# Patient Record
Sex: FEMALE | Race: WHITE | NOT HISPANIC OR LATINO | Employment: OTHER | URBAN - METROPOLITAN AREA
[De-identification: names, ages, dates, MRNs, and addresses within clinical notes are randomized per-mention and may not be internally consistent; named-entity substitution may affect disease eponyms.]

---

## 2017-02-22 ENCOUNTER — HISTORICAL (OUTPATIENT)
Dept: ADMINISTRATIVE | Facility: HOSPITAL | Age: 62
End: 2017-02-22

## 2018-03-01 ENCOUNTER — HISTORICAL (OUTPATIENT)
Dept: ADMINISTRATIVE | Facility: HOSPITAL | Age: 63
End: 2018-03-01

## 2018-03-01 LAB — ERYTHROCYTE [SEDIMENTATION RATE] IN BLOOD: 55 MM/HR (ref 0–20)

## 2018-05-30 ENCOUNTER — HISTORICAL (OUTPATIENT)
Dept: INTERNAL MEDICINE | Facility: CLINIC | Age: 63
End: 2018-05-30

## 2018-05-30 LAB
ABS NEUT (OLG): 5.04 X10(3)/MCL (ref 2.1–9.2)
ALBUMIN SERPL-MCNC: 3.2 GM/DL (ref 3.4–5)
ALBUMIN/GLOB SERPL: 1 RATIO (ref 1–2)
ALP SERPL-CCNC: 150 UNIT/L (ref 45–117)
ALT SERPL-CCNC: 23 UNIT/L (ref 12–78)
AST SERPL-CCNC: 17 UNIT/L (ref 15–37)
BASOPHILS # BLD AUTO: 0.07 X10(3)/MCL
BASOPHILS NFR BLD AUTO: 1 %
BILIRUB SERPL-MCNC: 0.5 MG/DL (ref 0.2–1)
BILIRUBIN DIRECT+TOT PNL SERPL-MCNC: 0.1 MG/DL
BILIRUBIN DIRECT+TOT PNL SERPL-MCNC: 0.4 MG/DL
BUN SERPL-MCNC: 13 MG/DL (ref 7–18)
CALCIUM SERPL-MCNC: 9 MG/DL (ref 8.5–10.1)
CHLORIDE SERPL-SCNC: 103 MMOL/L (ref 98–107)
CHOLEST SERPL-MCNC: 141 MG/DL
CHOLEST/HDLC SERPL: 2.5 {RATIO} (ref 0–4.4)
CO2 SERPL-SCNC: 28 MMOL/L (ref 21–32)
CREAT SERPL-MCNC: 1 MG/DL (ref 0.6–1.3)
CREAT UR-MCNC: 310 MG/DL
EOSINOPHIL # BLD AUTO: 0.28 X10(3)/MCL
EOSINOPHIL NFR BLD AUTO: 4 %
ERYTHROCYTE [DISTWIDTH] IN BLOOD BY AUTOMATED COUNT: 14.5 % (ref 11.5–14.5)
ERYTHROCYTE [SEDIMENTATION RATE] IN BLOOD: 48 MM/HR (ref 0–20)
EST. AVERAGE GLUCOSE BLD GHB EST-MCNC: 275 MG/DL
GLOBULIN SER-MCNC: 4.7 GM/ML (ref 2.3–3.5)
GLUCOSE SERPL-MCNC: 252 MG/DL (ref 74–106)
HBA1C MFR BLD: 11.2 % (ref 4.2–6.3)
HCT VFR BLD AUTO: 41.5 % (ref 35–46)
HDLC SERPL-MCNC: 56 MG/DL
HGB BLD-MCNC: 13.2 GM/DL (ref 12–16)
IMM GRANULOCYTES # BLD AUTO: 0.03 10*3/UL
IMM GRANULOCYTES NFR BLD AUTO: 0 %
LDLC SERPL CALC-MCNC: 66 MG/DL (ref 0–130)
LYMPHOCYTES # BLD AUTO: 1.92 X10(3)/MCL
LYMPHOCYTES NFR BLD AUTO: 25 % (ref 13–40)
MCH RBC QN AUTO: 25 PG (ref 26–34)
MCHC RBC AUTO-ENTMCNC: 31.8 GM/DL (ref 31–37)
MCV RBC AUTO: 78.7 FL (ref 80–100)
MICROALBUMIN UR-MCNC: 69 MG/L (ref 0–19)
MICROALBUMIN/CREAT RATIO PNL UR: 22.3 MCG/MG CR (ref 0–29)
MONOCYTES # BLD AUTO: 0.48 X10(3)/MCL
MONOCYTES NFR BLD AUTO: 6 % (ref 4–12)
NEUTROPHILS # BLD AUTO: 5.04 X10(3)/MCL
NEUTROPHILS NFR BLD AUTO: 64 X10(3)/MCL
PLATELET # BLD AUTO: 141 X10(3)/MCL (ref 130–400)
PMV BLD AUTO: 12.3 FL (ref 7.4–10.4)
POTASSIUM SERPL-SCNC: 4.2 MMOL/L (ref 3.5–5.1)
PROT SERPL-MCNC: 7.9 GM/DL (ref 6.4–8.2)
RBC # BLD AUTO: 5.27 X10(6)/MCL (ref 4–5.2)
SODIUM SERPL-SCNC: 138 MMOL/L (ref 136–145)
TRIGL SERPL-MCNC: 93 MG/DL
VLDLC SERPL CALC-MCNC: 19 MG/DL
WBC # SPEC AUTO: 7.8 X10(3)/MCL (ref 4.5–11)

## 2018-08-10 ENCOUNTER — HISTORICAL (OUTPATIENT)
Dept: RADIOLOGY | Facility: HOSPITAL | Age: 63
End: 2018-08-10

## 2018-08-14 ENCOUNTER — HISTORICAL (OUTPATIENT)
Dept: RADIOLOGY | Facility: HOSPITAL | Age: 63
End: 2018-08-14

## 2018-08-14 ENCOUNTER — HISTORICAL (OUTPATIENT)
Dept: WOUND CARE | Facility: HOSPITAL | Age: 63
End: 2018-08-14

## 2018-08-21 ENCOUNTER — HISTORICAL (OUTPATIENT)
Dept: WOUND CARE | Facility: HOSPITAL | Age: 63
End: 2018-08-21

## 2018-08-22 ENCOUNTER — HISTORICAL (OUTPATIENT)
Dept: RADIOLOGY | Facility: HOSPITAL | Age: 63
End: 2018-08-22

## 2018-09-11 ENCOUNTER — HISTORICAL (OUTPATIENT)
Dept: WOUND CARE | Facility: HOSPITAL | Age: 63
End: 2018-09-11

## 2018-09-19 ENCOUNTER — HISTORICAL (OUTPATIENT)
Dept: WOUND CARE | Facility: HOSPITAL | Age: 63
End: 2018-09-19

## 2018-09-19 ENCOUNTER — HISTORICAL (OUTPATIENT)
Dept: LAB | Facility: HOSPITAL | Age: 63
End: 2018-09-19

## 2018-09-19 LAB — (HCYS)2 SERPL-MCNC: 11.7 MCMOL/L (ref 3.2–10.7)

## 2018-09-24 ENCOUNTER — HISTORICAL (OUTPATIENT)
Dept: RADIOLOGY | Facility: HOSPITAL | Age: 63
End: 2018-09-24

## 2018-09-26 ENCOUNTER — HISTORICAL (OUTPATIENT)
Dept: WOUND CARE | Facility: HOSPITAL | Age: 63
End: 2018-09-26

## 2018-10-03 ENCOUNTER — HISTORICAL (OUTPATIENT)
Dept: WOUND CARE | Facility: HOSPITAL | Age: 63
End: 2018-10-03

## 2018-11-15 ENCOUNTER — HISTORICAL (OUTPATIENT)
Dept: SURGERY | Facility: HOSPITAL | Age: 63
End: 2018-11-15

## 2019-05-10 ENCOUNTER — HISTORICAL (OUTPATIENT)
Dept: INTERNAL MEDICINE | Facility: CLINIC | Age: 64
End: 2019-05-10

## 2019-05-10 LAB
EST. AVERAGE GLUCOSE BLD GHB EST-MCNC: 266 MG/DL
HBA1C MFR BLD: 10.9 % (ref 4.2–6.3)

## 2019-08-02 ENCOUNTER — HISTORICAL (OUTPATIENT)
Dept: INTERNAL MEDICINE | Facility: CLINIC | Age: 64
End: 2019-08-02

## 2019-08-02 LAB
CHOLEST SERPL-MCNC: 190 MG/DL
CHOLEST/HDLC SERPL: 3.2 {RATIO} (ref 0–4.4)
EST. AVERAGE GLUCOSE BLD GHB EST-MCNC: 255 MG/DL
HBA1C MFR BLD: 10.5 % (ref 4.2–6.3)
HDLC SERPL-MCNC: 59 MG/DL
LDLC SERPL CALC-MCNC: 107 MG/DL (ref 0–130)
TRIGL SERPL-MCNC: 118 MG/DL
VLDLC SERPL CALC-MCNC: 24 MG/DL

## 2019-08-26 ENCOUNTER — HISTORICAL (OUTPATIENT)
Dept: RADIOLOGY | Facility: HOSPITAL | Age: 64
End: 2019-08-26

## 2020-01-09 ENCOUNTER — HISTORICAL (OUTPATIENT)
Dept: ADMINISTRATIVE | Facility: HOSPITAL | Age: 65
End: 2020-01-09

## 2020-02-14 ENCOUNTER — HISTORICAL (OUTPATIENT)
Dept: ADMINISTRATIVE | Facility: HOSPITAL | Age: 65
End: 2020-02-14

## 2020-06-26 ENCOUNTER — HISTORICAL (OUTPATIENT)
Dept: ADMINISTRATIVE | Facility: HOSPITAL | Age: 65
End: 2020-06-26

## 2020-09-24 ENCOUNTER — HISTORICAL (OUTPATIENT)
Dept: INTERNAL MEDICINE | Facility: CLINIC | Age: 65
End: 2020-09-24

## 2020-09-24 LAB
ABS NEUT (OLG): 4.88 X10(3)/MCL (ref 2.1–9.2)
ALBUMIN SERPL-MCNC: 3.3 GM/DL (ref 3.4–5)
ALBUMIN/GLOB SERPL: 0.7 RATIO (ref 1.1–2)
ALP SERPL-CCNC: 112 UNIT/L (ref 45–117)
ALT SERPL-CCNC: 19 UNIT/L (ref 12–78)
AST SERPL-CCNC: 14 UNIT/L (ref 15–37)
BASOPHILS # BLD AUTO: 0.1 X10(3)/MCL (ref 0–0.2)
BASOPHILS NFR BLD AUTO: 1 %
BILIRUB SERPL-MCNC: 0.5 MG/DL (ref 0.2–1)
BILIRUBIN DIRECT+TOT PNL SERPL-MCNC: 0.1 MG/DL (ref 0–0.2)
BILIRUBIN DIRECT+TOT PNL SERPL-MCNC: 0.4 MG/DL
BUN SERPL-MCNC: 21 MG/DL (ref 7–18)
CALCIUM SERPL-MCNC: 9.5 MG/DL (ref 8.5–10.1)
CHLORIDE SERPL-SCNC: 105 MMOL/L (ref 98–107)
CHOLEST SERPL-MCNC: 165 MG/DL
CHOLEST/HDLC SERPL: 3.1 {RATIO} (ref 0–4.4)
CO2 SERPL-SCNC: 30 MMOL/L (ref 21–32)
CREAT SERPL-MCNC: 1.1 MG/DL (ref 0.6–1.3)
CREAT UR-MCNC: 126 MG/DL
EOSINOPHIL # BLD AUTO: 0.3 X10(3)/MCL (ref 0–0.9)
EOSINOPHIL NFR BLD AUTO: 4 %
ERYTHROCYTE [DISTWIDTH] IN BLOOD BY AUTOMATED COUNT: 14.6 % (ref 11.5–14.5)
EST. AVERAGE GLUCOSE BLD GHB EST-MCNC: 226 MG/DL
GLOBULIN SER-MCNC: 4.7 GM/ML (ref 2.3–3.5)
GLUCOSE SERPL-MCNC: 100 MG/DL (ref 74–106)
HBA1C MFR BLD: 9.5 % (ref 4.2–6.3)
HCT VFR BLD AUTO: 41.6 % (ref 35–46)
HDLC SERPL-MCNC: 54 MG/DL (ref 40–59)
HGB BLD-MCNC: 12.7 GM/DL (ref 12–16)
IMM GRANULOCYTES # BLD AUTO: 0.05 10*3/UL
IMM GRANULOCYTES NFR BLD AUTO: 1 %
LDLC SERPL CALC-MCNC: 92 MG/DL
LYMPHOCYTES # BLD AUTO: 1.5 X10(3)/MCL (ref 0.6–4.6)
LYMPHOCYTES NFR BLD AUTO: 21 %
MCH RBC QN AUTO: 25.8 PG (ref 26–34)
MCHC RBC AUTO-ENTMCNC: 30.5 GM/DL (ref 31–37)
MCV RBC AUTO: 84.6 FL (ref 80–100)
MICROALBUMIN UR-MCNC: 36.1 MG/L (ref 0–19)
MICROALBUMIN/CREAT RATIO PNL UR: 28.7 MCG/MG CR (ref 0–29)
MONOCYTES # BLD AUTO: 0.5 X10(3)/MCL (ref 0.1–1.3)
MONOCYTES NFR BLD AUTO: 6 %
NEUTROPHILS # BLD AUTO: 4.88 X10(3)/MCL (ref 2.1–9.2)
NEUTROPHILS NFR BLD AUTO: 67 %
PLATELET # BLD AUTO: 163 X10(3)/MCL (ref 130–400)
PMV BLD AUTO: 12.1 FL (ref 7.4–10.4)
POTASSIUM SERPL-SCNC: 4.9 MMOL/L (ref 3.5–5.1)
PROT SERPL-MCNC: 8 GM/DL (ref 6.4–8.2)
RBC # BLD AUTO: 4.92 X10(6)/MCL (ref 4–5.2)
SODIUM SERPL-SCNC: 141 MMOL/L (ref 136–145)
TRIGL SERPL-MCNC: 97 MG/DL
VLDLC SERPL CALC-MCNC: 19 MG/DL
WBC # SPEC AUTO: 7.2 X10(3)/MCL (ref 4.5–11)

## 2020-09-28 ENCOUNTER — HISTORICAL (OUTPATIENT)
Dept: ADMINISTRATIVE | Facility: HOSPITAL | Age: 65
End: 2020-09-28

## 2021-02-12 ENCOUNTER — HISTORICAL (OUTPATIENT)
Dept: ADMINISTRATIVE | Facility: HOSPITAL | Age: 66
End: 2021-02-12

## 2021-02-12 LAB
ABS NEUT (OLG): 4.91 X10(3)/MCL (ref 2.1–9.2)
ALBUMIN SERPL-MCNC: 3.5 GM/DL (ref 3.4–4.8)
ALBUMIN/GLOB SERPL: 0.8 RATIO (ref 1.1–2)
ALP SERPL-CCNC: 121 UNIT/L (ref 40–150)
ALT SERPL-CCNC: 12 UNIT/L (ref 0–55)
APPEARANCE, UA: CLEAR
AST SERPL-CCNC: 16 UNIT/L (ref 5–34)
BACTERIA #/AREA URNS AUTO: ABNORMAL /HPF
BASOPHILS # BLD AUTO: 0 X10(3)/MCL (ref 0–0.2)
BASOPHILS NFR BLD AUTO: 1 %
BILIRUB SERPL-MCNC: 0.5 MG/DL
BILIRUB UR QL STRIP: NEGATIVE
BILIRUBIN DIRECT+TOT PNL SERPL-MCNC: 0.2 MG/DL (ref 0–0.8)
BILIRUBIN DIRECT+TOT PNL SERPL-MCNC: 0.3 MG/DL (ref 0–0.5)
BUN SERPL-MCNC: 16 MG/DL (ref 9.8–20.1)
CALCIUM SERPL-MCNC: 9.5 MG/DL (ref 8.4–10.2)
CHLORIDE SERPL-SCNC: 100 MMOL/L (ref 98–107)
CO2 SERPL-SCNC: 30 MMOL/L (ref 23–31)
COLOR UR: NORMAL
CREAT SERPL-MCNC: 1.02 MG/DL (ref 0.55–1.02)
DEPRECATED CALCIDIOL+CALCIFEROL SERPL-MC: 13.9 NG/ML (ref 30–80)
EOSINOPHIL # BLD AUTO: 0.3 X10(3)/MCL (ref 0–0.9)
EOSINOPHIL NFR BLD AUTO: 4 %
ERYTHROCYTE [DISTWIDTH] IN BLOOD BY AUTOMATED COUNT: 14.5 % (ref 11.5–14.5)
EST. AVERAGE GLUCOSE BLD GHB EST-MCNC: 231.7 MG/DL
GLOBULIN SER-MCNC: 4.6 GM/DL (ref 2.4–3.5)
GLUCOSE (UA): NEGATIVE
GLUCOSE SERPL-MCNC: 146 MG/DL (ref 82–115)
HAV IGM SERPL QL IA: NONREACTIVE
HBA1C MFR BLD: 9.7 %
HBV CORE IGM SERPL QL IA: NONREACTIVE
HBV SURFACE AG SERPL QL IA: NONREACTIVE
HCT VFR BLD AUTO: 42.9 % (ref 35–46)
HCV AB SERPL QL IA: NONREACTIVE
HGB BLD-MCNC: 13 GM/DL (ref 12–16)
HGB UR QL STRIP: NEGATIVE
HYALINE CASTS #/AREA URNS LPF: ABNORMAL /LPF
IMM GRANULOCYTES # BLD AUTO: 0.03 10*3/UL
IMM GRANULOCYTES NFR BLD AUTO: 0 %
INR PPP: 1.1 (ref 0.9–1.2)
KETONES UR QL STRIP: NEGATIVE
LEUKOCYTE ESTERASE UR QL STRIP: NEGATIVE
LYMPHOCYTES # BLD AUTO: 1.6 X10(3)/MCL (ref 0.6–4.6)
LYMPHOCYTES NFR BLD AUTO: 22 %
MCH RBC QN AUTO: 25.5 PG (ref 26–34)
MCHC RBC AUTO-ENTMCNC: 30.3 GM/DL (ref 31–37)
MCV RBC AUTO: 84.3 FL (ref 80–100)
MONOCYTES # BLD AUTO: 0.4 X10(3)/MCL (ref 0.1–1.3)
MONOCYTES NFR BLD AUTO: 5 %
NEUTROPHILS # BLD AUTO: 4.91 X10(3)/MCL (ref 2.1–9.2)
NEUTROPHILS NFR BLD AUTO: 68 %
NITRITE UR QL STRIP: NEGATIVE
PH UR STRIP: 5.5 [PH] (ref 4.5–8)
PLATELET # BLD AUTO: 154 X10(3)/MCL (ref 130–400)
PMV BLD AUTO: 12.4 FL (ref 7.4–10.4)
POTASSIUM SERPL-SCNC: 4.3 MMOL/L (ref 3.5–5.1)
PROT SERPL-MCNC: 8.1 GM/DL (ref 5.8–7.6)
PROT UR QL STRIP: NEGATIVE
PROTHROMBIN TIME: 13.8 SECOND(S) (ref 11.9–14.4)
RBC # BLD AUTO: 5.09 X10(6)/MCL (ref 4–5.2)
RBC #/AREA URNS AUTO: ABNORMAL /HPF
SODIUM SERPL-SCNC: 139 MMOL/L (ref 136–145)
SP GR UR STRIP: 1.01 (ref 1–1.03)
SQUAMOUS #/AREA URNS LPF: ABNORMAL /LPF
TSH SERPL-ACNC: 3.04 UIU/ML (ref 0.35–4.94)
UROBILINOGEN UR STRIP-ACNC: NORMAL
WBC # SPEC AUTO: 7.3 X10(3)/MCL (ref 4.5–11)
WBC #/AREA URNS AUTO: ABNORMAL /HPF

## 2021-03-02 ENCOUNTER — HISTORICAL (OUTPATIENT)
Dept: RADIOLOGY | Facility: HOSPITAL | Age: 66
End: 2021-03-02

## 2021-06-08 ENCOUNTER — HISTORICAL (OUTPATIENT)
Dept: ADMINISTRATIVE | Facility: HOSPITAL | Age: 66
End: 2021-06-08

## 2021-06-08 LAB
ABS NEUT (OLG): 5.19 X10(3)/MCL (ref 2.1–9.2)
ALBUMIN SERPL-MCNC: 3.5 GM/DL (ref 3.4–4.8)
ALBUMIN/GLOB SERPL: 0.8 RATIO (ref 1.1–2)
ALP SERPL-CCNC: 108 UNIT/L (ref 40–150)
ALT SERPL-CCNC: 16 UNIT/L (ref 0–55)
AST SERPL-CCNC: 19 UNIT/L (ref 5–34)
BASOPHILS # BLD AUTO: 0 X10(3)/MCL (ref 0–0.2)
BASOPHILS NFR BLD AUTO: 0 %
BILIRUB SERPL-MCNC: 0.7 MG/DL
BILIRUBIN DIRECT+TOT PNL SERPL-MCNC: 0.3 MG/DL (ref 0–0.5)
BILIRUBIN DIRECT+TOT PNL SERPL-MCNC: 0.4 MG/DL (ref 0–0.8)
BUN SERPL-MCNC: 6.5 MG/DL (ref 9.8–20.1)
CALCIUM SERPL-MCNC: 9.8 MG/DL (ref 8.4–10.2)
CHLORIDE SERPL-SCNC: 100 MMOL/L (ref 98–107)
CHOLEST SERPL-MCNC: 167 MG/DL
CHOLEST/HDLC SERPL: 4 {RATIO} (ref 0–5)
CO2 SERPL-SCNC: 29 MMOL/L (ref 23–31)
CREAT SERPL-MCNC: 0.97 MG/DL (ref 0.55–1.02)
CREAT UR-MCNC: 332.7 MG/DL (ref 45–106)
DEPRECATED CALCIDIOL+CALCIFEROL SERPL-MC: 9.5 NG/ML (ref 30–80)
EOSINOPHIL # BLD AUTO: 0.4 X10(3)/MCL (ref 0–0.9)
EOSINOPHIL NFR BLD AUTO: 4 %
ERYTHROCYTE [DISTWIDTH] IN BLOOD BY AUTOMATED COUNT: 16.6 % (ref 11.5–14.5)
EST. AVERAGE GLUCOSE BLD GHB EST-MCNC: 157.1 MG/DL
GLOBULIN SER-MCNC: 4.3 GM/DL (ref 2.4–3.5)
GLUCOSE SERPL-MCNC: 120 MG/DL (ref 82–115)
HBA1C MFR BLD: 7.1 %
HCT VFR BLD AUTO: 40.3 % (ref 35–46)
HDLC SERPL-MCNC: 47 MG/DL (ref 35–60)
HGB BLD-MCNC: 13.1 GM/DL (ref 12–16)
IMM GRANULOCYTES # BLD AUTO: 0.02 10*3/UL
IMM GRANULOCYTES NFR BLD AUTO: 0 %
LDLC SERPL CALC-MCNC: 95 MG/DL (ref 50–140)
LYMPHOCYTES # BLD AUTO: 1.8 X10(3)/MCL (ref 0.6–4.6)
LYMPHOCYTES NFR BLD AUTO: 23 %
MCH RBC QN AUTO: 27.6 PG (ref 26–34)
MCHC RBC AUTO-ENTMCNC: 32.5 GM/DL (ref 31–37)
MCV RBC AUTO: 84.8 FL (ref 80–100)
MICROALBUMIN UR-MCNC: 384 MG/L
MICROALBUMIN/CREAT RATIO PNL UR: 115.4 MG/GM CR (ref 0–30)
MONOCYTES # BLD AUTO: 0.4 X10(3)/MCL (ref 0.1–1.3)
MONOCYTES NFR BLD AUTO: 4 %
NEUTROPHILS # BLD AUTO: 5.19 X10(3)/MCL (ref 2.1–9.2)
NEUTROPHILS NFR BLD AUTO: 67 %
NRBC BLD AUTO-RTO: 0 % (ref 0–0.2)
PLATELET # BLD AUTO: 144 X10(3)/MCL (ref 130–400)
PMV BLD AUTO: 12.4 FL (ref 7.4–10.4)
POTASSIUM SERPL-SCNC: 3.6 MMOL/L (ref 3.5–5.1)
PROT SERPL-MCNC: 7.8 GM/DL (ref 5.8–7.6)
RBC # BLD AUTO: 4.75 X10(6)/MCL (ref 4–5.2)
SODIUM SERPL-SCNC: 139 MMOL/L (ref 136–145)
TRIGL SERPL-MCNC: 123 MG/DL (ref 37–140)
VLDLC SERPL CALC-MCNC: 25 MG/DL
WBC # SPEC AUTO: 7.7 X10(3)/MCL (ref 4.5–11)

## 2021-06-29 ENCOUNTER — HISTORICAL (OUTPATIENT)
Dept: RADIOLOGY | Facility: HOSPITAL | Age: 66
End: 2021-06-29

## 2021-12-21 ENCOUNTER — HISTORICAL (OUTPATIENT)
Dept: ADMINISTRATIVE | Facility: HOSPITAL | Age: 66
End: 2021-12-21

## 2021-12-21 LAB
ABS NEUT (OLG): 3.48 X10(3)/MCL (ref 2.1–9.2)
ALBUMIN SERPL-MCNC: 3.6 GM/DL (ref 3.4–4.8)
ALBUMIN/GLOB SERPL: 0.9 RATIO (ref 1.1–2)
ALP SERPL-CCNC: 127 UNIT/L (ref 40–150)
ALT SERPL-CCNC: 13 UNIT/L (ref 0–55)
AST SERPL-CCNC: 18 UNIT/L (ref 5–34)
BASOPHILS # BLD AUTO: 0 X10(3)/MCL (ref 0–0.2)
BASOPHILS NFR BLD AUTO: 1 %
BILIRUB SERPL-MCNC: 0.4 MG/DL
BILIRUBIN DIRECT+TOT PNL SERPL-MCNC: 0.2 MG/DL (ref 0–0.5)
BILIRUBIN DIRECT+TOT PNL SERPL-MCNC: 0.2 MG/DL (ref 0–0.8)
BUN SERPL-MCNC: 16.7 MG/DL (ref 9.8–20.1)
CALCIUM SERPL-MCNC: 9.7 MG/DL (ref 8.7–10.5)
CHLORIDE SERPL-SCNC: 102 MMOL/L (ref 98–107)
CHOLEST SERPL-MCNC: 175 MG/DL
CHOLEST/HDLC SERPL: 3 {RATIO} (ref 0–5)
CO2 SERPL-SCNC: 31 MMOL/L (ref 23–31)
CREAT SERPL-MCNC: 0.87 MG/DL (ref 0.55–1.02)
CREAT UR-MCNC: 53 MG/DL (ref 45–106)
DEPRECATED CALCIDIOL+CALCIFEROL SERPL-MC: 14.3 NG/ML (ref 30–80)
EOSINOPHIL # BLD AUTO: 0.3 X10(3)/MCL (ref 0–0.9)
EOSINOPHIL NFR BLD AUTO: 5 %
ERYTHROCYTE [DISTWIDTH] IN BLOOD BY AUTOMATED COUNT: 14 % (ref 11.5–14.5)
EST. AVERAGE GLUCOSE BLD GHB EST-MCNC: 168.6 MG/DL
GLOBULIN SER-MCNC: 4.1 GM/DL (ref 2.4–3.5)
GLUCOSE SERPL-MCNC: 131 MG/DL (ref 82–115)
HBA1C MFR BLD: 7.5 %
HCT VFR BLD AUTO: 41.1 % (ref 35–46)
HDLC SERPL-MCNC: 59 MG/DL (ref 35–60)
HGB BLD-MCNC: 12.7 GM/DL (ref 12–16)
IMM GRANULOCYTES # BLD AUTO: 0.03 10*3/UL
IMM GRANULOCYTES NFR BLD AUTO: 0 %
LDLC SERPL CALC-MCNC: 99 MG/DL (ref 50–140)
LYMPHOCYTES # BLD AUTO: 1.5 X10(3)/MCL (ref 0.6–4.6)
LYMPHOCYTES NFR BLD AUTO: 26 %
MCH RBC QN AUTO: 26.2 PG (ref 26–34)
MCHC RBC AUTO-ENTMCNC: 30.9 GM/DL (ref 31–37)
MCV RBC AUTO: 84.9 FL (ref 80–100)
MICROALBUMIN UR-MCNC: 90.4 MG/L
MICROALBUMIN/CREAT RATIO PNL UR: 170.6 MG/GM CR (ref 0–30)
MONOCYTES # BLD AUTO: 0.3 X10(3)/MCL (ref 0.1–1.3)
MONOCYTES NFR BLD AUTO: 5 %
NEUTROPHILS # BLD AUTO: 3.48 X10(3)/MCL (ref 2.1–9.2)
NEUTROPHILS NFR BLD AUTO: 62 %
NRBC BLD AUTO-RTO: 0 % (ref 0–0.2)
PLATELET # BLD AUTO: 132 X10(3)/MCL (ref 130–400)
PMV BLD AUTO: 12.4 FL (ref 7.4–10.4)
POTASSIUM SERPL-SCNC: 4.2 MMOL/L (ref 3.5–5.1)
PROT SERPL-MCNC: 7.7 GM/DL (ref 5.8–7.6)
RBC # BLD AUTO: 4.84 X10(6)/MCL (ref 4–5.2)
SODIUM SERPL-SCNC: 139 MMOL/L (ref 136–145)
TRIGL SERPL-MCNC: 84 MG/DL (ref 37–140)
VLDLC SERPL CALC-MCNC: 17 MG/DL
WBC # SPEC AUTO: 5.6 X10(3)/MCL (ref 4.5–11)

## 2022-01-19 ENCOUNTER — HISTORICAL (OUTPATIENT)
Dept: LAB | Facility: HOSPITAL | Age: 67
End: 2022-01-19

## 2022-01-19 ENCOUNTER — HISTORICAL (OUTPATIENT)
Dept: CARDIOLOGY | Facility: HOSPITAL | Age: 67
End: 2022-01-19

## 2022-01-19 LAB
BUN SERPL-MCNC: 15.5 MG/DL (ref 9.8–20.1)
CALCIUM SERPL-MCNC: 9.7 MG/DL (ref 8.7–10.5)
CHLORIDE SERPL-SCNC: 106 MMOL/L (ref 98–107)
CO2 SERPL-SCNC: 28 MMOL/L (ref 23–31)
CREAT SERPL-MCNC: 0.93 MG/DL (ref 0.55–1.02)
CREAT/UREA NIT SERPL: 17
DEPRECATED CALCIDIOL+CALCIFEROL SERPL-MC: 14.3 NG/ML (ref 30–80)
ERYTHROCYTE [DISTWIDTH] IN BLOOD BY AUTOMATED COUNT: 14.1 % (ref 11.5–14.5)
GLUCOSE SERPL-MCNC: 90 MG/DL (ref 82–115)
HCT VFR BLD AUTO: 41 % (ref 35–46)
HGB BLD-MCNC: 12.6 GM/DL (ref 12–16)
MCH RBC QN AUTO: 26.2 PG (ref 26–34)
MCHC RBC AUTO-ENTMCNC: 30.7 GM/DL (ref 31–37)
MCV RBC AUTO: 85.2 FL (ref 80–100)
NRBC BLD AUTO-RTO: 0 % (ref 0–0.2)
PLATELET # BLD AUTO: 121 X10(3)/MCL (ref 130–400)
PMV BLD AUTO: 11.9 FL (ref 7.4–10.4)
POTASSIUM SERPL-SCNC: 4.7 MMOL/L (ref 3.5–5.1)
RBC # BLD AUTO: 4.81 X10(6)/MCL (ref 4–5.2)
SODIUM SERPL-SCNC: 141 MMOL/L (ref 136–145)
WBC # SPEC AUTO: 5.1 X10(3)/MCL (ref 4.5–11)

## 2022-04-08 ENCOUNTER — HISTORICAL (OUTPATIENT)
Dept: LAB | Facility: HOSPITAL | Age: 67
End: 2022-04-08

## 2022-04-08 LAB
ABS NEUT (OLG): 3.72 (ref 2.1–9.2)
ALBUMIN SERPL-MCNC: 3.7 G/DL (ref 3.4–4.8)
ALBUMIN/GLOB SERPL: 0.9 {RATIO} (ref 1.1–2)
ALP SERPL-CCNC: 103 U/L (ref 40–150)
ALT SERPL-CCNC: 15 U/L (ref 0–55)
APPEARANCE, UA: NORMAL
AST SERPL-CCNC: 17 U/L (ref 5–34)
BACTERIA SPEC CULT: NORMAL
BASOPHILS # BLD AUTO: 0.06 10*3/UL (ref 0–0.2)
BASOPHILS NFR BLD AUTO: 1 % (ref 0–1)
BILIRUB SERPL-MCNC: 0.5 MG/DL (ref 0.2–1.2)
BILIRUB UR QL STRIP.AUTO: NEGATIVE
BILIRUB UR QL STRIP: NEGATIVE
BILIRUBIN DIRECT+TOT PNL SERPL-MCNC: 0.2 (ref 0–0.5)
BILIRUBIN DIRECT+TOT PNL SERPL-MCNC: 0.3 (ref 0–0.8)
BUN SERPL-MCNC: 23.2 MG/DL (ref 9.8–20.1)
CALCIUM SERPL-MCNC: 9.9 MG/DL (ref 8.4–10.2)
CHLORIDE SERPL-SCNC: 104 MMOL/L (ref 98–107)
CHOLEST SERPL-MCNC: 138 MG/DL
CHOLEST/HDLC SERPL: 3 {RATIO} (ref 0–5)
CO2 SERPL-SCNC: 31 MMOL/L (ref 23–31)
COLOR UR: YELLOW
CREAT SERPL-MCNC: 1.11 MG/DL (ref 0.57–1.11)
CREAT UR-MCNC: 40 MG/DL (ref 45–106)
DEPRECATED CALCIDIOL+CALCIFEROL SERPL-MC: 52.8 NG/ML (ref 30–80)
DO MICRO?: YES
EOSINOPHIL # BLD AUTO: 0.36 10*3/UL (ref 0–0.9)
EOSINOPHIL NFR BLD AUTO: 5.8 % (ref 0–6.4)
ERYTHROCYTE [DISTWIDTH] IN BLOOD BY AUTOMATED COUNT: 14.4 % (ref 11.5–17)
EST. AVERAGE GLUCOSE BLD GHB EST-MCNC: 208.7 MG/DL
GLOBULIN SER-MCNC: 4.1 G/DL (ref 2.4–3.5)
GLUCOSE (UA): NEGATIVE
GLUCOSE SERPL-MCNC: 107 MG/DL (ref 82–115)
GLUCOSE UR QL STRIP.AUTO: NEGATIVE
HBA1C MFR BLD: 8.9 %
HCT VFR BLD AUTO: 43.1 % (ref 37–47)
HDLC SERPL-MCNC: 53 MG/DL (ref 40–60)
HEMOLYSIS INTERF INDEX SERPL-ACNC: 6
HGB BLD-MCNC: 13.4 G/DL (ref 12–16)
HGB UR QL STRIP: NEGATIVE
ICTERIC INTERF INDEX SERPL-ACNC: 0
IMM GRANULOCYTES # BLD AUTO: 0.03 10*3/UL (ref 0–0.02)
IMM GRANULOCYTES NFR BLD AUTO: 0.5 % (ref 0–0.43)
KETONES UR QL STRIP.AUTO: NEGATIVE
KETONES UR QL STRIP: NEGATIVE
LDLC SERPL CALC-MCNC: 67 MG/DL (ref 50–140)
LEUKOCYTE ESTERASE UR QL STRIP.AUTO: NORMAL
LEUKOCYTE ESTERASE UR QL STRIP: NORMAL
LIPEMIC INTERF INDEX SERPL-ACNC: 10
LYMPHOCYTES # BLD AUTO: 1.68 10*3/UL (ref 0.6–4.6)
LYMPHOCYTES NFR BLD AUTO: 27 % (ref 16–44)
MANUAL DIFF? (OHS): NO
MCH RBC QN AUTO: 26.3 PG (ref 27–31)
MCHC RBC AUTO-ENTMCNC: 31.1 G/DL (ref 33–36)
MCV RBC AUTO: 84.5 FL (ref 80–94)
MICROALBUMIN UR-MCNC: 33.2
MICROALBUMIN/CREAT RATIO PNL UR: 83 (ref 0–30)
MONOCYTES # BLD AUTO: 0.37 10*3/UL (ref 0.1–1.3)
MONOCYTES NFR BLD AUTO: 5.9 % (ref 4–12.1)
NEUTROPHILS # BLD AUTO: 3.72 10*3/UL (ref 2.1–9.2)
NEUTROPHILS NFR BLD AUTO: 59.8 % (ref 43–73)
NITRITE UR QL STRIP: POSITIVE
NRBC BLD AUTO-RTO: 0 % (ref 0–0.2)
PH UR STRIP: 6 [PH] (ref 5–7)
PLATELET # BLD AUTO: 115 10*3/UL (ref 130–400)
PMV BLD AUTO: 12.5 FL (ref 7.4–10.4)
POTASSIUM SERPL-SCNC: 4.8 MMOL/L (ref 3.5–5.1)
PROT SERPL-MCNC: 7.8 G/DL (ref 5.8–7.6)
PROT UR QL STRIP.AUTO: NEGATIVE
PROT UR QL STRIP: NEGATIVE
RBC # BLD AUTO: 5.1 10*6/UL (ref 4.2–5.4)
RBC #/AREA URNS HPF: 0 /[HPF] (ref 2–5)
RBC UR QL AUTO: NEGATIVE
SODIUM SERPL-SCNC: 142 MMOL/L (ref 136–145)
SP GR UR STRIP: 1.01 (ref 1–1.03)
SQUAMOUS EPITHELIAL, UA: NORMAL
TRIGL SERPL-MCNC: 90 MG/DL (ref 0–150)
TSH SERPL-ACNC: 2.52 M[IU]/L (ref 0.35–4.94)
UROBILINOGEN UR STRIP-ACNC: 1
UROBILINOGEN UR STRIP-ACNC: NEGATIVE
VLDLC SERPL CALC-MCNC: 18 MG/DL
WBC # SPEC AUTO: 6.2 10*3/UL (ref 4.5–11.5)
WBC #/AREA URNS HPF: NORMAL /[HPF] (ref 2–5)

## 2022-04-10 ENCOUNTER — HISTORICAL (OUTPATIENT)
Dept: ADMINISTRATIVE | Facility: HOSPITAL | Age: 67
End: 2022-04-10
Payer: MEDICARE

## 2022-04-12 ENCOUNTER — HISTORICAL (OUTPATIENT)
Dept: LAB | Facility: HOSPITAL | Age: 67
End: 2022-04-12

## 2022-04-20 ENCOUNTER — HISTORICAL (OUTPATIENT)
Dept: ADMINISTRATIVE | Facility: HOSPITAL | Age: 67
End: 2022-04-20
Payer: MEDICARE

## 2022-04-20 LAB
ABS NEUT (OLG): 3.91 (ref 2.1–9.2)
ALBUMIN SERPL-MCNC: 3.5 G/DL (ref 3.4–4.8)
ALBUMIN/GLOB SERPL: 1 {RATIO} (ref 1.1–2)
ALP SERPL-CCNC: 124 U/L (ref 40–150)
ALT SERPL-CCNC: 15 U/L (ref 0–55)
AST SERPL-CCNC: 15 U/L (ref 5–34)
BASOPHILS # BLD AUTO: 0 10*3/UL (ref 0–0.2)
BASOPHILS NFR BLD AUTO: 0.6 %
BILIRUB SERPL-MCNC: 0.4 MG/DL
BILIRUBIN DIRECT+TOT PNL SERPL-MCNC: 0.2 (ref 0–0.5)
BILIRUBIN DIRECT+TOT PNL SERPL-MCNC: 0.2 (ref 0–0.8)
BUN SERPL-MCNC: 19.2 MG/DL (ref 9.8–20.1)
CALCIUM SERPL-MCNC: 9.3 MG/DL (ref 8.7–10.5)
CHLORIDE SERPL-SCNC: 101 MMOL/L (ref 98–107)
CO2 SERPL-SCNC: 31 MMOL/L (ref 23–31)
CREAT SERPL-MCNC: 1.05 MG/DL (ref 0.55–1.02)
EOSINOPHIL # BLD AUTO: 0.3 10*3/UL (ref 0–0.9)
EOSINOPHIL NFR BLD AUTO: 4.7 %
ERYTHROCYTE [DISTWIDTH] IN BLOOD BY AUTOMATED COUNT: 14.1 % (ref 11.5–17)
FERRITIN SERPL-MCNC: 98.79 NG/ML (ref 4.63–204)
FOLATE SERPL-MCNC: 6.7 NG/ML (ref 7–31.4)
GLOBULIN SER-MCNC: 3.4 G/DL (ref 2.4–3.5)
GLUCOSE SERPL-MCNC: 181 MG/DL (ref 82–115)
HCT VFR BLD AUTO: 38.7 % (ref 37–47)
HEMOLYSIS INTERF INDEX SERPL-ACNC: 2
HGB BLD-MCNC: 12 G/DL (ref 12–16)
ICTERIC INTERF INDEX SERPL-ACNC: 0
IRON SATN MFR SERPL: 18 % (ref 20–50)
IRON SERPL-MCNC: 46 UG/DL (ref 50–170)
LIPEMIC INTERF INDEX SERPL-ACNC: 1
LYMPHOCYTES # BLD AUTO: 1.6 10*3/UL (ref 0.6–4.6)
LYMPHOCYTES NFR BLD AUTO: 25.6 %
MANUAL DIFF? (OHS): NO
MCH RBC QN AUTO: 26 PG (ref 27–31)
MCHC RBC AUTO-ENTMCNC: 31 G/DL (ref 33–36)
MCV RBC AUTO: 83.9 FL (ref 80–94)
MONOCYTES # BLD AUTO: 0.3 10*3/UL (ref 0.1–1.3)
MONOCYTES NFR BLD AUTO: 5.5 %
NEUTROPHILS # BLD AUTO: 3.9 10*3/UL (ref 2.1–9.2)
NEUTROPHILS NFR BLD AUTO: 63.3 %
PLATELET # BLD AUTO: 108 10*3/UL (ref 130–400)
PMV BLD AUTO: 10.9 FL (ref 9.4–12.4)
POTASSIUM SERPL-SCNC: 4.2 MMOL/L (ref 3.5–5.1)
PROT SERPL-MCNC: 6.9 G/DL (ref 5.8–7.6)
RBC # BLD AUTO: 4.61 10*6/UL (ref 4.2–5.4)
RHEUMATOID FACT SERPL-ACNC: <13 [IU]/ML
SODIUM SERPL-SCNC: 141 MMOL/L (ref 136–145)
TIBC SERPL-MCNC: 212 UG/DL (ref 70–310)
TIBC SERPL-MCNC: 258 UG/DL (ref 250–450)
TRANSFERRIN SERPL-MCNC: 249 MG/DL (ref 173–360)
TSH SERPL-ACNC: 2.29 M[IU]/L (ref 0.35–4.94)
VIT B12 SERPL-MCNC: 491 PG/ML (ref 213–816)
WBC # SPEC AUTO: 6.2 10*3/UL (ref 4.5–11.5)

## 2022-04-24 VITALS
BODY MASS INDEX: 43.45 KG/M2 | DIASTOLIC BLOOD PRESSURE: 76 MMHG | OXYGEN SATURATION: 96 % | WEIGHT: 260.81 LBS | HEIGHT: 65 IN | SYSTOLIC BLOOD PRESSURE: 126 MMHG

## 2022-04-24 LAB
ANTINUCLEAR ANTIBODY SCREEN (OHS): NEGATIVE
CENTROMERE PROTEIN ANTIBODY (OHS): NEGATIVE
CENTROMERE QUANT (OHS): <0.4
DSDNA AB QUANT (OHS): 1.4
DSDNA ANTIBODY (OHS): NEGATIVE
JO-1 AB QUANT (OHS): <0.3
JO-1 ANTIBODY (OHS): NEGATIVE
RNP70 AB QUANT (OHS): <0.3
RNP70 ANTIBODY (OHS): NEGATIVE
SCL-70S AB QUANT (OHS): <0.6
SCLERODERMA (SCL-70S) ANTIBODY (OHS): NEGATIVE
SMITH AB QUANT (OHS): 1.7
SMITH DP IGG (OHS): NEGATIVE
SSA(RO) AB QUANT (OHS): 0.3
SSA(RO) ANTIBODY (OHS): NEGATIVE
SSB(LA) AB QUANT (OHS): 0.3
SSB(LA) ANTIBODY (OHS): NEGATIVE
U1RNP AB QUANT (OHS): 0.6
U1RNP ANTIBODY (OHS): NEGATIVE

## 2022-05-03 DIAGNOSIS — Z87.891 PERSONAL HISTORY OF TOBACCO USE, PRESENTING HAZARDS TO HEALTH: Primary | ICD-10-CM

## 2022-05-03 RX ORDER — CELECOXIB 200 MG/1
200 CAPSULE ORAL DAILY
COMMUNITY
Start: 2022-03-24 | End: 2023-02-02

## 2022-05-03 RX ORDER — SYRINGE,SAFETY WITH NEEDLE,1ML 25GX1"
1 SYRINGE (EA) MISCELLANEOUS
COMMUNITY
Start: 2021-11-20

## 2022-05-03 RX ORDER — INSULIN GLARGINE 100 [IU]/ML
60 INJECTION, SOLUTION SUBCUTANEOUS NIGHTLY
COMMUNITY
Start: 2022-02-03 | End: 2022-05-03 | Stop reason: SDUPTHER

## 2022-05-03 RX ORDER — PANTOPRAZOLE SODIUM 40 MG/1
40 TABLET, DELAYED RELEASE ORAL
Status: ON HOLD | COMMUNITY
Start: 2022-03-14 | End: 2023-11-20 | Stop reason: HOSPADM

## 2022-05-03 RX ORDER — INSULIN ASPART 100 [IU]/ML
5 INJECTION, SOLUTION INTRAVENOUS; SUBCUTANEOUS
COMMUNITY
Start: 2021-12-03 | End: 2022-05-03 | Stop reason: SDUPTHER

## 2022-05-03 RX ORDER — OXYBUTYNIN CHLORIDE 5 MG/1
1 TABLET ORAL 3 TIMES DAILY
COMMUNITY
Start: 2022-03-14 | End: 2022-08-09

## 2022-05-03 RX ORDER — HYDROCODONE BITARTRATE AND ACETAMINOPHEN 7.5; 325 MG/1; MG/1
1-2 TABLET ORAL EVERY 8 HOURS PRN
COMMUNITY
Start: 2022-02-02 | End: 2022-08-09

## 2022-05-03 RX ORDER — ATORVASTATIN CALCIUM 20 MG/1
20 TABLET, FILM COATED ORAL DAILY
COMMUNITY
Start: 2022-03-14 | End: 2022-10-04 | Stop reason: SDUPTHER

## 2022-05-03 RX ORDER — SERTRALINE HYDROCHLORIDE 50 MG/1
50 TABLET, FILM COATED ORAL DAILY
COMMUNITY
Start: 2022-03-14 | End: 2023-01-05 | Stop reason: SDUPTHER

## 2022-05-03 RX ORDER — INSULIN GLARGINE 300 U/ML
85 INJECTION, SOLUTION SUBCUTANEOUS DAILY
COMMUNITY
Start: 2022-04-18 | End: 2022-05-03 | Stop reason: SDUPTHER

## 2022-05-03 RX ORDER — LISINOPRIL 5 MG/1
5 TABLET ORAL DAILY
COMMUNITY
Start: 2022-03-14 | End: 2022-10-17 | Stop reason: SDUPTHER

## 2022-05-03 RX ORDER — GABAPENTIN 300 MG/1
300 CAPSULE ORAL 3 TIMES DAILY
COMMUNITY
Start: 2022-03-14 | End: 2023-01-11 | Stop reason: SDUPTHER

## 2022-05-03 RX ORDER — ASPIRIN 325 MG
50000 TABLET, DELAYED RELEASE (ENTERIC COATED) ORAL
COMMUNITY
Start: 2022-02-08 | End: 2022-08-09

## 2022-05-04 ENCOUNTER — OFFICE VISIT (OUTPATIENT)
Dept: HEMATOLOGY/ONCOLOGY | Facility: CLINIC | Age: 67
End: 2022-05-04
Payer: MEDICARE

## 2022-05-04 DIAGNOSIS — E53.8 FOLATE DEFICIENCY: ICD-10-CM

## 2022-05-04 DIAGNOSIS — E61.1 IRON DEFICIENCY: ICD-10-CM

## 2022-05-04 DIAGNOSIS — D69.6 THROMBOCYTOPENIA: Primary | ICD-10-CM

## 2022-05-04 PROCEDURE — 3288F PR FALLS RISK ASSESSMENT DOCUMENTED: ICD-10-PCS | Mod: CPTII,95,, | Performed by: INTERNAL MEDICINE

## 2022-05-04 PROCEDURE — 1101F PT FALLS ASSESS-DOCD LE1/YR: CPT | Mod: CPTII,95,, | Performed by: INTERNAL MEDICINE

## 2022-05-04 PROCEDURE — 99203 OFFICE O/P NEW LOW 30 MIN: CPT | Mod: 95,,, | Performed by: INTERNAL MEDICINE

## 2022-05-04 PROCEDURE — 1101F PR PT FALLS ASSESS DOC 0-1 FALLS W/OUT INJ PAST YR: ICD-10-PCS | Mod: CPTII,95,, | Performed by: INTERNAL MEDICINE

## 2022-05-04 PROCEDURE — 1159F PR MEDICATION LIST DOCUMENTED IN MEDICAL RECORD: ICD-10-PCS | Mod: CPTII,95,, | Performed by: INTERNAL MEDICINE

## 2022-05-04 PROCEDURE — 99203 PR OFFICE/OUTPT VISIT, NEW, LEVL III, 30-44 MIN: ICD-10-PCS | Mod: 95,,, | Performed by: INTERNAL MEDICINE

## 2022-05-04 PROCEDURE — 1160F PR REVIEW ALL MEDS BY PRESCRIBER/CLIN PHARMACIST DOCUMENTED: ICD-10-PCS | Mod: CPTII,95,, | Performed by: INTERNAL MEDICINE

## 2022-05-04 PROCEDURE — 1160F RVW MEDS BY RX/DR IN RCRD: CPT | Mod: CPTII,95,, | Performed by: INTERNAL MEDICINE

## 2022-05-04 PROCEDURE — 3288F FALL RISK ASSESSMENT DOCD: CPT | Mod: CPTII,95,, | Performed by: INTERNAL MEDICINE

## 2022-05-04 PROCEDURE — 1159F MED LIST DOCD IN RCRD: CPT | Mod: CPTII,95,, | Performed by: INTERNAL MEDICINE

## 2022-05-04 NOTE — HISTORICAL [facility]
This is a historical note converted from Seth. Formatting and pictures may have been removed.  Please reference Seth for original formatting and attached multimedia. Chief Complaint  Rt. great toe ulcer  Physical Exam  Vitals & Measurements  T:?36.5? ?C (Oral)? HR:?86(Peripheral)? RR:?16? BP:?117/80?  HT:?165?cm? WT:?115.4?kg?  Assessment/Plan  1.?Diabetic foot ulcer  Ordered:  Wound Care Team Treatment, 18 11:43:00 CDT, Stop date 18 11:43:00 CDT  Wound Care Team Treatment, 18 11:32:00 CDT, Stop date 18 11:32:00 CDT  XR Foot Great Toe Right Min 2 Views, Routine, 18 11:36:00 CDT, Inflammation, R/O Osteomyelitis, None, Ambulatory, Rad Type, Order for future visit, Diabetic foot ulcer, Not Scheduled, 18 11:36:00 CDT  ?  2.?Acquired keratoderma  ?  3.?Diabetic neuropathy  ?  4.?Xerosis of skin  ?  Orders:  sulfamethoxazole-trimethoprim, 1 tab(s), Oral, BID, for infection, X 10 day(s), # 20 tab(s), 0 Refill(s), Pharmacy: [id number], Patient Verbalizes Understanding  triamcinolone topical, 1 jayden, TOP, TID, Mix with an equal portion of Eucerin lotion and use as directed for dry skin dermatitis, X 30 day(s), # 60 mL, 0 Refill(s), Pharmacy: [id number], Patient Verbalizes Understanding  Wound Care Outpatient, *Est. 18 14:00:00 CDT, *Est. Stop date 18 14:00:00 CDT, [facility] FU with Physician in 1 week  Wound Care Outpatient, *Est. 18 3:00:00 CDT, *Est. Stop date 18 3:00:00 CDT, [facility] FU with Physician in 1 week   Problem List/Past Medical History  Ongoing  Acquired keratoderma  Depression  Diabetes type 2 on insulin  Diabetic foot ulcer  Diabetic neuropathy  Diabetic ulcer of right foot due to diabetes mellitus type 2  Tobacco user  Tobacco user  Xerosis of skin  Historical  Diabetes mellitus  Procedure/Surgical History  Appendectomy  Breast biopsy and related procedures    section  Cholecystectomy  gall bladder  Hemorrhoid  Hysterectomy   Medications  Bactrim  mg-160 mg oral tablet, 1 tab(s), Oral, BID  Basaglar KwikPen 100 units/mL subcutaneous solution, 100 units, Subcutaneous, Daily, 6 refills  Basaglar pen and needles, See Instructions, 6 refills  CBG test strips, See Instructions, 11 refills  Glucometer, See Instructions  Lancets, See Instructions, 11 refills  Lipitor 40 mg oral tablet, 40 mg= 1 tab(s), Oral, Daily, 6 refills  Needles, See Instructions, 11 refills  Neurontin 600 mg oral tablet, 600 mg= 1 tab(s), Oral, TID, 6 refills  NovoLOG 100 units/mL injectable solution, 5 units, Subcutaneous, TIDAC, 3 refills  triamcinolone 0.1% topical lotion, 1 jayden, TOP, TID  Zoloft 50 mg oral tablet, 75 mg= 1.5 tab(s), Oral, Daily, 6 refills  Allergies  No Known Medication Allergies  Social History  Alcohol - Denies Alcohol Use, 07/13/2014  Never, 03/08/2016  Employment/School  not working, Work/School description: not working. Highest education level: High school. Operates hazardous equipment: No., 04/09/2015  Exercise  Exercise frequency: Daily. Self assessment: Good condition. Exercise type: Walking., 04/09/2015  Home/Environment  Lives with Alone. Living situation: Home/Independent. Home equipment: Glucose monitoring. Alcohol abuse in household: No. Substance abuse in household: No. Smoker in household: Yes. Injuries/Abuse/Neglect in household: No. Feels unsafe at home: No. Family/Friends available for support: Yes. Concern for family members at home: No. Major illness in household: No. Financial concerns: Yes., 05/31/2018  Nutrition/Health  Type of diet: regular. Regular, Caffeine intake amount: coffee. Wants to lose weight: Yes. Sleeping concerns: Yes. Feels highly stressed: Yes., 04/09/2015  Other  Substance Abuse - Denies Substance Abuse, 07/13/2014  Never, 03/08/2016  Tobacco  Current every day smoker Use:. Cigarettes Type:. 6 per day., 08/08/2018  Family  History  Alzheimers disease: Mother.  DIABETES MELLITUS: Mother, Father, Sister and Brother.  Immunizations  Vaccine Date Status   influenza virus vaccine, inactivated 11/30/2016 Given   pneumococcal 23-polyvalent vaccine 11/30/2016 Given   Health Maintenance  Health Maintenance  ???Pending?(in the next year)  ??? ??OverDue  ??? ? ? ?Smoking Cessation (Diabetes) due??07/12/16??and every 2??year(s)  ??? ? ? ?Diabetes Maintenance-Urine Dipstick due??02/22/18??and every 1??year(s)  ??? ??Due?  ??? ? ? ?Breast Cancer Screening due??07/06/18??and every 2??year(s)  ??? ? ? ?Alcohol Misuse Screening due??08/14/18??and every 1??year(s)  ??? ? ? ?Cervical Cancer Screening due??08/14/18??and every?  ??? ? ? ?Colorectal Screening due??08/14/18??and every?  ??? ? ? ?Diabetes Maintenance-Foot Exam due??08/14/18??and every?  ??? ? ? ?Influenza Vaccine due??08/14/18??and every?  ??? ? ? ?Tetanus Vaccine due??08/14/18??and every 10??year(s)  ??? ? ? ?Zoster Vaccine due??08/14/18??and every 100??year(s)  ??? ??Due In Future?  ??? ? ? ?Diabetes Maintenance-Eye Exam not due until??05/24/19??and every 1??year(s)  ??? ? ? ?Diabetes Maintenance-Fasting Lipid Profile not due until??05/30/19??and every 1??year(s)  ??? ? ? ?Diabetes Maintenance-HgbA1c not due until??05/30/19??and every 1??year(s)  ??? ? ? ?Hypertension Management-BMP not due until??05/30/19??and every 1??year(s)  ??? ? ? ?Diabetes Maintenance-Serum Creatinine not due until??05/30/19??and every 1??year(s)  ??? ? ? ?Diabetes Maintenance-Microalbumin not due until??05/30/19??and every 1??year(s)  ??? ? ? ?Aspirin Therapy for CVD Prevention not due until??05/31/19??and every 1??year(s)  ??? ? ? ?Blood Pressure Screening not due until??08/08/19??and every 1??year(s)  ??? ? ? ?Body Mass Index Check not due until??08/08/19??and every 1??year(s)  ??? ? ? ?Hypertension Management-Blood Pressure not due until??08/08/19??and every 1??year(s)  ??? ? ? ?Obesity Screening not due  until??08/08/19??and every 1??year(s)  ???Satisfied?(in the past 1 year)  ??? ??Satisfied?  ??? ? ? ?Aspirin Therapy for CVD Prevention on??05/31/18.??Satisfied by Peng Ladd DO??Reason: Expectation Satisfied Elsewhere  ??? ? ? ?Blood Pressure Screening on??08/14/18.??Satisfied by Bhumi Mackenzie LPN.  ??? ? ? ?Body Mass Index Check on??08/08/18.??Satisfied by Andrea BARGER, Keeley  ??? ? ? ?Breast Cancer Screening on??08/22/18.??Satisfied by Peng Ladd DO  ??? ? ? ?Depression Screening on??08/08/18.??Satisfied by Andrea BARGER, Keeley  ??? ? ? ?Diabetes Maintenance-Eye Exam on??05/24/18.??Satisfied by Dejah Mcgregor  ??? ? ? ?Diabetes Maintenance-Fasting Lipid Profile on??05/30/18.??Satisfied by Marquez Marcelino Jr.  ??? ? ? ?Diabetes Maintenance-HgbA1c on??10/08/18.??Satisfied by Peng Ladd DO  ??? ? ? ?Diabetes Maintenance-Microalbumin on??05/30/18.??Satisfied by Marquez Marcelino Jr.  ??? ? ? ?Diabetes Maintenance-Serum Creatinine on??05/30/18.??Satisfied by Marquez Marcelino Jr.  ??? ? ? ?Diabetes Screening on??05/30/18.??Satisfied by Marquez Marcelino Jr.  ??? ? ? ?Hypertension Management-BMP on??05/30/18.??Satisfied by Marquez Marcelino Jr.  ??? ? ? ?Hypertension Management-Blood Pressure on??08/14/18.??Satisfied by Bhumi Mackenzie LPN.  ??? ? ? ?Lipid Screening on??05/30/18.??Satisfied by Marquez Marcelino Jr.  ??? ? ? ?Obesity Screening on??08/08/18.??Satisfied by Keeley Mendez LPN  ??? ? ? ?Smoking Cessation on??08/14/18.??Satisfied by Cintia Jo RN  ??? ? ? ?Smoking Cessation (Coronary Artery Disease) on??08/14/18.??Satisfied by Cintia Jo RN  ??? ? ? ?Smoking Cessation (Diabetes) on??08/14/18.??Satisfied by Cintia Jo RN  ?  ?

## 2022-05-04 NOTE — HISTORICAL [facility]
This is a historical note converted from Seth. Formatting and pictures may have been removed.  Please reference Seth for original formatting and attached multimedia. History of Present Illness  Mrs. Azul?notes that she has had?recurrent bleeding?involving the wound?on her?right hallux.? She continues to have significant loss of sensation.? She has been?more active recently and has?observed increased trauma?to the?right great toe as a result of excessive walking?and?use of her shoes.  Physical Exam  Vitals & Measurements  T:?36.9? ?C (Oral)? HR:?86(Peripheral)? RR:?16? BP:?123/80? SpO2:?96%?  ?  The patient has a broad area of keratoma formation involving the?medial and plantar right hallux.??Centrally?there is evidence of ulcerative changes?with associated?hyper granulation.? There is a moderate amount of undermining?associated with her wound.? There is no evidence of?cellulitic changes or purulence?at the site of her wound.  Assessment/Plan  1.?Granuloma, pyogenic  ? Today the patient underwent debridement?of her wound with extension to subcutaneous tissue.? The granulomatous tissue was excised using a #15 blade. ?Silver nitrate was used for cauterization?with?hemostasis being accomplished.? Will be placed on Bactrim?DS 1 twice daily for 7 days. ?Follow-up appointment has been suggested in 1 week.  Ordered:  Wound Care Team Treatment, 09/11/18 15:11:00 CDT, Stop date 09/11/18 15:11:00 CDT  ?  2.?Diabetic foot ulcer  Ordered:  Wound Care Team Treatment, 09/11/18 15:11:00 CDT, Stop date 09/11/18 15:11:00 CDT  ?  3.?Diabetic neuropathy  Ordered:  Wound Care Team Treatment, 09/11/18 15:11:00 CDT, Stop date 09/11/18 15:11:00 CDT  ?  Orders:  Wound Care Outpatient, *Est. 09/18/18 13:30:00 CDT, *Est. Stop date 09/18/18 13:30:00 CDT, [facility], FU with Physician in 1 week   Problem List/Past Medical History  Ongoing  Acquired keratoderma  Depression  Diabetes type 2 on insulin  Diabetic foot  ulcer  Diabetic neuropathy  Diabetic ulcer of right foot due to diabetes mellitus type 2  Granuloma, pyogenic  Tobacco user  Tobacco user  Xerosis of skin  Historical  Diabetes mellitus  Skin granuloma  Procedure/Surgical History  Appendectomy  Breast biopsy and related procedures   section  Cholecystectomy  gall bladder  Hemorrhoid  Hysterectomy   Medications  Basaglar KwikPen 100 units/mL subcutaneous solution, 100 units, Subcutaneous, Daily, 6 refills  Basaglar pen and needles, See Instructions, 6 refills  CBG test strips, See Instructions, 11 refills  Glucometer, See Instructions  Lancets, See Instructions, 11 refills  Lipitor 40 mg oral tablet, 40 mg= 1 tab(s), Oral, Daily, 6 refills  Needles, See Instructions, 11 refills  Neurontin 600 mg oral tablet, 600 mg= 1 tab(s), Oral, TID, 6 refills  NovoLOG 100 units/mL injectable solution, 5 units, Subcutaneous, TIDAC, 3 refills  triamcinolone 0.1% topical lotion, 1 jayden, TOP, TID  Zoloft 50 mg oral tablet, 75 mg= 1.5 tab(s), Oral, Daily, 6 refills  Allergies  No Known Medication Allergies  Social History  Alcohol - Denies Alcohol Use, 2014  Never, 2016  Employment/School  not working, Work/School description: not working. Highest education level: High school. Operates hazardous equipment: No., 2015  Exercise  Exercise frequency: Daily. Self assessment: Good condition. Exercise type: Walking., 2015  Home/Environment  Lives with Alone. Living situation: Home/Independent. Home equipment: Glucose monitoring. Alcohol abuse in household: No. Substance abuse in household: No. Smoker in household: Yes. Injuries/Abuse/Neglect in household: No. Feels unsafe at home: No. Family/Friends available for support: Yes. Concern for family members at home: No. Major illness in household: No. Financial concerns: Yes., 2018  Nutrition/Health  Type of diet: regular. Regular, Caffeine intake amount: coffee. Wants to lose weight: Yes. Sleeping concerns:  Yes. Feels highly stressed: Yes., 04/09/2015  Other  Substance Abuse - Denies Substance Abuse, 07/13/2014  Never, 03/08/2016  Tobacco  Current every day smoker Use:. Cigarettes Type:. 6 per day., 08/08/2018  Family History  Alzheimers disease: Mother.  DIABETES MELLITUS: Mother, Father, Sister and Brother.  Immunizations  Vaccine Date Status   influenza virus vaccine, inactivated 11/30/2016 Given   pneumococcal 23-polyvalent vaccine 11/30/2016 Given   Health Maintenance  Health Maintenance  ???Pending?(in the next year)  ??? ??OverDue  ??? ? ? ?Smoking Cessation (Diabetes) due??07/12/16??and every 2??year(s)  ??? ? ? ?Diabetes Maintenance-Urine Dipstick due??02/22/18??and every 1??year(s)  ??? ??Due?  ??? ? ? ?ADL Screening due??09/11/18??and every 1??year(s)  ??? ? ? ?Alcohol Misuse Screening due??09/11/18??and every 1??year(s)  ??? ? ? ?Cervical Cancer Screening due??09/11/18??and every?  ??? ? ? ?Colorectal Screening due??09/11/18??and every?  ??? ? ? ?Diabetes Maintenance-Foot Exam due??09/11/18??and every?  ??? ? ? ?Influenza Vaccine due??09/11/18??and every?  ??? ? ? ?Tetanus Vaccine due??09/11/18??and every 10??year(s)  ??? ? ? ?Zoster Vaccine due??09/11/18??and every 100??year(s)  ??? ??Due In Future?  ??? ? ? ?Diabetes Maintenance-Eye Exam not due until??05/24/19??and every 1??year(s)  ??? ? ? ?Diabetes Maintenance-Fasting Lipid Profile not due until??05/30/19??and every 1??year(s)  ??? ? ? ?Diabetes Maintenance-HgbA1c not due until??05/30/19??and every 1??year(s)  ??? ? ? ?Hypertension Management-BMP not due until??05/30/19??and every 1??year(s)  ??? ? ? ?Diabetes Maintenance-Serum Creatinine not due until??05/30/19??and every 1??year(s)  ??? ? ? ?Diabetes Maintenance-Microalbumin not due until??05/30/19??and every 1??year(s)  ??? ? ? ?Aspirin Therapy for CVD Prevention not due until??05/31/19??and every 1??year(s)  ??? ? ? ?Obesity Screening not due until??08/08/19??and every 1??year(s)  ??? ? ? ?Body  Mass Index Check not due until??08/14/19??and every 1??year(s)  ??? ? ? ?Smoking Cessation not due until??08/14/19??and every 1??year(s)  ???Satisfied?(in the past 1 year)  ??? ??Satisfied?  ??? ? ? ?Aspirin Therapy for CVD Prevention on??05/31/18.??Satisfied by Peng Ladd DO??Reason: Expectation Satisfied Elsewhere  ??? ? ? ?Blood Pressure Screening on??09/11/18.??Satisfied by Bhumi Mackenzie LPN.  ??? ? ? ?Body Mass Index Check on??08/08/18.??Satisfied by Andrea BARGER Keeley  ??? ? ? ?Breast Cancer Screening on??08/22/18.??Satisfied by Jackie Campbell  ??? ? ? ?Depression Screening on??08/08/18.??Satisfied by Andrea BARGER, Keeley  ??? ? ? ?Diabetes Maintenance-Eye Exam on??05/24/18.??Satisfied by Dejah Mcgregor  ??? ? ? ?Diabetes Maintenance-Fasting Lipid Profile on??05/30/18.??Satisfied by Marquez Marcelino Jr.  ??? ? ? ?Diabetes Maintenance-HgbA1c on??10/08/18.??Satisfied by Peng Ladd DO  ??? ? ? ?Diabetes Maintenance-Microalbumin on??05/30/18.??Satisfied by Marquez Marcelino Jr.  ??? ? ? ?Diabetes Maintenance-Serum Creatinine on??05/30/18.??Satisfied by Marquez Marcelino Jr.  ??? ? ? ?Diabetes Screening on??05/30/18.??Satisfied by Marquez Marcelino Jr.  ??? ? ? ?Hypertension Management-BMP on??05/30/18.??Satisfied by Marquez Marcelino Jr.  ??? ? ? ?Hypertension Management-Blood Pressure on??09/11/18.??Satisfied by Bhumi Mackenzie LPN  ??? ? ? ?Lipid Screening on??05/30/18.??Satisfied by Marquez Marcelino Jr.  ??? ? ? ?Obesity Screening on??08/08/18.??Satisfied by Keeley Mendez LPN  ??? ? ? ?Smoking Cessation on??08/14/18.??Satisfied by Cintia Jo RN  ??? ? ? ?Smoking Cessation (Coronary Artery Disease) on??08/14/18.??Satisfied by Cinita Jo RN  ??? ? ? ?Smoking Cessation (Diabetes) on??08/14/18.??Satisfied by Cintia Jo RN  ?  ?

## 2022-05-04 NOTE — HISTORICAL [facility]
This is a historical note converted from Seth. Formatting and pictures may have been removed.  Please reference Seth for original formatting and attached multimedia. History of Present Illness  Mrs. Tan presents today for?continued management of a diabetic foot ulcer?on the tibial side of her right hallux.? She offers no new complaints and has been compliant with treatment recommendations.  Physical Exam  Vitals & Measurements  T:?37.2? ?C (Oral)? HR:?96(Peripheral)? RR:?18? BP:?115/72? SpO2:?96%?  ?  The?right hallux ulcer has significantly contracted in size and there is only of superficial?ulcer?approximately?0.3 cm in?length and a depth of 1?centimeter. ?Excellent granulation tissue is present. ?There is a broad area of periwound hyper callus tissue present.? There is no evidence of infection at this time.  Assessment/Plan  1.?Diabetic ulcer of right foot due to diabetes mellitus type 2  ? The patient underwent?selective debridement of her wound using #15 blade and?rongeurs.??A large amount of?hyper callus tissue was?abbreviated.? She has been advised to apply?a foam dressing to the wound and change every 48 hours.? A follow-up appointment has been suggested in 3 weeks.  Ordered:  Wound Care Team Treatment, 08/21/18 14:45:00 CDT, Stop date 08/21/18 14:45:00 CDT  ?  2.?Diabetic neuropathy  ? The use of proper foot care has been emphasized.? Patient education?literature has been?provided to the patient.  ?  3.?Acquired keratoderma  ? Meticulous foot care is advised with?proper foot gear?use?has been encouraged.  ?  4.?Xerosis of skin  ? Continue?using triamcinolone cream mixed with Eucerin cream?as previously?prescribed.? She was also advised to?apply the triamcinolone cream to the?hyper callused area?of the right hallux?once daily.  ?  Orders:  Wound Care Outpatient, *Est. 08/28/18 3:00:00 CDT, *Est. Stop date 08/28/18 3:00:00 CDT, [facility], FU with Physician in 3 week   Problem List/Past  Medical History  Ongoing  Acquired keratoderma  Depression  Diabetes type 2 on insulin  Diabetic foot ulcer  Diabetic neuropathy  Diabetic ulcer of right foot due to diabetes mellitus type 2  Tobacco user  Tobacco user  Xerosis of skin  Historical  Diabetes mellitus  Procedure/Surgical History  Appendectomy  Breast biopsy and related procedures   section  Cholecystectomy  gall bladder  Hemorrhoid  Hysterectomy   Medications  Bactrim  mg-160 mg oral tablet, 1 tab(s), Oral, BID  Basaglar KwikPen 100 units/mL subcutaneous solution, 100 units, Subcutaneous, Daily, 6 refills  Basaglar pen and needles, See Instructions, 6 refills  CBG test strips, See Instructions, 11 refills  Glucometer, See Instructions  Lancets, See Instructions, 11 refills  Lipitor 40 mg oral tablet, 40 mg= 1 tab(s), Oral, Daily, 6 refills  Needles, See Instructions, 11 refills  Neurontin 600 mg oral tablet, 600 mg= 1 tab(s), Oral, TID, 6 refills  NovoLOG 100 units/mL injectable solution, 5 units, Subcutaneous, TIDAC, 3 refills  triamcinolone 0.1% topical lotion, 1 jayden, TOP, TID  Zoloft 50 mg oral tablet, 75 mg= 1.5 tab(s), Oral, Daily, 6 refills  Allergies  No Known Medication Allergies  Social History  Alcohol - Denies Alcohol Use, 2014  Never, 2016  Employment/School  not working, Work/School description: not working. Highest education level: High school. Operates hazardous equipment: No., 2015  Exercise  Exercise frequency: Daily. Self assessment: Good condition. Exercise type: Walking., 2015  Home/Environment  Lives with Alone. Living situation: Home/Independent. Home equipment: Glucose monitoring. Alcohol abuse in household: No. Substance abuse in household: No. Smoker in household: Yes. Injuries/Abuse/Neglect in household: No. Feels unsafe at home: No. Family/Friends available for support: Yes. Concern for family members at home: No. Major illness in household: No. Financial concerns: Yes.,  05/31/2018  Nutrition/Health  Type of diet: regular. Regular, Caffeine intake amount: coffee. Wants to lose weight: Yes. Sleeping concerns: Yes. Feels highly stressed: Yes., 04/09/2015  Other  Substance Abuse - Denies Substance Abuse, 07/13/2014  Never, 03/08/2016  Tobacco  Current every day smoker Use:. Cigarettes Type:. 6 per day., 08/08/2018  Family History  Alzheimers disease: Mother.  DIABETES MELLITUS: Mother, Father, Sister and Brother.  Immunizations  Vaccine Date Status   influenza virus vaccine, inactivated 11/30/2016 Given   pneumococcal 23-polyvalent vaccine 11/30/2016 Given   Health Maintenance  Health Maintenance  ???Pending?(in the next year)  ??? ??OverDue  ??? ? ? ?Smoking Cessation (Diabetes) due??07/12/16??and every 2??year(s)  ??? ? ? ?Diabetes Maintenance-Urine Dipstick due??02/22/18??and every 1??year(s)  ??? ??Due?  ??? ? ? ?Breast Cancer Screening due??07/06/18??and every 2??year(s)  ??? ? ? ?Alcohol Misuse Screening due??08/21/18??and every 1??year(s)  ??? ? ? ?Cervical Cancer Screening due??08/21/18??and every?  ??? ? ? ?Colorectal Screening due??08/21/18??and every?  ??? ? ? ?Diabetes Maintenance-Foot Exam due??08/21/18??and every?  ??? ? ? ?Influenza Vaccine due??08/21/18??and every?  ??? ? ? ?Tetanus Vaccine due??08/21/18??and every 10??year(s)  ??? ? ? ?Zoster Vaccine due??08/21/18??and every 100??year(s)  ??? ??Due In Future?  ??? ? ? ?Diabetes Maintenance-Eye Exam not due until??05/24/19??and every 1??year(s)  ??? ? ? ?Diabetes Maintenance-Fasting Lipid Profile not due until??05/30/19??and every 1??year(s)  ??? ? ? ?Diabetes Maintenance-HgbA1c not due until??05/30/19??and every 1??year(s)  ??? ? ? ?Hypertension Management-BMP not due until??05/30/19??and every 1??year(s)  ??? ? ? ?Diabetes Maintenance-Serum Creatinine not due until??05/30/19??and every 1??year(s)  ??? ? ? ?Diabetes Maintenance-Microalbumin not due until??05/30/19??and every 1??year(s)  ??? ? ? ?Aspirin Therapy for CVD  Prevention not due until??05/31/19??and every 1??year(s)  ??? ? ? ?Obesity Screening not due until??08/08/19??and every 1??year(s)  ??? ? ? ?Blood Pressure Screening not due until??08/14/19??and every 1??year(s)  ??? ? ? ?Body Mass Index Check not due until??08/14/19??and every 1??year(s)  ??? ? ? ?Hypertension Management-Blood Pressure not due until??08/14/19??and every 1??year(s)  ??? ? ? ?Smoking Cessation not due until??08/14/19??and every 1??year(s)  ???Satisfied?(in the past 1 year)  ??? ??Satisfied?  ??? ? ? ?Aspirin Therapy for CVD Prevention on??05/31/18.??Satisfied by Peng Ladd DO??Reason: Expectation Satisfied Elsewhere  ??? ? ? ?Blood Pressure Screening on??08/21/18.??Satisfied by Cintia Jo RN  ??? ? ? ?Body Mass Index Check on??08/08/18.??Satisfied by Andrea BARGER Keeley  ??? ? ? ?Breast Cancer Screening on??08/22/18.??Satisfied by Peng Ladd DO  ??? ? ? ?Depression Screening on??08/08/18.??Satisfied by Andrea BARGER Keeley  ??? ? ? ?Diabetes Maintenance-Eye Exam on??05/24/18.??Satisfied by Dejah Mcgregor  ??? ? ? ?Diabetes Maintenance-Fasting Lipid Profile on??05/30/18.??Satisfied by Marquez Marcelino Jr.  ??? ? ? ?Diabetes Maintenance-HgbA1c on??10/08/18.??Satisfied by Peng Ladd DO  ??? ? ? ?Diabetes Maintenance-Microalbumin on??05/30/18.??Satisfied by Marquez Marcelino Jr.  ??? ? ? ?Diabetes Maintenance-Serum Creatinine on??05/30/18.??Satisfied by Marquez Marcelino Jr.  ??? ? ? ?Diabetes Screening on??05/30/18.??Satisfied by Marquez Marcelino Jr.  ??? ? ? ?Hypertension Management-BMP on??05/30/18.??Satisfied by Marquez Marcelino Jr.  ??? ? ? ?Hypertension Management-Blood Pressure on??08/21/18.??Satisfied by Cintia Jo RN  ??? ? ? ?Lipid Screening on??05/30/18.??Satisfied by Marquez Marcelino Jr.  ??? ? ? ?Obesity Screening on??08/08/18.??Satisfied by Keeley Mendez LPN  ??? ? ? ?Smoking Cessation  on??08/14/18.??Satisfied by Cintia Jo RN  ??? ? ? ?Smoking Cessation (Coronary Artery Disease) on??08/14/18.??Satisfied by Cintia Jo RN  ??? ? ? ?Smoking Cessation (Diabetes) on??08/14/18.??Satisfied by Cintia Jo RN  ?  ?

## 2022-05-04 NOTE — HISTORICAL [facility]ER
This is a historical note converted from Seth. Formatting and pictures may have been removed.  Please reference Seth for original formatting and attached multimedia. Chief Complaint  Rt. great toe ulcer  History of Present Illness  This 62-year-old lady?reports having a chronic callus?on the tibial side of her right hallux for?greater than 8 months.? In an attempt to remedy the problem she?tried debriding it with a pumice stone.? The site eventually became inflamed?and?ulcerated.? She has used a number of?home modalities without?adequate healing response.? She also has been?seen?at a local emergency department?and provided with an antibiotic?for infection.? She was recently seen and evaluated by her personal medical physician?and thereafter referred to the wound treatment center for further evaluation and treatment.  ?  It is also noted radiate that?following multiple attempts?at?obtaining a visit with?a podiatrist she has had no success.? She has a long history of?diabetic?peripheral neuropathy?and uncontrolled diabetes?despite?multiple adjustments in her?hypoglycemic program.  Review of Systems  REVIEW OF SYSTEMS:  Constitutional:? [No fever, fatigue or weight loss.]?  Skin:?See?history of present illness  Eyes:? [No recent vision problems or eye pain.]?  ENT:? [No congestion, ear pain, or sore throat.]?  Endocrine:? [No thyroid problems.]?  Cardiovascular:? [No chest pain.]?  Respiratory:? [No cough, shortness of breath, congestion, or wheezing.]?  Gastrointestinal:?See past medical history  Genitourinary: Past medical history  Musculoskeletal:? [No joint swelling.]?  Neurologic:? [No seizures.]  Hematologic:? [No unusual bruising or bleeding.]?  Psychiatric:?She is a chronic smoker?summation marked  [All other systems reviewed and otherwise negative.]  ?  Physical Exam  Vitals & Measurements  T:?36.5? ?C (Oral)? HR:?86(Peripheral)? RR:?16? BP:?117/80?  HT:?165?cm? WT:?115.4?kg?  PHYSICAL EXAMINATION:  HEENT:  Normal cephalic; PERRLA; Nose is unremarkable; Oropharyngeal exam is normal  NECK: Supple; without thyromegaly  RESPIRATORY: Lungs are clear  CARDIO- VASCULAR: NSR without murmur or ectopy.? The pedal pulses are intact.  ABDOMEN:? Soft without organomegaly  : Without CVAT  MUSCULOSKELETAL: Good range of motion of all joints  NEURO:?She has moderate?loss of sensation in both feet?both for touch and?vibratory sensation.??She?also has diminished?deep tendon reflexes at the knees and ankle.  LYMPHATIC: Without lymphadenopathy  PSYCH: Unremarkable  SKIN:?Patient has?keratoma?overlying the medial and plantar?right hallux?with?central?ulceration.? There is evidence of?microtic tissue with a fetid odor.? Scant?serous drainage is present.? Also has?a small keratoma overlying the tibial side of the left hallux.  ?   The RICHARD?on the right is?1.06  The?RICHARD on the left is?1.03  ?  Assessment/Plan  1.?Diabetic foot ulcer  ? Today the patient underwent debridement of?the?right hallux diabetic ulcer using a #15 blade and rongeurs. ?The wound was packed with?Mesalt and?an appropriate secondary overlay.??In general x-rays of the right hallux will be obtained to rule out osteomyelitis.???Tissue?will be submitted to the lab for CNS.. ?Antibiotic treatment will be initiated with?trimethoprim sulfa DS.  Ordered:  Wound Care Team Treatment, 08/14/18 11:43:00 CDT, Stop date 08/14/18 11:43:00 CDT  Wound Care Team Treatment, 08/14/18 11:32:00 CDT, Stop date 08/14/18 11:32:00 CDT  XR Foot Great Toe Right Min 2 Views, Routine, 08/14/18 11:36:00 CDT, Inflammation, R/O Osteomyelitis, None, Ambulatory, Rad Type, Order for future visit, Diabetic foot ulcer, Not Scheduled, 08/14/18 11:36:00 CDT  ?  2.?Acquired keratoderma  ? Proper foot ?gear?and?periodic?paring of the keratoma will be?provided  ?  3.?Diabetic neuropathy  ? Chronic diabetic foot care instructions/education?will be provided.  ?  4.?Xerosis of skin  ? She will be placed on  triamcinolone lotion which will be mixed with an equal portion of Eucerin lotion?and applied to the areas 3 times daily.? Aveeno will be used as the soap of choice.  ?  Orders:  sulfamethoxazole-trimethoprim, 1 tab(s), Oral, BID, for infection, X 10 day(s), # 20 tab(s), 0 Refill(s), Pharmacy: [id number], Patient Verbalizes Understanding  triamcinolone topical, 1 jayden, TOP, TID, Mix with an equal portion of Eucerin lotion and use as directed for dry skin dermatitis, X 30 day(s), # 60 mL, 0 Refill(s), Pharmacy: [id number], Patient Verbalizes Understanding  Wound Care Outpatient, *Est. 18 14:00:00 CDT, *Est. Stop date 18 14:00:00 CDT, [facility], FU with Physician in 1 week  Wound Care Outpatient, *Est. 18 3:00:00 CDT, *Est. Stop date 18 3:00:00 CDT, [facility], FU with Physician in 1 week   Problem List/Past Medical History  Ongoing  Acquired keratoderma  Depression  Diabetes type 2 on insulin  Diabetic foot ulcer  Diabetic neuropathy  Diabetic ulcer of right foot due to diabetes mellitus type 2  Tobacco user  Tobacco user  Xerosis of skin  Historical  Diabetes mellitus  Procedure/Surgical History  Appendectomy  Breast biopsy and related procedures   section  Cholecystectomy  gall bladder  Hemorrhoid  Hysterectomy   Medications  Bactrim  mg-160 mg oral tablet, 1 tab(s), Oral, BID  Basaglar KwikPen 100 units/mL subcutaneous solution, 100 units, Subcutaneous, Daily, 6 refills  Basaglar pen and needles, See Instructions, 6 refills  CBG test strips, See Instructions, 11 refills  Glucometer, See Instructions  Lancets, See Instructions, 11 refills  Lipitor 40 mg oral tablet, 40 mg= 1 tab(s), Oral, Daily, 6 refills  Needles, See Instructions, 11 refills  Neurontin 600 mg oral tablet, 600 mg= 1 tab(s), Oral, TID, 6 refills  NovoLOG 100 units/mL injectable solution, 5 units, Subcutaneous, TIDAC, 3 refills  triamcinolone 0.1% topical lotion,  1 jayden, TOP, TID  Zoloft 50 mg oral tablet, 75 mg= 1.5 tab(s), Oral, Daily, 6 refills  Allergies  No Known Medication Allergies  Social History  Alcohol - Denies Alcohol Use, 07/13/2014  Never, 03/08/2016  Employment/School  not working, Work/School description: not working. Highest education level: High school. Operates hazardous equipment: No., 04/09/2015  Exercise  Exercise frequency: Daily. Self assessment: Good condition. Exercise type: Walking., 04/09/2015  Home/Environment  Lives with Alone. Living situation: Home/Independent. Home equipment: Glucose monitoring. Alcohol abuse in household: No. Substance abuse in household: No. Smoker in household: Yes. Injuries/Abuse/Neglect in household: No. Feels unsafe at home: No. Family/Friends available for support: Yes. Concern for family members at home: No. Major illness in household: No. Financial concerns: Yes., 05/31/2018  Nutrition/Health  Type of diet: regular. Regular, Caffeine intake amount: coffee. Wants to lose weight: Yes. Sleeping concerns: Yes. Feels highly stressed: Yes., 04/09/2015  Other  Substance Abuse - Denies Substance Abuse, 07/13/2014  Never, 03/08/2016  Tobacco  Current every day smoker Use:. Cigarettes Type:. 6 per day., 08/08/2018  Family History  Alzheimers disease: Mother.  DIABETES MELLITUS: Mother, Father, Sister and Brother.  Immunizations  Vaccine Date Status   influenza virus vaccine, inactivated 11/30/2016 Given   pneumococcal 23-polyvalent vaccine 11/30/2016 Given   Health Maintenance  Health Maintenance  ???Pending?(in the next year)  ??? ??OverDue  ??? ? ? ?Smoking Cessation (Diabetes) due??07/12/16??and every 2??year(s)  ??? ? ? ?Diabetes Maintenance-Urine Dipstick due??02/22/18??and every 1??year(s)  ??? ??Due?  ??? ? ? ?Breast Cancer Screening due??07/06/18??and every 2??year(s)  ??? ? ? ?Alcohol Misuse Screening due??08/14/18??and every 1??year(s)  ??? ? ? ?Cervical Cancer Screening due??08/14/18??and every?  ??? ? ? ?Colorectal  Screening due??08/14/18??and every?  ??? ? ? ?Diabetes Maintenance-Foot Exam due??08/14/18??and every?  ??? ? ? ?Influenza Vaccine due??08/14/18??and every?  ??? ? ? ?Tetanus Vaccine due??08/14/18??and every 10??year(s)  ??? ? ? ?Zoster Vaccine due??08/14/18??and every 100??year(s)  ??? ??Due In Future?  ??? ? ? ?Diabetes Maintenance-Eye Exam not due until??05/24/19??and every 1??year(s)  ??? ? ? ?Diabetes Maintenance-Fasting Lipid Profile not due until??05/30/19??and every 1??year(s)  ??? ? ? ?Diabetes Maintenance-HgbA1c not due until??05/30/19??and every 1??year(s)  ??? ? ? ?Hypertension Management-BMP not due until??05/30/19??and every 1??year(s)  ??? ? ? ?Diabetes Maintenance-Serum Creatinine not due until??05/30/19??and every 1??year(s)  ??? ? ? ?Diabetes Maintenance-Microalbumin not due until??05/30/19??and every 1??year(s)  ??? ? ? ?Aspirin Therapy for CVD Prevention not due until??05/31/19??and every 1??year(s)  ??? ? ? ?Blood Pressure Screening not due until??08/08/19??and every 1??year(s)  ??? ? ? ?Body Mass Index Check not due until??08/08/19??and every 1??year(s)  ??? ? ? ?Hypertension Management-Blood Pressure not due until??08/08/19??and every 1??year(s)  ??? ? ? ?Obesity Screening not due until??08/08/19??and every 1??year(s)  ???Satisfied?(in the past 1 year)  ??? ??Satisfied?  ??? ? ? ?Aspirin Therapy for CVD Prevention on??05/31/18.??Satisfied by Peng Ladd DO??Reason: Expectation Satisfied Elsewhere  ??? ? ? ?Blood Pressure Screening on??08/14/18.??Satisfied by Bhmui Mackenzie LPN  ??? ? ? ?Body Mass Index Check on??08/08/18.??Satisfied by Andrea BARGER, Keeley  ??? ? ? ?Breast Cancer Screening on??08/22/18.??Satisfied by Peng Ladd DO  ??? ? ? ?Depression Screening on??08/08/18.??Satisfied by Andrea BARGER, Keeley  ??? ? ? ?Diabetes Maintenance-Eye Exam on??05/24/18.??Satisfied by Dejah Mcgregor  ??? ? ? ?Diabetes Maintenance-Fasting Lipid Profile on??05/30/18.??Satisfied by  Marquez Marcelino Jr.  ??? ? ? ?Diabetes Maintenance-HgbA1c on??10/08/18.??Satisfied by Peng Ladd DO  ??? ? ? ?Diabetes Maintenance-Microalbumin on??05/30/18.??Satisfied by Marquez Marcelino Jr.  ??? ? ? ?Diabetes Maintenance-Serum Creatinine on??05/30/18.??Satisfied by Marquez Marcelino Jr.  ??? ? ? ?Diabetes Screening on??05/30/18.??Satisfied by Marquez Marcelino Jr.  ??? ? ? ?Hypertension Management-BMP on??05/30/18.??Satisfied by Marquez Marcelino Jr.  ??? ? ? ?Hypertension Management-Blood Pressure on??08/14/18.??Satisfied by Bhumi Mackenzie LPN  ??? ? ? ?Lipid Screening on??05/30/18.??Satisfied by Marquez Marcelino Jr.  ??? ? ? ?Obesity Screening on??08/08/18.??Satisfied by Keeley Mendez LPN  ??? ? ? ?Smoking Cessation on??08/14/18.??Satisfied by Cintia Jo RN  ??? ? ? ?Smoking Cessation (Coronary Artery Disease) on??08/14/18.??Satisfied by Cintia Jo RN  ??? ? ? ?Smoking Cessation (Diabetes) on??08/14/18.??Satisfied by Cintia Jo RN  ?  ?

## 2022-05-06 DIAGNOSIS — Z87.891 HISTORY OF TOBACCO USE: Primary | ICD-10-CM

## 2022-05-17 DIAGNOSIS — K74.60 HEPATIC CIRRHOSIS, UNSPECIFIED HEPATIC CIRRHOSIS TYPE, UNSPECIFIED WHETHER ASCITES PRESENT: Primary | ICD-10-CM

## 2022-06-01 ENCOUNTER — DOCUMENTATION ONLY (OUTPATIENT)
Dept: RADIOLOGY | Facility: HOSPITAL | Age: 67
End: 2022-06-01
Payer: MEDICARE

## 2022-06-02 ENCOUNTER — TELEPHONE (OUTPATIENT)
Dept: FAMILY MEDICINE | Facility: CLINIC | Age: 67
End: 2022-06-02

## 2022-06-02 ENCOUNTER — OFFICE VISIT (OUTPATIENT)
Dept: FAMILY MEDICINE | Facility: CLINIC | Age: 67
End: 2022-06-02
Payer: MEDICARE

## 2022-06-02 VITALS
HEIGHT: 65 IN | BODY MASS INDEX: 42.99 KG/M2 | OXYGEN SATURATION: 97 % | SYSTOLIC BLOOD PRESSURE: 112 MMHG | DIASTOLIC BLOOD PRESSURE: 64 MMHG | TEMPERATURE: 98 F | WEIGHT: 258 LBS | RESPIRATION RATE: 17 BRPM

## 2022-06-02 DIAGNOSIS — Z79.4 TYPE 2 DIABETES MELLITUS WITH HYPERGLYCEMIA, WITH LONG-TERM CURRENT USE OF INSULIN: Primary | ICD-10-CM

## 2022-06-02 DIAGNOSIS — E11.22 CKD STAGE 3 DUE TO TYPE 2 DIABETES MELLITUS: ICD-10-CM

## 2022-06-02 DIAGNOSIS — Z79.4 LONG-TERM INSULIN USE: ICD-10-CM

## 2022-06-02 DIAGNOSIS — E11.65 TYPE 2 DIABETES MELLITUS WITH HYPERGLYCEMIA, WITH LONG-TERM CURRENT USE OF INSULIN: Primary | ICD-10-CM

## 2022-06-02 DIAGNOSIS — E55.9 VITAMIN D DEFICIENCY: ICD-10-CM

## 2022-06-02 DIAGNOSIS — N18.30 CKD STAGE 3 DUE TO TYPE 2 DIABETES MELLITUS: ICD-10-CM

## 2022-06-02 DIAGNOSIS — E11.59 HYPERTENSION ASSOCIATED WITH TYPE 2 DIABETES MELLITUS: ICD-10-CM

## 2022-06-02 DIAGNOSIS — E11.69 HYPERLIPIDEMIA ASSOCIATED WITH TYPE 2 DIABETES MELLITUS: ICD-10-CM

## 2022-06-02 DIAGNOSIS — F33.42 RECURRENT MAJOR DEPRESSIVE DISORDER, IN FULL REMISSION: ICD-10-CM

## 2022-06-02 DIAGNOSIS — E78.5 HYPERLIPIDEMIA ASSOCIATED WITH TYPE 2 DIABETES MELLITUS: ICD-10-CM

## 2022-06-02 DIAGNOSIS — I15.2 HYPERTENSION ASSOCIATED WITH TYPE 2 DIABETES MELLITUS: ICD-10-CM

## 2022-06-02 PROCEDURE — 1101F PT FALLS ASSESS-DOCD LE1/YR: CPT | Mod: CPTII,,, | Performed by: INTERNAL MEDICINE

## 2022-06-02 PROCEDURE — 99214 OFFICE O/P EST MOD 30 MIN: CPT | Mod: ,,, | Performed by: INTERNAL MEDICINE

## 2022-06-02 PROCEDURE — 1160F PR REVIEW ALL MEDS BY PRESCRIBER/CLIN PHARMACIST DOCUMENTED: ICD-10-PCS | Mod: CPTII,,, | Performed by: INTERNAL MEDICINE

## 2022-06-02 PROCEDURE — 99214 PR OFFICE/OUTPT VISIT, EST, LEVL IV, 30-39 MIN: ICD-10-PCS | Mod: ,,, | Performed by: INTERNAL MEDICINE

## 2022-06-02 PROCEDURE — 3074F SYST BP LT 130 MM HG: CPT | Mod: CPTII,,, | Performed by: INTERNAL MEDICINE

## 2022-06-02 PROCEDURE — 3060F POS MICROALBUMINURIA REV: CPT | Mod: CPTII,,, | Performed by: INTERNAL MEDICINE

## 2022-06-02 PROCEDURE — 4010F PR ACE/ARB THEARPY RXD/TAKEN: ICD-10-PCS | Mod: CPTII,,, | Performed by: INTERNAL MEDICINE

## 2022-06-02 PROCEDURE — 1159F MED LIST DOCD IN RCRD: CPT | Mod: CPTII,,, | Performed by: INTERNAL MEDICINE

## 2022-06-02 PROCEDURE — 3052F PR MOST RECENT HEMOGLOBIN A1C LEVEL 8.0 - < 9.0%: ICD-10-PCS | Mod: CPTII,,, | Performed by: INTERNAL MEDICINE

## 2022-06-02 PROCEDURE — 3074F PR MOST RECENT SYSTOLIC BLOOD PRESSURE < 130 MM HG: ICD-10-PCS | Mod: CPTII,,, | Performed by: INTERNAL MEDICINE

## 2022-06-02 PROCEDURE — 1126F AMNT PAIN NOTED NONE PRSNT: CPT | Mod: CPTII,,, | Performed by: INTERNAL MEDICINE

## 2022-06-02 PROCEDURE — 3008F PR BODY MASS INDEX (BMI) DOCUMENTED: ICD-10-PCS | Mod: CPTII,,, | Performed by: INTERNAL MEDICINE

## 2022-06-02 PROCEDURE — 1126F PR PAIN SEVERITY QUANTIFIED, NO PAIN PRESENT: ICD-10-PCS | Mod: CPTII,,, | Performed by: INTERNAL MEDICINE

## 2022-06-02 PROCEDURE — 3288F FALL RISK ASSESSMENT DOCD: CPT | Mod: CPTII,,, | Performed by: INTERNAL MEDICINE

## 2022-06-02 PROCEDURE — 1160F RVW MEDS BY RX/DR IN RCRD: CPT | Mod: CPTII,,, | Performed by: INTERNAL MEDICINE

## 2022-06-02 PROCEDURE — 4010F ACE/ARB THERAPY RXD/TAKEN: CPT | Mod: CPTII,,, | Performed by: INTERNAL MEDICINE

## 2022-06-02 PROCEDURE — 1159F PR MEDICATION LIST DOCUMENTED IN MEDICAL RECORD: ICD-10-PCS | Mod: CPTII,,, | Performed by: INTERNAL MEDICINE

## 2022-06-02 PROCEDURE — 3008F BODY MASS INDEX DOCD: CPT | Mod: CPTII,,, | Performed by: INTERNAL MEDICINE

## 2022-06-02 PROCEDURE — 3078F DIAST BP <80 MM HG: CPT | Mod: CPTII,,, | Performed by: INTERNAL MEDICINE

## 2022-06-02 PROCEDURE — 3066F NEPHROPATHY DOC TX: CPT | Mod: CPTII,,, | Performed by: INTERNAL MEDICINE

## 2022-06-02 PROCEDURE — 3078F PR MOST RECENT DIASTOLIC BLOOD PRESSURE < 80 MM HG: ICD-10-PCS | Mod: CPTII,,, | Performed by: INTERNAL MEDICINE

## 2022-06-02 PROCEDURE — 3052F HG A1C>EQUAL 8.0%<EQUAL 9.0%: CPT | Mod: CPTII,,, | Performed by: INTERNAL MEDICINE

## 2022-06-02 PROCEDURE — 1101F PR PT FALLS ASSESS DOC 0-1 FALLS W/OUT INJ PAST YR: ICD-10-PCS | Mod: CPTII,,, | Performed by: INTERNAL MEDICINE

## 2022-06-02 PROCEDURE — 3288F PR FALLS RISK ASSESSMENT DOCUMENTED: ICD-10-PCS | Mod: CPTII,,, | Performed by: INTERNAL MEDICINE

## 2022-06-02 PROCEDURE — 3066F PR DOCUMENTATION OF TREATMENT FOR NEPHROPATHY: ICD-10-PCS | Mod: CPTII,,, | Performed by: INTERNAL MEDICINE

## 2022-06-02 PROCEDURE — 3060F PR POS MICROALBUMINURIA RESULT DOCUMENTED/REVIEW: ICD-10-PCS | Mod: CPTII,,, | Performed by: INTERNAL MEDICINE

## 2022-06-02 RX ORDER — ACETAMINOPHEN 500 MG
500 TABLET ORAL EVERY 6 HOURS PRN
Status: ON HOLD | COMMUNITY
End: 2023-10-23 | Stop reason: HOSPADM

## 2022-06-02 RX ORDER — DULAGLUTIDE 1.5 MG/.5ML
1.5 INJECTION, SOLUTION SUBCUTANEOUS
Qty: 4 PEN | Refills: 11
Start: 2022-06-02 | End: 2022-06-30 | Stop reason: SDUPTHER

## 2022-06-02 RX ORDER — SOLIFENACIN SUCCINATE 10 MG/1
10 TABLET, FILM COATED ORAL DAILY
COMMUNITY

## 2022-06-02 RX ORDER — INSULIN GLARGINE 300 U/ML
75 INJECTION, SOLUTION SUBCUTANEOUS NIGHTLY
COMMUNITY
Start: 2022-06-01 | End: 2022-08-09 | Stop reason: DRUGHIGH

## 2022-06-02 NOTE — PROGRESS NOTES
Subjective:      Patient ID: Indu Azul is a 66 y.o. female.    Chief Complaint: Establish Care (Urologist-Dr. Tijerina)    Disclaimer:  This note is prepared using voice recognition software and as such is likely to have errors and has not been proof read. Please contact me for questions.     HPI     Patient is a 66 year old F here today to establish care with new PCP. Wellness in April 2022 with Maggy.  Patient is with her son today in office. She says she recently had two surgeries in the past 1 year for her feet- she has charcot corrie disease. She says that her main concern is her diabetes as it is very uncontrolled.     Type 2 DM with complications, Long Term Insulin Use  -currently using Toujeo 85 units QHS  -her -140, evening sugar is up to 300-350  -she was told to check her BG throughout the day and use 10 units of Novolog if her BG was >200 and she does use this  -she is overdue for an eye and foot exam    UTI: Currently taking Macrobid for UTI.    Thrombocytopenia: Plt count 115.      CKD: Most recent GFR 52,    HLD:  Not taking any Statin. Previously taking Atorvastatin.     HTN: BP at goal. Previously Lisinopril 5 mg daily.    Vitamin D deficiency: Recent vitamin D level 52.8. Currently taking Vitamin D3 50,000 IUs weekly.     GERD: Currently taking Protonix 40 mg daily.    Depression: Stable. Currently taking Zoloft 50 mg (1 tablet) daily. Denies any suicidal/homicidal ideations.    Charcot's joint of left foot: Recent surgery February 1st, 2022 (old records requested; Dr. Areli Hart's in [place])    Overactive Bladder/Urge Incontinence: Urology referral pending.      Lab Results   Component Value Date    HGBA1C 8.9 04/08/2022    HGBA1C 7.5 (H) 12/21/2021    HGBA1C 7.1 (H) 06/08/2021      Lab Results   Component Value Date    CHOL 138 04/08/2022    CHOL 175 12/21/2021    CHOL 167 06/08/2021     No results found for: LDLCALC    Wt Readings from Last 10 Encounters:    06/02/22 117 kg (258 lb)   12/21/21 118.3 kg (260 lb 12.9 oz)       The 10-year ASCVD risk score (Marinalawanda MILLER Jr., et al., 2013) is: 16.3%*    Values used to calculate the score:      Age: 66 years      Sex: Female      Is Non- : No      Diabetic: Yes      Tobacco smoker: Yes      Systolic Blood Pressure: 112 mmHg      Is BP treated: Yes      HDL Cholesterol: 53 mg/dL*      Total Cholesterol: 138 mg/dL*      * - Cholesterol units were assumed for this score calculation        Lab Results   Component Value Date    WBC 6.2 04/20/2022    HGB 12.0 04/20/2022    HCT 38.7 04/20/2022     04/20/2022    CHOL 138 04/08/2022    TRIG 90 04/08/2022    HDL 53 04/08/2022    ALT 15 04/20/2022    AST 15 04/20/2022     04/20/2022    K 4.2 04/20/2022    CREATININE 1.05 04/20/2022    BUN 19.2 04/20/2022    CO2 31 04/20/2022    TSH 2.2858 04/20/2022    INR 1.10 02/12/2021    HGBA1C 8.9 04/08/2022       CT Chest Lung Screening Low Dose  Narrative: EXAMINATION:  CT CHEST LUNG SCREENING LOW DOSE    CLINICAL HISTORY:  screening; Personal history of nicotine dependence    TECHNIQUE:  CT of the thorax was performed with low dose, lung screening protocol.  No contrast was administered.  Sagittal and coronal reconstructions were obtained.    COMPARISON:  Chest x-ray May 17, 2021; CT thorax December 15, 2014    FINDINGS:  Support devices: None    Heart/pericardial/pleural spaces: Cardiac size is not enlarged.  There is no coronary calcium.  No pleural or pericardial effusion is observed.    Hilum/mediastinum/great vessels: No hilar or mediastinal lymphadenopathy is detected.  The aortic arch and pulmonary trunk are normal in caliber.    Chest wall/diaphragm/upper abdomen: No axillary or supraclavicular lymphadenopathy is seen.  Hepatic steatosis is mild.  The liver appears cirrhotic, with widened hepatic fissures and deep notch sign in the posterior right hepatic lobe.  The gallbladder is absent.  The spleen  "appears enlarged, but is incompletely imaged.  No bone lesion is detected.    Lungs: The lungs are clear.  No pulmonary nodule is identified.    Central airway: A small amount of mucoid debris is expected in the trachea.  The tracheobronchial tree is patent.  Impression: Lung-RADS Category:  1 - Negative - Continue annual screening with LDCT in 12 months.    Clinically or potentially clinically significant non lung cancer finding:  S - Significant.    Prior Lung Cancer Modifier:  No history of prior lung cancer.    Cirrhosis.  Mild hepatic steatosis.  Splenomegaly.  Cholecystectomy.    Electronically signed by: Damien Ross  Date:    05/16/2022  Time:    15:38        Review of Systems   Constitutional: Negative for chills, fatigue and fever.   HENT: Negative for congestion, ear pain, postnasal drip, rhinorrhea, sinus pressure and sore throat.    Eyes: Negative for itching and visual disturbance.   Respiratory: Negative for cough, shortness of breath and wheezing.    Cardiovascular: Negative for chest pain, palpitations and leg swelling.   Gastrointestinal: Negative for abdominal pain and nausea.   Genitourinary: Negative for dysuria.   Musculoskeletal: Negative for arthralgias and myalgias.   Skin: Negative for rash.   Neurological: Negative for weakness, light-headedness and headaches.     Objective:     Vitals:    06/02/22 0950   BP: 112/64   BP Location: Left arm   Patient Position: Sitting   BP Method: Large (Automatic)   Resp: 17   Temp: 98.1 °F (36.7 °C)   TempSrc: Oral   SpO2: 97%   Weight: 117 kg (258 lb)   Height: 5' 4.96" (1.65 m)     Physical Exam  Vitals and nursing note reviewed.   Constitutional:       Appearance: Normal appearance.   Neurological:      Mental Status: She is alert.   Psychiatric:         Mood and Affect: Mood normal.         Behavior: Behavior normal.       Assessment:     1. Type 2 diabetes mellitus with hyperglycemia, with long-term current use of insulin    2. Long-term insulin " use    3. Hypertension associated with type 2 diabetes mellitus    4. Hyperlipidemia associated with type 2 diabetes mellitus    5. CKD stage 3 due to type 2 diabetes mellitus    6. Recurrent major depressive disorder, in full remission    7. Vitamin D deficiency      Plan:   Indu was seen today for establish care.    Diagnoses and all orders for this visit:    Type 2 diabetes mellitus with hyperglycemia, with long-term current use of insulin  -     Ambulatory referral/consult to Diabetes Education; Future  Uncontrolled  Start Trulicity 1.5 mg subQ weekly  Reduce Toujeo to 75 untis QHS  Check BG twice daily atleast  Send me BG log in 2 weeks  Referral to DM education- patient needs to understand how to eat properly  Reminded patient to schedule DM eye exam- she has eye MD  Foot exam due next visit  Long-term insulin use  See above    Hypertension associated with type 2 diabetes mellitus    Hyperlipidemia associated with type 2 diabetes mellitus    CKD stage 3 due to type 2 diabetes mellitus    Recurrent major depressive disorder, in full remission    Vitamin D deficiency        Follow up in about 4 weeks (around 6/30/2022) for DM follow up.    There are no Patient Instructions on file for this visit.

## 2022-06-16 DIAGNOSIS — K74.60 HEPATIC CIRRHOSIS, UNSPECIFIED HEPATIC CIRRHOSIS TYPE, UNSPECIFIED WHETHER ASCITES PRESENT: Primary | ICD-10-CM

## 2022-06-30 ENCOUNTER — OFFICE VISIT (OUTPATIENT)
Dept: FAMILY MEDICINE | Facility: CLINIC | Age: 67
End: 2022-06-30
Payer: MEDICARE

## 2022-06-30 VITALS
HEIGHT: 65 IN | HEART RATE: 75 BPM | DIASTOLIC BLOOD PRESSURE: 73 MMHG | TEMPERATURE: 98 F | BODY MASS INDEX: 42.99 KG/M2 | RESPIRATION RATE: 16 BRPM | WEIGHT: 258 LBS | OXYGEN SATURATION: 98 % | SYSTOLIC BLOOD PRESSURE: 110 MMHG

## 2022-06-30 DIAGNOSIS — K76.0 HEPATIC STEATOSIS: ICD-10-CM

## 2022-06-30 DIAGNOSIS — Z79.4 TYPE 2 DIABETES MELLITUS WITH HYPERGLYCEMIA, WITH LONG-TERM CURRENT USE OF INSULIN: Primary | ICD-10-CM

## 2022-06-30 DIAGNOSIS — E11.65 TYPE 2 DIABETES MELLITUS WITH HYPERGLYCEMIA, WITH LONG-TERM CURRENT USE OF INSULIN: Primary | ICD-10-CM

## 2022-06-30 PROCEDURE — 3078F PR MOST RECENT DIASTOLIC BLOOD PRESSURE < 80 MM HG: ICD-10-PCS | Mod: CPTII,,, | Performed by: NURSE PRACTITIONER

## 2022-06-30 PROCEDURE — 3060F PR POS MICROALBUMINURIA RESULT DOCUMENTED/REVIEW: ICD-10-PCS | Mod: CPTII,,, | Performed by: NURSE PRACTITIONER

## 2022-06-30 PROCEDURE — 1160F PR REVIEW ALL MEDS BY PRESCRIBER/CLIN PHARMACIST DOCUMENTED: ICD-10-PCS | Mod: CPTII,,, | Performed by: NURSE PRACTITIONER

## 2022-06-30 PROCEDURE — 4010F PR ACE/ARB THEARPY RXD/TAKEN: ICD-10-PCS | Mod: CPTII,,, | Performed by: NURSE PRACTITIONER

## 2022-06-30 PROCEDURE — 99214 PR OFFICE/OUTPT VISIT, EST, LEVL IV, 30-39 MIN: ICD-10-PCS | Mod: ,,, | Performed by: NURSE PRACTITIONER

## 2022-06-30 PROCEDURE — 1126F AMNT PAIN NOTED NONE PRSNT: CPT | Mod: CPTII,,, | Performed by: NURSE PRACTITIONER

## 2022-06-30 PROCEDURE — 3066F PR DOCUMENTATION OF TREATMENT FOR NEPHROPATHY: ICD-10-PCS | Mod: CPTII,,, | Performed by: NURSE PRACTITIONER

## 2022-06-30 PROCEDURE — 3074F SYST BP LT 130 MM HG: CPT | Mod: CPTII,,, | Performed by: NURSE PRACTITIONER

## 2022-06-30 PROCEDURE — 4010F ACE/ARB THERAPY RXD/TAKEN: CPT | Mod: CPTII,,, | Performed by: NURSE PRACTITIONER

## 2022-06-30 PROCEDURE — 1101F PR PT FALLS ASSESS DOC 0-1 FALLS W/OUT INJ PAST YR: ICD-10-PCS | Mod: CPTII,,, | Performed by: NURSE PRACTITIONER

## 2022-06-30 PROCEDURE — 1160F RVW MEDS BY RX/DR IN RCRD: CPT | Mod: CPTII,,, | Performed by: NURSE PRACTITIONER

## 2022-06-30 PROCEDURE — 3060F POS MICROALBUMINURIA REV: CPT | Mod: CPTII,,, | Performed by: NURSE PRACTITIONER

## 2022-06-30 PROCEDURE — 1126F PR PAIN SEVERITY QUANTIFIED, NO PAIN PRESENT: ICD-10-PCS | Mod: CPTII,,, | Performed by: NURSE PRACTITIONER

## 2022-06-30 PROCEDURE — 3066F NEPHROPATHY DOC TX: CPT | Mod: CPTII,,, | Performed by: NURSE PRACTITIONER

## 2022-06-30 PROCEDURE — 1159F PR MEDICATION LIST DOCUMENTED IN MEDICAL RECORD: ICD-10-PCS | Mod: CPTII,,, | Performed by: NURSE PRACTITIONER

## 2022-06-30 PROCEDURE — 3078F DIAST BP <80 MM HG: CPT | Mod: CPTII,,, | Performed by: NURSE PRACTITIONER

## 2022-06-30 PROCEDURE — 3288F PR FALLS RISK ASSESSMENT DOCUMENTED: ICD-10-PCS | Mod: CPTII,,, | Performed by: NURSE PRACTITIONER

## 2022-06-30 PROCEDURE — 3052F PR MOST RECENT HEMOGLOBIN A1C LEVEL 8.0 - < 9.0%: ICD-10-PCS | Mod: CPTII,,, | Performed by: NURSE PRACTITIONER

## 2022-06-30 PROCEDURE — 3288F FALL RISK ASSESSMENT DOCD: CPT | Mod: CPTII,,, | Performed by: NURSE PRACTITIONER

## 2022-06-30 PROCEDURE — 99214 OFFICE O/P EST MOD 30 MIN: CPT | Mod: ,,, | Performed by: NURSE PRACTITIONER

## 2022-06-30 PROCEDURE — 3074F PR MOST RECENT SYSTOLIC BLOOD PRESSURE < 130 MM HG: ICD-10-PCS | Mod: CPTII,,, | Performed by: NURSE PRACTITIONER

## 2022-06-30 PROCEDURE — 1101F PT FALLS ASSESS-DOCD LE1/YR: CPT | Mod: CPTII,,, | Performed by: NURSE PRACTITIONER

## 2022-06-30 PROCEDURE — 3052F HG A1C>EQUAL 8.0%<EQUAL 9.0%: CPT | Mod: CPTII,,, | Performed by: NURSE PRACTITIONER

## 2022-06-30 PROCEDURE — 1159F MED LIST DOCD IN RCRD: CPT | Mod: CPTII,,, | Performed by: NURSE PRACTITIONER

## 2022-06-30 PROCEDURE — 3008F PR BODY MASS INDEX (BMI) DOCUMENTED: ICD-10-PCS | Mod: CPTII,,, | Performed by: NURSE PRACTITIONER

## 2022-06-30 PROCEDURE — 3008F BODY MASS INDEX DOCD: CPT | Mod: CPTII,,, | Performed by: NURSE PRACTITIONER

## 2022-06-30 RX ORDER — INSULIN ASPART 100 [IU]/ML
2 INJECTION, SOLUTION INTRAVENOUS; SUBCUTANEOUS
COMMUNITY
Start: 2022-03-14 | End: 2023-10-13

## 2022-06-30 RX ORDER — DULAGLUTIDE 1.5 MG/.5ML
1.5 INJECTION, SOLUTION SUBCUTANEOUS
Qty: 4 PEN | Refills: 6
Start: 2022-06-30 | End: 2022-08-09 | Stop reason: SDUPTHER

## 2022-06-30 NOTE — PROGRESS NOTES
Subjective:      Patient ID: Indu Azul is a 66 y.o. White female      Chief Complaint: Follow-up (4 week f/u DM)       Past Medical History:   Diagnosis Date    Charcot's joint of foot, left     Chronic kidney disease, unspecified     Depression     Diabetes mellitus, type II, insulin dependent     GERD (gastroesophageal reflux disease)     Hepatic steatosis     HLD (hyperlipidemia)     HTN (hypertension)     Other hyperlipidemia     Overactive bladder     Thrombocytopenia, unspecified     Vitamin D deficiency         HPI  Here for 4-week follow up DM    DM: Most recent A1c 8.9 (4/21/2022).  Medication adjustments were made at previous visit (Trulicity added).  States fasting CBGs 90's-150's, much improved. Denies any hypoglycemia. Currently taking Toujeo 75 units at bedtime, Trulicity 1.5 mg po q week,  and NovoLog 5 units TIDAC prn CBGs >200.    Microalbumin up-to-date (12/20/2021)  DM eye exam up-to-date (6/20/2021); Needs DM eye exam.    PLT:  PLT count 108.  Following Dr. Olmstead    Hepatitic Steatosis:  Cirrhotic appearance of liver per CT.  Has upcoming appt with GI for evaluation.       CKD:  Most recent GFR 52,     HLD: LDL 67  Not taking any Statin. Previously taking Atorvastatin.      HTN: BP at goal.  Previously Lisinopril 5 mg daily.     Vitamin D deficiency: Recent vitamin D level 52.8.  Currently taking Vitamin D3 50,000 IUs weekly.       GERD: Currently taking Protonix 40 mg daily.     Depression: Stable.  Currently taking Zoloft 50 mg (1 tablet) daily.  Denies any suicidal/homicidal ideations.     Charcot's joint of left foot:  Recent surgery February 1st, 2022 (old records requested; Dr. Areli Hart's in [place])     Overactive Bladder/Urge Incontinence: Specialist is Dr. Crenshaw, urology.  Cystoscopy done 6/14/2022; states normal.             Review of Systems   Constitutional: Negative.  Negative for appetite change, chills and fever.   HENT: Negative.    Eyes:  Negative.  Negative for discharge and redness.   Respiratory: Negative.  Negative for shortness of breath.    Cardiovascular: Negative.  Negative for chest pain.   Gastrointestinal: Negative.    Endocrine: Negative.    Genitourinary: Negative.    Integumentary:  Negative.   Allergic/Immunologic: Negative.    Neurological: Negative.    Psychiatric/Behavioral: Negative.    All other systems reviewed and are negative.         Objective:      Vitals:    06/30/22 1344   BP: 110/73   Pulse: 75   Resp: 16   Temp: 98 °F (36.7 °C)      Body mass index is 42.99 kg/m².     Physical Exam  Vitals reviewed.   Constitutional:       Appearance: She is not toxic-appearing.   HENT:      Head: Normocephalic.      Mouth/Throat:      Mouth: Mucous membranes are moist.   Eyes:      Extraocular Movements: Extraocular movements intact.      Pupils: Pupils are equal, round, and reactive to light.   Cardiovascular:      Rate and Rhythm: Normal rate and regular rhythm.      Pulses: Normal pulses.      Heart sounds: Normal heart sounds.   Pulmonary:      Effort: Pulmonary effort is normal. No respiratory distress.      Breath sounds: Normal breath sounds.   Abdominal:      General: Bowel sounds are normal. There is no distension.      Palpations: Abdomen is soft.      Tenderness: There is no abdominal tenderness.   Musculoskeletal:         General: No tenderness. Normal range of motion.      Cervical back: Neck supple.   Skin:     General: Skin is warm and dry.   Neurological:      Mental Status: She is alert and oriented to person, place, and time.   Psychiatric:         Mood and Affect: Mood normal.            Current Outpatient Medications:     acetaminophen (TYLENOL) 500 MG tablet, Take 500 mg by mouth every 6 (six) hours as needed., Disp: , Rfl:     atorvastatin (LIPITOR) 20 MG tablet, Take 20 mg by mouth once daily., Disp: , Rfl:     celecoxib (CELEBREX) 200 MG capsule, Take 200 mg by mouth once daily., Disp: , Rfl:      cholecalciferol, vitamin D3, 1,250 mcg (50,000 unit) capsule, Take 50,000 Units by mouth every 7 days., Disp: , Rfl:     gabapentin (NEURONTIN) 300 MG capsule, Take 300 mg by mouth 3 (three) times daily., Disp: , Rfl:     HYDROcodone-acetaminophen (NORCO) 7.5-325 mg per tablet, Take 1-2 tablets by mouth every 8 (eight) hours as needed for Pain., Disp: , Rfl:     insulin aspart U-100 (NOVOLOG) 100 unit/mL injection, Inject into the skin., Disp: , Rfl:     insulin syringe-needle U-100 1 mL 31 gauge x 5/16 Syrg, Inject 1 Syringe into the skin 3 (three) times daily with meals., Disp: , Rfl:     lisinopriL (PRINIVIL,ZESTRIL) 5 MG tablet, Take 5 mg by mouth once daily., Disp: , Rfl:     oxybutynin (DITROPAN) 5 MG Tab, Take 1 tablet by mouth 3 (three) times daily., Disp: , Rfl:     pantoprazole (PROTONIX) 40 MG tablet, Take 40 mg by mouth once daily. for 90 days, Disp: , Rfl:     sertraline (ZOLOFT) 50 MG tablet, Take 50 mg by mouth once daily., Disp: , Rfl:     solifenacin (VESICARE) 10 MG tablet, Take 10 mg by mouth once daily., Disp: , Rfl:     TOUJEO MAX U-300 SOLOSTAR 300 unit/mL (3 mL) insulin pen, Inject 75 Units into the skin every evening., Disp: , Rfl:     dulaglutide (TRULICITY) 1.5 mg/0.5 mL pen injector, Inject 1.5 mg into the skin every 7 days., Disp: 4 pen, Rfl: 6    Assessment & Plan:     Problem List Items Addressed This Visit        Endocrine    Type 2 diabetes mellitus with hyperglycemia, with long-term current use of insulin - Primary     Most recent A1c 8.9.  (4/21/2022)  Medication adjustments weret made at previous visit (Trulicity added).   CBGs 90's-150's, much improved.  Denies any hypoglycemia.   Currently taking Toujeo 75 units at bedtime, Trulicity 1.5 mg po q week,  and NovoLog 5 units TIDAC prn CBGs >200  Needs Rx for Trulicity (sample and Rx/Rx card given)  With improved cbgs, will continue current regimen  CBGs daily  Educated on hypoglycemia and interventions  DM eye exam  up-to-date (6/20/2021); Needs DM eye exam; will do in office at next visit  RTC in 4-6 weeks for reevaluation, with A1C prior               Relevant Medications    insulin aspart U-100 (NOVOLOG) 100 unit/mL injection    dulaglutide (TRULICITY) 1.5 mg/0.5 mL pen injector    Other Relevant Orders    Hemoglobin A1C       GI    Hepatic steatosis     Hepatic Steatosis with cirrhotic appearance of liver per CT  Keep appt with GI for evaluation

## 2022-06-30 NOTE — ASSESSMENT & PLAN NOTE
Most recent A1c 8.9.  (4/21/2022)  Medication adjustments weret made at previous visit (Trulicity added).   CBGs 90's-150's, much improved.  Denies any hypoglycemia.   Currently taking Toujeo 75 units at bedtime, Trulicity 1.5 mg po q week,  and NovoLog 5 units TIDAC prn CBGs >200  Needs Rx for Trulicity (sample and Rx/Rx card given)  With improved cbgs, will continue current regimen  CBGs daily  Educated on hypoglycemia and interventions  DM eye exam up-to-date (6/20/2021); Needs DM eye exam; will do in office at next visit  RTC in 4-6 weeks for reevaluation, with A1C prior

## 2022-07-27 ENCOUNTER — HOSPITAL ENCOUNTER (OUTPATIENT)
Dept: RADIOLOGY | Facility: HOSPITAL | Age: 67
Discharge: HOME | End: 2022-07-27
Attending: INTERNAL MEDICINE
Payer: MEDICARE

## 2022-07-27 DIAGNOSIS — Z12.31 BREAST CANCER SCREENING BY MAMMOGRAM: ICD-10-CM

## 2022-07-27 PROCEDURE — 77067 SCR MAMMO BI INCL CAD: CPT | Mod: TC

## 2022-07-27 PROCEDURE — 77063 MAMMO DIGITAL SCREENING BILAT WITH TOMO: ICD-10-PCS | Mod: 26,,, | Performed by: STUDENT IN AN ORGANIZED HEALTH CARE EDUCATION/TRAINING PROGRAM

## 2022-07-27 PROCEDURE — 77063 BREAST TOMOSYNTHESIS BI: CPT | Mod: 26,,, | Performed by: STUDENT IN AN ORGANIZED HEALTH CARE EDUCATION/TRAINING PROGRAM

## 2022-07-27 PROCEDURE — 77067 SCR MAMMO BI INCL CAD: CPT | Mod: 26,,, | Performed by: STUDENT IN AN ORGANIZED HEALTH CARE EDUCATION/TRAINING PROGRAM

## 2022-07-27 PROCEDURE — 77067 MAMMO DIGITAL SCREENING BILAT WITH TOMO: ICD-10-PCS | Mod: 26,,, | Performed by: STUDENT IN AN ORGANIZED HEALTH CARE EDUCATION/TRAINING PROGRAM

## 2022-08-04 ENCOUNTER — LAB VISIT (OUTPATIENT)
Dept: LAB | Facility: HOSPITAL | Age: 67
End: 2022-08-04
Attending: INTERNAL MEDICINE
Payer: MEDICARE

## 2022-08-04 DIAGNOSIS — E61.1 IRON DEFICIENCY: ICD-10-CM

## 2022-08-04 DIAGNOSIS — D69.6 THROMBOCYTOPENIA: ICD-10-CM

## 2022-08-04 DIAGNOSIS — E53.8 FOLATE DEFICIENCY: ICD-10-CM

## 2022-08-04 LAB
ALBUMIN SERPL-MCNC: 3.4 GM/DL (ref 3.4–4.8)
ALBUMIN/GLOB SERPL: 0.9 RATIO (ref 1.1–2)
ALP SERPL-CCNC: 101 UNIT/L (ref 40–150)
ALT SERPL-CCNC: 12 UNIT/L (ref 0–55)
AST SERPL-CCNC: 15 UNIT/L (ref 5–34)
BASOPHILS # BLD AUTO: 0.05 X10(3)/MCL (ref 0–0.2)
BASOPHILS NFR BLD AUTO: 0.7 %
BILIRUBIN DIRECT+TOT PNL SERPL-MCNC: 0.4 MG/DL
BUN SERPL-MCNC: 19.3 MG/DL (ref 9.8–20.1)
CALCIUM SERPL-MCNC: 9.6 MG/DL (ref 8.4–10.2)
CHLORIDE SERPL-SCNC: 104 MMOL/L (ref 98–107)
CO2 SERPL-SCNC: 27 MMOL/L (ref 23–31)
CREAT SERPL-MCNC: 1.32 MG/DL (ref 0.55–1.02)
EOSINOPHIL # BLD AUTO: 0.27 X10(3)/MCL (ref 0–0.9)
EOSINOPHIL NFR BLD AUTO: 3.9 %
ERYTHROCYTE [DISTWIDTH] IN BLOOD BY AUTOMATED COUNT: 13.7 % (ref 11.5–17)
FERRITIN SERPL-MCNC: 95.97 NG/ML (ref 4.63–204)
FOLATE SERPL-MCNC: 7.6 NG/ML (ref 7–31.4)
GLOBULIN SER-MCNC: 3.9 GM/DL (ref 2.4–3.5)
GLUCOSE SERPL-MCNC: 120 MG/DL (ref 82–115)
HCT VFR BLD AUTO: 38.6 % (ref 37–47)
HGB BLD-MCNC: 12.1 GM/DL (ref 12–16)
IMM GRANULOCYTES # BLD AUTO: 0.01 X10(3)/MCL (ref 0–0.04)
IMM GRANULOCYTES NFR BLD AUTO: 0.1 %
IRON SATN MFR SERPL: 21 % (ref 20–50)
IRON SERPL-MCNC: 54 UG/DL (ref 50–170)
LYMPHOCYTES # BLD AUTO: 1.44 X10(3)/MCL (ref 0.6–4.6)
LYMPHOCYTES NFR BLD AUTO: 21 %
MCH RBC QN AUTO: 26.3 PG (ref 27–31)
MCHC RBC AUTO-ENTMCNC: 31.3 MG/DL (ref 33–36)
MCV RBC AUTO: 83.9 FL (ref 80–94)
MONOCYTES # BLD AUTO: 0.32 X10(3)/MCL (ref 0.1–1.3)
MONOCYTES NFR BLD AUTO: 4.7 %
NEUTROPHILS # BLD AUTO: 4.8 X10(3)/MCL (ref 2.1–9.2)
NEUTROPHILS NFR BLD AUTO: 69.6 %
NRBC BLD AUTO-RTO: 0 %
PLATELET # BLD AUTO: 135 X10(3)/MCL (ref 130–400)
PMV BLD AUTO: 12.5 FL (ref 7.4–10.4)
POTASSIUM SERPL-SCNC: 4.6 MMOL/L (ref 3.5–5.1)
PROT SERPL-MCNC: 7.3 GM/DL (ref 5.8–7.6)
RBC # BLD AUTO: 4.6 X10(6)/MCL (ref 4.2–5.4)
SODIUM SERPL-SCNC: 142 MMOL/L (ref 136–145)
TIBC SERPL-MCNC: 204 UG/DL (ref 70–310)
TIBC SERPL-MCNC: 258 UG/DL (ref 250–450)
TRANSFERRIN SERPL-MCNC: 232 MG/DL (ref 173–360)
WBC # SPEC AUTO: 6.9 X10(3)/MCL (ref 4.5–11.5)

## 2022-08-04 PROCEDURE — 82746 ASSAY OF FOLIC ACID SERUM: CPT

## 2022-08-04 PROCEDURE — 85025 COMPLETE CBC W/AUTO DIFF WBC: CPT

## 2022-08-04 PROCEDURE — 83540 ASSAY OF IRON: CPT

## 2022-08-04 PROCEDURE — 80053 COMPREHEN METABOLIC PANEL: CPT

## 2022-08-04 PROCEDURE — 82728 ASSAY OF FERRITIN: CPT

## 2022-08-09 ENCOUNTER — OFFICE VISIT (OUTPATIENT)
Dept: FAMILY MEDICINE | Facility: CLINIC | Age: 67
End: 2022-08-09
Payer: MEDICARE

## 2022-08-09 ENCOUNTER — CLINICAL SUPPORT (OUTPATIENT)
Dept: FAMILY MEDICINE | Facility: CLINIC | Age: 67
End: 2022-08-09
Attending: NURSE PRACTITIONER
Payer: MEDICARE

## 2022-08-09 ENCOUNTER — OFFICE VISIT (OUTPATIENT)
Dept: HEMATOLOGY/ONCOLOGY | Facility: CLINIC | Age: 67
End: 2022-08-09
Payer: MEDICARE

## 2022-08-09 VITALS
DIASTOLIC BLOOD PRESSURE: 72 MMHG | OXYGEN SATURATION: 99 % | TEMPERATURE: 98 F | WEIGHT: 259.31 LBS | BODY MASS INDEX: 44.27 KG/M2 | HEIGHT: 64 IN | RESPIRATION RATE: 16 BRPM | HEART RATE: 79 BPM | SYSTOLIC BLOOD PRESSURE: 112 MMHG

## 2022-08-09 VITALS
TEMPERATURE: 98 F | SYSTOLIC BLOOD PRESSURE: 104 MMHG | OXYGEN SATURATION: 97 % | HEIGHT: 64 IN | WEIGHT: 260 LBS | DIASTOLIC BLOOD PRESSURE: 70 MMHG | HEART RATE: 75 BPM | BODY MASS INDEX: 44.39 KG/M2

## 2022-08-09 DIAGNOSIS — E61.1 IRON DEFICIENCY: ICD-10-CM

## 2022-08-09 DIAGNOSIS — D69.6 THROMBOCYTOPENIA: ICD-10-CM

## 2022-08-09 DIAGNOSIS — D69.6 THROMBOCYTOPENIA: Primary | ICD-10-CM

## 2022-08-09 DIAGNOSIS — E78.5 HYPERLIPIDEMIA ASSOCIATED WITH TYPE 2 DIABETES MELLITUS: ICD-10-CM

## 2022-08-09 DIAGNOSIS — E53.8 FOLATE DEFICIENCY: ICD-10-CM

## 2022-08-09 DIAGNOSIS — E11.69 HYPERLIPIDEMIA ASSOCIATED WITH TYPE 2 DIABETES MELLITUS: ICD-10-CM

## 2022-08-09 DIAGNOSIS — E11.22 CKD STAGE 3 DUE TO TYPE 2 DIABETES MELLITUS: ICD-10-CM

## 2022-08-09 DIAGNOSIS — I15.2 HYPERTENSION ASSOCIATED WITH TYPE 2 DIABETES MELLITUS: ICD-10-CM

## 2022-08-09 DIAGNOSIS — Z79.4 TYPE 2 DIABETES MELLITUS WITH HYPERGLYCEMIA, WITH LONG-TERM CURRENT USE OF INSULIN: Primary | ICD-10-CM

## 2022-08-09 DIAGNOSIS — E11.65 TYPE 2 DIABETES MELLITUS WITH HYPERGLYCEMIA, WITH LONG-TERM CURRENT USE OF INSULIN: Primary | ICD-10-CM

## 2022-08-09 DIAGNOSIS — E11.59 HYPERTENSION ASSOCIATED WITH TYPE 2 DIABETES MELLITUS: ICD-10-CM

## 2022-08-09 DIAGNOSIS — N18.30 CKD STAGE 3 DUE TO TYPE 2 DIABETES MELLITUS: ICD-10-CM

## 2022-08-09 DIAGNOSIS — Z12.11 COLON CANCER SCREENING: ICD-10-CM

## 2022-08-09 DIAGNOSIS — Z79.4 TYPE 2 DIABETES MELLITUS WITH HYPERGLYCEMIA, WITH LONG-TERM CURRENT USE OF INSULIN: ICD-10-CM

## 2022-08-09 DIAGNOSIS — E11.65 TYPE 2 DIABETES MELLITUS WITH HYPERGLYCEMIA, WITH LONG-TERM CURRENT USE OF INSULIN: ICD-10-CM

## 2022-08-09 PROCEDURE — 3078F PR MOST RECENT DIASTOLIC BLOOD PRESSURE < 80 MM HG: ICD-10-PCS | Mod: CPTII,,, | Performed by: NURSE PRACTITIONER

## 2022-08-09 PROCEDURE — 3288F PR FALLS RISK ASSESSMENT DOCUMENTED: ICD-10-PCS | Mod: CPTII,,, | Performed by: NURSE PRACTITIONER

## 2022-08-09 PROCEDURE — 3074F PR MOST RECENT SYSTOLIC BLOOD PRESSURE < 130 MM HG: ICD-10-PCS | Mod: CPTII,,, | Performed by: NURSE PRACTITIONER

## 2022-08-09 PROCEDURE — 99999 PR PBB SHADOW E&M-EST. PATIENT-LVL IV: CPT | Mod: PBBFAC,,, | Performed by: INTERNAL MEDICINE

## 2022-08-09 PROCEDURE — 1159F MED LIST DOCD IN RCRD: CPT | Mod: CPTII,S$GLB,, | Performed by: INTERNAL MEDICINE

## 2022-08-09 PROCEDURE — 1101F PR PT FALLS ASSESS DOC 0-1 FALLS W/OUT INJ PAST YR: ICD-10-PCS | Mod: CPTII,S$GLB,, | Performed by: INTERNAL MEDICINE

## 2022-08-09 PROCEDURE — 4010F ACE/ARB THERAPY RXD/TAKEN: CPT | Mod: CPTII,S$GLB,, | Performed by: INTERNAL MEDICINE

## 2022-08-09 PROCEDURE — 1101F PT FALLS ASSESS-DOCD LE1/YR: CPT | Mod: CPTII,,, | Performed by: NURSE PRACTITIONER

## 2022-08-09 PROCEDURE — 1126F AMNT PAIN NOTED NONE PRSNT: CPT | Mod: CPTII,,, | Performed by: NURSE PRACTITIONER

## 2022-08-09 PROCEDURE — 1101F PT FALLS ASSESS-DOCD LE1/YR: CPT | Mod: CPTII,S$GLB,, | Performed by: INTERNAL MEDICINE

## 2022-08-09 PROCEDURE — 3288F FALL RISK ASSESSMENT DOCD: CPT | Mod: CPTII,S$GLB,, | Performed by: INTERNAL MEDICINE

## 2022-08-09 PROCEDURE — 3288F FALL RISK ASSESSMENT DOCD: CPT | Mod: CPTII,,, | Performed by: NURSE PRACTITIONER

## 2022-08-09 PROCEDURE — 1126F PR PAIN SEVERITY QUANTIFIED, NO PAIN PRESENT: ICD-10-PCS | Mod: CPTII,S$GLB,, | Performed by: INTERNAL MEDICINE

## 2022-08-09 PROCEDURE — 99213 OFFICE O/P EST LOW 20 MIN: CPT | Mod: S$GLB,,, | Performed by: INTERNAL MEDICINE

## 2022-08-09 PROCEDURE — 1160F RVW MEDS BY RX/DR IN RCRD: CPT | Mod: CPTII,S$GLB,, | Performed by: INTERNAL MEDICINE

## 2022-08-09 PROCEDURE — 3288F PR FALLS RISK ASSESSMENT DOCUMENTED: ICD-10-PCS | Mod: CPTII,S$GLB,, | Performed by: INTERNAL MEDICINE

## 2022-08-09 PROCEDURE — 99214 OFFICE O/P EST MOD 30 MIN: CPT | Mod: ,,, | Performed by: NURSE PRACTITIONER

## 2022-08-09 PROCEDURE — 1159F PR MEDICATION LIST DOCUMENTED IN MEDICAL RECORD: ICD-10-PCS | Mod: CPTII,,, | Performed by: NURSE PRACTITIONER

## 2022-08-09 PROCEDURE — 3008F BODY MASS INDEX DOCD: CPT | Mod: CPTII,S$GLB,, | Performed by: INTERNAL MEDICINE

## 2022-08-09 PROCEDURE — 99999 PR PBB SHADOW E&M-EST. PATIENT-LVL IV: ICD-10-PCS | Mod: PBBFAC,,, | Performed by: INTERNAL MEDICINE

## 2022-08-09 PROCEDURE — 4010F PR ACE/ARB THEARPY RXD/TAKEN: ICD-10-PCS | Mod: CPTII,,, | Performed by: NURSE PRACTITIONER

## 2022-08-09 PROCEDURE — 3066F NEPHROPATHY DOC TX: CPT | Mod: CPTII,S$GLB,, | Performed by: INTERNAL MEDICINE

## 2022-08-09 PROCEDURE — 3060F POS MICROALBUMINURIA REV: CPT | Mod: CPTII,S$GLB,, | Performed by: INTERNAL MEDICINE

## 2022-08-09 PROCEDURE — 1159F MED LIST DOCD IN RCRD: CPT | Mod: CPTII,,, | Performed by: NURSE PRACTITIONER

## 2022-08-09 PROCEDURE — 1160F PR REVIEW ALL MEDS BY PRESCRIBER/CLIN PHARMACIST DOCUMENTED: ICD-10-PCS | Mod: CPTII,,, | Performed by: NURSE PRACTITIONER

## 2022-08-09 PROCEDURE — 3060F PR POS MICROALBUMINURIA RESULT DOCUMENTED/REVIEW: ICD-10-PCS | Mod: CPTII,,, | Performed by: NURSE PRACTITIONER

## 2022-08-09 PROCEDURE — 1126F PR PAIN SEVERITY QUANTIFIED, NO PAIN PRESENT: ICD-10-PCS | Mod: CPTII,,, | Performed by: NURSE PRACTITIONER

## 2022-08-09 PROCEDURE — 99214 PR OFFICE/OUTPT VISIT, EST, LEVL IV, 30-39 MIN: ICD-10-PCS | Mod: ,,, | Performed by: NURSE PRACTITIONER

## 2022-08-09 PROCEDURE — 3060F PR POS MICROALBUMINURIA RESULT DOCUMENTED/REVIEW: ICD-10-PCS | Mod: CPTII,S$GLB,, | Performed by: INTERNAL MEDICINE

## 2022-08-09 PROCEDURE — 3074F SYST BP LT 130 MM HG: CPT | Mod: CPTII,S$GLB,, | Performed by: INTERNAL MEDICINE

## 2022-08-09 PROCEDURE — 1159F PR MEDICATION LIST DOCUMENTED IN MEDICAL RECORD: ICD-10-PCS | Mod: CPTII,S$GLB,, | Performed by: INTERNAL MEDICINE

## 2022-08-09 PROCEDURE — 3078F DIAST BP <80 MM HG: CPT | Mod: CPTII,,, | Performed by: NURSE PRACTITIONER

## 2022-08-09 PROCEDURE — 4010F PR ACE/ARB THEARPY RXD/TAKEN: ICD-10-PCS | Mod: CPTII,S$GLB,, | Performed by: INTERNAL MEDICINE

## 2022-08-09 PROCEDURE — 4010F ACE/ARB THERAPY RXD/TAKEN: CPT | Mod: CPTII,,, | Performed by: NURSE PRACTITIONER

## 2022-08-09 PROCEDURE — 99213 PR OFFICE/OUTPT VISIT, EST, LEVL III, 20-29 MIN: ICD-10-PCS | Mod: S$GLB,,, | Performed by: INTERNAL MEDICINE

## 2022-08-09 PROCEDURE — 3078F DIAST BP <80 MM HG: CPT | Mod: CPTII,S$GLB,, | Performed by: INTERNAL MEDICINE

## 2022-08-09 PROCEDURE — 3008F BODY MASS INDEX DOCD: CPT | Mod: CPTII,,, | Performed by: NURSE PRACTITIONER

## 2022-08-09 PROCEDURE — 3074F PR MOST RECENT SYSTOLIC BLOOD PRESSURE < 130 MM HG: ICD-10-PCS | Mod: CPTII,S$GLB,, | Performed by: INTERNAL MEDICINE

## 2022-08-09 PROCEDURE — 3066F PR DOCUMENTATION OF TREATMENT FOR NEPHROPATHY: ICD-10-PCS | Mod: CPTII,S$GLB,, | Performed by: INTERNAL MEDICINE

## 2022-08-09 PROCEDURE — 3066F NEPHROPATHY DOC TX: CPT | Mod: CPTII,,, | Performed by: NURSE PRACTITIONER

## 2022-08-09 PROCEDURE — 3060F POS MICROALBUMINURIA REV: CPT | Mod: CPTII,,, | Performed by: NURSE PRACTITIONER

## 2022-08-09 PROCEDURE — 3078F PR MOST RECENT DIASTOLIC BLOOD PRESSURE < 80 MM HG: ICD-10-PCS | Mod: CPTII,S$GLB,, | Performed by: INTERNAL MEDICINE

## 2022-08-09 PROCEDURE — 3008F PR BODY MASS INDEX (BMI) DOCUMENTED: ICD-10-PCS | Mod: CPTII,,, | Performed by: NURSE PRACTITIONER

## 2022-08-09 PROCEDURE — 3008F PR BODY MASS INDEX (BMI) DOCUMENTED: ICD-10-PCS | Mod: CPTII,S$GLB,, | Performed by: INTERNAL MEDICINE

## 2022-08-09 PROCEDURE — 1126F AMNT PAIN NOTED NONE PRSNT: CPT | Mod: CPTII,S$GLB,, | Performed by: INTERNAL MEDICINE

## 2022-08-09 PROCEDURE — 1101F PR PT FALLS ASSESS DOC 0-1 FALLS W/OUT INJ PAST YR: ICD-10-PCS | Mod: CPTII,,, | Performed by: NURSE PRACTITIONER

## 2022-08-09 PROCEDURE — 1160F RVW MEDS BY RX/DR IN RCRD: CPT | Mod: CPTII,,, | Performed by: NURSE PRACTITIONER

## 2022-08-09 PROCEDURE — 3074F SYST BP LT 130 MM HG: CPT | Mod: CPTII,,, | Performed by: NURSE PRACTITIONER

## 2022-08-09 PROCEDURE — 1160F PR REVIEW ALL MEDS BY PRESCRIBER/CLIN PHARMACIST DOCUMENTED: ICD-10-PCS | Mod: CPTII,S$GLB,, | Performed by: INTERNAL MEDICINE

## 2022-08-09 PROCEDURE — 3066F PR DOCUMENTATION OF TREATMENT FOR NEPHROPATHY: ICD-10-PCS | Mod: CPTII,,, | Performed by: NURSE PRACTITIONER

## 2022-08-09 RX ORDER — INSULIN GLARGINE 300 U/ML
79 INJECTION, SOLUTION SUBCUTANEOUS DAILY
Qty: 6 PEN | Refills: 6 | Status: SHIPPED | OUTPATIENT
Start: 2022-08-09 | End: 2023-01-06 | Stop reason: SDUPTHER

## 2022-08-09 RX ORDER — DULAGLUTIDE 1.5 MG/.5ML
1.5 INJECTION, SOLUTION SUBCUTANEOUS
Qty: 4 PEN | Refills: 6
Start: 2022-08-09 | End: 2023-02-28 | Stop reason: SDUPTHER

## 2022-08-09 NOTE — ASSESSMENT & PLAN NOTE
A1C 7.3 (8/4//2022).    States fasting CBGs 's, much improved.   Currently taking Toujeo 75 units at bedtime, Trulicity 1.5 mg po q week, and NovoLog 5 units TIDAC prn CBGs >200.   Continue Trulicity and Novolog  Increase Toujeo to 79 units at bedtime  Educated on hypoglycemia and interventions  Microalbumin up-to-date (12/20/2021)  DM eye exam up-to-date (6/20/2021); Needs DM eye exam. Ordered in office on today.

## 2022-08-09 NOTE — PROGRESS NOTES
"Subjective:      Patient ID: Indu Azul is a 66 y.o. White female      Chief Complaint: Follow-up (6 wk f/u, DM)       Past Medical History:   Diagnosis Date    Charcot's joint of foot, left     Chronic kidney disease, unspecified     Depression     Diabetes mellitus, type II, insulin dependent     Diabetic neuropathy     GERD (gastroesophageal reflux disease)     Hepatic steatosis     HLD (hyperlipidemia)     HTN (hypertension)     Other hyperlipidemia     Overactive bladder     Thrombocytopenia, unspecified     Vitamin D deficiency         HPI  Presents for 6-week follow up DM     DM: Most recent A1C 7.3 (8/4//2022).  Medication adjustments were made at previous visit (Trulicity added).  States fasting CBGs 's, much improved. Denies any hypoglycemia. Currently taking Toujeo 75 units at bedtime, Trulicity 1.5 mg po q week,  and NovoLog 5 units TIDAC prn CBGs >200.    Microalbumin up-to-date (12/20/2021)  DM eye exam up-to-date (6/20/2021); Needs DM eye exam. Ordered in office on today.       PLT:  PLT count 135 and stable.  Following Dr. Olmstead     Hepatitic Steatosis:  Cirrhotic appearance of liver per CT.  Has upcoming appt with Dr. Amador next week for evaluation.        CKD:  Most recent GFR 43.      HLD: LDL 67.  Currently taking Atorvastatin 20 mg po daily.        HTN: BP at goal. Currently taking Lisinopril 5 mg daily.      Vitamin D deficiency: Recent vitamin D level 52.8.  Currently taking Vitamin D3 50,000 IUs weekly.        GERD: Currently taking Protonix 40 mg daily.      Depression: Stable.  Currently taking Zoloft 50 mg (1 tablet) daily.  Denies any suicidal/homicidal ideations.      Charcot's joint of left foot:  Recent surgery February 1st, 2022.      Overactive Bladder/Urge Incontinence: Specialist is Dr. Crenshaw, urology.  Cystoscopy done 6/14/2022; states normal.       Colon cancer screening:  States she "messed up" old Cologuard stool test.  Repeat testing ordered " today.        Review of Systems   Constitutional: Negative.  Negative for appetite change, chills and fever.   HENT: Negative.    Eyes: Negative.  Negative for discharge and redness.   Respiratory: Negative.  Negative for shortness of breath.    Cardiovascular: Negative.  Negative for chest pain.   Gastrointestinal: Negative.    Endocrine: Negative.    Genitourinary: Negative.    Integumentary:  Negative.   Allergic/Immunologic: Negative.    Neurological: Negative.    Psychiatric/Behavioral: Negative.    All other systems reviewed and are negative.         Objective:      Vitals:    08/09/22 0901   BP: 112/72   Pulse: 79   Resp: 16   Temp: 98.4 °F (36.9 °C)      Body mass index is 44.51 kg/m².     Physical Exam  Vitals reviewed.   Constitutional:       Appearance: Normal appearance.   HENT:      Head: Normocephalic.      Mouth/Throat:      Mouth: Mucous membranes are moist.   Eyes:      Conjunctiva/sclera: Conjunctivae normal.      Pupils: Pupils are equal, round, and reactive to light.   Cardiovascular:      Rate and Rhythm: Normal rate and regular rhythm.   Pulmonary:      Effort: Pulmonary effort is normal. No respiratory distress.      Breath sounds: Normal breath sounds.   Musculoskeletal:         General: Normal range of motion.      Cervical back: Normal range of motion and neck supple.   Skin:     General: Skin is warm and dry.   Neurological:      Mental Status: She is alert and oriented to person, place, and time.   Psychiatric:         Mood and Affect: Mood normal.            Current Outpatient Medications:     acetaminophen (TYLENOL) 500 MG tablet, Take 500 mg by mouth every 6 (six) hours as needed., Disp: , Rfl:     atorvastatin (LIPITOR) 20 MG tablet, Take 20 mg by mouth once daily., Disp: , Rfl:     celecoxib (CELEBREX) 200 MG capsule, Take 200 mg by mouth once daily., Disp: , Rfl:     gabapentin (NEURONTIN) 300 MG capsule, Take 300 mg by mouth 3 (three) times daily., Disp: , Rfl:     insulin  aspart U-100 (NOVOLOG) 100 unit/mL injection, Inject into the skin., Disp: , Rfl:     insulin syringe-needle U-100 1 mL 31 gauge x 5/16 Syrg, Inject 1 Syringe into the skin 3 (three) times daily with meals., Disp: , Rfl:     lisinopriL (PRINIVIL,ZESTRIL) 5 MG tablet, Take 5 mg by mouth once daily., Disp: , Rfl:     pantoprazole (PROTONIX) 40 MG tablet, Take 40 mg by mouth once daily. for 90 days, Disp: , Rfl:     sertraline (ZOLOFT) 50 MG tablet, Take 50 mg by mouth once daily., Disp: , Rfl:     solifenacin (VESICARE) 10 MG tablet, Take 10 mg by mouth once daily., Disp: , Rfl:     dulaglutide (TRULICITY) 1.5 mg/0.5 mL pen injector, Inject 1.5 mg into the skin every 7 days., Disp: 4 pen, Rfl: 6    insulin glargine, TOUJEO, (TOUJEO SOLOSTAR U-300 INSULIN) 300 unit/mL (1.5 mL) InPn pen, Inject 79 Units into the skin once daily., Disp: 6 pen, Rfl: 6    Assessment & Plan:     Problem List Items Addressed This Visit        Cardiac/Vascular    Hyperlipidemia associated with type 2 diabetes mellitus     Stable  Continue Statin           Relevant Medications    dulaglutide (TRULICITY) 1.5 mg/0.5 mL pen injector    insulin glargine, TOUJEO, (TOUJEO SOLOSTAR U-300 INSULIN) 300 unit/mL (1.5 mL) InPn pen       Renal/    CKD stage 3 due to type 2 diabetes mellitus     GFR 43  Avoid NSAIDS  Ensure adequate hydration  May need Nephrology referral if no improvement           Relevant Medications    dulaglutide (TRULICITY) 1.5 mg/0.5 mL pen injector    insulin glargine, TOUJEO, (TOUJEO SOLOSTAR U-300 INSULIN) 300 unit/mL (1.5 mL) InPn pen       Hematology    Thrombocytopenia       Endocrine    Type 2 diabetes mellitus with hyperglycemia, with long-term current use of insulin - Primary     A1C 7.3 (8/4//2022).    States fasting CBGs 's, much improved.   Currently taking Toujeo 75 units at bedtime, Trulicity 1.5 mg po q week, and NovoLog 5 units TIDAC prn CBGs >200.   Continue Trulicity and Novolog  Increase Toujeo to 79  units at bedtime  Educated on hypoglycemia and interventions  Microalbumin up-to-date (12/20/2021)  DM eye exam up-to-date (6/20/2021); Needs DM eye exam. Ordered in office on today.             Relevant Medications    dulaglutide (TRULICITY) 1.5 mg/0.5 mL pen injector    insulin glargine, TOUJEO, (TOUJEO SOLOSTAR U-300 INSULIN) 300 unit/mL (1.5 mL) InPn pen    Other Relevant Orders    Diabetic Eye Screening Photo    Hypertension associated with type 2 diabetes mellitus     BP at goal  Continue current meds           Relevant Medications    dulaglutide (TRULICITY) 1.5 mg/0.5 mL pen injector    insulin glargine, TOUJEO, (TOUJEO SOLOSTAR U-300 INSULIN) 300 unit/mL (1.5 mL) InPn pen       GI    Colon cancer screening     Cologuard reordered           Relevant Orders    Cologuard Screening (Multitarget Stool DNA)

## 2022-08-09 NOTE — PROGRESS NOTES
HEMATOLOGY/ONCOLOGY OFFICE CLINIC VISIT    Visit Information:     Initial Evaluation: 2022   Referring Provider: Maggy Jaquez NP   Other providers:   Code status: Not addressed    Diagnosis/Problem list:   Thrombocytopenia      CLINICAL HISTORY:       Patient ID: Indu Azul is a 66 y.o. female with multiple medical problems kindly referred for thrombocytopenia.  Patient platelet counts on 2021 were 132,000 and since that that has been slowly trending down.  2021 platelets 132,000  2022 platelets 121,000  2022 platelets 115,000    Of note patient have a UA done that same day that suggest possible UTI with positive nitrates, 1+ leukocytes and many bacteria.  Urine culture with E. coli.   Reviewing electronic medical record and going back to 12/15/2014 patient with occasional low platelets.  They were never lower than 100,000 and they always normalized.   As per patient in 2020 when her platelets were slightly decreased (117,000), this was shortly after she has her left foot surgery.  Then in May 17 and May 18 2021, platelets were 105k and 101k,  she had COVID.  Again in 2022 she have a UTI for which she completed antibiotics.      Patient's father diagnosed Blood disorder requiring blood transfusion that started q 4 weeks and the q week. He was diagnosed on his 80's but  at 91   Sister and niece had ovarian cancer. Sister  of it. Niece still alive.     No fever, chills, sweats.  No chest pain or shortness of breath.  No changes in bowel habits.  No bleeding.  No neurological symptoms.      Chief Complaint: No concerns today      Interval History:    Patient presents to clinic today for follow up to discuss lab results. She is with her daughter. She has not been taking any vitamin supplements. She voices no concerns today and is doing well. No fever, chills or sweats. No chest pain or shortness of breath. No bleeding. Thrombocytopenia resolved    Past  Medical History:   Diagnosis Date    Charcot's joint of foot, left     Chronic kidney disease, unspecified     Depression     Diabetes mellitus, type II, insulin dependent     Diabetic neuropathy     GERD (gastroesophageal reflux disease)     Hepatic steatosis     HLD (hyperlipidemia)     HTN (hypertension)     Other hyperlipidemia     Overactive bladder     Thrombocytopenia, unspecified     Vitamin D deficiency       Past Surgical History:   Procedure Laterality Date    APPENDECTOMY      BREAST BIOPSY  2016     SECTION      charcot-leyden crystals identification      CHOLECYSTECTOMY  2006    HEMORRHOID SURGERY      HYSTERECTOMY  1990    total    right foot surgery Right 2022     Family History   Problem Relation Age of Onset    Diabetes Mother     Alzheimer's disease Mother     Diabetes Father     Leukemia Father     Diabetes Sister     Liver cancer Sister     Diabetes Brother      Social Connections: Not on file       Review of patient's allergies indicates:  No Known Allergies   Current Outpatient Medications on File Prior to Visit   Medication Sig Dispense Refill    acetaminophen (TYLENOL) 500 MG tablet Take 500 mg by mouth every 6 (six) hours as needed.      atorvastatin (LIPITOR) 20 MG tablet Take 20 mg by mouth once daily.      celecoxib (CELEBREX) 200 MG capsule Take 200 mg by mouth once daily.      gabapentin (NEURONTIN) 300 MG capsule Take 300 mg by mouth 3 (three) times daily.      insulin aspart U-100 (NOVOLOG) 100 unit/mL injection Inject into the skin.      insulin syringe-needle U-100 1 mL 31 gauge x 5/16 Syrg Inject 1 Syringe into the skin 3 (three) times daily with meals.      lisinopriL (PRINIVIL,ZESTRIL) 5 MG tablet Take 5 mg by mouth once daily.      pantoprazole (PROTONIX) 40 MG tablet Take 40 mg by mouth once daily. for 90 days      sertraline (ZOLOFT) 50 MG tablet Take 50 mg by mouth once daily.      solifenacin (VESICARE) 10 MG tablet  Take 10 mg by mouth once daily.      [DISCONTINUED] cholecalciferol, vitamin D3, 1,250 mcg (50,000 unit) capsule Take 50,000 Units by mouth every 7 days.      [DISCONTINUED] dulaglutide (TRULICITY) 1.5 mg/0.5 mL pen injector Inject 1.5 mg into the skin every 7 days. 4 pen 6    [DISCONTINUED] HYDROcodone-acetaminophen (NORCO) 7.5-325 mg per tablet Take 1-2 tablets by mouth every 8 (eight) hours as needed for Pain.      [DISCONTINUED] oxybutynin (DITROPAN) 5 MG Tab Take 1 tablet by mouth 3 (three) times daily.      [DISCONTINUED] TOUJEO MAX U-300 SOLOSTAR 300 unit/mL (3 mL) insulin pen Inject 75 Units into the skin every evening.       No current facility-administered medications on file prior to visit.      Review of Systems   Constitutional: Negative for activity change, appetite change, chills, diaphoresis, fatigue, fever and unexpected weight change.   HENT: Negative.  Negative for mouth dryness, mouth sores, nosebleeds, sore throat and trouble swallowing.    Eyes: Negative for visual disturbance.   Respiratory: Negative for apnea, cough, choking, chest tightness and shortness of breath.    Cardiovascular: Negative for chest pain, palpitations and leg swelling.   Gastrointestinal: Negative for abdominal distention, abdominal pain, blood in stool, change in bowel habit, constipation, diarrhea and change in bowel habit.   Genitourinary: Negative for decreased urine volume, difficulty urinating, dysuria, frequency, hematuria, hot flashes and urgency.   Musculoskeletal: Negative for arthralgias, back pain, myalgias and neck pain.   Integumentary:  Negative for breast mass, breast discharge and breast tenderness.   Neurological: Negative for dizziness, syncope, weakness, numbness, headaches and memory loss.   Hematological: Negative for adenopathy. Does not bruise/bleed easily.   Psychiatric/Behavioral: Negative for confusion, hallucinations, sleep disturbance and suicidal ideas.   Breast: Negative for mass and  "tenderness             Vitals:    08/09/22 1100   BP: 104/70   BP Location: Left arm   Patient Position: Sitting   Pulse: 75   Temp: 98.4 °F (36.9 °C)   SpO2: 97%   Weight: 117.9 kg (260 lb)   Height: 5' 4" (1.626 m)      Physical Exam  Vitals and nursing note reviewed.   Constitutional:       General: She is not in acute distress.     Appearance: Normal appearance. She is well-developed.   HENT:      Head: Normocephalic and atraumatic.      Mouth/Throat:      Mouth: Mucous membranes are moist.   Eyes:      General: No scleral icterus.     Extraocular Movements: Extraocular movements intact.      Conjunctiva/sclera: Conjunctivae normal.      Pupils: Pupils are equal, round, and reactive to light.   Neck:      Vascular: No JVD.   Cardiovascular:      Rate and Rhythm: Normal rate and regular rhythm.      Heart sounds: No murmur heard.  Pulmonary:      Effort: Pulmonary effort is normal.      Breath sounds: Normal breath sounds. No wheezing or rhonchi.   Chest:      Chest wall: No deformity or tenderness.   Breasts:      Right: No swelling, mass, nipple discharge, skin change, tenderness, axillary adenopathy or supraclavicular adenopathy.      Left: No swelling, mass, nipple discharge, skin change, tenderness, axillary adenopathy or supraclavicular adenopathy.       Abdominal:      General: Bowel sounds are normal. There is no distension.      Palpations: Abdomen is soft. There is no mass.      Tenderness: There is no abdominal tenderness.   Musculoskeletal:         General: No swelling or deformity.      Cervical back: Neck supple.   Lymphadenopathy:      Cervical: No cervical adenopathy.      Upper Body:      Right upper body: No supraclavicular or axillary adenopathy.      Left upper body: No supraclavicular or axillary adenopathy.      Lower Body: No right inguinal adenopathy. No left inguinal adenopathy.   Skin:     General: Skin is warm.      Coloration: Skin is not jaundiced.      Findings: No lesion or rash.    "   Nails: There is no clubbing.   Neurological:      General: No focal deficit present.      Mental Status: She is alert and oriented to person, place, and time.      Sensory: Sensation is intact.      Motor: Motor function is intact.      Gait: Gait is intact.   Psychiatric:         Attention and Perception: Attention normal.         Mood and Affect: Mood and affect normal.         Speech: Speech normal.         Behavior: Behavior is cooperative.         Thought Content: Thought content normal.         Cognition and Memory: Cognition normal.         Judgment: Judgment normal.       ECOG SCORE         No results for input(s): COMP, CBC in the last 72 hours.               Assessment:       Thrombocytopenia-mild  Iron deficiency  Folate deficiency        Plan:         RTC in 6 months with labs  Labs: CBC, CMP, Iron studies, Ferritin, Vit B-12, Folate, MM work up, no urine  Continue follow ups with PCP and GI   Instructed to call office if any problems arise    Encouraged to call with questions or problems  The patient was given ample opportunity to ask questions and they were all answered to satisfaction; patient demonstrated understanding of what we discussed and is agreeable to plan.      AAYUSH REIS MD      Professional Services   I, Ashlie Duong LPN, acted solely as a scribe for and in the presence of Dr. Aayush Reis, who performed these services.

## 2022-08-10 NOTE — PROGRESS NOTES
Indu Azul is a 66 y.o. female here for a diabetic eye screening with non-dilated fundus photos per Maggy Jaquez NP    Patient cooperative?: Yes  Small pupils?: No  Last eye exam: unknown    For exam results, see Encounter Report.

## 2022-08-15 ENCOUNTER — TELEPHONE (OUTPATIENT)
Dept: FAMILY MEDICINE | Facility: CLINIC | Age: 67
End: 2022-08-15
Payer: MEDICARE

## 2022-10-04 DIAGNOSIS — E78.5 HYPERLIPIDEMIA ASSOCIATED WITH TYPE 2 DIABETES MELLITUS: Primary | ICD-10-CM

## 2022-10-04 DIAGNOSIS — E11.69 HYPERLIPIDEMIA ASSOCIATED WITH TYPE 2 DIABETES MELLITUS: Primary | ICD-10-CM

## 2022-10-04 RX ORDER — ATORVASTATIN CALCIUM 20 MG/1
20 TABLET, FILM COATED ORAL DAILY
Qty: 90 TABLET | Refills: 3 | Status: SHIPPED | OUTPATIENT
Start: 2022-10-04 | End: 2023-11-27

## 2022-10-17 DIAGNOSIS — E11.59 HYPERTENSION ASSOCIATED WITH TYPE 2 DIABETES MELLITUS: Primary | ICD-10-CM

## 2022-10-17 DIAGNOSIS — I15.2 HYPERTENSION ASSOCIATED WITH TYPE 2 DIABETES MELLITUS: Primary | ICD-10-CM

## 2022-10-17 RX ORDER — LISINOPRIL 5 MG/1
5 TABLET ORAL DAILY
Qty: 90 TABLET | Refills: 2 | Status: SHIPPED | OUTPATIENT
Start: 2022-10-17 | End: 2023-07-13

## 2023-01-05 ENCOUNTER — TELEPHONE (OUTPATIENT)
Dept: FAMILY MEDICINE | Facility: CLINIC | Age: 68
End: 2023-01-05
Payer: MEDICARE

## 2023-01-05 DIAGNOSIS — F33.42 RECURRENT MAJOR DEPRESSIVE DISORDER, IN FULL REMISSION: Primary | ICD-10-CM

## 2023-01-05 RX ORDER — SERTRALINE HYDROCHLORIDE 50 MG/1
50 TABLET, FILM COATED ORAL DAILY
Qty: 30 TABLET | Refills: 6 | Status: SHIPPED | OUTPATIENT
Start: 2023-01-05 | End: 2023-07-03

## 2023-01-06 DIAGNOSIS — Z79.4 TYPE 2 DIABETES MELLITUS WITH HYPERGLYCEMIA, WITH LONG-TERM CURRENT USE OF INSULIN: ICD-10-CM

## 2023-01-06 DIAGNOSIS — E11.65 TYPE 2 DIABETES MELLITUS WITH HYPERGLYCEMIA, WITH LONG-TERM CURRENT USE OF INSULIN: ICD-10-CM

## 2023-01-06 RX ORDER — INSULIN GLARGINE 300 U/ML
79 INJECTION, SOLUTION SUBCUTANEOUS DAILY
Qty: 6 PEN | Refills: 6 | Status: SHIPPED | OUTPATIENT
Start: 2023-01-06 | End: 2023-05-16

## 2023-01-11 ENCOUNTER — OFFICE VISIT (OUTPATIENT)
Dept: FAMILY MEDICINE | Facility: CLINIC | Age: 68
End: 2023-01-11
Payer: MEDICARE

## 2023-01-11 ENCOUNTER — LAB VISIT (OUTPATIENT)
Dept: LAB | Facility: HOSPITAL | Age: 68
End: 2023-01-11
Attending: NURSE PRACTITIONER
Payer: MEDICARE

## 2023-01-11 VITALS
WEIGHT: 257 LBS | HEIGHT: 64 IN | TEMPERATURE: 99 F | RESPIRATION RATE: 18 BRPM | HEART RATE: 92 BPM | OXYGEN SATURATION: 98 % | SYSTOLIC BLOOD PRESSURE: 119 MMHG | DIASTOLIC BLOOD PRESSURE: 76 MMHG | BODY MASS INDEX: 43.87 KG/M2

## 2023-01-11 DIAGNOSIS — E11.42 DIABETIC POLYNEUROPATHY ASSOCIATED WITH TYPE 2 DIABETES MELLITUS: ICD-10-CM

## 2023-01-11 DIAGNOSIS — Z79.4 TYPE 2 DIABETES MELLITUS WITH HYPERGLYCEMIA, WITH LONG-TERM CURRENT USE OF INSULIN: ICD-10-CM

## 2023-01-11 DIAGNOSIS — Z23 NEED FOR INFLUENZA VACCINATION: ICD-10-CM

## 2023-01-11 DIAGNOSIS — E78.5 HYPERLIPIDEMIA ASSOCIATED WITH TYPE 2 DIABETES MELLITUS: ICD-10-CM

## 2023-01-11 DIAGNOSIS — E11.22 CKD STAGE 3 DUE TO TYPE 2 DIABETES MELLITUS: ICD-10-CM

## 2023-01-11 DIAGNOSIS — E11.65 TYPE 2 DIABETES MELLITUS WITH HYPERGLYCEMIA, WITH LONG-TERM CURRENT USE OF INSULIN: Primary | ICD-10-CM

## 2023-01-11 DIAGNOSIS — E11.69 HYPERLIPIDEMIA ASSOCIATED WITH TYPE 2 DIABETES MELLITUS: ICD-10-CM

## 2023-01-11 DIAGNOSIS — Z79.4 TYPE 2 DIABETES MELLITUS WITH HYPERGLYCEMIA, WITH LONG-TERM CURRENT USE OF INSULIN: Primary | ICD-10-CM

## 2023-01-11 DIAGNOSIS — K74.69 OTHER CIRRHOSIS OF LIVER: ICD-10-CM

## 2023-01-11 DIAGNOSIS — E11.59 HYPERTENSION ASSOCIATED WITH TYPE 2 DIABETES MELLITUS: ICD-10-CM

## 2023-01-11 DIAGNOSIS — I15.2 HYPERTENSION ASSOCIATED WITH TYPE 2 DIABETES MELLITUS: ICD-10-CM

## 2023-01-11 DIAGNOSIS — E11.65 TYPE 2 DIABETES MELLITUS WITH HYPERGLYCEMIA, WITH LONG-TERM CURRENT USE OF INSULIN: ICD-10-CM

## 2023-01-11 DIAGNOSIS — N18.30 CKD STAGE 3 DUE TO TYPE 2 DIABETES MELLITUS: ICD-10-CM

## 2023-01-11 DIAGNOSIS — K74.60 HEPATIC CIRRHOSIS, UNSPECIFIED HEPATIC CIRRHOSIS TYPE, UNSPECIFIED WHETHER ASCITES PRESENT: Primary | ICD-10-CM

## 2023-01-11 LAB
ALBUMIN SERPL-MCNC: 3.4 G/DL (ref 3.4–4.8)
ALBUMIN/GLOB SERPL: 0.8 RATIO (ref 1.1–2)
ALP SERPL-CCNC: 102 UNIT/L (ref 40–150)
ALT SERPL-CCNC: 13 UNIT/L (ref 0–55)
AST SERPL-CCNC: 12 UNIT/L (ref 5–34)
BASOPHILS # BLD AUTO: 0.06 X10(3)/MCL (ref 0–0.2)
BASOPHILS NFR BLD AUTO: 0.6 %
BILIRUBIN DIRECT+TOT PNL SERPL-MCNC: 0.3 MG/DL
BUN SERPL-MCNC: 18.5 MG/DL (ref 9.8–20.1)
CALCIUM SERPL-MCNC: 10.1 MG/DL (ref 8.4–10.2)
CHLORIDE SERPL-SCNC: 104 MMOL/L (ref 98–107)
CO2 SERPL-SCNC: 28 MMOL/L (ref 23–31)
CREAT SERPL-MCNC: 1.52 MG/DL (ref 0.55–1.02)
EOSINOPHIL # BLD AUTO: 0.34 X10(3)/MCL (ref 0–0.9)
EOSINOPHIL NFR BLD AUTO: 3.2 %
ERYTHROCYTE [DISTWIDTH] IN BLOOD BY AUTOMATED COUNT: 14.3 % (ref 11.5–17)
EST. AVERAGE GLUCOSE BLD GHB EST-MCNC: 162.8 MG/DL
GFR SERPLBLD CREATININE-BSD FMLA CKD-EPI: 38 MLS/MIN/1.73/M2
GLOBULIN SER-MCNC: 4.3 GM/DL (ref 2.4–3.5)
GLUCOSE SERPL-MCNC: 183 MG/DL (ref 82–115)
HBA1C MFR BLD: 7.3 %
HCT VFR BLD AUTO: 38.3 % (ref 37–47)
HGB BLD-MCNC: 11.8 GM/DL (ref 12–16)
IMM GRANULOCYTES # BLD AUTO: 0.04 X10(3)/MCL (ref 0–0.04)
IMM GRANULOCYTES NFR BLD AUTO: 0.4 %
INR BLD: 1.11 (ref 2–3)
LYMPHOCYTES # BLD AUTO: 2.42 X10(3)/MCL (ref 0.6–4.6)
LYMPHOCYTES NFR BLD AUTO: 22.5 %
MCH RBC QN AUTO: 24.6 PG
MCHC RBC AUTO-ENTMCNC: 30.8 MG/DL (ref 33–36)
MCV RBC AUTO: 80 FL (ref 80–94)
MONOCYTES # BLD AUTO: 0.51 X10(3)/MCL (ref 0.1–1.3)
MONOCYTES NFR BLD AUTO: 4.7 %
NEUTROPHILS # BLD AUTO: 7.39 X10(3)/MCL (ref 2.1–9.2)
NEUTROPHILS NFR BLD AUTO: 68.6 %
NRBC BLD AUTO-RTO: 0 %
PLATELET # BLD AUTO: 195 X10(3)/MCL (ref 130–400)
PMV BLD AUTO: 11.8 FL (ref 7.4–10.4)
POTASSIUM SERPL-SCNC: 4.6 MMOL/L (ref 3.5–5.1)
PROT SERPL-MCNC: 7.7 GM/DL (ref 5.8–7.6)
PROTHROMBIN TIME: 14.1 SECONDS (ref 11.7–14.5)
RBC # BLD AUTO: 4.79 X10(6)/MCL (ref 4.2–5.4)
SODIUM SERPL-SCNC: 142 MMOL/L (ref 136–145)
WBC # SPEC AUTO: 10.8 X10(3)/MCL (ref 4.5–11.5)

## 2023-01-11 PROCEDURE — 3008F PR BODY MASS INDEX (BMI) DOCUMENTED: ICD-10-PCS | Mod: CPTII,,, | Performed by: NURSE PRACTITIONER

## 2023-01-11 PROCEDURE — 1159F MED LIST DOCD IN RCRD: CPT | Mod: CPTII,,, | Performed by: NURSE PRACTITIONER

## 2023-01-11 PROCEDURE — 1159F PR MEDICATION LIST DOCUMENTED IN MEDICAL RECORD: ICD-10-PCS | Mod: CPTII,,, | Performed by: NURSE PRACTITIONER

## 2023-01-11 PROCEDURE — 83036 HEMOGLOBIN GLYCOSYLATED A1C: CPT

## 2023-01-11 PROCEDURE — 85025 COMPLETE CBC W/AUTO DIFF WBC: CPT

## 2023-01-11 PROCEDURE — 80053 COMPREHEN METABOLIC PANEL: CPT

## 2023-01-11 PROCEDURE — 1101F PT FALLS ASSESS-DOCD LE1/YR: CPT | Mod: CPTII,,, | Performed by: NURSE PRACTITIONER

## 2023-01-11 PROCEDURE — 4010F ACE/ARB THERAPY RXD/TAKEN: CPT | Mod: CPTII,,, | Performed by: NURSE PRACTITIONER

## 2023-01-11 PROCEDURE — 3074F SYST BP LT 130 MM HG: CPT | Mod: CPTII,,, | Performed by: NURSE PRACTITIONER

## 2023-01-11 PROCEDURE — 1101F PR PT FALLS ASSESS DOC 0-1 FALLS W/OUT INJ PAST YR: ICD-10-PCS | Mod: CPTII,,, | Performed by: NURSE PRACTITIONER

## 2023-01-11 PROCEDURE — 3288F PR FALLS RISK ASSESSMENT DOCUMENTED: ICD-10-PCS | Mod: CPTII,,, | Performed by: NURSE PRACTITIONER

## 2023-01-11 PROCEDURE — 99214 PR OFFICE/OUTPT VISIT, EST, LEVL IV, 30-39 MIN: ICD-10-PCS | Mod: 25,,, | Performed by: NURSE PRACTITIONER

## 2023-01-11 PROCEDURE — 99214 OFFICE O/P EST MOD 30 MIN: CPT | Mod: 25,,, | Performed by: NURSE PRACTITIONER

## 2023-01-11 PROCEDURE — 90694 FLU VACCINE - QUADRIVALENT - ADJUVANTED: ICD-10-PCS | Mod: ,,, | Performed by: NURSE PRACTITIONER

## 2023-01-11 PROCEDURE — 4010F PR ACE/ARB THEARPY RXD/TAKEN: ICD-10-PCS | Mod: CPTII,,, | Performed by: NURSE PRACTITIONER

## 2023-01-11 PROCEDURE — 36415 COLL VENOUS BLD VENIPUNCTURE: CPT

## 2023-01-11 PROCEDURE — 90694 VACC AIIV4 NO PRSRV 0.5ML IM: CPT | Mod: ,,, | Performed by: NURSE PRACTITIONER

## 2023-01-11 PROCEDURE — 1160F PR REVIEW ALL MEDS BY PRESCRIBER/CLIN PHARMACIST DOCUMENTED: ICD-10-PCS | Mod: CPTII,,, | Performed by: NURSE PRACTITIONER

## 2023-01-11 PROCEDURE — 85610 PROTHROMBIN TIME: CPT

## 2023-01-11 PROCEDURE — G0008 FLU VACCINE - QUADRIVALENT - ADJUVANTED: ICD-10-PCS | Mod: ,,, | Performed by: NURSE PRACTITIONER

## 2023-01-11 PROCEDURE — G0008 ADMIN INFLUENZA VIRUS VAC: HCPCS | Mod: ,,, | Performed by: NURSE PRACTITIONER

## 2023-01-11 PROCEDURE — 3008F BODY MASS INDEX DOCD: CPT | Mod: CPTII,,, | Performed by: NURSE PRACTITIONER

## 2023-01-11 PROCEDURE — 1160F RVW MEDS BY RX/DR IN RCRD: CPT | Mod: CPTII,,, | Performed by: NURSE PRACTITIONER

## 2023-01-11 PROCEDURE — 3074F PR MOST RECENT SYSTOLIC BLOOD PRESSURE < 130 MM HG: ICD-10-PCS | Mod: CPTII,,, | Performed by: NURSE PRACTITIONER

## 2023-01-11 PROCEDURE — 3078F DIAST BP <80 MM HG: CPT | Mod: CPTII,,, | Performed by: NURSE PRACTITIONER

## 2023-01-11 PROCEDURE — 3078F PR MOST RECENT DIASTOLIC BLOOD PRESSURE < 80 MM HG: ICD-10-PCS | Mod: CPTII,,, | Performed by: NURSE PRACTITIONER

## 2023-01-11 PROCEDURE — 3288F FALL RISK ASSESSMENT DOCD: CPT | Mod: CPTII,,, | Performed by: NURSE PRACTITIONER

## 2023-01-11 RX ORDER — SPIRONOLACTONE 50 MG/1
50 TABLET, FILM COATED ORAL DAILY
COMMUNITY
Start: 2022-11-09

## 2023-01-11 RX ORDER — GABAPENTIN 300 MG/1
300 CAPSULE ORAL 3 TIMES DAILY
Qty: 90 CAPSULE | Refills: 6 | Status: SHIPPED | OUTPATIENT
Start: 2023-01-11 | End: 2023-07-19

## 2023-01-11 RX ORDER — FUROSEMIDE 40 MG/1
40 TABLET ORAL DAILY
COMMUNITY
Start: 2022-11-09 | End: 2023-02-02

## 2023-01-11 NOTE — ASSESSMENT & PLAN NOTE
Most recent A1C 7.3 (8/4//2022).   Currently taking Toujeo 75 units at bedtime, Trulicity 1.5 mg po q week,  and NovoLog 5 units TIDAC prn CBGs >200.    Microalbumin up-to-date (04/20/2022)  DM eye exam up-to-date (8/2022); Has appt with Opthalmopathy on tomorrow for DM eye exam   Labs today;will call with results  Keep appt with PCP for follow up

## 2023-01-11 NOTE — PROGRESS NOTES
"Subjective:      Patient ID: Indu Azul is a 67 y.o. White female      Chief Complaint: Follow-up (DM Follow-Up )       Past Medical History:   Diagnosis Date    Charcot's joint of foot, left     Chronic kidney disease, unspecified     Cirrhosis of liver without ascites     Depression     Diabetes mellitus, type II, insulin dependent     Diabetic neuropathy     GERD (gastroesophageal reflux disease)     Hepatic steatosis     HLD (hyperlipidemia)     HTN (hypertension)     Other hyperlipidemia     Overactive bladder     Thrombocytopenia, unspecified     Vitamin D deficiency         HPI  Presents for 6-month follow up DM.     DM: Most recent A1C 7.3 (8/4//2022).  States fasting CBGs 100-120's, much improved. Denies any hypoglycemia. Currently taking Toujeo 75 units at bedtime, Trulicity 1.5 mg po q week,  and NovoLog 5 units TIDAC prn CBGs >200.    Microalbumin up-to-date (04/20/2022)  DM eye exam up-to-date (8/2022); Has appt with Opthalmopathy on tomorrow for DM eye exam      PLT:  PLT count 135 and stable.  Following Dr. Olmstead     Hepatitic Steatosis/Cirrhosis:  Cirrhotic appearance of liver per CT.  Evaluated per Dr. Amador, who started her on Aldactone and Furosemide.  Has appt for follow up after labs.      CKD:  Most recent GFR 43.      HLD: LDL 67.  Currently taking Atorvastatin 20 mg po daily.        HTN: BP at goal. Currently taking Lisinopril 5 mg daily.      Vitamin D deficiency: Recent vitamin D level 52.8.  Currently taking Vitamin D3 50,000 IUs weekly.        GERD: Currently taking Protonix 40 mg daily.      Depression: Stable.  Currently taking Zoloft 50 mg (1 tablet) daily.  Denies any suicidal/homicidal ideations.      Charcot's joint of left foot:  Recent surgery February 1st, 2022.      Overactive Bladder/Urge Incontinence: Specialist is Dr. Crenshaw, urology.  Cystoscopy done 6/14/2022; states normal.       Colon cancer screening:  States she "messed up" old Cologuard stool test.  " Repeat testing ordered today.     Review of Systems   Constitutional: Negative.  Negative for appetite change, chills and fever.   HENT: Negative.     Eyes: Negative.  Negative for discharge and redness.   Respiratory: Negative.  Negative for shortness of breath.    Cardiovascular: Negative.  Negative for chest pain.   Gastrointestinal: Negative.    Endocrine: Negative.    Genitourinary: Negative.    Integumentary:  Negative.   Allergic/Immunologic: Negative.    Neurological: Negative.    Psychiatric/Behavioral: Negative.     All other systems reviewed and are negative.       Objective:      Vitals:    01/11/23 1252   BP: 119/76   Pulse: 92   Resp: 18   Temp: 99.3 °F (37.4 °C)      Body mass index is 44.11 kg/m².     Physical Exam  Vitals reviewed.   Constitutional:       Appearance: Normal appearance.   HENT:      Head: Normocephalic.      Mouth/Throat:      Mouth: Mucous membranes are moist.   Eyes:      Conjunctiva/sclera: Conjunctivae normal.      Pupils: Pupils are equal, round, and reactive to light.   Cardiovascular:      Rate and Rhythm: Normal rate and regular rhythm.   Pulmonary:      Effort: Pulmonary effort is normal. No respiratory distress.      Breath sounds: Normal breath sounds.   Musculoskeletal:         General: Normal range of motion.      Cervical back: Normal range of motion and neck supple.   Skin:     General: Skin is warm and dry.   Neurological:      Mental Status: She is alert and oriented to person, place, and time.   Psychiatric:         Mood and Affect: Mood normal.          Current Outpatient Medications:     acetaminophen (TYLENOL) 500 MG tablet, Take 500 mg by mouth every 6 (six) hours as needed., Disp: , Rfl:     atorvastatin (LIPITOR) 20 MG tablet, Take 1 tablet (20 mg total) by mouth once daily., Disp: 90 tablet, Rfl: 3    dulaglutide (TRULICITY) 1.5 mg/0.5 mL pen injector, Inject 1.5 mg into the skin every 7 days., Disp: 4 pen, Rfl: 6    furosemide (LASIX) 40 MG tablet, Take 40  mg by mouth once daily., Disp: , Rfl:     gabapentin (NEURONTIN) 300 MG capsule, Take 300 mg by mouth 3 (three) times daily., Disp: , Rfl:     insulin aspart U-100 (NOVOLOG) 100 unit/mL injection, Inject into the skin as needed., Disp: , Rfl:     insulin glargine, TOUJEO, (TOUJEO SOLOSTAR U-300 INSULIN) 300 unit/mL (1.5 mL) InPn pen, Inject 79 Units into the skin once daily., Disp: 6 pen, Rfl: 6    insulin syringe-needle U-100 1 mL 31 gauge x 5/16 Syrg, Inject 1 Syringe into the skin 3 (three) times daily with meals., Disp: , Rfl:     lisinopriL (PRINIVIL,ZESTRIL) 5 MG tablet, Take 1 tablet (5 mg total) by mouth once daily., Disp: 90 tablet, Rfl: 2    pantoprazole (PROTONIX) 40 MG tablet, Take 40 mg by mouth as needed. for 90 days, Disp: , Rfl:     sertraline (ZOLOFT) 50 MG tablet, Take 1 tablet (50 mg total) by mouth once daily., Disp: 30 tablet, Rfl: 6    solifenacin (VESICARE) 10 MG tablet, Take 10 mg by mouth once daily., Disp: , Rfl:     spironolactone (ALDACTONE) 50 MG tablet, Take 50 mg by mouth once daily., Disp: , Rfl:     celecoxib (CELEBREX) 200 MG capsule, Take 200 mg by mouth once daily., Disp: , Rfl:     Assessment & Plan:     Problem List Items Addressed This Visit          Neuro    Diabetic polyneuropathy associated with type 2 diabetes mellitus     Stable  Continue Gabapentin as prescribed; Rx sent  Keep appt with PCP for follow up            Cardiac/Vascular    Hyperlipidemia associated with type 2 diabetes mellitus     Stable  Continue Statin            Renal/    CKD stage 3 due to type 2 diabetes mellitus     GFR 43  Avoid NSAIDS  Ensure adequate hydration  May need Nephrology referral if no improvement            ID    Need for influenza vaccination    Relevant Orders    Influenza (FLUAD) - Quadrivalent (Adjuvanted) *Preferred* (65+) (PF)       Endocrine    Type 2 diabetes mellitus with hyperglycemia, with long-term current use of insulin - Primary     Most recent A1C 7.3 (8/4//2022).    Currently taking Toujeo 75 units at bedtime, Trulicity 1.5 mg po q week,  and NovoLog 5 units TIDAC prn CBGs >200.    Microalbumin up-to-date (04/20/2022)  DM eye exam up-to-date (8/2022); Has appt with Opthalmopathy on tomorrow for DM eye exam   Labs today;will call with results  Keep appt with PCP for follow up              Relevant Orders    Hemoglobin A1C    Comprehensive Metabolic Panel    Hypertension associated with type 2 diabetes mellitus     BP at goal  Continue current meds            GI    Other cirrhosis of liver     Stable  Following Dr. Amador; keep appt for follow up  Continue Aldactone and Lasix as prescribed

## 2023-01-12 LAB
LEFT EYE DM RETINOPATHY: NEGATIVE
RIGHT EYE DM RETINOPATHY: POSITIVE

## 2023-01-18 DIAGNOSIS — N18.32 STAGE 3B CHRONIC KIDNEY DISEASE: Primary | ICD-10-CM

## 2023-01-24 ENCOUNTER — DOCUMENTATION ONLY (OUTPATIENT)
Dept: FAMILY MEDICINE | Facility: CLINIC | Age: 68
End: 2023-01-24
Payer: MEDICARE

## 2023-01-30 ENCOUNTER — PATIENT MESSAGE (OUTPATIENT)
Dept: ADMINISTRATIVE | Facility: HOSPITAL | Age: 68
End: 2023-01-30
Payer: MEDICARE

## 2023-01-30 DIAGNOSIS — Z12.11 COLON CANCER SCREENING: Primary | ICD-10-CM

## 2023-01-31 ENCOUNTER — LAB VISIT (OUTPATIENT)
Dept: LAB | Facility: HOSPITAL | Age: 68
End: 2023-01-31
Attending: INTERNAL MEDICINE
Payer: MEDICARE

## 2023-01-31 DIAGNOSIS — D69.6 THROMBOCYTOPENIA: ICD-10-CM

## 2023-01-31 DIAGNOSIS — E53.8 FOLATE DEFICIENCY: ICD-10-CM

## 2023-01-31 DIAGNOSIS — E61.1 IRON DEFICIENCY: ICD-10-CM

## 2023-01-31 LAB
ALBUMIN SERPL-MCNC: 3.6 G/DL (ref 3.4–4.8)
ALBUMIN/GLOB SERPL: 1 RATIO (ref 1.1–2)
ALP SERPL-CCNC: 99 UNIT/L (ref 40–150)
ALT SERPL-CCNC: 14 UNIT/L (ref 0–55)
AST SERPL-CCNC: 13 UNIT/L (ref 5–34)
B2 MICROGLOB SERPL-MCNC: 6.21 MG/L
BASOPHILS # BLD AUTO: 0.04 X10(3)/MCL (ref 0–0.2)
BASOPHILS NFR BLD AUTO: 0.4 %
BILIRUBIN DIRECT+TOT PNL SERPL-MCNC: 0.5 MG/DL
BUN SERPL-MCNC: 37.1 MG/DL (ref 9.8–20.1)
CALCIUM SERPL-MCNC: 9.6 MG/DL (ref 8.4–10.2)
CHLORIDE SERPL-SCNC: 101 MMOL/L (ref 98–107)
CO2 SERPL-SCNC: 29 MMOL/L (ref 23–31)
CREAT SERPL-MCNC: 1.87 MG/DL (ref 0.55–1.02)
EOSINOPHIL # BLD AUTO: 0.33 X10(3)/MCL (ref 0–0.9)
EOSINOPHIL NFR BLD AUTO: 3.5 %
ERYTHROCYTE [DISTWIDTH] IN BLOOD BY AUTOMATED COUNT: 14.4 % (ref 11.5–17)
FERRITIN SERPL-MCNC: 45.39 NG/ML (ref 4.63–204)
FOLATE SERPL-MCNC: 7.5 NG/ML (ref 7–31.4)
GFR SERPLBLD CREATININE-BSD FMLA CKD-EPI: 29 MLS/MIN/1.73/M2
GLOBULIN SER-MCNC: 3.5 GM/DL (ref 2.4–3.5)
GLUCOSE SERPL-MCNC: 140 MG/DL (ref 82–115)
HCT VFR BLD AUTO: 38 % (ref 37–47)
HGB BLD-MCNC: 11.4 GM/DL (ref 12–16)
IGA SERPL-MCNC: 237 MG/DL (ref 69–517)
IGG SERPL-MCNC: 1252 MG/DL (ref 522–1631)
IGM SERPL-MCNC: 99 MG/DL (ref 33–293)
IMM GRANULOCYTES # BLD AUTO: 0.03 X10(3)/MCL (ref 0–0.04)
IMM GRANULOCYTES NFR BLD AUTO: 0.3 %
IRON SATN MFR SERPL: 14 % (ref 20–50)
IRON SERPL-MCNC: 44 UG/DL (ref 50–170)
LYMPHOCYTES # BLD AUTO: 1.64 X10(3)/MCL (ref 0.6–4.6)
LYMPHOCYTES NFR BLD AUTO: 17.4 %
MCH RBC QN AUTO: 24.1 PG
MCHC RBC AUTO-ENTMCNC: 30 MG/DL (ref 33–36)
MCV RBC AUTO: 80.3 FL (ref 80–94)
MONOCYTES # BLD AUTO: 0.53 X10(3)/MCL (ref 0.1–1.3)
MONOCYTES NFR BLD AUTO: 5.6 %
NEUTROPHILS # BLD AUTO: 6.85 X10(3)/MCL (ref 2.1–9.2)
NEUTROPHILS NFR BLD AUTO: 72.8 %
PLATELET # BLD AUTO: 172 X10(3)/MCL (ref 130–400)
PMV BLD AUTO: 12.3 FL (ref 7.4–10.4)
POTASSIUM SERPL-SCNC: 4.6 MMOL/L (ref 3.5–5.1)
PROT SERPL-MCNC: 7.1 GM/DL (ref 5.8–7.6)
RBC # BLD AUTO: 4.73 X10(6)/MCL (ref 4.2–5.4)
SODIUM SERPL-SCNC: 141 MMOL/L (ref 136–145)
TIBC SERPL-MCNC: 269 UG/DL (ref 70–310)
TIBC SERPL-MCNC: 313 UG/DL (ref 250–450)
VIT B12 SERPL-MCNC: 591 PG/ML (ref 213–816)
WBC # SPEC AUTO: 9.4 X10(3)/MCL (ref 4.5–11.5)

## 2023-01-31 PROCEDURE — 82784 ASSAY IGA/IGD/IGG/IGM EACH: CPT

## 2023-01-31 PROCEDURE — 85025 COMPLETE CBC W/AUTO DIFF WBC: CPT

## 2023-01-31 PROCEDURE — 80053 COMPREHEN METABOLIC PANEL: CPT

## 2023-01-31 PROCEDURE — 84165 PROTEIN E-PHORESIS SERUM: CPT

## 2023-01-31 PROCEDURE — 82728 ASSAY OF FERRITIN: CPT

## 2023-01-31 PROCEDURE — 36415 COLL VENOUS BLD VENIPUNCTURE: CPT

## 2023-01-31 PROCEDURE — 83521 IG LIGHT CHAINS FREE EACH: CPT

## 2023-01-31 PROCEDURE — 83550 IRON BINDING TEST: CPT

## 2023-01-31 PROCEDURE — 82607 VITAMIN B-12: CPT

## 2023-01-31 PROCEDURE — 86334 IMMUNOFIX E-PHORESIS SERUM: CPT

## 2023-01-31 PROCEDURE — 82746 ASSAY OF FOLIC ACID SERUM: CPT

## 2023-01-31 PROCEDURE — 86146 BETA-2 GLYCOPROTEIN ANTIBODY: CPT

## 2023-02-01 LAB
ALBUMIN % SPEP (OHS): 42.83
ALBUMIN SERPL-MCNC: 3 G/DL (ref 3.4–4.8)
ALBUMIN/GLOB SERPL: 0.7 RATIO (ref 1.1–2)
ALPHA 1 GLOB (OHS): 0.35 GM/DL
ALPHA 1 GLOB% (OHS): 4.98
ALPHA 2 GLOB % (OHS): 16.92
ALPHA 2 GLOB (OHS): 1.2 GM/DL
BETA GLOB (OHS): 1.17 GM/DL
BETA GLOB% (OHS): 16.48
GAMMA GLOBULIN % (OHS): 19.29
GAMMA GLOBULIN (OHS): 1.37 GM/DL
GLOBULIN SER-MCNC: 4.1 GM/DL (ref 2.4–3.5)
KAPPA LC FREE SER-MCNC: 7.38 MG/DL (ref 0.33–1.94)
KAPPA LC FREE/LAMBDA FREE SER: 2.04 {RATIO} (ref 0.26–1.65)
LAMBDA LC FREE SERPL-MCNC: 3.61 MG/DL (ref 0.57–2.63)
M SPIKE % (OHS): ABNORMAL
M SPIKE (OHS): ABNORMAL
PATH REV: NORMAL
PROT SERPL-MCNC: 7.1 GM/DL (ref 5.8–7.6)

## 2023-02-02 ENCOUNTER — OFFICE VISIT (OUTPATIENT)
Dept: HEMATOLOGY/ONCOLOGY | Facility: CLINIC | Age: 68
End: 2023-02-02
Payer: MEDICARE

## 2023-02-02 VITALS
HEIGHT: 65 IN | SYSTOLIC BLOOD PRESSURE: 119 MMHG | WEIGHT: 242.5 LBS | DIASTOLIC BLOOD PRESSURE: 80 MMHG | TEMPERATURE: 98 F | BODY MASS INDEX: 40.4 KG/M2 | RESPIRATION RATE: 20 BRPM | OXYGEN SATURATION: 98 % | HEART RATE: 91 BPM

## 2023-02-02 DIAGNOSIS — E53.8 FOLATE DEFICIENCY: ICD-10-CM

## 2023-02-02 DIAGNOSIS — D47.2 GAMMOPATHY: ICD-10-CM

## 2023-02-02 DIAGNOSIS — D69.6 THROMBOCYTOPENIA: Primary | ICD-10-CM

## 2023-02-02 DIAGNOSIS — R16.1 SPLENOMEGALY: ICD-10-CM

## 2023-02-02 DIAGNOSIS — E61.1 IRON DEFICIENCY: ICD-10-CM

## 2023-02-02 PROCEDURE — 4010F ACE/ARB THERAPY RXD/TAKEN: CPT | Mod: CPTII,S$GLB,, | Performed by: INTERNAL MEDICINE

## 2023-02-02 PROCEDURE — 99999 PR PBB SHADOW E&M-EST. PATIENT-LVL IV: CPT | Mod: PBBFAC,,, | Performed by: INTERNAL MEDICINE

## 2023-02-02 PROCEDURE — 1159F PR MEDICATION LIST DOCUMENTED IN MEDICAL RECORD: ICD-10-PCS | Mod: CPTII,S$GLB,, | Performed by: INTERNAL MEDICINE

## 2023-02-02 PROCEDURE — 1160F RVW MEDS BY RX/DR IN RCRD: CPT | Mod: CPTII,S$GLB,, | Performed by: INTERNAL MEDICINE

## 2023-02-02 PROCEDURE — 99214 OFFICE O/P EST MOD 30 MIN: CPT | Mod: S$GLB,,, | Performed by: INTERNAL MEDICINE

## 2023-02-02 PROCEDURE — 3008F PR BODY MASS INDEX (BMI) DOCUMENTED: ICD-10-PCS | Mod: CPTII,S$GLB,, | Performed by: INTERNAL MEDICINE

## 2023-02-02 PROCEDURE — 1101F PR PT FALLS ASSESS DOC 0-1 FALLS W/OUT INJ PAST YR: ICD-10-PCS | Mod: CPTII,S$GLB,, | Performed by: INTERNAL MEDICINE

## 2023-02-02 PROCEDURE — 1126F AMNT PAIN NOTED NONE PRSNT: CPT | Mod: CPTII,S$GLB,, | Performed by: INTERNAL MEDICINE

## 2023-02-02 PROCEDURE — 3008F BODY MASS INDEX DOCD: CPT | Mod: CPTII,S$GLB,, | Performed by: INTERNAL MEDICINE

## 2023-02-02 PROCEDURE — 1160F PR REVIEW ALL MEDS BY PRESCRIBER/CLIN PHARMACIST DOCUMENTED: ICD-10-PCS | Mod: CPTII,S$GLB,, | Performed by: INTERNAL MEDICINE

## 2023-02-02 PROCEDURE — 3079F DIAST BP 80-89 MM HG: CPT | Mod: CPTII,S$GLB,, | Performed by: INTERNAL MEDICINE

## 2023-02-02 PROCEDURE — 1159F MED LIST DOCD IN RCRD: CPT | Mod: CPTII,S$GLB,, | Performed by: INTERNAL MEDICINE

## 2023-02-02 PROCEDURE — 99999 PR PBB SHADOW E&M-EST. PATIENT-LVL IV: ICD-10-PCS | Mod: PBBFAC,,, | Performed by: INTERNAL MEDICINE

## 2023-02-02 PROCEDURE — 4010F PR ACE/ARB THEARPY RXD/TAKEN: ICD-10-PCS | Mod: CPTII,S$GLB,, | Performed by: INTERNAL MEDICINE

## 2023-02-02 PROCEDURE — 1126F PR PAIN SEVERITY QUANTIFIED, NO PAIN PRESENT: ICD-10-PCS | Mod: CPTII,S$GLB,, | Performed by: INTERNAL MEDICINE

## 2023-02-02 PROCEDURE — 1101F PT FALLS ASSESS-DOCD LE1/YR: CPT | Mod: CPTII,S$GLB,, | Performed by: INTERNAL MEDICINE

## 2023-02-02 PROCEDURE — 3288F FALL RISK ASSESSMENT DOCD: CPT | Mod: CPTII,S$GLB,, | Performed by: INTERNAL MEDICINE

## 2023-02-02 PROCEDURE — 3079F PR MOST RECENT DIASTOLIC BLOOD PRESSURE 80-89 MM HG: ICD-10-PCS | Mod: CPTII,S$GLB,, | Performed by: INTERNAL MEDICINE

## 2023-02-02 PROCEDURE — 3074F SYST BP LT 130 MM HG: CPT | Mod: CPTII,S$GLB,, | Performed by: INTERNAL MEDICINE

## 2023-02-02 PROCEDURE — 3074F PR MOST RECENT SYSTOLIC BLOOD PRESSURE < 130 MM HG: ICD-10-PCS | Mod: CPTII,S$GLB,, | Performed by: INTERNAL MEDICINE

## 2023-02-02 PROCEDURE — 3288F PR FALLS RISK ASSESSMENT DOCUMENTED: ICD-10-PCS | Mod: CPTII,S$GLB,, | Performed by: INTERNAL MEDICINE

## 2023-02-02 PROCEDURE — 99214 PR OFFICE/OUTPT VISIT, EST, LEVL IV, 30-39 MIN: ICD-10-PCS | Mod: S$GLB,,, | Performed by: INTERNAL MEDICINE

## 2023-02-02 NOTE — PROGRESS NOTES
HEMATOLOGY/ONCOLOGY OFFICE CLINIC VISIT    Visit Information:    Initial Evaluation: 2022  Referring Provider: Maggy Jaquez NP  Other providers:  Code status: Not addressed    Diagnosis/Problem list:   Thrombocytopenia      Imaging studies:  CT C/A/P 6/3/2022: There is no significant hepatic steatosis.  The liver is cirrhotic.  No liver lesion is identified.  The spleen is enlarged, measuring 15.5 cm in length.  There is no retroperitoneal lymphadenopathy or abdominal aortic aneurysm.  A mildly prominent right external iliac lymph node measures 9 mm in short axis, nonspecific.  This is also similar in appearance when compared to 2015. Impression: Cirrhosis.  Splenomegaly.     CLINICAL HISTORY:       Patient ID: Indu Azul is a 67 y.o. female with multiple medical problems kindly referred for thrombocytopenia.  Patient platelet counts on 2021 were 132,000 and since that that has been slowly trending down.  2021 platelets 132,000  2022 platelets 121,000  2022 platelets 115,000    Of note patient have a UA done that same day that suggest possible UTI with positive nitrates, 1+ leukocytes and many bacteria.  Urine culture with E. coli.   Reviewing electronic medical record and going back to 12/15/2014 patient with occasional low platelets.  They were never lower than 100,000 and they always normalized.   As per patient in 2020 when her platelets were slightly decreased (117,000), this was shortly after she has her left foot surgery.  Then in May 17 and May 18 2021, platelets were 105k and 101k,  she had COVID.  Again in 2022 she have a UTI for which she completed antibiotics.      Patient's father diagnosed Blood disorder requiring blood transfusion that started q 4 weeks and the q week. He was diagnosed on his 80's but  at 91   Sister and niece had ovarian cancer. Sister  of it. Niece still alive.           Chief Complaint: no complaints today      Interval  History:    Patient presents to clinic today for follow up to discuss lab results. She is by herself. She has not been taking any vitamin supplements. She voices no concerns today and is doing well. No fever, chills or sweats. No chest pain or shortness of breath. No bleeding. Thrombocytopenia resolved    Kidney function is worsening so she was referred to nephrologist. She will see also an Urologist and GI for colonoscopy and possible cirrhosis as well.  Review blood work with her.  Thrombocytopenia resolved.  Platelets continue to be normal.  She is a little more anemic with mild iron deficiency.  Kidney function worsening.  Discussed with her CT scan of the abdomen and pelvis from 2022 that revealed cirrhosis of the liver with splenomegaly.  She remember discussing CT scan with her doctor but not wearing the cirrhosis or enlarged spleen.  Discussed with her the significance of these findings and this need to be continue to follow either with her primary care physician or her GI once she starts seeing him.    Past Medical History:   Diagnosis Date    Charcot's joint of foot, left     Chronic kidney disease, unspecified     Cirrhosis of liver without ascites     Depression     Diabetes mellitus, type II, insulin dependent     Diabetic neuropathy     GERD (gastroesophageal reflux disease)     Hepatic steatosis     HLD (hyperlipidemia)     HTN (hypertension)     Other hyperlipidemia     Overactive bladder     Thrombocytopenia, unspecified     Type 2 diabetes mellitus with unspecified diabetic retinopathy without macular edema     Vitamin D deficiency       Past Surgical History:   Procedure Laterality Date    APPENDECTOMY      BREAST BIOPSY  2016     SECTION      charcot-leyden crystals identification      CHOLECYSTECTOMY  2006    HEMORRHOID SURGERY      HYSTERECTOMY  1990    total    right foot surgery Right 2022     Family History   Problem Relation Age of Onset    Diabetes Mother      Alzheimer's disease Mother     Diabetes Father     Leukemia Father     Diabetes Sister     Liver cancer Sister     Diabetes Brother      Social Connections: Not on file       Review of patient's allergies indicates:  No Known Allergies   Current Outpatient Medications on File Prior to Visit   Medication Sig Dispense Refill    acetaminophen (TYLENOL) 500 MG tablet Take 500 mg by mouth every 6 (six) hours as needed.      atorvastatin (LIPITOR) 20 MG tablet Take 1 tablet (20 mg total) by mouth once daily. 90 tablet 3    dulaglutide (TRULICITY) 1.5 mg/0.5 mL pen injector Inject 1.5 mg into the skin every 7 days. 4 pen 6    gabapentin (NEURONTIN) 300 MG capsule Take 1 capsule (300 mg total) by mouth 3 (three) times daily. 90 capsule 6    insulin aspart U-100 (NOVOLOG) 100 unit/mL injection Inject into the skin as needed.      insulin glargine, TOUJEO, (TOUJEO SOLOSTAR U-300 INSULIN) 300 unit/mL (1.5 mL) InPn pen Inject 79 Units into the skin once daily. 6 pen 6    insulin syringe-needle U-100 1 mL 31 gauge x 5/16 Syrg Inject 1 Syringe into the skin 3 (three) times daily with meals.      pantoprazole (PROTONIX) 40 MG tablet Take 40 mg by mouth as needed. for 90 days      sertraline (ZOLOFT) 50 MG tablet Take 1 tablet (50 mg total) by mouth once daily. 30 tablet 6    solifenacin (VESICARE) 10 MG tablet Take 10 mg by mouth once daily.      spironolactone (ALDACTONE) 50 MG tablet Take 50 mg by mouth once daily.      lisinopriL (PRINIVIL,ZESTRIL) 5 MG tablet Take 1 tablet (5 mg total) by mouth once daily. 90 tablet 2    [DISCONTINUED] celecoxib (CELEBREX) 200 MG capsule Take 200 mg by mouth once daily.      [DISCONTINUED] furosemide (LASIX) 40 MG tablet Take 40 mg by mouth once daily.       No current facility-administered medications on file prior to visit.      Review of Systems   Constitutional:  Negative for activity change, appetite change, chills, diaphoresis, fatigue, fever and unexpected weight change.   HENT:  "Negative.  Negative for mouth dryness, mouth sores, nosebleeds, sore throat and trouble swallowing.    Eyes:  Negative for visual disturbance.   Respiratory:  Negative for apnea, cough, choking, chest tightness and shortness of breath.    Cardiovascular:  Negative for chest pain, palpitations and leg swelling.   Gastrointestinal:  Negative for abdominal distention, abdominal pain, blood in stool, change in bowel habit, constipation, diarrhea and change in bowel habit.   Genitourinary:  Negative for decreased urine volume, difficulty urinating, dysuria, frequency, hematuria, hot flashes and urgency.   Musculoskeletal:  Negative for arthralgias, back pain, myalgias and neck pain.   Integumentary:  Negative for breast mass, breast discharge and breast tenderness.   Neurological:  Negative for dizziness, syncope, weakness, numbness, headaches and memory loss.   Hematological:  Negative for adenopathy. Does not bruise/bleed easily.   Psychiatric/Behavioral:  Negative for confusion, hallucinations, sleep disturbance and suicidal ideas.    Breast: Negative for mass and tenderness           Vitals:    02/02/23 0949   BP: 119/80   BP Location: Left arm   Patient Position: Sitting   BP Method: Medium (Automatic)   Pulse: 91   Resp: 20   Temp: 98.2 °F (36.8 °C)   TempSrc: Oral   SpO2: 98%   Weight: 110 kg (242 lb 8 oz)   Height: 5' 5" (1.651 m)      Physical Exam  Vitals and nursing note reviewed.   Constitutional:       General: She is not in acute distress.     Appearance: Normal appearance. She is well-developed.   HENT:      Head: Normocephalic and atraumatic.      Mouth/Throat:      Mouth: Mucous membranes are moist.   Eyes:      General: No scleral icterus.     Extraocular Movements: Extraocular movements intact.      Conjunctiva/sclera: Conjunctivae normal.      Pupils: Pupils are equal, round, and reactive to light.   Neck:      Vascular: No JVD.   Cardiovascular:      Rate and Rhythm: Normal rate and regular rhythm. "      Heart sounds: No murmur heard.  Pulmonary:      Effort: Pulmonary effort is normal.      Breath sounds: Normal breath sounds. No wheezing or rhonchi.   Chest:      Chest wall: No deformity or tenderness.   Breasts:     Right: No swelling, mass, nipple discharge, skin change or tenderness.      Left: No swelling, mass, nipple discharge, skin change or tenderness.   Abdominal:      General: Bowel sounds are normal. There is no distension.      Palpations: Abdomen is soft. There is no mass.      Tenderness: There is no abdominal tenderness.   Musculoskeletal:         General: No swelling or deformity.      Cervical back: Neck supple.   Lymphadenopathy:      Cervical: No cervical adenopathy.      Upper Body:      Right upper body: No supraclavicular or axillary adenopathy.      Left upper body: No supraclavicular or axillary adenopathy.      Lower Body: No right inguinal adenopathy. No left inguinal adenopathy.   Skin:     General: Skin is warm.      Coloration: Skin is not jaundiced.      Findings: No lesion or rash.      Nails: There is no clubbing.   Neurological:      General: No focal deficit present.      Mental Status: She is alert and oriented to person, place, and time.      Sensory: Sensation is intact.      Motor: Motor function is intact.      Gait: Gait is intact.   Psychiatric:         Attention and Perception: Attention normal.         Mood and Affect: Mood and affect normal.         Speech: Speech normal.         Behavior: Behavior is cooperative.         Thought Content: Thought content normal.         Cognition and Memory: Cognition normal.         Judgment: Judgment normal.     ECOG SCORE            Latest Reference Range & Units 01/19/22 08:57 04/08/22 09:51 04/20/22 13:53 08/04/22 10:04 01/11/23 13:50 01/31/23 07:28   WBC 4.5 - 11.5 x10(3)/mcL 5.1 6.2 6.2 6.9 10.8 9.4   RBC 4.20 - 5.40 x10(6)/mcL 4.81 5.10 4.61 4.60 4.79 4.73   Hemoglobin 12.0 - 16.0 gm/dL 12.6 13.4 12.0 12.1 11.8 (L) 11.4 (L)    Hematocrit 37.0 - 47.0 % 41.0 43.1 38.7 38.6 38.3 38.0   MCV 80.0 - 94.0 fL 85.2 84.5 83.9 83.9 80.0 80.3   MCH pg 26.2 26.3 26.0 26.3 (L) 24.6 24.1   MCHC 33.0 - 36.0 mg/dL 30.7 (L) 31.1 31.0 31.3 (L) 30.8 (L) 30.0 (L)   RDW 11.5 - 17.0 % 14.1 14.4 14.1 13.7 14.3 14.4   Platelets 130 - 400 x10(3)/mcL 121 (L) 115 108 135 195 172      Latest Reference Range & Units 01/31/23 07:28   Iron 50 - 170 ug/dL 44 (L)   TIBC 250 - 450 ug/dL 313   Iron Binding Capacity Unsaturated 70 - 310 ug/dL 269   Ferritin 4.63 - 204.00 ng/mL 45.39   Folate 7.0 - 31.4 ng/mL 7.5   Vitamin B-12 213 - 816 pg/mL 591   Iron Saturation 20 - 50 % 14 (L)       SPEP: No M-spike indicative of a monoclonal protein is identified.   RAFY:  Immunofixation shows a normal pattern of immunoglobulins.   No monoclonal bands are detected.    Latest Reference Range & Units 01/31/23 07:28   IgG 522.00 - 1,631.00 mg/dL 1,252.00   IgM 33.0 - 293.0 mg/dL 99.0   IgA 69.0 - 517.0 mg/dL 237.0     Kappa Free Light Chain 0.3300 - 1.94 mg/dL 7.38 High     Lambda Free Light Chain 0.5700 - 2.63 mg/dL 3.61 High     Kappa/Lambda FLC Ratio 0.2600 - 1.65 2.04 High     Comment: Elevated free light chain ratios between 1.66 and 3.00 may  occur due to polyclonal hypergammaglobulinemia or impaired   renal clearance. An isolated increased free light chain ratio in this range should be interpreted with caution, and clinical correlation is recommended.           Latest Reference Range & Units 01/31/23 07:28   Sodium 136 - 145 mmol/L 141   Potassium 3.5 - 5.1 mmol/L 4.6   Chloride 98 - 107 mmol/L 101   CO2 23 - 31 mmol/L 29   BUN 9.8 - 20.1 mg/dL 37.1 (H)   Creatinine 0.55 - 1.02 mg/dL 1.87 (H)   eGFR mls/min/1.73/m2 29   Glucose 82 - 115 mg/dL 140 (H)   Calcium 8.4 - 10.2 mg/dL 9.6   Alkaline Phosphatase 40 - 150 unit/L 99   PROTEIN TOTAL 5.8 - 7.6 gm/dL  5.8 - 7.6 gm/dL 7.1  7.1   Albumin 3.4 - 4.8 g/dL  3.4 - 4.8 g/dL 3.0 (L)  3.6   Albumin/Globulin Ratio 1.1 - 2.0 ratio  1.1 -  2.0 ratio 0.7 (L)  1.0 (L)   BILIRUBIN TOTAL <=1.5 mg/dL 0.5   AST 5 - 34 unit/L 13   ALT 0 - 55 unit/L 14   Globulin, Total 2.4 - 3.5 gm/dL  2.4 - 3.5 gm/dL 4.1 (H)  3.5               Assessment:       1. Thrombocytopenia    2. Folate deficiency    3. Iron deficiency    4. Gammopathy    5. Splenomegaly        Thrombocytopenia-resolved. Will follow counts closely as she has splenomegaly and this can affects her counts mostly platelets.  Iron deficiency-mild  Folate deficiency-resolved        Plan:         RTC in 6 months with labs  Labs: CBC, CMP, Iron studies, Ferritin, folate  Restart oral Iron daily  Continue follow ups with PCP and GI   Instructed to call office if any problems arise    Encouraged to call with questions or problems  The patient was given ample opportunity to ask questions and they were all answered to satisfaction; patient demonstrated understanding of what we discussed and is agreeable to plan.      AAYUSH REIS MD

## 2023-02-08 ENCOUNTER — TELEPHONE (OUTPATIENT)
Dept: FAMILY MEDICINE | Facility: CLINIC | Age: 68
End: 2023-02-08
Payer: MEDICARE

## 2023-02-28 ENCOUNTER — TELEPHONE (OUTPATIENT)
Dept: FAMILY MEDICINE | Facility: CLINIC | Age: 68
End: 2023-02-28
Payer: MEDICARE

## 2023-02-28 DIAGNOSIS — E11.65 TYPE 2 DIABETES MELLITUS WITH HYPERGLYCEMIA, WITH LONG-TERM CURRENT USE OF INSULIN: ICD-10-CM

## 2023-02-28 DIAGNOSIS — Z79.4 TYPE 2 DIABETES MELLITUS WITH HYPERGLYCEMIA, WITH LONG-TERM CURRENT USE OF INSULIN: ICD-10-CM

## 2023-02-28 RX ORDER — DULAGLUTIDE 1.5 MG/.5ML
1.5 INJECTION, SOLUTION SUBCUTANEOUS
Qty: 4 PEN | Refills: 6 | Status: SHIPPED | OUTPATIENT
Start: 2023-02-28 | End: 2023-07-20

## 2023-02-28 NOTE — TELEPHONE ENCOUNTER
I have signed for the following orders AND/OR meds. Please notify the patient and ask the patient to schedule the testing and/or information about any medications that were sent.      Medications Ordered This Encounter   Medications    dulaglutide (TRULICITY) 1.5 mg/0.5 mL pen injector     Sig: Inject 1.5 mg into the skin every 7 days.     Dispense:  4 pen     Refill:  6     No orders of the defined types were placed in this encounter.

## 2023-04-14 ENCOUNTER — TELEPHONE (OUTPATIENT)
Dept: FAMILY MEDICINE | Facility: CLINIC | Age: 68
End: 2023-04-14
Payer: MEDICARE

## 2023-05-01 ENCOUNTER — PATIENT MESSAGE (OUTPATIENT)
Dept: ADMINISTRATIVE | Facility: HOSPITAL | Age: 68
End: 2023-05-01
Payer: MEDICARE

## 2023-05-16 DIAGNOSIS — Z79.4 TYPE 2 DIABETES MELLITUS WITH HYPERGLYCEMIA, WITH LONG-TERM CURRENT USE OF INSULIN: ICD-10-CM

## 2023-05-16 DIAGNOSIS — E11.65 TYPE 2 DIABETES MELLITUS WITH HYPERGLYCEMIA, WITH LONG-TERM CURRENT USE OF INSULIN: ICD-10-CM

## 2023-05-16 RX ORDER — INSULIN GLARGINE 300 U/ML
INJECTION, SOLUTION SUBCUTANEOUS
Qty: 6 ML | Refills: 3 | Status: SHIPPED | OUTPATIENT
Start: 2023-05-16 | End: 2023-06-19

## 2023-05-26 LAB
LEFT EYE DM RETINOPATHY: POSITIVE
RIGHT EYE DM RETINOPATHY: POSITIVE

## 2023-06-14 ENCOUNTER — TELEPHONE (OUTPATIENT)
Dept: FAMILY MEDICINE | Facility: CLINIC | Age: 68
End: 2023-06-14
Payer: MEDICARE

## 2023-06-14 DIAGNOSIS — Z00.00 WELLNESS EXAMINATION: ICD-10-CM

## 2023-06-14 DIAGNOSIS — E11.9 TYPE 2 DIABETES MELLITUS WITHOUT COMPLICATION, UNSPECIFIED WHETHER LONG TERM INSULIN USE: Primary | ICD-10-CM

## 2023-06-15 ENCOUNTER — LAB VISIT (OUTPATIENT)
Dept: LAB | Facility: HOSPITAL | Age: 68
End: 2023-06-15
Attending: NURSE PRACTITIONER
Payer: MEDICARE

## 2023-06-15 DIAGNOSIS — E11.9 TYPE 2 DIABETES MELLITUS WITHOUT COMPLICATION, UNSPECIFIED WHETHER LONG TERM INSULIN USE: ICD-10-CM

## 2023-06-15 DIAGNOSIS — Z00.00 WELLNESS EXAMINATION: ICD-10-CM

## 2023-06-15 LAB
ALBUMIN SERPL-MCNC: 3.3 G/DL (ref 3.4–4.8)
ALBUMIN/GLOB SERPL: 0.8 RATIO (ref 1.1–2)
ALP SERPL-CCNC: 90 UNIT/L (ref 40–150)
ALT SERPL-CCNC: 10 UNIT/L (ref 0–55)
APPEARANCE UR: ABNORMAL
AST SERPL-CCNC: 11 UNIT/L (ref 5–34)
BACTERIA #/AREA URNS AUTO: ABNORMAL /HPF
BASOPHILS # BLD AUTO: 0.06 X10(3)/MCL
BASOPHILS NFR BLD AUTO: 0.7 %
BILIRUB UR QL STRIP.AUTO: NEGATIVE MG/DL
BILIRUBIN DIRECT+TOT PNL SERPL-MCNC: 0.3 MG/DL
BUN SERPL-MCNC: 25.4 MG/DL (ref 9.8–20.1)
CALCIUM SERPL-MCNC: 10 MG/DL (ref 8.4–10.2)
CHLORIDE SERPL-SCNC: 100 MMOL/L (ref 98–107)
CHOLEST SERPL-MCNC: 113 MG/DL
CHOLEST/HDLC SERPL: 3 {RATIO} (ref 0–5)
CO2 SERPL-SCNC: 30 MMOL/L (ref 23–31)
COLOR UR: ABNORMAL
CREAT SERPL-MCNC: 1.93 MG/DL (ref 0.55–1.02)
EOSINOPHIL # BLD AUTO: 0.2 X10(3)/MCL (ref 0–0.9)
EOSINOPHIL NFR BLD AUTO: 2.2 %
ERYTHROCYTE [DISTWIDTH] IN BLOOD BY AUTOMATED COUNT: 16.1 % (ref 11.5–17)
EST. AVERAGE GLUCOSE BLD GHB EST-MCNC: 191.5 MG/DL
GFR SERPLBLD CREATININE-BSD FMLA CKD-EPI: 28 MLS/MIN/1.73/M2
GLOBULIN SER-MCNC: 4.3 GM/DL (ref 2.4–3.5)
GLUCOSE SERPL-MCNC: 112 MG/DL (ref 82–115)
GLUCOSE UR QL STRIP.AUTO: NEGATIVE MG/DL
HBA1C MFR BLD: 8.3 %
HCT VFR BLD AUTO: 34.8 % (ref 37–47)
HDLC SERPL-MCNC: 38 MG/DL (ref 35–60)
HGB BLD-MCNC: 10.8 G/DL (ref 12–16)
IMM GRANULOCYTES # BLD AUTO: 0.03 X10(3)/MCL (ref 0–0.04)
IMM GRANULOCYTES NFR BLD AUTO: 0.3 %
KETONES UR QL STRIP.AUTO: NEGATIVE MG/DL
LDLC SERPL CALC-MCNC: 62 MG/DL (ref 50–140)
LEUKOCYTE ESTERASE UR QL STRIP.AUTO: ABNORMAL UNIT/L
LYMPHOCYTES # BLD AUTO: 1.46 X10(3)/MCL (ref 0.6–4.6)
LYMPHOCYTES NFR BLD AUTO: 15.9 %
MCH RBC QN AUTO: 23.1 PG (ref 27–31)
MCHC RBC AUTO-ENTMCNC: 31 G/DL (ref 33–36)
MCV RBC AUTO: 74.5 FL (ref 80–94)
MONOCYTES # BLD AUTO: 0.51 X10(3)/MCL (ref 0.1–1.3)
MONOCYTES NFR BLD AUTO: 5.6 %
NEUTROPHILS # BLD AUTO: 6.92 X10(3)/MCL (ref 2.1–9.2)
NEUTROPHILS NFR BLD AUTO: 75.3 %
NITRITE UR QL STRIP.AUTO: NEGATIVE
NRBC BLD AUTO-RTO: 0 %
PH UR STRIP.AUTO: 6 [PH]
PLATELET # BLD AUTO: 176 X10(3)/MCL (ref 130–400)
PLATELETS.RETICULATED NFR BLD AUTO: 7.9 % (ref 0.9–11.2)
PMV BLD AUTO: 11.9 FL (ref 7.4–10.4)
POTASSIUM SERPL-SCNC: 4.3 MMOL/L (ref 3.5–5.1)
PROT SERPL-MCNC: 7.6 GM/DL (ref 5.8–7.6)
PROT UR QL STRIP.AUTO: NEGATIVE MG/DL
RBC # BLD AUTO: 4.67 X10(6)/MCL (ref 4.2–5.4)
RBC #/AREA URNS AUTO: ABNORMAL /HPF
RBC UR QL AUTO: ABNORMAL UNIT/L
SODIUM SERPL-SCNC: 139 MMOL/L (ref 136–145)
SP GR UR STRIP.AUTO: 1.01
SQUAMOUS #/AREA URNS AUTO: ABNORMAL /HPF
TRIGL SERPL-MCNC: 64 MG/DL (ref 37–140)
TSH SERPL-ACNC: 3.07 UIU/ML (ref 0.35–4.94)
UROBILINOGEN UR STRIP-ACNC: 1 MG/DL
VLDLC SERPL CALC-MCNC: 13 MG/DL
WBC # SPEC AUTO: 9.18 X10(3)/MCL (ref 4.5–11.5)
WBC #/AREA URNS AUTO: ABNORMAL /HPF

## 2023-06-15 PROCEDURE — 80061 LIPID PANEL: CPT

## 2023-06-15 PROCEDURE — 36415 COLL VENOUS BLD VENIPUNCTURE: CPT

## 2023-06-15 PROCEDURE — 80053 COMPREHEN METABOLIC PANEL: CPT

## 2023-06-15 PROCEDURE — 83036 HEMOGLOBIN GLYCOSYLATED A1C: CPT

## 2023-06-15 PROCEDURE — 85025 COMPLETE CBC W/AUTO DIFF WBC: CPT

## 2023-06-15 PROCEDURE — 84443 ASSAY THYROID STIM HORMONE: CPT

## 2023-06-19 ENCOUNTER — OFFICE VISIT (OUTPATIENT)
Dept: FAMILY MEDICINE | Facility: CLINIC | Age: 68
End: 2023-06-19
Payer: MEDICARE

## 2023-06-19 VITALS
WEIGHT: 238.13 LBS | HEART RATE: 89 BPM | RESPIRATION RATE: 19 BRPM | SYSTOLIC BLOOD PRESSURE: 123 MMHG | OXYGEN SATURATION: 96 % | DIASTOLIC BLOOD PRESSURE: 76 MMHG | BODY MASS INDEX: 39.67 KG/M2 | TEMPERATURE: 97 F | HEIGHT: 65 IN

## 2023-06-19 DIAGNOSIS — F33.42 RECURRENT MAJOR DEPRESSIVE DISORDER, IN FULL REMISSION: ICD-10-CM

## 2023-06-19 DIAGNOSIS — N18.30 CKD STAGE 3 DUE TO TYPE 2 DIABETES MELLITUS: ICD-10-CM

## 2023-06-19 DIAGNOSIS — Z79.4 TYPE 2 DIABETES MELLITUS WITH HYPERGLYCEMIA, WITH LONG-TERM CURRENT USE OF INSULIN: ICD-10-CM

## 2023-06-19 DIAGNOSIS — E11.69 HYPERLIPIDEMIA ASSOCIATED WITH TYPE 2 DIABETES MELLITUS: ICD-10-CM

## 2023-06-19 DIAGNOSIS — E11.65 TYPE 2 DIABETES MELLITUS WITH HYPERGLYCEMIA, WITH LONG-TERM CURRENT USE OF INSULIN: ICD-10-CM

## 2023-06-19 DIAGNOSIS — D22.9 NEVUS: ICD-10-CM

## 2023-06-19 DIAGNOSIS — K74.69 OTHER CIRRHOSIS OF LIVER: ICD-10-CM

## 2023-06-19 DIAGNOSIS — E11.42 DIABETIC POLYNEUROPATHY ASSOCIATED WITH TYPE 2 DIABETES MELLITUS: ICD-10-CM

## 2023-06-19 DIAGNOSIS — E55.9 VITAMIN D DEFICIENCY: ICD-10-CM

## 2023-06-19 DIAGNOSIS — D50.9 MICROCYTIC ANEMIA: ICD-10-CM

## 2023-06-19 DIAGNOSIS — Z00.00 WELLNESS EXAMINATION: Primary | ICD-10-CM

## 2023-06-19 DIAGNOSIS — K76.0 HEPATIC STEATOSIS: ICD-10-CM

## 2023-06-19 DIAGNOSIS — F17.210 CIGARETTE NICOTINE DEPENDENCE WITHOUT COMPLICATION: ICD-10-CM

## 2023-06-19 DIAGNOSIS — E78.5 HYPERLIPIDEMIA ASSOCIATED WITH TYPE 2 DIABETES MELLITUS: ICD-10-CM

## 2023-06-19 DIAGNOSIS — Z12.39 ENCOUNTER FOR SCREENING FOR MALIGNANT NEOPLASM OF BREAST, UNSPECIFIED SCREENING MODALITY: ICD-10-CM

## 2023-06-19 DIAGNOSIS — Z12.31 ENCOUNTER FOR SCREENING MAMMOGRAM FOR MALIGNANT NEOPLASM OF BREAST: ICD-10-CM

## 2023-06-19 DIAGNOSIS — Z78.0 POST-MENOPAUSAL: ICD-10-CM

## 2023-06-19 DIAGNOSIS — E11.22 CKD STAGE 3 DUE TO TYPE 2 DIABETES MELLITUS: ICD-10-CM

## 2023-06-19 DIAGNOSIS — E11.59 HYPERTENSION ASSOCIATED WITH TYPE 2 DIABETES MELLITUS: ICD-10-CM

## 2023-06-19 DIAGNOSIS — Z12.11 COLON CANCER SCREENING: ICD-10-CM

## 2023-06-19 DIAGNOSIS — I15.2 HYPERTENSION ASSOCIATED WITH TYPE 2 DIABETES MELLITUS: ICD-10-CM

## 2023-06-19 PROCEDURE — G0439 PPPS, SUBSEQ VISIT: HCPCS | Mod: ,,, | Performed by: NURSE PRACTITIONER

## 2023-06-19 PROCEDURE — 3061F NEG MICROALBUMINURIA REV: CPT | Mod: CPTII,,, | Performed by: NURSE PRACTITIONER

## 2023-06-19 PROCEDURE — 3074F PR MOST RECENT SYSTOLIC BLOOD PRESSURE < 130 MM HG: ICD-10-PCS | Mod: CPTII,,, | Performed by: NURSE PRACTITIONER

## 2023-06-19 PROCEDURE — 3078F PR MOST RECENT DIASTOLIC BLOOD PRESSURE < 80 MM HG: ICD-10-PCS | Mod: CPTII,,, | Performed by: NURSE PRACTITIONER

## 2023-06-19 PROCEDURE — 4010F ACE/ARB THERAPY RXD/TAKEN: CPT | Mod: CPTII,,, | Performed by: NURSE PRACTITIONER

## 2023-06-19 PROCEDURE — 1160F PR REVIEW ALL MEDS BY PRESCRIBER/CLIN PHARMACIST DOCUMENTED: ICD-10-PCS | Mod: CPTII,,, | Performed by: NURSE PRACTITIONER

## 2023-06-19 PROCEDURE — 3288F PR FALLS RISK ASSESSMENT DOCUMENTED: ICD-10-PCS | Mod: CPTII,,, | Performed by: NURSE PRACTITIONER

## 2023-06-19 PROCEDURE — 1101F PT FALLS ASSESS-DOCD LE1/YR: CPT | Mod: CPTII,,, | Performed by: NURSE PRACTITIONER

## 2023-06-19 PROCEDURE — 3078F DIAST BP <80 MM HG: CPT | Mod: CPTII,,, | Performed by: NURSE PRACTITIONER

## 2023-06-19 PROCEDURE — 1126F PR PAIN SEVERITY QUANTIFIED, NO PAIN PRESENT: ICD-10-PCS | Mod: CPTII,,, | Performed by: NURSE PRACTITIONER

## 2023-06-19 PROCEDURE — 3066F NEPHROPATHY DOC TX: CPT | Mod: CPTII,,, | Performed by: NURSE PRACTITIONER

## 2023-06-19 PROCEDURE — 4010F PR ACE/ARB THEARPY RXD/TAKEN: ICD-10-PCS | Mod: CPTII,,, | Performed by: NURSE PRACTITIONER

## 2023-06-19 PROCEDURE — 1101F PR PT FALLS ASSESS DOC 0-1 FALLS W/OUT INJ PAST YR: ICD-10-PCS | Mod: CPTII,,, | Performed by: NURSE PRACTITIONER

## 2023-06-19 PROCEDURE — 1126F AMNT PAIN NOTED NONE PRSNT: CPT | Mod: CPTII,,, | Performed by: NURSE PRACTITIONER

## 2023-06-19 PROCEDURE — 3074F SYST BP LT 130 MM HG: CPT | Mod: CPTII,,, | Performed by: NURSE PRACTITIONER

## 2023-06-19 PROCEDURE — 3008F PR BODY MASS INDEX (BMI) DOCUMENTED: ICD-10-PCS | Mod: CPTII,,, | Performed by: NURSE PRACTITIONER

## 2023-06-19 PROCEDURE — G0439 PR MEDICARE ANNUAL WELLNESS SUBSEQUENT VISIT: ICD-10-PCS | Mod: ,,, | Performed by: NURSE PRACTITIONER

## 2023-06-19 PROCEDURE — 3066F PR DOCUMENTATION OF TREATMENT FOR NEPHROPATHY: ICD-10-PCS | Mod: CPTII,,, | Performed by: NURSE PRACTITIONER

## 2023-06-19 PROCEDURE — 1159F PR MEDICATION LIST DOCUMENTED IN MEDICAL RECORD: ICD-10-PCS | Mod: CPTII,,, | Performed by: NURSE PRACTITIONER

## 2023-06-19 PROCEDURE — 3288F FALL RISK ASSESSMENT DOCD: CPT | Mod: CPTII,,, | Performed by: NURSE PRACTITIONER

## 2023-06-19 PROCEDURE — 3061F PR NEG MICROALBUMINURIA RESULT DOCUMENTED/REVIEW: ICD-10-PCS | Mod: CPTII,,, | Performed by: NURSE PRACTITIONER

## 2023-06-19 PROCEDURE — 1159F MED LIST DOCD IN RCRD: CPT | Mod: CPTII,,, | Performed by: NURSE PRACTITIONER

## 2023-06-19 PROCEDURE — 3008F BODY MASS INDEX DOCD: CPT | Mod: CPTII,,, | Performed by: NURSE PRACTITIONER

## 2023-06-19 PROCEDURE — 1160F RVW MEDS BY RX/DR IN RCRD: CPT | Mod: CPTII,,, | Performed by: NURSE PRACTITIONER

## 2023-06-19 RX ORDER — INSULIN GLARGINE 300 U/ML
INJECTION, SOLUTION SUBCUTANEOUS
Qty: 6 ML | Refills: 3
Start: 2023-06-19 | End: 2023-08-11

## 2023-06-19 RX ORDER — FUROSEMIDE 40 MG/1
40 TABLET ORAL
COMMUNITY
Start: 2023-05-08

## 2023-06-19 NOTE — ASSESSMENT & PLAN NOTE
Labs previously done and reviewed with patient  DM eye exam up to date (1/2023)  Colon cancer screening due; Colonoscopy ordered; referral placed  Shingle; Tdap; Pnemovax due; declines  Declines CT Lung Cancer screening

## 2023-06-19 NOTE — PROGRESS NOTES
Subjective:      Patient ID: Indu Azul is a 67 y.o. White female      Chief Complaint: No chief complaint on file.       Past Medical History:   Diagnosis Date    Charcot's joint of foot, left     Chronic kidney disease, unspecified     Cirrhosis of liver without ascites     Depression     Diabetes mellitus, type II, insulin dependent     Diabetic neuropathy     GERD (gastroesophageal reflux disease)     Hepatic steatosis     HLD (hyperlipidemia)     HTN (hypertension)     Other hyperlipidemia     Overactive bladder     Thrombocytopenia, unspecified     Type 2 diabetes mellitus with unspecified diabetic retinopathy without macular edema     Vitamin D deficiency         HPI  Presents to clinic for Annual Wellness.    Labs previously done and reviewed with patient  DM eye exam up to date (1/2023)  Colon cancer screening due  Shingle; Tdap; Pnemovax due           DM: Most recent A1C 7.3 (8/4//2022).  States fasting CBGs 100-120's, much improved. Denies any hypoglycemia. Currently taking Toujeo 75 units at bedtime, Trulicity 1.5 mg po q week,  and NovoLog 5 units TIDAC prn CBGs >200.    Microalbumin up-to-date (04/20/2022)  DM eye exam up-to-date (8/2022); Has appt with Opthalmopathy on tomorrow for DM eye exam      PLT:  PLT count 135 and stable.  Following Dr. Olmstead     Hepatitic Steatosis/Cirrhosis:  Cirrhotic appearance of liver per CT.  Evaluated per Dr. Amador, who started her on Aldactone and Furosemide.  Has appt for follow up after labs.      CKD:  Most recent GFR 43.      HLD: LDL 67.  Currently taking Atorvastatin 20 mg po daily.        HTN: BP at goal. Currently taking Lisinopril 5 mg daily.      Vitamin D deficiency: Recent vitamin D level 52.8.  Currently taking Vitamin D3 50,000 IUs weekly.        GERD: Currently taking Protonix 40 mg daily.      Depression: Stable.  Currently taking Zoloft 50 mg (1 tablet) daily.  Denies any suicidal/homicidal ideations.      Charcot's joint of left foot:   Recent surgery February 1st, 2022.      Overactive Bladder/Urge Incontinence: Specialist is Dr. Crenshaw, urology.  Cystoscopy done 6/14/2022; states normal.     Tobacco user:  Smokes 1/2 PPD x 40 years.  Declines CT Lung Cancer screening            Review of Systems       Objective:      There were no vitals filed for this visit.   There is no height or weight on file to calculate BMI.     Physical Exam       Current Outpatient Medications:     acetaminophen (TYLENOL) 500 MG tablet, Take 500 mg by mouth every 6 (six) hours as needed., Disp: , Rfl:     atorvastatin (LIPITOR) 20 MG tablet, Take 1 tablet (20 mg total) by mouth once daily., Disp: 90 tablet, Rfl: 3    dulaglutide (TRULICITY) 1.5 mg/0.5 mL pen injector, Inject 1.5 mg into the skin every 7 days., Disp: 4 pen, Rfl: 6    gabapentin (NEURONTIN) 300 MG capsule, Take 1 capsule (300 mg total) by mouth 3 (three) times daily., Disp: 90 capsule, Rfl: 6    insulin aspart U-100 (NOVOLOG) 100 unit/mL injection, Inject into the skin as needed., Disp: , Rfl:     insulin glargine, TOUJEO, (TOUJEO SOLOSTAR U-300 INSULIN) 300 unit/mL (1.5 mL) InPn pen, INJECT 79 UNITS SUBCUTANEOUSLY ONCE DAILY, Disp: 6 mL, Rfl: 3    insulin syringe-needle U-100 1 mL 31 gauge x 5/16 Syrg, Inject 1 Syringe into the skin 3 (three) times daily with meals., Disp: , Rfl:     lisinopriL (PRINIVIL,ZESTRIL) 5 MG tablet, Take 1 tablet (5 mg total) by mouth once daily., Disp: 90 tablet, Rfl: 2    pantoprazole (PROTONIX) 40 MG tablet, Take 40 mg by mouth as needed. for 90 days, Disp: , Rfl:     sertraline (ZOLOFT) 50 MG tablet, Take 1 tablet (50 mg total) by mouth once daily., Disp: 30 tablet, Rfl: 6    solifenacin (VESICARE) 10 MG tablet, Take 10 mg by mouth once daily., Disp: , Rfl:     spironolactone (ALDACTONE) 50 MG tablet, Take 50 mg by mouth once daily., Disp: , Rfl:     Assessment & Plan:     Problem List Items Addressed This Visit          Neuro    Diabetic polyneuropathy associated with  type 2 diabetes mellitus       Psychiatric    Recurrent major depressive disorder, in full remission       Cardiac/Vascular    Hyperlipidemia associated with type 2 diabetes mellitus       Renal/    CKD stage 3 due to type 2 diabetes mellitus       Endocrine    Type 2 diabetes mellitus with hyperglycemia, with long-term current use of insulin    Hypertension associated with type 2 diabetes mellitus    Vitamin D deficiency       GI    Hepatic steatosis    Other cirrhosis of liver     Other Visit Diagnoses       Wellness examination    -  Primary                 ***

## 2023-06-19 NOTE — PROGRESS NOTES
Patient ID: [id number]     Chief Complaint: Annual Exam      HPI:     Indu Azul is a 67 y.o. female here today for a Medicare Wellness.     Presents to clinic for Annual Wellness.    Labs previously done and reviewed with patient  DM eye exam up to date (1/2023)  Colon cancer screening due; Colonoscopy ordered  Shingle; Tdap; Pnemovax due; declines  Declines CT Lung Cancer screening         DM: Most recent A1C 8.3 (6/15/2022).  States fasting CBGs 100-120's, much improved. Denies any hypoglycemia. Currently taking Toujeo 75 units at bedtime, Trulicity 1.5 mg  q week,  and NovoLog 5 units TIDAC prn CBGs >200.    Microalbumin up-to-date (04/20/2022)  DM eye exam up-to-date (8/2022); Has appt with Opthalmopathy on tomorrow for DM eye exam      Hx thrombocytopenia:  PLT count 176 and stable.  Following Dr. Olmstead     Hepatitic Steatosis/Cirrhosis:  Cirrhotic appearance of liver per CT.  Evaluated per Dr. Amador, who started her on Aldactone and Furosemide.  Has appt for follow up after labs.      CKD:  Most recent GFR 43.  Not currently following Nephrology; referral placed.       HLD: LDL 67.  Currently taking Atorvastatin 20 mg po daily.        HTN: BP at goal. Currently taking Lisinopril 5 mg daily.      Vitamin D deficiency: Recent vitamin D level 52.8.  Currently taking Vitamin D3 50,000 IUs weekly.        GERD: Currently taking Protonix 40 mg daily.      Depression: Stable.  Currently taking Zoloft 50 mg (1 tablet) daily.  Denies any suicidal/homicidal ideations.      Charcot's joint of left foot:  Recent surgery February 1st, 2022.      Overactive Bladder/Urge Incontinence: Specialist is Dr. Crenshaw, urology.  Cystoscopy done 6/14/2022; states normal.     Tobacco user:  Smokes 1/2 PPD x 40 years.  Declines CT Lung Cancer screening          Opioid Screening: Patient medication list reviewed, patient is not taking prescription opioids. Patient is not using additional opioids than prescribed. Patient is  at low risk of substance abuse based on this opioid use history.       ----------------------------  Charcot's joint of foot, left  Chronic kidney disease, unspecified  Cirrhosis of liver without ascites  Depression  Diabetes mellitus, type II, insulin dependent  Diabetic neuropathy  GERD (gastroesophageal reflux disease)  H/O: hysterectomy  Hepatic steatosis  HLD (hyperlipidemia)  HTN (hypertension)  Other hyperlipidemia  Overactive bladder  Thrombocytopenia, unspecified  Type 2 diabetes mellitus with unspecified diabetic retinopathy   without macular edema  Vitamin D deficiency     Past Surgical History:   Procedure Laterality Date    APPENDECTOMY      BREAST BIOPSY       SECTION      charcot-leyden crystals identification      CHOLECYSTECTOMY  2006    HEMORRHOID SURGERY      HYSTERECTOMY      total    right foot surgery Right 2022       Review of patient's allergies indicates:  No Known Allergies    Outpatient Medications Marked as Taking for the 23 encounter (Office Visit) with Maggy Jaquez NP   Medication Sig Dispense Refill    acetaminophen (TYLENOL) 500 MG tablet Take 500 mg by mouth every 6 (six) hours as needed.      atorvastatin (LIPITOR) 20 MG tablet Take 1 tablet (20 mg total) by mouth once daily. 90 tablet 3    dulaglutide (TRULICITY) 1.5 mg/0.5 mL pen injector Inject 1.5 mg into the skin every 7 days. 4 pen 6    gabapentin (NEURONTIN) 300 MG capsule Take 1 capsule (300 mg total) by mouth 3 (three) times daily. 90 capsule 6    insulin aspart U-100 (NOVOLOG) 100 unit/mL injection Inject into the skin as needed.      insulin syringe-needle U-100 1 mL 31 gauge x 5/16 Syrg Inject 1 Syringe into the skin 3 (three) times daily with meals.      pantoprazole (PROTONIX) 40 MG tablet Take 40 mg by mouth as needed. for 90 days      sertraline (ZOLOFT) 50 MG tablet Take 1 tablet (50 mg total) by mouth once daily. 30 tablet 6    solifenacin (VESICARE) 10 MG tablet Take 10 mg by  mouth once daily.      spironolactone (ALDACTONE) 50 MG tablet Take 50 mg by mouth once daily.      [DISCONTINUED] insulin glargine, TOUJEO, (TOUJEO SOLOSTAR U-300 INSULIN) 300 unit/mL (1.5 mL) InPn pen INJECT 79 UNITS SUBCUTANEOUSLY ONCE DAILY 6 mL 3       Social History     Socioeconomic History    Marital status:    Occupational History    Occupation: retired   Tobacco Use    Smoking status: Every Day     Packs/day: 0.50     Years: 40.00     Pack years: 20.00     Types: Cigarettes    Smokeless tobacco: Never   Substance and Sexual Activity    Alcohol use: Not Currently     Comment: occassionally    Drug use: Never        Family History   Problem Relation Age of Onset    Diabetes Mother     Alzheimer's disease Mother     Diabetes Father     Leukemia Father     Diabetes Sister     Liver cancer Sister     Diabetes Brother         Patient Care Team:  Alejandra Pinedo MD as PCP - General (Internal Medicine)  Maggy Jaquez NP as Nurse Practitioner (Nurse Practitioner)  Nolan Russ MD as Consulting Physician (Gastroenterology)       Subjective:     Review of Systems   Constitutional: Negative.    HENT: Negative.     Eyes: Negative.    Respiratory: Negative.     Cardiovascular: Negative.    Gastrointestinal: Negative.    Genitourinary: Negative.    Musculoskeletal: Negative.    Skin: Negative.    Neurological: Negative.    Endo/Heme/Allergies: Negative.    Psychiatric/Behavioral: Negative.     All other systems reviewed and are negative.      Patient Reported Health Risk Assessment  What [age] 65-70  Are you male or female?: Female  During the past four weeks, how much have you been bothered by emotional problems such as feeling anxious, depressed, irritable, sad, or downhearted and blue?: Not at all  During the past five weeks, has your physical and/or emotional health limited your social activities with family, friends, neighbors, or groups?: Slightly  During the past four weeks, how much bodily  pain have you generally had?: Mild pain  During the past four weeks, was someone available to help if you needed and wanted help?: Yes, as much as I wanted  During the past four weeks, what was the hardest physical activity you could do for at least two minutes?: Very light  Can you get to places out of walking distance without help?  (For example, can you travel alone on buses or taxis, or drive your own car?): Yes  Can you go shopping for groceries or clothes without someone's help?: Yes  Can you prepare your own meals?: Yes  Can you do your own housework without help?: Yes  Because of any health problems, do you need the help of another person with your personal care needs such as eating, bathing, dressing, or getting around the house?: No  Can you handle your own money without help?: Yes  During the past four weeks, how would you rate your health in general?: Good  How have things been going for you during the past four weeks?: Pretty well  Are you having difficulties driving your car?: No  Do you always fasten your seat belt when you are in a car?: Yes, usually  How often in the past four weeks have you been bothered by falling or dizzy when standing up?: Seldom  How often in the past four weeks have you been bothered by sexual problems?: Often  How often in the past four weeks have you been bothered by trouble eating well?: Never  How often in the past four weeks have you been bothered by teeth or denture problems?: Never  How often in the past four weeks have you been bothered with problems using the telephone?: Never  How often in the past four weeks have you been bothered by tiredness or fatigue?: Never  Have you fallen two or more times in the past year?: No  Are you afraid of falling?: No  Are you a smoker?: No  During the past four weeks, how many drinks of wine, beer, or other alcoholic beverages did you have?: No alcohol at all  Do you exercise for about 20 minutes three or more days a week?: No, I  "usually do not exercise this much  Have you been given any information to help you with hazards in your house that might hurt you?: No  Have you been given any information to help you with keeping track of your medications?: No  How often do you have trouble taking medicines the way you've been told to take them?: I always take them as prescribed  How confident are you that you can control and manage most of your health problems?: Very confident  What is your race? (Check all that apply.):     Objective:     /76 (BP Location: Right arm, Patient Position: Sitting, BP Method: Large (Automatic))   Pulse 89   Temp 97.4 °F (36.3 °C) (Oral)   Resp 19   Ht 5' 5" (1.651 m)   Wt 108 kg (238 lb 1.6 oz)   SpO2 96%   BMI 39.62 kg/m²     Physical Exam  Vitals reviewed.   Constitutional:       Appearance: Normal appearance.   HENT:      Head: Normocephalic.      Mouth/Throat:      Mouth: Mucous membranes are moist.   Eyes:      Conjunctiva/sclera: Conjunctivae normal.      Pupils: Pupils are equal, round, and reactive to light.   Cardiovascular:      Rate and Rhythm: Normal rate and regular rhythm.   Pulmonary:      Effort: Pulmonary effort is normal. No respiratory distress.      Breath sounds: Normal breath sounds.   Musculoskeletal:         General: Normal range of motion.      Cervical back: Normal range of motion and neck supple.   Skin:     General: Skin is warm and dry.      Comments: Mole posterior thorax; black; irregular borders   Neurological:      Mental Status: She is alert and oriented to person, place, and time.   Psychiatric:         Mood and Affect: Mood normal.         No flowsheet data found.  Fall Risk Assessment - Outpatient 6/19/2023 2/2/2023 1/11/2023 8/9/2022 8/9/2022 6/30/2022 6/2/2022   Mobility Status Ambulatory w/ assistance Ambulatory Ambulatory Ambulatory w/ assistance Ambulatory Ambulatory w/ assistance Ambulatory   Number of falls 0 0 0 0 0 0 0   Identified as fall risk 1 0 0 " 0 0 0 0   Wrist band refused - - - - - - -           Depression Screening  Over the past two weeks, has the patient felt down, depressed, or hopeless?: No  Over the past two weeks, has the patient felt little interest or pleasure in doing things?: No  Functional Ability/Safety Screening  Was the patient's timed Up & Go test unsteady or longer than 30 seconds?: Yes  Does the patient need help with phone, transportation, shopping, preparing meals, housework, laundry, meds, or managing money?: No  Does the patient's home have rugs in the hallway, lack grab bars in the bathroom, lack handrails on the stairs or have poor lighting?: No  Have you noticed any hearing difficulties?: No  Cognitive Function (Assessed through direct observation with due consideration of information obtained by way of patient reports and/or concerns raised by family, friends, caretakers, or others)    Does the patient repeat questions/statements in the same day?: No  Does the patient have trouble remembering the date, year, and time?: No  Does the patient have difficulty managing finances?: No  Does the patient have a decreased sense of direction?: No  Assessment/Plan:     1. Wellness examination  Assessment & Plan:  Labs previously done and reviewed with patient  DM eye exam up to date (1/2023)  Colon cancer screening due; Colonoscopy ordered; referral placed  Shingle; Tdap; Pnemovax due; declines  Declines CT Lung Cancer screening      2. Type 2 diabetes mellitus with hyperglycemia, with long-term current use of insulin  Assessment & Plan:  Most recent A1C 8.3 (6/15/2032).    Currently taking Toujeo 75 units at bedtime, Trulicity 1.5 mg  q week,  and NovoLog 5 units TIDAC prn CBGs >200.    Continue Trulicity and Novolog  Increase Toujeo to 80 units at bedtime  Daily CBGs; Educated on hypoglycemia and interventions  Keep appt with PCP for follow up    Orders:  -     insulin glargine, TOUJEO, (TOUJEO SOLOSTAR U-300 INSULIN) 300 unit/mL (1.5 mL)  InPn pen; INJECT 80 UNITS SUBCUTANEOUSLY ONCE DAILY  Dispense: 6 mL; Refill: 3    3. Hyperlipidemia associated with type 2 diabetes mellitus  Assessment & Plan:  Stable  Continue Statin      4. Hypertension associated with type 2 diabetes mellitus  Assessment & Plan:  BP at goal  Continue current medication  Daily BP check; bring log all clinic visits  Instructed to report to ED for any BP greater than 200/100, dizziness, syncope, CP, or SOB  Keep appt. With PCP for follow up  Patient is agreeable to plan and verbalizes understanding          5. Vitamin D deficiency  Assessment & Plan:  Stable      6. CKD stage 3 due to type 2 diabetes mellitus  Assessment & Plan:  GFR 28; Worsening  Referral placed to Nephrology (patient cancelled previous referral)  Keep appt with PCP for follow up    Orders:  -     Ambulatory referral/consult to Nephrology; Future; Expected date: 06/26/2023    7. Recurrent major depressive disorder, in full remission  Assessment & Plan:  Stable  Continue Sertraline as prescribed      8. Hepatic steatosis  Assessment & Plan:  Hepatic Steatosis with cirrhotic appearance of liver per CT  Keep appt with GI for follow up      9. Other cirrhosis of liver  Assessment & Plan:  Stable  Following Dr. Amador; keep appt for follow up  Continue Aldactone and Lasix as prescribed      10. Diabetic polyneuropathy associated with type 2 diabetes mellitus  Assessment & Plan:  Stable  Continue Gabapentin as prescribed      11. Cigarette nicotine dependence without complication    12. Encounter for screening for malignant neoplasm of breast, unspecified screening modality  -     Mammo Digital Screening Bilat w/ Sharad; Future; Expected date: 08/01/2023    13. Encounter for screening mammogram for malignant neoplasm of breast  -     Mammo Digital Screening Bilat w/ Sharad; Future; Expected date: 08/01/2023    14. Colon cancer screening  -     Ambulatory referral/consult to Gastroenterology; Future; Expected date:  06/26/2023    15. Post-menopausal  -     DXA Bone Density Axial Skeleton 1 or more sites; Future; Expected date: 08/01/2023    16. Microcytic anemia  Assessment & Plan:  Start OTC Fe+ supplement  Keep appt with PCP for follow up  Referral placed to GI for colonoscopy      17. Nevus  -     Ambulatory referral/consult to Dermatology; Future; Expected date: 06/26/2023           Medicare Annual Wellness and Personalized Prevention Plan:   Fall Risk + Home Safety + Hearing Impairment + Depression Screen + Opioid and Substance Abuse Screening + Cognitive Impairment Screen + Health Risk Assessment all reviewed.     Health Maintenance Topics with due status: Not Due       Topic Last Completion Date    Eye Exam 01/12/2023    Diabetes Urine Screening 06/15/2023    Lipid Panel 06/15/2023    Hemoglobin A1c 06/15/2023      The patient's Health Maintenance was reviewed and the following appears to be due at this time:   Health Maintenance Due   Topic Date Due    Foot Exam  Never done    DEXA Scan  Never done    Colorectal Cancer Screening  Never done    Mammogram  07/27/2023       Advance Care Planning   I attest to discussing Advance Care Planning with patient and/or family member.  Education was provided including the importance of the Health Care Power of , Advance Directives, and/or [place] documentation.  The patient expressed understanding to the importance of this information and discussion.         Follow up in about 1 month (around 7/19/2023) for F/U with PCP; WENDY. In addition to their scheduled follow up, the patient has also been instructed to follow up on as needed basis.

## 2023-06-19 NOTE — ASSESSMENT & PLAN NOTE
BP at goal  Continue current medication  Daily BP check; bring log all clinic visits  Instructed to report to ED for any BP greater than 200/100, dizziness, syncope, CP, or SOB  Keep appt. With PCP for follow up  Patient is agreeable to plan and verbalizes understanding

## 2023-06-19 NOTE — ASSESSMENT & PLAN NOTE
GFR 28; Worsening  Referral placed to Nephrology (patient cancelled previous referral)  Keep appt with PCP for follow up

## 2023-06-19 NOTE — ASSESSMENT & PLAN NOTE
Most recent A1C 8.3 (6/15/2032).    Currently taking Toujeo 75 units at bedtime, Trulicity 1.5 mg  q week,  and NovoLog 5 units TIDAC prn CBGs >200.    Continue Trulicity and Novolog  Increase Toujeo to 80 units at bedtime  Daily CBGs; Educated on hypoglycemia and interventions  Keep appt with PCP for follow up

## 2023-06-20 ENCOUNTER — DOCUMENTATION ONLY (OUTPATIENT)
Dept: ADMINISTRATIVE | Facility: HOSPITAL | Age: 68
End: 2023-06-20
Payer: MEDICARE

## 2023-07-02 DIAGNOSIS — F33.42 RECURRENT MAJOR DEPRESSIVE DISORDER, IN FULL REMISSION: ICD-10-CM

## 2023-07-03 RX ORDER — SERTRALINE HYDROCHLORIDE 50 MG/1
TABLET, FILM COATED ORAL
Qty: 30 TABLET | Refills: 0 | Status: SHIPPED | OUTPATIENT
Start: 2023-07-03 | End: 2023-07-31

## 2023-07-13 ENCOUNTER — OFFICE VISIT (OUTPATIENT)
Dept: NEPHROLOGY | Facility: CLINIC | Age: 68
End: 2023-07-13
Payer: MEDICARE

## 2023-07-13 VITALS
SYSTOLIC BLOOD PRESSURE: 132 MMHG | OXYGEN SATURATION: 96 % | BODY MASS INDEX: 40 KG/M2 | HEIGHT: 65 IN | RESPIRATION RATE: 20 BRPM | HEART RATE: 95 BPM | WEIGHT: 240.06 LBS | DIASTOLIC BLOOD PRESSURE: 70 MMHG | TEMPERATURE: 99 F

## 2023-07-13 DIAGNOSIS — E11.65 TYPE 2 DIABETES MELLITUS WITH HYPERGLYCEMIA, WITH LONG-TERM CURRENT USE OF INSULIN: ICD-10-CM

## 2023-07-13 DIAGNOSIS — N18.4 CKD (CHRONIC KIDNEY DISEASE) STAGE 4, GFR 15-29 ML/MIN: ICD-10-CM

## 2023-07-13 DIAGNOSIS — K74.60 HEPATIC CIRRHOSIS, UNSPECIFIED HEPATIC CIRRHOSIS TYPE, UNSPECIFIED WHETHER ASCITES PRESENT: ICD-10-CM

## 2023-07-13 DIAGNOSIS — E11.22 CKD STAGE 3 DUE TO TYPE 2 DIABETES MELLITUS: Primary | ICD-10-CM

## 2023-07-13 DIAGNOSIS — N18.30 CKD STAGE 3 DUE TO TYPE 2 DIABETES MELLITUS: Primary | ICD-10-CM

## 2023-07-13 DIAGNOSIS — Z79.4 TYPE 2 DIABETES MELLITUS WITH HYPERGLYCEMIA, WITH LONG-TERM CURRENT USE OF INSULIN: ICD-10-CM

## 2023-07-13 PROCEDURE — 4010F PR ACE/ARB THEARPY RXD/TAKEN: ICD-10-PCS | Mod: CPTII,S$GLB,,

## 2023-07-13 PROCEDURE — 99204 OFFICE O/P NEW MOD 45 MIN: CPT | Mod: S$GLB,,,

## 2023-07-13 PROCEDURE — 3078F DIAST BP <80 MM HG: CPT | Mod: CPTII,S$GLB,,

## 2023-07-13 PROCEDURE — 99999 PR PBB SHADOW E&M-EST. PATIENT-LVL IV: ICD-10-PCS | Mod: PBBFAC,,,

## 2023-07-13 PROCEDURE — 3288F FALL RISK ASSESSMENT DOCD: CPT | Mod: CPTII,S$GLB,,

## 2023-07-13 PROCEDURE — 3288F PR FALLS RISK ASSESSMENT DOCUMENTED: ICD-10-PCS | Mod: CPTII,S$GLB,,

## 2023-07-13 PROCEDURE — 3075F PR MOST RECENT SYSTOLIC BLOOD PRESS GE 130-139MM HG: ICD-10-PCS | Mod: CPTII,S$GLB,,

## 2023-07-13 PROCEDURE — 99204 PR OFFICE/OUTPT VISIT, NEW, LEVL IV, 45-59 MIN: ICD-10-PCS | Mod: S$GLB,,,

## 2023-07-13 PROCEDURE — 3078F PR MOST RECENT DIASTOLIC BLOOD PRESSURE < 80 MM HG: ICD-10-PCS | Mod: CPTII,S$GLB,,

## 2023-07-13 PROCEDURE — 4010F ACE/ARB THERAPY RXD/TAKEN: CPT | Mod: CPTII,S$GLB,,

## 2023-07-13 PROCEDURE — 1159F MED LIST DOCD IN RCRD: CPT | Mod: CPTII,S$GLB,,

## 2023-07-13 PROCEDURE — 1159F PR MEDICATION LIST DOCUMENTED IN MEDICAL RECORD: ICD-10-PCS | Mod: CPTII,S$GLB,,

## 2023-07-13 PROCEDURE — 1126F PR PAIN SEVERITY QUANTIFIED, NO PAIN PRESENT: ICD-10-PCS | Mod: CPTII,S$GLB,,

## 2023-07-13 PROCEDURE — 1101F PR PT FALLS ASSESS DOC 0-1 FALLS W/OUT INJ PAST YR: ICD-10-PCS | Mod: CPTII,S$GLB,,

## 2023-07-13 PROCEDURE — 99999 PR PBB SHADOW E&M-EST. PATIENT-LVL IV: CPT | Mod: PBBFAC,,,

## 2023-07-13 PROCEDURE — 3075F SYST BP GE 130 - 139MM HG: CPT | Mod: CPTII,S$GLB,,

## 2023-07-13 PROCEDURE — 3008F PR BODY MASS INDEX (BMI) DOCUMENTED: ICD-10-PCS | Mod: CPTII,S$GLB,,

## 2023-07-13 PROCEDURE — 3066F NEPHROPATHY DOC TX: CPT | Mod: CPTII,S$GLB,,

## 2023-07-13 PROCEDURE — 3052F HG A1C>EQUAL 8.0%<EQUAL 9.0%: CPT | Mod: CPTII,S$GLB,,

## 2023-07-13 PROCEDURE — 3061F NEG MICROALBUMINURIA REV: CPT | Mod: CPTII,S$GLB,,

## 2023-07-13 PROCEDURE — 1126F AMNT PAIN NOTED NONE PRSNT: CPT | Mod: CPTII,S$GLB,,

## 2023-07-13 PROCEDURE — 1101F PT FALLS ASSESS-DOCD LE1/YR: CPT | Mod: CPTII,S$GLB,,

## 2023-07-13 PROCEDURE — 3052F PR MOST RECENT HEMOGLOBIN A1C LEVEL 8.0 - < 9.0%: ICD-10-PCS | Mod: CPTII,S$GLB,,

## 2023-07-13 PROCEDURE — 3066F PR DOCUMENTATION OF TREATMENT FOR NEPHROPATHY: ICD-10-PCS | Mod: CPTII,S$GLB,,

## 2023-07-13 PROCEDURE — 3061F PR NEG MICROALBUMINURIA RESULT DOCUMENTED/REVIEW: ICD-10-PCS | Mod: CPTII,S$GLB,,

## 2023-07-13 PROCEDURE — 3008F BODY MASS INDEX DOCD: CPT | Mod: CPTII,S$GLB,,

## 2023-07-13 NOTE — PROGRESS NOTES
[facility] New Referral Office Note    HPI  Indu Azul, 67 y.o. female, presents to office as a new patient for chronic kidney disease.  Patient's history is significant for diabetes.  Patient had normal renal function prior to August 2022.  Over the last year patient's renal function has progressively declined to stage 4 CKD.  Her only new medications included spironolactone and Lasix started in November.  She has needed these medications for lower extremity edema.  She was previously on an ACE inhibitor however she states she forgot to refill it and has not been on it for some time now.  Her blood pressure has been stable.  She is never been diagnosed with hypertension.  She is had diabetes since about 2008.  She has negative diabetic retinopathy however she does have bilateral lower extremity neuropathy and can not feel from her mid lower legs to her feet.  She denies ever having a heart attack or stroke.  She denies ever having an angiogram or stents placed.  She denies dysuria.  No family history of polycystic kidney disease or chronic kidney disease.  Her PCP recently increased her Lantus for better glucose control.  She denies ever taking NSAIDs.  She has only taken Protonix PRN very sparingly.  Serum immuno electrophoresis completed in January negative for monoclonal gammopathy   Hepatitis panel completed 1 year ago negative screening for hepatitis-B or C    Overactive Bladder/Urge Incontinence: Specialist is Dr. Crenshaw, urology. Cystoscopy done 6/14/2022; states normal.   Hepatitic Steatosis/Cirrhosis: Cirrhotic appearance of liver per CT.  Seeing per Dr. Perry Cobb thrombocytopenia: Following Dr. Olmstead           Medical Diagnoses:   Past Medical History:   Diagnosis Date    Charcot's joint of foot, left     Chronic kidney disease, unspecified     Cirrhosis of liver without ascites     Depression     Diabetes mellitus, type II, insulin dependent     Diabetic neuropathy     GERD  (gastroesophageal reflux disease)     H/O: hysterectomy     Hepatic steatosis     HLD (hyperlipidemia)     HTN (hypertension)     Other hyperlipidemia     Overactive bladder     Thrombocytopenia, unspecified     Type 2 diabetes mellitus with unspecified diabetic retinopathy without macular edema     Vitamin D deficiency      Patient Active Problem List   Diagnosis    Thrombocytopenia    Iron deficiency    Folate deficiency    Type 2 diabetes mellitus with hyperglycemia, with long-term current use of insulin    Long-term insulin use    Hypertension associated with type 2 diabetes mellitus    Hyperlipidemia associated with type 2 diabetes mellitus    CKD stage 3 due to type 2 diabetes mellitus    Recurrent major depressive disorder, in full remission    Vitamin D deficiency    Hepatic steatosis    Colon cancer screening    Need for influenza vaccination    Other cirrhosis of liver    Diabetic polyneuropathy associated with type 2 diabetes mellitus    Gammopathy    Splenomegaly    Microcytic anemia    Wellness examination       Surgical History:   Past Surgical History:   Procedure Laterality Date    APPENDECTOMY      BREAST BIOPSY       SECTION      charcot-leyden crystals identification      CHOLECYSTECTOMY  2006    HEMORRHOID SURGERY      HYSTERECTOMY  1990    total    right foot surgery Right 2022       Family History:   Family History   Problem Relation Age of Onset    Diabetes Mother     Alzheimer's disease Mother     Diabetes Father     Leukemia Father     Diabetes Sister     Liver cancer Sister     Diabetes Brother        Social History:   Social History     Tobacco Use    Smoking status: Every Day     Packs/day: 0.50     Years: 40.00     Pack years: 20.00     Types: Cigarettes     Passive exposure: Current    Smokeless tobacco: Never   Substance Use Topics    Alcohol use: Not Currently     Comment: occassionally       Allergies:  Review of patient's allergies indicates:  No Known  Allergies    Medications:    Current Outpatient Medications:     sertraline (ZOLOFT) 50 MG tablet, Take 1 tablet by mouth once daily, Disp: 30 tablet, Rfl: 0    acetaminophen (TYLENOL) 500 MG tablet, Take 500 mg by mouth every 6 (six) hours as needed., Disp: , Rfl:     atorvastatin (LIPITOR) 20 MG tablet, Take 1 tablet (20 mg total) by mouth once daily., Disp: 90 tablet, Rfl: 3    dulaglutide (TRULICITY) 1.5 mg/0.5 mL pen injector, Inject 1.5 mg into the skin every 7 days., Disp: 4 pen, Rfl: 6    furosemide (LASIX) 40 MG tablet, Take 40 mg by mouth., Disp: , Rfl:     gabapentin (NEURONTIN) 300 MG capsule, Take 1 capsule (300 mg total) by mouth 3 (three) times daily., Disp: 90 capsule, Rfl: 6    insulin aspart U-100 (NOVOLOG) 100 unit/mL injection, Inject into the skin as needed., Disp: , Rfl:     insulin glargine, TOUJEO, (TOUJEO SOLOSTAR U-300 INSULIN) 300 unit/mL (1.5 mL) InPn pen, INJECT 80 UNITS SUBCUTANEOUSLY ONCE DAILY, Disp: 6 mL, Rfl: 3    insulin syringe-needle U-100 1 mL 31 gauge x 5/16 Syrg, Inject 1 Syringe into the skin 3 (three) times daily with meals., Disp: , Rfl:     lisinopriL (PRINIVIL,ZESTRIL) 5 MG tablet, Take 1 tablet (5 mg total) by mouth once daily., Disp: 90 tablet, Rfl: 2    pantoprazole (PROTONIX) 40 MG tablet, Take 40 mg by mouth as needed. for 90 days, Disp: , Rfl:     solifenacin (VESICARE) 10 MG tablet, Take 10 mg by mouth once daily., Disp: , Rfl:     spironolactone (ALDACTONE) 50 MG tablet, Take 50 mg by mouth once daily., Disp: , Rfl:        Review of Systems:    Constitutional: Denies fever, fatigue, generalized weakness  Skin: Denies wounds, no rashes, no itching, no new skin lesions  Respiratory:  Denies cough, shortness of breath, or wheezing  Cardiovascular: Denies chest pain, palpitations; + bilateral lower extremity edema, much better controlled with spironolactone and Lasix  Gastrointestional: Denies abdominal pain, nausea, vomiting, diarrhea, or constipation  Genitourinary:  "Denies dysuria, hematuria, foamy urine, or incontinence; reports able to empty bladder; + overactive bladder  Musculoskeletal: Denies back or flank pain  Neurological: Denies headaches, dizziness, tremors or focal weakness; + neuropathy bilateral lower extremity      Vital Signs:  /70 (BP Location: Left arm, Patient Position: Sitting)   Pulse 95   Temp 98.6 °F (37 °C) (Temporal)   Resp 20   Ht 5' 5" (1.651 m)   Wt 108.9 kg (240 lb 1.3 oz)   SpO2 96%   BMI 39.95 kg/m²   Body mass index is 39.95 kg/m².      Physical Exam:    General: no acute distress, awake, alert  Eyes: PERRLA, conjunctiva clear, eyelids without swelling  HENT: atraumatic, oropharynx and nasal mucosa patent  Neck: supple, trache midline, full ROM, no JVD  Respiratory: equal, unlabored, clear to auscultation A/P  Cardiovascular: RRR without rub; radial and pedal pulses +2 BL  Edema:  Trace to +1 bilateral lower extremity  Gastrointestinal: soft, non-tender, non-distended; positive bowel sounds; no masses to palpation; no ascites  Genitourinary: no CVA tenderness upon palpation  Musculoskeletal: ROM without new limitation or discomfort; + cane for ambulation assistance  Integumentary: warm, dry; no rashes, wounds, or skin lesions  Neurological: oriented x4, appropriate, no acute deficits; no asterixis      Labs:        Component Value Date/Time     06/15/2023 0834     01/31/2023 0728    K 4.3 06/15/2023 0834    K 4.6 01/31/2023 0728    CO2 30 06/15/2023 0834    CO2 29 01/31/2023 0728    BUN 25.4 (H) 06/15/2023 0834    BUN 37.1 (H) 01/31/2023 0728    CREATININE 1.93 (H) 06/15/2023 0834    CREATININE 1.87 (H) 01/31/2023 0728    CREATININE 1.52 (H) 01/11/2023 1350    CREATININE 1.32 (H) 08/04/2022 1004    CALCIUM 10.0 06/15/2023 0834    CALCIUM 9.6 01/31/2023 0728           Component Value Date/Time    WBC 9.18 06/15/2023 0834    WBC 9.4 01/31/2023 0728    HGB 10.8 (L) 06/15/2023 0834    HGB 11.4 (L) 01/31/2023 0728    HCT 34.8 " (L) 06/15/2023 0834    HCT 38.0 01/31/2023 0728     06/15/2023 0834     01/31/2023 0728         Imaging:  CT ABDOMEN PELVIS W WO CONTRAST   Impression:     Cirrhosis.  Splenomegaly.     Unremarkable appearance of the kidneys, ureters, and bladder.     Moderate degenerative changes in the spine.     Date:                                            06/03/2022      Impression:    1. CKD stage 3 due to type 2 diabetes mellitus  Ambulatory referral/consult to Nephrology      2. CKD (chronic kidney disease) stage 4, GFR 15-29 ml/min  Patient's GFR since January 2023 puts her at stage IV CKD    Most likely related to diabetic nephropathy.  Need to quantitative proteinuria as she has had positive microalbuminuria in the past    Likely also a component of hepatorenal disease with her history of hepatic steatosis and cirrhosis, low albumin. Hx of splenomegaly.   Patient is followed by Dr. Amador.  Liver function is stable.      3. Type 2 diabetes mellitus with hyperglycemia, with long-term current use of insulin  A1C 8.3 (6/15/2022)    Patient needs tighter control of glucose levels as A1c goal is less than 7.  PCP recently increased insulin dosage                    Plan:  Continue hydrating well with water.  Prevent dehydration.  Quantitative proteinuria, urine sodium  LADI  Retroperitoneal ultrasound  Iron studies    Follow up in  2 weeks  with labs within the week before due to progressiveness of renal disease.    Patient to call our office with any concerns prior to next appointment.    Avoid NSAIDs (Aleve, Mobic, Celebrex, Ibuprofen, Advil, Toradol and Diclofenac).  Only take tylenol occasionally if needed for aches/pains.    Educated on low sodium diet:  Avoid high salt foods (olives, pickles, smoked meats, deli meats, salted potato chips, etc.).   Do not add salt to your food at the table.   Use only small amounts of salt when cooking.    DENIS Gardner

## 2023-07-19 ENCOUNTER — TELEPHONE (OUTPATIENT)
Dept: NEPHROLOGY | Facility: CLINIC | Age: 68
End: 2023-07-19
Payer: MEDICARE

## 2023-07-19 ENCOUNTER — LAB VISIT (OUTPATIENT)
Dept: LAB | Facility: HOSPITAL | Age: 68
End: 2023-07-19
Payer: MEDICARE

## 2023-07-19 DIAGNOSIS — E61.1 IRON DEFICIENCY: Primary | ICD-10-CM

## 2023-07-19 DIAGNOSIS — Z79.4 TYPE 2 DIABETES MELLITUS WITH HYPERGLYCEMIA, WITH LONG-TERM CURRENT USE OF INSULIN: ICD-10-CM

## 2023-07-19 DIAGNOSIS — N18.30 CKD STAGE 3 DUE TO TYPE 2 DIABETES MELLITUS: ICD-10-CM

## 2023-07-19 DIAGNOSIS — E11.65 TYPE 2 DIABETES MELLITUS WITH HYPERGLYCEMIA, WITH LONG-TERM CURRENT USE OF INSULIN: ICD-10-CM

## 2023-07-19 DIAGNOSIS — N18.4 CKD (CHRONIC KIDNEY DISEASE) STAGE 4, GFR 15-29 ML/MIN: ICD-10-CM

## 2023-07-19 DIAGNOSIS — E11.22 CKD STAGE 3 DUE TO TYPE 2 DIABETES MELLITUS: ICD-10-CM

## 2023-07-19 LAB
ALBUMIN SERPL-MCNC: 3.2 G/DL (ref 3.4–4.8)
ALBUMIN/GLOB SERPL: 1 RATIO (ref 1.1–2)
ALP SERPL-CCNC: 88 UNIT/L (ref 40–150)
ALT SERPL-CCNC: 10 UNIT/L (ref 0–55)
APPEARANCE UR: CLEAR
AST SERPL-CCNC: 11 UNIT/L (ref 5–34)
BACTERIA #/AREA URNS AUTO: ABNORMAL /HPF
BASOPHILS # BLD AUTO: 0.05 X10(3)/MCL
BASOPHILS NFR BLD AUTO: 0.7 %
BILIRUB UR QL STRIP.AUTO: NEGATIVE
BILIRUBIN DIRECT+TOT PNL SERPL-MCNC: 0.3 MG/DL
BUN SERPL-MCNC: 19 MG/DL (ref 9.8–20.1)
CALCIUM SERPL-MCNC: 8.9 MG/DL (ref 8.4–10.2)
CHLORIDE SERPL-SCNC: 104 MMOL/L (ref 98–107)
CO2 SERPL-SCNC: 29 MMOL/L (ref 23–31)
COLOR UR: YELLOW
CREAT SERPL-MCNC: 1.35 MG/DL (ref 0.55–1.02)
CREAT UR-MCNC: 63.9 MG/DL (ref 45–106)
EOSINOPHIL # BLD AUTO: 0.22 X10(3)/MCL (ref 0–0.9)
EOSINOPHIL NFR BLD AUTO: 2.9 %
ERYTHROCYTE [DISTWIDTH] IN BLOOD BY AUTOMATED COUNT: 16.8 % (ref 11.5–17)
FERRITIN SERPL-MCNC: 8.7 NG/ML (ref 4.63–204)
GFR SERPLBLD CREATININE-BSD FMLA CKD-EPI: 43 MLS/MIN/1.73/M2
GLOBULIN SER-MCNC: 3.2 GM/DL (ref 2.4–3.5)
GLUCOSE SERPL-MCNC: 149 MG/DL (ref 82–115)
GLUCOSE UR QL STRIP.AUTO: NEGATIVE
HCT VFR BLD AUTO: 31.7 % (ref 37–47)
HGB BLD-MCNC: 9.3 G/DL (ref 12–16)
IMM GRANULOCYTES # BLD AUTO: 0.03 X10(3)/MCL (ref 0–0.04)
IMM GRANULOCYTES NFR BLD AUTO: 0.4 %
IRON SATN MFR SERPL: 9 % (ref 20–50)
IRON SERPL-MCNC: 31 UG/DL (ref 50–170)
KETONES UR QL STRIP.AUTO: NEGATIVE
LEUKOCYTE ESTERASE UR QL STRIP.AUTO: ABNORMAL
LYMPHOCYTES # BLD AUTO: 1.15 X10(3)/MCL (ref 0.6–4.6)
LYMPHOCYTES NFR BLD AUTO: 15.4 %
MCH RBC QN AUTO: 22.2 PG (ref 27–31)
MCHC RBC AUTO-ENTMCNC: 29.3 G/DL (ref 33–36)
MCV RBC AUTO: 75.8 FL (ref 80–94)
MONOCYTES # BLD AUTO: 0.41 X10(3)/MCL (ref 0.1–1.3)
MONOCYTES NFR BLD AUTO: 5.5 %
NEUTROPHILS # BLD AUTO: 5.6 X10(3)/MCL (ref 2.1–9.2)
NEUTROPHILS NFR BLD AUTO: 75.1 %
NITRITE UR QL STRIP.AUTO: NEGATIVE
NRBC BLD AUTO-RTO: 0 %
PH UR STRIP.AUTO: 6 [PH]
PHOSPHATE SERPL-MCNC: 3.6 MG/DL (ref 2.3–4.7)
PLATELET # BLD AUTO: 156 X10(3)/MCL (ref 130–400)
PMV BLD AUTO: 12 FL (ref 7.4–10.4)
POTASSIUM SERPL-SCNC: 4.9 MMOL/L (ref 3.5–5.1)
PROT SERPL-MCNC: 6.4 GM/DL (ref 5.8–7.6)
PROT UR QL STRIP.AUTO: NEGATIVE
PROT UR STRIP-MCNC: 7.9 MG/DL
PTH-INTACT SERPL-MCNC: 67.2 PG/ML (ref 8.7–77)
RBC # BLD AUTO: 4.18 X10(6)/MCL (ref 4.2–5.4)
RBC #/AREA URNS AUTO: ABNORMAL /HPF
RBC UR QL AUTO: NEGATIVE
RHEUMATOID FACT SERPL-ACNC: <13 IU/ML
SODIUM SERPL-SCNC: 139 MMOL/L (ref 136–145)
SODIUM UR-SCNC: 52 MMOL/L
SP GR UR STRIP.AUTO: 1.01 (ref 1–1.03)
SQUAMOUS #/AREA URNS AUTO: ABNORMAL /HPF
TIBC SERPL-MCNC: 296 UG/DL (ref 70–310)
TIBC SERPL-MCNC: 327 UG/DL (ref 250–450)
TRANSFERRIN SERPL-MCNC: 289 MG/DL (ref 173–360)
URINE PROTEIN/CREATININE RATIO (OHS): 0.1
UROBILINOGEN UR STRIP-ACNC: 1
WBC # SPEC AUTO: 7.46 X10(3)/MCL (ref 4.5–11.5)
WBC #/AREA URNS AUTO: ABNORMAL /HPF

## 2023-07-19 PROCEDURE — 80053 COMPREHEN METABOLIC PANEL: CPT

## 2023-07-19 PROCEDURE — 83550 IRON BINDING TEST: CPT

## 2023-07-19 PROCEDURE — 82728 ASSAY OF FERRITIN: CPT

## 2023-07-19 PROCEDURE — 86431 RHEUMATOID FACTOR QUANT: CPT

## 2023-07-19 PROCEDURE — 82570 ASSAY OF URINE CREATININE: CPT

## 2023-07-19 PROCEDURE — 85025 COMPLETE CBC W/AUTO DIFF WBC: CPT

## 2023-07-19 PROCEDURE — 81001 URINALYSIS AUTO W/SCOPE: CPT

## 2023-07-19 PROCEDURE — 86160 COMPLEMENT ANTIGEN: CPT

## 2023-07-19 PROCEDURE — 83970 ASSAY OF PARATHORMONE: CPT

## 2023-07-19 PROCEDURE — 84300 ASSAY OF URINE SODIUM: CPT

## 2023-07-19 PROCEDURE — 84100 ASSAY OF PHOSPHORUS: CPT

## 2023-07-19 PROCEDURE — 36415 COLL VENOUS BLD VENIPUNCTURE: CPT

## 2023-07-19 RX ORDER — FERROUS SULFATE 325(65) MG
325 TABLET ORAL 2 TIMES DAILY
Qty: 60 TABLET | Refills: 11 | Status: SHIPPED | OUTPATIENT
Start: 2023-07-19 | End: 2024-03-08

## 2023-07-19 RX ORDER — GABAPENTIN 300 MG/1
600 CAPSULE ORAL NIGHTLY
Qty: 60 CAPSULE | Refills: 5 | Status: SHIPPED | OUTPATIENT
Start: 2023-07-19

## 2023-07-19 NOTE — TELEPHONE ENCOUNTER
----- Message from DENIS Purdy sent at 7/19/2023  3:22 PM CDT -----  Please start patient on ferrous sulfate 325 mg BID.  Please send iron panel and CBC to PCP and have them follow up on why iron is so low.  We will see her regarding renal function and remaining labs on 7/27    Called patient regarding lab results, iron level is low, Dez Burdick NP ordered Ferrous Sulfate 325 mg bid, sent to pharmacy on file, labs sent to PCP. Verbalized understanding.

## 2023-07-20 ENCOUNTER — TELEPHONE (OUTPATIENT)
Dept: FAMILY MEDICINE | Facility: CLINIC | Age: 68
End: 2023-07-20
Payer: MEDICARE

## 2023-07-20 DIAGNOSIS — Z79.4 TYPE 2 DIABETES MELLITUS WITH HYPERGLYCEMIA, WITH LONG-TERM CURRENT USE OF INSULIN: Primary | ICD-10-CM

## 2023-07-20 DIAGNOSIS — E11.65 TYPE 2 DIABETES MELLITUS WITH HYPERGLYCEMIA, WITH LONG-TERM CURRENT USE OF INSULIN: Primary | ICD-10-CM

## 2023-07-20 DIAGNOSIS — E11.65 TYPE 2 DIABETES MELLITUS WITH HYPERGLYCEMIA, WITH LONG-TERM CURRENT USE OF INSULIN: ICD-10-CM

## 2023-07-20 DIAGNOSIS — Z79.4 TYPE 2 DIABETES MELLITUS WITH HYPERGLYCEMIA, WITH LONG-TERM CURRENT USE OF INSULIN: ICD-10-CM

## 2023-07-20 RX ORDER — DULAGLUTIDE 3 MG/.5ML
3 INJECTION, SOLUTION SUBCUTANEOUS
Qty: 4 PEN | Refills: 11 | Status: SHIPPED | OUTPATIENT
Start: 2023-07-20 | End: 2024-07-19

## 2023-07-20 NOTE — TELEPHONE ENCOUNTER
Nephrology notified me of low hgb, patient has seen both her hematologist and also GI this year, she is scheduled for GI evaluation for iron deficiency anemia next month, she is also now taking iron supplements as of yesterday.   She did have questions about why her kidney function declined in June, appears to be related to uncontrolled DM. We discussed she is only checking BG once a day fasting and she is only giving herself long acting insulin and trulicity. I think she would benefit from a CGM, some diabetes education on dietary changes, lifestyle changes, and also let's increase her trulicity to 3 mg subq weekly, she is agreeable  She will call me in 2 weeks with home BG report after dose change of trulicity  Continue toujeo      Please help work on dex com device for patient   Thanks

## 2023-07-21 LAB
C3 SERPL-MCNC: 161 MG/DL
C4 SERPL-MCNC: 22 MG/DL
NUCLEAR IGG SER QL IA: NORMAL
RHEUMATOID FACT SERPL-ACNC: <10 IU/ML

## 2023-07-24 ENCOUNTER — PATIENT MESSAGE (OUTPATIENT)
Dept: ADMINISTRATIVE | Facility: HOSPITAL | Age: 68
End: 2023-07-24
Payer: MEDICARE

## 2023-07-27 ENCOUNTER — OFFICE VISIT (OUTPATIENT)
Dept: NEPHROLOGY | Facility: CLINIC | Age: 68
End: 2023-07-27
Payer: MEDICARE

## 2023-07-27 VITALS
DIASTOLIC BLOOD PRESSURE: 60 MMHG | WEIGHT: 239.44 LBS | OXYGEN SATURATION: 95 % | TEMPERATURE: 98 F | HEIGHT: 65 IN | HEART RATE: 97 BPM | BODY MASS INDEX: 39.89 KG/M2 | SYSTOLIC BLOOD PRESSURE: 120 MMHG | RESPIRATION RATE: 20 BRPM

## 2023-07-27 DIAGNOSIS — N18.4 CKD (CHRONIC KIDNEY DISEASE) STAGE 4, GFR 15-29 ML/MIN: Primary | ICD-10-CM

## 2023-07-27 DIAGNOSIS — E11.65 TYPE 2 DIABETES MELLITUS WITH HYPERGLYCEMIA, WITH LONG-TERM CURRENT USE OF INSULIN: ICD-10-CM

## 2023-07-27 DIAGNOSIS — Z79.4 TYPE 2 DIABETES MELLITUS WITH HYPERGLYCEMIA, WITH LONG-TERM CURRENT USE OF INSULIN: ICD-10-CM

## 2023-07-27 PROCEDURE — 3052F HG A1C>EQUAL 8.0%<EQUAL 9.0%: CPT | Mod: CPTII,S$GLB,, | Performed by: INTERNAL MEDICINE

## 2023-07-27 PROCEDURE — 3061F PR NEG MICROALBUMINURIA RESULT DOCUMENTED/REVIEW: ICD-10-PCS | Mod: CPTII,S$GLB,, | Performed by: INTERNAL MEDICINE

## 2023-07-27 PROCEDURE — 1101F PR PT FALLS ASSESS DOC 0-1 FALLS W/OUT INJ PAST YR: ICD-10-PCS | Mod: CPTII,S$GLB,, | Performed by: INTERNAL MEDICINE

## 2023-07-27 PROCEDURE — 3078F DIAST BP <80 MM HG: CPT | Mod: CPTII,S$GLB,, | Performed by: INTERNAL MEDICINE

## 2023-07-27 PROCEDURE — 3008F PR BODY MASS INDEX (BMI) DOCUMENTED: ICD-10-PCS | Mod: CPTII,S$GLB,, | Performed by: INTERNAL MEDICINE

## 2023-07-27 PROCEDURE — 3052F PR MOST RECENT HEMOGLOBIN A1C LEVEL 8.0 - < 9.0%: ICD-10-PCS | Mod: CPTII,S$GLB,, | Performed by: INTERNAL MEDICINE

## 2023-07-27 PROCEDURE — 3288F PR FALLS RISK ASSESSMENT DOCUMENTED: ICD-10-PCS | Mod: CPTII,S$GLB,, | Performed by: INTERNAL MEDICINE

## 2023-07-27 PROCEDURE — 4010F PR ACE/ARB THEARPY RXD/TAKEN: ICD-10-PCS | Mod: CPTII,S$GLB,, | Performed by: INTERNAL MEDICINE

## 2023-07-27 PROCEDURE — 3074F PR MOST RECENT SYSTOLIC BLOOD PRESSURE < 130 MM HG: ICD-10-PCS | Mod: CPTII,S$GLB,, | Performed by: INTERNAL MEDICINE

## 2023-07-27 PROCEDURE — 1159F MED LIST DOCD IN RCRD: CPT | Mod: CPTII,S$GLB,, | Performed by: INTERNAL MEDICINE

## 2023-07-27 PROCEDURE — 3078F PR MOST RECENT DIASTOLIC BLOOD PRESSURE < 80 MM HG: ICD-10-PCS | Mod: CPTII,S$GLB,, | Performed by: INTERNAL MEDICINE

## 2023-07-27 PROCEDURE — 99213 PR OFFICE/OUTPT VISIT, EST, LEVL III, 20-29 MIN: ICD-10-PCS | Mod: S$GLB,,, | Performed by: INTERNAL MEDICINE

## 2023-07-27 PROCEDURE — 1126F PR PAIN SEVERITY QUANTIFIED, NO PAIN PRESENT: ICD-10-PCS | Mod: CPTII,S$GLB,, | Performed by: INTERNAL MEDICINE

## 2023-07-27 PROCEDURE — 3066F PR DOCUMENTATION OF TREATMENT FOR NEPHROPATHY: ICD-10-PCS | Mod: CPTII,S$GLB,, | Performed by: INTERNAL MEDICINE

## 2023-07-27 PROCEDURE — 3288F FALL RISK ASSESSMENT DOCD: CPT | Mod: CPTII,S$GLB,, | Performed by: INTERNAL MEDICINE

## 2023-07-27 PROCEDURE — 99999 PR PBB SHADOW E&M-EST. PATIENT-LVL IV: ICD-10-PCS | Mod: PBBFAC,,, | Performed by: INTERNAL MEDICINE

## 2023-07-27 PROCEDURE — 99213 OFFICE O/P EST LOW 20 MIN: CPT | Mod: S$GLB,,, | Performed by: INTERNAL MEDICINE

## 2023-07-27 PROCEDURE — 3061F NEG MICROALBUMINURIA REV: CPT | Mod: CPTII,S$GLB,, | Performed by: INTERNAL MEDICINE

## 2023-07-27 PROCEDURE — 3074F SYST BP LT 130 MM HG: CPT | Mod: CPTII,S$GLB,, | Performed by: INTERNAL MEDICINE

## 2023-07-27 PROCEDURE — 4010F ACE/ARB THERAPY RXD/TAKEN: CPT | Mod: CPTII,S$GLB,, | Performed by: INTERNAL MEDICINE

## 2023-07-27 PROCEDURE — 1126F AMNT PAIN NOTED NONE PRSNT: CPT | Mod: CPTII,S$GLB,, | Performed by: INTERNAL MEDICINE

## 2023-07-27 PROCEDURE — 1159F PR MEDICATION LIST DOCUMENTED IN MEDICAL RECORD: ICD-10-PCS | Mod: CPTII,S$GLB,, | Performed by: INTERNAL MEDICINE

## 2023-07-27 PROCEDURE — 1101F PT FALLS ASSESS-DOCD LE1/YR: CPT | Mod: CPTII,S$GLB,, | Performed by: INTERNAL MEDICINE

## 2023-07-27 PROCEDURE — 99999 PR PBB SHADOW E&M-EST. PATIENT-LVL IV: CPT | Mod: PBBFAC,,, | Performed by: INTERNAL MEDICINE

## 2023-07-27 PROCEDURE — 3066F NEPHROPATHY DOC TX: CPT | Mod: CPTII,S$GLB,, | Performed by: INTERNAL MEDICINE

## 2023-07-27 PROCEDURE — 3008F BODY MASS INDEX DOCD: CPT | Mod: CPTII,S$GLB,, | Performed by: INTERNAL MEDICINE

## 2023-07-27 NOTE — PROGRESS NOTES
[facility] Ambulatory Progress Note      HPI  Indu Azul, 67 y.o. female, presents to office for a follow up visit for CKD 3 patient also has cirrhosis and possible chronic hepatorenal physiology she has been labeled as diabetic nephropathy although recently she has no proteinuria.  Patient is maintained on Aldactone and furosemide and despite that her potassium is borderline elevated precluding us from prescribing Ace or arbs  Only complaints are related to chronic lower extremity edema    Patient denies taking NSAIDs or new antibiotics. Also denies recent episode of dehydration, diarrhea, vomiting, acute illness, hospitalization, recent angiograms or exposure to IV radiocontrast.        Medical Diagnoses:   Past Medical History:   Diagnosis Date    Charcot's joint of foot, left     Chronic kidney disease, unspecified     Cirrhosis of liver without ascites     Depression     Diabetes mellitus, type II, insulin dependent     Diabetic neuropathy     GERD (gastroesophageal reflux disease)     H/O: hysterectomy     Hepatic steatosis     HLD (hyperlipidemia)     HTN (hypertension)     Other hyperlipidemia     Overactive bladder     Thrombocytopenia, unspecified     Type 2 diabetes mellitus with unspecified diabetic retinopathy without macular edema     Vitamin D deficiency      Patient Active Problem List   Diagnosis    Thrombocytopenia    Iron deficiency    Folate deficiency    Type 2 diabetes mellitus with hyperglycemia, with long-term current use of insulin    Long-term insulin use    Hypertension associated with type 2 diabetes mellitus    Hyperlipidemia associated with type 2 diabetes mellitus    CKD stage 3 due to type 2 diabetes mellitus    Recurrent major depressive disorder, in full remission    Vitamin D deficiency    Hepatic steatosis    Colon cancer screening    Need for influenza vaccination    Other cirrhosis of liver    Diabetic polyneuropathy associated with type 2 diabetes mellitus     Gammopathy    Splenomegaly    Microcytic anemia    Wellness examination    CKD (chronic kidney disease) stage 4, GFR 15-29 ml/min       Surgical History:   Past Surgical History:   Procedure Laterality Date    APPENDECTOMY  1978    BREAST BIOPSY  2016     SECTION      charcot-leyden crystals identification      CHOLECYSTECTOMY  2006    HEMORRHOID SURGERY      HYSTERECTOMY  1990    total    right foot surgery Right 2022       Family History:   Family History   Problem Relation Age of Onset    Diabetes Mother     Alzheimer's disease Mother     Diabetes Father     Leukemia Father     Diabetes Sister     Liver cancer Sister     Diabetes Brother        Social History:   Social History     Tobacco Use    Smoking status: Every Day     Packs/day: 0.50     Years: 40.00     Pack years: 20.00     Types: Cigarettes     Passive exposure: Current    Smokeless tobacco: Never   Substance Use Topics    Alcohol use: Not Currently     Comment: occassionally       Allergies:  Review of patient's allergies indicates:  No Known Allergies    Medications:    Current Outpatient Medications:     acetaminophen (TYLENOL) 500 MG tablet, Take 500 mg by mouth every 6 (six) hours as needed., Disp: , Rfl:     atorvastatin (LIPITOR) 20 MG tablet, Take 1 tablet (20 mg total) by mouth once daily., Disp: 90 tablet, Rfl: 3    dulaglutide (TRULICITY) 3 mg/0.5 mL pen injector, Inject 3 mg into the skin every 7 days., Disp: 4 pen , Rfl: 11    ferrous sulfate (FEOSOL) 325 mg (65 mg iron) Tab tablet, Take 1 tablet (325 mg total) by mouth 2 (two) times daily., Disp: 60 tablet, Rfl: 11    furosemide (LASIX) 40 MG tablet, Take 40 mg by mouth once daily., Disp: , Rfl:     gabapentin (NEURONTIN) 300 MG capsule, Take 2 capsules (600 mg total) by mouth every evening., Disp: 60 capsule, Rfl: 5    insulin aspart U-100 (NOVOLOG) 100 unit/mL injection, Inject into the skin as needed., Disp: , Rfl:     insulin glargine, TOUJEO, (TOUJEO SOLOSTAR U-300  "INSULIN) 300 unit/mL (1.5 mL) InPn pen, INJECT 80 UNITS SUBCUTANEOUSLY ONCE DAILY, Disp: 6 mL, Rfl: 3    insulin syringe-needle U-100 1 mL 31 gauge x 5/16 Syrg, Inject 1 Syringe into the skin 3 (three) times daily with meals., Disp: , Rfl:     pantoprazole (PROTONIX) 40 MG tablet, Take 40 mg by mouth as needed. for 90 days, Disp: , Rfl:     sertraline (ZOLOFT) 50 MG tablet, Take 1 tablet by mouth once daily, Disp: 30 tablet, Rfl: 0    solifenacin (VESICARE) 10 MG tablet, Take 10 mg by mouth once daily., Disp: , Rfl:     spironolactone (ALDACTONE) 50 MG tablet, Take 50 mg by mouth once daily., Disp: , Rfl:        Review of Systems:    Constitutional: Denies fever, fatigue, generalized weakness  Skin: Denies wounds, no rashes, no itching, no new skin lesions  Respiratory:  Denies cough, shortness of breath, or wheezing  Cardiovascular: Denies chest pain, palpitations,   Gastrointestional: Denies abdominal pain, nausea, vomiting, diarrhea, or constipation  Genitourinary: Denies dysuria, hematuria, foamy urine, or incontinence; reports able to empty bladder  Musculoskeletal:  Difficulty in ambulation needs a cane due to Charcot feet  Neurological: Denies headaches, dizziness, paresthesias, tremors or focal weakness      Vital Signs:  /60 (BP Location: Left arm, Patient Position: Sitting)   Pulse 97   Temp 98.2 °F (36.8 °C) (Temporal)   Resp 20   Ht 5' 5" (1.651 m)   Wt 108.6 kg (239 lb 6.7 oz)   SpO2 95%   BMI 39.84 kg/m²   Body mass index is 39.84 kg/m².      Physical Exam:    General: no acute distress, awake, alert  Eyes: conjunctiva clear, eyelids without swelling  HENT: atraumatic, oropharynx and nasal mucosa patent  Neck: supple, trache midline, no JVD  Respiratory: equal, unlabored, clear to auscultation A/P  Cardiovascular: RRR without murmur or rub  Edema:  +2 lower extremity edema  Gastrointestinal: soft, non-tender, non-distended; positive bowel sounds; no masses to palpation; no " ascites  Genitourinary: no CVA tenderness upon palpation  Musculoskeletal:  Needs cane for ambulation  Integumentary: warm, dry; no rashes, wounds, or skin lesions  Neurological: oriented x4, appropriate, no acute deficits; no asterixis      Labs:        Component Value Date/Time     07/19/2023 0834     06/15/2023 0834     06/01/2022 1340    K 4.9 07/19/2023 0834    K 4.3 06/15/2023 0834    K 4.9 06/01/2022 1340     06/01/2022 1340    CO2 29 07/19/2023 0834    CO2 30 06/15/2023 0834    CO2 29 06/01/2022 1340    BUN 19.0 07/19/2023 0834    BUN 25.4 (H) 06/15/2023 0834    BUN 22 06/01/2022 1340    CREATININE 1.35 (H) 07/19/2023 0834    CREATININE 1.93 (H) 06/15/2023 0834    CREATININE 1.87 (H) 01/31/2023 0728    CREATININE 1.52 (H) 01/11/2023 1350    CREATININE 1.32 (H) 06/01/2022 1340    CALCIUM 8.9 07/19/2023 0834    CALCIUM 10.0 06/15/2023 0834    CALCIUM 8.7 (L) 06/01/2022 1340    PHOS 3.6 07/19/2023 0834    PTH 67.2 07/19/2023 0834           Component Value Date/Time    WBC 7.46 07/19/2023 0834    WBC 9.18 06/15/2023 0834    HGB 9.3 (L) 07/19/2023 0834    HGB 10.8 (L) 06/15/2023 0834    HCT 31.7 (L) 07/19/2023 0834    HCT 34.8 (L) 06/15/2023 0834     07/19/2023 0834     06/15/2023 0834       Urine Creatinine   Date Value Ref Range Status   07/19/2023 63.9 45.0 - 106.0 mg/dL Final   06/15/2023 114.5 (H) 47.0 - 110.0 mg/dL Final       Urine Protein Level   Date Value Ref Range Status   07/19/2023 7.9 mg/dL Final           Imaging:  Retroperitoneal US:      Impression:    CKD 3 related to combination of diabetic kidney disease and possible hepatorenal physiology not quite sure about her cardiac function  Patient on Aldactone and furosemide with borderline hyperkalemia precluding us from prescribing ACE inhibitors  No significant proteinuria no straight forward indication for SGL 2 inhibitors but we may consider that option in the near future      Plan:  Low-sodium diet  low-potassium diet  I will follow back in 3 months with repeating labs repeated urine spot protein to creatinine consideration for adding SGL 2 inhibitors will be done accordingly      Daya Weir      This note was created with the assistance of [employer] voice recognition software or phone dictation. There may be transcription errors as a result of using this technology however minimal. Effort has been made to assure accuracy of transcription but any obvious errors or omissions should be clarified with the author of the document

## 2023-07-28 DIAGNOSIS — F33.42 RECURRENT MAJOR DEPRESSIVE DISORDER, IN FULL REMISSION: ICD-10-CM

## 2023-07-31 ENCOUNTER — HOSPITAL ENCOUNTER (OUTPATIENT)
Dept: RADIOLOGY | Facility: HOSPITAL | Age: 68
Discharge: HOME | End: 2023-07-31
Attending: NURSE PRACTITIONER
Payer: MEDICARE

## 2023-07-31 DIAGNOSIS — Z78.0 POST-MENOPAUSAL: ICD-10-CM

## 2023-07-31 DIAGNOSIS — Z12.39 ENCOUNTER FOR SCREENING FOR MALIGNANT NEOPLASM OF BREAST, UNSPECIFIED SCREENING MODALITY: ICD-10-CM

## 2023-07-31 DIAGNOSIS — Z12.31 ENCOUNTER FOR SCREENING MAMMOGRAM FOR MALIGNANT NEOPLASM OF BREAST: ICD-10-CM

## 2023-07-31 PROCEDURE — 77080 DXA BONE DENSITY AXIAL: CPT | Mod: 26,XU,, | Performed by: RADIOLOGY

## 2023-07-31 PROCEDURE — 77080 DXA BONE DENSITY AXIAL: CPT | Mod: XU,TC

## 2023-07-31 PROCEDURE — 77081 DXA BONE DENSITY APPENDICULR: CPT | Mod: TC

## 2023-07-31 PROCEDURE — 77081 DEXA BONE DENSITY APPENDICULAR SKELETON: ICD-10-PCS | Mod: 26,,, | Performed by: RADIOLOGY

## 2023-07-31 PROCEDURE — 77081 DXA BONE DENSITY APPENDICULR: CPT | Mod: 26,,, | Performed by: RADIOLOGY

## 2023-07-31 PROCEDURE — 77067 MAMMO DIGITAL SCREENING BILAT WITH TOMO: ICD-10-PCS | Mod: 26,,, | Performed by: RADIOLOGY

## 2023-07-31 PROCEDURE — 77067 SCR MAMMO BI INCL CAD: CPT | Mod: TC

## 2023-07-31 PROCEDURE — 77063 BREAST TOMOSYNTHESIS BI: CPT | Mod: 26,,, | Performed by: RADIOLOGY

## 2023-07-31 PROCEDURE — 77080 DXA BONE DENSITY AXIAL SKELETON 1 OR MORE SITES: ICD-10-PCS | Mod: 26,XU,, | Performed by: RADIOLOGY

## 2023-07-31 PROCEDURE — 77063 MAMMO DIGITAL SCREENING BILAT WITH TOMO: ICD-10-PCS | Mod: 26,,, | Performed by: RADIOLOGY

## 2023-07-31 PROCEDURE — 77067 SCR MAMMO BI INCL CAD: CPT | Mod: 26,,, | Performed by: RADIOLOGY

## 2023-07-31 RX ORDER — SERTRALINE HYDROCHLORIDE 50 MG/1
TABLET, FILM COATED ORAL
Qty: 30 TABLET | Refills: 2 | Status: ON HOLD | OUTPATIENT
Start: 2023-07-31 | End: 2023-08-29

## 2023-08-10 ENCOUNTER — LAB VISIT (OUTPATIENT)
Dept: LAB | Facility: HOSPITAL | Age: 68
End: 2023-08-10
Attending: INTERNAL MEDICINE
Payer: MEDICARE

## 2023-08-10 DIAGNOSIS — D47.2 GAMMOPATHY: ICD-10-CM

## 2023-08-10 DIAGNOSIS — R16.1 SPLENOMEGALY: ICD-10-CM

## 2023-08-10 DIAGNOSIS — D69.6 THROMBOCYTOPENIA: ICD-10-CM

## 2023-08-10 DIAGNOSIS — E53.8 FOLATE DEFICIENCY: ICD-10-CM

## 2023-08-10 DIAGNOSIS — E61.1 IRON DEFICIENCY: ICD-10-CM

## 2023-08-10 LAB
ALBUMIN SERPL-MCNC: 3.3 G/DL (ref 3.4–4.8)
ALBUMIN/GLOB SERPL: 1 RATIO (ref 1.1–2)
ALP SERPL-CCNC: 88 UNIT/L (ref 40–150)
ALT SERPL-CCNC: 9 UNIT/L (ref 0–55)
AST SERPL-CCNC: 11 UNIT/L (ref 5–34)
BASOPHILS # BLD AUTO: 0.04 X10(3)/MCL
BASOPHILS NFR BLD AUTO: 0.5 %
BILIRUB SERPL-MCNC: 0.4 MG/DL
BUN SERPL-MCNC: 14.8 MG/DL (ref 9.8–20.1)
CALCIUM SERPL-MCNC: 9.4 MG/DL (ref 8.4–10.2)
CHLORIDE SERPL-SCNC: 104 MMOL/L (ref 98–107)
CO2 SERPL-SCNC: 28 MMOL/L (ref 23–31)
CREAT SERPL-MCNC: 1.36 MG/DL (ref 0.55–1.02)
EOSINOPHIL # BLD AUTO: 0.27 X10(3)/MCL (ref 0–0.9)
EOSINOPHIL NFR BLD AUTO: 3.1 %
ERYTHROCYTE [DISTWIDTH] IN BLOOD BY AUTOMATED COUNT: 17.6 % (ref 11.5–17)
FERRITIN SERPL-MCNC: 18.14 NG/ML (ref 4.63–204)
FOLATE SERPL-MCNC: 6.2 NG/ML (ref 7–31.4)
GFR SERPLBLD CREATININE-BSD FMLA CKD-EPI: 43 MLS/MIN/1.73/M2
GLOBULIN SER-MCNC: 3.3 GM/DL (ref 2.4–3.5)
GLUCOSE SERPL-MCNC: 85 MG/DL (ref 82–115)
HCT VFR BLD AUTO: 34.7 % (ref 37–47)
HGB BLD-MCNC: 10.2 G/DL (ref 12–16)
IMM GRANULOCYTES # BLD AUTO: 0.02 X10(3)/MCL (ref 0–0.04)
IMM GRANULOCYTES NFR BLD AUTO: 0.2 %
IRON SATN MFR SERPL: 10 % (ref 20–50)
IRON SERPL-MCNC: 32 UG/DL (ref 50–170)
LYMPHOCYTES # BLD AUTO: 1.7 X10(3)/MCL (ref 0.6–4.6)
LYMPHOCYTES NFR BLD AUTO: 19.7 %
MCH RBC QN AUTO: 22.9 PG (ref 27–31)
MCHC RBC AUTO-ENTMCNC: 29.4 G/DL (ref 33–36)
MCV RBC AUTO: 78 FL (ref 80–94)
MONOCYTES # BLD AUTO: 0.52 X10(3)/MCL (ref 0.1–1.3)
MONOCYTES NFR BLD AUTO: 6 %
NEUTROPHILS # BLD AUTO: 6.07 X10(3)/MCL (ref 2.1–9.2)
NEUTROPHILS NFR BLD AUTO: 70.5 %
PLATELET # BLD AUTO: 187 X10(3)/MCL (ref 130–400)
PMV BLD AUTO: 11.4 FL (ref 7.4–10.4)
POTASSIUM SERPL-SCNC: 4.9 MMOL/L (ref 3.5–5.1)
PROT SERPL-MCNC: 6.6 GM/DL (ref 5.8–7.6)
RBC # BLD AUTO: 4.45 X10(6)/MCL (ref 4.2–5.4)
SODIUM SERPL-SCNC: 141 MMOL/L (ref 136–145)
TIBC SERPL-MCNC: 278 UG/DL (ref 70–310)
TIBC SERPL-MCNC: 310 UG/DL (ref 250–450)
TRANSFERRIN SERPL-MCNC: 278 MG/DL (ref 173–360)
WBC # SPEC AUTO: 8.62 X10(3)/MCL (ref 4.5–11.5)

## 2023-08-10 PROCEDURE — 85025 COMPLETE CBC W/AUTO DIFF WBC: CPT

## 2023-08-10 PROCEDURE — 83550 IRON BINDING TEST: CPT

## 2023-08-10 PROCEDURE — 82746 ASSAY OF FOLIC ACID SERUM: CPT

## 2023-08-10 PROCEDURE — 36415 COLL VENOUS BLD VENIPUNCTURE: CPT

## 2023-08-10 PROCEDURE — 80053 COMPREHEN METABOLIC PANEL: CPT

## 2023-08-10 PROCEDURE — 82728 ASSAY OF FERRITIN: CPT

## 2023-08-14 ENCOUNTER — TELEPHONE (OUTPATIENT)
Dept: HEMATOLOGY/ONCOLOGY | Facility: CLINIC | Age: 68
End: 2023-08-14
Payer: MEDICARE

## 2023-08-14 DIAGNOSIS — E53.8 FOLIC ACID DEFICIENCY: Primary | ICD-10-CM

## 2023-08-14 RX ORDER — FOLIC ACID 1 MG/1
1 TABLET ORAL DAILY
Qty: 100 TABLET | Refills: 3 | Status: SHIPPED | OUTPATIENT
Start: 2023-08-14 | End: 2024-03-08

## 2023-08-14 NOTE — TELEPHONE ENCOUNTER
Please let the patient know that her folic acid, a type of B vitamin, came back low and that I order folic acid to take daily to her pharmacy. Thanks

## 2023-08-21 ENCOUNTER — OFFICE VISIT (OUTPATIENT)
Dept: HEMATOLOGY/ONCOLOGY | Facility: CLINIC | Age: 68
End: 2023-08-21
Payer: MEDICARE

## 2023-08-21 VITALS
DIASTOLIC BLOOD PRESSURE: 78 MMHG | WEIGHT: 243.19 LBS | OXYGEN SATURATION: 95 % | TEMPERATURE: 98 F | BODY MASS INDEX: 40.52 KG/M2 | RESPIRATION RATE: 18 BRPM | HEIGHT: 65 IN | SYSTOLIC BLOOD PRESSURE: 116 MMHG | HEART RATE: 84 BPM

## 2023-08-21 DIAGNOSIS — E53.8 FOLIC ACID DEFICIENCY: Primary | ICD-10-CM

## 2023-08-21 DIAGNOSIS — D50.9 MICROCYTIC ANEMIA: ICD-10-CM

## 2023-08-21 DIAGNOSIS — E61.1 IRON DEFICIENCY: ICD-10-CM

## 2023-08-21 PROCEDURE — 99999 PR PBB SHADOW E&M-EST. PATIENT-LVL IV: ICD-10-PCS | Mod: PBBFAC,,, | Performed by: NURSE PRACTITIONER

## 2023-08-21 PROCEDURE — 3008F BODY MASS INDEX DOCD: CPT | Mod: CPTII,S$GLB,, | Performed by: NURSE PRACTITIONER

## 2023-08-21 PROCEDURE — 3074F PR MOST RECENT SYSTOLIC BLOOD PRESSURE < 130 MM HG: ICD-10-PCS | Mod: CPTII,S$GLB,, | Performed by: NURSE PRACTITIONER

## 2023-08-21 PROCEDURE — 3066F PR DOCUMENTATION OF TREATMENT FOR NEPHROPATHY: ICD-10-PCS | Mod: CPTII,S$GLB,, | Performed by: NURSE PRACTITIONER

## 2023-08-21 PROCEDURE — 99999 PR PBB SHADOW E&M-EST. PATIENT-LVL IV: CPT | Mod: PBBFAC,,, | Performed by: NURSE PRACTITIONER

## 2023-08-21 PROCEDURE — 4010F PR ACE/ARB THEARPY RXD/TAKEN: ICD-10-PCS | Mod: CPTII,S$GLB,, | Performed by: NURSE PRACTITIONER

## 2023-08-21 PROCEDURE — 1159F PR MEDICATION LIST DOCUMENTED IN MEDICAL RECORD: ICD-10-PCS | Mod: CPTII,S$GLB,, | Performed by: NURSE PRACTITIONER

## 2023-08-21 PROCEDURE — 3008F PR BODY MASS INDEX (BMI) DOCUMENTED: ICD-10-PCS | Mod: CPTII,S$GLB,, | Performed by: NURSE PRACTITIONER

## 2023-08-21 PROCEDURE — 3061F NEG MICROALBUMINURIA REV: CPT | Mod: CPTII,S$GLB,, | Performed by: NURSE PRACTITIONER

## 2023-08-21 PROCEDURE — 1126F AMNT PAIN NOTED NONE PRSNT: CPT | Mod: CPTII,S$GLB,, | Performed by: NURSE PRACTITIONER

## 2023-08-21 PROCEDURE — 1126F PR PAIN SEVERITY QUANTIFIED, NO PAIN PRESENT: ICD-10-PCS | Mod: CPTII,S$GLB,, | Performed by: NURSE PRACTITIONER

## 2023-08-21 PROCEDURE — 3288F FALL RISK ASSESSMENT DOCD: CPT | Mod: CPTII,S$GLB,, | Performed by: NURSE PRACTITIONER

## 2023-08-21 PROCEDURE — 4010F ACE/ARB THERAPY RXD/TAKEN: CPT | Mod: CPTII,S$GLB,, | Performed by: NURSE PRACTITIONER

## 2023-08-21 PROCEDURE — 99214 PR OFFICE/OUTPT VISIT, EST, LEVL IV, 30-39 MIN: ICD-10-PCS | Mod: S$GLB,,, | Performed by: NURSE PRACTITIONER

## 2023-08-21 PROCEDURE — 1101F PR PT FALLS ASSESS DOC 0-1 FALLS W/OUT INJ PAST YR: ICD-10-PCS | Mod: CPTII,S$GLB,, | Performed by: NURSE PRACTITIONER

## 2023-08-21 PROCEDURE — 3288F PR FALLS RISK ASSESSMENT DOCUMENTED: ICD-10-PCS | Mod: CPTII,S$GLB,, | Performed by: NURSE PRACTITIONER

## 2023-08-21 PROCEDURE — 99214 OFFICE O/P EST MOD 30 MIN: CPT | Mod: S$GLB,,, | Performed by: NURSE PRACTITIONER

## 2023-08-21 PROCEDURE — 3074F SYST BP LT 130 MM HG: CPT | Mod: CPTII,S$GLB,, | Performed by: NURSE PRACTITIONER

## 2023-08-21 PROCEDURE — 3066F NEPHROPATHY DOC TX: CPT | Mod: CPTII,S$GLB,, | Performed by: NURSE PRACTITIONER

## 2023-08-21 PROCEDURE — 3052F PR MOST RECENT HEMOGLOBIN A1C LEVEL 8.0 - < 9.0%: ICD-10-PCS | Mod: CPTII,S$GLB,, | Performed by: NURSE PRACTITIONER

## 2023-08-21 PROCEDURE — 3061F PR NEG MICROALBUMINURIA RESULT DOCUMENTED/REVIEW: ICD-10-PCS | Mod: CPTII,S$GLB,, | Performed by: NURSE PRACTITIONER

## 2023-08-21 PROCEDURE — 1101F PT FALLS ASSESS-DOCD LE1/YR: CPT | Mod: CPTII,S$GLB,, | Performed by: NURSE PRACTITIONER

## 2023-08-21 PROCEDURE — 1159F MED LIST DOCD IN RCRD: CPT | Mod: CPTII,S$GLB,, | Performed by: NURSE PRACTITIONER

## 2023-08-21 PROCEDURE — 3078F PR MOST RECENT DIASTOLIC BLOOD PRESSURE < 80 MM HG: ICD-10-PCS | Mod: CPTII,S$GLB,, | Performed by: NURSE PRACTITIONER

## 2023-08-21 PROCEDURE — 3078F DIAST BP <80 MM HG: CPT | Mod: CPTII,S$GLB,, | Performed by: NURSE PRACTITIONER

## 2023-08-21 PROCEDURE — 3052F HG A1C>EQUAL 8.0%<EQUAL 9.0%: CPT | Mod: CPTII,S$GLB,, | Performed by: NURSE PRACTITIONER

## 2023-08-21 NOTE — PROGRESS NOTES
HEMATOLOGY/ONCOLOGY OFFICE CLINIC VISIT    Visit Information:    Initial Evaluation: 2022  Referring Provider: Maggy Jaquez NP  Other providers:  Code status: Not addressed    Diagnosis/Problem list:   Microcytic anemia.   Folate deficiency       Imaging studies:  CT C/A/P 6/3/2022: There is no significant hepatic steatosis.  The liver is cirrhotic.  No liver lesion is identified.  The spleen is enlarged, measuring 15.5 cm in length.  There is no retroperitoneal lymphadenopathy or abdominal aortic aneurysm.  A mildly prominent right external iliac lymph node measures 9 mm in short axis, nonspecific.  This is also similar in appearance when compared to 2015. Impression: Cirrhosis.  Splenomegaly.     CLINICAL HISTORY:       Patient ID: Indu Azul is a 67 y.o. female with multiple medical problems kindly referred for thrombocytopenia.  Patient platelet counts on 2021 were 132,000 and since that that has been slowly trending down.  2021 platelets 132,000  2022 platelets 121,000  2022 platelets 115,000    Of note patient have a UA done that same day that suggest possible UTI with positive nitrates, 1+ leukocytes and many bacteria.  Urine culture with E. coli.   Reviewing electronic medical record and going back to 12/15/2014 patient with occasional low platelets.  They were never lower than 100,000 and they always normalized.   As per patient in 2020 when her platelets were slightly decreased (117,000), this was shortly after she has her left foot surgery.  Then in May 17 and May 18 2021, platelets were 105k and 101k,  she had COVID.  Again in 2022 she have a UTI for which she completed antibiotics.      Patient's father diagnosed Blood disorder requiring blood transfusion that started q 4 weeks and the q week. He was diagnosed on his 80's but  at 91   Sister and niece had ovarian cancer. Sister  of it. Niece still alive.           Chief Complaint: OTHER (PT  have questions about her medications. )      Interval History:    Patient presents to clinic today for follow up to discuss lab results. She is by herself. She has has been taking oral iron twice daily. Last week when she did labs in preparation for this appointment, her folate level was decreased so Dr. Aysha taycribed folic acid and nurse called to let her know to start taking it. She reports that she has not started it yet because she wanted to ask us today if it will interfere with her other medications. She voices no concerns today and is doing well. No fever, chills or sweats. No chest pain or shortness of breath. No bleeding. Thrombocytopenia resolved    Past Medical History:   Diagnosis Date    Charcot's joint of foot, left     Chronic kidney disease, unspecified     Cirrhosis of liver without ascites     Depression     Diabetes mellitus, type II, insulin dependent     Diabetic neuropathy     GERD (gastroesophageal reflux disease)     H/O: hysterectomy     Hepatic steatosis     HLD (hyperlipidemia)     HTN (hypertension)     Other hyperlipidemia     Overactive bladder     Thrombocytopenia, unspecified     Type 2 diabetes mellitus with unspecified diabetic retinopathy without macular edema     Vitamin D deficiency       Past Surgical History:   Procedure Laterality Date    APPENDECTOMY      BREAST BIOPSY       SECTION      charcot-leyden crystals identification      CHOLECYSTECTOMY  2006    HEMORRHOID SURGERY      HYSTERECTOMY  1990    total    right foot surgery Right 2022     Family History   Problem Relation Age of Onset    Diabetes Mother     Alzheimer's disease Mother     Diabetes Father     Leukemia Father     Diabetes Sister     Liver cancer Sister     Diabetes Brother      Social Connections: Not on file       Review of patient's allergies indicates:  No Known Allergies   Current Outpatient Medications on File Prior to Visit   Medication Sig Dispense Refill    acetaminophen  (TYLENOL) 500 MG tablet Take 500 mg by mouth every 6 (six) hours as needed.      atorvastatin (LIPITOR) 20 MG tablet Take 1 tablet (20 mg total) by mouth once daily. 90 tablet 3    dulaglutide (TRULICITY) 3 mg/0.5 mL pen injector Inject 3 mg into the skin every 7 days. 4 pen 11    ferrous sulfate (FEOSOL) 325 mg (65 mg iron) Tab tablet Take 1 tablet (325 mg total) by mouth 2 (two) times daily. 60 tablet 11    folic acid (FOLVITE) 1 MG tablet Take 1 tablet (1 mg total) by mouth once daily. 100 tablet 3    furosemide (LASIX) 40 MG tablet Take 40 mg by mouth once daily.      gabapentin (NEURONTIN) 300 MG capsule Take 2 capsules (600 mg total) by mouth every evening. 60 capsule 5    insulin aspart U-100 (NOVOLOG) 100 unit/mL injection Inject into the skin as needed.      insulin glargine, TOUJEO, (TOUJEO SOLOSTAR U-300 INSULIN) 300 unit/mL (1.5 mL) InPn pen INJECT 79 UNITS SUBCUTANEOUSLY ONCE DAILY 6 mL 0    insulin syringe-needle U-100 1 mL 31 gauge x 5/16 Syrg Inject 1 Syringe into the skin 3 (three) times daily with meals.      pantoprazole (PROTONIX) 40 MG tablet Take 40 mg by mouth as needed. for 90 days      sertraline (ZOLOFT) 50 MG tablet Take 1 tablet by mouth once daily 30 tablet 2    solifenacin (VESICARE) 10 MG tablet Take 10 mg by mouth once daily.      spironolactone (ALDACTONE) 50 MG tablet Take 50 mg by mouth once daily.       No current facility-administered medications on file prior to visit.      Review of Systems   Constitutional:  Negative for activity change, appetite change, chills, diaphoresis, fatigue, fever and unexpected weight change.   HENT: Negative.  Negative for mouth dryness, mouth sores, nosebleeds, sore throat and trouble swallowing.    Eyes:  Negative for visual disturbance.   Respiratory:  Negative for apnea, cough, choking, chest tightness and shortness of breath.    Cardiovascular:  Negative for chest pain, palpitations and leg swelling.   Gastrointestinal:  Negative for abdominal  "distention, abdominal pain, blood in stool, change in bowel habit, constipation, diarrhea and change in bowel habit.   Genitourinary:  Negative for decreased urine volume, difficulty urinating, dysuria, frequency, hematuria, hot flashes and urgency.   Musculoskeletal:  Negative for arthralgias, back pain, myalgias and neck pain.   Integumentary:  Negative for breast mass, breast discharge and breast tenderness.   Neurological:  Negative for dizziness, syncope, weakness, numbness, headaches and memory loss.   Hematological:  Negative for adenopathy. Does not bruise/bleed easily.   Psychiatric/Behavioral:  Negative for confusion, hallucinations, sleep disturbance and suicidal ideas.    Breast: Negative for mass and tenderness             Vitals:    08/21/23 1113   BP: 116/78   BP Location: Left arm   Patient Position: Sitting   Pulse: 84   Resp: 18   Temp: 98.4 °F (36.9 °C)   TempSrc: Oral   SpO2: 95%   Weight: 110.3 kg (243 lb 3.2 oz)   Height: 5' 5" (1.651 m)        Physical Exam  Vitals and nursing note reviewed.   Constitutional:       General: She is not in acute distress.     Appearance: Normal appearance. She is well-developed.   HENT:      Head: Normocephalic and atraumatic.      Mouth/Throat:      Mouth: Mucous membranes are moist.   Eyes:      General: No scleral icterus.     Extraocular Movements: Extraocular movements intact.      Conjunctiva/sclera: Conjunctivae normal.      Pupils: Pupils are equal, round, and reactive to light.   Neck:      Vascular: No JVD.   Cardiovascular:      Rate and Rhythm: Normal rate and regular rhythm.      Heart sounds: No murmur heard.  Pulmonary:      Effort: Pulmonary effort is normal.      Breath sounds: Normal breath sounds. No wheezing or rhonchi.   Chest:      Chest wall: No deformity or tenderness.   Breasts:     Right: No swelling, mass, nipple discharge, skin change or tenderness.      Left: No swelling, mass, nipple discharge, skin change or tenderness. "   Abdominal:      General: Bowel sounds are normal. There is no distension.      Palpations: Abdomen is soft. There is no mass.      Tenderness: There is no abdominal tenderness.   Musculoskeletal:         General: No swelling or deformity.      Cervical back: Neck supple.   Lymphadenopathy:      Cervical: No cervical adenopathy.      Upper Body:      Right upper body: No supraclavicular or axillary adenopathy.      Left upper body: No supraclavicular or axillary adenopathy.      Lower Body: No right inguinal adenopathy. No left inguinal adenopathy.   Skin:     General: Skin is warm.      Coloration: Skin is not jaundiced.      Findings: No lesion or rash.      Nails: There is no clubbing.   Neurological:      General: No focal deficit present.      Mental Status: She is alert and oriented to person, place, and time.      Sensory: Sensation is intact.      Motor: Motor function is intact.      Gait: Gait is intact.   Psychiatric:         Attention and Perception: Attention normal.         Mood and Affect: Mood and affect normal.         Speech: Speech normal.         Behavior: Behavior is cooperative.         Thought Content: Thought content normal.         Cognition and Memory: Cognition normal.         Judgment: Judgment normal.       ECOG SCORE            Latest Reference Range & Units 01/19/22 08:57 04/08/22 09:51 04/20/22 13:53 08/04/22 10:04 01/11/23 13:50 01/31/23 07:28   WBC 4.5 - 11.5 x10(3)/mcL 5.1 6.2 6.2 6.9 10.8 9.4   RBC 4.20 - 5.40 x10(6)/mcL 4.81 5.10 4.61 4.60 4.79 4.73   Hemoglobin 12.0 - 16.0 gm/dL 12.6 13.4 12.0 12.1 11.8 (L) 11.4 (L)   Hematocrit 37.0 - 47.0 % 41.0 43.1 38.7 38.6 38.3 38.0   MCV 80.0 - 94.0 fL 85.2 84.5 83.9 83.9 80.0 80.3   MCH pg 26.2 26.3 26.0 26.3 (L) 24.6 24.1   MCHC 33.0 - 36.0 mg/dL 30.7 (L) 31.1 31.0 31.3 (L) 30.8 (L) 30.0 (L)   RDW 11.5 - 17.0 % 14.1 14.4 14.1 13.7 14.3 14.4   Platelets 130 - 400 x10(3)/mcL 121 (L) 115 108 135 195 172      Latest Reference Range & Units  01/31/23 07:28   Iron 50 - 170 ug/dL 44 (L)   TIBC 250 - 450 ug/dL 313   Iron Binding Capacity Unsaturated 70 - 310 ug/dL 269   Ferritin 4.63 - 204.00 ng/mL 45.39   Folate 7.0 - 31.4 ng/mL 7.5   Vitamin B-12 213 - 816 pg/mL 591   Iron Saturation 20 - 50 % 14 (L)       SPEP: No M-spike indicative of a monoclonal protein is identified.   RAFY:  Immunofixation shows a normal pattern of immunoglobulins.   No monoclonal bands are detected.    Latest Reference Range & Units 01/31/23 07:28   IgG 522.00 - 1,631.00 mg/dL 1,252.00   IgM 33.0 - 293.0 mg/dL 99.0   IgA 69.0 - 517.0 mg/dL 237.0     Kappa Free Light Chain 0.3300 - 1.94 mg/dL 7.38 High     Lambda Free Light Chain 0.5700 - 2.63 mg/dL 3.61 High     Kappa/Lambda FLC Ratio 0.2600 - 1.65 2.04 High     Comment: Elevated free light chain ratios between 1.66 and 3.00 may  occur due to polyclonal hypergammaglobulinemia or impaired   renal clearance. An isolated increased free light chain ratio in this range should be interpreted with caution, and clinical correlation is recommended.           Latest Reference Range & Units 01/31/23 07:28   Sodium 136 - 145 mmol/L 141   Potassium 3.5 - 5.1 mmol/L 4.6   Chloride 98 - 107 mmol/L 101   CO2 23 - 31 mmol/L 29   BUN 9.8 - 20.1 mg/dL 37.1 (H)   Creatinine 0.55 - 1.02 mg/dL 1.87 (H)   eGFR mls/min/1.73/m2 29   Glucose 82 - 115 mg/dL 140 (H)   Calcium 8.4 - 10.2 mg/dL 9.6   Alkaline Phosphatase 40 - 150 unit/L 99   PROTEIN TOTAL 5.8 - 7.6 gm/dL  5.8 - 7.6 gm/dL 7.1  7.1   Albumin 3.4 - 4.8 g/dL  3.4 - 4.8 g/dL 3.0 (L)  3.6   Albumin/Globulin Ratio 1.1 - 2.0 ratio  1.1 - 2.0 ratio 0.7 (L)  1.0 (L)   BILIRUBIN TOTAL <=1.5 mg/dL 0.5   AST 5 - 34 unit/L 13   ALT 0 - 55 unit/L 14   Globulin, Total 2.4 - 3.5 gm/dL  2.4 - 3.5 gm/dL 4.1 (H)  3.5               Assessment:       No diagnosis found.      Thrombocytopenia-resolved. Will follow counts closely as she has splenomegaly and this can affects her counts mostly platelets.  Iron  deficiency-mild  Folate deficiency-she will start taking folic acid at this time.         Plan:         Continue oral iron  Start Folic acid 1 mg daily.   Continue follow ups with PCP , nephrology and GI   RTC in 6 months with labs    Instructed to call office if any problems arise    Encouraged to call with questions or problems  The patient was given ample opportunity to ask questions and they were all answered to satisfaction; patient demonstrated understanding of what we discussed and is agreeable to plan.      LAURA Degroot

## 2023-08-25 ENCOUNTER — PATIENT MESSAGE (OUTPATIENT)
Dept: ADMINISTRATIVE | Facility: OTHER | Age: 68
End: 2023-08-25
Payer: MEDICARE

## 2023-08-25 DIAGNOSIS — E11.65 TYPE 2 DIABETES MELLITUS WITH HYPERGLYCEMIA, WITH LONG-TERM CURRENT USE OF INSULIN: ICD-10-CM

## 2023-08-25 DIAGNOSIS — Z79.4 TYPE 2 DIABETES MELLITUS WITH HYPERGLYCEMIA, WITH LONG-TERM CURRENT USE OF INSULIN: ICD-10-CM

## 2023-08-25 RX ORDER — INSULIN GLARGINE 300 U/ML
INJECTION, SOLUTION SUBCUTANEOUS
Qty: 6 ML | Refills: 0 | Status: SHIPPED | OUTPATIENT
Start: 2023-08-25 | End: 2023-09-14

## 2023-08-26 ENCOUNTER — PATIENT MESSAGE (OUTPATIENT)
Dept: ADMINISTRATIVE | Facility: OTHER | Age: 68
End: 2023-08-26
Payer: MEDICARE

## 2023-08-27 ENCOUNTER — PATIENT MESSAGE (OUTPATIENT)
Dept: ADMINISTRATIVE | Facility: OTHER | Age: 68
End: 2023-08-27
Payer: MEDICARE

## 2023-08-28 ENCOUNTER — ANESTHESIA (OUTPATIENT)
Dept: ENDOSCOPY | Facility: HOSPITAL | Age: 68
End: 2023-08-28
Payer: MEDICARE

## 2023-08-28 ENCOUNTER — HOSPITAL ENCOUNTER (OUTPATIENT)
Facility: HOSPITAL | Age: 68
Discharge: HOME | End: 2023-08-28
Attending: INTERNAL MEDICINE | Admitting: INTERNAL MEDICINE
Payer: MEDICARE

## 2023-08-28 ENCOUNTER — ANESTHESIA EVENT (OUTPATIENT)
Dept: ENDOSCOPY | Facility: HOSPITAL | Age: 68
End: 2023-08-28
Payer: MEDICARE

## 2023-08-28 DIAGNOSIS — K74.60 HEPATIC CIRRHOSIS, UNSPECIFIED HEPATIC CIRRHOSIS TYPE, UNSPECIFIED WHETHER ASCITES PRESENT: ICD-10-CM

## 2023-08-28 DIAGNOSIS — Z86.010 PERSONAL HISTORY OF COLONIC POLYPS: ICD-10-CM

## 2023-08-28 DIAGNOSIS — K63.89 MASS OF COLON: Primary | ICD-10-CM

## 2023-08-28 LAB — POCT GLUCOSE: 137 MG/DL (ref 70–110)

## 2023-08-28 PROCEDURE — D9220A PRA ANESTHESIA: ICD-10-PCS | Mod: ANES,,, | Performed by: STUDENT IN AN ORGANIZED HEALTH CARE EDUCATION/TRAINING PROGRAM

## 2023-08-28 PROCEDURE — D9220A PRA ANESTHESIA: Mod: ANES,,, | Performed by: STUDENT IN AN ORGANIZED HEALTH CARE EDUCATION/TRAINING PROGRAM

## 2023-08-28 PROCEDURE — 37000009 HC ANESTHESIA EA ADD 15 MINS: Performed by: INTERNAL MEDICINE

## 2023-08-28 PROCEDURE — 63600175 PHARM REV CODE 636 W HCPCS: Performed by: NURSE ANESTHETIST, CERTIFIED REGISTERED

## 2023-08-28 PROCEDURE — 45380 COLONOSCOPY AND BIOPSY: CPT | Mod: 74 | Performed by: INTERNAL MEDICINE

## 2023-08-28 PROCEDURE — 27201423 OPTIME MED/SURG SUP & DEVICES STERILE SUPPLY: Performed by: INTERNAL MEDICINE

## 2023-08-28 PROCEDURE — D9220A PRA ANESTHESIA: ICD-10-PCS | Mod: CRNA,,, | Performed by: NURSE ANESTHETIST, CERTIFIED REGISTERED

## 2023-08-28 PROCEDURE — 88305 TISSUE EXAM BY PATHOLOGIST: CPT | Mod: 59 | Performed by: INTERNAL MEDICINE

## 2023-08-28 PROCEDURE — D9220A PRA ANESTHESIA: Mod: CRNA,,, | Performed by: NURSE ANESTHETIST, CERTIFIED REGISTERED

## 2023-08-28 PROCEDURE — 25000003 PHARM REV CODE 250: Performed by: NURSE ANESTHETIST, CERTIFIED REGISTERED

## 2023-08-28 PROCEDURE — 45385 COLONOSCOPY W/LESION REMOVAL: CPT | Mod: 74 | Performed by: INTERNAL MEDICINE

## 2023-08-28 PROCEDURE — 37000008 HC ANESTHESIA 1ST 15 MINUTES: Performed by: INTERNAL MEDICINE

## 2023-08-28 PROCEDURE — 43202 ESOPHAGOSCOPY FLEX BIOPSY: CPT | Performed by: INTERNAL MEDICINE

## 2023-08-28 PROCEDURE — 45381 COLONOSCOPY SUBMUCOUS NJX: CPT | Mod: 74 | Performed by: INTERNAL MEDICINE

## 2023-08-28 RX ORDER — LIDOCAINE HYDROCHLORIDE 20 MG/ML
INJECTION INTRAVENOUS
Status: DISCONTINUED | OUTPATIENT
Start: 2023-08-28 | End: 2023-08-28

## 2023-08-28 RX ORDER — PROPOFOL 10 MG/ML
INJECTION, EMULSION INTRAVENOUS
Status: DISCONTINUED | OUTPATIENT
Start: 2023-08-28 | End: 2023-08-28

## 2023-08-28 RX ADMIN — SODIUM CHLORIDE, SODIUM GLUCONATE, SODIUM ACETATE, POTASSIUM CHLORIDE AND MAGNESIUM CHLORIDE: 526; 502; 368; 37; 30 INJECTION, SOLUTION INTRAVENOUS at 07:08

## 2023-08-28 RX ADMIN — PROPOFOL 30 MG: 10 INJECTION, EMULSION INTRAVENOUS at 07:08

## 2023-08-28 RX ADMIN — PROPOFOL 20 MG: 10 INJECTION, EMULSION INTRAVENOUS at 08:08

## 2023-08-28 RX ADMIN — PROPOFOL 30 MG: 10 INJECTION, EMULSION INTRAVENOUS at 08:08

## 2023-08-28 RX ADMIN — LIDOCAINE HYDROCHLORIDE 80 MG: 20 INJECTION INTRAVENOUS at 07:08

## 2023-08-28 NOTE — H&P
Gastroenterology     CC:  67-year-old female here for varus esophageal variceal screening.  She has a history of cirrhosis likely due to fatty liver disease abnormal workup was negative.  She denies any difficulty swallowing nausea or vomiting other alarm symptoms.  She does have a history of anemia.  She also here for colonoscopies and polyps in the past but denies any bleeding diarrhea the alarm symptoms.  Tolerated the prep for the procedure.    HPI 67 y.o. female      Past Medical History:   Diagnosis Date    Charcot's joint of foot, left     Chronic kidney disease, unspecified     Cirrhosis of liver without ascites     Depression     Diabetes mellitus, type II, insulin dependent     Diabetic neuropathy     GERD (gastroesophageal reflux disease)     H/O: hysterectomy     Hepatic steatosis     HLD (hyperlipidemia)     HTN (hypertension)     Other hyperlipidemia     Overactive bladder     Thrombocytopenia, unspecified     Type 2 diabetes mellitus with unspecified diabetic retinopathy without macular edema     Vitamin D deficiency        Past Surgical History:   Procedure Laterality Date    APPENDECTOMY      BREAST BIOPSY       SECTION      charcot-leyden crystals identification      CHOLECYSTECTOMY  2006    HEMORRHOID SURGERY      HYSTERECTOMY  1990    total    right foot surgery Right 2022       Social History     Tobacco Use    Smoking status: Every Day     Current packs/day: 0.50     Average packs/day: 0.5 packs/day for 40.0 years (20.0 ttl pk-yrs)     Types: Cigarettes     Passive exposure: Current    Smokeless tobacco: Never   Substance Use Topics    Alcohol use: Not Currently     Comment: occassionally    Drug use: Never       Family History   Problem Relation Age of Onset    Diabetes Mother     Alzheimer's disease Mother     Diabetes Father     Leukemia Father     Diabetes Sister     Liver cancer Sister     Diabetes Brother        Review of Systems  General ROS: negative for - chills,  "fever or weight loss  Psychological ROS: negative for - hallucination, depression or suicidal ideation  Ophthalmic ROS: negative for - blurry vision, photophobia or eye pain  ENT ROS: negative for - epistaxis, sore throat or rhinorrhea  Respiratory ROS: no cough, shortness of breath, or wheezing  Cardiovascular ROS: no chest pain or dyspnea on exertion  Gastrointestinal ROS: nl  Genito-Urinary ROS: no dysuria, trouble voiding, or hematuria  Musculoskeletal ROS: negative for - gait disturbance or muscular weakness  Neurological ROS: no syncope or seizures; no ataxia  Dermatological ROS: negative for pruritis, rash and jaundice    Physical Examination  /71 (BP Location: Left arm)   Pulse 92   Temp 96.8 °F (36 °C)   Resp 13   Ht 5' 5" (1.651 m)   Wt 108.9 kg (240 lb)   SpO2 99%   BMI 39.94 kg/m²   General appearance: alert, cooperative, no distress  HENT: Normocephalic, atraumatic, neck symmetrical, no nasal discharge   Eyes: conjunctivae/corneas clear, PERRL, EOM's intact  Lungs: clear to auscultation bilaterally, no dullness to percussion bilaterally  Heart: regular rate and rhythm without rub; no displacement of the PMI   Abdomen: nl  Extremities: extremities symmetric; no clubbing, cyanosis, or edema  Integument: Skin color, texture, turgor normal; no rashes; hair distrubution normal  Neurologic: Alert and oriented X 3, normal strength, normal coordination and gait  Psychiatric: no pressured speech; normal affect; no evidence of impaired cognition     Labs:      Imaging:     I have personally reviewed these images    Assessment:   Egd/colon     Plan:  procedure   "

## 2023-08-28 NOTE — PROVATION PATIENT INSTRUCTIONS
Discharge Summary/Instructions after an Endoscopic Procedure  Patient Name: Indu Azul  Patient MRN: [id number]  Patient YOB: 1955  Monday, August 28, 2023  Luis Amador MD  Dear patient,  As a result of recent federal legislation (The Federal Cures Act), you may   receive lab or pathology results from your procedure in your [facility]   account before your physician is able to contact you. Your physician or   their representative will relay the results to you with their   recommendations at their soonest availability.  Thank you,  RESTRICTIONS:  During your procedure today, you received medications for sedation.  These   medications may affect your judgment, balance and coordination.  Therefore,   for 24 hours, you have the following restrictions:   - DO NOT drive a car, operate machinery, make legal/financial decisions,   sign important papers or drink alcohol.    ACTIVITY:  Today: no heavy lifting, straining or running due to procedural   sedation/anesthesia.  The following day: return to full activity including work.  DIET:  Eat and drink normally unless instructed otherwise.     TREATMENT FOR COMMON SIDE EFFECTS:  - Mild abdominal pain, nausea, belching, bloating or excessive gas:  rest,   eat lightly and use a heating pad.  - Sore Throat: treat with throat lozenges and/or gargle with warm salt   water.  - Because air was used during the procedure, expelling large amounts of air   from your rectum or belching is normal.  - If a bowel prep was taken, you may not have a bowel movement for 1-3 days.    This is normal.  SYMPTOMS TO WATCH FOR AND REPORT TO YOUR PHYSICIAN:  1. Abdominal pain or bloating, other than gas cramps.  2. Chest pain.  3. Back pain.  4. Signs of infection such as: chills or fever occurring within 24 hours   after the procedure.  5. Rectal bleeding, which would show as bright red, maroon, or black stools.   (A tablespoon of blood from the rectum is not serious, especially  if   hemorrhoids are present.)  6. Vomiting.  7. Weakness or dizziness.  GO DIRECTLY TO THE NEAREST EMERGENCY ROOM IF YOU HAVE ANY OF THE FOLLOWING:      Difficulty breathing              Chills and/or fever over 101 F   Persistent vomiting and/or vomiting blood   Severe abdominal pain   Severe chest pain   Black, tarry stools   Bleeding- more than one tablespoon   Any other symptom or condition that you feel may need urgent attention  Your doctor recommends these additional instructions:  If any biopsies were taken, your doctors clinic will contact you in 1 to 2   weeks with any results.  small varices  gastritis  follow up the biopies  Repeat egd in one years  Varices to small for banding  Will hold off on beta blockers for now given there size  - Discharge patient to home (ambulatory).   - Discharge patient to home (ambulatory).  For questions, problems or results please call your physician - Luis Amador MD at Work:  [phone].  [facility] EMERGENCY ROOM PHONE NUMBER: [phone]  IF A COMPLICATION OR EMERGENCY SITUATION ARISES AND YOU ARE UNABLE TO REACH   YOUR PHYSICIAN - GO DIRECTLY TO THE EMERGENCY ROOM.  MD Luis Bird MD  8/28/2023 8:35:14 AM  This report has been verified and signed electronically.  Dear patient,  As a result of recent federal legislation (The Federal Cures Act), you may   receive lab or pathology results from your procedure in your [facility]   account before your physician is able to contact you. Your physician or   their representative will relay the results to you with their   recommendations at their soonest availability.  Thank you,  PROVATION

## 2023-08-28 NOTE — ANESTHESIA PREPROCEDURE EVALUATION
2023  Indu Azul is a 67 y.o., female.    Other Medical History   Chronic kidney disease, unspecified Diabetes mellitus, type II, insulin dependent   GERD (gastroesophageal reflux disease) Depression   Other hyperlipidemia Thrombocytopenia, unspecified   Vitamin D deficiency HLD (hyperlipidemia)   HTN (hypertension) Charcot's joint of foot, left   Overactive bladder Hepatic steatosis   Diabetic neuropathy Cirrhosis of liver without ascites   Type 2 diabetes mellitus with unspecified diabetic retinopathy without macular edema H/O: hysterectomy     Surgical History  right foot surgery BREAST BIOPSY   CHOLECYSTECTOMY HYSTERECTOMY   APPENDECTOMY  SECTION   HEMORRHOID SURGERY charcot-leyden crystals identification     Na 141, K 4.9, Cr 1.36  10 / 34 / 187k    Newly diagnosed liver cirrhosis 2/2 hepatitis C  Appears mostly euvolemic, but with some LE edema  Able to lie flat, no dyspnea  No obvious modifiable risk factors    Pre-op Assessment    I have reviewed the Patient Summary Reports.     I have reviewed the Nursing Notes. I have reviewed the NPO Status.   I have reviewed the Medications.     Review of Systems  Anesthesia Hx:   Denies Personal Hx of Anesthesia complications.   EENT/Dental:EENT/Dental Normal   Cardiovascular:   Hypertension    Pulmonary:  Pulmonary Normal    Renal/:   Chronic Renal Disease, CKD    Hepatic/GI:   GERD Liver Disease,    Neurological:   Neuromuscular Disease,    Endocrine:   Diabetes  Obesity / BMI > 30  Psych:   Psychiatric History depression          Physical Exam  General: Well nourished, Cooperative and Alert    Airway:  Mallampati: II   Mouth Opening: Normal  TM Distance: Normal  Tongue: Normal    Dental:  Intact    Chest/Lungs:  Normal Respiratory Rate    Heart:  Rate: Normal        Anesthesia Plan  Type of Anesthesia, risks & benefits  discussed:    Anesthesia Type: Gen Natural Airway  Intra-op Monitoring Plan: Standard ASA Monitors  Post Op Pain Control Plan: IV/PO Opioids PRN  Induction:  IV  Informed Consent: Informed consent signed with the Patient and all parties understand the risks and agree with anesthesia plan.  All questions answered.   ASA Score: 3  Day of Surgery Review of History & Physical: H&P Update referred to the surgeon/provider.    Ready For Surgery From Anesthesia Perspective.     .

## 2023-08-28 NOTE — DISCHARGE SUMMARY
[facility] Endoscopy  Discharge Note  Short Stay    Procedure(s) (LRB):  DOUBLE (N/A)  COLON (N/A)  COLONOSCOPY, WITH 1 OR MORE BIOPSIES  COLONOSCOPY, WITH DIRECTED SUBMUCOSAL INJECTION  COLONOSCOPY, WITH POLYPECTOMY USING SNARE      OUTCOME: Patient tolerated treatment/procedure well without complication and is now ready for discharge.    DISPOSITION: Home or Self Care    FINAL DIAGNOSIS:  <principal problem not specified>    FOLLOWUP: None    DISCHARGE INSTRUCTIONS:  No discharge procedures on file.      Clinical Reference Documents Added to Patient Instructions         Document    COLON POLYPECTOMY DISCHARGE INSTRUCTIONS (ENGLISH)    MODERATE SEDATION IN ADULTS DISCHARGE INSTRUCTIONS (ENGLISH)    UPPER GI ENDOSCOPY DISCHARGE INSTRUCTIONS (ENGLISH)            TIME SPENT ON DISCHARGE: 33 minutes

## 2023-08-28 NOTE — TRANSFER OF CARE
"Anesthesia Transfer of Care Note    Patient: Indu Azul    Procedure(s) Performed: Procedure(s) (LRB):  DOUBLE (N/A)  COLON (N/A)  COLONOSCOPY, WITH 1 OR MORE BIOPSIES  COLONOSCOPY, WITH DIRECTED SUBMUCOSAL INJECTION  COLONOSCOPY, WITH POLYPECTOMY USING SNARE    Patient location: GI    Anesthesia Type: MAC    Transport from OR: Transported from OR on room air with adequate spontaneous ventilation    Post pain: adequate analgesia    Post assessment: no apparent anesthetic complications and tolerated procedure well    Post vital signs: stable    Level of consciousness: awake, alert and responds to stimulation    Nausea/Vomiting: no nausea/vomiting    Complications: none    Transfer of care protocol was followed      Last vitals:   Visit Vitals  /71 (BP Location: Left arm)   Pulse 92   Temp 36 °C (96.8 °F)   Resp 13   Ht 5' 5" (1.651 m)   Wt 108.9 kg (240 lb)   SpO2 99%   BMI 39.94 kg/m²     "

## 2023-08-28 NOTE — PROVATION PATIENT INSTRUCTIONS
Discharge Summary/Instructions after an Endoscopic Procedure  Patient Name: Indu Azul  Patient MRN: [id number]  Patient YOB: 1955  Monday, August 28, 2023  Luis Amador MD  Dear patient,  As a result of recent federal legislation (The Federal Cures Act), you may   receive lab or pathology results from your procedure in your [facility]   account before your physician is able to contact you. Your physician or   their representative will relay the results to you with their   recommendations at their soonest availability.  Thank you,  RESTRICTIONS:  During your procedure today, you received medications for sedation.  These   medications may affect your judgment, balance and coordination.  Therefore,   for 24 hours, you have the following restrictions:   - DO NOT drive a car, operate machinery, make legal/financial decisions,   sign important papers or drink alcohol.    ACTIVITY:  Today: no heavy lifting, straining or running due to procedural   sedation/anesthesia.  The following day: return to full activity including work.  DIET:  Eat and drink normally unless instructed otherwise.     TREATMENT FOR COMMON SIDE EFFECTS:  - Mild abdominal pain, nausea, belching, bloating or excessive gas:  rest,   eat lightly and use a heating pad.  - Sore Throat: treat with throat lozenges and/or gargle with warm salt   water.  - Because air was used during the procedure, expelling large amounts of air   from your rectum or belching is normal.  - If a bowel prep was taken, you may not have a bowel movement for 1-3 days.    This is normal.  SYMPTOMS TO WATCH FOR AND REPORT TO YOUR PHYSICIAN:  1. Abdominal pain or bloating, other than gas cramps.  2. Chest pain.  3. Back pain.  4. Signs of infection such as: chills or fever occurring within 24 hours   after the procedure.  5. Rectal bleeding, which would show as bright red, maroon, or black stools.   (A tablespoon of blood from the rectum is not serious, especially  if   hemorrhoids are present.)  6. Vomiting.  7. Weakness or dizziness.  GO DIRECTLY TO THE NEAREST EMERGENCY ROOM IF YOU HAVE ANY OF THE FOLLOWING:      Difficulty breathing              Chills and/or fever over 101 F   Persistent vomiting and/or vomiting blood   Severe abdominal pain   Severe chest pain   Black, tarry stools   Bleeding- more than one tablespoon   Any other symptom or condition that you feel may need urgent attention  Your doctor recommends these additional instructions:  If any biopsies were taken, your doctors clinic will contact you in 1 to 2   weeks with any results.  Follow up biopsies  Repeat colon in 1 year due to the prep  Ct scan abdomen and pelvis and refer to surgeon  For questions, problems or results please call your physician - Luis Amador MD at Work:  [phone].  [facility] EMERGENCY ROOM PHONE NUMBER: [phone]  IF A COMPLICATION OR EMERGENCY SITUATION ARISES AND YOU ARE UNABLE TO REACH   YOUR PHYSICIAN - GO DIRECTLY TO THE EMERGENCY ROOM.  MD Luis Bird MD  8/28/2023 8:43:26 AM  This report has been verified and signed electronically.  Dear patient,  As a result of recent federal legislation (The Federal Cures Act), you may   receive lab or pathology results from your procedure in your [facility]   account before your physician is able to contact you. Your physician or   their representative will relay the results to you with their   recommendations at their soonest availability.  Thank you,  PROVATION

## 2023-08-29 VITALS
RESPIRATION RATE: 16 BRPM | SYSTOLIC BLOOD PRESSURE: 124 MMHG | TEMPERATURE: 97 F | HEART RATE: 84 BPM | WEIGHT: 240 LBS | BODY MASS INDEX: 39.99 KG/M2 | HEIGHT: 65 IN | OXYGEN SATURATION: 95 % | DIASTOLIC BLOOD PRESSURE: 74 MMHG

## 2023-08-29 DIAGNOSIS — F33.42 RECURRENT MAJOR DEPRESSIVE DISORDER, IN FULL REMISSION: ICD-10-CM

## 2023-08-29 RX ORDER — SERTRALINE HYDROCHLORIDE 50 MG/1
TABLET, FILM COATED ORAL
Qty: 90 TABLET | Refills: 0 | Status: SHIPPED | OUTPATIENT
Start: 2023-08-29 | End: 2024-02-01

## 2023-08-30 LAB — PSYCHE PATHOLOGY RESULT: NORMAL

## 2023-08-31 ENCOUNTER — PATIENT MESSAGE (OUTPATIENT)
Dept: ENDOSCOPY | Facility: HOSPITAL | Age: 68
End: 2023-08-31
Payer: MEDICARE

## 2023-08-31 ENCOUNTER — PATIENT MESSAGE (OUTPATIENT)
Dept: ADMINISTRATIVE | Facility: OTHER | Age: 68
End: 2023-08-31
Payer: MEDICARE

## 2023-09-01 ENCOUNTER — PATIENT MESSAGE (OUTPATIENT)
Dept: ADMINISTRATIVE | Facility: OTHER | Age: 68
End: 2023-09-01
Payer: MEDICARE

## 2023-09-01 ENCOUNTER — PATIENT MESSAGE (OUTPATIENT)
Dept: ENDOSCOPY | Facility: HOSPITAL | Age: 68
End: 2023-09-01
Payer: MEDICARE

## 2023-09-07 NOTE — PROGRESS NOTES
History & Physical    CHIEF COMPLAINT:  ASCENDING COLON MASS     History of Present Illness:  67 year-old-female referred by Dr. Amador.  Patient presented with iron deficiency anemia.  EGD performed, grade I-II esophageal varices found, too small to band.  Colonoscopy performed, an ulcerated malignant-appearing partially obstructing medium sized mass found in the ascending colon.  Biopsy and tattoo of the mass showed fragments of colonic mucosa, no evidence of dysplasia or malignancy.  Two other polyps were completely removed in the descending and sigmoid colon, pathology returned hyperplastic polyps.  CT abd/pel showed no acute findings.    Greater than 60 minutes was required for complete chart review, imaging review including interpretation of imaging, coordination with referring/other physicians, patient counseling regarding diagnosis/treatment plan, answering questions, medical decision making, and documentation.      Review of patient's allergies indicates:  No Known Allergies    Current Outpatient Medications   Medication Sig Dispense Refill    acetaminophen (TYLENOL) 500 MG tablet Take 500 mg by mouth every 6 (six) hours as needed.      atorvastatin (LIPITOR) 20 MG tablet Take 1 tablet (20 mg total) by mouth once daily. (Patient not taking: Reported on 8/25/2023) 90 tablet 3    dulaglutide (TRULICITY) 3 mg/0.5 mL pen injector Inject 3 mg into the skin every 7 days. 4 pen 11    ferrous sulfate (FEOSOL) 325 mg (65 mg iron) Tab tablet Take 1 tablet (325 mg total) by mouth 2 (two) times daily. 60 tablet 11    folic acid (FOLVITE) 1 MG tablet Take 1 tablet (1 mg total) by mouth once daily. 100 tablet 3    furosemide (LASIX) 40 MG tablet Take 40 mg by mouth once daily.      gabapentin (NEURONTIN) 300 MG capsule Take 2 capsules (600 mg total) by mouth every evening. 60 capsule 5    insulin aspart U-100 (NOVOLOG) 100 unit/mL injection Inject into the skin as needed.      insulin syringe-needle U-100 1 mL 31  gauge x 5/16 Syrg Inject 1 Syringe into the skin 3 (three) times daily with meals.      pantoprazole (PROTONIX) 40 MG tablet Take 40 mg by mouth as needed. for 90 days      sertraline (ZOLOFT) 50 MG tablet Take 1 tablet by mouth once daily 90 tablet 0    solifenacin (VESICARE) 10 MG tablet Take 10 mg by mouth once daily.      spironolactone (ALDACTONE) 50 MG tablet Take 50 mg by mouth once daily.      TOUJEO SOLOSTAR U-300 INSULIN 300 unit/mL (1.5 mL) InPn pen INJECT 79 UNITS SUBCUTANEOUSLY ONCE DAILY 6 mL 0     No current facility-administered medications for this visit.       Past Medical History:   Diagnosis Date    Charcot's joint of foot, left     Chronic kidney disease, unspecified     Cirrhosis of liver without ascites     Depression     Diabetes mellitus, type II, insulin dependent     Diabetic neuropathy     GERD (gastroesophageal reflux disease)     H/O: hysterectomy     Hepatic steatosis     HLD (hyperlipidemia)     HTN (hypertension)     Other hyperlipidemia     Overactive bladder     Thrombocytopenia, unspecified     Type 2 diabetes mellitus with unspecified diabetic retinopathy without macular edema     Vitamin D deficiency      Past Surgical History:   Procedure Laterality Date    APPENDECTOMY      BREAST BIOPSY       SECTION      charcot-leyden crystals identification      CHOLECYSTECTOMY      COLONOSCOPY N/A 2023    Procedure: COLON;  Surgeon: Luis Amador MD;  Location: [facility] ENDOSCOPY;  Service: Gastroenterology;  Laterality: N/A;    COLONOSCOPY, WITH 1 OR MORE BIOPSIES  2023    Procedure: COLONOSCOPY, WITH 1 OR MORE BIOPSIES;  Surgeon: Luis Amador MD;  Location: [facility] ENDOSCOPY;  Service: Gastroenterology;;    COLONOSCOPY, WITH DIRECTED SUBMUCOSAL INJECTION  2023    Procedure: COLONOSCOPY, WITH DIRECTED SUBMUCOSAL INJECTION;  Surgeon: Luis Amador MD;  Location: [facility] ENDOSCOPY;  Service: Gastroenterology;;  [facility]  Colon Tattoo    COLONOSCOPY, WITH POLYPECTOMY USING SNARE  8/28/2023    Procedure: COLONOSCOPY, WITH POLYPECTOMY USING SNARE;  Surgeon: Luis Amador MD;  Location: [facility] ENDOSCOPY;  Service: Gastroenterology;;    ESOPHAGOGASTRODUODENOSCOPY N/A 8/28/2023    Procedure: DOUBLE;  Surgeon: Luis Amador MD;  Location: [facility] ENDOSCOPY;  Service: Gastroenterology;  Laterality: N/A;    HEMORRHOID SURGERY      HYSTERECTOMY  1990    total    right foot surgery Right 02/01/2022     Family History   Problem Relation Age of Onset    Diabetes Mother     Alzheimer's disease Mother     Diabetes Father     Leukemia Father     Diabetes Sister     Liver cancer Sister     Diabetes Brother      Social History     Tobacco Use    Smoking status: Every Day     Current packs/day: 0.50     Average packs/day: 0.5 packs/day for 40.0 years (20.0 ttl pk-yrs)     Types: Cigarettes     Passive exposure: Current    Smokeless tobacco: Never   Substance Use Topics    Alcohol use: Not Currently     Comment: occassionally    Drug use: Never        Review of Systems:  Review of Systems   Constitutional:  Negative for appetite change, chills, diaphoresis and fever.   HENT:  Negative for congestion, drooling, ear discharge, ear pain and hearing loss.    Eyes:  Negative for discharge.   Respiratory:  Negative for apnea, cough, choking, chest tightness, shortness of breath and stridor.    Cardiovascular:  Negative for chest pain, palpitations and leg swelling.   Endocrine: Negative for cold intolerance and heat intolerance.   Genitourinary:  Negative for difficulty urinating, dyspareunia, dysuria and hematuria.   Musculoskeletal:  Negative for arthralgias, gait problem and joint swelling.   Skin:  Negative for color change and rash.   Neurological:  Negative for dizziness, tremors, seizures, syncope, facial asymmetry, speech difficulty, light-headedness, numbness and headaches.   Psychiatric/Behavioral:  Negative for agitation  and confusion.        OBJECTIVE:     Vital Signs (Most Recent)              Physical Exam:  Physical Exam  Constitutional:       General: She is not in acute distress.     Appearance: Normal appearance. She is not toxic-appearing.   HENT:      Head: Normocephalic and atraumatic.      Right Ear: External ear normal.      Left Ear: External ear normal.      Mouth/Throat:      Mouth: Mucous membranes are moist.   Eyes:      General: No scleral icterus.     Conjunctiva/sclera: Conjunctivae normal.      Pupils: Pupils are equal, round, and reactive to light.   Cardiovascular:      Rate and Rhythm: Normal rate and regular rhythm.      Pulses: Normal pulses.   Pulmonary:      Effort: Pulmonary effort is normal. No respiratory distress.      Breath sounds: Normal breath sounds. No stridor. No wheezing or rhonchi.   Abdominal:      General: Abdomen is flat. There is no distension.      Palpations: Abdomen is soft. There is no mass.      Tenderness: There is no abdominal tenderness. There is no guarding or rebound.      Hernia: No hernia is present.   Musculoskeletal:         General: No swelling or tenderness. Normal range of motion.      Cervical back: Normal range of motion and neck supple.      Right lower leg: No edema.      Left lower leg: No edema.   Skin:     General: Skin is warm.      Capillary Refill: Capillary refill takes less than 2 seconds.      Coloration: Skin is not jaundiced or pale.      Findings: No erythema or rash.   Neurological:      General: No focal deficit present.      Mental Status: She is alert and oriented to person, place, and time.      Gait: Gait normal.   Psychiatric:         Mood and Affect: Mood normal.         Behavior: Behavior normal.         Judgment: Judgment normal.           ASSESSMENT/PLAN:     Malignant-appearing partially obstructing mass of the ascending colon.  Biopsy at colonoscopy showed fragments of colonic mucosa, but photographs and description are consistent with colon  cancer.  The area is not amenable to endoscopic resection    Diagnosis, staging, prognosis and treatment guidelines for presumptive diagnosis of colon cancer were discussed with the patient in detail, all questions were addressed.Using visual aids and drawings, I described the anatomy and potential surgical procedures.    Schedule laparoscopic/robotic right hemicolectomy    The risks and benefits of the procedure were explained in detail using layman terms, including the pros and cons of any implant that may be used, all questions were addressed, the patient gives consent to proceed    Timothy Russ MD  Surgical Oncology  Complex General, Gastrointestinal and Hepatobiliary Surgery

## 2023-09-08 ENCOUNTER — OFFICE VISIT (OUTPATIENT)
Dept: SURGICAL ONCOLOGY | Facility: CLINIC | Age: 68
End: 2023-09-08
Payer: MEDICARE

## 2023-09-08 VITALS
BODY MASS INDEX: 40.22 KG/M2 | SYSTOLIC BLOOD PRESSURE: 116 MMHG | WEIGHT: 241.38 LBS | HEART RATE: 87 BPM | HEIGHT: 65 IN | DIASTOLIC BLOOD PRESSURE: 66 MMHG

## 2023-09-08 DIAGNOSIS — Z01.818 PREOP TESTING: ICD-10-CM

## 2023-09-08 DIAGNOSIS — C18.2 MALIGNANT NEOPLASM OF ASCENDING COLON: ICD-10-CM

## 2023-09-08 DIAGNOSIS — K63.89 COLONIC MASS: ICD-10-CM

## 2023-09-08 DIAGNOSIS — K63.89 MASS OF COLON: Primary | ICD-10-CM

## 2023-09-08 DIAGNOSIS — C18.2 MALIGNANT NEOPLASM OF ASCENDING COLON: Primary | ICD-10-CM

## 2023-09-08 DIAGNOSIS — K63.89 MASS OF COLON: ICD-10-CM

## 2023-09-08 PROCEDURE — 3074F PR MOST RECENT SYSTOLIC BLOOD PRESSURE < 130 MM HG: ICD-10-PCS | Mod: CPTII,S$GLB,, | Performed by: SURGERY

## 2023-09-08 PROCEDURE — 99205 PR OFFICE/OUTPT VISIT, NEW, LEVL V, 60-74 MIN: ICD-10-PCS | Mod: S$GLB,,, | Performed by: SURGERY

## 2023-09-08 PROCEDURE — 3288F FALL RISK ASSESSMENT DOCD: CPT | Mod: CPTII,S$GLB,, | Performed by: SURGERY

## 2023-09-08 PROCEDURE — 3052F HG A1C>EQUAL 8.0%<EQUAL 9.0%: CPT | Mod: CPTII,S$GLB,, | Performed by: SURGERY

## 2023-09-08 PROCEDURE — 99999 PR PBB SHADOW E&M-EST. PATIENT-LVL III: ICD-10-PCS | Mod: PBBFAC,,, | Performed by: SURGERY

## 2023-09-08 PROCEDURE — 3052F PR MOST RECENT HEMOGLOBIN A1C LEVEL 8.0 - < 9.0%: ICD-10-PCS | Mod: CPTII,S$GLB,, | Performed by: SURGERY

## 2023-09-08 PROCEDURE — 1126F AMNT PAIN NOTED NONE PRSNT: CPT | Mod: CPTII,S$GLB,, | Performed by: SURGERY

## 2023-09-08 PROCEDURE — 1159F PR MEDICATION LIST DOCUMENTED IN MEDICAL RECORD: ICD-10-PCS | Mod: CPTII,S$GLB,, | Performed by: SURGERY

## 2023-09-08 PROCEDURE — 3008F BODY MASS INDEX DOCD: CPT | Mod: CPTII,S$GLB,, | Performed by: SURGERY

## 2023-09-08 PROCEDURE — 4010F PR ACE/ARB THEARPY RXD/TAKEN: ICD-10-PCS | Mod: CPTII,S$GLB,, | Performed by: SURGERY

## 2023-09-08 PROCEDURE — 99999 PR PBB SHADOW E&M-EST. PATIENT-LVL III: CPT | Mod: PBBFAC,,, | Performed by: SURGERY

## 2023-09-08 PROCEDURE — 3074F SYST BP LT 130 MM HG: CPT | Mod: CPTII,S$GLB,, | Performed by: SURGERY

## 2023-09-08 PROCEDURE — 1126F PR PAIN SEVERITY QUANTIFIED, NO PAIN PRESENT: ICD-10-PCS | Mod: CPTII,S$GLB,, | Performed by: SURGERY

## 2023-09-08 PROCEDURE — 1101F PT FALLS ASSESS-DOCD LE1/YR: CPT | Mod: CPTII,S$GLB,, | Performed by: SURGERY

## 2023-09-08 PROCEDURE — 3078F PR MOST RECENT DIASTOLIC BLOOD PRESSURE < 80 MM HG: ICD-10-PCS | Mod: CPTII,S$GLB,, | Performed by: SURGERY

## 2023-09-08 PROCEDURE — 3078F DIAST BP <80 MM HG: CPT | Mod: CPTII,S$GLB,, | Performed by: SURGERY

## 2023-09-08 PROCEDURE — 3288F PR FALLS RISK ASSESSMENT DOCUMENTED: ICD-10-PCS | Mod: CPTII,S$GLB,, | Performed by: SURGERY

## 2023-09-08 PROCEDURE — 3061F PR NEG MICROALBUMINURIA RESULT DOCUMENTED/REVIEW: ICD-10-PCS | Mod: CPTII,S$GLB,, | Performed by: SURGERY

## 2023-09-08 PROCEDURE — 4010F ACE/ARB THERAPY RXD/TAKEN: CPT | Mod: CPTII,S$GLB,, | Performed by: SURGERY

## 2023-09-08 PROCEDURE — 1159F MED LIST DOCD IN RCRD: CPT | Mod: CPTII,S$GLB,, | Performed by: SURGERY

## 2023-09-08 PROCEDURE — 3008F PR BODY MASS INDEX (BMI) DOCUMENTED: ICD-10-PCS | Mod: CPTII,S$GLB,, | Performed by: SURGERY

## 2023-09-08 PROCEDURE — 1101F PR PT FALLS ASSESS DOC 0-1 FALLS W/OUT INJ PAST YR: ICD-10-PCS | Mod: CPTII,S$GLB,, | Performed by: SURGERY

## 2023-09-08 PROCEDURE — 99205 OFFICE O/P NEW HI 60 MIN: CPT | Mod: S$GLB,,, | Performed by: SURGERY

## 2023-09-08 PROCEDURE — 3061F NEG MICROALBUMINURIA REV: CPT | Mod: CPTII,S$GLB,, | Performed by: SURGERY

## 2023-09-08 PROCEDURE — 3066F PR DOCUMENTATION OF TREATMENT FOR NEPHROPATHY: ICD-10-PCS | Mod: CPTII,S$GLB,, | Performed by: SURGERY

## 2023-09-08 PROCEDURE — 3066F NEPHROPATHY DOC TX: CPT | Mod: CPTII,S$GLB,, | Performed by: SURGERY

## 2023-09-08 RX ORDER — ALVIMOPAN 12 MG/1
12 CAPSULE ORAL ONCE
Status: CANCELLED | OUTPATIENT
Start: 2023-10-19 | End: 2023-10-25

## 2023-09-08 RX ORDER — ENOXAPARIN SODIUM 300 MG/3ML
30 INJECTION INTRAVENOUS; SUBCUTANEOUS
Status: CANCELLED | OUTPATIENT
Start: 2023-10-19 | End: 2023-10-19

## 2023-09-11 ENCOUNTER — PATIENT MESSAGE (OUTPATIENT)
Dept: ADMINISTRATIVE | Facility: HOSPITAL | Age: 68
End: 2023-09-11
Payer: MEDICARE

## 2023-09-14 DIAGNOSIS — E11.65 TYPE 2 DIABETES MELLITUS WITH HYPERGLYCEMIA, WITH LONG-TERM CURRENT USE OF INSULIN: ICD-10-CM

## 2023-09-14 DIAGNOSIS — Z79.4 TYPE 2 DIABETES MELLITUS WITH HYPERGLYCEMIA, WITH LONG-TERM CURRENT USE OF INSULIN: ICD-10-CM

## 2023-09-14 RX ORDER — INSULIN GLARGINE 300 U/ML
INJECTION, SOLUTION SUBCUTANEOUS
Qty: 6 ML | Refills: 0 | Status: SHIPPED | OUTPATIENT
Start: 2023-09-14 | End: 2023-09-29

## 2023-09-15 ENCOUNTER — PATIENT OUTREACH (OUTPATIENT)
Dept: ADMINISTRATIVE | Facility: HOSPITAL | Age: 68
End: 2023-09-15
Payer: MEDICARE

## 2023-09-15 ENCOUNTER — TELEPHONE (OUTPATIENT)
Dept: FAMILY MEDICINE | Facility: CLINIC | Age: 68
End: 2023-09-15
Payer: MEDICARE

## 2023-09-15 DIAGNOSIS — E11.65 TYPE 2 DIABETES MELLITUS WITH HYPERGLYCEMIA, WITH LONG-TERM CURRENT USE OF INSULIN: Primary | ICD-10-CM

## 2023-09-15 DIAGNOSIS — Z79.4 TYPE 2 DIABETES MELLITUS WITH HYPERGLYCEMIA, WITH LONG-TERM CURRENT USE OF INSULIN: Primary | ICD-10-CM

## 2023-09-15 DIAGNOSIS — E78.5 HYPERLIPIDEMIA ASSOCIATED WITH TYPE 2 DIABETES MELLITUS: ICD-10-CM

## 2023-09-15 DIAGNOSIS — E11.59 HYPERTENSION ASSOCIATED WITH TYPE 2 DIABETES MELLITUS: ICD-10-CM

## 2023-09-15 DIAGNOSIS — I15.2 HYPERTENSION ASSOCIATED WITH TYPE 2 DIABETES MELLITUS: ICD-10-CM

## 2023-09-15 DIAGNOSIS — E11.69 HYPERLIPIDEMIA ASSOCIATED WITH TYPE 2 DIABETES MELLITUS: ICD-10-CM

## 2023-09-15 NOTE — TELEPHONE ENCOUNTER
----- Message from Jenelle Franklin MA sent at 9/15/2023  8:51 AM CDT -----  Regarding: Lab order  Please order an A1c and other labs the patient may need.

## 2023-09-15 NOTE — PROGRESS NOTES
Population Health Chart Review & Patient Outreach Details:     Reason for Outreach Encounter:     []  Non-Compliant Report   []  Payor Report ([employer], PHN, BCBS, MSSP, MCIP, UHC, etc.)   [x]  Campaign Message     Updates Requested / Reviewed:     []  Care Everywhere    []     []  External Sources ([facility], [facility], DIS, etc.)   []  Care Team Updated    Patient Outreach Method:    []  Telephone Outreach Completed   [] Successful   [] Left Voicemail   [] Unable to Contact (wrong number, no voicemail)  [x]  [facility] Portal Outreach Sent  []  Letter Outreach Mailed  []  Fax Sent for External Records  []  External Records Upload    Health Maintenance Topics Addressed and Outreach Outcomes / Actions Taken:        []      Breast Cancer Screening []  Mammo Scheduled      []  External Records Requested     []  Added Reminder to Complete to Upcoming Primary Care Appt Notes     []  Patient Declined     []  Patient Will Call Back to Schedule     []  Patient Will Schedule with External Provider / Order Routed if Applicable             []       Cervical Cancer Screening []  Pap Scheduled      []  External Records Requested     []  Added Reminder to Complete to Upcoming Primary Care Appt Notes     []  Patient Declined     []  Patient Will Call Back to Schedule     []  Patient Will Schedule with External Provider               []          Colorectal Cancer Screening []  Colonoscopy Case Request or Referral Placed     []  External Records Requested     []  Added Reminder to Complete to Upcoming Primary Care Appt Notes     []  Patient Declined     []  Patient Will Call Back to Schedule     []  Patient Will Schedule with External Provider     []  Fit Kit Mailed (add the SmartPhrase under additional notes)     []  Reminded Patient to Complete Home Test             []      Diabetic Eye Exam []  Eye Camera Scheduled or Optometry Referral Placed     []  External Records Requested     []  Added Reminder to Complete to Upcoming  Primary Care Appt Notes     []  Patient Declined     []  Patient Will Call Back to Schedule     []  Patient Will Schedule with External Provider             []      Blood Pressure Control []  Primary Care Follow Up Visit Scheduled     []  Remote Blood Pressure Reading Captured     []  Added Reminder to Complete to Upcoming Primary Care Appt Notes     []  Patient Declined     []  Patient Will Call Back / Patient Will Send Portal Message with Reading     []  Patient Will Call Back to Schedule Provider Visit             [x]       HbA1c & Other Labs []  Labs Ordered     []  Primary Care Follow Up Visit Scheduled      []  Reminded Patient to Complete Home Test     []  Added Reminder to Complete to Upcoming Primary Care Appt Notes     []  Patient Declined     []  Patient Will Call Back to Schedule     []  Patient Will Schedule with External Provider / Order Routed if Applicable           []    Schedule Primary Care Appt []  Primary Care Appt Scheduled     []  Patient Declined     []  Patient Will Call Back to Schedule     []  Pt Established with External Provider & Updated Care Team             []      Medication Adherence []  Primary Care Appointment Scheduled     []  Added Reminder to Upcoming Primary Care Appt Notes     []  Patient Reminded to  Prescription     []  Patient Declined, Provider Notified if Needed     []  Sent Provider Message to Review and/or Add Exclusion to Problem List             []      Osteoporosis Screening []  DXA Appointment Scheduled     []  External Records Requested     []  Added Reminder to Complete to Upcoming Primary Care Appt Notes     []  Patient Declined     []  Patient Will Call Back to Schedule     []  Patient Will Schedule with External Provider / Order Routed if Applicable     Additional Care Coordinator Notes:     A1C and other labs have been ordered. Patient notified through portal.

## 2023-09-18 PROBLEM — Z00.00 WELLNESS EXAMINATION: Status: RESOLVED | Noted: 2023-06-19 | Resolved: 2023-09-18

## 2023-09-20 DIAGNOSIS — Z79.4 TYPE 2 DIABETES MELLITUS WITH HYPERGLYCEMIA, WITH LONG-TERM CURRENT USE OF INSULIN: Primary | ICD-10-CM

## 2023-09-20 DIAGNOSIS — E11.65 TYPE 2 DIABETES MELLITUS WITH HYPERGLYCEMIA, WITH LONG-TERM CURRENT USE OF INSULIN: Primary | ICD-10-CM

## 2023-09-26 ENCOUNTER — CLINICAL SUPPORT (OUTPATIENT)
Dept: DIABETES | Facility: CLINIC | Age: 68
End: 2023-09-26
Payer: MEDICARE

## 2023-09-26 VITALS — BODY MASS INDEX: 38.7 KG/M2 | WEIGHT: 232.31 LBS | HEIGHT: 65 IN

## 2023-09-26 DIAGNOSIS — E11.65 TYPE 2 DIABETES MELLITUS WITH HYPERGLYCEMIA, WITH LONG-TERM CURRENT USE OF INSULIN: ICD-10-CM

## 2023-09-26 DIAGNOSIS — Z79.4 TYPE 2 DIABETES MELLITUS WITH HYPERGLYCEMIA, WITH LONG-TERM CURRENT USE OF INSULIN: ICD-10-CM

## 2023-09-26 PROCEDURE — G0108 DIAB MANAGE TRN  PER INDIV: HCPCS | Mod: ,,, | Performed by: FAMILY MEDICINE

## 2023-09-26 PROCEDURE — G0108 PR DIAB MANAGE TRN  PER INDIV: ICD-10-PCS | Mod: ,,, | Performed by: FAMILY MEDICINE

## 2023-09-26 NOTE — PROGRESS NOTES
"Diabetes Care Specialist Progress Note  Author: Trinity Shay RN  Date: 9/26/2023    Program Intake  Reason for Diabetes Program Visit:: Initial Diabetes Assessment  Current diabetes risk level:: high  In the last 12 months, have you:: none  Permission to speak with others about care:: yes    Lab Results   Component Value Date    HGBA1C 8.3 (H) 06/15/2023       Clinical    Weight: 105.4 kg (232 lb 4.8 oz)   Height: 5' 5" (165.1 cm)   Body mass index is 38.66 kg/m².    Patient Health Rating  Compared to other people your age, how would you rate your health?: Fair    Problem Review  Reviewed Problem List with Patient: yes  Active comorbidities affecting diabetes self-care.: no    Clinical Assessment  Current Diabetes Treatment: Injectable, Insulin  Have you ever experienced hypoglycemia (low blood sugar)?: yes  In the last month, how often have you experienced low blood sugar?: once a day  Are you able to tell when your blood sugar is low?: Yes  What symptoms do you experience?: Dizzy/Light-headed, Shaky, Sweaty, Tiredness  Have you ever been hospitalized because your blood sugar was too low?: no  How do you treat hypoglycemia (low blood sugar)?: 1/2 can soda/fruit juice  Have you ever experienced hyperglycemia (high blood sugar)?: yes  In the last month, how often have you experienced high blood sugar?: other (see comments) (Nothing over 200 in the last 4 weeks.)  Are you able to tell when your blood sugar is high?: Yes  What are your symptoms?: fatigue, thirst  Have you ever been hospitalized because your blood sugar was high?: no    Medication Information  How many days a week do you miss your medications?: Never  Do you use a pill box or medication chart to help you manage your medications?: No  Do you sometimes have difficulty refilling your medications?: No  Medication adherence impacting ability to self-manage diabetes?: No    Labs  Do you have regular lab work to monitor your medications?: Yes  Type of " Regular Lab Work: A1c  Lab Compliance Barriers: No    Nutritional Status  Diet: Diabetic diet  Change in appetite?: No  Dentation:: Intact  Recent Changes in Weight: No Recent Weight Change  Current nutritional status an area of need that is impacting patient's ability to self-manage diabetes?: No    Additional Social History    Support  Does anyone support you with your diabetes care?: yes  Who supports you?: son/daughter, family member, self  Who takes you to your medical appointments?: son/daughter, self  Does the current support meet the patient's needs?: Yes  Is Support an area impacting ability to self-manage diabetes?: No    Access to Mass Media & Technology  Does the patient have access to any of the following devices or technologies?: Smart phone  Media or technology needs impacting ability to self-manage diabetes?: No    Cognitive/Behavioral Health  Alert and Oriented: Yes  Difficulty Thinking: No  Requires Prompting: No  Requires assistance for routine expression?: No  Cognitive or behavioral barriers impacting ability to self-manage diabetes?: No    Culture/Mormonism  Culture or Sikhism beliefs that may impact ability to access healthcare: No    Communication  Language preference: English  Hearing Problems: No  Vision Problems: No  Communication needs impacting ability to self-manage diabetes?: No    Health Literacy  Preferred Learning Method: Face to Face, Web Based, Reading Materials, Hands On  How often do you need to have someone help you read instructions, pamphlets, or written material from your doctor or pharmacy?: Sometimes  Health literacy needs impacting ability to self-manage diabetes?: No      Diabetes Self-Management Skills Assessment    Diabetes Disease Process/Treatment Options  Patient/caregiver able to state what happens when someone has diabetes.: yes  Patient/caregiver knows what type of diabetes they have.: yes  Diabetes Type : Type II  Diabetes Disease Process/Treatment Options:  Skills Assessment Completed: No  Deferred due to:: Time    Nutrition/Healthy Eating  Challenges to healthy eating:: other (see comments) (skipping meals day time and large supper night time)  Method of carbohydrate measurement:: no method  Patient can identify foods that impact blood sugar.: yes  Patient-identified foods:: milk, starches (bread, pasta, rice, cereal)  Nutrition/Healthy Eating Skills Assessment Completed:: Yes  Assessment indicates:: Knowledge deficit, Instruction Needed  Area of need?: Yes    Physical Activity/Exercise  Patient's daily activity level:: sedentary  Patient formally exercises outside of work.: no  Reasons for not exercising:: physically unable to exercise currently  Patient can identify forms of physical activity.: no  Patient can identify reasons why exercise/physical activity is important in diabetes management.: no  Physical Activity/Exercise Skills Assessment Completed: : Yes  Assessment indicates:: Knowledge deficit, Instruction Needed  Area of need?: Yes    Medications  Patient is able to describe current diabetes management routine.: yes  Diabetes management routine:: injectable medications, insulin (Toujeo 80units Qhs, Trulicity 3mg Every saturday Novolog ss >200)  Patient is able to identify current diabetes medications, dosages, and appropriate timing of medications.: yes  Patient understands the purpose of the medications taken for diabetes.: no  Patient reports problems or concerns with current medication regimen.: yes  Medication regimen problems/concerns:: concerned about side effects  Medication Skills Assessment Completed:: Yes  Assessment indicates:: Knowledge deficit, Instruction Needed  Area of need?: Yes    Home Blood Glucose Monitoring  Patient states that blood sugar is checked at home daily.: yes  Monitoring Method:: home glucometer, personal continuous glucose monitor  How often do you check your blood sugar?: Once a day  When do you check your blood sugar?:  Before breakfast  When you check what is your typical blood sugar range? : 104-180  Blood glucose logs:: no  Personal CGM type:: Dexcom G7 - New start up today  CGM Report reviewed?: no  Home Blood Glucose Monitoring Skills Assessment Completed: : Yes  Assessment indicates:: Knowledge deficit, Instruction Needed  Area of need?: Yes    Acute Complications  Patient is able to identify types of acute complications: Yes  Patient Identified:: Hypoglycemia  Patient is able to state the basic meaning of hypoglycemia?: Yes  Able to state the blood sugar range for hypoglycemia?: no (see comments)  Patient can identify general symptoms of hypoglycemia: yes  Patient identified:: dizziness, shakiness, sweating, other (see comments) (weakness and feels like passing out)  Able to state proper treatment of hypoglycemia?: yes  Patient identified:: 1/2 can soda/fruit juice, 5-6 pieces of hard candy  Acute Complications Skills Assessment Completed: : Yes  Assessment indicates:: Knowledge deficit, Instruction Needed  Area of need?: Yes    Chronic Complications  Chronic Complications Skills Assessment Completed: : No  Deferred due to:: Time    Psychosocial/Coping  Patient can identify ways of coping with chronic disease.: yes  Patient-stated ways of coping with chronic disease:: support from loved ones  Psychosocial/Coping Skills Assessment Completed: : Yes  Assessment indicates:: Adequate understanding  Area of need?: No      Assessment Summary and Plan    Based on today's diabetes care assessment, the following areas of need were identified:          9/26/2023    12:01 AM   Social   Support No   Access to Mass Media/Tech No   Cognitive/Behavioral Health No   Culture/Catholic No   Communication No   Health Literacy No            9/26/2023    12:01 AM   Clinical   Medication Adherence No   Lab Compliance No   Nutritional Status No            9/26/2023    12:01 AM   Diabetes Self-Management Skills   Nutrition/Healthy Eating Yes see care  plan    Physical Activity/Exercise Yes see care plan    Medication Yes Discussed MOA, onset, side effects, dosage of Toujeo and  trulicity has not used novolog in the last 4 weeks.    Home Blood Glucose Monitoring Yes see care plan    Acute Complications Yes Discussed prevention, identification signs/symptoms and treatment of hyperglycemia and hypoglycemia and when to contact clinic. Reviewed sick days.    Psychosocial/Coping No son at side for support          Today's interventions were provided through individual discussion, instruction, and written materials were provided.      Patient verbalized understanding of instruction and written materials.  Pt was able to return back demonstration of instructions today. Patient understood key points, needs reinforcement and further instruction.     Diabetes Self-Management Care Plan:    Today's Diabetes Self-Management Care Plan was developed with Indu's input. Indu has agreed to work toward the following goal(s) to improve his/her overall diabetes control.      Care Plan: Diabetes Management   Updates made since 8/27/2023 12:00 AM        Problem: Healthy Eating         Long-Range Goal: Pt agrees to pair carbohydrate with protein for meals or snacks    Start Date: 9/26/2023   Priority: Medium   Barriers: Knowledge deficit   Note:    Review the importance of balancing carbohydrates with each meal using portion control techniques to count servings of carbohydrate.  Discussed food exchange list and snack ideas as well as pairing carbs with protein. She does drink one 4oz serving of coke per day.  Discussed stopping or using during the day when not eating much instead of with larger meal at supper       Task: Provided visual examples using dry measuring cups, food models, and other familiar objects such as computer mouse, deck or cards, tennis ball etc. to help with visualization of portions. Completed 9/26/2023        Task: Recommended replacing beverages containing  high sugar content with noncaloric/sugar free options and/or water. Completed 9/26/2023        Problem: Blood Glucose Self-Monitoring         Long-Range Goal: Patient agrees to use dexcom G7 to monitor glucose and return in 2 weeks to review patterns.    Start Date: 9/26/2023   Priority: High   Barriers: Knowledge deficit   Note:    Personal, patient owned continuous glucose monitor brought to clinic for education/set up.      CGM Type: Dexcom G7  to clinic 9/26/2023 for set up and education.     Reviewed Sensor and Reciever. Patient was successfully able to return demonstrate proper preparation of skin, insertion on sensor, inserting sensor and codes, troubleshooting, 20 min warm up, set up high and low sensors, removing and replacing sensor every 10 days, overlay patches as needed.  Discussed trend arrows and predictive patterns. Discussed lag time with interstitial fluid glucose vs blood glucose, instructed to do blood glucose anytime in doubt with sensor reading or if glucose shows low. Patient voiced understanding to all instructions.  Son at side to help with technology but pt. Was able to apply sensor and change alarm settings with little assistance. Overlay patch applied.                Problem: Physical Activity and Exercise         Long-Range Goal: Patient agrees to increase physical activity to a goal of 3 times per week for 15 minutes.    Start Date: 9/26/2023   Priority: Low   Barriers: Knowledge deficit; Physical Limitations   Note:    Had foot surgery and uses cane unable to walk far. Discussed using light arm weights like water bottles or stretch bands. She is willing to try this.        Task: Discussed role of physical activity on reducing insulin resistance and improvement in overall glycemic control. Completed 9/26/2023        Task: Discussed role of physical activity as it relates to weight loss Completed 9/26/2023        Task: Offered suggestions on how patient could increase their regular  physical activity Completed 9/26/2023        Task: Reviewed blood glucose monitoring before, during and after exercise/activity Completed 9/26/2023          Follow Up Plan     Follow up in about 2 weeks (around 10/10/2023) for pattern management . Appt made with pt. Today for October 10 at 12:30.  Plan of downloading agp and reviewing patterns prior to surgery on the 19th.      Today's care plan and follow up schedule was discussed with patient.  Indu verbalized understanding of the care plan, goals, and agrees to follow up plan.        The patient was encouraged to communicate with his/her health care provider/physician and care team regarding his/her condition(s) and treatment.  I provided the patient with my contact information today and encouraged to contact me via phone or [facility] Patient Portal as needed.     Length of Visit   Total Time: 90 Minutes

## 2023-09-29 DIAGNOSIS — E11.65 TYPE 2 DIABETES MELLITUS WITH HYPERGLYCEMIA, WITH LONG-TERM CURRENT USE OF INSULIN: ICD-10-CM

## 2023-09-29 DIAGNOSIS — Z79.4 TYPE 2 DIABETES MELLITUS WITH HYPERGLYCEMIA, WITH LONG-TERM CURRENT USE OF INSULIN: ICD-10-CM

## 2023-09-29 RX ORDER — INSULIN GLARGINE 300 U/ML
INJECTION, SOLUTION SUBCUTANEOUS
Qty: 6 ML | Refills: 0 | Status: SHIPPED | OUTPATIENT
Start: 2023-09-29 | End: 2023-10-18

## 2023-10-10 ENCOUNTER — CLINICAL SUPPORT (OUTPATIENT)
Dept: DIABETES | Facility: CLINIC | Age: 68
End: 2023-10-10
Payer: MEDICARE

## 2023-10-10 ENCOUNTER — TELEPHONE (OUTPATIENT)
Dept: DIABETES | Facility: CLINIC | Age: 68
End: 2023-10-10

## 2023-10-10 VITALS — HEIGHT: 65 IN | BODY MASS INDEX: 39.32 KG/M2 | WEIGHT: 236 LBS

## 2023-10-10 DIAGNOSIS — Z79.4 TYPE 2 DIABETES MELLITUS WITH HYPERGLYCEMIA, WITH LONG-TERM CURRENT USE OF INSULIN: Primary | ICD-10-CM

## 2023-10-10 DIAGNOSIS — E11.65 TYPE 2 DIABETES MELLITUS WITH HYPERGLYCEMIA, WITH LONG-TERM CURRENT USE OF INSULIN: Primary | ICD-10-CM

## 2023-10-10 PROCEDURE — G0108 DIAB MANAGE TRN  PER INDIV: HCPCS | Mod: ,,, | Performed by: FAMILY MEDICINE

## 2023-10-10 PROCEDURE — G0108 PR DIAB MANAGE TRN  PER INDIV: ICD-10-PCS | Mod: ,,, | Performed by: FAMILY MEDICINE

## 2023-10-10 NOTE — TELEPHONE ENCOUNTER
Pt. In office today for dm f/u.  We reviewed her Dexcom G7 AGP report.  She is having pattens of night time highs and has ss novolog >200.  Could we consider changing this to >150 so she can take at least 2 units prior to supper to prevent the highs.  She is open to trying this.  She is due to see you in office on 10/30 but will have surgery on 10/19.  See attached report. Please advise.  Thank you

## 2023-10-10 NOTE — PROGRESS NOTES
"Diabetes Care Specialist Progress Note  Author: Trinity Shay RN  Date: 10/10/2023    Program Intake  Reason for Diabetes Program Visit:: Intervention  Type of Intervention:: Individual  Individual: Education  Education: Pattern Management  Current diabetes risk level:: high  In the last 12 months, have you:: none    Lab Results   Component Value Date    HGBA1C 8.3 (H) 06/15/2023       Clinical    Weight: 107 kg (236 lb)   Height: 5' 5" (165.1 cm)   Body mass index is 39.27 kg/m².     Medication Information  How do you obtain your medications?: Patient drives    Diabetes Self-Management Skills Assessment    Diabetes Disease Process/Treatment Options  Patient/caregiver able to identify at least three signs and symptoms of diabetes.: yes  Identified signs and symptoms:: fatigue, increased thirst, frequent urination  Patient able to identify at least three risk factors for diabetes.: yes  Identified risk factors:: reduced activity, family history, being overweight, age over 40  Diabetes Disease Process/Treatment Options: Skills Assessment Completed: Yes  Assessment indicates:: Adequate understanding  Area of need?: No    Medications  Diabetes management routine:: insulin, injectable medications (Toujeo 80 units Qhs, Trulicity 3mg every saturday and novolog ss>200)     Chronic Complications  Patient can identify major chronic complications of diabetes.: no  Patient can identify ways to prevent or delay diabetes complications.: yes  Stated ways to prevent complications:: controlling blood sugar, controlling cholesterol and triglycerides, maintaining optimal blood glucose control  Patient is aware that having diabetes increases risk of heart disease?: No  Patient is aware that heart disease is the leading cause of death and disability in people with diabetes?: No  Patient able to state risk factors for heart disease?: No  Patient is taking statin?: Yes  Chronic Complications Skills Assessment Completed: : " Yes  Assessment indicates:: Knowledge deficit, Instruction Needed  Area of need?: Yes     Assessment Summary and Plan    Based on today's diabetes care assessment, the following areas of need were identified:          9/26/2023    12:01 AM   Social   Support No   Access to Mass Media/Tech No   Cognitive/Behavioral Health No   Culture/Muslim No   Communication No   Health Literacy No            9/26/2023    12:01 AM   Clinical   Medication Adherence No   Lab Compliance No   Nutritional Status No            10/10/2023    12:01 AM   Diabetes Self-Management Skills   Diabetes Disease Process/Treatment Options No   Chronic Complications Yes Discussed importance of A1c less than 7 to reduce risk of micro and macro complications, including nephropathy, neuropathy, retinopathy, heart attack and stroke. Reviewed the importance of controlled Blood Pressure and Cholesterol Lab Values in preventing disease.   Health maintenance reviewed           Today's interventions were provided through individual discussion, instruction, and written materials were provided.      Patient verbalized understanding of instruction and written materials.  Pt was able to return back demonstration of instructions today. Patient understood key points, needs reinforcement and further instruction.     Diabetes Self-Management Care Plan:    Today's Diabetes Self-Management Care Plan was developed with Indu's input. Indu has agreed to work toward the following goal(s) to improve his/her overall diabetes control.      Care Plan: Diabetes Management   Updates made since 9/10/2023 12:00 AM        Problem: Healthy Eating         Long-Range Goal: Pt agrees to pair carbohydrate with protein for meals or snacks    Start Date: 9/26/2023   Priority: Medium   Barriers: Knowledge deficit   Note:    Review the importance of balancing carbohydrates with each meal using portion control techniques to count servings of carbohydrate.  Discussed food exchange  list and snack ideas as well as pairing carbs with protein. She does drink one 4oz serving of coke per day.  Discussed stopping or using during the day when not eating much instead of with larger meal at supper 10/10/23 States no matter what she eats at supper her glucose rises. Last night had  salad and glucose >250 after meal        Task: Provided visual examples using dry measuring cups, food models, and other familiar objects such as computer mouse, deck or cards, tennis ball etc. to help with visualization of portions. Completed 9/26/2023     Task: Recommended replacing beverages containing high sugar content with noncaloric/sugar free options and/or water. Completed 9/26/2023     Problem: Blood Glucose Self-Monitoring         Long-Range Goal: Patient agrees to use dexcom G7 to monitor glucose and return in 2 weeks to review patterns.    Start Date: 9/26/2023   Priority: High   Barriers: Knowledge deficit   Note:    Personal, patient owned continuous glucose monitor brought to clinic for education/set up.      CGM Type: Dexcom G7  to clinic 9/26/2023 for set up and education.     Reviewed Sensor and Reciever. Patient was successfully able to return demonstrate proper preparation of skin, insertion on sensor, inserting sensor and codes, troubleshooting, 20 min warm up, set up high and low sensors, removing and replacing sensor every 10 days, overlay patches as needed.  Discussed trend arrows and predictive patterns. Discussed lag time with interstitial fluid glucose vs blood glucose, instructed to do blood glucose anytime in doubt with sensor reading or if glucose shows low. Patient voiced understanding to all instructions.  Son at side to help with technology but pt. Was able to apply sensor and change alarm settings with little assistance. Overlay patch applied.   10/10/23  Dexcom g7 CGM reviewed  Average glucose is 177 mg/dL  Hypoglycemia frequency 0 % with BG <70  Hyperglycemia frequency 41 % with BG  >180  Time in range 58 %  Recommendations: Reviewed agp with pt. And son. See attached report Consider changing novolog ss to <150 instead of 200 or adding flat 2 units prior to each supper meal. Sending report to pcp for possible medication changes.                                  Problem: Physical Activity and Exercise         Long-Range Goal: Patient agrees to increase physical activity to a goal of 3 times per week for 15 minutes.    Start Date: 9/26/2023   Priority: Low   Barriers: Knowledge deficit; Physical Limitations   Note:    Had foot surgery and uses cane unable to walk far. Discussed using light arm weights like water bottles or stretch bands. She is willing to try this. 10/10/23 Defer this visit .       Task: Discussed role of physical activity on reducing insulin resistance and improvement in overall glycemic control. Completed 9/26/2023        Task: Discussed role of physical activity as it relates to weight loss Completed 9/26/2023        Task: Offered suggestions on how patient could increase their regular physical activity Completed 9/26/2023        Task: Reviewed blood glucose monitoring before, during and after exercise/activity Completed 9/26/2023          Follow Up Plan     Follow up in about 4 weeks (around 11/7/2023) for pattern management . Appt made with pt. Today for Nov 15 at 12:30pm.  Plan to review agp, med changes, nutrition and exercise.     Today's care plan and follow up schedule was discussed with patient.  Indu verbalized understanding of the care plan, goals, and agrees to follow up plan.        The patient was encouraged to communicate with his/her health care provider/physician and care team regarding his/her condition(s) and treatment.  I provided the patient with my contact information today and encouraged to contact me via phone or [facility]'s Patient Portal as needed.     Length of Visit   Total Time: 60 Minutes

## 2023-10-11 ENCOUNTER — OFFICE VISIT (OUTPATIENT)
Dept: SURGICAL ONCOLOGY | Facility: CLINIC | Age: 68
End: 2023-10-11
Payer: MEDICARE

## 2023-10-11 DIAGNOSIS — C18.2 MALIGNANT NEOPLASM OF ASCENDING COLON: Primary | ICD-10-CM

## 2023-10-11 PROCEDURE — 99213 OFFICE O/P EST LOW 20 MIN: CPT | Mod: S$GLB,,, | Performed by: NURSE PRACTITIONER

## 2023-10-11 PROCEDURE — 3061F NEG MICROALBUMINURIA REV: CPT | Mod: CPTII,S$GLB,, | Performed by: NURSE PRACTITIONER

## 2023-10-11 PROCEDURE — 3066F NEPHROPATHY DOC TX: CPT | Mod: CPTII,S$GLB,, | Performed by: NURSE PRACTITIONER

## 2023-10-11 PROCEDURE — 3052F HG A1C>EQUAL 8.0%<EQUAL 9.0%: CPT | Mod: CPTII,S$GLB,, | Performed by: NURSE PRACTITIONER

## 2023-10-11 PROCEDURE — 99213 PR OFFICE/OUTPT VISIT, EST, LEVL III, 20-29 MIN: ICD-10-PCS | Mod: S$GLB,,, | Performed by: NURSE PRACTITIONER

## 2023-10-11 PROCEDURE — 3052F PR MOST RECENT HEMOGLOBIN A1C LEVEL 8.0 - < 9.0%: ICD-10-PCS | Mod: CPTII,S$GLB,, | Performed by: NURSE PRACTITIONER

## 2023-10-11 PROCEDURE — 4010F ACE/ARB THERAPY RXD/TAKEN: CPT | Mod: CPTII,S$GLB,, | Performed by: NURSE PRACTITIONER

## 2023-10-11 PROCEDURE — 3061F PR NEG MICROALBUMINURIA RESULT DOCUMENTED/REVIEW: ICD-10-PCS | Mod: CPTII,S$GLB,, | Performed by: NURSE PRACTITIONER

## 2023-10-11 PROCEDURE — 3066F PR DOCUMENTATION OF TREATMENT FOR NEPHROPATHY: ICD-10-PCS | Mod: CPTII,S$GLB,, | Performed by: NURSE PRACTITIONER

## 2023-10-11 PROCEDURE — 4010F PR ACE/ARB THEARPY RXD/TAKEN: ICD-10-PCS | Mod: CPTII,S$GLB,, | Performed by: NURSE PRACTITIONER

## 2023-10-11 NOTE — PROGRESS NOTES
History & Physical    CHIEF COMPLAINT:  ASCENDING COLON MASS     History of Present Illness:  67 year-old-female referred by Dr. Amador.  Patient presented with iron deficiency anemia.  EGD performed, grade I-II esophageal varices found, too small to band.  Colonoscopy performed, an ulcerated malignant-appearing partially obstructing medium sized mass found in the ascending colon.  Biopsy and tattoo of the mass showed fragments of colonic mucosa, no evidence of dysplasia or malignancy.  Two other polyps were completely removed in the descending and sigmoid colon, pathology returned hyperplastic polyps.  CT abd/pel showed no acute findings.    Greater than 60 minutes was required for complete chart review, imaging review including interpretation of imaging, coordination with referring/other physicians, patient counseling regarding diagnosis/treatment plan, answering questions, medical decision making, and documentation.      Review of patient's allergies indicates:  No Known Allergies    Current Outpatient Medications   Medication Sig Dispense Refill    acetaminophen (TYLENOL) 500 MG tablet Take 500 mg by mouth every 6 (six) hours as needed.      atorvastatin (LIPITOR) 20 MG tablet Take 1 tablet (20 mg total) by mouth once daily. 90 tablet 3    dulaglutide (TRULICITY) 3 mg/0.5 mL pen injector Inject 3 mg into the skin every 7 days. 4 pen 11    ferrous sulfate (FEOSOL) 325 mg (65 mg iron) Tab tablet Take 1 tablet (325 mg total) by mouth 2 (two) times daily. 60 tablet 11    folic acid (FOLVITE) 1 MG tablet Take 1 tablet (1 mg total) by mouth once daily. 100 tablet 3    furosemide (LASIX) 40 MG tablet Take 40 mg by mouth once daily.      gabapentin (NEURONTIN) 300 MG capsule Take 2 capsules (600 mg total) by mouth every evening. 60 capsule 5    insulin aspart U-100 (NOVOLOG) 100 unit/mL injection Inject into the skin as needed.      insulin syringe-needle U-100 1 mL 31 gauge x 5/16 Syrg Inject 1 Syringe into the skin  3 (three) times daily with meals.      pantoprazole (PROTONIX) 40 MG tablet Take 40 mg by mouth as needed. for 90 days      sertraline (ZOLOFT) 50 MG tablet Take 1 tablet by mouth once daily 90 tablet 0    solifenacin (VESICARE) 10 MG tablet Take 10 mg by mouth once daily.      spironolactone (ALDACTONE) 50 MG tablet Take 50 mg by mouth once daily.      TOUJEO SOLOSTAR U-300 INSULIN 300 unit/mL (1.5 mL) InPn pen INJECT 79 UNITS SUBCUTANEOUSLY ONCE DAILY 6 mL 0     No current facility-administered medications for this visit.       Past Medical History:   Diagnosis Date    Charcot's joint of foot, left     Chronic kidney disease, unspecified     Cirrhosis of liver without ascites     Depression     Diabetes mellitus, type II, insulin dependent     Diabetic neuropathy     GERD (gastroesophageal reflux disease)     H/O: hysterectomy     Hepatic steatosis     HLD (hyperlipidemia)     HTN (hypertension)     Other hyperlipidemia     Overactive bladder     Thrombocytopenia, unspecified     Type 2 diabetes mellitus with unspecified diabetic retinopathy without macular edema     Vitamin D deficiency      Past Surgical History:   Procedure Laterality Date    APPENDECTOMY      BREAST BIOPSY       SECTION      charcot-leyden crystals identification      CHOLECYSTECTOMY      COLONOSCOPY N/A 2023    Procedure: COLON;  Surgeon: Luis Amador MD;  Location: [facility] ENDOSCOPY;  Service: Gastroenterology;  Laterality: N/A;    COLONOSCOPY, WITH 1 OR MORE BIOPSIES  2023    Procedure: COLONOSCOPY, WITH 1 OR MORE BIOPSIES;  Surgeon: Luis Amador MD;  Location: [facility] ENDOSCOPY;  Service: Gastroenterology;;    COLONOSCOPY, WITH DIRECTED SUBMUCOSAL INJECTION  2023    Procedure: COLONOSCOPY, WITH DIRECTED SUBMUCOSAL INJECTION;  Surgeon: Luis Amador MD;  Location: [facility] ENDOSCOPY;  Service: Gastroenterology;;  8cc Colon Tattoo    COLONOSCOPY, WITH POLYPECTOMY USING  SNARE  8/28/2023    Procedure: COLONOSCOPY, WITH POLYPECTOMY USING SNARE;  Surgeon: Luis Amador MD;  Location: [facility] ENDOSCOPY;  Service: Gastroenterology;;    ESOPHAGOGASTRODUODENOSCOPY N/A 8/28/2023    Procedure: DOUBLE;  Surgeon: Luis Amador MD;  Location: [facility] ENDOSCOPY;  Service: Gastroenterology;  Laterality: N/A;    HEMORRHOID SURGERY      HYSTERECTOMY  1990    total    right foot surgery Right 02/01/2022     Family History   Problem Relation Age of Onset    Diabetes Mother     Alzheimer's disease Mother     Diabetes Father     Leukemia Father     Diabetes Sister     Liver cancer Sister     Diabetes Brother      Social History     Tobacco Use    Smoking status: Every Day     Current packs/day: 0.50     Average packs/day: 0.5 packs/day for 40.0 years (20.0 ttl pk-yrs)     Types: Cigarettes     Passive exposure: Current    Smokeless tobacco: Never   Substance Use Topics    Alcohol use: Not Currently     Comment: occassionally    Drug use: Never        Review of Systems:  Review of Systems   Constitutional: Negative.  Negative for appetite change, chills, diaphoresis and fever.   HENT: Negative.  Negative for congestion, drooling, ear discharge, ear pain and hearing loss.    Eyes: Negative.  Negative for discharge.   Respiratory: Negative.  Negative for apnea, cough, choking, chest tightness, shortness of breath and stridor.    Cardiovascular: Negative.  Negative for chest pain, palpitations and leg swelling.   Gastrointestinal: Negative.    Endocrine: Negative.  Negative for cold intolerance and heat intolerance.   Genitourinary: Negative.  Negative for difficulty urinating, dyspareunia, dysuria and hematuria.   Musculoskeletal: Negative.  Negative for arthralgias, gait problem and joint swelling.   Skin:  Negative for color change and rash.   Allergic/Immunologic: Negative.    Neurological: Negative.  Negative for dizziness, tremors, seizures, syncope, facial asymmetry, speech  difficulty, light-headedness, numbness and headaches.   Hematological: Negative.    Psychiatric/Behavioral: Negative.  Negative for agitation and confusion.    All other systems reviewed and are negative.      OBJECTIVE:     Vital Signs (Most Recent)              Physical Exam:  Physical Exam  Constitutional:       General: She is not in acute distress.     Appearance: Normal appearance. She is not toxic-appearing.   HENT:      Head: Normocephalic and atraumatic.      Right Ear: External ear normal.      Left Ear: External ear normal.      Mouth/Throat:      Mouth: Mucous membranes are moist.   Eyes:      General: No scleral icterus.     Conjunctiva/sclera: Conjunctivae normal.      Pupils: Pupils are equal, round, and reactive to light.   Cardiovascular:      Rate and Rhythm: Normal rate and regular rhythm.      Pulses: Normal pulses.   Pulmonary:      Effort: Pulmonary effort is normal. No respiratory distress.      Breath sounds: Normal breath sounds. No stridor. No wheezing or rhonchi.   Abdominal:      General: Abdomen is flat. There is no distension.      Palpations: Abdomen is soft. There is no mass.      Tenderness: There is no abdominal tenderness. There is no guarding or rebound.      Hernia: No hernia is present.   Musculoskeletal:         General: No swelling or tenderness. Normal range of motion.      Cervical back: Normal range of motion and neck supple.      Right lower leg: No edema.      Left lower leg: No edema.   Skin:     General: Skin is warm.      Capillary Refill: Capillary refill takes less than 2 seconds.      Coloration: Skin is not jaundiced or pale.      Findings: No erythema or rash.   Neurological:      General: No focal deficit present.      Mental Status: She is alert and oriented to person, place, and time.      Gait: Gait normal.   Psychiatric:         Mood and Affect: Mood normal.         Behavior: Behavior normal.         Judgment: Judgment normal.           ASSESSMENT/PLAN:      Malignant-appearing partially obstructing mass of the ascending colon.  Biopsy at colonoscopy showed fragments of colonic mucosa, but photographs and description are consistent with colon cancer.  The area is not amenable to endoscopic resection    Diagnosis, staging, prognosis and treatment guidelines for presumptive diagnosis of colon cancer were discussed with the patient in detail, all questions were addressed.Using visual aids and drawings, I described the anatomy and potential surgical procedures.    Schedule laparoscopic/robotic right hemicolectomy    The risks and benefits of the procedure were explained in detail using layman terms, including the pros and cons of any implant that may be used, all questions were addressed, the patient gives consent to proceed    Timothy Arizmendi MD  Surgical Oncology  Complex General, Gastrointestinal and Hepatobiliary Surgery    ADDENDUM    PT HERE TODAY FOR PREOPERATIVE EVALUATION  DENIES ANY CHANGES IN MEDICAL HX  NO NEW MEDICATIONS  NO NEW SURGICAL PROCEDURES  NOT ON BLOOD THINNERS    CH CHEST COMPLETED, NO EVIDENCE OF METASTATIC DISEASE  DR ARIZMENDI REVIEWS    ROS: ALL NEG  PE AS ABOVE    PROCEDURE, RISKS, AND BENEFITS DISCUSSED AT LENGTH AND QUESTIONS ANSWERED; PT WISHES TO PROCEED  DX LAP/CHIKA RIGHT COLON RESECTION  SX DATE 10/19/2023

## 2023-10-11 NOTE — H&P (VIEW-ONLY)
History & Physical    CHIEF COMPLAINT:  ASCENDING COLON MASS     History of Present Illness:  67 year-old-female referred by Dr. Amador.  Patient presented with iron deficiency anemia.  EGD performed, grade I-II esophageal varices found, too small to band.  Colonoscopy performed, an ulcerated malignant-appearing partially obstructing medium sized mass found in the ascending colon.  Biopsy and tattoo of the mass showed fragments of colonic mucosa, no evidence of dysplasia or malignancy.  Two other polyps were completely removed in the descending and sigmoid colon, pathology returned hyperplastic polyps.  CT abd/pel showed no acute findings.    Greater than 60 minutes was required for complete chart review, imaging review including interpretation of imaging, coordination with referring/other physicians, patient counseling regarding diagnosis/treatment plan, answering questions, medical decision making, and documentation.      Review of patient's allergies indicates:  No Known Allergies    Current Outpatient Medications   Medication Sig Dispense Refill    acetaminophen (TYLENOL) 500 MG tablet Take 500 mg by mouth every 6 (six) hours as needed.      atorvastatin (LIPITOR) 20 MG tablet Take 1 tablet (20 mg total) by mouth once daily. 90 tablet 3    dulaglutide (TRULICITY) 3 mg/0.5 mL pen injector Inject 3 mg into the skin every 7 days. 4 pen 11    ferrous sulfate (FEOSOL) 325 mg (65 mg iron) Tab tablet Take 1 tablet (325 mg total) by mouth 2 (two) times daily. 60 tablet 11    folic acid (FOLVITE) 1 MG tablet Take 1 tablet (1 mg total) by mouth once daily. 100 tablet 3    furosemide (LASIX) 40 MG tablet Take 40 mg by mouth once daily.      gabapentin (NEURONTIN) 300 MG capsule Take 2 capsules (600 mg total) by mouth every evening. 60 capsule 5    insulin aspart U-100 (NOVOLOG) 100 unit/mL injection Inject into the skin as needed.      insulin syringe-needle U-100 1 mL 31 gauge x 5/16 Syrg Inject 1 Syringe into the skin  3 (three) times daily with meals.      pantoprazole (PROTONIX) 40 MG tablet Take 40 mg by mouth as needed. for 90 days      sertraline (ZOLOFT) 50 MG tablet Take 1 tablet by mouth once daily 90 tablet 0    solifenacin (VESICARE) 10 MG tablet Take 10 mg by mouth once daily.      spironolactone (ALDACTONE) 50 MG tablet Take 50 mg by mouth once daily.      TOUJEO SOLOSTAR U-300 INSULIN 300 unit/mL (1.5 mL) InPn pen INJECT 79 UNITS SUBCUTANEOUSLY ONCE DAILY 6 mL 0     No current facility-administered medications for this visit.       Past Medical History:   Diagnosis Date    Charcot's joint of foot, left     Chronic kidney disease, unspecified     Cirrhosis of liver without ascites     Depression     Diabetes mellitus, type II, insulin dependent     Diabetic neuropathy     GERD (gastroesophageal reflux disease)     H/O: hysterectomy     Hepatic steatosis     HLD (hyperlipidemia)     HTN (hypertension)     Other hyperlipidemia     Overactive bladder     Thrombocytopenia, unspecified     Type 2 diabetes mellitus with unspecified diabetic retinopathy without macular edema     Vitamin D deficiency      Past Surgical History:   Procedure Laterality Date    APPENDECTOMY      BREAST BIOPSY       SECTION      charcot-leyden crystals identification      CHOLECYSTECTOMY      COLONOSCOPY N/A 2023    Procedure: COLON;  Surgeon: Luis Amador MD;  Location: [facility] ENDOSCOPY;  Service: Gastroenterology;  Laterality: N/A;    COLONOSCOPY, WITH 1 OR MORE BIOPSIES  2023    Procedure: COLONOSCOPY, WITH 1 OR MORE BIOPSIES;  Surgeon: Luis Amador MD;  Location: [facility] ENDOSCOPY;  Service: Gastroenterology;;    COLONOSCOPY, WITH DIRECTED SUBMUCOSAL INJECTION  2023    Procedure: COLONOSCOPY, WITH DIRECTED SUBMUCOSAL INJECTION;  Surgeon: Luis Amador MD;  Location: [facility] ENDOSCOPY;  Service: Gastroenterology;;  8cc Colon Tattoo    COLONOSCOPY, WITH POLYPECTOMY USING  SNARE  8/28/2023    Procedure: COLONOSCOPY, WITH POLYPECTOMY USING SNARE;  Surgeon: Luis Amador MD;  Location: [facility] ENDOSCOPY;  Service: Gastroenterology;;    ESOPHAGOGASTRODUODENOSCOPY N/A 8/28/2023    Procedure: DOUBLE;  Surgeon: Luis Amador MD;  Location: [facility] ENDOSCOPY;  Service: Gastroenterology;  Laterality: N/A;    HEMORRHOID SURGERY      HYSTERECTOMY  1990    total    right foot surgery Right 02/01/2022     Family History   Problem Relation Age of Onset    Diabetes Mother     Alzheimer's disease Mother     Diabetes Father     Leukemia Father     Diabetes Sister     Liver cancer Sister     Diabetes Brother      Social History     Tobacco Use    Smoking status: Every Day     Current packs/day: 0.50     Average packs/day: 0.5 packs/day for 40.0 years (20.0 ttl pk-yrs)     Types: Cigarettes     Passive exposure: Current    Smokeless tobacco: Never   Substance Use Topics    Alcohol use: Not Currently     Comment: occassionally    Drug use: Never        Review of Systems:  Review of Systems   Constitutional: Negative.  Negative for appetite change, chills, diaphoresis and fever.   HENT: Negative.  Negative for congestion, drooling, ear discharge, ear pain and hearing loss.    Eyes: Negative.  Negative for discharge.   Respiratory: Negative.  Negative for apnea, cough, choking, chest tightness, shortness of breath and stridor.    Cardiovascular: Negative.  Negative for chest pain, palpitations and leg swelling.   Gastrointestinal: Negative.    Endocrine: Negative.  Negative for cold intolerance and heat intolerance.   Genitourinary: Negative.  Negative for difficulty urinating, dyspareunia, dysuria and hematuria.   Musculoskeletal: Negative.  Negative for arthralgias, gait problem and joint swelling.   Skin:  Negative for color change and rash.   Allergic/Immunologic: Negative.    Neurological: Negative.  Negative for dizziness, tremors, seizures, syncope, facial asymmetry, speech  difficulty, light-headedness, numbness and headaches.   Hematological: Negative.    Psychiatric/Behavioral: Negative.  Negative for agitation and confusion.    All other systems reviewed and are negative.      OBJECTIVE:     Vital Signs (Most Recent)              Physical Exam:  Physical Exam  Constitutional:       General: She is not in acute distress.     Appearance: Normal appearance. She is not toxic-appearing.   HENT:      Head: Normocephalic and atraumatic.      Right Ear: External ear normal.      Left Ear: External ear normal.      Mouth/Throat:      Mouth: Mucous membranes are moist.   Eyes:      General: No scleral icterus.     Conjunctiva/sclera: Conjunctivae normal.      Pupils: Pupils are equal, round, and reactive to light.   Cardiovascular:      Rate and Rhythm: Normal rate and regular rhythm.      Pulses: Normal pulses.   Pulmonary:      Effort: Pulmonary effort is normal. No respiratory distress.      Breath sounds: Normal breath sounds. No stridor. No wheezing or rhonchi.   Abdominal:      General: Abdomen is flat. There is no distension.      Palpations: Abdomen is soft. There is no mass.      Tenderness: There is no abdominal tenderness. There is no guarding or rebound.      Hernia: No hernia is present.   Musculoskeletal:         General: No swelling or tenderness. Normal range of motion.      Cervical back: Normal range of motion and neck supple.      Right lower leg: No edema.      Left lower leg: No edema.   Skin:     General: Skin is warm.      Capillary Refill: Capillary refill takes less than 2 seconds.      Coloration: Skin is not jaundiced or pale.      Findings: No erythema or rash.   Neurological:      General: No focal deficit present.      Mental Status: She is alert and oriented to person, place, and time.      Gait: Gait normal.   Psychiatric:         Mood and Affect: Mood normal.         Behavior: Behavior normal.         Judgment: Judgment normal.           ASSESSMENT/PLAN:      Malignant-appearing partially obstructing mass of the ascending colon.  Biopsy at colonoscopy showed fragments of colonic mucosa, but photographs and description are consistent with colon cancer.  The area is not amenable to endoscopic resection    Diagnosis, staging, prognosis and treatment guidelines for presumptive diagnosis of colon cancer were discussed with the patient in detail, all questions were addressed.Using visual aids and drawings, I described the anatomy and potential surgical procedures.    Schedule laparoscopic/robotic right hemicolectomy    The risks and benefits of the procedure were explained in detail using layman terms, including the pros and cons of any implant that may be used, all questions were addressed, the patient gives consent to proceed    Timothy Arizmendi MD  Surgical Oncology  Complex General, Gastrointestinal and Hepatobiliary Surgery    ADDENDUM    PT HERE TODAY FOR PREOPERATIVE EVALUATION  DENIES ANY CHANGES IN MEDICAL HX  NO NEW MEDICATIONS  NO NEW SURGICAL PROCEDURES  NOT ON BLOOD THINNERS    CH CHEST COMPLETED, NO EVIDENCE OF METASTATIC DISEASE  DR ARIZMENDI REVIEWS    ROS: ALL NEG  PE AS ABOVE    PROCEDURE, RISKS, AND BENEFITS DISCUSSED AT LENGTH AND QUESTIONS ANSWERED; PT WISHES TO PROCEED  DX LAP/CHIKA RIGHT COLON RESECTION  SX DATE 10/19/2023

## 2023-10-13 RX ORDER — INSULIN ASPART 100 [IU]/ML
2 INJECTION, SOLUTION INTRAVENOUS; SUBCUTANEOUS
Qty: 1.8 ML | Refills: 11 | Status: SHIPPED | OUTPATIENT
Start: 2023-10-13 | End: 2023-10-17

## 2023-10-13 NOTE — TELEPHONE ENCOUNTER
I have signed for the following orders AND/OR meds. Please notify the patient and ask the patient to schedule the testing and/or information about any medications that were sent.      Medications Ordered This Encounter   Medications    insulin aspart U-100 (NOVOLOG) 100 unit/mL injection     Sig: Inject 2 Units into the skin 3 (three) times daily before meals. Use this sliding scale insulin for blood sugar greater than 150 before meals     Dispense:  1.8 mL     Refill:  11     No orders of the defined types were placed in this encounter.

## 2023-10-16 ENCOUNTER — ANESTHESIA EVENT (OUTPATIENT)
Dept: SURGERY | Facility: HOSPITAL | Age: 68
DRG: 330 | End: 2023-10-16
Payer: MEDICARE

## 2023-10-17 DIAGNOSIS — C18.2 MALIGNANT NEOPLASM OF ASCENDING COLON: Primary | ICD-10-CM

## 2023-10-17 RX ORDER — NEOMYCIN SULFATE 500 MG/1
TABLET ORAL
Qty: 6 TABLET | Refills: 0 | Status: SHIPPED | OUTPATIENT
Start: 2023-10-17 | End: 2023-11-15 | Stop reason: CLARIF

## 2023-10-17 RX ORDER — METRONIDAZOLE 250 MG/1
TABLET ORAL
Qty: 6 TABLET | Refills: 0 | Status: SHIPPED | OUTPATIENT
Start: 2023-10-17 | End: 2023-11-27

## 2023-10-18 DIAGNOSIS — E11.65 TYPE 2 DIABETES MELLITUS WITH HYPERGLYCEMIA, WITH LONG-TERM CURRENT USE OF INSULIN: ICD-10-CM

## 2023-10-18 DIAGNOSIS — Z79.4 TYPE 2 DIABETES MELLITUS WITH HYPERGLYCEMIA, WITH LONG-TERM CURRENT USE OF INSULIN: ICD-10-CM

## 2023-10-18 RX ORDER — INSULIN GLARGINE 300 U/ML
INJECTION, SOLUTION SUBCUTANEOUS
Qty: 6 ML | Refills: 0 | Status: SHIPPED | OUTPATIENT
Start: 2023-10-18 | End: 2023-11-10

## 2023-10-18 NOTE — PRE-PROCEDURE INSTRUCTIONS
[facility]: Outpatient Surgery  Preprocedure Check-In Instructions     Your arrival time for your surgery or procedure is __0500____.  We ask patients to arrive about 2 hours before surgery to allow for enough time to review your health history & medications, start your IV, complete any outstanding labwork or tests, and meet your Anesthesiologist.  You will arrive at [facility], [address] [place] [address].  Enter through the [place] entrance next to the Emergency Room, and come to the 6th floor to the Outpatient Surgery Department.     Visitory Policy:  You are allowed 2 adult visitors to be with you in the hospital. All hospital visitors should be in good current health.  No small children.     What to Bring:  Please have your ID, insurance cards, and all home medication bottles with you at check in.  Bring your CPAP machine if one is used at home.     Fasting:  Nothing to eat or drink after midnight the night before your procedure. This includes no ice, gum, hard candies, and/or tobacco products.  Follow your doctor's instructions for taking any medications on the morning of your procedure.  If no instructions for taking medications were given, do not take any medications but bring your medications in their bottles to your procedure check in.     Follow your doctor's preoperative instructions regarding skin prep, bowel prep, bathing, or showering prior to your procedure.  If any special soaps were provided to you, please use according to your doctor's instructions. If no instructions were given from your doctor, take a good bath or shower with antibacterial soap the night before and the morning of your procedure.  On the morning of procedure, wear loose, comfortable clothing.  No lotions, makeup, perfumes, colognes, deodorant, or jewelry to your procedure.  Removable items (glasses, contact lenses, dentures, retainers, hearing aids) need to be removed for your  procedure.  Bring your storage containers for these items if you wear them.     Artificial nails, body jewelry, eyelash extensions, and/or hair extensions with metal clips are not allowed during your surgery.  If you currently wear any of these items, please arrange for them to be removed prior to your arrival to the hospital.     Outpatient or Same Day Surgeries:  Any patients receiving sedation/anesthesia are advised not to drive for 24 hours after their procedure.  We do not allow patients to drive themselves home after discharge.  If you are going home after your procedure, please have someone available to drive you home from the hospital.        You may call the Outpatient Surgery Department at [phone] with any questions or concerns.  We are looking forward to meeting you and taking great care of you for your procedure.  Thank you for choosing [facility] for your surgical needs.

## 2023-10-19 ENCOUNTER — ANESTHESIA (OUTPATIENT)
Dept: SURGERY | Facility: HOSPITAL | Age: 68
DRG: 330 | End: 2023-10-19
Payer: MEDICARE

## 2023-10-19 ENCOUNTER — HOSPITAL ENCOUNTER (INPATIENT)
Facility: HOSPITAL | Age: 68
LOS: 4 days | Discharge: HOME | DRG: 330 | End: 2023-10-23
Attending: SURGERY | Admitting: SURGERY
Payer: MEDICARE

## 2023-10-19 DIAGNOSIS — K63.89 COLONIC MASS: ICD-10-CM

## 2023-10-19 DIAGNOSIS — C18.2 MALIGNANT NEOPLASM OF ASCENDING COLON: ICD-10-CM

## 2023-10-19 LAB
GLUCOSE SERPL-MCNC: 171 MG/DL (ref 70–110)
GLUCOSE SERPL-MCNC: 179 MG/DL (ref 70–110)
POCT GLUCOSE: 67 MG/DL (ref 70–110)
POCT GLUCOSE: 94 MG/DL (ref 70–110)

## 2023-10-19 PROCEDURE — 49329 UNLSTD LAPS PX ABD PERTM&OMN: CPT | Mod: ,,, | Performed by: SURGERY

## 2023-10-19 PROCEDURE — 49329 PR LAP OMENTAL FLAP: ICD-10-PCS | Mod: AS,,, | Performed by: NURSE PRACTITIONER

## 2023-10-19 PROCEDURE — 25000003 PHARM REV CODE 250

## 2023-10-19 PROCEDURE — 36000713 HC OR TIME LEV V EA ADD 15 MIN: Performed by: SURGERY

## 2023-10-19 PROCEDURE — 82962 GLUCOSE BLOOD TEST: CPT | Performed by: SURGERY

## 2023-10-19 PROCEDURE — 49329 UNLSTD LAPS PX ABD PERTM&OMN: CPT | Mod: AS,,, | Performed by: NURSE PRACTITIONER

## 2023-10-19 PROCEDURE — 63600175 PHARM REV CODE 636 W HCPCS

## 2023-10-19 PROCEDURE — 44205 LAP COLECTOMY PART W/ILEUM: CPT | Mod: AS,,, | Performed by: NURSE PRACTITIONER

## 2023-10-19 PROCEDURE — 11000001 HC ACUTE MED/SURG PRIVATE ROOM

## 2023-10-19 PROCEDURE — 63600175 PHARM REV CODE 636 W HCPCS: Performed by: NURSE PRACTITIONER

## 2023-10-19 PROCEDURE — 71000033 HC RECOVERY, INTIAL HOUR: Performed by: SURGERY

## 2023-10-19 PROCEDURE — P9047 ALBUMIN (HUMAN), 25%, 50ML: HCPCS | Mod: JZ,JG

## 2023-10-19 PROCEDURE — 63600175 PHARM REV CODE 636 W HCPCS: Performed by: ANESTHESIOLOGY

## 2023-10-19 PROCEDURE — 25000003 PHARM REV CODE 250: Performed by: NURSE PRACTITIONER

## 2023-10-19 PROCEDURE — D9220A PRA ANESTHESIA: ICD-10-PCS | Mod: ANES,,, | Performed by: ANESTHESIOLOGY

## 2023-10-19 PROCEDURE — 44205 PR LAP,SURG,COLECTOMY,W/REMVL TERM ILEUM: ICD-10-PCS | Mod: ,,, | Performed by: SURGERY

## 2023-10-19 PROCEDURE — 49329 PR LAP OMENTAL FLAP: ICD-10-PCS | Mod: ,,, | Performed by: SURGERY

## 2023-10-19 PROCEDURE — 44205 LAP COLECTOMY PART W/ILEUM: CPT | Mod: ,,, | Performed by: SURGERY

## 2023-10-19 PROCEDURE — 44205 PR LAP,SURG,COLECTOMY,W/REMVL TERM ILEUM: ICD-10-PCS | Mod: AS,,, | Performed by: NURSE PRACTITIONER

## 2023-10-19 PROCEDURE — A4216 STERILE WATER/SALINE, 10 ML: HCPCS | Performed by: SURGERY

## 2023-10-19 PROCEDURE — 25000003 PHARM REV CODE 250: Performed by: SURGERY

## 2023-10-19 PROCEDURE — D9220A PRA ANESTHESIA: Mod: CRNA,,, | Performed by: NURSE ANESTHETIST, CERTIFIED REGISTERED

## 2023-10-19 PROCEDURE — 36000712 HC OR TIME LEV V 1ST 15 MIN: Performed by: SURGERY

## 2023-10-19 PROCEDURE — 37000009 HC ANESTHESIA EA ADD 15 MINS: Performed by: SURGERY

## 2023-10-19 PROCEDURE — C9290 INJ, BUPIVACAINE LIPOSOME: HCPCS | Performed by: SURGERY

## 2023-10-19 PROCEDURE — 37000008 HC ANESTHESIA 1ST 15 MINUTES: Performed by: SURGERY

## 2023-10-19 PROCEDURE — 88309 TISSUE EXAM BY PATHOLOGIST: CPT | Performed by: SURGERY

## 2023-10-19 PROCEDURE — 63600175 PHARM REV CODE 636 W HCPCS: Performed by: SURGERY

## 2023-10-19 PROCEDURE — 27201423 OPTIME MED/SURG SUP & DEVICES STERILE SUPPLY: Performed by: SURGERY

## 2023-10-19 PROCEDURE — 25000003 PHARM REV CODE 250: Performed by: ANESTHESIOLOGY

## 2023-10-19 PROCEDURE — D9220A PRA ANESTHESIA: Mod: ANES,,, | Performed by: ANESTHESIOLOGY

## 2023-10-19 PROCEDURE — D9220A PRA ANESTHESIA: ICD-10-PCS | Mod: CRNA,,, | Performed by: NURSE ANESTHETIST, CERTIFIED REGISTERED

## 2023-10-19 RX ORDER — DIPHENHYDRAMINE HYDROCHLORIDE 50 MG/ML
12.5 INJECTION INTRAMUSCULAR; INTRAVENOUS EVERY 6 HOURS PRN
Status: DISCONTINUED | OUTPATIENT
Start: 2023-10-19 | End: 2023-10-23 | Stop reason: HOSPADM

## 2023-10-19 RX ORDER — HYDROMORPHONE HYDROCHLORIDE 2 MG/ML
1 INJECTION, SOLUTION INTRAMUSCULAR; INTRAVENOUS; SUBCUTANEOUS
Status: DISCONTINUED | OUTPATIENT
Start: 2023-10-19 | End: 2023-10-23 | Stop reason: HOSPADM

## 2023-10-19 RX ORDER — PHENYLEPHRINE HCL IN 0.9% NACL 1 MG/10 ML
SYRINGE (ML) INTRAVENOUS
Status: DISCONTINUED | OUTPATIENT
Start: 2023-10-19 | End: 2023-10-19

## 2023-10-19 RX ORDER — FENTANYL CITRATE 50 UG/ML
25 INJECTION, SOLUTION INTRAMUSCULAR; INTRAVENOUS EVERY 5 MIN PRN
Status: DISCONTINUED | OUTPATIENT
Start: 2023-10-19 | End: 2023-10-19 | Stop reason: HOSPADM

## 2023-10-19 RX ORDER — DIPHENHYDRAMINE HYDROCHLORIDE 50 MG/ML
12.5 INJECTION INTRAMUSCULAR; INTRAVENOUS EVERY 4 HOURS PRN
Status: DISCONTINUED | OUTPATIENT
Start: 2023-10-19 | End: 2023-10-19

## 2023-10-19 RX ORDER — ALVIMOPAN 12 MG/1
12 CAPSULE ORAL ONCE
Status: COMPLETED | OUTPATIENT
Start: 2023-10-19 | End: 2023-10-19

## 2023-10-19 RX ORDER — IBUPROFEN 200 MG
16 TABLET ORAL
Status: DISCONTINUED | OUTPATIENT
Start: 2023-10-19 | End: 2023-10-23 | Stop reason: HOSPADM

## 2023-10-19 RX ORDER — LIDOCAINE HYDROCHLORIDE 10 MG/ML
1 INJECTION, SOLUTION EPIDURAL; INFILTRATION; INTRACAUDAL; PERINEURAL ONCE
Status: DISCONTINUED | OUTPATIENT
Start: 2023-10-19 | End: 2023-10-19 | Stop reason: HOSPADM

## 2023-10-19 RX ORDER — ACETAMINOPHEN 10 MG/ML
1000 INJECTION, SOLUTION INTRAVENOUS ONCE
Status: DISCONTINUED | OUTPATIENT
Start: 2023-10-19 | End: 2023-10-19 | Stop reason: HOSPADM

## 2023-10-19 RX ORDER — ONDANSETRON HYDROCHLORIDE 2 MG/ML
INJECTION, SOLUTION INTRAMUSCULAR; INTRAVENOUS
Status: DISCONTINUED | OUTPATIENT
Start: 2023-10-19 | End: 2023-10-19

## 2023-10-19 RX ORDER — IBUPROFEN 200 MG
24 TABLET ORAL
Status: DISCONTINUED | OUTPATIENT
Start: 2023-10-19 | End: 2023-10-23 | Stop reason: HOSPADM

## 2023-10-19 RX ORDER — SODIUM CHLORIDE 0.9 % (FLUSH) 0.9 %
SYRINGE (ML) INJECTION
Status: DISCONTINUED | OUTPATIENT
Start: 2023-10-19 | End: 2023-10-19 | Stop reason: HOSPADM

## 2023-10-19 RX ORDER — HYDROMORPHONE HYDROCHLORIDE 2 MG/ML
0.2 INJECTION, SOLUTION INTRAMUSCULAR; INTRAVENOUS; SUBCUTANEOUS EVERY 5 MIN PRN
Status: DISCONTINUED | OUTPATIENT
Start: 2023-10-19 | End: 2023-10-19 | Stop reason: HOSPADM

## 2023-10-19 RX ORDER — ACETAMINOPHEN 10 MG/ML
INJECTION, SOLUTION INTRAVENOUS
Status: DISCONTINUED | OUTPATIENT
Start: 2023-10-19 | End: 2023-10-19

## 2023-10-19 RX ORDER — HYDROMORPHONE HYDROCHLORIDE 2 MG/ML
1 INJECTION, SOLUTION INTRAMUSCULAR; INTRAVENOUS; SUBCUTANEOUS EVERY 4 HOURS PRN
Status: DISCONTINUED | OUTPATIENT
Start: 2023-10-19 | End: 2023-10-23 | Stop reason: HOSPADM

## 2023-10-19 RX ORDER — LIDOCAINE HYDROCHLORIDE 20 MG/ML
INJECTION, SOLUTION EPIDURAL; INFILTRATION; INTRACAUDAL; PERINEURAL
Status: DISCONTINUED | OUTPATIENT
Start: 2023-10-19 | End: 2023-10-19

## 2023-10-19 RX ORDER — ACETAMINOPHEN 10 MG/ML
1000 INJECTION, SOLUTION INTRAVENOUS EVERY 8 HOURS
Status: COMPLETED | OUTPATIENT
Start: 2023-10-19 | End: 2023-10-20

## 2023-10-19 RX ORDER — ALVIMOPAN 12 MG/1
12 CAPSULE ORAL 2 TIMES DAILY
Status: DISCONTINUED | OUTPATIENT
Start: 2023-10-19 | End: 2023-10-23 | Stop reason: HOSPADM

## 2023-10-19 RX ORDER — BUPIVACAINE HYDROCHLORIDE 5 MG/ML
INJECTION, SOLUTION EPIDURAL; INTRACAUDAL
Status: DISCONTINUED | OUTPATIENT
Start: 2023-10-19 | End: 2023-10-19 | Stop reason: HOSPADM

## 2023-10-19 RX ORDER — NALOXONE HCL 0.4 MG/ML
0.02 VIAL (ML) INJECTION
Status: DISCONTINUED | OUTPATIENT
Start: 2023-10-19 | End: 2023-10-23 | Stop reason: HOSPADM

## 2023-10-19 RX ORDER — ENOXAPARIN SODIUM 100 MG/ML
40 INJECTION SUBCUTANEOUS EVERY 24 HOURS
Status: DISCONTINUED | OUTPATIENT
Start: 2023-10-20 | End: 2023-10-23 | Stop reason: HOSPADM

## 2023-10-19 RX ORDER — SODIUM CHLORIDE, SODIUM LACTATE, POTASSIUM CHLORIDE, CALCIUM CHLORIDE 600; 310; 30; 20 MG/100ML; MG/100ML; MG/100ML; MG/100ML
INJECTION, SOLUTION INTRAVENOUS CONTINUOUS
Status: DISCONTINUED | OUTPATIENT
Start: 2023-10-19 | End: 2023-10-23 | Stop reason: HOSPADM

## 2023-10-19 RX ORDER — PROMETHAZINE HYDROCHLORIDE 25 MG/1
25 TABLET ORAL EVERY 6 HOURS PRN
Status: DISCONTINUED | OUTPATIENT
Start: 2023-10-19 | End: 2023-10-23 | Stop reason: HOSPADM

## 2023-10-19 RX ORDER — MIDAZOLAM HYDROCHLORIDE 1 MG/ML
INJECTION INTRAMUSCULAR; INTRAVENOUS
Status: DISCONTINUED | OUTPATIENT
Start: 2023-10-19 | End: 2023-10-19

## 2023-10-19 RX ORDER — PROPOFOL 10 MG/ML
VIAL (ML) INTRAVENOUS
Status: DISCONTINUED | OUTPATIENT
Start: 2023-10-19 | End: 2023-10-19

## 2023-10-19 RX ORDER — INDOCYANINE GREEN AND WATER 25 MG
KIT INJECTION
Status: DISCONTINUED | OUTPATIENT
Start: 2023-10-19 | End: 2023-10-19

## 2023-10-19 RX ORDER — DEXAMETHASONE SODIUM PHOSPHATE 4 MG/ML
INJECTION, SOLUTION INTRA-ARTICULAR; INTRALESIONAL; INTRAMUSCULAR; INTRAVENOUS; SOFT TISSUE
Status: DISCONTINUED | OUTPATIENT
Start: 2023-10-19 | End: 2023-10-19

## 2023-10-19 RX ORDER — ROCURONIUM BROMIDE 10 MG/ML
INJECTION, SOLUTION INTRAVENOUS
Status: DISCONTINUED | OUTPATIENT
Start: 2023-10-19 | End: 2023-10-19

## 2023-10-19 RX ORDER — INSULIN ASPART 100 [IU]/ML
0-5 INJECTION, SOLUTION INTRAVENOUS; SUBCUTANEOUS
Status: DISCONTINUED | OUTPATIENT
Start: 2023-10-19 | End: 2023-10-23 | Stop reason: HOSPADM

## 2023-10-19 RX ORDER — ONDANSETRON 2 MG/ML
8 INJECTION INTRAMUSCULAR; INTRAVENOUS EVERY 8 HOURS PRN
Status: DISCONTINUED | OUTPATIENT
Start: 2023-10-19 | End: 2023-10-20

## 2023-10-19 RX ORDER — GABAPENTIN 300 MG/1
300 CAPSULE ORAL ONCE
Status: COMPLETED | OUTPATIENT
Start: 2023-10-19 | End: 2023-10-19

## 2023-10-19 RX ORDER — ENOXAPARIN SODIUM 100 MG/ML
30 INJECTION SUBCUTANEOUS
Status: COMPLETED | OUTPATIENT
Start: 2023-10-19 | End: 2023-10-19

## 2023-10-19 RX ORDER — ALBUMIN HUMAN 250 G/1000ML
SOLUTION INTRAVENOUS
Status: DISCONTINUED | OUTPATIENT
Start: 2023-10-19 | End: 2023-10-19

## 2023-10-19 RX ORDER — FENTANYL CITRATE 50 UG/ML
INJECTION, SOLUTION INTRAMUSCULAR; INTRAVENOUS
Status: DISCONTINUED | OUTPATIENT
Start: 2023-10-19 | End: 2023-10-19

## 2023-10-19 RX ORDER — FAMOTIDINE 10 MG/ML
20 INJECTION INTRAVENOUS ONCE
Status: COMPLETED | OUTPATIENT
Start: 2023-10-19 | End: 2023-10-19

## 2023-10-19 RX ORDER — SODIUM CHLORIDE 0.9 % (FLUSH) 0.9 %
10 SYRINGE (ML) INJECTION
Status: DISCONTINUED | OUTPATIENT
Start: 2023-10-19 | End: 2023-10-19 | Stop reason: HOSPADM

## 2023-10-19 RX ORDER — GLUCAGON 1 MG
1 KIT INJECTION
Status: DISCONTINUED | OUTPATIENT
Start: 2023-10-19 | End: 2023-10-23 | Stop reason: HOSPADM

## 2023-10-19 RX ADMIN — ROCURONIUM BROMIDE 10 MG: 10 SOLUTION INTRAVENOUS at 09:10

## 2023-10-19 RX ADMIN — GABAPENTIN 300 MG: 300 CAPSULE ORAL at 07:10

## 2023-10-19 RX ADMIN — ENOXAPARIN SODIUM 30 MG: 30 INJECTION SUBCUTANEOUS at 06:10

## 2023-10-19 RX ADMIN — ONDANSETRON 4 MG: 2 INJECTION INTRAMUSCULAR; INTRAVENOUS at 07:10

## 2023-10-19 RX ADMIN — HYDROMORPHONE HYDROCHLORIDE 0.2 MG: 2 INJECTION, SOLUTION INTRAMUSCULAR; INTRAVENOUS; SUBCUTANEOUS at 10:10

## 2023-10-19 RX ADMIN — DEXAMETHASONE SODIUM PHOSPHATE 4 MG: 4 INJECTION, SOLUTION INTRA-ARTICULAR; INTRALESIONAL; INTRAMUSCULAR; INTRAVENOUS; SOFT TISSUE at 07:10

## 2023-10-19 RX ADMIN — HYDROMORPHONE HYDROCHLORIDE 1 MG: 2 INJECTION INTRAMUSCULAR; INTRAVENOUS; SUBCUTANEOUS at 01:10

## 2023-10-19 RX ADMIN — SODIUM CHLORIDE, POTASSIUM CHLORIDE, SODIUM LACTATE AND CALCIUM CHLORIDE: 600; 310; 30; 20 INJECTION, SOLUTION INTRAVENOUS at 11:10

## 2023-10-19 RX ADMIN — LIDOCAINE HYDROCHLORIDE 80 MG: 20 INJECTION, SOLUTION EPIDURAL; INFILTRATION; INTRACAUDAL; PERINEURAL at 07:10

## 2023-10-19 RX ADMIN — HYDROMORPHONE HYDROCHLORIDE 1 MG: 2 INJECTION INTRAMUSCULAR; INTRAVENOUS; SUBCUTANEOUS at 07:10

## 2023-10-19 RX ADMIN — FENTANYL CITRATE 50 MCG: 50 INJECTION, SOLUTION INTRAMUSCULAR; INTRAVENOUS at 07:10

## 2023-10-19 RX ADMIN — ERTAPENEM 1 G: 1 INJECTION INTRAMUSCULAR; INTRAVENOUS at 07:10

## 2023-10-19 RX ADMIN — ALVIMOPAN 12 MG: 12 CAPSULE ORAL at 06:10

## 2023-10-19 RX ADMIN — Medication 100 MCG: at 09:10

## 2023-10-19 RX ADMIN — PROPOFOL 120 MG: 10 INJECTION, EMULSION INTRAVENOUS at 07:10

## 2023-10-19 RX ADMIN — HYDROMORPHONE HYDROCHLORIDE 1 MG: 2 INJECTION INTRAMUSCULAR; INTRAVENOUS; SUBCUTANEOUS at 04:10

## 2023-10-19 RX ADMIN — INDOCYANINE GREEN AND WATER 7.5 MG: KIT at 09:10

## 2023-10-19 RX ADMIN — ACETAMINOPHEN 1000 MG: 10 INJECTION, SOLUTION INTRAVENOUS at 05:10

## 2023-10-19 RX ADMIN — Medication 100 MCG: at 08:10

## 2023-10-19 RX ADMIN — MIDAZOLAM HYDROCHLORIDE 1 MG: 1 INJECTION, SOLUTION INTRAMUSCULAR; INTRAVENOUS at 07:10

## 2023-10-19 RX ADMIN — SUGAMMADEX 200 MG: 100 INJECTION, SOLUTION INTRAVENOUS at 10:10

## 2023-10-19 RX ADMIN — FENTANYL CITRATE 50 MCG: 50 INJECTION, SOLUTION INTRAMUSCULAR; INTRAVENOUS at 08:10

## 2023-10-19 RX ADMIN — ALVIMOPAN 12 MG: 12 CAPSULE ORAL at 09:10

## 2023-10-19 RX ADMIN — ONDANSETRON 8 MG: 2 INJECTION INTRAMUSCULAR; INTRAVENOUS at 11:10

## 2023-10-19 RX ADMIN — SODIUM CHLORIDE, SODIUM GLUCONATE, SODIUM ACETATE, POTASSIUM CHLORIDE AND MAGNESIUM CHLORIDE: 526; 502; 368; 37; 30 INJECTION, SOLUTION INTRAVENOUS at 07:10

## 2023-10-19 RX ADMIN — ROCURONIUM BROMIDE 70 MG: 10 SOLUTION INTRAVENOUS at 07:10

## 2023-10-19 RX ADMIN — ACETAMINOPHEN 1000 MG: 10 INJECTION, SOLUTION INTRAVENOUS at 09:10

## 2023-10-19 RX ADMIN — ONDANSETRON 8 MG: 2 INJECTION INTRAMUSCULAR; INTRAVENOUS at 07:10

## 2023-10-19 RX ADMIN — FAMOTIDINE 20 MG: 10 INJECTION, SOLUTION INTRAVENOUS at 07:10

## 2023-10-19 RX ADMIN — Medication 100 MCG: at 07:10

## 2023-10-19 RX ADMIN — ALBUMIN (HUMAN) 100 ML: 12.5 SOLUTION INTRAVENOUS at 08:10

## 2023-10-19 RX ADMIN — ROCURONIUM BROMIDE 20 MG: 10 SOLUTION INTRAVENOUS at 08:10

## 2023-10-19 NOTE — NURSING
Nurses Note -- 4 Eyes      10/19/2023   5:00 PM      Skin assessed during: Admit      [x] No Altered Skin Integrity Present    []Prevention Measures Documented      [] Yes- Altered Skin Integrity Present or Discovered   [] LDA Added if Not in Epic (Describe Wound)   [] New Altered Skin Integrity was Present on Admit and Documented in LDA   [] Wound Image Taken    Wound Care Consulted? No    Attending Nurse:  Abbi Rangel RN/Staff Member:   Trudy Kramer Rn

## 2023-10-19 NOTE — ANESTHESIA PREPROCEDURE EVALUATION
10/19/2023  Indu Azul is a 68 y.o., female.      Pre-op Assessment    I have reviewed the Patient Summary Reports.     I have reviewed the Nursing Notes. I have reviewed the NPO Status.   I have reviewed the Medications.     Review of Systems  Anesthesia Hx:  Slow to wake up   Social:  Smoker    Hematology/Oncology:        Hematology Comments: Hx Thrombocytopenia  plt 166   Cardiovascular:   Hypertension Denies MI.    Denies Angina.  Denies CHF.    Pulmonary:   Denies COPD.  Denies Asthma.    Renal/:   Chronic Renal Disease (stage 4), CKD    Hepatic/GI:   GERD, well controlled Liver Disease, (cirrhosis) Denies Hepatitis.    Neurological:   Denies CVA. Denies Seizures.   Peripheral Neuropathy    Endocrine:   Diabetes, poorly controlled, type 2, using insulin Denies Hypothyroidism. Denies Hyperthyroidism.  Obesity / BMI > 30      Physical Exam  General: Well nourished, Cooperative, Alert and Oriented    Airway:  Mallampati: I   Mouth Opening: Normal  TM Distance: Normal  Tongue: Normal  Neck ROM: Normal ROM    Dental:  Intact        Anesthesia Plan  Type of Anesthesia, risks & benefits discussed:    Anesthesia Type: Gen ETT  Intra-op Monitoring Plan: Standard ASA Monitors  Post Op Pain Control Plan: multimodal analgesia  Induction:  IV  Informed Consent: Informed consent signed with the Patient and all parties understand the risks and agree with anesthesia plan.  All questions answered. Patient consented to blood products? Yes  ASA Score: 3  Day of Surgery Review of History & Physical: H&P Update referred to the surgeon/provider.  Anesthesia Plan Notes: Hansa requested  BIS requested    Ready For Surgery From Anesthesia Perspective.     .

## 2023-10-19 NOTE — ANESTHESIA POSTPROCEDURE EVALUATION
Anesthesia Post Evaluation    Patient: Indu Azul    Procedure(s) Performed: Procedure(s) (LRB):  XI ROBOTIC COLECTOMY, RIGHT (N/A)    Final Anesthesia Type: general      Patient location during evaluation: PACU  Patient participation: Yes- Able to Participate  Level of consciousness: awake and alert  Post-procedure vital signs: reviewed and stable  Pain management: adequate  Airway patency: patent    PONV status at discharge: No PONV  Anesthetic complications: no      Cardiovascular status: blood pressure returned to baseline  Respiratory status: spontaneous ventilation and unassisted  Hydration status: euvolemic  Follow-up needed           Vitals Value Taken Time   /79 10/19/23 1305   Temp 36.4 °C (97.6 °F) 10/19/23 1022   Pulse 82 10/19/23 1305   Resp 18 10/19/23 1329   SpO2 97 % 10/19/23 1305         Event Time   Out of Recovery 11:00:00         Pain/Lisa Score: Pain Rating Prior to Med Admin: 9 (10/19/2023  1:29 PM)  Pain Rating Post Med Admin: 2 (10/19/2023  1:59 PM)  Lisa Score: 8 (10/19/2023 11:00 AM)

## 2023-10-19 NOTE — TRANSFER OF CARE
"Anesthesia Transfer of Care Note    Patient: Indu Azul    Procedure(s) Performed: Procedure(s) (LRB):  XI ROBOTIC COLECTOMY, RIGHT (N/A)    Patient location: PACU    Anesthesia Type: general    Transport from OR: Transported from OR on room air with adequate spontaneous ventilation    Post pain: adequate analgesia    Post assessment: no apparent anesthetic complications    Post vital signs: stable    Level of consciousness: sedated and responds to stimulation    Nausea/Vomiting: no nausea/vomiting    Complications: none    Transfer of care protocol was followed      Last vitals:   Visit Vitals  /66 (BP Location: Right arm, Patient Position: Sitting)   Pulse 75   Temp 36.6 °C (97.9 °F) (Oral)   Resp 13   Ht 5' 5" (1.651 m)   Wt 104.3 kg (230 lb)   SpO2 100%   Breastfeeding No   BMI 38.27 kg/m²     "

## 2023-10-19 NOTE — ANESTHESIA PROCEDURE NOTES
Intubation    Date/Time: 10/19/2023 7:28 AM    Performed by: Johnna Melo  Authorized by: Shadi Faye DO    Intubation:     Induction:  Intravenous    Intubated:  Postinduction    Mask Ventilation:  Easy mask    Attempts:  1    Attempted By:  Student    Method of Intubation:  Direct    Blade:  Santo 3    Laryngeal View Grade: Grade I - full view of cords      Difficult Airway Encountered?: No      Complications:  None    Airway Device:  Oral endotracheal tube    Airway Device Size:  7.5    Style/Cuff Inflation:  Cuffed (inflated to minimal occlusive pressure)    Inflation Amount (mL):  6    Tube secured:  22    Secured at:  The teeth    Placement Verified By:  Capnometry    Complicating Factors:  None    Findings Post-Intubation:  BS equal bilateral and atraumatic/condition of teeth unchanged

## 2023-10-19 NOTE — OP NOTE
Date of Surgery:  10/19/2023    Surgeon:   Timothy Russ MD    Assistant: Dejah BARRIOS    Preoperative Diagnosis:  Ascending colon mass    Postoperative Diagnosis: Same    Procedure:    Laparoscopic/Robotic right hemicolectomy  Creation of omental pedicled flap    Anesthesia: GETA    Estimated Blood Loss:  25 cc                    Specimens:  Right hemicolectomy            Intraoperative findings: Tattoo was clearly visible in the ascending colon.  Formal right hemicolectomy was performed with high ligation of the ileocolic pedicle and right branch of middle colic.  Side-to-side functional end-to-end staple anastomosis was performed between the terminal ileum and the mid transverse colon.  Firefly visualization revealed excellent perfusion to the anastomosis.    Synopsis:    Operation performed with curative intent: Yes  Tumor location: ascending colon  Extent of Colon/Vascular resection: Right hemicolectomy - ileocolic, right colic       Procedure Details: After informed consent was obtained the patient was brought to the operating room placed in supine position.  General endotracheal anesthesia was administered without difficulty.  The patient's abdomen was prepped and draped in sterile fashion.  Under ultrasound guidance bilateral transversus abdominis plain nerve blocks were performed using liposomal bupivacaine.  Incision was made to the left of the umbilicus and an optical trocar was used into the peritoneal cavity under direct vision.  Pneumoperitoneum was achieved without difficulty.  The abdomen was explored laparoscopically there was no evidence of metastatic disease.  Additional working ports were placed under direct vision.  The omentum was retracted cephalad and placed over the top of the transverse colon.  The omentum was taken off the transverse colon in the avascular plane, the right-sided omental vessels were divided using the vessel sealer and the left-sided omental vessels were preserved off  of the gastroepiploic arcade creating an omental pedicle flap.  The small bowel was reflected towards the left.  The patient was tilted towards the left and placed in slight Trendelenburg position.  The robot was docked.  Dissection was begun along the medial aspect of the ileocolic pedicle dissecting in the avascular plane posterior to the pedicle.  The ileocolic pedicle was then dissected circumferentially at its origin and divided using a vascular load stapler.  The retroperitoneal plane was developed in the mesocolon in the retroperitoneum the peritoneal attachments of the right colon were incised and the colon was dissected out of the retroperitoneum in the avascular plane including mobilization of the terminal ileum and takedown of the hepatic flexure.  The very distal terminal ileum was then dissected circumferentially and divided using a blue load stapler.  The mesentery was divided perpendicularly using the vessel sealer preserving the vascular arcade.  In similar fashion.  The mid transverse colon was dissected circumferentially and divided using a blue load stapler.  The mesentery was divided perpendicularly using the vessel sealer preserving the vascular arcade.  The specimen was placed over the right lobe of the liver.  The 2 ends of the bowel were apposed.  Indocyanine green was injected intravenously and under firefly visualization excellent perfusion was noted to the proposed anastomosis site.  The terminal ileum was attached to the transverse colon using 2-0 silk Lembert sutures.  Colotomy and enterotomies were made there was no spillage side-to-side functional end-to-end staple anastomosis was then performed using a 45 mm blue load stapler.  Common enterotomy was reapproximated with interrupted silks and then closed using additional firing of the stapler.  The anastomosis was carefully examined there was no evidence of tension or ischemia whatsoever it was allowed to lie naturally in the right  upper quadrant.  The omental pedicle flap was then advanced to fill the void in the right upper quadrant retroperitoneum.  The robot was undocked.  The specimen was placed in a 15 Endo Catch bag and removed via the stapler port site which had to be expanded somewhat.  The fascia was closed at this port site using interrupted 0 Vicryl figure-of-eight sutures.  Pneumoperitoneum was reestablished right upper quadrant was irrigated the area was suctioned out the area was inspected for hemostasis which appeared to be excellent ports were removed pneumoperitoneum was relieved port sites were closed with absorbable suture sterile dressings were applied the patient tolerated the procedure well she was brought to recovery in stable condition.    Significant surgical tasks conducted by the assistant:  The skilled assistance of the nurse practitioner DENIS Cordova was necessary for the successful completion of this case.  She was essential for the proper positioning of the patient, manipulation of instruments, proper exposure, manipulation of tissue, and wound closure          Timothy Russ MD  Surgical Oncology  Complex General, Gastrointestinal and Hepatobiliary Surgery

## 2023-10-20 LAB
ALBUMIN SERPL-MCNC: 2.7 G/DL (ref 3.4–4.8)
ALBUMIN/GLOB SERPL: 1.1 RATIO (ref 1.1–2)
ALP SERPL-CCNC: 59 UNIT/L (ref 40–150)
ALT SERPL-CCNC: 7 UNIT/L (ref 0–55)
APTT PPP: 32.2 SECONDS (ref 23.2–33.7)
AST SERPL-CCNC: 10 UNIT/L (ref 5–34)
BASOPHILS # BLD AUTO: 0.02 X10(3)/MCL
BASOPHILS NFR BLD AUTO: 0.2 %
BILIRUB SERPL-MCNC: 0.4 MG/DL
BUN SERPL-MCNC: 14.8 MG/DL (ref 9.8–20.1)
CALCIUM SERPL-MCNC: 7.8 MG/DL (ref 8.4–10.2)
CHLORIDE SERPL-SCNC: 104 MMOL/L (ref 98–107)
CO2 SERPL-SCNC: 25 MMOL/L (ref 23–31)
CREAT SERPL-MCNC: 1.33 MG/DL (ref 0.55–1.02)
EOSINOPHIL # BLD AUTO: 0.32 X10(3)/MCL (ref 0–0.9)
EOSINOPHIL NFR BLD AUTO: 2.8 %
ERYTHROCYTE [DISTWIDTH] IN BLOOD BY AUTOMATED COUNT: 16.6 % (ref 11.5–17)
GFR SERPLBLD CREATININE-BSD FMLA CKD-EPI: 44 MLS/MIN/1.73/M2
GLOBULIN SER-MCNC: 2.4 GM/DL (ref 2.4–3.5)
GLUCOSE SERPL-MCNC: 111 MG/DL (ref 82–115)
GLUCOSE SERPL-MCNC: 117 MG/DL (ref 70–110)
GLUCOSE SERPL-MCNC: 158 MG/DL (ref 70–110)
HCT VFR BLD AUTO: 28.9 % (ref 37–47)
HGB BLD-MCNC: 8.8 G/DL (ref 12–16)
IMM GRANULOCYTES # BLD AUTO: 0.05 X10(3)/MCL (ref 0–0.04)
IMM GRANULOCYTES NFR BLD AUTO: 0.4 %
INR PPP: 1.3
LYMPHOCYTES # BLD AUTO: 0.39 X10(3)/MCL (ref 0.6–4.6)
LYMPHOCYTES NFR BLD AUTO: 3.4 %
MAGNESIUM SERPL-MCNC: 1.9 MG/DL (ref 1.6–2.6)
MCH RBC QN AUTO: 22.8 PG (ref 27–31)
MCHC RBC AUTO-ENTMCNC: 30.4 G/DL (ref 33–36)
MCV RBC AUTO: 74.9 FL (ref 80–94)
MONOCYTES # BLD AUTO: 0.42 X10(3)/MCL (ref 0.1–1.3)
MONOCYTES NFR BLD AUTO: 3.6 %
NEUTROPHILS # BLD AUTO: 10.36 X10(3)/MCL (ref 2.1–9.2)
NEUTROPHILS NFR BLD AUTO: 89.6 %
NRBC BLD AUTO-RTO: 0 %
PLATELET # BLD AUTO: 143 X10(3)/MCL (ref 130–400)
PMV BLD AUTO: ABNORMAL FL
POTASSIUM SERPL-SCNC: 4.2 MMOL/L (ref 3.5–5.1)
PROT SERPL-MCNC: 5.1 GM/DL (ref 5.8–7.6)
PROTHROMBIN TIME: 16.3 SECONDS (ref 12.5–14.5)
RBC # BLD AUTO: 3.86 X10(6)/MCL (ref 4.2–5.4)
SODIUM SERPL-SCNC: 137 MMOL/L (ref 136–145)
WBC # SPEC AUTO: 11.56 X10(3)/MCL (ref 4.5–11.5)

## 2023-10-20 PROCEDURE — 85610 PROTHROMBIN TIME: CPT | Performed by: NURSE PRACTITIONER

## 2023-10-20 PROCEDURE — 27000221 HC OXYGEN, UP TO 24 HOURS

## 2023-10-20 PROCEDURE — 80053 COMPREHEN METABOLIC PANEL: CPT | Performed by: NURSE PRACTITIONER

## 2023-10-20 PROCEDURE — 25000003 PHARM REV CODE 250: Performed by: NURSE PRACTITIONER

## 2023-10-20 PROCEDURE — 85025 COMPLETE CBC W/AUTO DIFF WBC: CPT | Performed by: NURSE PRACTITIONER

## 2023-10-20 PROCEDURE — 63600175 PHARM REV CODE 636 W HCPCS: Performed by: SURGERY

## 2023-10-20 PROCEDURE — 11000001 HC ACUTE MED/SURG PRIVATE ROOM

## 2023-10-20 PROCEDURE — 94760 N-INVAS EAR/PLS OXIMETRY 1: CPT

## 2023-10-20 PROCEDURE — 99900035 HC TECH TIME PER 15 MIN (STAT)

## 2023-10-20 PROCEDURE — 25000003 PHARM REV CODE 250: Performed by: SURGERY

## 2023-10-20 PROCEDURE — 83735 ASSAY OF MAGNESIUM: CPT | Performed by: NURSE PRACTITIONER

## 2023-10-20 PROCEDURE — 85730 THROMBOPLASTIN TIME PARTIAL: CPT | Performed by: NURSE PRACTITIONER

## 2023-10-20 PROCEDURE — 63600175 PHARM REV CODE 636 W HCPCS: Performed by: NURSE PRACTITIONER

## 2023-10-20 RX ORDER — ACETAMINOPHEN 10 MG/ML
1000 INJECTION, SOLUTION INTRAVENOUS ONCE
Status: DISCONTINUED | OUTPATIENT
Start: 2023-10-20 | End: 2023-10-20

## 2023-10-20 RX ORDER — ONDANSETRON 2 MG/ML
4 INJECTION INTRAMUSCULAR; INTRAVENOUS EVERY 4 HOURS PRN
Status: DISCONTINUED | OUTPATIENT
Start: 2023-10-20 | End: 2023-10-23 | Stop reason: HOSPADM

## 2023-10-20 RX ORDER — ACETAMINOPHEN 10 MG/ML
1000 INJECTION, SOLUTION INTRAVENOUS EVERY 6 HOURS
Status: COMPLETED | OUTPATIENT
Start: 2023-10-20 | End: 2023-10-21

## 2023-10-20 RX ORDER — MUPIROCIN 20 MG/G
OINTMENT TOPICAL 2 TIMES DAILY
Status: DISCONTINUED | OUTPATIENT
Start: 2023-10-20 | End: 2023-10-23 | Stop reason: HOSPADM

## 2023-10-20 RX ORDER — ACETAMINOPHEN 10 MG/ML
1000 INJECTION, SOLUTION INTRAVENOUS EVERY 6 HOURS
Status: DISCONTINUED | OUTPATIENT
Start: 2023-10-21 | End: 2023-10-20

## 2023-10-20 RX ADMIN — ACETAMINOPHEN 1000 MG: 10 INJECTION, SOLUTION INTRAVENOUS at 05:10

## 2023-10-20 RX ADMIN — ENOXAPARIN SODIUM 40 MG: 40 INJECTION SUBCUTANEOUS at 04:10

## 2023-10-20 RX ADMIN — HYDROMORPHONE HYDROCHLORIDE 1 MG: 2 INJECTION INTRAMUSCULAR; INTRAVENOUS; SUBCUTANEOUS at 06:10

## 2023-10-20 RX ADMIN — HYDROMORPHONE HYDROCHLORIDE 1 MG: 2 INJECTION INTRAMUSCULAR; INTRAVENOUS; SUBCUTANEOUS at 04:10

## 2023-10-20 RX ADMIN — MUPIROCIN: 20 OINTMENT TOPICAL at 08:10

## 2023-10-20 RX ADMIN — HYDROMORPHONE HYDROCHLORIDE 1 MG: 2 INJECTION INTRAMUSCULAR; INTRAVENOUS; SUBCUTANEOUS at 01:10

## 2023-10-20 RX ADMIN — ONDANSETRON 4 MG: 2 INJECTION INTRAMUSCULAR; INTRAVENOUS at 06:10

## 2023-10-20 RX ADMIN — ALVIMOPAN 12 MG: 12 CAPSULE ORAL at 08:10

## 2023-10-20 RX ADMIN — SODIUM CHLORIDE 500 ML: 9 INJECTION, SOLUTION INTRAVENOUS at 07:10

## 2023-10-20 RX ADMIN — SODIUM CHLORIDE, POTASSIUM CHLORIDE, SODIUM LACTATE AND CALCIUM CHLORIDE: 600; 310; 30; 20 INJECTION, SOLUTION INTRAVENOUS at 11:10

## 2023-10-20 RX ADMIN — ONDANSETRON 4 MG: 2 INJECTION INTRAMUSCULAR; INTRAVENOUS at 01:10

## 2023-10-20 RX ADMIN — HYDROMORPHONE HYDROCHLORIDE 1 MG: 2 INJECTION INTRAMUSCULAR; INTRAVENOUS; SUBCUTANEOUS at 09:10

## 2023-10-20 RX ADMIN — HYDROMORPHONE HYDROCHLORIDE 1 MG: 2 INJECTION INTRAMUSCULAR; INTRAVENOUS; SUBCUTANEOUS at 11:10

## 2023-10-20 RX ADMIN — ACETAMINOPHEN 1000 MG: 10 INJECTION, SOLUTION INTRAVENOUS at 08:10

## 2023-10-20 RX ADMIN — ACETAMINOPHEN 1000 MG: 10 INJECTION, SOLUTION INTRAVENOUS at 02:10

## 2023-10-20 NOTE — AI DETERIORATION ALERT
Artificial Intelligence Notification  [facility]  [address]  [place] [address]  Phone: [phone]    This documentation was triggered by an Artificial Intelligence Notification:    Admit Date: 10/19/2023   LOS: 1  Code Status: Full Code  : 1955  Age: 68 y.o.  Weight:   Wt Readings from Last 1 Encounters:   10/19/23 104.3 kg (230 lb)        Sex: female  Bed: [facility]  MRN: [id number]  Attending Physician: Timothy Russ MD     Date of Alert: 10/20/2023  Time AI Alert Received:             Vitals:    10/20/23 0832   BP: 100/62   Pulse: 89   Resp:    Temp:      SpO2: 98 %      Artificial Intelligence alert discussed with Provider:     Name: Dejah De La Cruz NP; ZACH Salgado   Date/Time of Provider Notification: 923    BP this am originally 80/52, nurse rechecked bp @0832 and it was 100/62. Nurse will discuss with WILBER Pond.No new issues or concerns at this time.      Patient Condition: stable

## 2023-10-20 NOTE — PROGRESS NOTES
SURG ONC POD 1    PT RESTING IN BED  REPORTS NAUSEA AND EPISODE OF VOMITING LAST NIGHT  TAKEN IN SMALL AMOUNTS OF CLEARS  PAIN CONTROLLED    ABD SOFT  INCISIONS HEALING WELL    VSS; NO FEVER  WBC 74533   8/28  LYTES REVIEWED   UO ADEQUATE    PLAN  CLEARS IF TOLERATED, ZOFRAN FOR NAUSEA  UP AND MOVING  DC GALLEGOS

## 2023-10-20 NOTE — PROGRESS NOTES
"Inpatient Nutrition Evaluation    Admit Date: 10/19/2023   Total duration of encounter: 1 day    Nutrition Recommendation/Prescription     -Advance diet as tolerated per MD. Goal Diet: Low Residue with diabetic diet restriction.   -Monitor wt, labs, and intake.   -Medical management of nausea per MD.     Nutrition Assessment     Chart Review    Reason Seen: continuous nutrition monitoring    Malnutrition Screening Tool Results   Have you recently lost weight without trying?: No  Have you been eating poorly because of a decreased appetite?: No   MST Score: 0     Diagnosis:  Ascending colon mass s/p  Laparoscopic/Robotic right hemicolectomy  Creation of omental pedicled flap    Relevant Medical History:    Charcot's joint of foot, left    Chronic kidney disease, unspecified    Cirrhosis of liver without ascites    Depression    Diabetes mellitus, type II, insulin dependent    Diabetic neuropathy    GERD (gastroesophageal reflux disease)    H/O: hysterectomy    Hepatic steatosis    HLD (hyperlipidemia)    HTN (hypertension)    Other hyperlipidemia    Overactive bladder    Thrombocytopenia, unspecified    Type 2 diabetes mellitus with unspecified diabetic retinopathy without macular edema    Vitamin D deficiency       Nutrition-Related Medications: reviewed     Nutrition-Related Labs:  10/18/23: Glu 93, GFR>60    Diet Order: Diet clear liquid No Concentrated Sweets  Oral Supplement Order: none  Appetite/Oral Intake: poor/0-25% of meals  Factors Affecting Nutritional Intake: nausea and vomiting  Food/Restorationist/Cultural Preferences: none reported  Food Allergies: no known food allergies    Skin Integrity: incision  Wound(s):   surgical incision     Comments    10/20/23: Pt on clears but not taking in much 2/2 nausea and some vomiting after surgery; pt reports usual good appetite and denies any wt loss prior to admit.     Anthropometrics    Height: 5' 5" (165.1 cm) Height Method: Stated  Last Weight: 104.3 kg (230 lb) " (10/19/23 1192) Weight Method: Bed Scale  BMI (Calculated): 38.3  BMI Classification: obese grade II (BMI 35-39.9)     Ideal Body Weight (IBW), Female: 125 lb     % Ideal Body Weight, Female (lb): 184 %                             Usual Weight Provided By: patient    Wt Readings from Last 5 Encounters:   10/19/23 104.3 kg (230 lb)   10/10/23 107 kg (236 lb)   09/26/23 105.4 kg (232 lb 4.8 oz)   09/08/23 109.5 kg (241 lb 6.4 oz)   08/25/23 108.9 kg (240 lb)     Weight Change(s) Since Admission:  Admit Weight: 104.3 kg (230 lb) (10/12/23 1238)      Patient Education    Not applicable.    Monitoring & Evaluation     Dietitian will monitor energy intake and weight.  Nutrition Risk/Follow-Up: low (follow-up in 5-7 days)  Patients assigned 'low nutrition risk' status do not qualify for a full nutritional assessment but will be monitored and re-evaluated in a 5-7 day time period. Please consult if re-evaluation needed sooner.

## 2023-10-21 LAB
ALBUMIN SERPL-MCNC: 2.5 G/DL (ref 3.4–4.8)
ALBUMIN/GLOB SERPL: 1.1 RATIO (ref 1.1–2)
ALP SERPL-CCNC: 59 UNIT/L (ref 40–150)
ALT SERPL-CCNC: 7 UNIT/L (ref 0–55)
APTT PPP: 36 SECONDS (ref 23.2–33.7)
AST SERPL-CCNC: 9 UNIT/L (ref 5–34)
BASOPHILS # BLD AUTO: 0.01 X10(3)/MCL
BASOPHILS NFR BLD AUTO: 0.2 %
BILIRUB SERPL-MCNC: 0.4 MG/DL
BUN SERPL-MCNC: 14.7 MG/DL (ref 9.8–20.1)
CALCIUM SERPL-MCNC: 7.5 MG/DL (ref 8.4–10.2)
CHLORIDE SERPL-SCNC: 105 MMOL/L (ref 98–107)
CO2 SERPL-SCNC: 26 MMOL/L (ref 23–31)
CREAT SERPL-MCNC: 1.39 MG/DL (ref 0.55–1.02)
EOSINOPHIL # BLD AUTO: 0.21 X10(3)/MCL (ref 0–0.9)
EOSINOPHIL NFR BLD AUTO: 3.2 %
ERYTHROCYTE [DISTWIDTH] IN BLOOD BY AUTOMATED COUNT: 16.6 % (ref 11.5–17)
GFR SERPLBLD CREATININE-BSD FMLA CKD-EPI: 42 MLS/MIN/1.73/M2
GLOBULIN SER-MCNC: 2.3 GM/DL (ref 2.4–3.5)
GLUCOSE SERPL-MCNC: 121 MG/DL (ref 70–110)
GLUCOSE SERPL-MCNC: 92 MG/DL (ref 82–115)
GLUCOSE SERPL-MCNC: 98 MG/DL (ref 70–110)
HCT VFR BLD AUTO: 25.8 % (ref 37–47)
HCT VFR BLD AUTO: 28.1 % (ref 37–47)
HGB BLD-MCNC: 7.8 G/DL (ref 12–16)
HGB BLD-MCNC: 8.6 G/DL (ref 12–16)
IMM GRANULOCYTES # BLD AUTO: 0.03 X10(3)/MCL (ref 0–0.04)
IMM GRANULOCYTES NFR BLD AUTO: 0.5 %
INR PPP: 1.3
LYMPHOCYTES # BLD AUTO: 0.37 X10(3)/MCL (ref 0.6–4.6)
LYMPHOCYTES NFR BLD AUTO: 5.6 %
MAGNESIUM SERPL-MCNC: 1.9 MG/DL (ref 1.6–2.6)
MCH RBC QN AUTO: 22.9 PG (ref 27–31)
MCHC RBC AUTO-ENTMCNC: 30.2 G/DL (ref 33–36)
MCV RBC AUTO: 75.7 FL (ref 80–94)
MONOCYTES # BLD AUTO: 0.26 X10(3)/MCL (ref 0.1–1.3)
MONOCYTES NFR BLD AUTO: 4 %
NEUTROPHILS # BLD AUTO: 5.67 X10(3)/MCL (ref 2.1–9.2)
NEUTROPHILS NFR BLD AUTO: 86.5 %
NRBC BLD AUTO-RTO: 0 %
PLATELET # BLD AUTO: 114 X10(3)/MCL (ref 130–400)
PMV BLD AUTO: 12.2 FL (ref 7.4–10.4)
POTASSIUM SERPL-SCNC: 3.9 MMOL/L (ref 3.5–5.1)
PROT SERPL-MCNC: 4.8 GM/DL (ref 5.8–7.6)
PROTHROMBIN TIME: 16.3 SECONDS (ref 12.5–14.5)
RBC # BLD AUTO: 3.41 X10(6)/MCL (ref 4.2–5.4)
SODIUM SERPL-SCNC: 137 MMOL/L (ref 136–145)
WBC # SPEC AUTO: 6.55 X10(3)/MCL (ref 4.5–11.5)

## 2023-10-21 PROCEDURE — 83735 ASSAY OF MAGNESIUM: CPT | Performed by: NURSE PRACTITIONER

## 2023-10-21 PROCEDURE — 99900035 HC TECH TIME PER 15 MIN (STAT)

## 2023-10-21 PROCEDURE — 85014 HEMATOCRIT: CPT | Performed by: SURGERY

## 2023-10-21 PROCEDURE — 25000003 PHARM REV CODE 250: Performed by: NURSE PRACTITIONER

## 2023-10-21 PROCEDURE — 11000001 HC ACUTE MED/SURG PRIVATE ROOM

## 2023-10-21 PROCEDURE — 63600175 PHARM REV CODE 636 W HCPCS: Performed by: SURGERY

## 2023-10-21 PROCEDURE — 80053 COMPREHEN METABOLIC PANEL: CPT | Performed by: NURSE PRACTITIONER

## 2023-10-21 PROCEDURE — 85610 PROTHROMBIN TIME: CPT | Performed by: NURSE PRACTITIONER

## 2023-10-21 PROCEDURE — 85025 COMPLETE CBC W/AUTO DIFF WBC: CPT | Performed by: NURSE PRACTITIONER

## 2023-10-21 PROCEDURE — 63600175 PHARM REV CODE 636 W HCPCS: Performed by: NURSE PRACTITIONER

## 2023-10-21 PROCEDURE — 85730 THROMBOPLASTIN TIME PARTIAL: CPT | Performed by: NURSE PRACTITIONER

## 2023-10-21 RX ADMIN — ACETAMINOPHEN 1000 MG: 10 INJECTION, SOLUTION INTRAVENOUS at 04:10

## 2023-10-21 RX ADMIN — ALVIMOPAN 12 MG: 12 CAPSULE ORAL at 09:10

## 2023-10-21 RX ADMIN — HYDROMORPHONE HYDROCHLORIDE 1 MG: 2 INJECTION INTRAMUSCULAR; INTRAVENOUS; SUBCUTANEOUS at 02:10

## 2023-10-21 RX ADMIN — MUPIROCIN: 20 OINTMENT TOPICAL at 08:10

## 2023-10-21 RX ADMIN — ALVIMOPAN 12 MG: 12 CAPSULE ORAL at 08:10

## 2023-10-21 RX ADMIN — HYDROMORPHONE HYDROCHLORIDE 1 MG: 2 INJECTION INTRAMUSCULAR; INTRAVENOUS; SUBCUTANEOUS at 07:10

## 2023-10-21 RX ADMIN — HYDROMORPHONE HYDROCHLORIDE 1 MG: 2 INJECTION INTRAMUSCULAR; INTRAVENOUS; SUBCUTANEOUS at 09:10

## 2023-10-21 RX ADMIN — HYDROMORPHONE HYDROCHLORIDE 1 MG: 2 INJECTION INTRAMUSCULAR; INTRAVENOUS; SUBCUTANEOUS at 01:10

## 2023-10-21 RX ADMIN — ACETAMINOPHEN 1000 MG: 10 INJECTION, SOLUTION INTRAVENOUS at 12:10

## 2023-10-21 RX ADMIN — HYDROMORPHONE HYDROCHLORIDE 1 MG: 2 INJECTION INTRAMUSCULAR; INTRAVENOUS; SUBCUTANEOUS at 05:10

## 2023-10-21 RX ADMIN — ACETAMINOPHEN 1000 MG: 10 INJECTION, SOLUTION INTRAVENOUS at 07:10

## 2023-10-21 RX ADMIN — MUPIROCIN: 20 OINTMENT TOPICAL at 09:10

## 2023-10-21 RX ADMIN — ENOXAPARIN SODIUM 40 MG: 40 INJECTION SUBCUTANEOUS at 05:10

## 2023-10-21 RX ADMIN — SODIUM CHLORIDE, POTASSIUM CHLORIDE, SODIUM LACTATE AND CALCIUM CHLORIDE: 600; 310; 30; 20 INJECTION, SOLUTION INTRAVENOUS at 03:10

## 2023-10-21 NOTE — PROGRESS NOTES
C/o poor appetite does not care for the clear liquids  Denies nausea, no BM or flatus     Afebrile, vital signs stable  Urine output adequate  No apparent distress  Regular rate and rhythm, clear to auscultation bilaterally  Abdomen is soft, nondistended, incisions are clean dry and intact  Extremities without edema, SCDs in place     Labs reviewed    Advance diet to something more palatable for her  Trend H&H  Encourage ambulation

## 2023-10-22 LAB
ALBUMIN SERPL-MCNC: 2.5 G/DL (ref 3.4–4.8)
ALBUMIN/GLOB SERPL: 1 RATIO (ref 1.1–2)
ALP SERPL-CCNC: 68 UNIT/L (ref 40–150)
ALT SERPL-CCNC: 7 UNIT/L (ref 0–55)
APTT PPP: 35.1 SECONDS (ref 23.2–33.7)
AST SERPL-CCNC: 9 UNIT/L (ref 5–34)
BASOPHILS # BLD AUTO: 0.01 X10(3)/MCL
BASOPHILS NFR BLD AUTO: 0.2 %
BILIRUB SERPL-MCNC: 0.3 MG/DL
BUN SERPL-MCNC: 12.1 MG/DL (ref 9.8–20.1)
CALCIUM SERPL-MCNC: 7.7 MG/DL (ref 8.4–10.2)
CHLORIDE SERPL-SCNC: 107 MMOL/L (ref 98–107)
CO2 SERPL-SCNC: 26 MMOL/L (ref 23–31)
CREAT SERPL-MCNC: 1.09 MG/DL (ref 0.55–1.02)
EOSINOPHIL # BLD AUTO: 0.26 X10(3)/MCL (ref 0–0.9)
EOSINOPHIL NFR BLD AUTO: 5 %
ERYTHROCYTE [DISTWIDTH] IN BLOOD BY AUTOMATED COUNT: 16.5 % (ref 11.5–17)
GFR SERPLBLD CREATININE-BSD FMLA CKD-EPI: 56 MLS/MIN/1.73/M2
GLOBULIN SER-MCNC: 2.5 GM/DL (ref 2.4–3.5)
GLUCOSE SERPL-MCNC: 117 MG/DL (ref 82–115)
GLUCOSE SERPL-MCNC: 189 MG/DL (ref 70–110)
HCT VFR BLD AUTO: 27.3 % (ref 37–47)
HGB BLD-MCNC: 8.3 G/DL (ref 12–16)
IMM GRANULOCYTES # BLD AUTO: 0.02 X10(3)/MCL (ref 0–0.04)
IMM GRANULOCYTES NFR BLD AUTO: 0.4 %
INR PPP: 1.2
LYMPHOCYTES # BLD AUTO: 0.46 X10(3)/MCL (ref 0.6–4.6)
LYMPHOCYTES NFR BLD AUTO: 8.9 %
MAGNESIUM SERPL-MCNC: 2 MG/DL (ref 1.6–2.6)
MCH RBC QN AUTO: 22.7 PG (ref 27–31)
MCHC RBC AUTO-ENTMCNC: 30.4 G/DL (ref 33–36)
MCV RBC AUTO: 74.6 FL (ref 80–94)
MONOCYTES # BLD AUTO: 0.23 X10(3)/MCL (ref 0.1–1.3)
MONOCYTES NFR BLD AUTO: 4.4 %
NEUTROPHILS # BLD AUTO: 4.2 X10(3)/MCL (ref 2.1–9.2)
NEUTROPHILS NFR BLD AUTO: 81.1 %
NRBC BLD AUTO-RTO: 0 %
PLATELET # BLD AUTO: 126 X10(3)/MCL (ref 130–400)
PMV BLD AUTO: ABNORMAL FL
POTASSIUM SERPL-SCNC: 4 MMOL/L (ref 3.5–5.1)
PROT SERPL-MCNC: 5 GM/DL (ref 5.8–7.6)
PROTHROMBIN TIME: 15.4 SECONDS (ref 12.5–14.5)
RBC # BLD AUTO: 3.66 X10(6)/MCL (ref 4.2–5.4)
SODIUM SERPL-SCNC: 140 MMOL/L (ref 136–145)
WBC # SPEC AUTO: 5.18 X10(3)/MCL (ref 4.5–11.5)

## 2023-10-22 PROCEDURE — 63600175 PHARM REV CODE 636 W HCPCS: Performed by: NURSE PRACTITIONER

## 2023-10-22 PROCEDURE — 85730 THROMBOPLASTIN TIME PARTIAL: CPT | Performed by: NURSE PRACTITIONER

## 2023-10-22 PROCEDURE — 63600175 PHARM REV CODE 636 W HCPCS: Performed by: SURGERY

## 2023-10-22 PROCEDURE — 11000001 HC ACUTE MED/SURG PRIVATE ROOM

## 2023-10-22 PROCEDURE — 80053 COMPREHEN METABOLIC PANEL: CPT | Performed by: NURSE PRACTITIONER

## 2023-10-22 PROCEDURE — 25000003 PHARM REV CODE 250: Performed by: SURGERY

## 2023-10-22 PROCEDURE — 85610 PROTHROMBIN TIME: CPT | Performed by: NURSE PRACTITIONER

## 2023-10-22 PROCEDURE — 25000003 PHARM REV CODE 250: Performed by: NURSE PRACTITIONER

## 2023-10-22 PROCEDURE — 85025 COMPLETE CBC W/AUTO DIFF WBC: CPT | Performed by: NURSE PRACTITIONER

## 2023-10-22 PROCEDURE — 83735 ASSAY OF MAGNESIUM: CPT | Performed by: NURSE PRACTITIONER

## 2023-10-22 RX ORDER — HYDROCODONE BITARTRATE AND ACETAMINOPHEN 7.5; 325 MG/1; MG/1
1 TABLET ORAL EVERY 6 HOURS PRN
Status: DISCONTINUED | OUTPATIENT
Start: 2023-10-22 | End: 2023-10-23

## 2023-10-22 RX ADMIN — HYDROCODONE BITARTRATE AND ACETAMINOPHEN 1 TABLET: 7.5; 325 TABLET ORAL at 02:10

## 2023-10-22 RX ADMIN — HYDROCODONE BITARTRATE AND ACETAMINOPHEN 1 TABLET: 7.5; 325 TABLET ORAL at 09:10

## 2023-10-22 RX ADMIN — HYDROMORPHONE HYDROCHLORIDE 1 MG: 2 INJECTION INTRAMUSCULAR; INTRAVENOUS; SUBCUTANEOUS at 05:10

## 2023-10-22 RX ADMIN — ALVIMOPAN 12 MG: 12 CAPSULE ORAL at 09:10

## 2023-10-22 RX ADMIN — ENOXAPARIN SODIUM 40 MG: 40 INJECTION SUBCUTANEOUS at 04:10

## 2023-10-22 RX ADMIN — INSULIN ASPART 1 UNITS: 100 INJECTION, SOLUTION INTRAVENOUS; SUBCUTANEOUS at 09:10

## 2023-10-22 RX ADMIN — HYDROMORPHONE HYDROCHLORIDE 1 MG: 2 INJECTION INTRAMUSCULAR; INTRAVENOUS; SUBCUTANEOUS at 03:10

## 2023-10-22 RX ADMIN — INSULIN ASPART 2 UNITS: 100 INJECTION, SOLUTION INTRAVENOUS; SUBCUTANEOUS at 02:10

## 2023-10-22 RX ADMIN — ONDANSETRON 4 MG: 2 INJECTION INTRAMUSCULAR; INTRAVENOUS at 12:10

## 2023-10-22 RX ADMIN — MUPIROCIN: 20 OINTMENT TOPICAL at 09:10

## 2023-10-22 RX ADMIN — ONDANSETRON 4 MG: 2 INJECTION INTRAMUSCULAR; INTRAVENOUS at 03:10

## 2023-10-22 RX ADMIN — HYDROMORPHONE HYDROCHLORIDE 1 MG: 2 INJECTION INTRAMUSCULAR; INTRAVENOUS; SUBCUTANEOUS at 09:10

## 2023-10-22 RX ADMIN — HYDROMORPHONE HYDROCHLORIDE 1 MG: 2 INJECTION INTRAMUSCULAR; INTRAVENOUS; SUBCUTANEOUS at 12:10

## 2023-10-22 NOTE — PROGRESS NOTES
Pt still w some nausea at times, limited PO intake. Small loose BM yesterday, no flatus, belching    Afebrile, vital signs stable  Urine output adequate  No apparent distress  Regular rate and rhythm, clear to auscultation bilaterally  Abdomen is soft, nondistended, incisions are clean dry and intact  Extremities without edema, SCDs in place     Ileus - await resolution. Limited in ability to ambulate longer distances due to Charcot foot

## 2023-10-23 VITALS
RESPIRATION RATE: 20 BRPM | DIASTOLIC BLOOD PRESSURE: 76 MMHG | WEIGHT: 230 LBS | HEART RATE: 86 BPM | BODY MASS INDEX: 38.32 KG/M2 | OXYGEN SATURATION: 95 % | SYSTOLIC BLOOD PRESSURE: 129 MMHG | HEIGHT: 65 IN | TEMPERATURE: 98 F

## 2023-10-23 PROBLEM — K63.89 COLONIC MASS: Status: ACTIVE | Noted: 2023-10-23

## 2023-10-23 PROCEDURE — 63600175 PHARM REV CODE 636 W HCPCS: Performed by: SURGERY

## 2023-10-23 PROCEDURE — 25000003 PHARM REV CODE 250: Performed by: SURGERY

## 2023-10-23 PROCEDURE — 25000003 PHARM REV CODE 250: Performed by: NURSE PRACTITIONER

## 2023-10-23 PROCEDURE — 63600175 PHARM REV CODE 636 W HCPCS: Performed by: NURSE PRACTITIONER

## 2023-10-23 RX ORDER — HYDROCODONE BITARTRATE AND ACETAMINOPHEN 7.5; 325 MG/1; MG/1
1 TABLET ORAL EVERY 6 HOURS PRN
Status: DISCONTINUED | OUTPATIENT
Start: 2023-10-23 | End: 2023-10-23 | Stop reason: HOSPADM

## 2023-10-23 RX ORDER — ONDANSETRON 8 MG/1
8 TABLET, ORALLY DISINTEGRATING ORAL EVERY 6 HOURS PRN
Qty: 10 TABLET | Refills: 0 | Status: SHIPPED | OUTPATIENT
Start: 2023-10-23

## 2023-10-23 RX ORDER — HYDROCODONE BITARTRATE AND ACETAMINOPHEN 7.5; 325 MG/1; MG/1
1 TABLET ORAL EVERY 6 HOURS PRN
Qty: 20 TABLET | Refills: 0 | Status: SHIPPED | OUTPATIENT
Start: 2023-10-23 | End: 2023-11-27

## 2023-10-23 RX ADMIN — ALVIMOPAN 12 MG: 12 CAPSULE ORAL at 10:10

## 2023-10-23 RX ADMIN — MUPIROCIN: 20 OINTMENT TOPICAL at 10:10

## 2023-10-23 RX ADMIN — HYDROCODONE BITARTRATE AND ACETAMINOPHEN 1 TABLET: 7.5; 325 TABLET ORAL at 01:10

## 2023-10-23 RX ADMIN — ONDANSETRON 4 MG: 2 INJECTION INTRAMUSCULAR; INTRAVENOUS at 01:10

## 2023-10-23 RX ADMIN — HYDROMORPHONE HYDROCHLORIDE 1 MG: 2 INJECTION INTRAMUSCULAR; INTRAVENOUS; SUBCUTANEOUS at 05:10

## 2023-10-23 NOTE — PROGRESS NOTES
SURG ONC POD 4    PT TELLS ME SHE IS MUCH BETTER  TOLERATING DIET  PAIN CONTROLLED  NO LONGER NAUSEATED  PASSING GAS  HAVING BMS    ABD SOFT  INCISIONS HEALING WELL    VSS; NO FEVER    PLAN  DC HOME  LOW RESIDUE DIET  FU OFFICE 2 WEEKS  RX NORCO, ZOFRAN

## 2023-10-23 NOTE — PLAN OF CARE
Problem: Adult Inpatient Plan of Care  Goal: Plan of Care Review  Outcome: Met  Goal: Patient-Specific Goal (Individualized)  Outcome: Met  Goal: Absence of Hospital-Acquired Illness or Injury  Outcome: Met  Goal: Optimal Comfort and Wellbeing  Outcome: Met  Goal: Readiness for Transition of Care  Outcome: Met     Problem: Diabetes Comorbidity  Goal: Blood Glucose Level Within Targeted Range  Outcome: Met     Problem: Infection  Goal: Absence of Infection Signs and Symptoms  Outcome: Met     Problem: Fall Injury Risk  Goal: Absence of Fall and Fall-Related Injury  Outcome: Met     Problem: Skin Injury Risk Increased  Goal: Skin Health and Integrity  Outcome: Met

## 2023-10-23 NOTE — NURSING
Discharge instructions provided to patient with family at bedside. Patient verbalized understanding and had no follow up questions.

## 2023-10-23 NOTE — NURSING
Pt dexicon glucose monitor 218.  1 unit of insulin from sliding scale as ordered. Pt admits she had full dinner with ice cream.

## 2023-10-24 LAB — PSYCHE PATHOLOGY RESULT: NORMAL

## 2023-10-24 NOTE — DISCHARGE SUMMARY
[facility] - 8th Floor Med Surg  General Surgery  Discharge Summary      Patient Name: Indu Azul  MRN: [id number]  Admission Date: 10/19/2023  Hospital Length of Stay: 4 days  Discharge Date and Time: 10/23/2023  1:49 PM  Attending Physician: No att. providers found   Discharging Provider: DENIS Craven  Primary Care Provider: Alejandra Pinedo MD    HPI:   No notes on file    Procedure(s) (LRB):  XI ROBOTIC COLECTOMY, RIGHT (N/A)      Indwelling Lines/Drains at time of discharge:   Lines/Drains/Airways       Peripherally Inserted Central Catheter Line  Duration             PICC Double Lumen 01/06/23 1132 left basilic 26 days                  Hospital Course: 68 YEAR OLD FEMALE ESTABLISHED DX OF COLON CANCER; UNDERWENT LAP/CHIKA RIGHT COLON RESECTION; NO POST OPERATIVE COMPLICATIONS; STABLE ON DISCHARGE    Goals of Care Treatment Preferences:         Consults:       Significant Diagnostic Studies: N/A    Pending Diagnostic Studies:       None          Final Active Diagnoses:    Diagnosis Date Noted POA    PRINCIPAL PROBLEM:  Colonic mass [K63.89] 10/23/2023 Yes      Problems Resolved During this Admission:      Discharged Condition: good    Disposition: Home or Self Care    Follow Up:   Follow-up Information       Alejandra Pinedo MD Follow up.    Specialty: Internal Medicine  Why: The nurse will give the patient a call with appointment.  Contact information:  [address]  [place] [address] [phone]               Dejah De La Cruz FNP Follow up in 2 week(s).    Specialty: Nurse Practitioner  Why: Appointment 11-1 @ 11:30  Contact information:  [address]  [address]  [place] [id number] [phone]                           Patient Instructions:   No discharge procedures on file.  Medications:  Reconciled Home Medications:      Medication List        START taking these medications      HYDROcodone-acetaminophen 7.5-325 mg per tablet  Commonly known as:  NORCO  Take 1 tablet by mouth every 6 (six) hours as needed for Pain.     ondansetron 8 MG Tbdl  Commonly known as: ZOFRAN-ODT  Take 1 tablet (8 mg total) by mouth every 6 (six) hours as needed.            CONTINUE taking these medications      atorvastatin 20 MG tablet  Commonly known as: LIPITOR  Take 1 tablet (20 mg total) by mouth once daily.     ferrous sulfate 325 mg (65 mg iron) Tab tablet  Commonly known as: FEOSOL  Take 1 tablet (325 mg total) by mouth 2 (two) times daily.     folic acid 1 MG tablet  Commonly known as: FOLVITE  Take 1 tablet (1 mg total) by mouth once daily.     furosemide 40 MG tablet  Commonly known as: LASIX  Take 40 mg by mouth as needed.     gabapentin 300 MG capsule  Commonly known as: NEURONTIN  Take 2 capsules (600 mg total) by mouth every evening.     insulin lispro 200 unit/mL (3 mL) Inpn  Inject 2 Units into the skin 3 (three) times daily before meals.     insulin syringe-needle U-100 1 mL 31 gauge x 5/16 Syrg  Inject 1 Syringe into the skin 3 (three) times daily with meals.     metroNIDAZOLE 250 MG tablet  Commonly known as: FLAGYL  TAKE 2 TABLETS AT 1PM, 6PM AND 9PM THE EVENING PRIOR TO SURGERY     neomycin 500 mg Tab  Commonly known as: MYCIFRADIN  TAKE 2 TABLETS AT 1PM, 6PM AND 9PM THE EVENING PRIOR TO SURGERY     pantoprazole 40 MG tablet  Commonly known as: PROTONIX  Take 40 mg by mouth as needed. for 90 days     sertraline 50 MG tablet  Commonly known as: ZOLOFT  Take 1 tablet by mouth once daily     solifenacin 10 MG tablet  Commonly known as: VESICARE  Take 10 mg by mouth once daily.     spironolactone 50 MG tablet  Commonly known as: ALDACTONE  Take 50 mg by mouth once daily.     TOUJEO SOLOSTAR U-300 INSULIN 300 unit/mL (1.5 mL) Inpn pen  Generic drug: insulin glargine (TOUJEO)  INJECT 79 UNITS SUBCUTANEOUSLY ONCE DAILY     TRULICITY 3 mg/0.5 mL pen injector  Generic drug: dulaglutide  Inject 3 mg into the skin every 7 days.            STOP taking these medications       acetaminophen 500 MG tablet  Commonly known as: TYLENOL            Time spent on the discharge of patient:  minutes    DENIS Craven  General Surgery  [facility] - 8th Floor Med Surg

## 2023-10-25 ENCOUNTER — PATIENT MESSAGE (OUTPATIENT)
Dept: ADMINISTRATIVE | Facility: OTHER | Age: 68
End: 2023-10-25
Payer: MEDICARE

## 2023-10-26 ENCOUNTER — PATIENT MESSAGE (OUTPATIENT)
Dept: ADMINISTRATIVE | Facility: OTHER | Age: 68
End: 2023-10-26
Payer: MEDICARE

## 2023-10-26 ENCOUNTER — PATIENT OUTREACH (OUTPATIENT)
Dept: ADMINISTRATIVE | Facility: CLINIC | Age: 68
End: 2023-10-26
Payer: MEDICARE

## 2023-10-26 ENCOUNTER — PATIENT MESSAGE (OUTPATIENT)
Dept: ADMINISTRATIVE | Facility: CLINIC | Age: 68
End: 2023-10-26
Payer: MEDICARE

## 2023-10-26 NOTE — PROGRESS NOTES
C3 nurse attempted to contact Indu Azul for a TCC post hospital discharge follow up call. No answer. Left voicemail with callback information. The patient does not have a scheduled HOSFU appointment. Message sent to PCP staff for assistance with scheduling visit with patient.  The patient does have a POST OP follow up appointment on 11/1/23 @ 11:30 with Dejah De La Cruz FNP (Nurse Practitioner)

## 2023-10-27 NOTE — PROGRESS NOTES
C3 nurse attempted to contact Indu Azul for a TCC post hospital discharge follow up call. No answer. Left voicemail with callback information. The patient does have a visit with Alejandra Pinedo MD on 10/30/23 @ 10:00.  The patient does have a POST OP follow up appointment on 11/1/23 @ 11:30 with Dejah De La Cruz FNP (Nurse Practitioner)

## 2023-10-27 NOTE — PHYSICIAN QUERY
PT Name: Indu Azul  MR #: [id number]    DOCUMENTATION CLARIFICATION     CDS: Roxy DELEON RN  Contact information: [email or web address]  This form is a permanent document in the medical record.     Query Date: October 27, 2023    By submitting this query, we are merely seeking further clarification of documentation.  Please utilize your independent clinical judgment when addressing the question(s) below.    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   THE TUMOR INVADES THROUGH THE MUSCULARIS PROPRIA INTO PERICOLONIC TISSUE (PT3).  FOCAL LYMPHVASCULAR INVASION IS PRESENT.     METASTATIC ADENOCARCINOMA IS IDENTIFIED IN FOUR (4) OF TWENTY-SEVEN (27)   PERICOLONIC LYMPH NODES (LARGEST METASTATIC DEPOSIT 8 MM; FOCAL EXTRACAPSULAR   EXTENSION OF TUMOR IS PRESENT).     REGIONAL LYMPH NODES   Regional Lymph Node Status:  Regional lymph nodes present   Tumor present in regional lymph node(s)   Number of Lymph Nodes with Tumor:  Exact number : 4   Number of Lymph Nodes Examined:  Exact number : 27   Tumor Deposits:  Not identified       PATHOLOGIC STAGE CLASSIFICATION (pTNM, AJCC 8th Edition)   pT Category:  pT3: Tumor invades through the muscularis propria into   pericolorectal tissues   pN Category:  pN2a: Four to six regional lymph nodes are positive Surgical Pathology 10/19/23                    Surgical Pathology 10/19/23          Surgical Pathology 10/19/23            Surgical Pathology 10/19/23       Please clarify the pathology findings.  [  x] Pathology findings noted above are ruled in/confirmed as diagnoses   [  ] Pathology findings noted above are not confirmed as diagnoses   [  ] Pathology findings noted above are incidental   [   ] Other diagnosis (please specify): ___________   [  ] Clinically Undetermined     Please document in your progress notes daily for the duration of treatment until resolved and include in your discharge summary.    Form No. [id number]

## 2023-10-27 NOTE — PROGRESS NOTES
3rd attempt-C3 nurse attempted to contact Indu Azul for a TCC post hospital discharge follow up call. No answer. Left voicemail with callback information. The patient does have a visit with Alejandra Pinedo MD on 10/30/23 @ 10:00.  The patient does have a POST OP follow up appointment on 11/1/23 @ 11:30 with Dejah De La Cruz FNP (Nurse Practitioner)

## 2023-11-01 ENCOUNTER — OFFICE VISIT (OUTPATIENT)
Dept: SURGICAL ONCOLOGY | Facility: CLINIC | Age: 68
End: 2023-11-01
Payer: MEDICARE

## 2023-11-01 DIAGNOSIS — C18.2 MALIGNANT NEOPLASM OF ASCENDING COLON: Primary | ICD-10-CM

## 2023-11-01 PROCEDURE — 3061F NEG MICROALBUMINURIA REV: CPT | Mod: CPTII,S$GLB,, | Performed by: NURSE PRACTITIONER

## 2023-11-01 PROCEDURE — 99999 PR PBB SHADOW E&M-EST. PATIENT-LVL I: ICD-10-PCS | Mod: PBBFAC,,, | Performed by: NURSE PRACTITIONER

## 2023-11-01 PROCEDURE — 3044F PR MOST RECENT HEMOGLOBIN A1C LEVEL <7.0%: ICD-10-PCS | Mod: CPTII,S$GLB,, | Performed by: NURSE PRACTITIONER

## 2023-11-01 PROCEDURE — 99024 POSTOP FOLLOW-UP VISIT: CPT | Mod: S$GLB,,, | Performed by: NURSE PRACTITIONER

## 2023-11-01 PROCEDURE — 3066F NEPHROPATHY DOC TX: CPT | Mod: CPTII,S$GLB,, | Performed by: NURSE PRACTITIONER

## 2023-11-01 PROCEDURE — 4010F PR ACE/ARB THEARPY RXD/TAKEN: ICD-10-PCS | Mod: CPTII,S$GLB,, | Performed by: NURSE PRACTITIONER

## 2023-11-01 PROCEDURE — 3061F PR NEG MICROALBUMINURIA RESULT DOCUMENTED/REVIEW: ICD-10-PCS | Mod: CPTII,S$GLB,, | Performed by: NURSE PRACTITIONER

## 2023-11-01 PROCEDURE — 3066F PR DOCUMENTATION OF TREATMENT FOR NEPHROPATHY: ICD-10-PCS | Mod: CPTII,S$GLB,, | Performed by: NURSE PRACTITIONER

## 2023-11-01 PROCEDURE — 99024 PR POST-OP FOLLOW-UP VISIT: ICD-10-PCS | Mod: S$GLB,,, | Performed by: NURSE PRACTITIONER

## 2023-11-01 PROCEDURE — 4010F ACE/ARB THERAPY RXD/TAKEN: CPT | Mod: CPTII,S$GLB,, | Performed by: NURSE PRACTITIONER

## 2023-11-01 PROCEDURE — 99999 PR PBB SHADOW E&M-EST. PATIENT-LVL I: CPT | Mod: PBBFAC,,, | Performed by: NURSE PRACTITIONER

## 2023-11-01 PROCEDURE — 3044F HG A1C LEVEL LT 7.0%: CPT | Mod: CPTII,S$GLB,, | Performed by: NURSE PRACTITIONER

## 2023-11-01 RX ORDER — HYDROCODONE BITARTRATE AND ACETAMINOPHEN 5; 325 MG/1; MG/1
1 TABLET ORAL EVERY 6 HOURS PRN
Qty: 15 TABLET | Refills: 0 | Status: SHIPPED | OUTPATIENT
Start: 2023-11-01 | End: 2024-02-29 | Stop reason: SDUPTHER

## 2023-11-01 NOTE — PROGRESS NOTES
POST OP LAP/CHIKA RIGHT COLON RESECTION    PT DOING WELL  TELLS ME SHE IS GETTING STRONGER  TOLERATING DIET AT HOME  HAVING BMS  NO FEVER OR CHILLS  SORENESS AS EXPECTED  REQUEST REFILL ON PAIN MEDICATION    ABD SOFT  INCISIONS HEALING WELL  NO SIGNS OF INFECTION    PATH: 8CM ADENO, CLEAR MARGINS, 4/27 NODES POSITIVE  DR ARIZMENDI REVIEWS PATH  PATH DISCUSSED WITH PT AND QUESTIONS ANSWERED    SHE IS ALREADY ESTABLISHED WITH MED ONC DR REIS FOR DIFFERENT ISSUE  WILL SEND MESSAGE TO THEIR OFFICE AS PT WILL NEED AN APPT REGARDING NEW COLON CA DIAGNOSIS    WILL SEND REFILL ON PAIN MEDICATION    SHE KNOWS TO CALL WITH ANY PROBLEMS    RTC 6 MONTHS

## 2023-11-13 ENCOUNTER — OFFICE VISIT (OUTPATIENT)
Dept: HEMATOLOGY/ONCOLOGY | Facility: CLINIC | Age: 68
End: 2023-11-13
Payer: MEDICARE

## 2023-11-13 VITALS
HEIGHT: 65 IN | BODY MASS INDEX: 39.18 KG/M2 | OXYGEN SATURATION: 98 % | DIASTOLIC BLOOD PRESSURE: 82 MMHG | HEART RATE: 83 BPM | WEIGHT: 235.19 LBS | TEMPERATURE: 98 F | SYSTOLIC BLOOD PRESSURE: 141 MMHG | RESPIRATION RATE: 18 BRPM

## 2023-11-13 DIAGNOSIS — R97.0 ELEVATED CEA: ICD-10-CM

## 2023-11-13 DIAGNOSIS — E11.42 DIABETIC POLYNEUROPATHY ASSOCIATED WITH TYPE 2 DIABETES MELLITUS: ICD-10-CM

## 2023-11-13 DIAGNOSIS — R91.8 PULMONARY NODULES: ICD-10-CM

## 2023-11-13 DIAGNOSIS — D50.9 MICROCYTIC ANEMIA: ICD-10-CM

## 2023-11-13 DIAGNOSIS — C18.2 MALIGNANT NEOPLASM OF ASCENDING COLON: Primary | ICD-10-CM

## 2023-11-13 LAB
ALBUMIN SERPL-MCNC: 3.5 G/DL (ref 3.4–4.8)
ALBUMIN/GLOB SERPL: 0.9 RATIO (ref 1.1–2)
ALP SERPL-CCNC: 102 UNIT/L (ref 40–150)
ALT SERPL-CCNC: 16 UNIT/L (ref 0–55)
AST SERPL-CCNC: 18 UNIT/L (ref 5–34)
BASOPHILS # BLD AUTO: 0.03 X10(3)/MCL
BASOPHILS NFR BLD AUTO: 0.4 %
BILIRUB SERPL-MCNC: 0.4 MG/DL
BUN SERPL-MCNC: 25.1 MG/DL (ref 9.8–20.1)
CALCIUM SERPL-MCNC: 9.3 MG/DL (ref 8.4–10.2)
CHLORIDE SERPL-SCNC: 102 MMOL/L (ref 98–107)
CO2 SERPL-SCNC: 29 MMOL/L (ref 23–31)
CREAT SERPL-MCNC: 1.66 MG/DL (ref 0.55–1.02)
EOSINOPHIL # BLD AUTO: 1.13 X10(3)/MCL (ref 0–0.9)
EOSINOPHIL NFR BLD AUTO: 15.1 %
ERYTHROCYTE [DISTWIDTH] IN BLOOD BY AUTOMATED COUNT: 17.3 % (ref 11.5–17)
FERRITIN SERPL-MCNC: 26.72 NG/ML (ref 4.63–204)
FOLATE SERPL-MCNC: 13.1 NG/ML (ref 7–31.4)
GFR SERPLBLD CREATININE-BSD FMLA CKD-EPI: 34 MLS/MIN/1.73/M2
GLOBULIN SER-MCNC: 3.7 GM/DL (ref 2.4–3.5)
GLUCOSE SERPL-MCNC: 126 MG/DL (ref 82–115)
HCT VFR BLD AUTO: 33.8 % (ref 37–47)
HGB BLD-MCNC: 9.7 G/DL (ref 12–16)
IGA SERPL-MCNC: 257 MG/DL (ref 69–517)
IGG SERPL-MCNC: 1368 MG/DL (ref 522–1631)
IGM SERPL-MCNC: 243 MG/DL (ref 33–293)
IMM GRANULOCYTES # BLD AUTO: 0.01 X10(3)/MCL (ref 0–0.04)
IMM GRANULOCYTES NFR BLD AUTO: 0.1 %
IRON SATN MFR SERPL: 74 % (ref 20–50)
IRON SERPL-MCNC: 270 UG/DL (ref 50–170)
LYMPHOCYTES # BLD AUTO: 1.63 X10(3)/MCL (ref 0.6–4.6)
LYMPHOCYTES NFR BLD AUTO: 21.8 %
MCH RBC QN AUTO: 21.8 PG (ref 27–31)
MCHC RBC AUTO-ENTMCNC: 28.7 G/DL (ref 33–36)
MCV RBC AUTO: 76.1 FL (ref 80–94)
MONOCYTES # BLD AUTO: 0.39 X10(3)/MCL (ref 0.1–1.3)
MONOCYTES NFR BLD AUTO: 5.2 %
NEUTROPHILS # BLD AUTO: 4.29 X10(3)/MCL (ref 2.1–9.2)
NEUTROPHILS NFR BLD AUTO: 57.4 %
PLATELET # BLD AUTO: 194 X10(3)/MCL (ref 130–400)
PMV BLD AUTO: ABNORMAL FL
POTASSIUM SERPL-SCNC: 4 MMOL/L (ref 3.5–5.1)
PROT SERPL-MCNC: 7.2 GM/DL (ref 5.8–7.6)
RBC # BLD AUTO: 4.44 X10(6)/MCL (ref 4.2–5.4)
SODIUM SERPL-SCNC: 139 MMOL/L (ref 136–145)
TIBC SERPL-MCNC: 365 UG/DL (ref 250–450)
TIBC SERPL-MCNC: 95 UG/DL (ref 70–310)
TRANSFERRIN SERPL-MCNC: 310 MG/DL (ref 173–360)
WBC # SPEC AUTO: 7.48 X10(3)/MCL (ref 4.5–11.5)

## 2023-11-13 PROCEDURE — 3288F PR FALLS RISK ASSESSMENT DOCUMENTED: ICD-10-PCS | Mod: CPTII,S$GLB,, | Performed by: INTERNAL MEDICINE

## 2023-11-13 PROCEDURE — 3044F PR MOST RECENT HEMOGLOBIN A1C LEVEL <7.0%: ICD-10-PCS | Mod: CPTII,S$GLB,, | Performed by: INTERNAL MEDICINE

## 2023-11-13 PROCEDURE — 3061F PR NEG MICROALBUMINURIA RESULT DOCUMENTED/REVIEW: ICD-10-PCS | Mod: CPTII,S$GLB,, | Performed by: INTERNAL MEDICINE

## 2023-11-13 PROCEDURE — 85025 COMPLETE CBC W/AUTO DIFF WBC: CPT | Performed by: INTERNAL MEDICINE

## 2023-11-13 PROCEDURE — 99215 OFFICE O/P EST HI 40 MIN: CPT | Mod: S$GLB,,, | Performed by: INTERNAL MEDICINE

## 2023-11-13 PROCEDURE — 3079F DIAST BP 80-89 MM HG: CPT | Mod: CPTII,S$GLB,, | Performed by: INTERNAL MEDICINE

## 2023-11-13 PROCEDURE — 3008F PR BODY MASS INDEX (BMI) DOCUMENTED: ICD-10-PCS | Mod: CPTII,S$GLB,, | Performed by: INTERNAL MEDICINE

## 2023-11-13 PROCEDURE — 82728 ASSAY OF FERRITIN: CPT | Performed by: INTERNAL MEDICINE

## 2023-11-13 PROCEDURE — 3066F NEPHROPATHY DOC TX: CPT | Mod: CPTII,S$GLB,, | Performed by: INTERNAL MEDICINE

## 2023-11-13 PROCEDURE — 99215 PR OFFICE/OUTPT VISIT, EST, LEVL V, 40-54 MIN: ICD-10-PCS | Mod: S$GLB,,, | Performed by: INTERNAL MEDICINE

## 2023-11-13 PROCEDURE — 82784 ASSAY IGA/IGD/IGG/IGM EACH: CPT

## 2023-11-13 PROCEDURE — 4010F ACE/ARB THERAPY RXD/TAKEN: CPT | Mod: CPTII,S$GLB,, | Performed by: INTERNAL MEDICINE

## 2023-11-13 PROCEDURE — 1101F PT FALLS ASSESS-DOCD LE1/YR: CPT | Mod: CPTII,S$GLB,, | Performed by: INTERNAL MEDICINE

## 2023-11-13 PROCEDURE — 3044F HG A1C LEVEL LT 7.0%: CPT | Mod: CPTII,S$GLB,, | Performed by: INTERNAL MEDICINE

## 2023-11-13 PROCEDURE — 82746 ASSAY OF FOLIC ACID SERUM: CPT | Performed by: INTERNAL MEDICINE

## 2023-11-13 PROCEDURE — 3066F PR DOCUMENTATION OF TREATMENT FOR NEPHROPATHY: ICD-10-PCS | Mod: CPTII,S$GLB,, | Performed by: INTERNAL MEDICINE

## 2023-11-13 PROCEDURE — 3061F NEG MICROALBUMINURIA REV: CPT | Mod: CPTII,S$GLB,, | Performed by: INTERNAL MEDICINE

## 2023-11-13 PROCEDURE — 80053 COMPREHEN METABOLIC PANEL: CPT | Performed by: INTERNAL MEDICINE

## 2023-11-13 PROCEDURE — 83540 ASSAY OF IRON: CPT | Performed by: INTERNAL MEDICINE

## 2023-11-13 PROCEDURE — 99999 PR PBB SHADOW E&M-EST. PATIENT-LVL V: CPT | Mod: PBBFAC,,, | Performed by: INTERNAL MEDICINE

## 2023-11-13 PROCEDURE — 1111F PR DISCHARGE MEDS RECONCILED W/ CURRENT OUTPATIENT MED LIST: ICD-10-PCS | Mod: CPTII,S$GLB,, | Performed by: INTERNAL MEDICINE

## 2023-11-13 PROCEDURE — 3077F SYST BP >= 140 MM HG: CPT | Mod: CPTII,S$GLB,, | Performed by: INTERNAL MEDICINE

## 2023-11-13 PROCEDURE — 1101F PR PT FALLS ASSESS DOC 0-1 FALLS W/OUT INJ PAST YR: ICD-10-PCS | Mod: CPTII,S$GLB,, | Performed by: INTERNAL MEDICINE

## 2023-11-13 PROCEDURE — 3077F PR MOST RECENT SYSTOLIC BLOOD PRESSURE >= 140 MM HG: ICD-10-PCS | Mod: CPTII,S$GLB,, | Performed by: INTERNAL MEDICINE

## 2023-11-13 PROCEDURE — 3079F PR MOST RECENT DIASTOLIC BLOOD PRESSURE 80-89 MM HG: ICD-10-PCS | Mod: CPTII,S$GLB,, | Performed by: INTERNAL MEDICINE

## 2023-11-13 PROCEDURE — 1159F MED LIST DOCD IN RCRD: CPT | Mod: CPTII,S$GLB,, | Performed by: INTERNAL MEDICINE

## 2023-11-13 PROCEDURE — 1126F PR PAIN SEVERITY QUANTIFIED, NO PAIN PRESENT: ICD-10-PCS | Mod: CPTII,S$GLB,, | Performed by: INTERNAL MEDICINE

## 2023-11-13 PROCEDURE — 4010F PR ACE/ARB THEARPY RXD/TAKEN: ICD-10-PCS | Mod: CPTII,S$GLB,, | Performed by: INTERNAL MEDICINE

## 2023-11-13 PROCEDURE — 1126F AMNT PAIN NOTED NONE PRSNT: CPT | Mod: CPTII,S$GLB,, | Performed by: INTERNAL MEDICINE

## 2023-11-13 PROCEDURE — 3288F FALL RISK ASSESSMENT DOCD: CPT | Mod: CPTII,S$GLB,, | Performed by: INTERNAL MEDICINE

## 2023-11-13 PROCEDURE — 3008F BODY MASS INDEX DOCD: CPT | Mod: CPTII,S$GLB,, | Performed by: INTERNAL MEDICINE

## 2023-11-13 PROCEDURE — 99999 PR PBB SHADOW E&M-EST. PATIENT-LVL V: ICD-10-PCS | Mod: PBBFAC,,, | Performed by: INTERNAL MEDICINE

## 2023-11-13 PROCEDURE — 1159F PR MEDICATION LIST DOCUMENTED IN MEDICAL RECORD: ICD-10-PCS | Mod: CPTII,S$GLB,, | Performed by: INTERNAL MEDICINE

## 2023-11-13 PROCEDURE — 1111F DSCHRG MED/CURRENT MED MERGE: CPT | Mod: CPTII,S$GLB,, | Performed by: INTERNAL MEDICINE

## 2023-11-13 RX ORDER — EPINEPHRINE 0.3 MG/.3ML
0.3 INJECTION SUBCUTANEOUS ONCE AS NEEDED
Status: CANCELLED | OUTPATIENT
Start: 2023-11-28

## 2023-11-13 RX ORDER — FLUOROURACIL 50 MG/ML
2400 INJECTION, SOLUTION INTRAVENOUS
Status: CANCELLED | OUTPATIENT
Start: 2023-11-28

## 2023-11-13 RX ORDER — ATROPINE SULFATE 0.4 MG/ML
0.4 INJECTION, SOLUTION ENDOTRACHEAL; INTRAMEDULLARY; INTRAMUSCULAR; INTRAVENOUS; SUBCUTANEOUS ONCE AS NEEDED
Status: CANCELLED | OUTPATIENT
Start: 2023-11-28

## 2023-11-13 RX ORDER — DIPHENHYDRAMINE HYDROCHLORIDE 50 MG/ML
50 INJECTION INTRAMUSCULAR; INTRAVENOUS ONCE AS NEEDED
Status: CANCELLED | OUTPATIENT
Start: 2023-11-28

## 2023-11-13 RX ORDER — SODIUM CHLORIDE 0.9 % (FLUSH) 0.9 %
10 SYRINGE (ML) INJECTION
Status: CANCELLED | OUTPATIENT
Start: 2023-11-30

## 2023-11-13 RX ORDER — SODIUM CHLORIDE 0.9 % (FLUSH) 0.9 %
10 SYRINGE (ML) INJECTION
Status: CANCELLED | OUTPATIENT
Start: 2023-11-28

## 2023-11-13 RX ORDER — OLANZAPINE 5 MG/1
5 TABLET ORAL NIGHTLY
Qty: 6 TABLET | Refills: 5 | Status: SHIPPED | OUTPATIENT
Start: 2023-11-13 | End: 2024-04-03

## 2023-11-13 RX ORDER — HEPARIN 100 UNIT/ML
500 SYRINGE INTRAVENOUS
Status: CANCELLED | OUTPATIENT
Start: 2023-11-30

## 2023-11-13 RX ORDER — DIPHENHYDRAMINE HYDROCHLORIDE 50 MG/ML
25 INJECTION INTRAMUSCULAR; INTRAVENOUS
Status: CANCELLED
Start: 2023-11-28

## 2023-11-13 RX ORDER — FLUOROURACIL 50 MG/ML
400 INJECTION, SOLUTION INTRAVENOUS
Status: CANCELLED | OUTPATIENT
Start: 2023-11-28

## 2023-11-13 RX ORDER — HEPARIN 100 UNIT/ML
500 SYRINGE INTRAVENOUS
Status: CANCELLED | OUTPATIENT
Start: 2023-11-28

## 2023-11-13 RX ORDER — PROCHLORPERAZINE EDISYLATE 5 MG/ML
5 INJECTION INTRAMUSCULAR; INTRAVENOUS ONCE AS NEEDED
Status: CANCELLED | OUTPATIENT
Start: 2023-11-30

## 2023-11-13 RX ORDER — PROCHLORPERAZINE MALEATE 5 MG
5 TABLET ORAL EVERY 6 HOURS PRN
Qty: 20 TABLET | Refills: 5 | Status: SHIPPED | OUTPATIENT
Start: 2023-11-13 | End: 2023-11-13 | Stop reason: ALTCHOICE

## 2023-11-13 RX ORDER — ONDANSETRON 8 MG/1
8 TABLET, ORALLY DISINTEGRATING ORAL EVERY 8 HOURS PRN
Qty: 60 TABLET | Refills: 5 | Status: SHIPPED | OUTPATIENT
Start: 2023-11-26 | End: 2023-12-19 | Stop reason: SDUPTHER

## 2023-11-13 RX ORDER — PROCHLORPERAZINE EDISYLATE 5 MG/ML
5 INJECTION INTRAMUSCULAR; INTRAVENOUS ONCE AS NEEDED
Status: CANCELLED | OUTPATIENT
Start: 2023-11-28

## 2023-11-13 RX ORDER — DEXAMETHASONE 4 MG/1
8 TABLET ORAL DAILY
Qty: 24 TABLET | Refills: 5 | Status: SHIPPED | OUTPATIENT
Start: 2023-11-27 | End: 2023-11-27

## 2023-11-13 NOTE — PLAN OF CARE
START ON PATHWAY REGIMEN - Colorectal    COS67        Oxaliplatin       Leucovorin       Fluorouracil       Fluorouracil     **Always confirm dose/schedule in your pharmacy ordering system**    Patient Characteristics:  Postoperative without Neoadjuvant Therapy, M0 (Pathologic Staging), Colon, Stage   III, High Risk (pT4 or pN2)  Tumor Location: Colon  Therapeutic Status: Postoperative without Neoadjuvant Therapy, M0 (Pathologic   Staging)  AJCC M Category: cM0  AJCC T Category: pT3  AJCC N Category: pN2a  AJCC 8 Stage Grouping: IIIB  Intent of Therapy:  Curative Intent, Discussed with Patient

## 2023-11-13 NOTE — PROGRESS NOTES
HEMATOLOGY/ONCOLOGY OFFICE CLINIC VISIT    Visit Information:    Initial Evaluation: 4/22/2022  Referring Provider: Maggy Jaquez NP  Other providers: Dr Timothy Russ  Code status: Not addressed     Diagnosis/Problem list:   pT3 N2a Mx Stage IIIB Colon cancer  Microcytic anemia.   Folate deficiency     Present Treatment:  FOLFOX planned with dose reduction on Oxaliplatin due to neuropathy    Treatment history:  10/19/2023 Lap/jaswinder right colon resection         Imaging studies:  CT C/A/P 6/3/2022: There is no significant hepatic steatosis.  The liver is cirrhotic.  No liver lesion is identified.  The spleen is enlarged, measuring 15.5 cm in length.  There is no retroperitoneal lymphadenopathy or abdominal aortic aneurysm.  A mildly prominent right external iliac lymph node measures 9 mm in short axis, nonspecific.  This is also similar in appearance when compared to 2015. Impression: Cirrhosis. Splenomegaly.    Pathology:  10/19/2023:  COLON, RIGHT HEMICOLECTOMY (1.5 CM OF TERMINAL ILEUM, 25 CM LENGTH OF RIGHT COLON): POORLY DIFFERENTIATED ADENOCARCINOMA WITH FOCAL SIGNET RING CELL FEATURES, UNIFOCAL, RIGHT COLON, 8 CM GREATEST DIMENSION.   THE TUMOR INVADES THROUGH THE MUSCULARIS PROPRIA INTO PERICOLONIC TISSUE (PT3).   FOCAL LYMPHVASCULAR INVASION IS PRESENT.   THE SURGICAL MARGINS ARE FREE OF TUMOR.   METASTATIC ADENOCARCINOMA IS IDENTIFIED IN FOUR (4) OF TWENTY-SEVEN (27)   PERICOLONIC LYMPH NODES (LARGEST METASTATIC DEPOSIT 8 MM; FOCAL EXTRACAPSULAR EXTENSION OF TUMOR IS PRESENT).      PATHOLOGIC STAGING:  pT3 N2a Mx     Histologic Grade:  G3, poorly differentiated   Macroscopic Tumor Perforation:  Not identified   Lymphovascular Invasion:  Small vessel   Perineural Invasion:  Not identified        CLINICAL HISTORY:       Patient: Indu Azul is a 68 y.o. female. with multiple medical problems that I saw originally on 4/22/2022 for thrombocytopenia.  Patient platelet counts on 12/21/2021 were 132,000 and  since that that has been slowly trending down.  2021 platelets 132,000  2022 platelets 121,000  2022 platelets 115,000     Of note patient have a UA done that same day that suggest possible UTI with positive nitrates, 1+ leukocytes and many bacteria.  Urine culture with E. coli.   Reviewing electronic medical record and going back to 12/15/2014 patient with occasional low platelets.  They were never lower than 100,000 and they always normalized.   As per patient in 2020 when her platelets were slightly decreased (117,000), this was shortly after she has her left foot surgery.  Then in May 17 and May 18 2021, platelets were 105k and 101k,  she had COVID.  Again in 2022 she have a UTI for which she completed antibiotics.        Patient's father diagnosed Blood disorder requiring blood transfusion that started q 4 weeks and the q week. He was diagnosed on his 80's but  at 91   Sister and niece had ovarian cancer. Sister  of it. Niece still alive.          Chief Complaint: OTHER (NP referred by WILBER De La Cruz for Colon Ca- ascending. ([facility] PT).)      Interval History:    Since last visit she was found to have iron deficiency anemia.  EGD performed, grade I-II esophageal varices found, too small to band. She also had Colonoscopy by Dr Hammonds that showed an ulcerated malignant-appearing partially obstructing medium sized mass in the ascending colon.  She reports that she had a colonoscopy about 5 years ago at [facility] and everything was fine, no polyps or masses.   Biopsy and tattoo of the mass showed fragments of colonic mucosa, no evidence of dysplasia or malignancy.  Two other polyps were completely removed in the descending and sigmoid colon, pathology returned hyperplastic polyps.  CT abd/pel with no acute findings.     Despite of biopsy because of malignant-appearing partially obstructing mass of the ascending colon and photographs and description were consistent with colon  cancer she underwent Lap/jaswinder right colon resection on 10/19/2023 by Dr Timothy Russ. Path c/w really differentiated adeno carcinoma.    She is here for further recommendations regarding his newly diagnosed colon cancer. She is with her son and daughter was on speaker phone. She denies any bright red blood per rectum or melena.  In the was on speaker she denies any bright red blood per rectum or melena.  No fever, chills,  sweats. No chest pain, or short of breath.  She has recovered from her surgery.      Past Medical History:   Diagnosis Date    Anesthesia complication     trouble awakening    Charcot's joint of foot, left     Chronic kidney disease, unspecified     Cirrhosis of liver without ascites     Depression     Diabetes mellitus, type II, insulin dependent     Diabetic neuropathy     GERD (gastroesophageal reflux disease)     H/O: hysterectomy     Hepatic steatosis     HLD (hyperlipidemia)     HTN (hypertension)     Malignant neoplasm of ascending colon     Other hyperlipidemia     Overactive bladder     Thrombocytopenia, unspecified     Type 2 diabetes mellitus with unspecified diabetic retinopathy without macular edema     Vitamin D deficiency       Past Surgical History:   Procedure Laterality Date    APPENDECTOMY      BREAST BIOPSY  2016     SECTION      x3    charcot-leyden crystals identification      CHOLECYSTECTOMY      COLONOSCOPY N/A 2023    Procedure: COLON;  Surgeon: Luis Amador MD;  Location: [facility] ENDOSCOPY;  Service: Gastroenterology;  Laterality: N/A;    COLONOSCOPY, WITH 1 OR MORE BIOPSIES  2023    Procedure: COLONOSCOPY, WITH 1 OR MORE BIOPSIES;  Surgeon: Luis Amador MD;  Location: [facility] ENDOSCOPY;  Service: Gastroenterology;;    COLONOSCOPY, WITH DIRECTED SUBMUCOSAL INJECTION  2023    Procedure: COLONOSCOPY, WITH DIRECTED SUBMUCOSAL INJECTION;  Surgeon: Luis Amador MD;  Location: [facility] ENDOSCOPY;  Service:  Gastroenterology;;  8cc Colon Tattoo    COLONOSCOPY, WITH POLYPECTOMY USING SNARE  08/28/2023    Procedure: COLONOSCOPY, WITH POLYPECTOMY USING SNARE;  Surgeon: Luis Amador MD;  Location: [facility] ENDOSCOPY;  Service: Gastroenterology;;    ESOPHAGOGASTRODUODENOSCOPY N/A 08/28/2023    Procedure: DOUBLE;  Surgeon: Luis Amador MD;  Location: [facility] ENDOSCOPY;  Service: Gastroenterology;  Laterality: N/A;    HEMORRHOID SURGERY      HYSTERECTOMY  1990    total    right foot surgery Right 02/01/2022    XI ROBOTIC COLECTOMY, RIGHT N/A 10/19/2023    Procedure: XI ROBOTIC COLECTOMY, RIGHT;  Surgeon: Timothy Russ MD;  Location: [facility];  Service: General;  Laterality: N/A;     Family History   Problem Relation Age of Onset    Diabetes Mother     Alzheimer's disease Mother     Diabetes Father     Leukemia Father     Diabetes Sister     Liver cancer Sister     Diabetes Brother      Social Connections: Not on file       Review of patient's allergies indicates:  No Known Allergies   Current Outpatient Medications on File Prior to Visit   Medication Sig Dispense Refill    dulaglutide (TRULICITY) 3 mg/0.5 mL pen injector Inject 3 mg into the skin every 7 days. 4 pen 11    ferrous sulfate (FEOSOL) 325 mg (65 mg iron) Tab tablet Take 1 tablet (325 mg total) by mouth 2 (two) times daily. 60 tablet 11    folic acid (FOLVITE) 1 MG tablet Take 1 tablet (1 mg total) by mouth once daily. 100 tablet 3    furosemide (LASIX) 40 MG tablet Take 40 mg by mouth as needed.      gabapentin (NEURONTIN) 300 MG capsule Take 2 capsules (600 mg total) by mouth every evening. 60 capsule 5    HYDROcodone-acetaminophen (NORCO) 5-325 mg per tablet Take 1 tablet by mouth every 6 (six) hours as needed for Pain. 15 tablet 0    insulin lispro 200 unit/mL (3 mL) InPn Inject 2 Units into the skin 3 (three) times daily before meals. 3 mL 11    insulin syringe-needle U-100 1 mL 31 gauge x 5/16 Syrg Inject 1 Syringe into the skin 3  (three) times daily with meals.      ondansetron (ZOFRAN-ODT) 8 MG TbDL Take 1 tablet (8 mg total) by mouth every 6 (six) hours as needed. 10 tablet 0    pantoprazole (PROTONIX) 40 MG tablet Take 40 mg by mouth as needed. for 90 days      sertraline (ZOLOFT) 50 MG tablet Take 1 tablet by mouth once daily 90 tablet 0    solifenacin (VESICARE) 10 MG tablet Take 10 mg by mouth once daily.      spironolactone (ALDACTONE) 50 MG tablet Take 50 mg by mouth once daily.      TOUJEO SOLOSTAR U-300 INSULIN 300 unit/mL (1.5 mL) InPn pen INJECT 79 UNITS SUBCUTANEOUSLY ONCE DAILY 6 mL 0    atorvastatin (LIPITOR) 20 MG tablet Take 1 tablet (20 mg total) by mouth once daily. (Patient not taking: Reported on 11/13/2023) 90 tablet 3    HYDROcodone-acetaminophen (NORCO) 7.5-325 mg per tablet Take 1 tablet by mouth every 6 (six) hours as needed for Pain. (Patient not taking: Reported on 11/13/2023) 20 tablet 0    metroNIDAZOLE (FLAGYL) 250 MG tablet TAKE 2 TABLETS AT 1PM, 6PM AND 9PM THE EVENING PRIOR TO SURGERY (Patient not taking: Reported on 11/13/2023) 6 tablet 0    neomycin (MYCIFRADIN) 500 mg Tab TAKE 2 TABLETS AT 1PM, 6PM AND 9PM THE EVENING PRIOR TO SURGERY (Patient not taking: Reported on 11/13/2023) 6 tablet 0     No current facility-administered medications on file prior to visit.      Review of Systems   Constitutional:  Negative for activity change, appetite change, chills, fatigue, fever and unexpected weight change.   HENT:  Negative for mouth dryness, mouth sores, nosebleeds, sore throat and trouble swallowing.    Eyes:  Negative for visual disturbance.   Respiratory:  Negative for cough and shortness of breath.    Cardiovascular:  Negative for chest pain, palpitations and leg swelling.   Gastrointestinal:  Negative for abdominal distention, abdominal pain, blood in stool, change in bowel habit, constipation, diarrhea, nausea and vomiting.   Endocrine: Negative.    Genitourinary:  Negative for dysuria, frequency,  "hematuria and urgency.   Musculoskeletal:  Negative for arthralgias, back pain, myalgias and neck pain.   Integumentary:  Negative for rash.   Neurological:  Positive for numbness (of bilateral foot due to severe neuropathy) and coordination difficulties. Negative for dizziness, tremors, syncope, speech difficulty, weakness, light-headedness, headaches and memory loss.   Hematological:  Does not bruise/bleed easily.   Psychiatric/Behavioral:  Negative for confusion and suicidal ideas.               Vitals:    11/13/23 1356   BP: (!) 141/82   BP Location: Left forearm   Patient Position: Sitting   Pulse: 83   Resp: 18   Temp: 98 °F (36.7 °C)   TempSrc: Oral   SpO2: 98%   Weight: 106.7 kg (235 lb 3.2 oz)   Height: 5' 5" (1.651 m)      Wt Readings from Last 6 Encounters:   11/13/23 106.7 kg (235 lb 3.2 oz)   10/19/23 104.3 kg (230 lb)   10/10/23 107 kg (236 lb)   09/26/23 105.4 kg (232 lb 4.8 oz)   09/08/23 109.5 kg (241 lb 6.4 oz)   08/25/23 108.9 kg (240 lb)     Body mass index is 39.14 kg/m².  Body surface area is 2.21 meters squared.  Physical Exam  Vitals and nursing note reviewed.   Constitutional:       General: She is not in acute distress.     Appearance: Normal appearance. She is obese.      Comments: Laparoscopic incisions well healed    HENT:      Head: Normocephalic and atraumatic.      Mouth/Throat:      Mouth: Mucous membranes are moist.   Eyes:      General: No scleral icterus.     Extraocular Movements: Extraocular movements intact.      Conjunctiva/sclera: Conjunctivae normal.      Pupils: Pupils are equal, round, and reactive to light.   Neck:      Vascular: No JVD.   Cardiovascular:      Rate and Rhythm: Normal rate and regular rhythm.      Heart sounds: No murmur heard.  Pulmonary:      Effort: Pulmonary effort is normal.      Breath sounds: Normal breath sounds. No wheezing or rhonchi.   Abdominal:      General: Bowel sounds are normal. There is no distension.      Palpations: Abdomen is soft. " There is no mass.      Tenderness: There is no abdominal tenderness.   Musculoskeletal:         General: No swelling or deformity.      Cervical back: Neck supple.   Lymphadenopathy:      Head:      Right side of head: No submandibular adenopathy.      Left side of head: No submandibular adenopathy.      Cervical: No cervical adenopathy.      Upper Body:      Right upper body: No supraclavicular or axillary adenopathy.      Left upper body: No supraclavicular or axillary adenopathy.      Lower Body: No right inguinal adenopathy. No left inguinal adenopathy.   Skin:     General: Skin is warm.      Coloration: Skin is not jaundiced.      Findings: No lesion or rash.      Nails: There is no clubbing.   Neurological:      General: No focal deficit present.      Mental Status: She is alert and oriented to person, place, and time.      Cranial Nerves: Cranial nerves 2-12 are intact.   Psychiatric:         Attention and Perception: Attention normal.         Mood and Affect: Mood is depressed.         Behavior: Behavior is cooperative.         Judgment: Judgment normal.       ECOG SCORE    1 - Restricted in strenuous activity-ambulatory and able to carry out work of a light nature         Laboratory:  CBC with Differential:  Lab Results   Component Value Date    WBC 5.18 10/22/2023    RBC 3.66 (L) 10/22/2023    HGB 8.3 (L) 10/22/2023    HCT 27.3 (L) 10/22/2023    MCV 74.6 (L) 10/22/2023    MCH 22.7 (L) 10/22/2023    MCHC 30.4 (L) 10/22/2023    RDW 16.5 10/22/2023     (L) 10/22/2023    MPV  10/22/2023      Comment:      na        CMP:  Sodium   Date Value Ref Range Status   06/01/2022 140 136 - 145 mmol/L Final     Sodium Level   Date Value Ref Range Status   10/22/2023 140 136 - 145 mmol/L Final     Potassium   Date Value Ref Range Status   06/01/2022 4.9 3.5 - 5.1 mmol/L Final     Potassium Level   Date Value Ref Range Status   10/22/2023 4.0 3.5 - 5.1 mmol/L Final     Chloride   Date Value Ref Range Status    06/01/2022 104 100 - 109 mmol/L Final     Carbon Dioxide   Date Value Ref Range Status   10/22/2023 26 23 - 31 mmol/L Final   06/01/2022 29 22 - 33 mmol/L Final     Blood Urea Nitrogen   Date Value Ref Range Status   10/22/2023 12.1 9.8 - 20.1 mg/dL Final   06/01/2022 22 5 - 25 mg/dL Final     Creatinine   Date Value Ref Range Status   10/22/2023 1.09 (H) 0.55 - 1.02 mg/dL Final   06/01/2022 1.32 (H) 0.57 - 1.25 mg/dL Final     Calcium   Date Value Ref Range Status   06/01/2022 8.7 (L) 8.8 - 10.6 mg/dL Final     Calcium Level Total   Date Value Ref Range Status   10/22/2023 7.7 (L) 8.4 - 10.2 mg/dL Final     Albumin Level   Date Value Ref Range Status   10/22/2023 2.5 (L) 3.4 - 4.8 g/dL Final     Bilirubin Total   Date Value Ref Range Status   10/22/2023 0.3 <=1.5 mg/dL Final     Alkaline Phosphatase   Date Value Ref Range Status   10/22/2023 68 40 - 150 unit/L Final     Aspartate Aminotransferase   Date Value Ref Range Status   10/22/2023 9 5 - 34 unit/L Final     Alanine Aminotransferase   Date Value Ref Range Status   10/22/2023 7 0 - 55 unit/L Final     Anion Gap   Date Value Ref Range Status   06/01/2022 7 (L) 8 - 16 mmol/L Final     eGFR    Date Value Ref Range Status   06/01/2022 45 mL/min/1.73mSq Final     Comment:     In accordance with NKF-ASN Task Force recommendation, calculation based on the Chronic Kidney Disease Epidemiology Collaboration (CKD-EPI) equation without adjustment for race. eGFR adjusted for gender and age and calculated in ml/min/1.73mSquared. eGFR cannot be calculated if patient is under 18 years of age.     Reference Range:   >= 60 ml/min/1.73mSquared.     Estimated GFR-Non    Date Value Ref Range Status   08/04/2022 43 mls/min/1.73/m2 Final             Assessment:       1. Malignant neoplasm of ascending colon    2. Pulmonary nodules    3. Microcytic anemia    4. Elevated CEA    5. Diabetic polyneuropathy associated with type 2 diabetes mellitus         1) Stage IIIB adenocarcinoma of the ascending colon  --Patient will benefit of adjuvant chemotherapy.   --Due to severe diabetic neuropathy, will try dose reduction of Oxaliplatin, starting 65 mg/m2.    --If not tolerated, then with d/c Oxaliplatin and cont 5FU and LV  2) Lung nodules-Stable. Will monitor  3) iron deficiency anemia-on iron PO  4) Thrombocytopenia-resolved. Will follow counts closely as she has splenomegaly and this can affects her counts mostly platelets.  5) Folate deficiency-she will start taking folic acid at this time.   6) Diabetic neuropathy-severe. She does not feel her feet.  --She will have nerve stimulator placement to see if can help her feet     Educated the patient on the risks versus benefits as well as toxicities associated with treatment.  Verbally consented the patient to the treatment plan and the patient was educated on the planned duration of the treatment and schedule of the treatment administration.  All questions were answered.          Plan:         Plan: Adjuvant FOLFOX x 6 months,with dose reduction of oxaliplatin due to severe (Grade 3) neuropathy. If not tolerated, then with d/c Oxaliplatin and cont 5FU and LV  RTC Monday 11/27/2023 with labs  Chemo next day 11/28/2023  Patient education prior  Refer back to Dr Russ for Mediport placement  Labs today: CBC, CMP, CEA, iron profile, ferritin and folate  Cont folate and iron by mouth    The patient was seen, interviewed and examined. Pertinent lab and radiology studies were reviewed.   The patient was given ample opportunity to ask questions, and to the best of my abilities, all questions answered to satisfaction; patient demonstrated understanding of what we discussed and agreeable to the plan. Pt instructed to call should develop concerning signs/symptoms or have further questions.       Fatemeh Olmstead MD  Hematology/Oncology

## 2023-11-14 DIAGNOSIS — C18.9 COLON CANCER: ICD-10-CM

## 2023-11-14 DIAGNOSIS — C18.2 MALIGNANT NEOPLASM OF ASCENDING COLON: Primary | ICD-10-CM

## 2023-11-14 LAB
KAPPA LC FREE SER-MCNC: 6.32 MG/DL (ref 0.33–1.94)
KAPPA LC FREE/LAMBDA FREE SER: 1.76 {RATIO} (ref 0.26–1.65)
LAMBDA LC FREE SERPL-MCNC: 3.59 MG/DL (ref 0.57–2.63)

## 2023-11-14 RX ORDER — ENOXAPARIN SODIUM 300 MG/3ML
30 INJECTION INTRAVENOUS; SUBCUTANEOUS EVERY 24 HOURS
Status: CANCELLED | OUTPATIENT
Start: 2023-11-14

## 2023-11-15 ENCOUNTER — CLINICAL SUPPORT (OUTPATIENT)
Dept: DIABETES | Facility: CLINIC | Age: 68
End: 2023-11-15
Payer: MEDICARE

## 2023-11-15 VITALS — WEIGHT: 235.63 LBS | HEIGHT: 65 IN | BODY MASS INDEX: 39.26 KG/M2

## 2023-11-15 DIAGNOSIS — Z79.4 TYPE 2 DIABETES MELLITUS WITH HYPERGLYCEMIA, WITH LONG-TERM CURRENT USE OF INSULIN: Primary | ICD-10-CM

## 2023-11-15 DIAGNOSIS — E11.65 TYPE 2 DIABETES MELLITUS WITH HYPERGLYCEMIA, WITH LONG-TERM CURRENT USE OF INSULIN: Primary | ICD-10-CM

## 2023-11-15 PROCEDURE — G0108 PR DIAB MANAGE TRN  PER INDIV: ICD-10-PCS | Mod: ,,, | Performed by: FAMILY MEDICINE

## 2023-11-15 PROCEDURE — G0108 DIAB MANAGE TRN  PER INDIV: HCPCS | Mod: ,,, | Performed by: FAMILY MEDICINE

## 2023-11-15 NOTE — PROGRESS NOTES
"Diabetes Care Specialist Follow-up Note  Author: Trinity Shay RN  Date: 11/15/2023    Program Intake  Reason for Diabetes Program Visit:: Intervention  Type of Intervention:: Individual  Individual: Education  Education: Nutrition and Meal Planning  Current diabetes risk level:: high  In the last 12 months, have you:: been admitted to a hospital (colon surgery)  Was the ER or hospital admission related to diabetes?: No  Permission to speak with others about care:: yes    Lab Results   Component Value Date    HGBA1C 6.5 10/11/2023     A1c Pre Diabetes Care Specialist Intervention:  8.3%    Clinical    Weight: 106.9 kg (235 lb 9.6 oz)   Height: 5' 5" (165.1 cm)   Body mass index is 39.21 kg/m².  No weight change since last visit on 10/10/23     Medications  Diabetes management routine:: insulin, injectable medications (Toujeo 80 units Qhs,  Novolog 2units tid ac meals, Trulicity 3mg weekly)    During today's follow-up visit,  the following areas required further assessment and content was provided/reviewed.    Based on today's diabetes care assessment, the following areas of need were identified:          9/26/2023    12:01 AM   Social   Support No   Access to Mass Media/Tech No   Cognitive/Behavioral Health No   Culture/Jehovah's witness No   Communication No   Health Literacy No            9/26/2023    12:01 AM   Clinical   Medication Adherence No   Lab Compliance No   Nutritional Status No            10/10/2023    12:01 AM   Diabetes Self-Management Skills   Diabetes Disease Process/Treatment Options No    Chronic Complications Yes Discussed last visit         Today's interventions were provided through individual discussion, instruction, and written materials were provided.    Patient verbalized understanding of instruction and written materials.  Pt was able to return back demonstration of instructions today. Patient understood key points, needs reinforcement and further instruction.     Diabetes Self-Management " Care Plan Review and Evaluation of Progress:    During today's follow-up Indu's Diabetes Self-Management Care Plan progress was reviewed and progress was evaluated including his/her input. Indu has agreed to continue his/her journey to improve/maintain overall diabetes control by continuing to set health goals. See care plan progress below.      Care Plan: Diabetes Management   Updates made since 10/16/2023 12:00 AM        Problem: Healthy Eating         Long-Range Goal: Pt agrees to pair carbohydrate with protein for meals or snacks    Start Date: 9/26/2023   Priority: Medium   Barriers: Knowledge deficit   Note:    Review the importance of balancing carbohydrates with each meal using portion control techniques to count servings of carbohydrate.  Discussed food exchange list and snack ideas as well as pairing carbs with protein. She does drink one 4oz serving of coke per day.  Discussed stopping or using during the day when not eating much instead of with larger meal at supper 10/10/23 States no matter what she eats at supper her glucose rises. Last night had  salad and glucose >250 after meal   11/15/2023 States has been eating toast or breakfast sandwich in am and usually skips lunch but has larger supper. Discussed adding more non-starchy vegetables to diet and protein as well as increased need for protein and decreasing carbs while healing from surgery and while on chemo therapy. Gave meal planning suggestions.        Task: Reviewed the sources and role of Carbohydrate, Protein, and Fat and how each nutrient impacts blood sugar. Completed 11/15/2023        Problem: Blood Glucose Self-Monitoring         Long-Range Goal: Patient agrees to use dexcom G7 to monitor glucose and return in 2 weeks to review patterns.    Start Date: 9/26/2023   Priority: High   Barriers: Knowledge deficit   Note:    Personal, patient owned continuous glucose monitor brought to clinic for education/set up.      CGM Type:  Dexcom G7  to clinic 9/26/2023 for set up and education.     Reviewed Sensor and Reciever. Patient was successfully able to return demonstrate proper preparation of skin, insertion on sensor, inserting sensor and codes, troubleshooting, 20 min warm up, set up high and low sensors, removing and replacing sensor every 10 days, overlay patches as needed.  Discussed trend arrows and predictive patterns. Discussed lag time with interstitial fluid glucose vs blood glucose, instructed to do blood glucose anytime in doubt with sensor reading or if glucose shows low. Patient voiced understanding to all instructions.  Son at side to help with technology but pt. Was able to apply sensor and change alarm settings with little assistance. Overlay patch applied.   10/10/23  Dexcom g7 CGM reviewed  Average glucose is 177 mg/dL  Hypoglycemia frequency 0 % with BG <70  Hyperglycemia frequency 41 % with BG >180  Time in range 58 %  Recommendations: Reviewed agp with pt. And son. See attached report Consider changing novolog ss to <150 instead of 200 or adding flat 2 units prior to each supper meal. Sending report to pcp for possible medication changes.   11/15/23  Still having some lows about 4-5 am, States one in the last two weeks, she gets up to eat or drink something then back to sleep until about 6:30               Problem: Physical Activity and Exercise         Long-Range Goal: Patient agrees to increase physical activity to a goal of 3 times per week for 15 minutes.    Start Date: 9/26/2023   Priority: Low   Barriers: Knowledge deficit; Physical Limitations   Note:    Had foot surgery and uses cane unable to walk far. Discussed using light arm weights like water bottles or stretch bands. She is willing to try this. 10/10/23 Defer this visit . 11/15/23 Has not been able to exercise due to recent surgery.        Problem: Medications         Long-Range Goal: Patient Agrees to take Novolog 2units 15 min prior to meals.    Start  Date: 11/15/2023   Priority: High   Barriers: Knowledge deficit   Note:    States she has been forgetting to take insulin.  Discussed setting alarm on phone and increased insulin demands when starting chemo and when taking steroid medication.         Task: Reviewed with patient all current diabetes medications and provided basic review of the purpose, dosage, frequency, side effects, and storage of both oral and injectable diabetes medications. Completed 11/15/2023        Task: Discussed guidelines for preventing, detecting and treating hypoglycemia and hyperglycemia and reviewed the importance of meal and medication timing with diabetes mediations for prevention of hypoglycemia and maximum drug benefit. Completed 11/15/2023          Follow Up Plan     Follow up in about 4 weeks (around 12/13/2023) for pattern management . Son at side for support. Appt made with pt. Today for Benjamín 3 at 12:30. Plan on uploading  for agp and new patterns and hypoglycemia.   Discuss medication changes and nutrition.      Today's care plan and follow up schedule was discussed with patient.  Indu verbalized understanding of the care plan, goals, and agrees to follow up plan.        The patient was encouraged to communicate with his/her health care provider/physician and care team regarding his/her condition(s) and treatment.  I provided the patient with my contact information today and encouraged to contact me via phone or [facility] Patient Portal as needed.     Length of Visit   Total Time: 60 Minutes

## 2023-11-17 ENCOUNTER — PATIENT MESSAGE (OUTPATIENT)
Dept: ADMINISTRATIVE | Facility: OTHER | Age: 68
End: 2023-11-17
Payer: MEDICARE

## 2023-11-17 ENCOUNTER — ANESTHESIA EVENT (OUTPATIENT)
Dept: SURGERY | Facility: HOSPITAL | Age: 68
End: 2023-11-17
Payer: MEDICARE

## 2023-11-18 ENCOUNTER — PATIENT MESSAGE (OUTPATIENT)
Dept: ADMINISTRATIVE | Facility: OTHER | Age: 68
End: 2023-11-18
Payer: MEDICARE

## 2023-11-19 ENCOUNTER — PATIENT MESSAGE (OUTPATIENT)
Dept: ADMINISTRATIVE | Facility: OTHER | Age: 68
End: 2023-11-19
Payer: MEDICARE

## 2023-11-20 ENCOUNTER — ANESTHESIA (OUTPATIENT)
Dept: SURGERY | Facility: HOSPITAL | Age: 68
End: 2023-11-20
Payer: MEDICARE

## 2023-11-20 ENCOUNTER — HOSPITAL ENCOUNTER (OUTPATIENT)
Facility: HOSPITAL | Age: 68
Discharge: HOME | End: 2023-11-20
Attending: SURGERY | Admitting: SURGERY
Payer: MEDICARE

## 2023-11-20 DIAGNOSIS — C18.2 MALIGNANT NEOPLASM OF ASCENDING COLON: ICD-10-CM

## 2023-11-20 DIAGNOSIS — C18.9 COLON CANCER: ICD-10-CM

## 2023-11-20 LAB — POCT GLUCOSE: 80 MG/DL (ref 70–110)

## 2023-11-20 PROCEDURE — 25000003 PHARM REV CODE 250: Performed by: NURSE ANESTHETIST, CERTIFIED REGISTERED

## 2023-11-20 PROCEDURE — 63600175 PHARM REV CODE 636 W HCPCS: Performed by: SURGERY

## 2023-11-20 PROCEDURE — 76937 US GUIDE VASCULAR ACCESS: CPT | Mod: 26,,, | Performed by: SURGERY

## 2023-11-20 PROCEDURE — 25000003 PHARM REV CODE 250: Performed by: ANESTHESIOLOGY

## 2023-11-20 PROCEDURE — 36561 PR INSERT TUNNELED CV CATH WITH PORT: ICD-10-PCS | Mod: RT,,, | Performed by: SURGERY

## 2023-11-20 PROCEDURE — 37000009 HC ANESTHESIA EA ADD 15 MINS: Performed by: SURGERY

## 2023-11-20 PROCEDURE — D9220A PRA ANESTHESIA: ICD-10-PCS | Mod: CRNA,,, | Performed by: NURSE ANESTHETIST, CERTIFIED REGISTERED

## 2023-11-20 PROCEDURE — 77001 CHG FLUOROGUIDE CNTRL VEN ACCESS,PLACE,REPLACE,REMOVE: ICD-10-PCS | Mod: 26,,, | Performed by: SURGERY

## 2023-11-20 PROCEDURE — 76937 PR  US GUIDE, VASCULAR ACCESS: ICD-10-PCS | Mod: 26,,, | Performed by: SURGERY

## 2023-11-20 PROCEDURE — 77001 FLUOROGUIDE FOR VEIN DEVICE: CPT | Mod: 26,,, | Performed by: SURGERY

## 2023-11-20 PROCEDURE — 63600175 PHARM REV CODE 636 W HCPCS: Performed by: NURSE ANESTHETIST, CERTIFIED REGISTERED

## 2023-11-20 PROCEDURE — D9220A PRA ANESTHESIA: Mod: ANES,,, | Performed by: ANESTHESIOLOGY

## 2023-11-20 PROCEDURE — 25000003 PHARM REV CODE 250

## 2023-11-20 PROCEDURE — 71000033 HC RECOVERY, INTIAL HOUR: Performed by: SURGERY

## 2023-11-20 PROCEDURE — D9220A PRA ANESTHESIA: ICD-10-PCS | Mod: ANES,,, | Performed by: ANESTHESIOLOGY

## 2023-11-20 PROCEDURE — C1788 PORT, INDWELLING, IMP: HCPCS | Performed by: SURGERY

## 2023-11-20 PROCEDURE — 63600175 PHARM REV CODE 636 W HCPCS: Performed by: ANESTHESIOLOGY

## 2023-11-20 PROCEDURE — 37000008 HC ANESTHESIA 1ST 15 MINUTES: Performed by: SURGERY

## 2023-11-20 PROCEDURE — 71000016 HC POSTOP RECOV ADDL HR: Performed by: SURGERY

## 2023-11-20 PROCEDURE — 82962 GLUCOSE BLOOD TEST: CPT | Performed by: SURGERY

## 2023-11-20 PROCEDURE — D9220A PRA ANESTHESIA: Mod: CRNA,,, | Performed by: NURSE ANESTHETIST, CERTIFIED REGISTERED

## 2023-11-20 PROCEDURE — 71000015 HC POSTOP RECOV 1ST HR: Performed by: SURGERY

## 2023-11-20 PROCEDURE — 36000706: Performed by: SURGERY

## 2023-11-20 PROCEDURE — 36000707: Performed by: SURGERY

## 2023-11-20 PROCEDURE — 36561 INSERT TUNNELED CV CATH: CPT | Mod: RT,,, | Performed by: SURGERY

## 2023-11-20 DEVICE — PORT POWER CLEAR VIEW: Type: IMPLANTABLE DEVICE | Site: CHEST  WALL | Status: FUNCTIONAL

## 2023-11-20 RX ORDER — CEFAZOLIN SODIUM 1 G/3ML
2 INJECTION, POWDER, FOR SOLUTION INTRAMUSCULAR; INTRAVENOUS
Status: DISCONTINUED | OUTPATIENT
Start: 2023-11-20 | End: 2023-11-20 | Stop reason: HOSPADM

## 2023-11-20 RX ORDER — ONDANSETRON 2 MG/ML
4 INJECTION INTRAMUSCULAR; INTRAVENOUS ONCE
Status: COMPLETED | OUTPATIENT
Start: 2023-11-20 | End: 2023-11-20

## 2023-11-20 RX ORDER — PROPOFOL 10 MG/ML
VIAL (ML) INTRAVENOUS
Status: DISCONTINUED | OUTPATIENT
Start: 2023-11-20 | End: 2023-11-20

## 2023-11-20 RX ORDER — LIDOCAINE HYDROCHLORIDE 10 MG/ML
INJECTION, SOLUTION EPIDURAL; INFILTRATION; INTRACAUDAL; PERINEURAL
Status: DISCONTINUED | OUTPATIENT
Start: 2023-11-20 | End: 2023-11-20

## 2023-11-20 RX ORDER — BUPIVACAINE HYDROCHLORIDE 5 MG/ML
INJECTION, SOLUTION EPIDURAL; INTRACAUDAL
Status: DISCONTINUED
Start: 2023-11-20 | End: 2023-11-20 | Stop reason: HOSPADM

## 2023-11-20 RX ORDER — ENOXAPARIN SODIUM 100 MG/ML
30 INJECTION SUBCUTANEOUS
Status: DISCONTINUED | OUTPATIENT
Start: 2023-11-20 | End: 2023-11-20 | Stop reason: HOSPADM

## 2023-11-20 RX ORDER — SODIUM CHLORIDE, SODIUM LACTATE, POTASSIUM CHLORIDE, CALCIUM CHLORIDE 600; 310; 30; 20 MG/100ML; MG/100ML; MG/100ML; MG/100ML
INJECTION, SOLUTION INTRAVENOUS CONTINUOUS
Status: DISCONTINUED | OUTPATIENT
Start: 2023-11-20 | End: 2023-11-20 | Stop reason: HOSPADM

## 2023-11-20 RX ORDER — MEPERIDINE HYDROCHLORIDE 25 MG/ML
12.5 INJECTION INTRAMUSCULAR; INTRAVENOUS; SUBCUTANEOUS EVERY 10 MIN PRN
Status: COMPLETED | OUTPATIENT
Start: 2023-11-20 | End: 2023-11-20

## 2023-11-20 RX ORDER — DEXTROSE MONOHYDRATE 100 MG/ML
INJECTION, SOLUTION INTRAVENOUS
Status: COMPLETED
Start: 2023-11-20 | End: 2023-11-20

## 2023-11-20 RX ORDER — CEFAZOLIN SODIUM 1 G/3ML
INJECTION, POWDER, FOR SOLUTION INTRAMUSCULAR; INTRAVENOUS
Status: COMPLETED
Start: 2023-11-20 | End: 2023-11-20

## 2023-11-20 RX ORDER — FAMOTIDINE 20 MG/1
20 TABLET, FILM COATED ORAL ONCE
Status: COMPLETED | OUTPATIENT
Start: 2023-11-20 | End: 2023-11-20

## 2023-11-20 RX ORDER — HEPARIN SODIUM 5000 [USP'U]/ML
INJECTION, SOLUTION INTRAVENOUS; SUBCUTANEOUS
Status: DISCONTINUED | OUTPATIENT
Start: 2023-11-20 | End: 2023-11-20 | Stop reason: HOSPADM

## 2023-11-20 RX ORDER — MIDAZOLAM HYDROCHLORIDE 1 MG/ML
1 INJECTION INTRAMUSCULAR; INTRAVENOUS ONCE AS NEEDED
Status: COMPLETED | OUTPATIENT
Start: 2023-11-20 | End: 2023-11-20

## 2023-11-20 RX ORDER — BUPIVACAINE HYDROCHLORIDE 5 MG/ML
INJECTION, SOLUTION EPIDURAL; INTRACAUDAL
Status: DISCONTINUED | OUTPATIENT
Start: 2023-11-20 | End: 2023-11-20 | Stop reason: HOSPADM

## 2023-11-20 RX ORDER — CEFAZOLIN SODIUM 1 G/3ML
INJECTION, POWDER, FOR SOLUTION INTRAMUSCULAR; INTRAVENOUS
Status: DISCONTINUED | OUTPATIENT
Start: 2023-11-20 | End: 2023-11-20 | Stop reason: HOSPADM

## 2023-11-20 RX ORDER — EPHEDRINE SULFATE 50 MG/ML
INJECTION, SOLUTION INTRAVENOUS
Status: DISCONTINUED | OUTPATIENT
Start: 2023-11-20 | End: 2023-11-20

## 2023-11-20 RX ORDER — WATER FOR INJECTION,STERILE
VIAL (ML) INJECTION
Status: DISCONTINUED
Start: 2023-11-20 | End: 2023-11-20 | Stop reason: HOSPADM

## 2023-11-20 RX ORDER — ONDANSETRON 2 MG/ML
4 INJECTION INTRAMUSCULAR; INTRAVENOUS DAILY PRN
Status: DISCONTINUED | OUTPATIENT
Start: 2023-11-20 | End: 2023-11-20 | Stop reason: HOSPADM

## 2023-11-20 RX ORDER — ACETAMINOPHEN 325 MG/1
650 TABLET ORAL
Status: COMPLETED | OUTPATIENT
Start: 2023-11-20 | End: 2023-11-20

## 2023-11-20 RX ORDER — METOCLOPRAMIDE HYDROCHLORIDE 5 MG/ML
10 INJECTION INTRAMUSCULAR; INTRAVENOUS EVERY 10 MIN PRN
Status: DISCONTINUED | OUTPATIENT
Start: 2023-11-20 | End: 2023-11-20 | Stop reason: HOSPADM

## 2023-11-20 RX ORDER — SODIUM CHLORIDE 0.9 % (FLUSH) 0.9 %
10 SYRINGE (ML) INJECTION
Status: DISCONTINUED | OUTPATIENT
Start: 2023-11-20 | End: 2023-11-20 | Stop reason: HOSPADM

## 2023-11-20 RX ORDER — HYDROMORPHONE HYDROCHLORIDE 2 MG/ML
0.2 INJECTION, SOLUTION INTRAMUSCULAR; INTRAVENOUS; SUBCUTANEOUS EVERY 5 MIN PRN
Status: DISCONTINUED | OUTPATIENT
Start: 2023-11-20 | End: 2023-11-20 | Stop reason: HOSPADM

## 2023-11-20 RX ORDER — HEPARIN SODIUM 5000 [USP'U]/ML
INJECTION, SOLUTION INTRAVENOUS; SUBCUTANEOUS
Status: DISCONTINUED
Start: 2023-11-20 | End: 2023-11-20 | Stop reason: HOSPADM

## 2023-11-20 RX ORDER — ENOXAPARIN SODIUM 100 MG/ML
30 INJECTION SUBCUTANEOUS EVERY 24 HOURS
Status: DISCONTINUED | OUTPATIENT
Start: 2023-11-20 | End: 2023-11-20

## 2023-11-20 RX ADMIN — DEXTROSE MONOHYDRATE 250 ML: 100 INJECTION, SOLUTION INTRAVENOUS at 07:11

## 2023-11-20 RX ADMIN — HYDROMORPHONE HYDROCHLORIDE 0.2 MG: 2 INJECTION INTRAMUSCULAR; INTRAVENOUS; SUBCUTANEOUS at 12:11

## 2023-11-20 RX ADMIN — ONDANSETRON 4 MG: 2 INJECTION INTRAMUSCULAR; INTRAVENOUS at 07:11

## 2023-11-20 RX ADMIN — SODIUM CHLORIDE, POTASSIUM CHLORIDE, SODIUM LACTATE AND CALCIUM CHLORIDE: 600; 310; 30; 20 INJECTION, SOLUTION INTRAVENOUS at 08:11

## 2023-11-20 RX ADMIN — LIDOCAINE HYDROCHLORIDE 20 MG: 10 INJECTION, SOLUTION EPIDURAL; INFILTRATION; INTRACAUDAL; PERINEURAL at 10:11

## 2023-11-20 RX ADMIN — FAMOTIDINE 20 MG: 20 TABLET, FILM COATED ORAL at 07:11

## 2023-11-20 RX ADMIN — ENOXAPARIN SODIUM 30 MG: 30 INJECTION SUBCUTANEOUS at 08:11

## 2023-11-20 RX ADMIN — ACETAMINOPHEN 650 MG: 325 TABLET, FILM COATED ORAL at 07:11

## 2023-11-20 RX ADMIN — EPHEDRINE SULFATE 10 MG: 50 INJECTION INTRAVENOUS at 11:11

## 2023-11-20 RX ADMIN — ONDANSETRON 4 MG: 2 INJECTION INTRAMUSCULAR; INTRAVENOUS at 11:11

## 2023-11-20 RX ADMIN — CEFAZOLIN 2 G: 330 INJECTION, POWDER, FOR SOLUTION INTRAMUSCULAR; INTRAVENOUS at 10:11

## 2023-11-20 RX ADMIN — MEPERIDINE HYDROCHLORIDE 12.5 MG: 25 INJECTION INTRAMUSCULAR; INTRAVENOUS; SUBCUTANEOUS at 12:11

## 2023-11-20 RX ADMIN — SODIUM CHLORIDE, POTASSIUM CHLORIDE, SODIUM LACTATE AND CALCIUM CHLORIDE: 600; 310; 30; 20 INJECTION, SOLUTION INTRAVENOUS at 11:11

## 2023-11-20 RX ADMIN — MIDAZOLAM HYDROCHLORIDE 1 MG: 1 INJECTION, SOLUTION INTRAMUSCULAR; INTRAVENOUS at 10:11

## 2023-11-20 RX ADMIN — PROPOFOL 150 MG: 10 INJECTION, EMULSION INTRAVENOUS at 10:11

## 2023-11-20 RX ADMIN — MEPERIDINE HYDROCHLORIDE 12.5 MG: 25 INJECTION INTRAMUSCULAR; INTRAVENOUS; SUBCUTANEOUS at 11:11

## 2023-11-20 NOTE — PROGRESS NOTES
Pt discharged to home. No apparent distress noted. No c/o. Vss. Discharge instructions explained and given to pt. Pt instructed to call for any needs.

## 2023-11-20 NOTE — PLAN OF CARE
VSS, cain score   9 , pt arousable and ready for transfer to [facility] per Dr Carrillo.

## 2023-11-20 NOTE — OP NOTE
Date of Surgery: 11/20/23    Surgeon:  Timotyh Russ MD                                      Pre-op Diagnosis:  Colon cancer    Post-op Diagnosis:  Same    Procedure:  Right internal jugular Mediport insertion with ultrasound and fluoroscopic guidance    Anesthesia:  Local/MAC    EBL:  Less than 15 cc    Specimens:  None    Findings:  8Fr PowerPort placed without difficulty, final fluoroscopy revealed the tip of the catheter within the superior vena cava, no evidence of pneumothorax or other complication.  The port flushed and aspirated freely.    Procedure in detail: After informed consent was obtained the patient was brought to the operating room and placed in the supine position.  Sedation was administered by anesthesia.  The upper chest and neck were prepped and draped in sterile fashion.  After infiltration with local anesthesia, under ultrasound guidance the right internal jugular vein was cannulated with a large-bore needle and a guidewire was placed under fluoroscopic guidance into the superior vena cava.  Incision was made and a pocket was created for the port over the pectoralis fascia.  A PowerPort was assembled and flushed it was soaked in antimicrobial solution, placed in the pocket and the catheter was tunneled to the wire insertion site without difficulty.  The catheter was cut to size using fluoroscopic guidance.  An introducer dilator was placed over the guidewire and the catheter was threaded through the peel-away introducer without difficulty.  Final fluoroscopy revealed the tip of the catheter within the superior vena cava, the port flushed and aspirated freely, final heparin solution was instilled.  There was no evidence of pneumothorax or other complication on final fluoroscopy.  The port pocket was irrigated with antimicrobial solution, meticulous hemostasis was achieved, it was closed in multiple layers with absorbable suture and sterile dressings were applied.  The patient tolerated the  procedure well.    Brief Discharge Note:    Outcome: Satisfactory  Disposition: Discharged home postoperatively in good condition  Followup: with oncology  Final Diagnosis same as postoperative diagnosis    Timothy Russ MD  Surgical Oncology  Complex General, Gastrointestinal and Hepatobiliary Surgery

## 2023-11-20 NOTE — TRANSFER OF CARE
"Anesthesia Transfer of Care Note    Patient: Indu Azul    Procedure(s) Performed: Procedure(s) (LRB):  [id number] (Right)    Patient location: PACU    Anesthesia Type: general    Transport from OR: Transported from OR on room air with adequate spontaneous ventilation    Post pain: adequate analgesia    Post assessment: no apparent anesthetic complications    Post vital signs: stable    Level of consciousness: responds to stimulation    Nausea/Vomiting: no nausea/vomiting    Complications: none    Transfer of care protocol was followed      Last vitals: Visit Vitals  /66   Pulse 84   Temp 36.7 °C (98 °F) (Oral)   Resp 18   Ht 5' 5" (1.651 m)   Wt 104.3 kg (230 lb)   SpO2 97%   Breastfeeding No   BMI 38.27 kg/m²     "

## 2023-11-20 NOTE — ANESTHESIA PROCEDURE NOTES
Intubation    Date/Time: 11/20/2023 11:01 AM    Performed by: Dameon Gonzales CRNA  Authorized by: Lilia Dye MD    Intubation:     Induction:  Intravenous    Intubated:  Postinduction    Mask Ventilation:  Easy mask    Attempts:  1    Attempted By:  CRNA    Difficult Airway Encountered?: No      Complications:  None    Airway Device:  Supraglottic airway/LMA    Airway Device Size:  4.0    Style/Cuff Inflation:  Cuffed (inflated to minimal occlusive pressure)    Secured at:  The lips    Placement Verified By:  Capnometry    Findings Post-Intubation:  Atraumatic/condition of teeth unchanged

## 2023-11-20 NOTE — DISCHARGE INSTRUCTIONS
Mediport/Portacath Discharge Instructions     About this topic   A portacath or port is a type of central line. A central line catheter is a very long intravenous (IV) line. A port has two main parts: A round disc and long tube called a catheter. The port looks like a small bump under your skin. It is most often in the chest area, but may be in your upper arm or lower abdomen. The long catheter tube goes under the skin and into a large vein near your heart. Once you have a port, you do not need to have IV needles stuck into your arms to have lab tests drawn or fluids given.    Once this port is in place, the doctor or nurse will use a special needle to get into the disc part. This is called accessing the port. The needle can stay in for a week at a time and will be covered with a clear bandage and taped in place. When the needle is not in the port, you will not have any holes in your skin because the port is under the skin. It can stay in place for months or years until your treatment is no longer needed. Sometimes, this kind of IV is used to draw blood for tests.  Your doctor can give you any kind of fluids, drugs, or blood products through your port. They will not bother your veins because the port goes into such a large vein in your body. You can also have all kinds of blood draws through your port. If your port is not accessed, you will just see a small bump under your skin. There will be nothing that is sticking out from your skin.           When you are at home, there may be times when your port is still accessed. This means it still has a needle in place.  Do not get your port wet while the needle is in place. Take a sponge bath or cover the area with plastic wrap before you shower.  Make sure your dressing stays clean and dry. This will help keep the needle secure and in place.  If your port is not accessed, it will just look like a small raised area under the skin.  You can shower.  You may take a bath or  swim in a pool (once incision is healed).  You do not need to have a dressing over your port.  Wear clothes that do not rub on your port.  What care is needed at home?   You can take the bandage off in 24 hours.  Wash your hands before and after touching your dressing and incision. You may take a shower after removing the bandage. Wash your incision with soap and water, pat dry, then leave open to air. The skin glue (dermabond) will peel off within 1-2 weeks. Do not peel or pick at it.  Check your incision daily for signs and symptoms of infection: redness, drainage or pus, foul odor, warmth, and swelling.   No lifting anything over 10 pounds (4.5 kg) until the doctor say otherwise.  Gradually go back to your normal activities like work, driving, or sex.  You may use an ice pack for comfort and swelling. Place ice pack over incision with a towel between your skin and the ice pack. Leave on 15 minutes and take off. Repeat 3-4 times during the day.   How to care for your port.  Wash your hands with soap and water for at least 30 seconds before touching the needle or port. Do not allow anyone to touch your needle or port with dirty hands or if they have not had special training.  If there is no needle in it, the port will be fully under your skin and you will be able to do your normal activities.  Your port will need to be flushed so a clot does not form at the end. A nurse will flush the port with saline and then heparin to avoid having a clot form inside or at the end of the tube. The nurse will do this each time you are finished getting fluid or drugs, and when the it's time to pull the needle out of your skin. If you are allergic to heparin or had a bad side effect from it, talk to your doctor.  You will get an ID card that will tell other people that you have an implanted port. It is very important to keep this card with you at all times to let doctors and nurses know what kind of port you have.  Will physical  activity be limited?   You may have to limit your activity when you have a port. Avoid activities that make you tired. Avoid sports where you might get hit in the port. Talk to your doctor about the right amount of activity for you.  Avoid activities that pull on your arms or chest.  No pulling with arm on the side of the mediport and not picking up heavy objects with the arm on the side of the port.  What problems could happen?   Infection  Bruising  Blood clot  Scar inside the large blood vessel if the port is in for a long time  When do I need to call the doctor?   Signs of infection. These include a fever of 100.4°F (38°C) or higher, chills.  Signs of wound infection. These include swelling, redness, warmth around the wound; too much pain when touched; yellowish, greenish, or bloody discharge; foul smell coming from the cut site; cut site opens up.  Pain, irritation, discomfort, or swelling at the port site or other areas of your chest when using the port.  If you hear a rushing sound in your ear when you flush your port.  You have concerns about your port.

## 2023-11-20 NOTE — ANESTHESIA PREPROCEDURE EVALUATION
11/20/2023  Indu Azul is a 68 y.o., female.Pre-operative evaluation for Procedure(s) (LRB):  [id number] (N/A)    Indu Azul is a 68 y.o. female     Patient Active Problem List   Diagnosis    Thrombocytopenia    Iron deficiency    Folate deficiency    Type 2 diabetes mellitus with hyperglycemia, with long-term current use of insulin    Long-term insulin use    Hypertension associated with type 2 diabetes mellitus    Hyperlipidemia associated with type 2 diabetes mellitus    CKD stage 3 due to type 2 diabetes mellitus    Recurrent major depressive disorder, in full remission    Vitamin D deficiency    Hepatic steatosis    Colon cancer screening    Need for influenza vaccination    Other cirrhosis of liver    Diabetic polyneuropathy associated with type 2 diabetes mellitus    Gammopathy    Splenomegaly    Microcytic anemia    CKD (chronic kidney disease) stage 4, GFR 15-29 ml/min    Colonic mass    Malignant neoplasm of ascending colon    Pulmonary nodules    Elevated CEA       Review of patient's allergies indicates:  No Known Allergies    No current facility-administered medications on file prior to encounter.     Current Outpatient Medications on File Prior to Encounter   Medication Sig Dispense Refill    atorvastatin (LIPITOR) 20 MG tablet Take 1 tablet (20 mg total) by mouth once daily. 90 tablet 3    dulaglutide (TRULICITY) 3 mg/0.5 mL pen injector Inject 3 mg into the skin every 7 days. 4 pen 11    ferrous sulfate (FEOSOL) 325 mg (65 mg iron) Tab tablet Take 1 tablet (325 mg total) by mouth 2 (two) times daily. 60 tablet 11    folic acid (FOLVITE) 1 MG tablet Take 1 tablet (1 mg total) by mouth once daily. 100 tablet 3    furosemide (LASIX) 40 MG tablet Take 40 mg by mouth as needed.      gabapentin (NEURONTIN) 300 MG capsule Take 2 capsules (600 mg total) by mouth every evening. 60  capsule 5    insulin lispro 200 unit/mL (3 mL) InPn Inject 2 Units into the skin 3 (three) times daily before meals. 3 mL 11    ondansetron (ZOFRAN-ODT) 8 MG TbDL Take 1 tablet (8 mg total) by mouth every 6 (six) hours as needed. 10 tablet 0    sertraline (ZOLOFT) 50 MG tablet Take 1 tablet by mouth once daily 90 tablet 0    solifenacin (VESICARE) 10 MG tablet Take 10 mg by mouth once daily.      spironolactone (ALDACTONE) 50 MG tablet Take 50 mg by mouth once daily.      TOUJEO SOLOSTAR U-300 INSULIN 300 unit/mL (1.5 mL) InPn pen INJECT 79 UNITS SUBCUTANEOUSLY ONCE DAILY 6 mL 0    [START ON 2023] dexAMETHasone (DECADRON) 4 MG Tab Take 2 tablets (8 mg total) by mouth once daily. Take as directed on days 2 and 3 of your chemotherapy cycle. 24 tablet 5    HYDROcodone-acetaminophen (NORCO) 5-325 mg per tablet Take 1 tablet by mouth every 6 (six) hours as needed for Pain. 15 tablet 0    HYDROcodone-acetaminophen (NORCO) 7.5-325 mg per tablet Take 1 tablet by mouth every 6 (six) hours as needed for Pain. (Patient not taking: Reported on 2023) 20 tablet 0    insulin syringe-needle U-100 1 mL 31 gauge x 5/16 Syrg Inject 1 Syringe into the skin 3 (three) times daily with meals.      OLANZapine (ZYPREXA) 5 MG tablet Take 1 tablet (5 mg total) by mouth every evening. Take nightly on days 1-3 of each chemotherapy cycle. 6 tablet 5    [START ON 2023] ondansetron (ZOFRAN-ODT) 8 MG TbDL Take 1 tablet (8 mg total) by mouth every 8 (eight) hours as needed (nausea/vomiting). 60 tablet 5    pantoprazole (PROTONIX) 40 MG tablet Take 40 mg by mouth as needed. for 90 days         Past Surgical History:   Procedure Laterality Date    APPENDECTOMY      BREAST BIOPSY  2016     SECTION      x3    charcot-leyden crystals identification      CHOLECYSTECTOMY  2006    COLONOSCOPY N/A 2023    Procedure: COLON;  Surgeon: Luis Amador MD;  Location: [facility] ENDOSCOPY;  Service: Gastroenterology;   "Laterality: N/A;    COLONOSCOPY, WITH 1 OR MORE BIOPSIES  08/28/2023    Procedure: COLONOSCOPY, WITH 1 OR MORE BIOPSIES;  Surgeon: Luis Amador MD;  Location: [facility] ENDOSCOPY;  Service: Gastroenterology;;    COLONOSCOPY, WITH DIRECTED SUBMUCOSAL INJECTION  08/28/2023    Procedure: COLONOSCOPY, WITH DIRECTED SUBMUCOSAL INJECTION;  Surgeon: Luis Amador MD;  Location: [facility] ENDOSCOPY;  Service: Gastroenterology;;  8cc Colon Tattoo    COLONOSCOPY, WITH POLYPECTOMY USING SNARE  08/28/2023    Procedure: COLONOSCOPY, WITH POLYPECTOMY USING SNARE;  Surgeon: Luis Amador MD;  Location: [facility] ENDOSCOPY;  Service: Gastroenterology;;    ESOPHAGOGASTRODUODENOSCOPY N/A 08/28/2023    Procedure: DOUBLE;  Surgeon: Luis Amador MD;  Location: [facility] ENDOSCOPY;  Service: Gastroenterology;  Laterality: N/A;    HEMORRHOID SURGERY      HYSTERECTOMY  1990    total    right foot surgery Right 02/01/2022    XI ROBOTIC COLECTOMY, RIGHT N/A 10/19/2023    Procedure: XI ROBOTIC COLECTOMY, RIGHT;  Surgeon: Timothy Russ MD;  Location: [facility];  Service: General;  Laterality: N/A;       CBC: No results for input(s): "WBC", "RBC", "HGB", "HCT", "PLT", "MCV", "MCH", "MCHC" in the last 72 hours.    [facility] K=4; elev Cr=1.66. eGFR low=34.    No results for input(s): "NA", "K", "CL", "CO2", "BUN", "CREATININE", "GLU", "MG", "PHOS", "CALCIUM", "ALBUMIN", "PROT", "ALKPHOS", "ALT", "AST", "BILITOT" in the last 72 hours.    INR  No results for input(s): "PT", "INR", "PROTIME", "APTT" in the last 72 hours.    Diagnostic Studies:  CXR :    EKG : SR, with PVCs.      2D Echo :  No results found for this or any previous visit.      Pre-op Assessment    I have reviewed the Patient Summary Reports.     I have reviewed the Nursing Notes. I have reviewed the NPO Status.   I have reviewed the Medications.     Review of Systems  Anesthesia Hx:  No problems with previous Anesthesia  Slow to wake up " History of prior surgery of interest to airway management or planning:  Previous anesthesia: General 10/19/2023 Robot Colectomy: EM, [place] 3,Gr1x1,ET7.5; GB:; Thyroidectomy; GI scopes:;6/14/2022 Cysto bladder BX: LMA with general anesthesia.  Procedure performed at an [facility].      Airway issues documented on chart review include GETA, videolaryngoscope used, laryngeal mask airway used  , view on video-laryngoscopy Grade I    Denies Family Hx of Anesthesia complications.   Personal Hx of Anesthesia complications          Slow To Awaken/Delayed Emergence and mild, somewhat sensitive to sedation / narcotics          Social:  Smoker, Social Alcohol Use 1/2PPD x 61 yrs.      Hematology/Oncology:                   Hematology Comments: Hx Thrombocytopenia  plt 194.      --  Cancer in past history:                 Oncology Comments: Colon CA s/p [id number].     Cardiovascular:     Denies Pacemaker. Hypertension   Denies MI.     Denies CABG/stent.    Denies Angina.  Denies CHF.        Sedentary s/p feet Sx 1/2023.  mild aortic atherosclerosis and coronary atherosclerosis.  Echo 3/2/2021: EF=55-60%. PASP=26.                         Pulmonary:    Denies COPD.  Denies Asthma.    Denies Recent URI.  Scattered pulmonary nodules in the upper lobe similar to prior               Renal/:  Chronic Renal Disease (stage 4), CKD                Hepatic/GI:     GERD, well controlled Liver Disease, (cirrhosis)  Denies Hepatitis. Steatosis.  Protonix:          Neurological:    Denies CVA.    Denies Seizures.    DM:      Peripheral Neuropathy                          Endocrine:  Diabetes, poorly controlled, type 2, using insulin Denies Hypothyroidism.  Denies Hyperthyroidism.       Obesity / BMI > 30  Psych:   anxiety depression                Physical Exam  General: Well nourished, Cooperative, Alert, Oriented and Flat Affect    Airway:  Mallampati: III / III  Mouth Opening: Small, but > 3cm  TM Distance: Normal  Tongue:  Normal  Neck ROM: Normal ROM  <1/2 inch Mhyoid distance. Poss anterior.  Dental:  Intact        Anesthesia Plan  Type of Anesthesia, risks & benefits discussed:    Anesthesia Type: Gen Supraglottic Airway  Intra-op Monitoring Plan: Standard ASA Monitors  Post Op Pain Control Plan: multimodal analgesia  Induction:  IV  Airway Plan: Video, Post-Induction  Informed Consent: Informed consent signed with the Patient and all parties understand the risks and agree with anesthesia plan.  All questions answered. Patient consented to blood products? Yes  ASA Score: 3  Day of Surgery Review of History & Physical: H&P Update referred to the surgeon/provider.  Anesthesia Plan Notes: XU back up. Hansa if needed. Poss anterior.  BIS requested    Ready For Surgery From Anesthesia Perspective.     .

## 2023-11-20 NOTE — ANESTHESIA POSTPROCEDURE EVALUATION
Anesthesia Post Evaluation    Patient: Indu Azul    Procedure(s) Performed: Procedure(s) (LRB):  [id number] (Right)    Final Anesthesia Type: general      Patient location during evaluation: PACU  Patient participation: Yes- Able to Participate  Level of consciousness: awake and alert, awake and oriented  Post-procedure vital signs: reviewed and stable  Pain management: adequate  Airway patency: patent    PONV status at discharge: No PONV  Anesthetic complications: no      Cardiovascular status: blood pressure returned to baseline, hemodynamically stable and stable  Respiratory status: unassisted, spontaneous ventilation and room air  Hydration status: euvolemic  Follow-up not needed.          Vitals Value Taken Time   /66 11/20/23 1230   Temp 36.2 °C (97.2 °F) 11/20/23 1140   Pulse 82 11/20/23 1231   Resp 17 11/20/23 1230   SpO2 97 % 11/20/23 1231   Vitals shown include unvalidated device data.      Event Time   Out of Recovery 12:29:00         Pain/Lisa Score: Pain Rating Prior to Med Admin: 6 (11/20/2023 12:08 PM)  Lisa Score: 9 (11/20/2023 12:30 PM)

## 2023-11-21 ENCOUNTER — PATIENT MESSAGE (OUTPATIENT)
Dept: ADMINISTRATIVE | Facility: OTHER | Age: 68
End: 2023-11-21
Payer: MEDICARE

## 2023-11-21 ENCOUNTER — OFFICE VISIT (OUTPATIENT)
Dept: NEPHROLOGY | Facility: CLINIC | Age: 68
End: 2023-11-21
Payer: MEDICARE

## 2023-11-21 VITALS
RESPIRATION RATE: 18 BRPM | WEIGHT: 230 LBS | HEIGHT: 65 IN | SYSTOLIC BLOOD PRESSURE: 112 MMHG | BODY MASS INDEX: 38.32 KG/M2 | HEART RATE: 73 BPM | TEMPERATURE: 97 F | DIASTOLIC BLOOD PRESSURE: 76 MMHG | OXYGEN SATURATION: 97 %

## 2023-11-21 VITALS
TEMPERATURE: 97 F | SYSTOLIC BLOOD PRESSURE: 134 MMHG | WEIGHT: 243 LBS | HEIGHT: 65 IN | OXYGEN SATURATION: 99 % | RESPIRATION RATE: 20 BRPM | DIASTOLIC BLOOD PRESSURE: 68 MMHG | HEART RATE: 85 BPM | BODY MASS INDEX: 40.48 KG/M2

## 2023-11-21 DIAGNOSIS — N18.32 STAGE 3B CHRONIC KIDNEY DISEASE: Primary | ICD-10-CM

## 2023-11-21 LAB — POCT GLUCOSE: 140 MG/DL (ref 70–110)

## 2023-11-21 PROCEDURE — 4010F PR ACE/ARB THEARPY RXD/TAKEN: ICD-10-PCS | Mod: CPTII,S$GLB,, | Performed by: INTERNAL MEDICINE

## 2023-11-21 PROCEDURE — 3075F SYST BP GE 130 - 139MM HG: CPT | Mod: CPTII,S$GLB,, | Performed by: INTERNAL MEDICINE

## 2023-11-21 PROCEDURE — 3008F PR BODY MASS INDEX (BMI) DOCUMENTED: ICD-10-PCS | Mod: CPTII,S$GLB,, | Performed by: INTERNAL MEDICINE

## 2023-11-21 PROCEDURE — 1126F AMNT PAIN NOTED NONE PRSNT: CPT | Mod: CPTII,S$GLB,, | Performed by: INTERNAL MEDICINE

## 2023-11-21 PROCEDURE — 99999 PR PBB SHADOW E&M-EST. PATIENT-LVL III: CPT | Mod: PBBFAC,,, | Performed by: INTERNAL MEDICINE

## 2023-11-21 PROCEDURE — 3078F PR MOST RECENT DIASTOLIC BLOOD PRESSURE < 80 MM HG: ICD-10-PCS | Mod: CPTII,S$GLB,, | Performed by: INTERNAL MEDICINE

## 2023-11-21 PROCEDURE — 3061F PR NEG MICROALBUMINURIA RESULT DOCUMENTED/REVIEW: ICD-10-PCS | Mod: CPTII,S$GLB,, | Performed by: INTERNAL MEDICINE

## 2023-11-21 PROCEDURE — 99213 PR OFFICE/OUTPT VISIT, EST, LEVL III, 20-29 MIN: ICD-10-PCS | Mod: S$GLB,,, | Performed by: INTERNAL MEDICINE

## 2023-11-21 PROCEDURE — 99999 PR PBB SHADOW E&M-EST. PATIENT-LVL III: ICD-10-PCS | Mod: PBBFAC,,, | Performed by: INTERNAL MEDICINE

## 2023-11-21 PROCEDURE — 99213 OFFICE O/P EST LOW 20 MIN: CPT | Mod: S$GLB,,, | Performed by: INTERNAL MEDICINE

## 2023-11-21 PROCEDURE — 3075F PR MOST RECENT SYSTOLIC BLOOD PRESS GE 130-139MM HG: ICD-10-PCS | Mod: CPTII,S$GLB,, | Performed by: INTERNAL MEDICINE

## 2023-11-21 PROCEDURE — 3078F DIAST BP <80 MM HG: CPT | Mod: CPTII,S$GLB,, | Performed by: INTERNAL MEDICINE

## 2023-11-21 PROCEDURE — 1126F PR PAIN SEVERITY QUANTIFIED, NO PAIN PRESENT: ICD-10-PCS | Mod: CPTII,S$GLB,, | Performed by: INTERNAL MEDICINE

## 2023-11-21 PROCEDURE — 3044F PR MOST RECENT HEMOGLOBIN A1C LEVEL <7.0%: ICD-10-PCS | Mod: CPTII,S$GLB,, | Performed by: INTERNAL MEDICINE

## 2023-11-21 PROCEDURE — 1159F MED LIST DOCD IN RCRD: CPT | Mod: CPTII,S$GLB,, | Performed by: INTERNAL MEDICINE

## 2023-11-21 PROCEDURE — 3008F BODY MASS INDEX DOCD: CPT | Mod: CPTII,S$GLB,, | Performed by: INTERNAL MEDICINE

## 2023-11-21 PROCEDURE — 3066F PR DOCUMENTATION OF TREATMENT FOR NEPHROPATHY: ICD-10-PCS | Mod: CPTII,S$GLB,, | Performed by: INTERNAL MEDICINE

## 2023-11-21 PROCEDURE — 1111F DSCHRG MED/CURRENT MED MERGE: CPT | Mod: CPTII,S$GLB,, | Performed by: INTERNAL MEDICINE

## 2023-11-21 PROCEDURE — 4010F ACE/ARB THERAPY RXD/TAKEN: CPT | Mod: CPTII,S$GLB,, | Performed by: INTERNAL MEDICINE

## 2023-11-21 PROCEDURE — 3066F NEPHROPATHY DOC TX: CPT | Mod: CPTII,S$GLB,, | Performed by: INTERNAL MEDICINE

## 2023-11-21 PROCEDURE — 1159F PR MEDICATION LIST DOCUMENTED IN MEDICAL RECORD: ICD-10-PCS | Mod: CPTII,S$GLB,, | Performed by: INTERNAL MEDICINE

## 2023-11-21 PROCEDURE — 3044F HG A1C LEVEL LT 7.0%: CPT | Mod: CPTII,S$GLB,, | Performed by: INTERNAL MEDICINE

## 2023-11-21 PROCEDURE — 3061F NEG MICROALBUMINURIA REV: CPT | Mod: CPTII,S$GLB,, | Performed by: INTERNAL MEDICINE

## 2023-11-21 PROCEDURE — 1111F PR DISCHARGE MEDS RECONCILED W/ CURRENT OUTPATIENT MED LIST: ICD-10-PCS | Mod: CPTII,S$GLB,, | Performed by: INTERNAL MEDICINE

## 2023-11-21 NOTE — PROGRESS NOTES
[facility] Ambulatory Progress Note      HPI  Indu Azul, 68 y.o. female, presents to office for a follow up visit .  Patient has CKD 3.  Unfortunately recently she was diagnosed with a colon cancer and she had had partial colectomy done with lymph node dissection she is scheduled to get chemotherapy starting next week  While she was hospitalized she was receiving IV fluids creatinine went as low as 1.1 currently he is back to 1.6  Looking back at the trend it has been as high as 1.9 in February but her baseline fluctuates between 1.1-1.4 in general  She feels fine denies any major complaints except for lower extremity edema but it is better than since she was in the hospital and she is back on furosemide daily and Aldactone daily    Patient denies taking NSAIDs or new antibiotics. Also denies recent episode of dehydration, diarrhea, vomiting, acute illness, hospitalization, recent angiograms or exposure to IV radiocontrast.        Medical Diagnoses:   Past Medical History:   Diagnosis Date    Anesthesia complication     trouble awakening    Charcot's joint of foot, left     Chronic kidney disease, unspecified     Cirrhosis of liver without ascites     Depression     Diabetes mellitus, type II, insulin dependent     Diabetic neuropathy     GERD (gastroesophageal reflux disease)     H/O: hysterectomy     Hepatic steatosis     Malignant neoplasm of ascending colon     Obesity, unspecified     Overactive bladder     Thrombocytopenia, unspecified     Vitamin D deficiency      Patient Active Problem List   Diagnosis    Thrombocytopenia    Iron deficiency    Folate deficiency    Type 2 diabetes mellitus with hyperglycemia, with long-term current use of insulin    Long-term insulin use    Hypertension associated with type 2 diabetes mellitus    Hyperlipidemia associated with type 2 diabetes mellitus    CKD stage 3 due to type 2 diabetes mellitus    Recurrent major depressive disorder, in full remission    Vitamin  D deficiency    Hepatic steatosis    Colon cancer screening    Need for influenza vaccination    Other cirrhosis of liver    Diabetic polyneuropathy associated with type 2 diabetes mellitus    Gammopathy    Splenomegaly    Microcytic anemia    CKD (chronic kidney disease) stage 4, GFR 15-29 ml/min    Colonic mass    Malignant neoplasm of ascending colon    Pulmonary nodules    Elevated CEA       Surgical History:   Past Surgical History:   Procedure Laterality Date    APPENDECTOMY      BREAST BIOPSY  2016     SECTION      x3    charcot-leyden crystals identification      CHOLECYSTECTOMY  2006    COLONOSCOPY N/A 2023    Procedure: COLON;  Surgeon: Luis Amador MD;  Location: [facility] ENDOSCOPY;  Service: Gastroenterology;  Laterality: N/A;    COLONOSCOPY, WITH 1 OR MORE BIOPSIES  2023    Procedure: COLONOSCOPY, WITH 1 OR MORE BIOPSIES;  Surgeon: Luis Amador MD;  Location: [facility] ENDOSCOPY;  Service: Gastroenterology;;    COLONOSCOPY, WITH DIRECTED SUBMUCOSAL INJECTION  2023    Procedure: COLONOSCOPY, WITH DIRECTED SUBMUCOSAL INJECTION;  Surgeon: Luis Amador MD;  Location: [facility] ENDOSCOPY;  Service: Gastroenterology;;  8cc Colon Tattoo    COLONOSCOPY, WITH POLYPECTOMY USING SNARE  2023    Procedure: COLONOSCOPY, WITH POLYPECTOMY USING SNARE;  Surgeon: Luis Amador MD;  Location: [facility] ENDOSCOPY;  Service: Gastroenterology;;    ESOPHAGOGASTRODUODENOSCOPY N/A 2023    Procedure: DOUBLE;  Surgeon: Luis Amador MD;  Location: [facility] ENDOSCOPY;  Service: Gastroenterology;  Laterality: N/A;    HEMORRHOID SURGERY      HYSTERECTOMY      total    INSERTION OF TUNNELED CENTRAL VENOUS CATHETER (CVC) WITH SUBCUTANEOUS PORT Right 2023    Procedure: [id number];  Surgeon: Timothy Russ MD;  Location: [facility];  Service: General;  Laterality: Right;    right foot surgery Right 2022    XI  ROBOTIC COLECTOMY, RIGHT N/A 10/19/2023    Procedure: XI ROBOTIC COLECTOMY, RIGHT;  Surgeon: Timothy Russ MD;  Location: [facility] OR;  Service: General;  Laterality: N/A;       Family History:   Family History   Problem Relation Age of Onset    Diabetes Mother     Alzheimer's disease Mother     Diabetes Father     Leukemia Father     Diabetes Sister     Liver cancer Sister     Diabetes Brother        Social History:   Social History     Tobacco Use    Smoking status: Every Day     Current packs/day: 0.50     Average packs/day: 0.5 packs/day for 60.9 years (30.4 ttl pk-yrs)     Types: Cigarettes     Start date: 2003     Passive exposure: Current    Smokeless tobacco: Never   Substance Use Topics    Alcohol use: Not Currently     Comment: occassionally       Allergies:  Review of patient's allergies indicates:  No Known Allergies    Medications:    Current Outpatient Medications:     dulaglutide (TRULICITY) 3 mg/0.5 mL pen injector, Inject 3 mg into the skin every 7 days., Disp: 4 pen , Rfl: 11    ferrous sulfate (FEOSOL) 325 mg (65 mg iron) Tab tablet, Take 1 tablet (325 mg total) by mouth 2 (two) times daily., Disp: 60 tablet, Rfl: 11    folic acid (FOLVITE) 1 MG tablet, Take 1 tablet (1 mg total) by mouth once daily., Disp: 100 tablet, Rfl: 3    furosemide (LASIX) 40 MG tablet, Take 40 mg by mouth as needed., Disp: , Rfl:     gabapentin (NEURONTIN) 300 MG capsule, Take 2 capsules (600 mg total) by mouth every evening., Disp: 60 capsule, Rfl: 5    HYDROcodone-acetaminophen (NORCO) 5-325 mg per tablet, Take 1 tablet by mouth every 6 (six) hours as needed for Pain., Disp: 15 tablet, Rfl: 0    insulin lispro 200 unit/mL (3 mL) InPn, Inject 2 Units into the skin 3 (three) times daily before meals., Disp: 3 mL, Rfl: 11    insulin syringe-needle U-100 1 mL 31 gauge x 5/16 Syrg, Inject 1 Syringe into the skin 3 (three) times daily with meals., Disp: , Rfl:     OLANZapine (ZYPREXA) 5 MG tablet, Take 1 tablet (5 mg total) by  mouth every evening. Take nightly on days 1-3 of each chemotherapy cycle., Disp: 6 tablet, Rfl: 5    ondansetron (ZOFRAN-ODT) 8 MG TbDL, Take 1 tablet (8 mg total) by mouth every 6 (six) hours as needed., Disp: 10 tablet, Rfl: 0    sertraline (ZOLOFT) 50 MG tablet, Take 1 tablet by mouth once daily, Disp: 90 tablet, Rfl: 0    solifenacin (VESICARE) 10 MG tablet, Take 10 mg by mouth once daily., Disp: , Rfl:     spironolactone (ALDACTONE) 50 MG tablet, Take 50 mg by mouth once daily., Disp: , Rfl:     TOUJEO SOLOSTAR U-300 INSULIN 300 unit/mL (1.5 mL) InPn pen, INJECT 79 UNITS SUBCUTANEOUSLY ONCE DAILY, Disp: 6 mL, Rfl: 0    atorvastatin (LIPITOR) 20 MG tablet, Take 1 tablet (20 mg total) by mouth once daily. (Patient not taking: Reported on 11/21/2023), Disp: 90 tablet, Rfl: 3    [START ON 11/27/2023] dexAMETHasone (DECADRON) 4 MG Tab, Take 2 tablets (8 mg total) by mouth once daily. Take as directed on days 2 and 3 of your chemotherapy cycle., Disp: 24 tablet, Rfl: 5    HYDROcodone-acetaminophen (NORCO) 7.5-325 mg per tablet, Take 1 tablet by mouth every 6 (six) hours as needed for Pain. (Patient not taking: Reported on 11/13/2023), Disp: 20 tablet, Rfl: 0    [START ON 11/26/2023] ondansetron (ZOFRAN-ODT) 8 MG TbDL, Take 1 tablet (8 mg total) by mouth every 8 (eight) hours as needed (nausea/vomiting). (Patient not taking: Reported on 11/21/2023), Disp: 60 tablet, Rfl: 5  No current facility-administered medications for this visit.       Review of Systems:    Constitutional: Denies fever, fatigue, generalized weakness  Skin: Denies wounds, no rashes, no itching, no new skin lesions  Respiratory:  Denies cough, shortness of breath, or wheezing  Cardiovascular: Denies chest pain, palpitations, lower extremity swelling better since back on diuretics  Gastrointestional: Denies abdominal pain, nausea, vomiting, diarrhea, or constipation  Genitourinary: Denies dysuria, hematuria, foamy urine, or incontinence; reports able  "to empty bladder  Musculoskeletal: Denies back or flank pain  Neurological: Denies headaches, dizziness, paresthesias, tremors or focal weakness      Vital Signs:  /68 (BP Location: Left arm, Patient Position: Sitting, BP Method: Medium (Manual))   Pulse 85   Temp 97.3 °F (36.3 °C) (Temporal)   Resp 20   Ht 5' 5" (1.651 m)   Wt 110.2 kg (243 lb)   SpO2 99%   BMI 40.44 kg/m²   Body mass index is 40.44 kg/m².      Physical Exam:    General: no acute distress, awake, alert  Eyes: conjunctiva clear, eyelids without swelling  HENT: atraumatic, oropharynx and nasal mucosa patent  Neck: supple, trache midline, no JVD  Respiratory: equal, unlabored, clear to auscultation A/P  Cardiovascular: RRR without rub  Edema:  +2 lower extremity edema  Gastrointestinal: soft, non-tender, non-distended; positive bowel sounds; no masses to palpation; no ascites, healed abdominal scar  Genitourinary: no CVA tenderness upon palpation  Musculoskeletal: ROM without new limitation or discomfort  Integumentary: warm, dry; no rashes, wounds, or skin lesions  Neurological: oriented x4, appropriate, no acute deficits; no asterixis      Labs:        Component Value Date/Time     11/13/2023 1445     10/22/2023 0455     06/01/2022 1340    K 4.0 11/13/2023 1445    K 4.0 10/22/2023 0455    K 4.9 06/01/2022 1340     06/01/2022 1340    CO2 29 11/13/2023 1445    CO2 26 10/22/2023 0455    CO2 29 06/01/2022 1340    BUN 25.1 (H) 11/13/2023 1445    BUN 12.1 10/22/2023 0455    BUN 22 06/01/2022 1340    CREATININE 1.66 (H) 11/13/2023 1445    CREATININE 1.09 (H) 10/22/2023 0455    CREATININE 1.39 (H) 10/21/2023 0442    CREATININE 1.33 (H) 10/20/2023 0747    CREATININE 1.32 (H) 06/01/2022 1340    CALCIUM 9.3 11/13/2023 1445    CALCIUM 7.7 (L) 10/22/2023 0455    CALCIUM 8.7 (L) 06/01/2022 1340    PHOS 3.6 07/19/2023 0834    PTH 67.2 07/19/2023 0834           Component Value Date/Time    WBC 7.48 11/13/2023 1445    WBC 5.18 " 10/22/2023 0455    HGB 9.7 (L) 11/13/2023 1445    HGB 8.3 (L) 10/22/2023 0455    HCT 33.8 (L) 11/13/2023 1445    HCT 27.3 (L) 10/22/2023 0455     11/13/2023 1445     (L) 10/22/2023 0455       Urine Creatinine   Date Value Ref Range Status   07/19/2023 63.9 45.0 - 106.0 mg/dL Final   06/15/2023 114.5 (H) 47.0 - 110.0 mg/dL Final       Urine Protein Level   Date Value Ref Range Status   07/19/2023 7.9 mg/dL Final           Imaging:  Retroperitoneal US:      Impression:    CKD 3 related to nephrosclerosis  Cirrhosis  Colon cancer status post resection and for chemotherapy starting next week  Worsening renal function not unexpected in the view of the recent events and since she is back on diuretics      Plan:  No major change in renal plans for now we will follow back in 3 weeks with shayy Weir      This note was created with the assistance of [employer]alt[employer] voice recognition software or phone dictation. There may be transcription errors as a result of using this technology however minimal. Effort has been made to assure accuracy of transcription but any obvious errors or omissions should be clarified with the author of the document.

## 2023-11-22 ENCOUNTER — OFFICE VISIT (OUTPATIENT)
Dept: HEMATOLOGY/ONCOLOGY | Facility: CLINIC | Age: 68
End: 2023-11-22
Payer: MEDICARE

## 2023-11-22 ENCOUNTER — CLINICAL SUPPORT (OUTPATIENT)
Dept: HEMATOLOGY/ONCOLOGY | Facility: CLINIC | Age: 68
End: 2023-11-22
Payer: MEDICARE

## 2023-11-22 ENCOUNTER — PATIENT MESSAGE (OUTPATIENT)
Dept: ADMINISTRATIVE | Facility: OTHER | Age: 68
End: 2023-11-22
Payer: MEDICARE

## 2023-11-22 VITALS
SYSTOLIC BLOOD PRESSURE: 131 MMHG | TEMPERATURE: 98 F | HEIGHT: 65 IN | BODY MASS INDEX: 40.98 KG/M2 | HEART RATE: 85 BPM | WEIGHT: 246 LBS | DIASTOLIC BLOOD PRESSURE: 78 MMHG | OXYGEN SATURATION: 100 %

## 2023-11-22 DIAGNOSIS — C18.2 MALIGNANT NEOPLASM OF ASCENDING COLON: ICD-10-CM

## 2023-11-22 DIAGNOSIS — C18.2 MALIGNANT NEOPLASM OF ASCENDING COLON: Primary | ICD-10-CM

## 2023-11-22 PROCEDURE — 1101F PT FALLS ASSESS-DOCD LE1/YR: CPT | Mod: CPTII,S$GLB,,

## 2023-11-22 PROCEDURE — 3078F PR MOST RECENT DIASTOLIC BLOOD PRESSURE < 80 MM HG: ICD-10-PCS | Mod: CPTII,S$GLB,,

## 2023-11-22 PROCEDURE — 99215 OFFICE O/P EST HI 40 MIN: CPT | Mod: S$GLB,,,

## 2023-11-22 PROCEDURE — 3066F PR DOCUMENTATION OF TREATMENT FOR NEPHROPATHY: ICD-10-PCS | Mod: CPTII,S$GLB,,

## 2023-11-22 PROCEDURE — 3044F HG A1C LEVEL LT 7.0%: CPT | Mod: CPTII,S$GLB,,

## 2023-11-22 PROCEDURE — 1111F DSCHRG MED/CURRENT MED MERGE: CPT | Mod: CPTII,S$GLB,,

## 2023-11-22 PROCEDURE — 3008F BODY MASS INDEX DOCD: CPT | Mod: CPTII,S$GLB,,

## 2023-11-22 PROCEDURE — 4010F PR ACE/ARB THEARPY RXD/TAKEN: ICD-10-PCS | Mod: CPTII,S$GLB,,

## 2023-11-22 PROCEDURE — 4010F ACE/ARB THERAPY RXD/TAKEN: CPT | Mod: CPTII,S$GLB,,

## 2023-11-22 PROCEDURE — 3075F SYST BP GE 130 - 139MM HG: CPT | Mod: CPTII,S$GLB,,

## 2023-11-22 PROCEDURE — 1126F PR PAIN SEVERITY QUANTIFIED, NO PAIN PRESENT: ICD-10-PCS | Mod: CPTII,S$GLB,,

## 2023-11-22 PROCEDURE — 99999 PR PBB SHADOW E&M-EST. PATIENT-LVL I: ICD-10-PCS | Mod: PBBFAC,,,

## 2023-11-22 PROCEDURE — 99999 PR PBB SHADOW E&M-EST. PATIENT-LVL IV: ICD-10-PCS | Mod: PBBFAC,,,

## 2023-11-22 PROCEDURE — 1101F PR PT FALLS ASSESS DOC 0-1 FALLS W/OUT INJ PAST YR: ICD-10-PCS | Mod: CPTII,S$GLB,,

## 2023-11-22 PROCEDURE — 3066F NEPHROPATHY DOC TX: CPT | Mod: CPTII,S$GLB,,

## 2023-11-22 PROCEDURE — 3061F PR NEG MICROALBUMINURIA RESULT DOCUMENTED/REVIEW: ICD-10-PCS | Mod: CPTII,S$GLB,,

## 2023-11-22 PROCEDURE — 1159F MED LIST DOCD IN RCRD: CPT | Mod: CPTII,S$GLB,,

## 2023-11-22 PROCEDURE — 99999 PR PBB SHADOW E&M-EST. PATIENT-LVL I: CPT | Mod: PBBFAC,,,

## 2023-11-22 PROCEDURE — 3008F PR BODY MASS INDEX (BMI) DOCUMENTED: ICD-10-PCS | Mod: CPTII,S$GLB,,

## 2023-11-22 PROCEDURE — 1159F PR MEDICATION LIST DOCUMENTED IN MEDICAL RECORD: ICD-10-PCS | Mod: CPTII,S$GLB,,

## 2023-11-22 PROCEDURE — 3075F PR MOST RECENT SYSTOLIC BLOOD PRESS GE 130-139MM HG: ICD-10-PCS | Mod: CPTII,S$GLB,,

## 2023-11-22 PROCEDURE — 3078F DIAST BP <80 MM HG: CPT | Mod: CPTII,S$GLB,,

## 2023-11-22 PROCEDURE — 1111F PR DISCHARGE MEDS RECONCILED W/ CURRENT OUTPATIENT MED LIST: ICD-10-PCS | Mod: CPTII,S$GLB,,

## 2023-11-22 PROCEDURE — 3061F NEG MICROALBUMINURIA REV: CPT | Mod: CPTII,S$GLB,,

## 2023-11-22 PROCEDURE — 3288F PR FALLS RISK ASSESSMENT DOCUMENTED: ICD-10-PCS | Mod: CPTII,S$GLB,,

## 2023-11-22 PROCEDURE — 3288F FALL RISK ASSESSMENT DOCD: CPT | Mod: CPTII,S$GLB,,

## 2023-11-22 PROCEDURE — 1126F AMNT PAIN NOTED NONE PRSNT: CPT | Mod: CPTII,S$GLB,,

## 2023-11-22 PROCEDURE — 3044F PR MOST RECENT HEMOGLOBIN A1C LEVEL <7.0%: ICD-10-PCS | Mod: CPTII,S$GLB,,

## 2023-11-22 PROCEDURE — 99215 PR OFFICE/OUTPT VISIT, EST, LEVL V, 40-54 MIN: ICD-10-PCS | Mod: S$GLB,,,

## 2023-11-22 PROCEDURE — 99999 PR PBB SHADOW E&M-EST. PATIENT-LVL IV: CPT | Mod: PBBFAC,,,

## 2023-11-22 NOTE — NURSING
I met with Indu and two of her children for her patient education appointment. I reviewed my role and discussed barriers to treatment and her emotional wellbeing. I referred her to the [facility]. She stated she is on Zoloft and has been for years. She may contact her PCP to increase the dose if she feels like she is experiencing more depression.  They will reach out to me if any needs arise.

## 2023-11-22 NOTE — PROGRESS NOTES
Oncology Nutrition Evaluation      Indu Azul   1955    Oncology Provider:   Fatemeh Olmstead MD    Reason for Visit:  New Treatment Education    Oncology/Hematology Diagnosis:   pT3 N2a Mx Stage IIIB Colon cancer s/p R hemicolectomy (10/19/23)  Microcytic anemia.   Folate deficiency    Treatment Plan:  FOLFOX     Nutrition Recommendations:  1. Regular diet as tolerated. Avoid ingestion of cold foods/beverages during oxaliplatin infusion and for 4-5 days following.     Nutrition Assessment    11/22/23: This is a 68 y.o.female with a medical diagnosis of colon CA s/p resection on 10/19. She reports good appetite and po intake, no c/o n/v/c/d but does have some slight intolerance to a few foods that cause reflux. Her wt has been stable.     Nutrition Factors Affecting Intake  none identified    PMHx: CKD, cirrhosis, DM, GERD, hepatic steatosis, HLD, HTN, concha    Allergies: Patient has no known allergies.    Current Medications:    Current Outpatient Medications:     atorvastatin (LIPITOR) 20 MG tablet, Take 1 tablet (20 mg total) by mouth once daily. (Patient not taking: Reported on 11/21/2023), Disp: 90 tablet, Rfl: 3    [START ON 11/27/2023] dexAMETHasone (DECADRON) 4 MG Tab, Take 2 tablets (8 mg total) by mouth once daily. Take as directed on days 2 and 3 of your chemotherapy cycle., Disp: 24 tablet, Rfl: 5    dulaglutide (TRULICITY) 3 mg/0.5 mL pen injector, Inject 3 mg into the skin every 7 days., Disp: 4 pen , Rfl: 11    ferrous sulfate (FEOSOL) 325 mg (65 mg iron) Tab tablet, Take 1 tablet (325 mg total) by mouth 2 (two) times daily., Disp: 60 tablet, Rfl: 11    folic acid (FOLVITE) 1 MG tablet, Take 1 tablet (1 mg total) by mouth once daily., Disp: 100 tablet, Rfl: 3    furosemide (LASIX) 40 MG tablet, Take 40 mg by mouth as needed., Disp: , Rfl:     gabapentin (NEURONTIN) 300 MG capsule, Take 2 capsules (600 mg total) by mouth every evening., Disp: 60 capsule, Rfl: 5     "HYDROcodone-acetaminophen (NORCO) 5-325 mg per tablet, Take 1 tablet by mouth every 6 (six) hours as needed for Pain., Disp: 15 tablet, Rfl: 0    HYDROcodone-acetaminophen (NORCO) 7.5-325 mg per tablet, Take 1 tablet by mouth every 6 (six) hours as needed for Pain. (Patient not taking: Reported on 11/13/2023), Disp: 20 tablet, Rfl: 0    insulin lispro 200 unit/mL (3 mL) InPn, Inject 2 Units into the skin 3 (three) times daily before meals., Disp: 3 mL, Rfl: 11    insulin syringe-needle U-100 1 mL 31 gauge x 5/16 Syrg, Inject 1 Syringe into the skin 3 (three) times daily with meals., Disp: , Rfl:     OLANZapine (ZYPREXA) 5 MG tablet, Take 1 tablet (5 mg total) by mouth every evening. Take nightly on days 1-3 of each chemotherapy cycle., Disp: 6 tablet, Rfl: 5    ondansetron (ZOFRAN-ODT) 8 MG TbDL, Take 1 tablet (8 mg total) by mouth every 6 (six) hours as needed., Disp: 10 tablet, Rfl: 0    [START ON 11/26/2023] ondansetron (ZOFRAN-ODT) 8 MG TbDL, Take 1 tablet (8 mg total) by mouth every 8 (eight) hours as needed (nausea/vomiting). (Patient not taking: Reported on 11/21/2023), Disp: 60 tablet, Rfl: 5    sertraline (ZOLOFT) 50 MG tablet, Take 1 tablet by mouth once daily, Disp: 90 tablet, Rfl: 0    solifenacin (VESICARE) 10 MG tablet, Take 10 mg by mouth once daily., Disp: , Rfl:     spironolactone (ALDACTONE) 50 MG tablet, Take 50 mg by mouth once daily., Disp: , Rfl:     TOUJEO SOLOSTAR U-300 INSULIN 300 unit/mL (1.5 mL) InPn pen, INJECT 79 UNITS SUBCUTANEOUSLY ONCE DAILY, Disp: 6 mL, Rfl: 0    Labs: no new    Anthropometrics    Height:   Ht Readings from Last 1 Encounters:   11/22/23 5' 5" (1.651 m)      Weight:   Wt Readings from Last 1 Encounters:   11/22/23 111.6 kg (246 lb)        Usual Body Weight: 111.6 kg (246 lb)  % Weight Change: 0    BMI: 40.9 (obese III)    Ideal Weight: 56.7 kg (125 lb)  % Ideal Weight: 197%      Nutrition Diagnosis    PES: Food and nutrition related knowledge deficit related to " chronic illness as evidenced by lack of prior exposure to onc nutrition. (resolved)     Nutrition Risk  low    Nutrition Intervention    Interventions(treatment strategy):  modified composition of meals/snacks and purpose of nutrition education    Education Provided: food safety guidelines and oxaliplatin nutrition therapy  Teaching Method: explanation and printed materials  Comprehension: verbalizes understanding  Barriers to Learning: none evident  Expected Compliance: good  Comments: All questions were answered and dietitian's contact information was provided.      Nutrition Monitoring and Evaluation    Ongoing monitoring not warranted at this time. Please consult RD prn.        Bianca De La Cruz MS, RD, , LDN

## 2023-11-22 NOTE — PROGRESS NOTES
THERAPY EDUCATION: FOLFOX     Diagnosis/Problem list:   pT3 N2a Mx Stage IIIB Colon cancer  Microcytic anemia.   Folate deficiency     Present Treatment:  FOLFOX planned to start 11/28/23 with dose reduction on Oxaliplatin due to neuropathy    Treatment history:  10/19/2023 Lap/jaswinder right colon resection         Imaging studies:  CT C/A/P 6/3/2022: There is no significant hepatic steatosis.  The liver is cirrhotic.  No liver lesion is identified.  The spleen is enlarged, measuring 15.5 cm in length.  There is no retroperitoneal lymphadenopathy or abdominal aortic aneurysm.  A mildly prominent right external iliac lymph node measures 9 mm in short axis, nonspecific.  This is also similar in appearance when compared to 2015. Impression: Cirrhosis. Splenomegaly.    Pathology:  10/19/2023:  COLON, RIGHT HEMICOLECTOMY (1.5 CM OF TERMINAL ILEUM, 25 CM LENGTH OF RIGHT COLON): POORLY DIFFERENTIATED ADENOCARCINOMA WITH FOCAL SIGNET RING CELL FEATURES, UNIFOCAL, RIGHT COLON, 8 CM GREATEST DIMENSION.   THE TUMOR INVADES THROUGH THE MUSCULARIS PROPRIA INTO PERICOLONIC TISSUE (PT3).   FOCAL LYMPHVASCULAR INVASION IS PRESENT.   THE SURGICAL MARGINS ARE FREE OF TUMOR.   METASTATIC ADENOCARCINOMA IS IDENTIFIED IN FOUR (4) OF TWENTY-SEVEN (27)   PERICOLONIC LYMPH NODES (LARGEST METASTATIC DEPOSIT 8 MM; FOCAL EXTRACAPSULAR EXTENSION OF TUMOR IS PRESENT).      PATHOLOGIC STAGING:  pT3 N2a Mx     Histologic Grade:  G3, poorly differentiated   Macroscopic Tumor Perforation:  Not identified   Lymphovascular Invasion:  Small vessel   Perineural Invasion:  Not identified        CLINICAL HISTORY:      Patient: Indu Azul is a 68 y.o. female. with multiple medical problems that I saw originally on 4/22/2022 for thrombocytopenia.  Patient platelet counts on 12/21/2021 were 132,000 and since that that has been slowly trending down.  12/21/2021 platelets 132,000  01/19/2022 platelets 121,000  04/08/2022 platelets 115,000     Of note patient  have a UA done that same day that suggest possible UTI with positive nitrates, 1+ leukocytes and many bacteria.  Urine culture with E. coli.   Reviewing electronic medical record and going back to 12/15/2014 patient with occasional low platelets.  They were never lower than 100,000 and they always normalized.   As per patient in 2020 when her platelets were slightly decreased (117,000), this was shortly after she has her left foot surgery.  Then in May 17 and May 18 2021, platelets were 105k and 101k,  she had COVID.  Again in 2022 she have a UTI for which she completed antibiotics.        Patient's father diagnosed Blood disorder requiring blood transfusion that started q 4 weeks and the q week. He was diagnosed on his 80's but  at 91   Sister and niece had ovarian cancer. Sister  of it. Niece still alive.          Chief Complaint: Therapy Education       Interval History:    Since last visit she was found to have iron deficiency anemia.  EGD performed, grade I-II esophageal varices found, too small to band. She also had Colonoscopy by Dr Hammonds that showed an ulcerated malignant-appearing partially obstructing medium sized mass in the ascending colon.  She reports that she had a colonoscopy about 5 years ago at [facility] and everything was fine, no polyps or masses.   Biopsy and tattoo of the mass showed fragments of colonic mucosa, no evidence of dysplasia or malignancy.  Two other polyps were completely removed in the descending and sigmoid colon, pathology returned hyperplastic polyps.  CT abd/pel with no acute findings.     Despite of biopsy because of malignant-appearing partially obstructing mass of the ascending colon and photographs and description were consistent with colon cancer she underwent Lap/jaswinder right colon resection on 10/19/2023 by Dr Timothy Russ. Path c/w really differentiated adeno carcinoma.    She is here for further recommendations regarding his newly diagnosed colon  cancer. She is with her son and daughter was on speaker phone. She denies any bright red blood per rectum or melena.  In the was on speaker she denies any bright red blood per rectum or melena.  No fever, chills,  sweats. No chest pain, or short of breath.  She has recovered from her surgery.      23: Ms. Azul presents to the clinic accompanied by her son and daughter for therapy education. Patient reports no major complaints at today's visit. Denies fever, chills, SOB, N/V/D, constipation, recent infection, chest pain or unexplained bleeding or bruising.      Past Medical History:   Diagnosis Date    Anesthesia complication     trouble awakening    Charcot's joint of foot, left     Chronic kidney disease, unspecified     Cirrhosis of liver without ascites     Depression     Diabetes mellitus, type II, insulin dependent     Diabetic neuropathy     GERD (gastroesophageal reflux disease)     H/O: hysterectomy     Hepatic steatosis     Malignant neoplasm of ascending colon     Obesity, unspecified     Overactive bladder     Thrombocytopenia, unspecified     Vitamin D deficiency       Past Surgical History:   Procedure Laterality Date    APPENDECTOMY      BREAST BIOPSY  2016     SECTION      x3    charcot-leyden crystals identification      CHOLECYSTECTOMY      COLONOSCOPY N/A 2023    Procedure: COLON;  Surgeon: Luis Amador MD;  Location: [facility] ENDOSCOPY;  Service: Gastroenterology;  Laterality: N/A;    COLONOSCOPY, WITH 1 OR MORE BIOPSIES  2023    Procedure: COLONOSCOPY, WITH 1 OR MORE BIOPSIES;  Surgeon: Luis Amador MD;  Location: [facility] ENDOSCOPY;  Service: Gastroenterology;;    COLONOSCOPY, WITH DIRECTED SUBMUCOSAL INJECTION  2023    Procedure: COLONOSCOPY, WITH DIRECTED SUBMUCOSAL INJECTION;  Surgeon: Luis Amador MD;  Location: [facility] ENDOSCOPY;  Service: Gastroenterology;;  8cc Colon Tattoo    COLONOSCOPY, WITH POLYPECTOMY  USING SNARE  08/28/2023    Procedure: COLONOSCOPY, WITH POLYPECTOMY USING SNARE;  Surgeon: Luis Amador MD;  Location: [facility] ENDOSCOPY;  Service: Gastroenterology;;    ESOPHAGOGASTRODUODENOSCOPY N/A 08/28/2023    Procedure: DOUBLE;  Surgeon: Luis Amador MD;  Location: [facility] ENDOSCOPY;  Service: Gastroenterology;  Laterality: N/A;    HEMORRHOID SURGERY      HYSTERECTOMY  1990    total    INSERTION OF TUNNELED CENTRAL VENOUS CATHETER (CVC) WITH SUBCUTANEOUS PORT Right 11/20/2023    Procedure: [id number];  Surgeon: Timothy Russ MD;  Location: [facility] OR;  Service: General;  Laterality: Right;    right foot surgery Right 02/01/2022    XI ROBOTIC COLECTOMY, RIGHT N/A 10/19/2023    Procedure: XI ROBOTIC COLECTOMY, RIGHT;  Surgeon: Timothy Russ MD;  Location: [facility];  Service: General;  Laterality: N/A;     Family History   Problem Relation Age of Onset    Diabetes Mother     Alzheimer's disease Mother     Diabetes Father     Leukemia Father     Diabetes Sister     Liver cancer Sister     Diabetes Brother      Social Connections: Not on file       Review of patient's allergies indicates:  No Known Allergies   Current Outpatient Medications on File Prior to Visit   Medication Sig Dispense Refill    atorvastatin (LIPITOR) 20 MG tablet Take 1 tablet (20 mg total) by mouth once daily. (Patient not taking: Reported on 11/21/2023) 90 tablet 3    [START ON 11/27/2023] dexAMETHasone (DECADRON) 4 MG Tab Take 2 tablets (8 mg total) by mouth once daily. Take as directed on days 2 and 3 of your chemotherapy cycle. 24 tablet 5    dulaglutide (TRULICITY) 3 mg/0.5 mL pen injector Inject 3 mg into the skin every 7 days. 4 pen 11    ferrous sulfate (FEOSOL) 325 mg (65 mg iron) Tab tablet Take 1 tablet (325 mg total) by mouth 2 (two) times daily. 60 tablet 11    folic acid (FOLVITE) 1 MG tablet Take 1 tablet (1 mg total) by mouth once daily. 100 tablet 3    furosemide (LASIX) 40 MG tablet Take  40 mg by mouth as needed.      gabapentin (NEURONTIN) 300 MG capsule Take 2 capsules (600 mg total) by mouth every evening. 60 capsule 5    HYDROcodone-acetaminophen (NORCO) 5-325 mg per tablet Take 1 tablet by mouth every 6 (six) hours as needed for Pain. 15 tablet 0    HYDROcodone-acetaminophen (NORCO) 7.5-325 mg per tablet Take 1 tablet by mouth every 6 (six) hours as needed for Pain. (Patient not taking: Reported on 11/13/2023) 20 tablet 0    insulin lispro 200 unit/mL (3 mL) InPn Inject 2 Units into the skin 3 (three) times daily before meals. 3 mL 11    insulin syringe-needle U-100 1 mL 31 gauge x 5/16 Syrg Inject 1 Syringe into the skin 3 (three) times daily with meals.      OLANZapine (ZYPREXA) 5 MG tablet Take 1 tablet (5 mg total) by mouth every evening. Take nightly on days 1-3 of each chemotherapy cycle. 6 tablet 5    ondansetron (ZOFRAN-ODT) 8 MG TbDL Take 1 tablet (8 mg total) by mouth every 6 (six) hours as needed. 10 tablet 0    [START ON 11/26/2023] ondansetron (ZOFRAN-ODT) 8 MG TbDL Take 1 tablet (8 mg total) by mouth every 8 (eight) hours as needed (nausea/vomiting). (Patient not taking: Reported on 11/21/2023) 60 tablet 5    sertraline (ZOLOFT) 50 MG tablet Take 1 tablet by mouth once daily 90 tablet 0    solifenacin (VESICARE) 10 MG tablet Take 10 mg by mouth once daily.      spironolactone (ALDACTONE) 50 MG tablet Take 50 mg by mouth once daily.      TOUJEO SOLOSTAR U-300 INSULIN 300 unit/mL (1.5 mL) InPn pen INJECT 79 UNITS SUBCUTANEOUSLY ONCE DAILY 6 mL 0     No current facility-administered medications on file prior to visit.      Review of Systems   Constitutional:  Negative for activity change, appetite change, chills, fatigue, fever and unexpected weight change.   HENT:  Negative for mouth dryness, mouth sores, nosebleeds, sore throat and trouble swallowing.    Eyes:  Negative for visual disturbance.   Respiratory:  Negative for cough and shortness of breath.    Cardiovascular:  Negative  "for chest pain, palpitations and leg swelling.   Gastrointestinal:  Negative for abdominal distention, abdominal pain, blood in stool, change in bowel habit, constipation, diarrhea, nausea and vomiting.   Endocrine: Negative.    Genitourinary:  Negative for dysuria, frequency, hematuria and urgency.   Musculoskeletal:  Negative for arthralgias, back pain, myalgias and neck pain.   Integumentary:  Negative for rash.   Neurological:  Positive for numbness (of bilateral foot due to severe neuropathy) and coordination difficulties. Negative for dizziness, tremors, syncope, speech difficulty, weakness, light-headedness, headaches and memory loss.   Hematological:  Does not bruise/bleed easily.   Psychiatric/Behavioral:  Negative for confusion and suicidal ideas.        Vitals:    11/22/23 1024   BP: 131/78   BP Location: Left arm   Patient Position: Sitting   Pulse: 85   Temp: 97.6 °F (36.4 °C)   TempSrc: Oral   SpO2: 100%   Weight: 111.6 kg (246 lb)   Height: 5' 5" (1.651 m)        Wt Readings from Last 6 Encounters:   11/21/23 110.2 kg (243 lb)   11/15/23 104.3 kg (230 lb)   11/15/23 106.9 kg (235 lb 9.6 oz)   11/13/23 106.7 kg (235 lb 3.2 oz)   10/19/23 104.3 kg (230 lb)   10/10/23 107 kg (236 lb)     Body mass index is 40.94 kg/m².  Body surface area is 2.26 meters squared.          Laboratory:  CBC with Differential:  Lab Results   Component Value Date    WBC 7.48 11/13/2023    RBC 4.44 11/13/2023    HGB 9.7 (L) 11/13/2023    HCT 33.8 (L) 11/13/2023    MCV 76.1 (L) 11/13/2023    MCH 21.8 (L) 11/13/2023    MCHC 28.7 (L) 11/13/2023    RDW 17.3 (H) 11/13/2023     11/13/2023    MPV  11/13/2023      Comment:      Unable to calculate MPV         CMP:  Sodium   Date Value Ref Range Status   06/01/2022 140 136 - 145 mmol/L Final     Sodium Level   Date Value Ref Range Status   11/13/2023 139 136 - 145 mmol/L Final     Potassium   Date Value Ref Range Status   06/01/2022 4.9 3.5 - 5.1 mmol/L Final     Potassium Level "   Date Value Ref Range Status   11/13/2023 4.0 3.5 - 5.1 mmol/L Final     Chloride   Date Value Ref Range Status   06/01/2022 104 100 - 109 mmol/L Final     Carbon Dioxide   Date Value Ref Range Status   11/13/2023 29 23 - 31 mmol/L Final   06/01/2022 29 22 - 33 mmol/L Final     Blood Urea Nitrogen   Date Value Ref Range Status   11/13/2023 25.1 (H) 9.8 - 20.1 mg/dL Final   06/01/2022 22 5 - 25 mg/dL Final     Creatinine   Date Value Ref Range Status   11/13/2023 1.66 (H) 0.55 - 1.02 mg/dL Final   06/01/2022 1.32 (H) 0.57 - 1.25 mg/dL Final     Calcium   Date Value Ref Range Status   06/01/2022 8.7 (L) 8.8 - 10.6 mg/dL Final     Calcium Level Total   Date Value Ref Range Status   11/13/2023 9.3 8.4 - 10.2 mg/dL Final     Albumin Level   Date Value Ref Range Status   11/13/2023 3.5 3.4 - 4.8 g/dL Final     Bilirubin Total   Date Value Ref Range Status   11/13/2023 0.4 <=1.5 mg/dL Final     Alkaline Phosphatase   Date Value Ref Range Status   11/13/2023 102 40 - 150 unit/L Final     Aspartate Aminotransferase   Date Value Ref Range Status   11/13/2023 18 5 - 34 unit/L Final     Alanine Aminotransferase   Date Value Ref Range Status   11/13/2023 16 0 - 55 unit/L Final     Anion Gap   Date Value Ref Range Status   06/01/2022 7 (L) 8 - 16 mmol/L Final     eGFR    Date Value Ref Range Status   06/01/2022 45 mL/min/1.73mSq Final     Comment:     In accordance with NKF-ASN Task Force recommendation, calculation based on the Chronic Kidney Disease Epidemiology Collaboration (CKD-EPI) equation without adjustment for race. eGFR adjusted for gender and age and calculated in ml/min/1.73mSquared. eGFR cannot be calculated if patient is under 18 years of age.     Reference Range:   >= 60 ml/min/1.73mSquared.     Estimated GFR-Non    Date Value Ref Range Status   08/04/2022 43 mls/min/1.73/m2 Final            Assessment:   1) Stage IIIB adenocarcinoma of the ascending colon  2) Lung nodules-Stable.    3) iron deficiency anemia-on iron PO  4) Thrombocytopenia-resolved.   5) Folate deficiency-she will start taking folic acid at this time.   6) Diabetic neuropathy-severe.   Plan:   -Mediport placement completed on 11/20/23. Lidocaine cream given with instructions to apply on port 30 minutes before treatment.   -Therapy education completed on 11/22/23.  -Plans to start FOLFOX tentatively on 8/23/23. Made patient aware that she will receive premedications given 30 minutes prior to each treatment to help prevent nausea. Patient understood that she will be going home with a 5FU pump for 2 days and will need to return to the clinic to have pump disconnected. Patient aware the importance of monitoring the pump 3 to 4 times a day to check for leaks or kinks. Patient also aware of the importance of avoiding cold drinks and cold food on the day of and 2 days after treatment with Oxaliplatin.     -Antiemetics regimen schedule made and given with calendar for guidance     Olanzapine- Take 1 tablet by mouth on Days 1-3 of each chemo cycle   -Zofran PRN prescribed for at home with instructions to be taken by mouth every 8 hours as needed for nausea. If not working, Compazine can be prescribed as 2nd choice.    -OTC Imodium AD recommended for diarrhea (4-6 BMs a day). Take 2 tablets after the first loose bowel movement, and 1 tablet after each loose bowel movement after the first dose has been taken. No more than 4 tablets should be taken in any 24-hour period. If not working, Lomotil can be prescribed as 2nd choice.    -Mouth sore prevention with 1-quart warm water with 1 tsp of baking soda and salt and alcohol-free mouthwash.   -Emphasized adequate hand-hygiene and limited contact with people who are sick.   -Monitor and notify any bleeding in urine, stool, or sputum.  As well as unusual bleeding or bruising and stomach pain.   - Emphasized hydration with 4 16 oz bottles of water a day.    -Importance of moisturizing with  fragrance free lotion to prevent skin rash and/or hand-foot syndrome.  -Continue folate and iron by mouth  -Call clinic if fever >100.4, shakes or chills, shortness of breath, chest pain, uncontrolled vomiting or diarrhea, pain and tingling in the chest or arm, or just not feeling well.    - RTC on 11/27/23 labs and toxicity check.       DISCUSSION:    1.  A total of 60 minutes were spent in counseling today, in which 100% were face-to-face.  At today's therapy teaching session, we discussed the patient's cancer diagnosis as well as planned therapy regimen, protocol, side effects and toxicities.  A handout of each therapeutic agent in the regimen was provided and reviewed in detail.    2.  The following side effects were discussed but not limited to:    Fluorouracil Side Effects:  Allergic Reactions  Breathing Problems  Bruising  Chest Pain  Chills  Cough  Diarrhea  Fever  Hair Loss  Headache  Infection  Itching  Low Blood Counts  Mouth Sores  Nausea  Numbness  Pain  Rash  Stomach Upset  Swelling  Tingling  Vomiting    Leucovorin (Injection) Side Effects:  Allergic Reactions  Breathing Problems  Itching  Rash  Swelling    Oxaliplatin Side Effects:  Allergic Reactions  Anemia  Breathing Problems  Bruising  Chest Pain  Chills  Cough  Diarrhea  Dizziness  Feeling Faint  Fever  Gas  Hair Loss  Infection  Injury  Itching  Low Blood Counts  Mouth Sores  Muscle Pain  Nausea  Numbness  Pain  Rash  Swelling  Tingling  Vomiting                a.  Discussed the risk of infection while on therapy related to pancytopenia, specifically a decrease in their white blood cell count.  Instructed to contact our office for temperature >100.4 F, chills, sudden onset cough or shortness of breath, symptoms of a urinary tract infection.                b.  Discussed the risk of anemia. Instructed to contact our office for dizziness, heart palpitations, or extreme or sudden changes in weakness.                c.  Discussed the risk of  thrombocytopenia, which increases the risk of bruising or bleeding.  Instructed the patient to contact our office for spontaneous signs of bleeding, including nose bleeds, bleeding from the gums or mouth, blood in sputum, urine or stool and unusual or excessive bruising or rash.                d.  Discussed GI side effects including weight changes, changes in appetite, altered sense of taste, stomatitis, nausea, vomiting, diarrhea, constipation, and heartburn.                e.  Discussed  side effects including painful urination, changes in the amount of urination, possible urine color changes.  Discussed fertility issues and to prevent  pregnancy if of child bearing age.                f.  Discussed neurological side effects including the risk of peripheral neuropathy, either temporary or permanent.                g.  Discussed the potential for skin, hair, and nail changes.       3.  Instructed to contact our office for discussion of medication changes, the addition of vitamin and/or herbal supplementation as they may interact with some chemotherapy agents.    4.  Discussed dietary modifications and the need to maintain adequate caloric intake and proper oral hydration.  Recommended 64 ounces of fluid per day.    5.  Discussed anti-emetic protocol and bowel regimen protocol.    6.  Office contact information given including after hours number.  Discussed there is an oncologist on call 24/7, 365 days including weekends.  Provided primary nurse's information .    7.  In summary, the patient is in agreement with the plan of care.  Questions appeared to be answered to their satisfaction. Consented the patient to the treatment plan and the patient was educated on the planned duration of the treatment and schedule of the treatment administration. Copy to be scanned into the chart.    All questions answered to the satisfaction of the patient and family.     Follow up appointments given to patient.      STEPHEN BERNAL  FNP-C  PATIENT EDUCATOR  [facility]

## 2023-11-27 ENCOUNTER — OFFICE VISIT (OUTPATIENT)
Dept: HEMATOLOGY/ONCOLOGY | Facility: CLINIC | Age: 68
End: 2023-11-27
Payer: MEDICARE

## 2023-11-27 ENCOUNTER — TELEPHONE (OUTPATIENT)
Dept: HEMATOLOGY/ONCOLOGY | Facility: CLINIC | Age: 68
End: 2023-11-27

## 2023-11-27 VITALS
WEIGHT: 244.5 LBS | BODY MASS INDEX: 40.73 KG/M2 | SYSTOLIC BLOOD PRESSURE: 135 MMHG | RESPIRATION RATE: 18 BRPM | HEART RATE: 79 BPM | TEMPERATURE: 98 F | OXYGEN SATURATION: 99 % | HEIGHT: 65 IN | DIASTOLIC BLOOD PRESSURE: 69 MMHG

## 2023-11-27 DIAGNOSIS — E11.42 DIABETIC POLYNEUROPATHY ASSOCIATED WITH TYPE 2 DIABETES MELLITUS: ICD-10-CM

## 2023-11-27 DIAGNOSIS — N18.4 CKD (CHRONIC KIDNEY DISEASE) STAGE 4, GFR 15-29 ML/MIN: Primary | ICD-10-CM

## 2023-11-27 DIAGNOSIS — D50.9 MICROCYTIC ANEMIA: ICD-10-CM

## 2023-11-27 DIAGNOSIS — C18.2 MALIGNANT NEOPLASM OF ASCENDING COLON: Primary | ICD-10-CM

## 2023-11-27 PROCEDURE — 4010F PR ACE/ARB THEARPY RXD/TAKEN: ICD-10-PCS | Mod: CPTII,S$GLB,, | Performed by: NURSE PRACTITIONER

## 2023-11-27 PROCEDURE — 1101F PT FALLS ASSESS-DOCD LE1/YR: CPT | Mod: CPTII,S$GLB,, | Performed by: NURSE PRACTITIONER

## 2023-11-27 PROCEDURE — 1159F PR MEDICATION LIST DOCUMENTED IN MEDICAL RECORD: ICD-10-PCS | Mod: CPTII,S$GLB,, | Performed by: NURSE PRACTITIONER

## 2023-11-27 PROCEDURE — 99999 PR PBB SHADOW E&M-EST. PATIENT-LVL IV: ICD-10-PCS | Mod: PBBFAC,,, | Performed by: NURSE PRACTITIONER

## 2023-11-27 PROCEDURE — 3066F PR DOCUMENTATION OF TREATMENT FOR NEPHROPATHY: ICD-10-PCS | Mod: CPTII,S$GLB,, | Performed by: NURSE PRACTITIONER

## 2023-11-27 PROCEDURE — 99215 OFFICE O/P EST HI 40 MIN: CPT | Mod: S$GLB,,, | Performed by: NURSE PRACTITIONER

## 2023-11-27 PROCEDURE — 3061F PR NEG MICROALBUMINURIA RESULT DOCUMENTED/REVIEW: ICD-10-PCS | Mod: CPTII,S$GLB,, | Performed by: NURSE PRACTITIONER

## 2023-11-27 PROCEDURE — 1101F PR PT FALLS ASSESS DOC 0-1 FALLS W/OUT INJ PAST YR: ICD-10-PCS | Mod: CPTII,S$GLB,, | Performed by: NURSE PRACTITIONER

## 2023-11-27 PROCEDURE — 3044F HG A1C LEVEL LT 7.0%: CPT | Mod: CPTII,S$GLB,, | Performed by: NURSE PRACTITIONER

## 2023-11-27 PROCEDURE — 3061F NEG MICROALBUMINURIA REV: CPT | Mod: CPTII,S$GLB,, | Performed by: NURSE PRACTITIONER

## 2023-11-27 PROCEDURE — 99215 PR OFFICE/OUTPT VISIT, EST, LEVL V, 40-54 MIN: ICD-10-PCS | Mod: S$GLB,,, | Performed by: NURSE PRACTITIONER

## 2023-11-27 PROCEDURE — 3288F PR FALLS RISK ASSESSMENT DOCUMENTED: ICD-10-PCS | Mod: CPTII,S$GLB,, | Performed by: NURSE PRACTITIONER

## 2023-11-27 PROCEDURE — 3008F PR BODY MASS INDEX (BMI) DOCUMENTED: ICD-10-PCS | Mod: CPTII,S$GLB,, | Performed by: NURSE PRACTITIONER

## 2023-11-27 PROCEDURE — 1126F PR PAIN SEVERITY QUANTIFIED, NO PAIN PRESENT: ICD-10-PCS | Mod: CPTII,S$GLB,, | Performed by: NURSE PRACTITIONER

## 2023-11-27 PROCEDURE — 4010F ACE/ARB THERAPY RXD/TAKEN: CPT | Mod: CPTII,S$GLB,, | Performed by: NURSE PRACTITIONER

## 2023-11-27 PROCEDURE — 3008F BODY MASS INDEX DOCD: CPT | Mod: CPTII,S$GLB,, | Performed by: NURSE PRACTITIONER

## 2023-11-27 PROCEDURE — 3288F FALL RISK ASSESSMENT DOCD: CPT | Mod: CPTII,S$GLB,, | Performed by: NURSE PRACTITIONER

## 2023-11-27 PROCEDURE — 3075F SYST BP GE 130 - 139MM HG: CPT | Mod: CPTII,S$GLB,, | Performed by: NURSE PRACTITIONER

## 2023-11-27 PROCEDURE — 3078F DIAST BP <80 MM HG: CPT | Mod: CPTII,S$GLB,, | Performed by: NURSE PRACTITIONER

## 2023-11-27 PROCEDURE — 3075F PR MOST RECENT SYSTOLIC BLOOD PRESS GE 130-139MM HG: ICD-10-PCS | Mod: CPTII,S$GLB,, | Performed by: NURSE PRACTITIONER

## 2023-11-27 PROCEDURE — 1126F AMNT PAIN NOTED NONE PRSNT: CPT | Mod: CPTII,S$GLB,, | Performed by: NURSE PRACTITIONER

## 2023-11-27 PROCEDURE — 3078F PR MOST RECENT DIASTOLIC BLOOD PRESSURE < 80 MM HG: ICD-10-PCS | Mod: CPTII,S$GLB,, | Performed by: NURSE PRACTITIONER

## 2023-11-27 PROCEDURE — 99999 PR PBB SHADOW E&M-EST. PATIENT-LVL IV: CPT | Mod: PBBFAC,,, | Performed by: NURSE PRACTITIONER

## 2023-11-27 PROCEDURE — 3066F NEPHROPATHY DOC TX: CPT | Mod: CPTII,S$GLB,, | Performed by: NURSE PRACTITIONER

## 2023-11-27 PROCEDURE — 3044F PR MOST RECENT HEMOGLOBIN A1C LEVEL <7.0%: ICD-10-PCS | Mod: CPTII,S$GLB,, | Performed by: NURSE PRACTITIONER

## 2023-11-27 PROCEDURE — 1159F MED LIST DOCD IN RCRD: CPT | Mod: CPTII,S$GLB,, | Performed by: NURSE PRACTITIONER

## 2023-11-27 RX ORDER — SULFAMETHOXAZOLE AND TRIMETHOPRIM 800; 160 MG/1; MG/1
1 TABLET ORAL 2 TIMES DAILY
Qty: 14 TABLET | Refills: 0 | Status: SHIPPED | OUTPATIENT
Start: 2023-11-27 | End: 2023-12-19

## 2023-11-27 NOTE — PROGRESS NOTES
HEMATOLOGY/ONCOLOGY OFFICE CLINIC VISIT    Visit Information:    Initial Evaluation: 4/22/2022  Referring Provider: Maggy Jaquez NP  Other providers: Dr Timothy Russ  Code status: Not addressed     Diagnosis/Problem list:   pT3 N2a Mx Stage IIIB Colon cancer. Dx 10/19/23  Microcytic anemia.   Folate deficiency     Present Treatment:  FOLFOX planned with dose reduction on Oxaliplatin due to neuropathy. Plan to start on 11/28/23.    Treatment history:  10/19/2023 Lap/jaswinder right colon resection         Imaging studies:  CT C/A/P 6/3/2022: There is no significant hepatic steatosis.  The liver is cirrhotic.  No liver lesion is identified.  The spleen is enlarged, measuring 15.5 cm in length.  There is no retroperitoneal lymphadenopathy or abdominal aortic aneurysm.  A mildly prominent right external iliac lymph node measures 9 mm in short axis, nonspecific.  This is also similar in appearance when compared to 2015. Impression: Cirrhosis. Splenomegaly.    Pathology:  10/19/2023:  COLON, RIGHT HEMICOLECTOMY (1.5 CM OF TERMINAL ILEUM, 25 CM LENGTH OF RIGHT COLON): POORLY DIFFERENTIATED ADENOCARCINOMA WITH FOCAL SIGNET RING CELL FEATURES, UNIFOCAL, RIGHT COLON, 8 CM GREATEST DIMENSION.   THE TUMOR INVADES THROUGH THE MUSCULARIS PROPRIA INTO PERICOLONIC TISSUE (PT3).   FOCAL LYMPHVASCULAR INVASION IS PRESENT.   THE SURGICAL MARGINS ARE FREE OF TUMOR.   METASTATIC ADENOCARCINOMA IS IDENTIFIED IN FOUR (4) OF TWENTY-SEVEN (27)   PERICOLONIC LYMPH NODES (LARGEST METASTATIC DEPOSIT 8 MM; FOCAL EXTRACAPSULAR EXTENSION OF TUMOR IS PRESENT).      PATHOLOGIC STAGING:  pT3 N2a Mx     Histologic Grade:  G3, poorly differentiated   Macroscopic Tumor Perforation:  Not identified   Lymphovascular Invasion:  Small vessel   Perineural Invasion:  Not identified        CLINICAL HISTORY:       Patient: Indu Azul is a 68 y.o. female. with multiple medical problems that I saw originally on 4/22/2022 for thrombocytopenia.  Patient  platelet counts on 2021 were 132,000 and since that that has been slowly trending down.  2021 platelets 132,000  2022 platelets 121,000  2022 platelets 115,000     Of note patient have a UA done that same day that suggest possible UTI with positive nitrates, 1+ leukocytes and many bacteria.  Urine culture with E. coli.   Reviewing electronic medical record and going back to 12/15/2014 patient with occasional low platelets.  They were never lower than 100,000 and they always normalized.   As per patient in 2020 when her platelets were slightly decreased (117,000), this was shortly after she has her left foot surgery.  Then in May 17 and May 18 2021, platelets were 105k and 101k,  she had COVID.  Again in 2022 she have a UTI for which she completed antibiotics.        Patient's father diagnosed Blood disorder requiring blood transfusion that started q 4 weeks and the q week. He was diagnosed on his 80's but  at 91   Sister and niece had ovarian cancer. Sister  of it. Niece still alive.          Chief Complaint: OTHER (PT has concerns about her medi-port, It's a little red around the incision.)      Interval History:    Since last visit she was found to have iron deficiency anemia.  EGD performed, grade I-II esophageal varices found, too small to band. She also had Colonoscopy by Dr Hammonds that showed an ulcerated malignant-appearing partially obstructing medium sized mass in the ascending colon.  She reports that she had a colonoscopy about 5 years ago at [facility] and everything was fine, no polyps or masses.   Biopsy and tattoo of the mass showed fragments of colonic mucosa, no evidence of dysplasia or malignancy.  Two other polyps were completely removed in the descending and sigmoid colon, pathology returned hyperplastic polyps.  CT abd/pel with no acute findings.     Despite of biopsy because of malignant-appearing partially obstructing mass of the ascending colon and  photographs and description were consistent with colon cancer she underwent Lap/jaswinder right colon resection on 10/19/2023 by Dr Timothy Russ. Path c/w really differentiated adeno carcinoma.    23: Patient presents today for labs and TD prior to C1 of FOLFOX with dose reduction of Oxaliplatin due to neuropathy. She has microcytic anemia. Iron studies done on 23 show elevated iron and saturation. She is on oral iron. Denies any visible bleeding in stools. She has new mediport. Incision with redness. Denies tenderness.     Past Medical History:   Diagnosis Date    Anesthesia complication     trouble awakening    Charcot's joint of foot, left     Chronic kidney disease, unspecified     Cirrhosis of liver without ascites     Depression     Diabetes mellitus, type II, insulin dependent     Diabetic neuropathy     GERD (gastroesophageal reflux disease)     H/O: hysterectomy     Hepatic steatosis     Malignant neoplasm of ascending colon     Obesity, unspecified     Overactive bladder     Thrombocytopenia, unspecified     Vitamin D deficiency       Past Surgical History:   Procedure Laterality Date    APPENDECTOMY      BREAST BIOPSY  2016     SECTION      x3    charcot-leyden crystals identification      CHOLECYSTECTOMY      COLONOSCOPY N/A 2023    Procedure: COLON;  Surgeon: Luis Amador MD;  Location: [facility] ENDOSCOPY;  Service: Gastroenterology;  Laterality: N/A;    COLONOSCOPY, WITH 1 OR MORE BIOPSIES  2023    Procedure: COLONOSCOPY, WITH 1 OR MORE BIOPSIES;  Surgeon: Luis Amador MD;  Location: [facility] ENDOSCOPY;  Service: Gastroenterology;;    COLONOSCOPY, WITH DIRECTED SUBMUCOSAL INJECTION  2023    Procedure: COLONOSCOPY, WITH DIRECTED SUBMUCOSAL INJECTION;  Surgeon: Luis Amador MD;  Location: [facility] ENDOSCOPY;  Service: Gastroenterology;;  8cc Colon Tattoo    COLONOSCOPY, WITH POLYPECTOMY USING SNARE  2023    Procedure:  COLONOSCOPY, WITH POLYPECTOMY USING SNARE;  Surgeon: Luis Amador MD;  Location: [facility] ENDOSCOPY;  Service: Gastroenterology;;    ESOPHAGOGASTRODUODENOSCOPY N/A 08/28/2023    Procedure: DOUBLE;  Surgeon: Luis Amador MD;  Location: [facility] ENDOSCOPY;  Service: Gastroenterology;  Laterality: N/A;    HEMORRHOID SURGERY      HYSTERECTOMY  1990    total    INSERTION OF TUNNELED CENTRAL VENOUS CATHETER (CVC) WITH SUBCUTANEOUS PORT Right 11/20/2023    Procedure: [id number];  Surgeon: Timothy Russ MD;  Location: [facility] OR;  Service: General;  Laterality: Right;    right foot surgery Right 02/01/2022    XI ROBOTIC COLECTOMY, RIGHT N/A 10/19/2023    Procedure: XI ROBOTIC COLECTOMY, RIGHT;  Surgeon: Timothy Russ MD;  Location: [facility] OR;  Service: General;  Laterality: N/A;     Family History   Problem Relation Age of Onset    Diabetes Mother     Alzheimer's disease Mother     Diabetes Father     Leukemia Father     Diabetes Sister     Liver cancer Sister     Diabetes Brother      Social Connections: Not on file       Review of patient's allergies indicates:  No Known Allergies   Current Outpatient Medications on File Prior to Visit   Medication Sig Dispense Refill    ferrous sulfate (FEOSOL) 325 mg (65 mg iron) Tab tablet Take 1 tablet (325 mg total) by mouth 2 (two) times daily. 60 tablet 11    folic acid (FOLVITE) 1 MG tablet Take 1 tablet (1 mg total) by mouth once daily. 100 tablet 3    furosemide (LASIX) 40 MG tablet Take 40 mg by mouth as needed.      gabapentin (NEURONTIN) 300 MG capsule Take 2 capsules (600 mg total) by mouth every evening. 60 capsule 5    HYDROcodone-acetaminophen (NORCO) 5-325 mg per tablet Take 1 tablet by mouth every 6 (six) hours as needed for Pain. 15 tablet 0    insulin lispro 200 unit/mL (3 mL) InPn Inject 2 Units into the skin 3 (three) times daily before meals. 3 mL 11    insulin syringe-needle U-100 1 mL 31 gauge x 5/16 Syrg Inject 1 Syringe into  the skin 3 (three) times daily with meals.      OLANZapine (ZYPREXA) 5 MG tablet Take 1 tablet (5 mg total) by mouth every evening. Take nightly on days 1-3 of each chemotherapy cycle. 6 tablet 5    ondansetron (ZOFRAN-ODT) 8 MG TbDL Take 1 tablet (8 mg total) by mouth every 6 (six) hours as needed. 10 tablet 0    ondansetron (ZOFRAN-ODT) 8 MG TbDL Take 1 tablet (8 mg total) by mouth every 8 (eight) hours as needed (nausea/vomiting). 60 tablet 5    sertraline (ZOLOFT) 50 MG tablet Take 1 tablet by mouth once daily 90 tablet 0    solifenacin (VESICARE) 10 MG tablet Take 10 mg by mouth once daily.      spironolactone (ALDACTONE) 50 MG tablet Take 50 mg by mouth once daily.      TOUJEO SOLOSTAR U-300 INSULIN 300 unit/mL (1.5 mL) InPn pen INJECT 79 UNITS SUBCUTANEOUSLY ONCE DAILY 6 mL 0    [DISCONTINUED] dexAMETHasone (DECADRON) 4 MG Tab Take 2 tablets (8 mg total) by mouth once daily. Take as directed on days 2 and 3 of your chemotherapy cycle. 24 tablet 5    dulaglutide (TRULICITY) 3 mg/0.5 mL pen injector Inject 3 mg into the skin every 7 days. (Patient not taking: Reported on 11/27/2023) 4 pen 11    [DISCONTINUED] atorvastatin (LIPITOR) 20 MG tablet Take 1 tablet (20 mg total) by mouth once daily. (Patient not taking: Reported on 11/21/2023) 90 tablet 3    [DISCONTINUED] HYDROcodone-acetaminophen (NORCO) 7.5-325 mg per tablet Take 1 tablet by mouth every 6 (six) hours as needed for Pain. (Patient not taking: Reported on 11/13/2023) 20 tablet 0    [DISCONTINUED] metroNIDAZOLE (FLAGYL) 250 MG tablet TAKE 2 TABLETS AT 1PM, 6PM AND 9PM THE EVENING PRIOR TO SURGERY (Patient not taking: Reported on 11/13/2023) 6 tablet 0     No current facility-administered medications on file prior to visit.      Review of Systems   Integumentary:  Positive for wound.            Vitals:    11/27/23 0946   BP: 135/69   BP Location: Right arm   Patient Position: Sitting   Pulse: 79   Resp: 18   Temp: 98 °F (36.7 °C)   TempSrc: Oral   SpO2:  "99%   Weight: 110.9 kg (244 lb 8 oz)   Height: 5' 5" (1.651 m)        Wt Readings from Last 6 Encounters:   11/27/23 110.9 kg (244 lb 8 oz)   11/22/23 111.6 kg (246 lb)   11/21/23 110.2 kg (243 lb)   11/15/23 104.3 kg (230 lb)   11/15/23 106.9 kg (235 lb 9.6 oz)   11/13/23 106.7 kg (235 lb 3.2 oz)     Body mass index is 40.69 kg/m².  Body surface area is 2.26 meters squared.  Physical Exam  Vitals and nursing note reviewed.   Constitutional:       General: She is not in acute distress.     Appearance: Normal appearance. She is obese.      Comments: Laparoscopic incisions well healed    HENT:      Head: Normocephalic and atraumatic.      Mouth/Throat:      Mouth: Mucous membranes are moist.   Eyes:      General: No scleral icterus.     Extraocular Movements: Extraocular movements intact.      Conjunctiva/sclera: Conjunctivae normal.      Pupils: Pupils are equal, round, and reactive to light.   Neck:      Vascular: No JVD.   Cardiovascular:      Rate and Rhythm: Normal rate and regular rhythm.      Heart sounds: No murmur heard.  Pulmonary:      Effort: Pulmonary effort is normal.      Breath sounds: Normal breath sounds. No wheezing or rhonchi.   Chest:      Comments: Right chest wall mediport with redness at incision. Not tender.    Abdominal:      General: Bowel sounds are normal. There is no distension.      Palpations: Abdomen is soft. There is no mass.      Tenderness: There is no abdominal tenderness.      Comments: Surgical incisions healing after colon surgery.    Musculoskeletal:         General: No swelling or deformity.      Cervical back: Neck supple.   Lymphadenopathy:      Head:      Right side of head: No submandibular adenopathy.      Left side of head: No submandibular adenopathy.      Cervical: No cervical adenopathy.      Upper Body:      Right upper body: No supraclavicular or axillary adenopathy.      Left upper body: No supraclavicular or axillary adenopathy.      Lower Body: No right inguinal " adenopathy. No left inguinal adenopathy.   Skin:     General: Skin is warm.      Coloration: Skin is not jaundiced.      Findings: No lesion or rash.      Nails: There is no clubbing.   Neurological:      General: No focal deficit present.      Mental Status: She is alert and oriented to person, place, and time.      Cranial Nerves: Cranial nerves 2-12 are intact.   Psychiatric:         Attention and Perception: Attention normal.         Mood and Affect: Mood is depressed.         Behavior: Behavior is cooperative.         Judgment: Judgment normal.     ECOG SCORE             Laboratory:  CBC with Differential:  Lab Results   Component Value Date    WBC 5.71 11/27/2023    RBC 4.04 (L) 11/27/2023    HGB 9.0 (L) 11/27/2023    HCT 31.3 (L) 11/27/2023    MCV 77.5 (L) 11/27/2023    MCH 22.3 (L) 11/27/2023    MCHC 28.8 (L) 11/27/2023    RDW 18.0 (H) 11/27/2023     11/27/2023    MPV  11/27/2023      Comment:      Unable to calculate MPV         CMP:  Sodium   Date Value Ref Range Status   06/01/2022 140 136 - 145 mmol/L Final     Sodium Level   Date Value Ref Range Status   11/27/2023 139 136 - 145 mmol/L Final     Potassium   Date Value Ref Range Status   06/01/2022 4.9 3.5 - 5.1 mmol/L Final     Potassium Level   Date Value Ref Range Status   11/27/2023 4.4 3.5 - 5.1 mmol/L Final     Chloride   Date Value Ref Range Status   06/01/2022 104 100 - 109 mmol/L Final     Carbon Dioxide   Date Value Ref Range Status   11/27/2023 24 23 - 31 mmol/L Final   06/01/2022 29 22 - 33 mmol/L Final     Blood Urea Nitrogen   Date Value Ref Range Status   11/27/2023 23.8 (H) 9.8 - 20.1 mg/dL Final   06/01/2022 22 5 - 25 mg/dL Final     Creatinine   Date Value Ref Range Status   11/27/2023 1.51 (H) 0.55 - 1.02 mg/dL Final   06/01/2022 1.32 (H) 0.57 - 1.25 mg/dL Final     Calcium   Date Value Ref Range Status   06/01/2022 8.7 (L) 8.8 - 10.6 mg/dL Final     Calcium Level Total   Date Value Ref Range Status   11/27/2023 8.6 8.4 - 10.2  mg/dL Final     Albumin Level   Date Value Ref Range Status   11/27/2023 3.3 (L) 3.4 - 4.8 g/dL Final     Bilirubin Total   Date Value Ref Range Status   11/27/2023 0.3 <=1.5 mg/dL Final     Alkaline Phosphatase   Date Value Ref Range Status   11/27/2023 93 40 - 150 unit/L Final     Aspartate Aminotransferase   Date Value Ref Range Status   11/27/2023 22 5 - 34 unit/L Final     Alanine Aminotransferase   Date Value Ref Range Status   11/27/2023 15 0 - 55 unit/L Final     Anion Gap   Date Value Ref Range Status   06/01/2022 7 (L) 8 - 16 mmol/L Final     eGFR    Date Value Ref Range Status   06/01/2022 45 mL/min/1.73mSq Final     Comment:     In accordance with NKF-ASN Task Force recommendation, calculation based on the Chronic Kidney Disease Epidemiology Collaboration (CKD-EPI) equation without adjustment for race. eGFR adjusted for gender and age and calculated in ml/min/1.73mSquared. eGFR cannot be calculated if patient is under 18 years of age.     Reference Range:   >= 60 ml/min/1.73mSquared.     Estimated GFR-Non    Date Value Ref Range Status   08/04/2022 43 mls/min/1.73/m2 Final             Assessment:       1. Malignant neoplasm of ascending colon    2. Diabetic polyneuropathy associated with type 2 diabetes mellitus    3. Microcytic anemia          1) Stage IIIB adenocarcinoma of the ascending colon  --Patient will benefit of adjuvant chemotherapy.   --Due to severe diabetic neuropathy, will try dose reduction of Oxaliplatin, starting 65 mg/m2.    --If not tolerated, then with d/c Oxaliplatin and cont 5FU and LV  2) Lung nodules-Stable. Will monitor  3) iron deficiency anemia-on iron PO  4) Thrombocytopenia-resolved. Will follow counts closely as she has splenomegaly and this can affects her counts mostly platelets.  5) Folate deficiency-she will start taking folic acid at this time.   6) Diabetic neuropathy-severe. She does not feel her feet.  --She will have nerve stimulator  placement to see if can help her feet     Educated the patient on the risks versus benefits as well as toxicities associated with treatment.  Verbally consented the patient to the treatment plan and the patient was educated on the planned duration of the treatment and schedule of the treatment administration.  All questions were answered.          Plan:         Plan: Adjuvant FOLFOX x 6 months,with dose reduction of oxaliplatin due to severe (Grade 3) neuropathy. If not tolerated, then with d/c Oxaliplatin and cont 5FU and LV  May proceed with C1 tomorrow,  11/28/2023  Cont folate and iron by mouth  Repeat labs next Thursday.  ePrescribed Bactrim DS bid x 7 days for mediport with erythema.   RTC in 2 weeks with lab/td same day, treat the next day.     LAURA Degroot

## 2023-11-27 NOTE — TELEPHONE ENCOUNTER
----- Message from DENIS Amaya sent at 11/27/2023 10:49 AM CST -----  Please tell her not to start the dexamethasone after chemo (it was eprescribed already by Dr. Olmstead as part of the regimen) . This drug is for nausea after chemo and she is a diabetic. She has zyprexa and zofran for nausea after chemo. She does not need all three.

## 2023-11-28 ENCOUNTER — INFUSION (OUTPATIENT)
Dept: INFUSION THERAPY | Facility: HOSPITAL | Age: 68
End: 2023-11-28
Attending: INTERNAL MEDICINE
Payer: MEDICARE

## 2023-11-28 VITALS
HEIGHT: 65 IN | OXYGEN SATURATION: 99 % | RESPIRATION RATE: 16 BRPM | SYSTOLIC BLOOD PRESSURE: 143 MMHG | DIASTOLIC BLOOD PRESSURE: 73 MMHG | HEART RATE: 87 BPM | WEIGHT: 244.5 LBS | TEMPERATURE: 98 F | BODY MASS INDEX: 40.73 KG/M2

## 2023-11-28 DIAGNOSIS — C18.2 MALIGNANT NEOPLASM OF ASCENDING COLON: Primary | ICD-10-CM

## 2023-11-28 PROCEDURE — 63600175 PHARM REV CODE 636 W HCPCS: Performed by: INTERNAL MEDICINE

## 2023-11-28 PROCEDURE — 96415 CHEMO IV INFUSION ADDL HR: CPT

## 2023-11-28 PROCEDURE — 96413 CHEMO IV INFUSION 1 HR: CPT

## 2023-11-28 PROCEDURE — 96367 TX/PROPH/DG ADDL SEQ IV INF: CPT

## 2023-11-28 PROCEDURE — 25000003 PHARM REV CODE 250: Performed by: NURSE PRACTITIONER

## 2023-11-28 PROCEDURE — 63600175 PHARM REV CODE 636 W HCPCS: Performed by: NURSE PRACTITIONER

## 2023-11-28 PROCEDURE — 96416 CHEMO PROLONG INFUSE W/PUMP: CPT

## 2023-11-28 PROCEDURE — 96366 THER/PROPH/DIAG IV INF ADDON: CPT

## 2023-11-28 PROCEDURE — 96411 CHEMO IV PUSH ADDL DRUG: CPT

## 2023-11-28 PROCEDURE — 25000003 PHARM REV CODE 250: Performed by: INTERNAL MEDICINE

## 2023-11-28 RX ORDER — DIPHENHYDRAMINE HYDROCHLORIDE 50 MG/ML
50 INJECTION INTRAMUSCULAR; INTRAVENOUS ONCE AS NEEDED
Status: DISCONTINUED | OUTPATIENT
Start: 2023-11-28 | End: 2023-11-28 | Stop reason: HOSPADM

## 2023-11-28 RX ORDER — HEPARIN 100 UNIT/ML
500 SYRINGE INTRAVENOUS
Status: DISCONTINUED | OUTPATIENT
Start: 2023-11-28 | End: 2023-11-28 | Stop reason: HOSPADM

## 2023-11-28 RX ORDER — SODIUM CHLORIDE 0.9 % (FLUSH) 0.9 %
10 SYRINGE (ML) INJECTION
Status: DISCONTINUED | OUTPATIENT
Start: 2023-11-28 | End: 2023-11-28 | Stop reason: HOSPADM

## 2023-11-28 RX ORDER — FLUOROURACIL 50 MG/ML
900 INJECTION, SOLUTION INTRAVENOUS
Status: COMPLETED | OUTPATIENT
Start: 2023-11-28 | End: 2023-11-28

## 2023-11-28 RX ORDER — PROCHLORPERAZINE EDISYLATE 5 MG/ML
5 INJECTION INTRAMUSCULAR; INTRAVENOUS ONCE AS NEEDED
Status: DISCONTINUED | OUTPATIENT
Start: 2023-11-28 | End: 2023-11-28 | Stop reason: HOSPADM

## 2023-11-28 RX ORDER — EPINEPHRINE 0.3 MG/.3ML
0.3 INJECTION SUBCUTANEOUS ONCE AS NEEDED
Status: DISCONTINUED | OUTPATIENT
Start: 2023-11-28 | End: 2023-11-28 | Stop reason: HOSPADM

## 2023-11-28 RX ADMIN — FLUOROURACIL 900 MG: 50 INJECTION, SOLUTION INTRAVENOUS at 11:11

## 2023-11-28 RX ADMIN — LEUCOVORIN CALCIUM 900 MG: 200 INJECTION, POWDER, LYOPHILIZED, FOR SOLUTION INTRAMUSCULAR; INTRAVENOUS at 09:11

## 2023-11-28 RX ADMIN — OXALIPLATIN 144 MG: 5 INJECTION, SOLUTION INTRAVENOUS at 09:11

## 2023-11-28 RX ADMIN — PALONOSETRON 0.25 MG: 0.25 INJECTION, SOLUTION INTRAVENOUS at 09:11

## 2023-11-28 RX ADMIN — FLUOROURACIL 5400 MG: 50 INJECTION, SOLUTION INTRAVENOUS at 11:11

## 2023-11-30 ENCOUNTER — INFUSION (OUTPATIENT)
Dept: INFUSION THERAPY | Facility: HOSPITAL | Age: 68
End: 2023-11-30
Attending: INTERNAL MEDICINE
Payer: MEDICARE

## 2023-11-30 VITALS — BODY MASS INDEX: 40.73 KG/M2 | WEIGHT: 244.5 LBS | HEIGHT: 65 IN

## 2023-11-30 DIAGNOSIS — C18.2 MALIGNANT NEOPLASM OF ASCENDING COLON: Primary | ICD-10-CM

## 2023-11-30 RX ORDER — HEPARIN 100 UNIT/ML
500 SYRINGE INTRAVENOUS
Status: DISCONTINUED | OUTPATIENT
Start: 2023-11-30 | End: 2023-11-30 | Stop reason: HOSPADM

## 2023-11-30 RX ORDER — PROCHLORPERAZINE EDISYLATE 5 MG/ML
5 INJECTION INTRAMUSCULAR; INTRAVENOUS ONCE AS NEEDED
Status: DISCONTINUED | OUTPATIENT
Start: 2023-11-30 | End: 2023-11-30 | Stop reason: HOSPADM

## 2023-11-30 RX ORDER — SODIUM CHLORIDE 0.9 % (FLUSH) 0.9 %
10 SYRINGE (ML) INJECTION
Status: DISCONTINUED | OUTPATIENT
Start: 2023-11-30 | End: 2023-11-30 | Stop reason: HOSPADM

## 2023-12-04 DIAGNOSIS — Z79.4 TYPE 2 DIABETES MELLITUS WITH HYPERGLYCEMIA, WITH LONG-TERM CURRENT USE OF INSULIN: ICD-10-CM

## 2023-12-04 DIAGNOSIS — E11.65 TYPE 2 DIABETES MELLITUS WITH HYPERGLYCEMIA, WITH LONG-TERM CURRENT USE OF INSULIN: ICD-10-CM

## 2023-12-05 RX ORDER — INSULIN GLARGINE 300 U/ML
INJECTION, SOLUTION SUBCUTANEOUS
Qty: 6 ML | Refills: 11 | Status: SHIPPED | OUTPATIENT
Start: 2023-12-05

## 2023-12-11 ENCOUNTER — OFFICE VISIT (OUTPATIENT)
Dept: HEMATOLOGY/ONCOLOGY | Facility: CLINIC | Age: 68
End: 2023-12-11
Payer: MEDICARE

## 2023-12-11 ENCOUNTER — LAB VISIT (OUTPATIENT)
Dept: LAB | Facility: HOSPITAL | Age: 68
End: 2023-12-11
Attending: INTERNAL MEDICINE
Payer: MEDICARE

## 2023-12-11 VITALS
RESPIRATION RATE: 18 BRPM | TEMPERATURE: 98 F | BODY MASS INDEX: 39.68 KG/M2 | WEIGHT: 238.19 LBS | SYSTOLIC BLOOD PRESSURE: 132 MMHG | OXYGEN SATURATION: 100 % | HEART RATE: 81 BPM | HEIGHT: 65 IN | DIASTOLIC BLOOD PRESSURE: 82 MMHG

## 2023-12-11 DIAGNOSIS — N18.4 CKD (CHRONIC KIDNEY DISEASE) STAGE 4, GFR 15-29 ML/MIN: Primary | ICD-10-CM

## 2023-12-11 DIAGNOSIS — E11.42 DIABETIC POLYNEUROPATHY ASSOCIATED WITH TYPE 2 DIABETES MELLITUS: ICD-10-CM

## 2023-12-11 DIAGNOSIS — C18.2 MALIGNANT NEOPLASM OF ASCENDING COLON: Primary | ICD-10-CM

## 2023-12-11 DIAGNOSIS — R91.8 PULMONARY NODULES: ICD-10-CM

## 2023-12-11 DIAGNOSIS — C18.2 MALIGNANT NEOPLASM OF ASCENDING COLON: ICD-10-CM

## 2023-12-11 DIAGNOSIS — R97.0 ELEVATED CEA: ICD-10-CM

## 2023-12-11 LAB
ALBUMIN SERPL-MCNC: 3.5 G/DL (ref 3.4–4.8)
ALBUMIN/GLOB SERPL: 1 RATIO (ref 1.1–2)
ALP SERPL-CCNC: 107 UNIT/L (ref 40–150)
ALT SERPL-CCNC: 13 UNIT/L (ref 0–55)
AST SERPL-CCNC: 15 UNIT/L (ref 5–34)
BASOPHILS # BLD AUTO: 0.03 X10(3)/MCL
BASOPHILS NFR BLD AUTO: 0.5 %
BILIRUB SERPL-MCNC: 0.3 MG/DL
BUN SERPL-MCNC: 26 MG/DL (ref 9.8–20.1)
CALCIUM SERPL-MCNC: 8.9 MG/DL (ref 8.4–10.2)
CHLORIDE SERPL-SCNC: 106 MMOL/L (ref 98–107)
CO2 SERPL-SCNC: 26 MMOL/L (ref 23–31)
CREAT SERPL-MCNC: 1.84 MG/DL (ref 0.55–1.02)
EOSINOPHIL # BLD AUTO: 0.33 X10(3)/MCL (ref 0–0.9)
EOSINOPHIL NFR BLD AUTO: 5.8 %
ERYTHROCYTE [DISTWIDTH] IN BLOOD BY AUTOMATED COUNT: 17.8 % (ref 11.5–17)
GFR SERPLBLD CREATININE-BSD FMLA CKD-EPI: 30 MLS/MIN/1.73/M2
GLOBULIN SER-MCNC: 3.4 GM/DL (ref 2.4–3.5)
GLUCOSE SERPL-MCNC: 126 MG/DL (ref 82–115)
HCT VFR BLD AUTO: 30.2 % (ref 37–47)
HGB BLD-MCNC: 9.1 G/DL (ref 12–16)
IMM GRANULOCYTES # BLD AUTO: 0.01 X10(3)/MCL (ref 0–0.04)
IMM GRANULOCYTES NFR BLD AUTO: 0.2 %
LYMPHOCYTES # BLD AUTO: 1.32 X10(3)/MCL (ref 0.6–4.6)
LYMPHOCYTES NFR BLD AUTO: 23.2 %
MCH RBC QN AUTO: 22.8 PG (ref 27–31)
MCHC RBC AUTO-ENTMCNC: 30.1 G/DL (ref 33–36)
MCV RBC AUTO: 75.5 FL (ref 80–94)
MONOCYTES # BLD AUTO: 0.55 X10(3)/MCL (ref 0.1–1.3)
MONOCYTES NFR BLD AUTO: 9.7 %
NEUTROPHILS # BLD AUTO: 3.44 X10(3)/MCL (ref 2.1–9.2)
NEUTROPHILS NFR BLD AUTO: 60.6 %
PLATELET # BLD AUTO: 143 X10(3)/MCL (ref 130–400)
PMV BLD AUTO: 11.1 FL (ref 7.4–10.4)
POTASSIUM SERPL-SCNC: 5.3 MMOL/L (ref 3.5–5.1)
PROT SERPL-MCNC: 6.9 GM/DL (ref 5.8–7.6)
RBC # BLD AUTO: 4 X10(6)/MCL (ref 4.2–5.4)
SODIUM SERPL-SCNC: 138 MMOL/L (ref 136–145)
WBC # SPEC AUTO: 5.68 X10(3)/MCL (ref 4.5–11.5)

## 2023-12-11 PROCEDURE — 99215 OFFICE O/P EST HI 40 MIN: CPT | Mod: S$GLB,,, | Performed by: INTERNAL MEDICINE

## 2023-12-11 PROCEDURE — 3008F PR BODY MASS INDEX (BMI) DOCUMENTED: ICD-10-PCS | Mod: CPTII,S$GLB,, | Performed by: INTERNAL MEDICINE

## 2023-12-11 PROCEDURE — 3288F FALL RISK ASSESSMENT DOCD: CPT | Mod: CPTII,S$GLB,, | Performed by: INTERNAL MEDICINE

## 2023-12-11 PROCEDURE — 3061F NEG MICROALBUMINURIA REV: CPT | Mod: CPTII,S$GLB,, | Performed by: INTERNAL MEDICINE

## 2023-12-11 PROCEDURE — 1126F AMNT PAIN NOTED NONE PRSNT: CPT | Mod: CPTII,S$GLB,, | Performed by: INTERNAL MEDICINE

## 2023-12-11 PROCEDURE — 99999 PR PBB SHADOW E&M-EST. PATIENT-LVL IV: ICD-10-PCS | Mod: PBBFAC,,, | Performed by: INTERNAL MEDICINE

## 2023-12-11 PROCEDURE — 99999 PR PBB SHADOW E&M-EST. PATIENT-LVL IV: CPT | Mod: PBBFAC,,, | Performed by: INTERNAL MEDICINE

## 2023-12-11 PROCEDURE — 1160F PR REVIEW ALL MEDS BY PRESCRIBER/CLIN PHARMACIST DOCUMENTED: ICD-10-PCS | Mod: CPTII,S$GLB,, | Performed by: INTERNAL MEDICINE

## 2023-12-11 PROCEDURE — 3075F SYST BP GE 130 - 139MM HG: CPT | Mod: CPTII,S$GLB,, | Performed by: INTERNAL MEDICINE

## 2023-12-11 PROCEDURE — 80053 COMPREHEN METABOLIC PANEL: CPT

## 2023-12-11 PROCEDURE — 4010F PR ACE/ARB THEARPY RXD/TAKEN: ICD-10-PCS | Mod: CPTII,S$GLB,, | Performed by: INTERNAL MEDICINE

## 2023-12-11 PROCEDURE — 85025 COMPLETE CBC W/AUTO DIFF WBC: CPT

## 2023-12-11 PROCEDURE — 1159F PR MEDICATION LIST DOCUMENTED IN MEDICAL RECORD: ICD-10-PCS | Mod: CPTII,S$GLB,, | Performed by: INTERNAL MEDICINE

## 2023-12-11 PROCEDURE — 3044F HG A1C LEVEL LT 7.0%: CPT | Mod: CPTII,S$GLB,, | Performed by: INTERNAL MEDICINE

## 2023-12-11 PROCEDURE — 1160F RVW MEDS BY RX/DR IN RCRD: CPT | Mod: CPTII,S$GLB,, | Performed by: INTERNAL MEDICINE

## 2023-12-11 PROCEDURE — 36415 COLL VENOUS BLD VENIPUNCTURE: CPT

## 2023-12-11 PROCEDURE — 1159F MED LIST DOCD IN RCRD: CPT | Mod: CPTII,S$GLB,, | Performed by: INTERNAL MEDICINE

## 2023-12-11 PROCEDURE — 3044F PR MOST RECENT HEMOGLOBIN A1C LEVEL <7.0%: ICD-10-PCS | Mod: CPTII,S$GLB,, | Performed by: INTERNAL MEDICINE

## 2023-12-11 PROCEDURE — 3066F NEPHROPATHY DOC TX: CPT | Mod: CPTII,S$GLB,, | Performed by: INTERNAL MEDICINE

## 2023-12-11 PROCEDURE — 3061F PR NEG MICROALBUMINURIA RESULT DOCUMENTED/REVIEW: ICD-10-PCS | Mod: CPTII,S$GLB,, | Performed by: INTERNAL MEDICINE

## 2023-12-11 PROCEDURE — 3079F DIAST BP 80-89 MM HG: CPT | Mod: CPTII,S$GLB,, | Performed by: INTERNAL MEDICINE

## 2023-12-11 PROCEDURE — 99215 PR OFFICE/OUTPT VISIT, EST, LEVL V, 40-54 MIN: ICD-10-PCS | Mod: S$GLB,,, | Performed by: INTERNAL MEDICINE

## 2023-12-11 PROCEDURE — 3066F PR DOCUMENTATION OF TREATMENT FOR NEPHROPATHY: ICD-10-PCS | Mod: CPTII,S$GLB,, | Performed by: INTERNAL MEDICINE

## 2023-12-11 PROCEDURE — 4010F ACE/ARB THERAPY RXD/TAKEN: CPT | Mod: CPTII,S$GLB,, | Performed by: INTERNAL MEDICINE

## 2023-12-11 PROCEDURE — 3079F PR MOST RECENT DIASTOLIC BLOOD PRESSURE 80-89 MM HG: ICD-10-PCS | Mod: CPTII,S$GLB,, | Performed by: INTERNAL MEDICINE

## 2023-12-11 PROCEDURE — 3288F PR FALLS RISK ASSESSMENT DOCUMENTED: ICD-10-PCS | Mod: CPTII,S$GLB,, | Performed by: INTERNAL MEDICINE

## 2023-12-11 PROCEDURE — 1101F PR PT FALLS ASSESS DOC 0-1 FALLS W/OUT INJ PAST YR: ICD-10-PCS | Mod: CPTII,S$GLB,, | Performed by: INTERNAL MEDICINE

## 2023-12-11 PROCEDURE — 1126F PR PAIN SEVERITY QUANTIFIED, NO PAIN PRESENT: ICD-10-PCS | Mod: CPTII,S$GLB,, | Performed by: INTERNAL MEDICINE

## 2023-12-11 PROCEDURE — 3008F BODY MASS INDEX DOCD: CPT | Mod: CPTII,S$GLB,, | Performed by: INTERNAL MEDICINE

## 2023-12-11 PROCEDURE — 1101F PT FALLS ASSESS-DOCD LE1/YR: CPT | Mod: CPTII,S$GLB,, | Performed by: INTERNAL MEDICINE

## 2023-12-11 PROCEDURE — 3075F PR MOST RECENT SYSTOLIC BLOOD PRESS GE 130-139MM HG: ICD-10-PCS | Mod: CPTII,S$GLB,, | Performed by: INTERNAL MEDICINE

## 2023-12-11 RX ORDER — DIPHENHYDRAMINE HYDROCHLORIDE 50 MG/ML
50 INJECTION INTRAMUSCULAR; INTRAVENOUS ONCE AS NEEDED
Status: CANCELLED | OUTPATIENT
Start: 2023-12-12

## 2023-12-11 RX ORDER — HEPARIN 100 UNIT/ML
500 SYRINGE INTRAVENOUS
Status: CANCELLED | OUTPATIENT
Start: 2023-12-12

## 2023-12-11 RX ORDER — PROCHLORPERAZINE EDISYLATE 5 MG/ML
5 INJECTION INTRAMUSCULAR; INTRAVENOUS ONCE AS NEEDED
Status: CANCELLED | OUTPATIENT
Start: 2023-12-14

## 2023-12-11 RX ORDER — SODIUM CHLORIDE 0.9 % (FLUSH) 0.9 %
10 SYRINGE (ML) INJECTION
Status: CANCELLED | OUTPATIENT
Start: 2023-12-14

## 2023-12-11 RX ORDER — FLUOROURACIL 50 MG/ML
2400 INJECTION, SOLUTION INTRAVENOUS
Status: CANCELLED | OUTPATIENT
Start: 2023-12-12

## 2023-12-11 RX ORDER — EPINEPHRINE 0.3 MG/.3ML
0.3 INJECTION SUBCUTANEOUS ONCE AS NEEDED
Status: CANCELLED | OUTPATIENT
Start: 2023-12-12

## 2023-12-11 RX ORDER — FLUOROURACIL 50 MG/ML
400 INJECTION, SOLUTION INTRAVENOUS
Status: CANCELLED | OUTPATIENT
Start: 2023-12-12

## 2023-12-11 RX ORDER — SODIUM CHLORIDE 0.9 % (FLUSH) 0.9 %
10 SYRINGE (ML) INJECTION
Status: CANCELLED | OUTPATIENT
Start: 2023-12-12

## 2023-12-11 RX ORDER — HEPARIN 100 UNIT/ML
500 SYRINGE INTRAVENOUS
Status: CANCELLED | OUTPATIENT
Start: 2023-12-14

## 2023-12-11 RX ORDER — PROCHLORPERAZINE EDISYLATE 5 MG/ML
5 INJECTION INTRAMUSCULAR; INTRAVENOUS ONCE AS NEEDED
Status: CANCELLED | OUTPATIENT
Start: 2023-12-12

## 2023-12-11 NOTE — PROGRESS NOTES
HEMATOLOGY/ONCOLOGY OFFICE CLINIC VISIT    Visit Information:    Initial Evaluation: 4/22/2022  Referring Provider: Maggy Jaquez NP  Other providers: Dr Timothy Russ  Code status: Not addressed     Diagnosis/Problem list:   pT3 N2a Mx Stage IIIB Colon cancer. Dx 10/19/23  Microcytic anemia.   Folate deficiency     Present Treatment:  FOLFOX planned with dose reduction on Oxaliplatin due to neuropathy. Plan to start on 11/28/23.    Treatment history:  10/19/2023 Lap/jaswinder right colon resection         Imaging studies:  CT C/A/P 6/3/2022: There is no significant hepatic steatosis.  The liver is cirrhotic.  No liver lesion is identified.  The spleen is enlarged, measuring 15.5 cm in length.  There is no retroperitoneal lymphadenopathy or abdominal aortic aneurysm.  A mildly prominent right external iliac lymph node measures 9 mm in short axis, nonspecific.  This is also similar in appearance when compared to 2015. Impression: Cirrhosis. Splenomegaly.  CT CAP 9/11/2023: There is no supraclavicular or axillary lymphadenopathy.  No mediastinal adenopathy. 3 mm right upper lobe pulmonary nodule. There are few additional pulmonary micronodules in the lung apices measuring no greater than 1-2 mm.  Findings similar to prior exam.     Impression: Scattered pulmonary nodules in the upper lobe similar to prior.  No further follow-up acute according to Fleischner criteria.  No convincing evidence for metastatic disease. Nodular appearing liver, correlation for cirrhosis.    Pathology:  10/19/2023:  COLON, RIGHT HEMICOLECTOMY (1.5 CM OF TERMINAL ILEUM, 25 CM LENGTH OF RIGHT COLON): POORLY DIFFERENTIATED ADENOCARCINOMA WITH FOCAL SIGNET RING CELL FEATURES, UNIFOCAL, RIGHT COLON, 8 CM GREATEST DIMENSION.   THE TUMOR INVADES THROUGH THE MUSCULARIS PROPRIA INTO PERICOLONIC TISSUE (PT3).   FOCAL LYMPHVASCULAR INVASION IS PRESENT.   THE SURGICAL MARGINS ARE FREE OF TUMOR.   METASTATIC ADENOCARCINOMA IS IDENTIFIED IN FOUR (4) OF  TWENTY-SEVEN (27)   PERICOLONIC LYMPH NODES (LARGEST METASTATIC DEPOSIT 8 MM; FOCAL EXTRACAPSULAR EXTENSION OF TUMOR IS PRESENT).      PATHOLOGIC STAGING:  pT3 N2a Mx     Histologic Grade:  G3, poorly differentiated   Macroscopic Tumor Perforation:  Not identified   Lymphovascular Invasion:  Small vessel   Perineural Invasion:  Not identified        CLINICAL HISTORY:       Patient: Indu Azul is a 68 y.o. female. with multiple medical problems that I saw originally on 2022 for thrombocytopenia.  Patient platelet counts on 2021 were 132,000 and since that that has been slowly trending down.  2021 platelets 132,000  2022 platelets 121,000  2022 platelets 115,000     Of note patient have a UA done that same day that suggest possible UTI with positive nitrates, 1+ leukocytes and many bacteria.  Urine culture with E. coli.   Reviewing electronic medical record and going back to 12/15/2014 patient with occasional low platelets.  They were never lower than 100,000 and they always normalized.   As per patient in 2020 when her platelets were slightly decreased (117,000), this was shortly after she has her left foot surgery.  Then in May 17 and May 18 2021, platelets were 105k and 101k,  she had COVID.  Again in 2022 she have a UTI for which she completed antibiotics.     Patient's father diagnosed Blood disorder requiring blood transfusion that started q 4 weeks and the q week. He was diagnosed on his 80's but  at 91. Sister and niece had ovarian cancer. Sister  of it. Niece still alive.     She was found to have iron deficiency anemia.  EGD performed, grade I-II esophageal varices found, too small to band. She also had Colonoscopy by Dr Hammonds that showed an ulcerated malignant-appearing partially obstructing medium sized mass in the ascending colon.  She reports that she had a colonoscopy about 5 years ago at [facility] and everything was fine, no polyps or masses.   Biopsy  and tattoo of the mass showed fragments of colonic mucosa, no evidence of dysplasia or malignancy.  Two other polyps were completely removed in the descending and sigmoid colon, pathology returned hyperplastic polyps.  CT abd/pel with no acute findings.     Despite of biopsy because of malignant-appearing partially obstructing mass of the ascending colon and photographs and description were consistent with colon cancer she underwent Lap/jaswinder right colon resection on 10/19/2023 by Dr Timothy Russ. Path c/w really differentiated adeno carcinoma.         Chief Complaint: Follow-up (PT has concerns about her Chemo or Liquids while taking it her Blood sugar went up for about 3 days. Also PT is concern about having diarrhea.)      Interval History:    12/11/23: Patient presents today for labs and TD prior to C2 of FOLFOX with dose reduction of Oxaliplatin due to neuropathy, accompanied by her daughter. She reports fatigue and sleeping after pump was removed. She has microcytic anemia. Iron studies done on 11/13/23 show elevated iron and saturation but ferritin 26. She is on oral iron. Denies any visible bleeding in stools. No fever, chills or sweats. No worsening in neuropathy  She wanted to know if it's okay to proceed with procedure for neuropathy. We decided to wait until after she completes chemotherapy.       Past Medical History:   Diagnosis Date    Anesthesia complication     trouble awakening    Charcot's joint of foot, left     Chronic kidney disease, unspecified     Cirrhosis of liver without ascites     Depression     Diabetes mellitus, type II, insulin dependent     Diabetic neuropathy     GERD (gastroesophageal reflux disease)     H/O: hysterectomy     Hepatic steatosis     Malignant neoplasm of ascending colon     Obesity, unspecified     Overactive bladder     Thrombocytopenia, unspecified     Vitamin D deficiency       Past Surgical History:   Procedure Laterality Date    APPENDECTOMY  1978    BREAST BIOPSY   2016     SECTION      x3    charcot-leyden crystals identification      CHOLECYSTECTOMY  2006    COLONOSCOPY N/A 2023    Procedure: COLON;  Surgeon: Luis Amador MD;  Location: [facility] ENDOSCOPY;  Service: Gastroenterology;  Laterality: N/A;    COLONOSCOPY, WITH 1 OR MORE BIOPSIES  2023    Procedure: COLONOSCOPY, WITH 1 OR MORE BIOPSIES;  Surgeon: Luis Amador MD;  Location: [facility] ENDOSCOPY;  Service: Gastroenterology;;    COLONOSCOPY, WITH DIRECTED SUBMUCOSAL INJECTION  2023    Procedure: COLONOSCOPY, WITH DIRECTED SUBMUCOSAL INJECTION;  Surgeon: Luis Amador MD;  Location: [facility] ENDOSCOPY;  Service: Gastroenterology;;  8cc Colon Tattoo    COLONOSCOPY, WITH POLYPECTOMY USING SNARE  2023    Procedure: COLONOSCOPY, WITH POLYPECTOMY USING SNARE;  Surgeon: Luis Amador MD;  Location: [facility] ENDOSCOPY;  Service: Gastroenterology;;    ESOPHAGOGASTRODUODENOSCOPY N/A 2023    Procedure: DOUBLE;  Surgeon: Luis Amador MD;  Location: [facility] ENDOSCOPY;  Service: Gastroenterology;  Laterality: N/A;    HEMORRHOID SURGERY      HYSTERECTOMY      total    INSERTION OF TUNNELED CENTRAL VENOUS CATHETER (CVC) WITH SUBCUTANEOUS PORT Right 2023    Procedure: [id number];  Surgeon: Timothy Russ MD;  Location: [facility];  Service: General;  Laterality: Right;    right foot surgery Right 2022    XI ROBOTIC COLECTOMY, RIGHT N/A 10/19/2023    Procedure: XI ROBOTIC COLECTOMY, RIGHT;  Surgeon: Timothy Russ MD;  Location: [facility];  Service: General;  Laterality: N/A;     Family History   Problem Relation Age of Onset    Diabetes Mother     Alzheimer's disease Mother     Diabetes Father     Leukemia Father     Diabetes Sister     Liver cancer Sister     Diabetes Brother      Social Connections: Not on file       Review of patient's allergies indicates:  No Known Allergies   Current Outpatient Medications  on File Prior to Visit   Medication Sig Dispense Refill    dulaglutide (TRULICITY) 3 mg/0.5 mL pen injector Inject 3 mg into the skin every 7 days. 4 pen 11    ferrous sulfate (FEOSOL) 325 mg (65 mg iron) Tab tablet Take 1 tablet (325 mg total) by mouth 2 (two) times daily. 60 tablet 11    folic acid (FOLVITE) 1 MG tablet Take 1 tablet (1 mg total) by mouth once daily. 100 tablet 3    furosemide (LASIX) 40 MG tablet Take 40 mg by mouth as needed.      gabapentin (NEURONTIN) 300 MG capsule Take 2 capsules (600 mg total) by mouth every evening. 60 capsule 5    HYDROcodone-acetaminophen (NORCO) 5-325 mg per tablet Take 1 tablet by mouth every 6 (six) hours as needed for Pain. 15 tablet 0    insulin glargine, TOUJEO, (TOUJEO SOLOSTAR U-300 INSULIN) 300 unit/mL (1.5 mL) InPn pen INJECT 79 UNITS SUBCUTANEOUSLY ONCE DAILY 6 mL 11    insulin lispro 200 unit/mL (3 mL) InPn Inject 2 Units into the skin 3 (three) times daily before meals. 3 mL 11    insulin syringe-needle U-100 1 mL 31 gauge x 5/16 Syrg Inject 1 Syringe into the skin 3 (three) times daily with meals.      OLANZapine (ZYPREXA) 5 MG tablet Take 1 tablet (5 mg total) by mouth every evening. Take nightly on days 1-3 of each chemotherapy cycle. 6 tablet 5    ondansetron (ZOFRAN-ODT) 8 MG TbDL Take 1 tablet (8 mg total) by mouth every 6 (six) hours as needed. 10 tablet 0    ondansetron (ZOFRAN-ODT) 8 MG TbDL Take 1 tablet (8 mg total) by mouth every 8 (eight) hours as needed (nausea/vomiting). 60 tablet 5    sertraline (ZOLOFT) 50 MG tablet Take 1 tablet by mouth once daily 90 tablet 0    solifenacin (VESICARE) 10 MG tablet Take 10 mg by mouth once daily.      spironolactone (ALDACTONE) 50 MG tablet Take 50 mg by mouth once daily.      sulfamethoxazole-trimethoprim 800-160mg (BACTRIM DS) 800-160 mg Tab Take 1 tablet by mouth 2 (two) times daily. 14 tablet 0     No current facility-administered medications on file prior to visit.      Review of Systems  "  Constitutional:  Negative for activity change, appetite change, chills, fatigue, fever and unexpected weight change.   HENT:  Negative for mouth dryness, mouth sores, nosebleeds, sore throat and trouble swallowing.    Eyes:  Negative for visual disturbance.   Respiratory:  Negative for cough and shortness of breath.    Cardiovascular:  Negative for chest pain, palpitations and leg swelling.   Gastrointestinal:  Negative for abdominal distention, abdominal pain, blood in stool, change in bowel habit, constipation, diarrhea, nausea and vomiting.   Endocrine: Negative.    Genitourinary:  Negative for dysuria, frequency, hematuria and urgency.   Musculoskeletal:  Negative for arthralgias, back pain, myalgias and neck pain.   Integumentary:  Positive for wound. Negative for rash.   Neurological:  Negative for dizziness, tremors, syncope, speech difficulty, weakness, light-headedness, numbness, headaches and memory loss.   Hematological:  Does not bruise/bleed easily.   Psychiatric/Behavioral:  Negative for confusion and suicidal ideas.               Vitals:    12/11/23 0926   BP: 132/82   BP Location: Right arm   Patient Position: Sitting   Pulse: 81   Resp: 18   Temp: 97.5 °F (36.4 °C)   TempSrc: Oral   SpO2: 100%   Weight: 108 kg (238 lb 3.2 oz)   Height: 5' 5" (1.651 m)          Wt Readings from Last 6 Encounters:   12/11/23 108 kg (238 lb 3.2 oz)   11/30/23 110.9 kg (244 lb 8 oz)   11/28/23 110.9 kg (244 lb 8 oz)   11/27/23 110.9 kg (244 lb 8 oz)   11/22/23 111.6 kg (246 lb)   11/21/23 110.2 kg (243 lb)     Body mass index is 39.64 kg/m².  Body surface area is 2.23 meters squared.  Physical Exam  Vitals and nursing note reviewed.   Constitutional:       General: She is not in acute distress.     Appearance: Normal appearance. She is obese.      Comments: Laparoscopic incisions well healed    HENT:      Head: Normocephalic and atraumatic.      Mouth/Throat:      Mouth: Mucous membranes are moist.   Eyes:      " General: No scleral icterus.     Extraocular Movements: Extraocular movements intact.      Conjunctiva/sclera: Conjunctivae normal.      Pupils: Pupils are equal, round, and reactive to light.   Neck:      Vascular: No JVD.   Cardiovascular:      Rate and Rhythm: Normal rate and regular rhythm.      Heart sounds: No murmur heard.  Pulmonary:      Effort: Pulmonary effort is normal.      Breath sounds: Normal breath sounds. No wheezing or rhonchi.   Chest:      Comments: Right chest wall mediport in place.    Abdominal:      General: Bowel sounds are normal. There is no distension.      Palpations: Abdomen is soft. There is no mass.      Tenderness: There is no abdominal tenderness.      Comments: Surgical incisions healing after colon surgery.    Musculoskeletal:         General: No swelling or deformity.      Cervical back: Neck supple.   Lymphadenopathy:      Head:      Right side of head: No submandibular adenopathy.      Left side of head: No submandibular adenopathy.      Cervical: No cervical adenopathy.      Upper Body:      Right upper body: No supraclavicular or axillary adenopathy.      Left upper body: No supraclavicular or axillary adenopathy.      Lower Body: No right inguinal adenopathy. No left inguinal adenopathy.   Skin:     General: Skin is warm.      Coloration: Skin is not jaundiced.      Findings: No lesion or rash.      Nails: There is no clubbing.   Neurological:      General: No focal deficit present.      Mental Status: She is alert and oriented to person, place, and time.      Cranial Nerves: Cranial nerves 2-12 are intact.   Psychiatric:         Attention and Perception: Attention normal.         Mood and Affect: Mood is depressed.         Behavior: Behavior is cooperative.         Judgment: Judgment normal.       ECOG SCORE    0 - Fully active-able to carry on all pre-disease performance without restriction         Laboratory:  CBC with Differential:  Lab Results   Component Value Date     WBC 5.68 12/11/2023    RBC 4.00 (L) 12/11/2023    HGB 9.1 (L) 12/11/2023    HCT 30.2 (L) 12/11/2023    MCV 75.5 (L) 12/11/2023    MCH 22.8 (L) 12/11/2023    MCHC 30.1 (L) 12/11/2023    RDW 17.8 (H) 12/11/2023     12/11/2023    MPV 11.1 (H) 12/11/2023        CMP:  Sodium   Date Value Ref Range Status   06/01/2022 140 136 - 145 mmol/L Final     Sodium Level   Date Value Ref Range Status   12/11/2023 138 136 - 145 mmol/L Final     Potassium   Date Value Ref Range Status   06/01/2022 4.9 3.5 - 5.1 mmol/L Final     Potassium Level   Date Value Ref Range Status   12/11/2023 5.3 (H) 3.5 - 5.1 mmol/L Final     Chloride   Date Value Ref Range Status   06/01/2022 104 100 - 109 mmol/L Final     Carbon Dioxide   Date Value Ref Range Status   12/11/2023 26 23 - 31 mmol/L Final   06/01/2022 29 22 - 33 mmol/L Final     Blood Urea Nitrogen   Date Value Ref Range Status   12/11/2023 26.0 (H) 9.8 - 20.1 mg/dL Final   06/01/2022 22 5 - 25 mg/dL Final     Creatinine   Date Value Ref Range Status   12/11/2023 1.84 (H) 0.55 - 1.02 mg/dL Final   06/01/2022 1.32 (H) 0.57 - 1.25 mg/dL Final     Calcium   Date Value Ref Range Status   06/01/2022 8.7 (L) 8.8 - 10.6 mg/dL Final     Calcium Level Total   Date Value Ref Range Status   12/11/2023 8.9 8.4 - 10.2 mg/dL Final     Albumin Level   Date Value Ref Range Status   12/11/2023 3.5 3.4 - 4.8 g/dL Final     Bilirubin Total   Date Value Ref Range Status   12/11/2023 0.3 <=1.5 mg/dL Final     Alkaline Phosphatase   Date Value Ref Range Status   12/11/2023 107 40 - 150 unit/L Final     Aspartate Aminotransferase   Date Value Ref Range Status   12/11/2023 15 5 - 34 unit/L Final     Alanine Aminotransferase   Date Value Ref Range Status   12/11/2023 13 0 - 55 unit/L Final     Anion Gap   Date Value Ref Range Status   06/01/2022 7 (L) 8 - 16 mmol/L Final     eGFR    Date Value Ref Range Status   06/01/2022 45 mL/min/1.73mSq Final     Comment:     In accordance with NKF-ASN  Task Force recommendation, calculation based on the Chronic Kidney Disease Epidemiology Collaboration (CKD-EPI) equation without adjustment for race. eGFR adjusted for gender and age and calculated in ml/min/1.73mSquared. eGFR cannot be calculated if patient is under 18 years of age.     Reference Range:   >= 60 ml/min/1.73mSquared.     Estimated GFR-Non    Date Value Ref Range Status   08/04/2022 43 mls/min/1.73/m2 Final         Assessment:       1. Malignant neoplasm of ascending colon    2. Elevated CEA        1) Stage IIIB adenocarcinoma of the ascending colon  --Patient will benefit of adjuvant chemotherapy.   --Due to severe diabetic neuropathy, will try dose reduction of Oxaliplatin, starting 65 mg/m2.    --If not tolerated, then with d/c Oxaliplatin and cont 5FU and LV  2) Lung nodules-Stable. Will monitor  3) iron deficiency anemia-on iron PO  4) Thrombocytopenia-resolved. Will follow counts closely as she has splenomegaly and this can affects her counts mostly platelets.  5) Folate deficiency-she will start taking folic acid at this time.   6) Diabetic neuropathy-severe. She does not feel her feet.  --She will have nerve stimulator placement to see if can help her feet     Educated the patient on the risks versus benefits as well as toxicities associated with treatment.  Verbally consented the patient to the treatment plan and the patient was educated on the planned duration of the treatment and schedule of the treatment administration.  All questions were answered.      Plan:       Plan: Adjuvant FOLFOX x 6 months,with dose reduction of oxaliplatin due to severe (Grade 3) neuropathy. If not tolerated, then with d/c Oxaliplatin and cont 5FU and LV. Neuropathy same.    Okay to proceed with C2 tomorrow,  12/12/2023  Continue folate and iron by mouth  Keep follow ups with other physicians  Advised to hold off on procedure for neuropathy until after chemotherapy  Instructed to take Vitamin B-12  1,000 mcg daily to help with neuropathy  Advised to discuss better diabetic control with PCP while on chemotherapy  Instructed to take a stool softener or Miralax daily, okay to take a laxative if no bowel movement  Weekly CBC, CMP - standing order placed  RTC in 2 weeks with NP for TD/labs: CBC, CMP, CEA,- chemo next day    Encouraged to call with questions or problems  The patient was given ample opportunity to ask questions and they were all answered to satisfaction; patient demonstrated understanding of what we discussed and is agreeable to the plan.    Fatemeh Olmstead MD  Hematology/Oncology      Professional Services   I, Ashlie Duong LPN, acted solely as a scribe for and in the presence of Dr. Fatemeh Olmstead, who performed these services.

## 2023-12-12 ENCOUNTER — INFUSION (OUTPATIENT)
Dept: INFUSION THERAPY | Facility: HOSPITAL | Age: 68
End: 2023-12-12
Attending: INTERNAL MEDICINE
Payer: MEDICARE

## 2023-12-12 VITALS
DIASTOLIC BLOOD PRESSURE: 73 MMHG | HEART RATE: 92 BPM | RESPIRATION RATE: 18 BRPM | WEIGHT: 238.19 LBS | TEMPERATURE: 97 F | HEIGHT: 65 IN | OXYGEN SATURATION: 100 % | SYSTOLIC BLOOD PRESSURE: 119 MMHG | BODY MASS INDEX: 39.68 KG/M2

## 2023-12-12 DIAGNOSIS — C18.2 MALIGNANT NEOPLASM OF ASCENDING COLON: Primary | ICD-10-CM

## 2023-12-12 PROCEDURE — 96375 TX/PRO/DX INJ NEW DRUG ADDON: CPT

## 2023-12-12 PROCEDURE — 96411 CHEMO IV PUSH ADDL DRUG: CPT

## 2023-12-12 PROCEDURE — 25000003 PHARM REV CODE 250: Performed by: INTERNAL MEDICINE

## 2023-12-12 PROCEDURE — 96413 CHEMO IV INFUSION 1 HR: CPT

## 2023-12-12 PROCEDURE — 63600175 PHARM REV CODE 636 W HCPCS: Performed by: INTERNAL MEDICINE

## 2023-12-12 PROCEDURE — 96368 THER/DIAG CONCURRENT INF: CPT

## 2023-12-12 PROCEDURE — 96415 CHEMO IV INFUSION ADDL HR: CPT

## 2023-12-12 PROCEDURE — 96416 CHEMO PROLONG INFUSE W/PUMP: CPT

## 2023-12-12 RX ORDER — SODIUM CHLORIDE 0.9 % (FLUSH) 0.9 %
10 SYRINGE (ML) INJECTION
Status: DISCONTINUED | OUTPATIENT
Start: 2023-12-12 | End: 2023-12-12 | Stop reason: HOSPADM

## 2023-12-12 RX ORDER — DIPHENHYDRAMINE HYDROCHLORIDE 50 MG/ML
50 INJECTION INTRAMUSCULAR; INTRAVENOUS ONCE AS NEEDED
Status: DISCONTINUED | OUTPATIENT
Start: 2023-12-12 | End: 2023-12-12 | Stop reason: HOSPADM

## 2023-12-12 RX ORDER — EPINEPHRINE 0.3 MG/.3ML
0.3 INJECTION SUBCUTANEOUS ONCE AS NEEDED
Status: DISCONTINUED | OUTPATIENT
Start: 2023-12-12 | End: 2023-12-12 | Stop reason: HOSPADM

## 2023-12-12 RX ORDER — PROCHLORPERAZINE EDISYLATE 5 MG/ML
5 INJECTION INTRAMUSCULAR; INTRAVENOUS ONCE AS NEEDED
Status: DISCONTINUED | OUTPATIENT
Start: 2023-12-12 | End: 2023-12-12 | Stop reason: HOSPADM

## 2023-12-12 RX ORDER — HEPARIN 100 UNIT/ML
500 SYRINGE INTRAVENOUS
Status: DISCONTINUED | OUTPATIENT
Start: 2023-12-12 | End: 2023-12-12 | Stop reason: HOSPADM

## 2023-12-12 RX ORDER — SODIUM CHLORIDE 9 MG/ML
INJECTION, SOLUTION INTRAVENOUS
Status: CANCELLED
Start: 2023-12-14

## 2023-12-12 RX ORDER — FLUOROURACIL 50 MG/ML
900 INJECTION, SOLUTION INTRAVENOUS
Status: COMPLETED | OUTPATIENT
Start: 2023-12-12 | End: 2023-12-12

## 2023-12-12 RX ADMIN — PALONOSETRON 0.25 MG: 0.25 INJECTION, SOLUTION INTRAVENOUS at 09:12

## 2023-12-12 RX ADMIN — OXALIPLATIN 147 MG: 5 INJECTION, SOLUTION INTRAVENOUS at 10:12

## 2023-12-12 RX ADMIN — FLUOROURACIL 900 MG: 50 INJECTION, SOLUTION INTRAVENOUS at 12:12

## 2023-12-12 RX ADMIN — FLUOROURACIL 5400 MG: 50 INJECTION, SOLUTION INTRAVENOUS at 01:12

## 2023-12-12 RX ADMIN — DEXTROSE MONOHYDRATE: 50 INJECTION, SOLUTION INTRAVENOUS at 10:12

## 2023-12-12 RX ADMIN — DEXTROSE 900 MG: 5 SOLUTION INTRAVENOUS at 10:12

## 2023-12-12 RX ADMIN — SODIUM CHLORIDE: 9 INJECTION, SOLUTION INTRAVENOUS at 09:12

## 2023-12-12 NOTE — PLAN OF CARE
Plan of care reviewed with patient; patient in agreement. C2D1 infused; CADD pump started @ 1300. Appts reviewed with patient and printed.

## 2023-12-14 ENCOUNTER — INFUSION (OUTPATIENT)
Dept: INFUSION THERAPY | Facility: HOSPITAL | Age: 68
End: 2023-12-14
Attending: INTERNAL MEDICINE
Payer: MEDICARE

## 2023-12-14 VITALS
WEIGHT: 238.19 LBS | DIASTOLIC BLOOD PRESSURE: 70 MMHG | RESPIRATION RATE: 18 BRPM | SYSTOLIC BLOOD PRESSURE: 111 MMHG | TEMPERATURE: 97 F | HEIGHT: 65 IN | OXYGEN SATURATION: 99 % | BODY MASS INDEX: 39.68 KG/M2 | HEART RATE: 86 BPM

## 2023-12-14 DIAGNOSIS — C18.2 MALIGNANT NEOPLASM OF ASCENDING COLON: Primary | ICD-10-CM

## 2023-12-14 PROCEDURE — A4216 STERILE WATER/SALINE, 10 ML: HCPCS | Performed by: INTERNAL MEDICINE

## 2023-12-14 PROCEDURE — 25000003 PHARM REV CODE 250: Performed by: INTERNAL MEDICINE

## 2023-12-14 PROCEDURE — 96375 TX/PRO/DX INJ NEW DRUG ADDON: CPT

## 2023-12-14 PROCEDURE — 96361 HYDRATE IV INFUSION ADD-ON: CPT

## 2023-12-14 PROCEDURE — 25000003 PHARM REV CODE 250: Performed by: NURSE PRACTITIONER

## 2023-12-14 PROCEDURE — 63600175 PHARM REV CODE 636 W HCPCS: Performed by: INTERNAL MEDICINE

## 2023-12-14 PROCEDURE — 96360 HYDRATION IV INFUSION INIT: CPT

## 2023-12-14 RX ORDER — HEPARIN 100 UNIT/ML
500 SYRINGE INTRAVENOUS
Status: DISCONTINUED | OUTPATIENT
Start: 2023-12-14 | End: 2023-12-14 | Stop reason: HOSPADM

## 2023-12-14 RX ORDER — SODIUM CHLORIDE 9 MG/ML
INJECTION, SOLUTION INTRAVENOUS
Status: COMPLETED | OUTPATIENT
Start: 2023-12-14 | End: 2023-12-14

## 2023-12-14 RX ORDER — SODIUM CHLORIDE 0.9 % (FLUSH) 0.9 %
10 SYRINGE (ML) INJECTION
Status: DISCONTINUED | OUTPATIENT
Start: 2023-12-14 | End: 2023-12-14 | Stop reason: HOSPADM

## 2023-12-14 RX ORDER — PROCHLORPERAZINE EDISYLATE 5 MG/ML
5 INJECTION INTRAMUSCULAR; INTRAVENOUS ONCE AS NEEDED
Status: COMPLETED | OUTPATIENT
Start: 2023-12-14 | End: 2023-12-14

## 2023-12-14 RX ADMIN — HEPARIN 500 UNITS: 100 SYRINGE at 01:12

## 2023-12-14 RX ADMIN — Medication 10 ML: at 01:12

## 2023-12-14 RX ADMIN — SODIUM CHLORIDE: 9 INJECTION, SOLUTION INTRAVENOUS at 11:12

## 2023-12-14 RX ADMIN — PROCHLORPERAZINE EDISYLATE 5 MG: 5 INJECTION INTRAMUSCULAR; INTRAVENOUS at 12:12

## 2023-12-14 NOTE — PLAN OF CARE
Plan of care reviewed with patient; patient in agreement. Pump Dcd and fluids given. Appts reviewed with patient; received copy of appts at previous visit.

## 2023-12-17 ENCOUNTER — PATIENT MESSAGE (OUTPATIENT)
Dept: HEMATOLOGY/ONCOLOGY | Facility: CLINIC | Age: 68
End: 2023-12-17
Payer: MEDICARE

## 2023-12-19 ENCOUNTER — OFFICE VISIT (OUTPATIENT)
Dept: NEPHROLOGY | Facility: CLINIC | Age: 68
End: 2023-12-19
Payer: MEDICARE

## 2023-12-19 ENCOUNTER — LAB VISIT (OUTPATIENT)
Dept: LAB | Facility: HOSPITAL | Age: 68
End: 2023-12-19
Attending: INTERNAL MEDICINE
Payer: MEDICARE

## 2023-12-19 VITALS
RESPIRATION RATE: 20 BRPM | OXYGEN SATURATION: 99 % | WEIGHT: 237.38 LBS | TEMPERATURE: 97 F | HEIGHT: 65 IN | SYSTOLIC BLOOD PRESSURE: 138 MMHG | DIASTOLIC BLOOD PRESSURE: 70 MMHG | BODY MASS INDEX: 39.55 KG/M2 | HEART RATE: 90 BPM

## 2023-12-19 DIAGNOSIS — I10 PRIMARY HYPERTENSION: ICD-10-CM

## 2023-12-19 DIAGNOSIS — N18.4 CKD (CHRONIC KIDNEY DISEASE) STAGE 4, GFR 15-29 ML/MIN: ICD-10-CM

## 2023-12-19 DIAGNOSIS — C18.2 MALIGNANT NEOPLASM OF ASCENDING COLON: ICD-10-CM

## 2023-12-19 DIAGNOSIS — E11.65 TYPE 2 DIABETES MELLITUS WITH HYPERGLYCEMIA, WITH LONG-TERM CURRENT USE OF INSULIN: ICD-10-CM

## 2023-12-19 DIAGNOSIS — E11.42 DIABETIC POLYNEUROPATHY ASSOCIATED WITH TYPE 2 DIABETES MELLITUS: ICD-10-CM

## 2023-12-19 DIAGNOSIS — E83.39 HYPOPHOSPHATEMIA: ICD-10-CM

## 2023-12-19 DIAGNOSIS — R91.8 PULMONARY NODULES: ICD-10-CM

## 2023-12-19 DIAGNOSIS — N18.32 STAGE 3B CHRONIC KIDNEY DISEASE: ICD-10-CM

## 2023-12-19 DIAGNOSIS — Z79.4 TYPE 2 DIABETES MELLITUS WITH HYPERGLYCEMIA, WITH LONG-TERM CURRENT USE OF INSULIN: ICD-10-CM

## 2023-12-19 DIAGNOSIS — R97.0 ELEVATED CEA: ICD-10-CM

## 2023-12-19 DIAGNOSIS — N18.32 STAGE 3B CHRONIC KIDNEY DISEASE: Primary | ICD-10-CM

## 2023-12-19 LAB
ALBUMIN SERPL-MCNC: 3.6 G/DL (ref 3.4–4.8)
ALBUMIN/GLOB SERPL: 1.2 RATIO (ref 1.1–2)
ALP SERPL-CCNC: 88 UNIT/L (ref 40–150)
ALT SERPL-CCNC: 10 UNIT/L (ref 0–55)
APPEARANCE UR: CLEAR
AST SERPL-CCNC: 12 UNIT/L (ref 5–34)
BACTERIA #/AREA URNS AUTO: NORMAL /HPF
BASOPHILS # BLD AUTO: 0.02 X10(3)/MCL
BASOPHILS NFR BLD AUTO: 0.6 %
BILIRUB SERPL-MCNC: 0.3 MG/DL
BILIRUB UR QL STRIP.AUTO: NEGATIVE
BUN SERPL-MCNC: 19.8 MG/DL (ref 9.8–20.1)
CALCIUM SERPL-MCNC: 8.7 MG/DL (ref 8.4–10.2)
CEA SERPL-MCNC: 3.03 NG/ML (ref 0–3)
CHLORIDE SERPL-SCNC: 104 MMOL/L (ref 98–107)
CO2 SERPL-SCNC: 28 MMOL/L (ref 23–31)
COLOR UR AUTO: NORMAL
CREAT SERPL-MCNC: 1.56 MG/DL (ref 0.55–1.02)
CREAT UR-MCNC: 56 MG/DL (ref 45–106)
EOSINOPHIL # BLD AUTO: 0.11 X10(3)/MCL (ref 0–0.9)
EOSINOPHIL NFR BLD AUTO: 3.4 %
ERYTHROCYTE [DISTWIDTH] IN BLOOD BY AUTOMATED COUNT: 17.8 % (ref 11.5–17)
GFR SERPLBLD CREATININE-BSD FMLA CKD-EPI: 36 MLS/MIN/1.73/M2
GLOBULIN SER-MCNC: 3 GM/DL (ref 2.4–3.5)
GLUCOSE SERPL-MCNC: 118 MG/DL (ref 82–115)
GLUCOSE UR QL STRIP.AUTO: NORMAL
HCT VFR BLD AUTO: 28.5 % (ref 37–47)
HGB BLD-MCNC: 8.7 G/DL (ref 12–16)
IMM GRANULOCYTES # BLD AUTO: 0.03 X10(3)/MCL (ref 0–0.04)
IMM GRANULOCYTES NFR BLD AUTO: 0.9 %
KETONES UR QL STRIP.AUTO: NEGATIVE
LEUKOCYTE ESTERASE UR QL STRIP.AUTO: NEGATIVE
LYMPHOCYTES # BLD AUTO: 1.15 X10(3)/MCL (ref 0.6–4.6)
LYMPHOCYTES NFR BLD AUTO: 35.4 %
MCH RBC QN AUTO: 22.8 PG (ref 27–31)
MCHC RBC AUTO-ENTMCNC: 30.5 G/DL (ref 33–36)
MCV RBC AUTO: 74.8 FL (ref 80–94)
MONOCYTES # BLD AUTO: 0.36 X10(3)/MCL (ref 0.1–1.3)
MONOCYTES NFR BLD AUTO: 11.1 %
NEUTROPHILS # BLD AUTO: 1.58 X10(3)/MCL (ref 2.1–9.2)
NEUTROPHILS NFR BLD AUTO: 48.6 %
NITRITE UR QL STRIP.AUTO: NEGATIVE
PH UR STRIP.AUTO: 6 [PH]
PHOSPHATE SERPL-MCNC: 2.1 MG/DL (ref 2.3–4.7)
PLATELET # BLD AUTO: 111 X10(3)/MCL (ref 130–400)
PMV BLD AUTO: ABNORMAL FL
POTASSIUM SERPL-SCNC: 4.5 MMOL/L (ref 3.5–5.1)
PROT SERPL-MCNC: 6.6 GM/DL (ref 5.8–7.6)
PROT UR QL STRIP.AUTO: NEGATIVE
PROT UR STRIP-MCNC: 6.9 MG/DL
PTH-INTACT SERPL-MCNC: 138.2 PG/ML (ref 8.7–77)
RBC # BLD AUTO: 3.81 X10(6)/MCL (ref 4.2–5.4)
RBC #/AREA URNS AUTO: NORMAL /HPF
RBC UR QL AUTO: NEGATIVE
SODIUM SERPL-SCNC: 137 MMOL/L (ref 136–145)
SP GR UR STRIP.AUTO: 1.01 (ref 1–1.03)
SQUAMOUS #/AREA URNS LPF: NORMAL /HPF
URINE PROTEIN/CREATININE RATIO (OHS): 0.1
UROBILINOGEN UR STRIP-ACNC: NORMAL
WBC # SPEC AUTO: 3.25 X10(3)/MCL (ref 4.5–11.5)
WBC #/AREA URNS AUTO: NORMAL /HPF

## 2023-12-19 PROCEDURE — 99999 PR PBB SHADOW E&M-EST. PATIENT-LVL IV: ICD-10-PCS | Mod: PBBFAC,,, | Performed by: NURSE PRACTITIONER

## 2023-12-19 PROCEDURE — 81001 URINALYSIS AUTO W/SCOPE: CPT

## 2023-12-19 PROCEDURE — 4010F PR ACE/ARB THEARPY RXD/TAKEN: ICD-10-PCS | Mod: CPTII,S$GLB,, | Performed by: NURSE PRACTITIONER

## 2023-12-19 PROCEDURE — 85025 COMPLETE CBC W/AUTO DIFF WBC: CPT

## 2023-12-19 PROCEDURE — 1101F PR PT FALLS ASSESS DOC 0-1 FALLS W/OUT INJ PAST YR: ICD-10-PCS | Mod: CPTII,S$GLB,, | Performed by: NURSE PRACTITIONER

## 2023-12-19 PROCEDURE — 3044F PR MOST RECENT HEMOGLOBIN A1C LEVEL <7.0%: ICD-10-PCS | Mod: CPTII,S$GLB,, | Performed by: NURSE PRACTITIONER

## 2023-12-19 PROCEDURE — 3066F PR DOCUMENTATION OF TREATMENT FOR NEPHROPATHY: ICD-10-PCS | Mod: CPTII,S$GLB,, | Performed by: NURSE PRACTITIONER

## 2023-12-19 PROCEDURE — 3078F DIAST BP <80 MM HG: CPT | Mod: CPTII,S$GLB,, | Performed by: NURSE PRACTITIONER

## 2023-12-19 PROCEDURE — 99999 PR PBB SHADOW E&M-EST. PATIENT-LVL IV: CPT | Mod: PBBFAC,,, | Performed by: NURSE PRACTITIONER

## 2023-12-19 PROCEDURE — 3044F HG A1C LEVEL LT 7.0%: CPT | Mod: CPTII,S$GLB,, | Performed by: NURSE PRACTITIONER

## 2023-12-19 PROCEDURE — 3008F PR BODY MASS INDEX (BMI) DOCUMENTED: ICD-10-PCS | Mod: CPTII,S$GLB,, | Performed by: NURSE PRACTITIONER

## 2023-12-19 PROCEDURE — 1159F PR MEDICATION LIST DOCUMENTED IN MEDICAL RECORD: ICD-10-PCS | Mod: CPTII,S$GLB,, | Performed by: NURSE PRACTITIONER

## 2023-12-19 PROCEDURE — 1126F PR PAIN SEVERITY QUANTIFIED, NO PAIN PRESENT: ICD-10-PCS | Mod: CPTII,S$GLB,, | Performed by: NURSE PRACTITIONER

## 2023-12-19 PROCEDURE — 3066F NEPHROPATHY DOC TX: CPT | Mod: CPTII,S$GLB,, | Performed by: NURSE PRACTITIONER

## 2023-12-19 PROCEDURE — 1159F MED LIST DOCD IN RCRD: CPT | Mod: CPTII,S$GLB,, | Performed by: NURSE PRACTITIONER

## 2023-12-19 PROCEDURE — 4010F ACE/ARB THERAPY RXD/TAKEN: CPT | Mod: CPTII,S$GLB,, | Performed by: NURSE PRACTITIONER

## 2023-12-19 PROCEDURE — 83970 ASSAY OF PARATHORMONE: CPT

## 2023-12-19 PROCEDURE — 99213 PR OFFICE/OUTPT VISIT, EST, LEVL III, 20-29 MIN: ICD-10-PCS | Mod: S$GLB,,, | Performed by: NURSE PRACTITIONER

## 2023-12-19 PROCEDURE — 3288F PR FALLS RISK ASSESSMENT DOCUMENTED: ICD-10-PCS | Mod: CPTII,S$GLB,, | Performed by: NURSE PRACTITIONER

## 2023-12-19 PROCEDURE — 82378 CARCINOEMBRYONIC ANTIGEN: CPT

## 2023-12-19 PROCEDURE — 3061F NEG MICROALBUMINURIA REV: CPT | Mod: CPTII,S$GLB,, | Performed by: NURSE PRACTITIONER

## 2023-12-19 PROCEDURE — 3078F PR MOST RECENT DIASTOLIC BLOOD PRESSURE < 80 MM HG: ICD-10-PCS | Mod: CPTII,S$GLB,, | Performed by: NURSE PRACTITIONER

## 2023-12-19 PROCEDURE — 82570 ASSAY OF URINE CREATININE: CPT

## 2023-12-19 PROCEDURE — 84100 ASSAY OF PHOSPHORUS: CPT

## 2023-12-19 PROCEDURE — 99213 OFFICE O/P EST LOW 20 MIN: CPT | Mod: S$GLB,,, | Performed by: NURSE PRACTITIONER

## 2023-12-19 PROCEDURE — 1101F PT FALLS ASSESS-DOCD LE1/YR: CPT | Mod: CPTII,S$GLB,, | Performed by: NURSE PRACTITIONER

## 2023-12-19 PROCEDURE — 3008F BODY MASS INDEX DOCD: CPT | Mod: CPTII,S$GLB,, | Performed by: NURSE PRACTITIONER

## 2023-12-19 PROCEDURE — 3061F PR NEG MICROALBUMINURIA RESULT DOCUMENTED/REVIEW: ICD-10-PCS | Mod: CPTII,S$GLB,, | Performed by: NURSE PRACTITIONER

## 2023-12-19 PROCEDURE — 36415 COLL VENOUS BLD VENIPUNCTURE: CPT

## 2023-12-19 PROCEDURE — 3075F PR MOST RECENT SYSTOLIC BLOOD PRESS GE 130-139MM HG: ICD-10-PCS | Mod: CPTII,S$GLB,, | Performed by: NURSE PRACTITIONER

## 2023-12-19 PROCEDURE — 80053 COMPREHEN METABOLIC PANEL: CPT

## 2023-12-19 PROCEDURE — 1126F AMNT PAIN NOTED NONE PRSNT: CPT | Mod: CPTII,S$GLB,, | Performed by: NURSE PRACTITIONER

## 2023-12-19 PROCEDURE — 3288F FALL RISK ASSESSMENT DOCD: CPT | Mod: CPTII,S$GLB,, | Performed by: NURSE PRACTITIONER

## 2023-12-19 PROCEDURE — 3075F SYST BP GE 130 - 139MM HG: CPT | Mod: CPTII,S$GLB,, | Performed by: NURSE PRACTITIONER

## 2023-12-19 RX ORDER — INSULIN LISPRO-AABC 200 [IU]/ML
INJECTION, SOLUTION SUBCUTANEOUS
COMMUNITY
Start: 2023-10-20 | End: 2024-03-08

## 2023-12-19 RX ORDER — PROCHLORPERAZINE MALEATE 5 MG
5 TABLET ORAL EVERY 6 HOURS PRN
COMMUNITY
Start: 2023-11-13

## 2023-12-19 NOTE — PROGRESS NOTES
[facility] [place] [facility] Progress Note      HPI  Indu Azul, 68 y.o. female, presents to office for a follow up visit for CKD 3B evaluation.  Unfortunately recently she was diagnosed with a colon cancer and she had had partial colectomy done with lymph node dissection.  She has now started her chemotherapy regimen.  She does report holding her diuretics the week of chemotherapy and receiving IV fluids. She has no significant complaints today.  Patient denies taking NSAIDs or new antibiotics. Also denies recent episode of dehydration, diarrhea, vomiting, acute illness, hospitalization, recent angiograms or exposure to IV radiocontrast.        Medical Diagnoses:   Past Medical History:   Diagnosis Date    Anesthesia complication     trouble awakening    Charcot's joint of foot, left     Chronic kidney disease, unspecified     Cirrhosis of liver without ascites     Depression     Diabetes mellitus, type II, insulin dependent     Diabetic neuropathy     GERD (gastroesophageal reflux disease)     H/O: hysterectomy     Hepatic steatosis     Malignant neoplasm of ascending colon     Obesity, unspecified     Overactive bladder     Thrombocytopenia, unspecified     Vitamin D deficiency      Patient Active Problem List   Diagnosis    Thrombocytopenia    Iron deficiency    Folate deficiency    Type 2 diabetes mellitus with hyperglycemia, with long-term current use of insulin    Long-term insulin use    Hypertension associated with type 2 diabetes mellitus    Hyperlipidemia associated with type 2 diabetes mellitus    CKD stage 3 due to type 2 diabetes mellitus    Recurrent major depressive disorder, in full remission    Vitamin D deficiency    Hepatic steatosis    Colon cancer screening    Need for influenza vaccination    Other cirrhosis of liver    Diabetic polyneuropathy associated with type 2 diabetes mellitus    Gammopathy    Splenomegaly    Microcytic anemia    CKD (chronic kidney disease) stage 4, GFR 15-29  ml/min    Colonic mass    Malignant neoplasm of ascending colon    Pulmonary nodules    Elevated CEA       Surgical History:   Past Surgical History:   Procedure Laterality Date    APPENDECTOMY  1978    BREAST BIOPSY  2016     SECTION      x3    charcot-leyden crystals identification      CHOLECYSTECTOMY      COLONOSCOPY N/A 2023    Procedure: COLON;  Surgeon: Luis Amador MD;  Location: [facility] ENDOSCOPY;  Service: Gastroenterology;  Laterality: N/A;    COLONOSCOPY, WITH 1 OR MORE BIOPSIES  2023    Procedure: COLONOSCOPY, WITH 1 OR MORE BIOPSIES;  Surgeon: Luis Amador MD;  Location: [facility] ENDOSCOPY;  Service: Gastroenterology;;    COLONOSCOPY, WITH DIRECTED SUBMUCOSAL INJECTION  2023    Procedure: COLONOSCOPY, WITH DIRECTED SUBMUCOSAL INJECTION;  Surgeon: Luis Amador MD;  Location: [facility] ENDOSCOPY;  Service: Gastroenterology;;  8cc Colon Tattoo    COLONOSCOPY, WITH POLYPECTOMY USING SNARE  2023    Procedure: COLONOSCOPY, WITH POLYPECTOMY USING SNARE;  Surgeon: Luis Amador MD;  Location: [facility] ENDOSCOPY;  Service: Gastroenterology;;    ESOPHAGOGASTRODUODENOSCOPY N/A 2023    Procedure: DOUBLE;  Surgeon: Luis Amador MD;  Location: [facility] ENDOSCOPY;  Service: Gastroenterology;  Laterality: N/A;    HEMORRHOID SURGERY      HYSTERECTOMY      total    INSERTION OF TUNNELED CENTRAL VENOUS CATHETER (CVC) WITH SUBCUTANEOUS PORT Right 2023    Procedure: [id number];  Surgeon: Timothy Russ MD;  Location: [facility];  Service: General;  Laterality: Right;    right foot surgery Right 2022    XI ROBOTIC COLECTOMY, RIGHT N/A 10/19/2023    Procedure: XI ROBOTIC COLECTOMY, RIGHT;  Surgeon: Timothy Russ MD;  Location: [facility];  Service: General;  Laterality: N/A;       Family History:   Family History   Problem Relation Age of Onset    Diabetes Mother     Alzheimer's disease Mother      Diabetes Father     Leukemia Father     Diabetes Sister     Liver cancer Sister     Diabetes Brother        Social History:   Social History     Tobacco Use    Smoking status: Every Day     Current packs/day: 0.50     Average packs/day: 0.5 packs/day for 61.0 years (30.5 ttl pk-yrs)     Types: Cigarettes     Start date: 2003     Passive exposure: Current    Smokeless tobacco: Never   Substance Use Topics    Alcohol use: Not Currently     Comment: occassionally       Allergies:  Review of patient's allergies indicates:  No Known Allergies    Medications:    Current Outpatient Medications:     dulaglutide (TRULICITY) 3 mg/0.5 mL pen injector, Inject 3 mg into the skin every 7 days., Disp: 4 pen , Rfl: 11    ferrous sulfate (FEOSOL) 325 mg (65 mg iron) Tab tablet, Take 1 tablet (325 mg total) by mouth 2 (two) times daily., Disp: 60 tablet, Rfl: 11    folic acid (FOLVITE) 1 MG tablet, Take 1 tablet (1 mg total) by mouth once daily., Disp: 100 tablet, Rfl: 3    furosemide (LASIX) 40 MG tablet, Take 40 mg by mouth as needed., Disp: , Rfl:     gabapentin (NEURONTIN) 300 MG capsule, Take 2 capsules (600 mg total) by mouth every evening., Disp: 60 capsule, Rfl: 5    HYDROcodone-acetaminophen (NORCO) 5-325 mg per tablet, Take 1 tablet by mouth every 6 (six) hours as needed for Pain., Disp: 15 tablet, Rfl: 0    insulin glargine, TOUJEO, (TOUJEO SOLOSTAR U-300 INSULIN) 300 unit/mL (1.5 mL) InPn pen, INJECT 79 UNITS SUBCUTANEOUSLY ONCE DAILY, Disp: 6 mL, Rfl: 11    insulin syringe-needle U-100 1 mL 31 gauge x 5/16 Syrg, Inject 1 Syringe into the skin 3 (three) times daily with meals., Disp: , Rfl:     LYUMJEV KWIKPEN U-200 INSULIN 200 unit/mL (3 mL) pen, Sliding scale, Disp: , Rfl:     OLANZapine (ZYPREXA) 5 MG tablet, Take 1 tablet (5 mg total) by mouth every evening. Take nightly on days 1-3 of each chemotherapy cycle., Disp: 6 tablet, Rfl: 5    ondansetron (ZOFRAN-ODT) 8 MG TbDL, Take 1 tablet (8 mg total) by mouth every 6  "(six) hours as needed., Disp: 10 tablet, Rfl: 0    prochlorperazine (COMPAZINE) 5 MG tablet, Take 5 mg by mouth every 6 (six) hours as needed., Disp: , Rfl:     sertraline (ZOLOFT) 50 MG tablet, Take 1 tablet by mouth once daily, Disp: 90 tablet, Rfl: 0    solifenacin (VESICARE) 10 MG tablet, Take 10 mg by mouth once daily., Disp: , Rfl:     spironolactone (ALDACTONE) 50 MG tablet, Take 50 mg by mouth once daily., Disp: , Rfl:     k phos di & mono-sod phos mono (K-PHOS-NEUTRAL) 250 mg Tab, Take 1 tablet by mouth 2 (two) times a day. for 2 days, Disp: 4 tablet, Rfl: 0    Current Facility-Administered Medications:     promethazine (PHENERGAN) 12.5 mg in dextrose 5 % (D5W) 50 mL IVPB, 12.5 mg, Intravenous, Once, Gayatri Jaffe, FNP       Review of Systems:    Constitutional: Denies fever, fatigue, generalized weakness  Skin: Denies wounds, no rashes, no itching, no new skin lesions  Respiratory:  Denies cough, shortness of breath, or wheezing  Cardiovascular: Denies chest pain, palpitations, lower extremity swelling better since back on diuretics  Gastrointestional: Denies abdominal pain, nausea, vomiting, diarrhea, or constipation  Genitourinary: Denies dysuria, hematuria, foamy urine, or incontinence; reports able to empty bladder  Musculoskeletal: Denies back or flank pain  Neurological: Denies headaches, dizziness, paresthesias, tremors or focal weakness      Vital Signs:  /70 (BP Location: Left arm, Patient Position: Sitting)   Pulse 90   Temp 97.4 °F (36.3 °C) (Temporal)   Resp 20   Ht 5' 5" (1.651 m)   Wt 107.7 kg (237 lb 6.4 oz)   SpO2 99%   BMI 39.51 kg/m²   Body mass index is 39.51 kg/m².      Physical Exam:    General: no acute distress, awake, alert  Eyes: conjunctiva clear, eyelids without swelling  HENT: atraumatic, oropharynx and nasal mucosa patent  Neck: supple, trache midline, no JVD  Respiratory: equal, unlabored, clear to auscultation A/P  Cardiovascular: RRR without rub  Edema:  +2 " lower extremity edema  Gastrointestinal: soft, non-tender, non-distended; positive bowel sounds; no masses to palpation; no ascites, healed abdominal scar  Genitourinary: no CVA tenderness upon palpation  Musculoskeletal: ROM without new limitation or discomfort  Integumentary: warm, dry; no rashes, wounds, or skin lesions  Neurological: oriented x4, appropriate, no acute deficits; no asterixis      Labs:        Component Value Date/Time     12/19/2023 0932     12/11/2023 0850     06/01/2022 1340    K 4.5 12/19/2023 0932    K 5.3 (H) 12/11/2023 0850    K 4.9 06/01/2022 1340     06/01/2022 1340    CO2 28 12/19/2023 0932    CO2 26 12/11/2023 0850    CO2 29 06/01/2022 1340    BUN 19.8 12/19/2023 0932    BUN 26.0 (H) 12/11/2023 0850    BUN 22 06/01/2022 1340    CREATININE 1.56 (H) 12/19/2023 0932    CREATININE 1.84 (H) 12/11/2023 0850    CREATININE 1.51 (H) 11/27/2023 0914    CREATININE 1.66 (H) 11/13/2023 1445    CREATININE 1.32 (H) 06/01/2022 1340    CALCIUM 8.7 12/19/2023 0932    CALCIUM 8.9 12/11/2023 0850    CALCIUM 8.7 (L) 06/01/2022 1340    PHOS 2.1 (L) 12/19/2023 0932    PHOS 3.6 07/19/2023 0834    .2 (H) 12/19/2023 0932    PTH 67.2 07/19/2023 0834           Component Value Date/Time    WBC 3.25 (L) 12/19/2023 0932    WBC 5.68 12/11/2023 0850    HGB 8.7 (L) 12/19/2023 0932    HGB 9.1 (L) 12/11/2023 0850    HCT 28.5 (L) 12/19/2023 0932    HCT 30.2 (L) 12/11/2023 0850     (L) 12/19/2023 0932     12/11/2023 0850       Urine Creatinine   Date Value Ref Range Status   12/19/2023 56.0 45.0 - 106.0 mg/dL Final   07/19/2023 63.9 45.0 - 106.0 mg/dL Final       Urine Protein Level   Date Value Ref Range Status   12/19/2023 6.9 mg/dL Final   07/19/2023 7.9 mg/dL Final           Imaging:  Retroperitoneal US:      Impression:  1. Stage 3b chronic kidney disease    2. Type 2 diabetes mellitus with hyperglycemia, with long-term current use of insulin    3. Primary hypertension    4.  Hypophosphatemia    5. Malignant neoplasm of ascending colon        CKD 3 related to nephrosclerosis  Cirrhosis  Colon cancer status post resection and for chemotherapy starting next week  Worsening renal function not unexpected in the view of the recent events and since she is back on diuretics      Plan:  CKD3b related nephrosclerosis.  Patient at baseline.  Patient does hold her Lasix the week of her chemotherapy and she does receive IV fluid with treatment.  DM2 with severe neuropathy  Neutra phos x 2 days   Blood pressure controlled  Heme-Onc managing anemia/currently on chemotherapy       Bri Fernandes [facility]        This note was created with the assistance of Jambo voice recognition software or phone dictation. There may be transcription errors as a result of using this technology however minimal. Effort has been made to assure accuracy of transcription but any obvious errors or omissions should be clarified with the author of the document.    Alert and oriented to person, place and time

## 2023-12-22 ENCOUNTER — OFFICE VISIT (OUTPATIENT)
Dept: HEMATOLOGY/ONCOLOGY | Facility: CLINIC | Age: 68
End: 2023-12-22
Payer: MEDICARE

## 2023-12-22 VITALS
TEMPERATURE: 98 F | BODY MASS INDEX: 40.43 KG/M2 | DIASTOLIC BLOOD PRESSURE: 74 MMHG | WEIGHT: 242.63 LBS | SYSTOLIC BLOOD PRESSURE: 123 MMHG | OXYGEN SATURATION: 98 % | HEART RATE: 78 BPM | HEIGHT: 65 IN | RESPIRATION RATE: 18 BRPM

## 2023-12-22 DIAGNOSIS — C18.2 MALIGNANT NEOPLASM OF ASCENDING COLON: Primary | ICD-10-CM

## 2023-12-22 DIAGNOSIS — D50.9 MICROCYTIC ANEMIA: ICD-10-CM

## 2023-12-22 LAB
ALBUMIN SERPL-MCNC: 3.1 G/DL (ref 3.4–4.8)
ALBUMIN/GLOB SERPL: 1 RATIO (ref 1.1–2)
ALP SERPL-CCNC: 78 UNIT/L (ref 40–150)
ALT SERPL-CCNC: 10 UNIT/L (ref 0–55)
AST SERPL-CCNC: 12 UNIT/L (ref 5–34)
BASOPHILS # BLD AUTO: 0.02 X10(3)/MCL
BASOPHILS NFR BLD AUTO: 0.4 %
BILIRUB SERPL-MCNC: 0.3 MG/DL
BUN SERPL-MCNC: 15.9 MG/DL (ref 9.8–20.1)
CALCIUM SERPL-MCNC: 8.3 MG/DL (ref 8.4–10.2)
CHLORIDE SERPL-SCNC: 110 MMOL/L (ref 98–107)
CO2 SERPL-SCNC: 23 MMOL/L (ref 23–31)
CREAT SERPL-MCNC: 1.23 MG/DL (ref 0.55–1.02)
EOSINOPHIL # BLD AUTO: 0.13 X10(3)/MCL (ref 0–0.9)
EOSINOPHIL NFR BLD AUTO: 2.9 %
ERYTHROCYTE [DISTWIDTH] IN BLOOD BY AUTOMATED COUNT: 18.5 % (ref 11.5–17)
GFR SERPLBLD CREATININE-BSD FMLA CKD-EPI: 48 MLS/MIN/1.73/M2
GLOBULIN SER-MCNC: 3.1 GM/DL (ref 2.4–3.5)
GLUCOSE SERPL-MCNC: 113 MG/DL (ref 82–115)
HCT VFR BLD AUTO: 27.1 % (ref 37–47)
HGB BLD-MCNC: 8.1 G/DL (ref 12–16)
IMM GRANULOCYTES # BLD AUTO: 0.02 X10(3)/MCL (ref 0–0.04)
IMM GRANULOCYTES NFR BLD AUTO: 0.4 %
LYMPHOCYTES # BLD AUTO: 1.26 X10(3)/MCL (ref 0.6–4.6)
LYMPHOCYTES NFR BLD AUTO: 27.8 %
MCH RBC QN AUTO: 22.6 PG (ref 27–31)
MCHC RBC AUTO-ENTMCNC: 29.9 G/DL (ref 33–36)
MCV RBC AUTO: 75.7 FL (ref 80–94)
MONOCYTES # BLD AUTO: 0.39 X10(3)/MCL (ref 0.1–1.3)
MONOCYTES NFR BLD AUTO: 8.6 %
NEUTROPHILS # BLD AUTO: 2.72 X10(3)/MCL (ref 2.1–9.2)
NEUTROPHILS NFR BLD AUTO: 59.9 %
PLATELET # BLD AUTO: 123 X10(3)/MCL (ref 130–400)
PMV BLD AUTO: ABNORMAL FL
POTASSIUM SERPL-SCNC: 4 MMOL/L (ref 3.5–5.1)
PROT SERPL-MCNC: 6.2 GM/DL (ref 5.8–7.6)
RBC # BLD AUTO: 3.58 X10(6)/MCL (ref 4.2–5.4)
SODIUM SERPL-SCNC: 138 MMOL/L (ref 136–145)
WBC # SPEC AUTO: 4.54 X10(3)/MCL (ref 4.5–11.5)

## 2023-12-22 PROCEDURE — 1160F RVW MEDS BY RX/DR IN RCRD: CPT | Mod: CPTII,S$GLB,, | Performed by: NURSE PRACTITIONER

## 2023-12-22 PROCEDURE — 3074F SYST BP LT 130 MM HG: CPT | Mod: CPTII,S$GLB,, | Performed by: NURSE PRACTITIONER

## 2023-12-22 PROCEDURE — 80053 COMPREHEN METABOLIC PANEL: CPT | Performed by: NURSE PRACTITIONER

## 2023-12-22 PROCEDURE — 3044F PR MOST RECENT HEMOGLOBIN A1C LEVEL <7.0%: ICD-10-PCS | Mod: CPTII,S$GLB,, | Performed by: NURSE PRACTITIONER

## 2023-12-22 PROCEDURE — 1160F PR REVIEW ALL MEDS BY PRESCRIBER/CLIN PHARMACIST DOCUMENTED: ICD-10-PCS | Mod: CPTII,S$GLB,, | Performed by: NURSE PRACTITIONER

## 2023-12-22 PROCEDURE — 3074F PR MOST RECENT SYSTOLIC BLOOD PRESSURE < 130 MM HG: ICD-10-PCS | Mod: CPTII,S$GLB,, | Performed by: NURSE PRACTITIONER

## 2023-12-22 PROCEDURE — 1126F AMNT PAIN NOTED NONE PRSNT: CPT | Mod: CPTII,S$GLB,, | Performed by: NURSE PRACTITIONER

## 2023-12-22 PROCEDURE — 3061F PR NEG MICROALBUMINURIA RESULT DOCUMENTED/REVIEW: ICD-10-PCS | Mod: CPTII,S$GLB,, | Performed by: NURSE PRACTITIONER

## 2023-12-22 PROCEDURE — 1159F PR MEDICATION LIST DOCUMENTED IN MEDICAL RECORD: ICD-10-PCS | Mod: CPTII,S$GLB,, | Performed by: NURSE PRACTITIONER

## 2023-12-22 PROCEDURE — 1126F PR PAIN SEVERITY QUANTIFIED, NO PAIN PRESENT: ICD-10-PCS | Mod: CPTII,S$GLB,, | Performed by: NURSE PRACTITIONER

## 2023-12-22 PROCEDURE — 99999 PR PBB SHADOW E&M-EST. PATIENT-LVL IV: ICD-10-PCS | Mod: PBBFAC,,, | Performed by: NURSE PRACTITIONER

## 2023-12-22 PROCEDURE — 1159F MED LIST DOCD IN RCRD: CPT | Mod: CPTII,S$GLB,, | Performed by: NURSE PRACTITIONER

## 2023-12-22 PROCEDURE — 99215 OFFICE O/P EST HI 40 MIN: CPT | Mod: S$GLB,,, | Performed by: NURSE PRACTITIONER

## 2023-12-22 PROCEDURE — 99215 PR OFFICE/OUTPT VISIT, EST, LEVL V, 40-54 MIN: ICD-10-PCS | Mod: S$GLB,,, | Performed by: NURSE PRACTITIONER

## 2023-12-22 PROCEDURE — 3044F HG A1C LEVEL LT 7.0%: CPT | Mod: CPTII,S$GLB,, | Performed by: NURSE PRACTITIONER

## 2023-12-22 PROCEDURE — 3066F PR DOCUMENTATION OF TREATMENT FOR NEPHROPATHY: ICD-10-PCS | Mod: CPTII,S$GLB,, | Performed by: NURSE PRACTITIONER

## 2023-12-22 PROCEDURE — 3288F FALL RISK ASSESSMENT DOCD: CPT | Mod: CPTII,S$GLB,, | Performed by: NURSE PRACTITIONER

## 2023-12-22 PROCEDURE — 99999 PR PBB SHADOW E&M-EST. PATIENT-LVL IV: CPT | Mod: PBBFAC,,, | Performed by: NURSE PRACTITIONER

## 2023-12-22 PROCEDURE — 85025 COMPLETE CBC W/AUTO DIFF WBC: CPT | Performed by: NURSE PRACTITIONER

## 2023-12-22 PROCEDURE — 3008F PR BODY MASS INDEX (BMI) DOCUMENTED: ICD-10-PCS | Mod: CPTII,S$GLB,, | Performed by: NURSE PRACTITIONER

## 2023-12-22 PROCEDURE — 1101F PR PT FALLS ASSESS DOC 0-1 FALLS W/OUT INJ PAST YR: ICD-10-PCS | Mod: CPTII,S$GLB,, | Performed by: NURSE PRACTITIONER

## 2023-12-22 PROCEDURE — 3061F NEG MICROALBUMINURIA REV: CPT | Mod: CPTII,S$GLB,, | Performed by: NURSE PRACTITIONER

## 2023-12-22 PROCEDURE — 3066F NEPHROPATHY DOC TX: CPT | Mod: CPTII,S$GLB,, | Performed by: NURSE PRACTITIONER

## 2023-12-22 PROCEDURE — 3008F BODY MASS INDEX DOCD: CPT | Mod: CPTII,S$GLB,, | Performed by: NURSE PRACTITIONER

## 2023-12-22 PROCEDURE — 4010F ACE/ARB THERAPY RXD/TAKEN: CPT | Mod: CPTII,S$GLB,, | Performed by: NURSE PRACTITIONER

## 2023-12-22 PROCEDURE — 36415 COLL VENOUS BLD VENIPUNCTURE: CPT | Performed by: NURSE PRACTITIONER

## 2023-12-22 PROCEDURE — 3078F PR MOST RECENT DIASTOLIC BLOOD PRESSURE < 80 MM HG: ICD-10-PCS | Mod: CPTII,S$GLB,, | Performed by: NURSE PRACTITIONER

## 2023-12-22 PROCEDURE — 3288F PR FALLS RISK ASSESSMENT DOCUMENTED: ICD-10-PCS | Mod: CPTII,S$GLB,, | Performed by: NURSE PRACTITIONER

## 2023-12-22 PROCEDURE — 1101F PT FALLS ASSESS-DOCD LE1/YR: CPT | Mod: CPTII,S$GLB,, | Performed by: NURSE PRACTITIONER

## 2023-12-22 PROCEDURE — 4010F PR ACE/ARB THEARPY RXD/TAKEN: ICD-10-PCS | Mod: CPTII,S$GLB,, | Performed by: NURSE PRACTITIONER

## 2023-12-22 PROCEDURE — 3078F DIAST BP <80 MM HG: CPT | Mod: CPTII,S$GLB,, | Performed by: NURSE PRACTITIONER

## 2023-12-22 RX ORDER — HYDROCODONE BITARTRATE AND ACETAMINOPHEN 500; 5 MG/1; MG/1
TABLET ORAL ONCE
Status: CANCELLED | OUTPATIENT
Start: 2023-12-27

## 2023-12-22 RX ORDER — DIPHENHYDRAMINE HCL 25 MG
25 CAPSULE ORAL ONCE
Status: CANCELLED | OUTPATIENT
Start: 2023-12-27

## 2023-12-22 RX ORDER — ACETAMINOPHEN 325 MG/1
650 TABLET ORAL ONCE
Status: CANCELLED | OUTPATIENT
Start: 2023-12-27

## 2023-12-22 RX ORDER — LEVOFLOXACIN 500 MG/1
500 TABLET, FILM COATED ORAL DAILY
Qty: 7 TABLET | Refills: 0 | Status: SHIPPED | OUTPATIENT
Start: 2023-12-22 | End: 2024-02-29 | Stop reason: ALTCHOICE

## 2023-12-22 NOTE — PROGRESS NOTES
HEMATOLOGY/ONCOLOGY OFFICE CLINIC VISIT    Visit Information:    Initial Evaluation: 4/22/2022  Referring Provider: Maggy Jaquez NP  Other providers: Dr Timothy Russ  Code status: Not addressed     Diagnosis/Problem list:   pT3 N2a Mx Stage IIIB Colon cancer. Dx 10/19/23  Microcytic anemia.   Folate deficiency     Present Treatment:  FOLFOX with dose reduction on Oxaliplatin due to neuropathy. Started on 11/28/23.    Treatment history:  10/19/2023 Lap/jaswinder right colon resection         Imaging studies:  CT C/A/P 6/3/2022: There is no significant hepatic steatosis.  The liver is cirrhotic.  No liver lesion is identified.  The spleen is enlarged, measuring 15.5 cm in length.  There is no retroperitoneal lymphadenopathy or abdominal aortic aneurysm.  A mildly prominent right external iliac lymph node measures 9 mm in short axis, nonspecific.  This is also similar in appearance when compared to 2015. Impression: Cirrhosis. Splenomegaly.  CT CAP 9/11/2023: There is no supraclavicular or axillary lymphadenopathy.  No mediastinal adenopathy. 3 mm right upper lobe pulmonary nodule. There are few additional pulmonary micronodules in the lung apices measuring no greater than 1-2 mm.  Findings similar to prior exam.     Impression: Scattered pulmonary nodules in the upper lobe similar to prior.  No further follow-up acute according to Fleischner criteria.  No convincing evidence for metastatic disease. Nodular appearing liver, correlation for cirrhosis.    Pathology:  10/19/2023:  COLON, RIGHT HEMICOLECTOMY (1.5 CM OF TERMINAL ILEUM, 25 CM LENGTH OF RIGHT COLON): POORLY DIFFERENTIATED ADENOCARCINOMA WITH FOCAL SIGNET RING CELL FEATURES, UNIFOCAL, RIGHT COLON, 8 CM GREATEST DIMENSION.   THE TUMOR INVADES THROUGH THE MUSCULARIS PROPRIA INTO PERICOLONIC TISSUE (PT3).   FOCAL LYMPHVASCULAR INVASION IS PRESENT.   THE SURGICAL MARGINS ARE FREE OF TUMOR.   METASTATIC ADENOCARCINOMA IS IDENTIFIED IN FOUR (4) OF TWENTY-SEVEN (27)    PERICOLONIC LYMPH NODES (LARGEST METASTATIC DEPOSIT 8 MM; FOCAL EXTRACAPSULAR EXTENSION OF TUMOR IS PRESENT).      PATHOLOGIC STAGING:  pT3 N2a Mx     Histologic Grade:  G3, poorly differentiated   Macroscopic Tumor Perforation:  Not identified   Lymphovascular Invasion:  Small vessel   Perineural Invasion:  Not identified        CLINICAL HISTORY:       Patient: Indu Azul is a 68 y.o. female. with multiple medical problems that I saw originally on 2022 for thrombocytopenia.  Patient platelet counts on 2021 were 132,000 and since that that has been slowly trending down.  2021 platelets 132,000  2022 platelets 121,000  2022 platelets 115,000     Of note patient have a UA done that same day that suggest possible UTI with positive nitrates, 1+ leukocytes and many bacteria.  Urine culture with E. coli.   Reviewing electronic medical record and going back to 12/15/2014 patient with occasional low platelets.  They were never lower than 100,000 and they always normalized.   As per patient in 2020 when her platelets were slightly decreased (117,000), this was shortly after she has her left foot surgery.  Then in May 17 and May 18 2021, platelets were 105k and 101k,  she had COVID.  Again in 2022 she have a UTI for which she completed antibiotics.     Patient's father diagnosed Blood disorder requiring blood transfusion that started q 4 weeks and the q week. He was diagnosed on his 80's but  at 91. Sister and niece had ovarian cancer. Sister  of it. Niece still alive.     She was found to have iron deficiency anemia.  EGD performed, grade I-II esophageal varices found, too small to band. She also had Colonoscopy by Dr Hammonds that showed an ulcerated malignant-appearing partially obstructing medium sized mass in the ascending colon.  She reports that she had a colonoscopy about 5 years ago at [facility] and everything was fine, no polyps or masses.   Biopsy and tattoo of the  mass showed fragments of colonic mucosa, no evidence of dysplasia or malignancy.  Two other polyps were completely removed in the descending and sigmoid colon, pathology returned hyperplastic polyps.  CT abd/pel with no acute findings.     Despite of biopsy because of malignant-appearing partially obstructing mass of the ascending colon and photographs and description were consistent with colon cancer she underwent Lap/jaswinder right colon resection on 10/19/2023 by Dr Timothy Russ. Path c/w really differentiated adeno carcinoma.         Chief Complaint: Follow-up (PT states she has cough the won't go away.)      Interval History:    23: Patient presents today for labs and TD prior to C3 of FOLFOX with dose reduction of Oxaliplatin due to neuropathy, accompanied by family members.  She reports fatigue and cough. Denies fever. She has microcytic anemia. Iron studies done on 23 show elevated iron and saturation but ferritin 26. She is on oral iron. Denies any visible bleeding in stools. No fever, chills or sweats. No worsening in neuropathy as of yet.        Past Medical History:   Diagnosis Date    Anesthesia complication     trouble awakening    Charcot's joint of foot, left     Chronic kidney disease, unspecified     Cirrhosis of liver without ascites     Depression     Diabetes mellitus, type II, insulin dependent     Diabetic neuropathy     GERD (gastroesophageal reflux disease)     H/O: hysterectomy     Hepatic steatosis     Malignant neoplasm of ascending colon     Obesity, unspecified     Overactive bladder     Thrombocytopenia, unspecified     Vitamin D deficiency       Past Surgical History:   Procedure Laterality Date    APPENDECTOMY      BREAST BIOPSY  2016     SECTION      x3    charcot-leyden crystals identification      CHOLECYSTECTOMY  2006    COLONOSCOPY N/A 2023    Procedure: COLON;  Surgeon: Luis Amador MD;  Location: [facility] ENDOSCOPY;  Service:  Gastroenterology;  Laterality: N/A;    COLONOSCOPY, WITH 1 OR MORE BIOPSIES  08/28/2023    Procedure: COLONOSCOPY, WITH 1 OR MORE BIOPSIES;  Surgeon: Luis Amador MD;  Location: [facility] ENDOSCOPY;  Service: Gastroenterology;;    COLONOSCOPY, WITH DIRECTED SUBMUCOSAL INJECTION  08/28/2023    Procedure: COLONOSCOPY, WITH DIRECTED SUBMUCOSAL INJECTION;  Surgeon: Luis Amador MD;  Location: [facility] ENDOSCOPY;  Service: Gastroenterology;;  8cc Colon Tattoo    COLONOSCOPY, WITH POLYPECTOMY USING SNARE  08/28/2023    Procedure: COLONOSCOPY, WITH POLYPECTOMY USING SNARE;  Surgeon: Luis Amador MD;  Location: [facility] ENDOSCOPY;  Service: Gastroenterology;;    ESOPHAGOGASTRODUODENOSCOPY N/A 08/28/2023    Procedure: DOUBLE;  Surgeon: Luis Amador MD;  Location: [facility] ENDOSCOPY;  Service: Gastroenterology;  Laterality: N/A;    HEMORRHOID SURGERY      HYSTERECTOMY  1990    total    INSERTION OF TUNNELED CENTRAL VENOUS CATHETER (CVC) WITH SUBCUTANEOUS PORT Right 11/20/2023    Procedure: [id number];  Surgeon: Timothy Russ MD;  Location: [facility];  Service: General;  Laterality: Right;    right foot surgery Right 02/01/2022    XI ROBOTIC COLECTOMY, RIGHT N/A 10/19/2023    Procedure: XI ROBOTIC COLECTOMY, RIGHT;  Surgeon: Timothy Russ MD;  Location: [facility];  Service: General;  Laterality: N/A;     Family History   Problem Relation Age of Onset    Diabetes Mother     Alzheimer's disease Mother     Diabetes Father     Leukemia Father     Diabetes Sister     Liver cancer Sister     Diabetes Brother      Social Connections: Not on file       Review of patient's allergies indicates:  No Known Allergies   Current Outpatient Medications on File Prior to Visit   Medication Sig Dispense Refill    dulaglutide (TRULICITY) 3 mg/0.5 mL pen injector Inject 3 mg into the skin every 7 days. 4 pen 11    ferrous sulfate (FEOSOL) 325 mg (65 mg iron) Tab tablet Take 1 tablet (325  mg total) by mouth 2 (two) times daily. 60 tablet 11    folic acid (FOLVITE) 1 MG tablet Take 1 tablet (1 mg total) by mouth once daily. 100 tablet 3    furosemide (LASIX) 40 MG tablet Take 40 mg by mouth as needed.      gabapentin (NEURONTIN) 300 MG capsule Take 2 capsules (600 mg total) by mouth every evening. 60 capsule 5    HYDROcodone-acetaminophen (NORCO) 5-325 mg per tablet Take 1 tablet by mouth every 6 (six) hours as needed for Pain. 15 tablet 0    insulin glargine, TOUJEO, (TOUJEO SOLOSTAR U-300 INSULIN) 300 unit/mL (1.5 mL) InPn pen INJECT 79 UNITS SUBCUTANEOUSLY ONCE DAILY 6 mL 11    insulin syringe-needle U-100 1 mL 31 gauge x 5/16 Syrg Inject 1 Syringe into the skin 3 (three) times daily with meals.      LYUMJEV KWIKPEN U-200 INSULIN 200 unit/mL (3 mL) pen Sliding scale      OLANZapine (ZYPREXA) 5 MG tablet Take 1 tablet (5 mg total) by mouth every evening. Take nightly on days 1-3 of each chemotherapy cycle. 6 tablet 5    ondansetron (ZOFRAN-ODT) 8 MG TbDL Take 1 tablet (8 mg total) by mouth every 6 (six) hours as needed. 10 tablet 0    prochlorperazine (COMPAZINE) 5 MG tablet Take 5 mg by mouth every 6 (six) hours as needed.      sertraline (ZOLOFT) 50 MG tablet Take 1 tablet by mouth once daily 90 tablet 0    solifenacin (VESICARE) 10 MG tablet Take 10 mg by mouth once daily.      spironolactone (ALDACTONE) 50 MG tablet Take 50 mg by mouth once daily.      k phos di & mono-sod phos mono (K-PHOS-NEUTRAL) 250 mg Tab Take 1 tablet by mouth 2 (two) times a day. for 2 days 4 tablet 0     Current Facility-Administered Medications on File Prior to Visit   Medication Dose Route Frequency Provider Last Rate Last Admin    promethazine (PHENERGAN) 12.5 mg in dextrose 5 % (D5W) 50 mL IVPB  12.5 mg Intravenous Once Gayatri Jaffe FNP          Review of Systems   Constitutional:  Negative for activity change, appetite change, chills, fatigue, fever and unexpected weight change.   HENT:  Negative for mouth  "dryness, mouth sores, nosebleeds, sore throat and trouble swallowing.    Eyes:  Negative for visual disturbance.   Respiratory:  Negative for cough and shortness of breath.    Cardiovascular:  Negative for chest pain, palpitations and leg swelling.   Gastrointestinal:  Negative for abdominal distention, abdominal pain, blood in stool, change in bowel habit, constipation, diarrhea, nausea and vomiting.   Endocrine: Negative.    Genitourinary:  Negative for dysuria, frequency, hematuria and urgency.   Musculoskeletal:  Negative for arthralgias, back pain, myalgias and neck pain.   Integumentary:  Positive for wound. Negative for rash.   Neurological:  Negative for dizziness, tremors, syncope, speech difficulty, weakness, light-headedness, numbness, headaches and memory loss.   Hematological:  Does not bruise/bleed easily.   Psychiatric/Behavioral:  Negative for confusion and suicidal ideas.               Vitals:    12/22/23 0958   BP: 123/74   BP Location: Right arm   Patient Position: Sitting   Pulse: 78   Resp: 18   Temp: 98.4 °F (36.9 °C)   TempSrc: Oral   SpO2: 98%   Weight: 110 kg (242 lb 9.6 oz)   Height: 5' 5" (1.651 m)            Wt Readings from Last 6 Encounters:   12/22/23 110 kg (242 lb 9.6 oz)   12/19/23 107.7 kg (237 lb 6.4 oz)   12/14/23 108 kg (238 lb 3.2 oz)   12/12/23 108 kg (238 lb 3.2 oz)   12/11/23 108 kg (238 lb 3.2 oz)   11/30/23 110.9 kg (244 lb 8 oz)     Body mass index is 40.37 kg/m².  Body surface area is 2.25 meters squared.  Physical Exam  Vitals and nursing note reviewed.   Constitutional:       General: She is not in acute distress.     Appearance: Normal appearance. She is obese.      Comments: Laparoscopic incisions well healed    HENT:      Head: Normocephalic and atraumatic.      Mouth/Throat:      Mouth: Mucous membranes are moist.   Eyes:      General: No scleral icterus.     Extraocular Movements: Extraocular movements intact.      Conjunctiva/sclera: Conjunctivae normal.      " Pupils: Pupils are equal, round, and reactive to light.   Neck:      Vascular: No JVD.   Cardiovascular:      Rate and Rhythm: Normal rate and regular rhythm.      Heart sounds: No murmur heard.  Pulmonary:      Effort: Pulmonary effort is normal.      Breath sounds: Normal breath sounds. No wheezing or rhonchi.   Chest:      Comments: Right chest wall mediport in place.    Abdominal:      General: Bowel sounds are normal. There is no distension.      Palpations: Abdomen is soft. There is no mass.      Tenderness: There is no abdominal tenderness.      Comments: Surgical incisions healing after colon surgery.    Musculoskeletal:         General: No swelling or deformity.      Cervical back: Neck supple.   Lymphadenopathy:      Head:      Right side of head: No submandibular adenopathy.      Left side of head: No submandibular adenopathy.      Cervical: No cervical adenopathy.      Upper Body:      Right upper body: No supraclavicular or axillary adenopathy.      Left upper body: No supraclavicular or axillary adenopathy.      Lower Body: No right inguinal adenopathy. No left inguinal adenopathy.   Skin:     General: Skin is warm.      Coloration: Skin is not jaundiced.      Findings: No lesion or rash.      Nails: There is no clubbing.   Neurological:      General: No focal deficit present.      Mental Status: She is alert and oriented to person, place, and time.      Cranial Nerves: Cranial nerves 2-12 are intact.   Psychiatric:         Attention and Perception: Attention normal.         Mood and Affect: Mood is depressed.         Behavior: Behavior is cooperative.         Judgment: Judgment normal.       ECOG SCORE             Laboratory:  CBC with Differential:  Lab Results   Component Value Date    WBC 4.54 12/22/2023    RBC 3.58 (L) 12/22/2023    HGB 8.1 (L) 12/22/2023    HCT 27.1 (L) 12/22/2023    MCV 75.7 (L) 12/22/2023    MCH 22.6 (L) 12/22/2023    MCHC 29.9 (L) 12/22/2023    RDW 18.5 (H) 12/22/2023    PLT  123 (L) 12/22/2023    MPV  12/22/2023      Comment:      N/A        CMP:  Sodium   Date Value Ref Range Status   06/01/2022 140 136 - 145 mmol/L Final     Sodium Level   Date Value Ref Range Status   12/22/2023 138 136 - 145 mmol/L Final     Potassium   Date Value Ref Range Status   06/01/2022 4.9 3.5 - 5.1 mmol/L Final     Potassium Level   Date Value Ref Range Status   12/22/2023 4.0 3.5 - 5.1 mmol/L Final     Chloride   Date Value Ref Range Status   06/01/2022 104 100 - 109 mmol/L Final     Carbon Dioxide   Date Value Ref Range Status   12/22/2023 23 23 - 31 mmol/L Final   06/01/2022 29 22 - 33 mmol/L Final     Blood Urea Nitrogen   Date Value Ref Range Status   12/22/2023 15.9 9.8 - 20.1 mg/dL Final   06/01/2022 22 5 - 25 mg/dL Final     Creatinine   Date Value Ref Range Status   12/22/2023 1.23 (H) 0.55 - 1.02 mg/dL Final   06/01/2022 1.32 (H) 0.57 - 1.25 mg/dL Final     Calcium   Date Value Ref Range Status   06/01/2022 8.7 (L) 8.8 - 10.6 mg/dL Final     Calcium Level Total   Date Value Ref Range Status   12/22/2023 8.3 (L) 8.4 - 10.2 mg/dL Final     Albumin Level   Date Value Ref Range Status   12/22/2023 3.1 (L) 3.4 - 4.8 g/dL Final     Bilirubin Total   Date Value Ref Range Status   12/22/2023 0.3 <=1.5 mg/dL Final     Alkaline Phosphatase   Date Value Ref Range Status   12/22/2023 78 40 - 150 unit/L Final     Aspartate Aminotransferase   Date Value Ref Range Status   12/22/2023 12 5 - 34 unit/L Final     Alanine Aminotransferase   Date Value Ref Range Status   12/22/2023 10 0 - 55 unit/L Final     Anion Gap   Date Value Ref Range Status   06/01/2022 7 (L) 8 - 16 mmol/L Final     eGFR    Date Value Ref Range Status   06/01/2022 45 mL/min/1.73mSq Final     Comment:     In accordance with NKF-ASN Task Force recommendation, calculation based on the Chronic Kidney Disease Epidemiology Collaboration (CKD-EPI) equation without adjustment for race. eGFR adjusted for gender and age and calculated in  ml/min/1.73mSquared. eGFR cannot be calculated if patient is under 18 years of age.     Reference Range:   >= 60 ml/min/1.73mSquared.     Estimated GFR-Non    Date Value Ref Range Status   08/04/2022 43 mls/min/1.73/m2 Final         Assessment:       1. Malignant neoplasm of ascending colon    2. Microcytic anemia          1) Stage IIIB adenocarcinoma of the ascending colon  --Patient will benefit of adjuvant chemotherapy.   --Due to severe diabetic neuropathy, will try dose reduction of Oxaliplatin, starting 65 mg/m2.    --If not tolerated, then with d/c Oxaliplatin and cont 5FU and LV  2) Lung nodules-Stable. Will monitor  3) iron deficiency anemia-on iron PO  4) Thrombocytopenia-resolved. Will follow counts closely as she has splenomegaly and this can affects her counts mostly platelets.  5) Folate deficiency-she will start taking folic acid at this time.   6) Diabetic neuropathy-severe. She does not feel her feet.  --She will have nerve stimulator placement to see if can help her feet     Educated the patient on the risks versus benefits as well as toxicities associated with treatment.  Verbally consented the patient to the treatment plan and the patient was educated on the planned duration of the treatment and schedule of the treatment administration.  All questions were answered.      Plan:       Plan: Adjuvant FOLFOX x 6 months,with dose reduction of oxaliplatin due to severe (Grade 3) neuropathy. If not tolerated, then with d/c Oxaliplatin and cont 5FU and LV. Neuropathy same.    Okay to proceed with C3 tomorrow,  12/26/2023. Will transfuse 1 unit of PRBCs on 12/27/23 as her Hgb is 8.1 today and she will get chemo on 12/26/23.  Prescribed levaquin x days for cough.   Continue folate and iron by mouth  Keep follow ups with other physicians    Weekly CBC, CMP - standing order placed  RTC in 2 weeks with for TD/labs: CBC, CMP, CEA,- chemo next day    Encouraged to call with questions or  problems  The patient was given ample opportunity to ask questions and they were all answered to satisfaction; patient demonstrated understanding of what we discussed and is agreeable to the plan.     LAURA Degroot

## 2023-12-26 ENCOUNTER — INFUSION (OUTPATIENT)
Dept: INFUSION THERAPY | Facility: HOSPITAL | Age: 68
End: 2023-12-26
Attending: INTERNAL MEDICINE
Payer: MEDICARE

## 2023-12-26 VITALS
DIASTOLIC BLOOD PRESSURE: 68 MMHG | HEIGHT: 65 IN | WEIGHT: 242.5 LBS | TEMPERATURE: 98 F | HEART RATE: 90 BPM | OXYGEN SATURATION: 97 % | BODY MASS INDEX: 40.4 KG/M2 | RESPIRATION RATE: 18 BRPM | SYSTOLIC BLOOD PRESSURE: 109 MMHG

## 2023-12-26 DIAGNOSIS — D50.9 MICROCYTIC ANEMIA: ICD-10-CM

## 2023-12-26 DIAGNOSIS — C18.2 MALIGNANT NEOPLASM OF ASCENDING COLON: Primary | ICD-10-CM

## 2023-12-26 PROCEDURE — 96415 CHEMO IV INFUSION ADDL HR: CPT

## 2023-12-26 PROCEDURE — 96368 THER/DIAG CONCURRENT INF: CPT

## 2023-12-26 PROCEDURE — 96416 CHEMO PROLONG INFUSE W/PUMP: CPT

## 2023-12-26 PROCEDURE — 96413 CHEMO IV INFUSION 1 HR: CPT

## 2023-12-26 PROCEDURE — 96411 CHEMO IV PUSH ADDL DRUG: CPT

## 2023-12-26 PROCEDURE — 63600175 PHARM REV CODE 636 W HCPCS: Performed by: NURSE PRACTITIONER

## 2023-12-26 PROCEDURE — 96367 TX/PROPH/DG ADDL SEQ IV INF: CPT

## 2023-12-26 PROCEDURE — 25000003 PHARM REV CODE 250: Performed by: NURSE PRACTITIONER

## 2023-12-26 RX ORDER — SODIUM CHLORIDE 0.9 % (FLUSH) 0.9 %
10 SYRINGE (ML) INJECTION
Status: DISCONTINUED | OUTPATIENT
Start: 2023-12-26 | End: 2023-12-26 | Stop reason: HOSPADM

## 2023-12-26 RX ORDER — PROCHLORPERAZINE EDISYLATE 5 MG/ML
5 INJECTION INTRAMUSCULAR; INTRAVENOUS ONCE AS NEEDED
Status: CANCELLED | OUTPATIENT
Start: 2023-12-26

## 2023-12-26 RX ORDER — SODIUM CHLORIDE 0.9 % (FLUSH) 0.9 %
10 SYRINGE (ML) INJECTION
Status: CANCELLED | OUTPATIENT
Start: 2023-12-26

## 2023-12-26 RX ORDER — HEPARIN 100 UNIT/ML
500 SYRINGE INTRAVENOUS
Status: DISCONTINUED | OUTPATIENT
Start: 2023-12-26 | End: 2023-12-26 | Stop reason: HOSPADM

## 2023-12-26 RX ORDER — DIPHENHYDRAMINE HYDROCHLORIDE 50 MG/ML
50 INJECTION INTRAMUSCULAR; INTRAVENOUS ONCE AS NEEDED
Status: DISCONTINUED | OUTPATIENT
Start: 2023-12-26 | End: 2023-12-26 | Stop reason: HOSPADM

## 2023-12-26 RX ORDER — PROCHLORPERAZINE EDISYLATE 5 MG/ML
5 INJECTION INTRAMUSCULAR; INTRAVENOUS ONCE AS NEEDED
Status: DISCONTINUED | OUTPATIENT
Start: 2023-12-26 | End: 2023-12-26 | Stop reason: HOSPADM

## 2023-12-26 RX ORDER — FLUOROURACIL 50 MG/ML
400 INJECTION, SOLUTION INTRAVENOUS
Status: CANCELLED | OUTPATIENT
Start: 2023-12-26

## 2023-12-26 RX ORDER — FLUOROURACIL 50 MG/ML
2400 INJECTION, SOLUTION INTRAVENOUS
Status: CANCELLED | OUTPATIENT
Start: 2023-12-26

## 2023-12-26 RX ORDER — HEPARIN 100 UNIT/ML
500 SYRINGE INTRAVENOUS
Status: CANCELLED | OUTPATIENT
Start: 2023-12-26

## 2023-12-26 RX ORDER — EPINEPHRINE 0.3 MG/.3ML
0.3 INJECTION SUBCUTANEOUS ONCE AS NEEDED
Status: CANCELLED | OUTPATIENT
Start: 2023-12-26

## 2023-12-26 RX ORDER — DIPHENHYDRAMINE HYDROCHLORIDE 50 MG/ML
50 INJECTION INTRAMUSCULAR; INTRAVENOUS ONCE AS NEEDED
Status: CANCELLED | OUTPATIENT
Start: 2023-12-26

## 2023-12-26 RX ORDER — FLUOROURACIL 50 MG/ML
900 INJECTION, SOLUTION INTRAVENOUS
Status: COMPLETED | OUTPATIENT
Start: 2023-12-26 | End: 2023-12-26

## 2023-12-26 RX ORDER — EPINEPHRINE 0.3 MG/.3ML
0.3 INJECTION SUBCUTANEOUS ONCE AS NEEDED
Status: DISCONTINUED | OUTPATIENT
Start: 2023-12-26 | End: 2023-12-26 | Stop reason: HOSPADM

## 2023-12-26 RX ADMIN — LEUCOVORIN CALCIUM 900 MG: 200 INJECTION, POWDER, LYOPHILIZED, FOR SOLUTION INTRAMUSCULAR; INTRAVENOUS at 09:12

## 2023-12-26 RX ADMIN — DEXTROSE MONOHYDRATE: 50 INJECTION, SOLUTION INTRAVENOUS at 08:12

## 2023-12-26 RX ADMIN — FLUOROURACIL 900 MG: 50 INJECTION, SOLUTION INTRAVENOUS at 11:12

## 2023-12-26 RX ADMIN — PALONOSETRON HYDROCHLORIDE 0.25 MG: 0.25 INJECTION INTRAVENOUS at 09:12

## 2023-12-26 RX ADMIN — OXALIPLATIN 145 MG: 5 INJECTION, SOLUTION INTRAVENOUS at 09:12

## 2023-12-26 RX ADMIN — SODIUM CHLORIDE: 9 INJECTION, SOLUTION INTRAVENOUS at 09:12

## 2023-12-26 RX ADMIN — FLUOROURACIL 5350 MG: 50 INJECTION, SOLUTION INTRAVENOUS at 12:12

## 2023-12-26 NOTE — PLAN OF CARE
C3 D1 Folfox given. Tolerated well. Pump started infusing [id number]. Instructed to return Thursday 12/28 at 1000 to remove pump and for IVF. Verbalized understanding. Dc'd home in stable condition.

## 2023-12-27 ENCOUNTER — INFUSION (OUTPATIENT)
Dept: INFUSION THERAPY | Facility: HOSPITAL | Age: 68
End: 2023-12-27
Attending: INTERNAL MEDICINE
Payer: MEDICARE

## 2023-12-27 VITALS
HEART RATE: 74 BPM | RESPIRATION RATE: 18 BRPM | BODY MASS INDEX: 40.4 KG/M2 | SYSTOLIC BLOOD PRESSURE: 121 MMHG | DIASTOLIC BLOOD PRESSURE: 77 MMHG | TEMPERATURE: 98 F | WEIGHT: 242.5 LBS | HEIGHT: 65 IN | OXYGEN SATURATION: 97 %

## 2023-12-27 DIAGNOSIS — D50.9 MICROCYTIC ANEMIA: ICD-10-CM

## 2023-12-27 DIAGNOSIS — D50.9 MICROCYTIC ANEMIA: Primary | ICD-10-CM

## 2023-12-27 DIAGNOSIS — C18.2 MALIGNANT NEOPLASM OF ASCENDING COLON: ICD-10-CM

## 2023-12-27 LAB
ABO + RH BLD: NORMAL
BLD PROD TYP BPU: NORMAL
BLOOD UNIT EXPIRATION DATE: NORMAL
BLOOD UNIT TYPE CODE: 9500
CROSSMATCH INTERPRETATION: NORMAL
DISPENSE STATUS: NORMAL
UNIT NUMBER: NORMAL

## 2023-12-27 PROCEDURE — 36415 COLL VENOUS BLD VENIPUNCTURE: CPT

## 2023-12-27 PROCEDURE — 86923 COMPATIBILITY TEST ELECTRIC: CPT | Performed by: NURSE PRACTITIONER

## 2023-12-27 PROCEDURE — P9040 RBC LEUKOREDUCED IRRADIATED: HCPCS | Performed by: NURSE PRACTITIONER

## 2023-12-27 PROCEDURE — 25000003 PHARM REV CODE 250: Performed by: NURSE PRACTITIONER

## 2023-12-27 PROCEDURE — 36430 TRANSFUSION BLD/BLD COMPNT: CPT

## 2023-12-27 RX ORDER — HYDROCODONE BITARTRATE AND ACETAMINOPHEN 500; 5 MG/1; MG/1
TABLET ORAL ONCE
Status: COMPLETED | OUTPATIENT
Start: 2023-12-27 | End: 2023-12-27

## 2023-12-27 RX ORDER — DIPHENHYDRAMINE HCL 25 MG
25 CAPSULE ORAL ONCE
Status: COMPLETED | OUTPATIENT
Start: 2023-12-27 | End: 2023-12-27

## 2023-12-27 RX ORDER — ACETAMINOPHEN 325 MG/1
650 TABLET ORAL ONCE
Status: COMPLETED | OUTPATIENT
Start: 2023-12-27 | End: 2023-12-27

## 2023-12-27 RX ADMIN — SODIUM CHLORIDE: 9 INJECTION, SOLUTION INTRAVENOUS at 08:12

## 2023-12-27 RX ADMIN — ACETAMINOPHEN 650 MG: 325 TABLET ORAL at 08:12

## 2023-12-27 RX ADMIN — DIPHENHYDRAMINE HYDROCHLORIDE 25 MG: 25 CAPSULE ORAL at 08:12

## 2023-12-28 ENCOUNTER — INFUSION (OUTPATIENT)
Dept: INFUSION THERAPY | Facility: HOSPITAL | Age: 68
End: 2023-12-28
Attending: INTERNAL MEDICINE
Payer: MEDICARE

## 2023-12-28 VITALS
DIASTOLIC BLOOD PRESSURE: 81 MMHG | OXYGEN SATURATION: 96 % | TEMPERATURE: 98 F | SYSTOLIC BLOOD PRESSURE: 114 MMHG | HEART RATE: 80 BPM

## 2023-12-28 DIAGNOSIS — C18.2 MALIGNANT NEOPLASM OF ASCENDING COLON: Primary | ICD-10-CM

## 2023-12-28 PROCEDURE — 25000003 PHARM REV CODE 250: Performed by: NURSE PRACTITIONER

## 2023-12-28 PROCEDURE — 96374 THER/PROPH/DIAG INJ IV PUSH: CPT

## 2023-12-28 PROCEDURE — A4216 STERILE WATER/SALINE, 10 ML: HCPCS | Performed by: NURSE PRACTITIONER

## 2023-12-28 PROCEDURE — 96361 HYDRATE IV INFUSION ADD-ON: CPT

## 2023-12-28 PROCEDURE — 63600175 PHARM REV CODE 636 W HCPCS: Performed by: NURSE PRACTITIONER

## 2023-12-28 RX ORDER — HEPARIN 100 UNIT/ML
500 SYRINGE INTRAVENOUS
Status: DISCONTINUED | OUTPATIENT
Start: 2023-12-28 | End: 2023-12-28 | Stop reason: HOSPADM

## 2023-12-28 RX ORDER — PROCHLORPERAZINE EDISYLATE 5 MG/ML
5 INJECTION INTRAMUSCULAR; INTRAVENOUS ONCE AS NEEDED
Status: CANCELLED | OUTPATIENT
Start: 2023-12-28

## 2023-12-28 RX ORDER — SODIUM CHLORIDE 0.9 % (FLUSH) 0.9 %
10 SYRINGE (ML) INJECTION
Status: DISCONTINUED | OUTPATIENT
Start: 2023-12-28 | End: 2023-12-28 | Stop reason: HOSPADM

## 2023-12-28 RX ORDER — HEPARIN 100 UNIT/ML
500 SYRINGE INTRAVENOUS
Status: CANCELLED | OUTPATIENT
Start: 2023-12-28

## 2023-12-28 RX ORDER — SODIUM CHLORIDE 9 MG/ML
INJECTION, SOLUTION INTRAVENOUS
Status: COMPLETED | OUTPATIENT
Start: 2023-12-28 | End: 2023-12-28

## 2023-12-28 RX ORDER — SODIUM CHLORIDE 0.9 % (FLUSH) 0.9 %
10 SYRINGE (ML) INJECTION
Status: CANCELLED | OUTPATIENT
Start: 2023-12-28

## 2023-12-28 RX ORDER — SODIUM CHLORIDE 9 MG/ML
INJECTION, SOLUTION INTRAVENOUS
Status: CANCELLED
Start: 2023-12-28 | End: 2023-12-28

## 2023-12-28 RX ORDER — PROCHLORPERAZINE EDISYLATE 5 MG/ML
5 INJECTION INTRAMUSCULAR; INTRAVENOUS ONCE AS NEEDED
Status: COMPLETED | OUTPATIENT
Start: 2023-12-28 | End: 2023-12-28

## 2023-12-28 RX ADMIN — Medication 10 ML: at 11:12

## 2023-12-28 RX ADMIN — HEPARIN 500 UNITS: 100 SYRINGE at 11:12

## 2023-12-28 RX ADMIN — SODIUM CHLORIDE: 9 INJECTION, SOLUTION INTRAVENOUS at 10:12

## 2023-12-28 RX ADMIN — PROCHLORPERAZINE EDISYLATE 5 MG: 5 INJECTION INTRAMUSCULAR; INTRAVENOUS at 10:12

## 2023-12-28 NOTE — PLAN OF CARE
Pt received c3 d3 1LNS/compazine/cadd; pt tolerated well and states nausea much better on departure.

## 2024-01-02 ENCOUNTER — LAB VISIT (OUTPATIENT)
Dept: LAB | Facility: HOSPITAL | Age: 69
End: 2024-01-02
Attending: INTERNAL MEDICINE
Payer: MEDICARE

## 2024-01-02 DIAGNOSIS — C18.2 MALIGNANT NEOPLASM OF ASCENDING COLON: ICD-10-CM

## 2024-01-02 LAB
ALBUMIN SERPL-MCNC: 3.7 G/DL (ref 3.4–4.8)
ALBUMIN/GLOB SERPL: 1.2 RATIO (ref 1.1–2)
ALP SERPL-CCNC: 110 UNIT/L (ref 40–150)
ALT SERPL-CCNC: 20 UNIT/L (ref 0–55)
AST SERPL-CCNC: 24 UNIT/L (ref 5–34)
BASOPHILS # BLD AUTO: 0.03 X10(3)/MCL
BASOPHILS NFR BLD AUTO: 0.8 %
BILIRUB SERPL-MCNC: 0.4 MG/DL
BUN SERPL-MCNC: 26.1 MG/DL (ref 9.8–20.1)
CALCIUM SERPL-MCNC: 8.7 MG/DL (ref 8.4–10.2)
CHLORIDE SERPL-SCNC: 106 MMOL/L (ref 98–107)
CO2 SERPL-SCNC: 27 MMOL/L (ref 23–31)
CREAT SERPL-MCNC: 1.6 MG/DL (ref 0.55–1.02)
EOSINOPHIL # BLD AUTO: 0.15 X10(3)/MCL (ref 0–0.9)
EOSINOPHIL NFR BLD AUTO: 4 %
ERYTHROCYTE [DISTWIDTH] IN BLOOD BY AUTOMATED COUNT: 20 % (ref 11.5–17)
GFR SERPLBLD CREATININE-BSD FMLA CKD-EPI: 35 MLS/MIN/1.73/M2
GLOBULIN SER-MCNC: 3.2 GM/DL (ref 2.4–3.5)
GLUCOSE SERPL-MCNC: 115 MG/DL (ref 82–115)
HCT VFR BLD AUTO: 31.7 % (ref 37–47)
HGB BLD-MCNC: 9.7 G/DL (ref 12–16)
IMM GRANULOCYTES # BLD AUTO: 0.05 X10(3)/MCL (ref 0–0.04)
IMM GRANULOCYTES NFR BLD AUTO: 1.3 %
LYMPHOCYTES # BLD AUTO: 1.4 X10(3)/MCL (ref 0.6–4.6)
LYMPHOCYTES NFR BLD AUTO: 37.6 %
MCH RBC QN AUTO: 23.2 PG (ref 27–31)
MCHC RBC AUTO-ENTMCNC: 30.6 G/DL (ref 33–36)
MCV RBC AUTO: 75.8 FL (ref 80–94)
MONOCYTES # BLD AUTO: 0.45 X10(3)/MCL (ref 0.1–1.3)
MONOCYTES NFR BLD AUTO: 12.1 %
NEUTROPHILS # BLD AUTO: 1.64 X10(3)/MCL (ref 2.1–9.2)
NEUTROPHILS NFR BLD AUTO: 44.2 %
PLATELET # BLD AUTO: 125 X10(3)/MCL (ref 130–400)
PMV BLD AUTO: ABNORMAL FL
POTASSIUM SERPL-SCNC: 4.7 MMOL/L (ref 3.5–5.1)
PROT SERPL-MCNC: 6.9 GM/DL (ref 5.8–7.6)
RBC # BLD AUTO: 4.18 X10(6)/MCL (ref 4.2–5.4)
SODIUM SERPL-SCNC: 139 MMOL/L (ref 136–145)
WBC # SPEC AUTO: 3.72 X10(3)/MCL (ref 4.5–11.5)

## 2024-01-02 PROCEDURE — 82378 CARCINOEMBRYONIC ANTIGEN: CPT

## 2024-01-02 PROCEDURE — 80053 COMPREHEN METABOLIC PANEL: CPT

## 2024-01-02 PROCEDURE — 36415 COLL VENOUS BLD VENIPUNCTURE: CPT

## 2024-01-02 PROCEDURE — 85025 COMPLETE CBC W/AUTO DIFF WBC: CPT

## 2024-01-03 DIAGNOSIS — C18.2 MALIGNANT NEOPLASM OF ASCENDING COLON: Primary | ICD-10-CM

## 2024-01-08 ENCOUNTER — PATIENT MESSAGE (OUTPATIENT)
Dept: ADMINISTRATIVE | Facility: HOSPITAL | Age: 69
End: 2024-01-08
Payer: MEDICARE

## 2024-01-08 ENCOUNTER — OFFICE VISIT (OUTPATIENT)
Dept: HEMATOLOGY/ONCOLOGY | Facility: CLINIC | Age: 69
End: 2024-01-08
Payer: MEDICARE

## 2024-01-08 ENCOUNTER — LAB VISIT (OUTPATIENT)
Dept: LAB | Facility: HOSPITAL | Age: 69
End: 2024-01-08
Attending: INTERNAL MEDICINE
Payer: MEDICARE

## 2024-01-08 VITALS
DIASTOLIC BLOOD PRESSURE: 75 MMHG | SYSTOLIC BLOOD PRESSURE: 117 MMHG | OXYGEN SATURATION: 98 % | RESPIRATION RATE: 20 BRPM | BODY MASS INDEX: 39.26 KG/M2 | TEMPERATURE: 98 F | WEIGHT: 235.63 LBS | HEIGHT: 65 IN | HEART RATE: 83 BPM

## 2024-01-08 DIAGNOSIS — C18.2 MALIGNANT NEOPLASM OF ASCENDING COLON: Primary | ICD-10-CM

## 2024-01-08 DIAGNOSIS — E11.42 DIABETIC POLYNEUROPATHY ASSOCIATED WITH TYPE 2 DIABETES MELLITUS: ICD-10-CM

## 2024-01-08 DIAGNOSIS — C18.2 MALIGNANT NEOPLASM OF ASCENDING COLON: ICD-10-CM

## 2024-01-08 DIAGNOSIS — D50.9 MICROCYTIC ANEMIA: ICD-10-CM

## 2024-01-08 LAB
ALBUMIN SERPL-MCNC: 3.5 G/DL (ref 3.4–4.8)
ALBUMIN/GLOB SERPL: 1 RATIO (ref 1.1–2)
ALP SERPL-CCNC: 126 UNIT/L (ref 40–150)
ALT SERPL-CCNC: 17 UNIT/L (ref 0–55)
AST SERPL-CCNC: 19 UNIT/L (ref 5–34)
BASOPHILS # BLD AUTO: 0.02 X10(3)/MCL
BASOPHILS NFR BLD AUTO: 0.3 %
BILIRUB SERPL-MCNC: 0.4 MG/DL
BUN SERPL-MCNC: 26.6 MG/DL (ref 9.8–20.1)
CALCIUM SERPL-MCNC: 9.1 MG/DL (ref 8.4–10.2)
CEA SERPL-MCNC: 5.18 NG/ML (ref 0–3)
CHLORIDE SERPL-SCNC: 100 MMOL/L (ref 98–107)
CO2 SERPL-SCNC: 26 MMOL/L (ref 23–31)
CREAT SERPL-MCNC: 1.65 MG/DL (ref 0.55–1.02)
EOSINOPHIL # BLD AUTO: 0.21 X10(3)/MCL (ref 0–0.9)
EOSINOPHIL NFR BLD AUTO: 3.4 %
ERYTHROCYTE [DISTWIDTH] IN BLOOD BY AUTOMATED COUNT: 21.7 % (ref 11.5–17)
GFR SERPLBLD CREATININE-BSD FMLA CKD-EPI: 34 MLS/MIN/1.73/M2
GLOBULIN SER-MCNC: 3.5 GM/DL (ref 2.4–3.5)
GLUCOSE SERPL-MCNC: 154 MG/DL (ref 82–115)
HCT VFR BLD AUTO: 32.9 % (ref 37–47)
HGB BLD-MCNC: 10 G/DL (ref 12–16)
IMM GRANULOCYTES # BLD AUTO: 0.02 X10(3)/MCL (ref 0–0.04)
IMM GRANULOCYTES NFR BLD AUTO: 0.3 %
LYMPHOCYTES # BLD AUTO: 1.15 X10(3)/MCL (ref 0.6–4.6)
LYMPHOCYTES NFR BLD AUTO: 18.6 %
MCH RBC QN AUTO: 23.5 PG (ref 27–31)
MCHC RBC AUTO-ENTMCNC: 30.4 G/DL (ref 33–36)
MCV RBC AUTO: 77.4 FL (ref 80–94)
MONOCYTES # BLD AUTO: 0.56 X10(3)/MCL (ref 0.1–1.3)
MONOCYTES NFR BLD AUTO: 9.1 %
NEUTROPHILS # BLD AUTO: 4.21 X10(3)/MCL (ref 2.1–9.2)
NEUTROPHILS NFR BLD AUTO: 68.3 %
PLATELET # BLD AUTO: 101 X10(3)/MCL (ref 130–400)
PMV BLD AUTO: ABNORMAL FL
POTASSIUM SERPL-SCNC: 4.6 MMOL/L (ref 3.5–5.1)
PROT SERPL-MCNC: 7 GM/DL (ref 5.8–7.6)
RBC # BLD AUTO: 4.25 X10(6)/MCL (ref 4.2–5.4)
SODIUM SERPL-SCNC: 135 MMOL/L (ref 136–145)
WBC # SPEC AUTO: 6.17 X10(3)/MCL (ref 4.5–11.5)

## 2024-01-08 PROCEDURE — 3288F FALL RISK ASSESSMENT DOCD: CPT | Mod: CPTII,S$GLB,, | Performed by: NURSE PRACTITIONER

## 2024-01-08 PROCEDURE — 1159F MED LIST DOCD IN RCRD: CPT | Mod: CPTII,S$GLB,, | Performed by: NURSE PRACTITIONER

## 2024-01-08 PROCEDURE — 99215 OFFICE O/P EST HI 40 MIN: CPT | Mod: S$GLB,,, | Performed by: NURSE PRACTITIONER

## 2024-01-08 PROCEDURE — 85025 COMPLETE CBC W/AUTO DIFF WBC: CPT

## 2024-01-08 PROCEDURE — 3078F DIAST BP <80 MM HG: CPT | Mod: CPTII,S$GLB,, | Performed by: NURSE PRACTITIONER

## 2024-01-08 PROCEDURE — 1126F AMNT PAIN NOTED NONE PRSNT: CPT | Mod: CPTII,S$GLB,, | Performed by: NURSE PRACTITIONER

## 2024-01-08 PROCEDURE — 1101F PT FALLS ASSESS-DOCD LE1/YR: CPT | Mod: CPTII,S$GLB,, | Performed by: NURSE PRACTITIONER

## 2024-01-08 PROCEDURE — 80053 COMPREHEN METABOLIC PANEL: CPT

## 2024-01-08 PROCEDURE — 99999 PR PBB SHADOW E&M-EST. PATIENT-LVL IV: CPT | Mod: PBBFAC,,, | Performed by: NURSE PRACTITIONER

## 2024-01-08 PROCEDURE — 82378 CARCINOEMBRYONIC ANTIGEN: CPT

## 2024-01-08 PROCEDURE — 36415 COLL VENOUS BLD VENIPUNCTURE: CPT

## 2024-01-08 PROCEDURE — 3074F SYST BP LT 130 MM HG: CPT | Mod: CPTII,S$GLB,, | Performed by: NURSE PRACTITIONER

## 2024-01-08 PROCEDURE — 3008F BODY MASS INDEX DOCD: CPT | Mod: CPTII,S$GLB,, | Performed by: NURSE PRACTITIONER

## 2024-01-08 RX ORDER — PROCHLORPERAZINE EDISYLATE 5 MG/ML
5 INJECTION INTRAMUSCULAR; INTRAVENOUS ONCE AS NEEDED
Status: CANCELLED | OUTPATIENT
Start: 2024-01-11

## 2024-01-08 RX ORDER — HEPARIN 100 UNIT/ML
500 SYRINGE INTRAVENOUS
Status: CANCELLED | OUTPATIENT
Start: 2024-01-11

## 2024-01-08 RX ORDER — SODIUM CHLORIDE 0.9 % (FLUSH) 0.9 %
10 SYRINGE (ML) INJECTION
Status: CANCELLED | OUTPATIENT
Start: 2024-01-11

## 2024-01-08 RX ORDER — FLUOROURACIL 50 MG/ML
400 INJECTION, SOLUTION INTRAVENOUS
Status: CANCELLED | OUTPATIENT
Start: 2024-01-09

## 2024-01-08 RX ORDER — PROCHLORPERAZINE EDISYLATE 5 MG/ML
5 INJECTION INTRAMUSCULAR; INTRAVENOUS ONCE AS NEEDED
Status: CANCELLED | OUTPATIENT
Start: 2024-01-09

## 2024-01-08 RX ORDER — SODIUM CHLORIDE 9 MG/ML
INJECTION, SOLUTION INTRAVENOUS
Status: CANCELLED
Start: 2024-01-11 | End: 2024-01-11

## 2024-01-08 RX ORDER — FLUOROURACIL 50 MG/ML
2400 INJECTION, SOLUTION INTRAVENOUS
Status: CANCELLED | OUTPATIENT
Start: 2024-01-09

## 2024-01-08 RX ORDER — DIPHENHYDRAMINE HYDROCHLORIDE 50 MG/ML
50 INJECTION, SOLUTION INTRAMUSCULAR; INTRAVENOUS ONCE AS NEEDED
Status: CANCELLED | OUTPATIENT
Start: 2024-01-09

## 2024-01-08 RX ORDER — HEPARIN 100 UNIT/ML
500 SYRINGE INTRAVENOUS
Status: CANCELLED | OUTPATIENT
Start: 2024-01-09

## 2024-01-08 RX ORDER — EPINEPHRINE 0.3 MG/.3ML
0.3 INJECTION SUBCUTANEOUS ONCE AS NEEDED
Status: CANCELLED | OUTPATIENT
Start: 2024-01-09

## 2024-01-08 RX ORDER — SODIUM CHLORIDE 0.9 % (FLUSH) 0.9 %
10 SYRINGE (ML) INJECTION
Status: CANCELLED | OUTPATIENT
Start: 2024-01-09

## 2024-01-08 NOTE — PROGRESS NOTES
HEMATOLOGY/ONCOLOGY OFFICE CLINIC VISIT    Visit Information:    Initial Evaluation: 4/22/2022  Referring Provider: Maggy Jaquez NP  Other providers: Dr Timothy Russ  Code status: Not addressed     Diagnosis/Problem list:   pT3 N2a Mx Stage IIIB Colon cancer. Dx 10/19/23  Microcytic anemia.   Folate deficiency     Present Treatment:  FOLFOX with dose reduction on Oxaliplatin due to neuropathy. Started on 11/28/23.    Treatment history:  10/19/2023 Lap/jaswinder right colon resection         Imaging studies:  CT C/A/P 6/3/2022: There is no significant hepatic steatosis.  The liver is cirrhotic.  No liver lesion is identified.  The spleen is enlarged, measuring 15.5 cm in length.  There is no retroperitoneal lymphadenopathy or abdominal aortic aneurysm.  A mildly prominent right external iliac lymph node measures 9 mm in short axis, nonspecific.  This is also similar in appearance when compared to 2015. Impression: Cirrhosis. Splenomegaly.  CT CAP 9/11/2023: There is no supraclavicular or axillary lymphadenopathy.  No mediastinal adenopathy. 3 mm right upper lobe pulmonary nodule. There are few additional pulmonary micronodules in the lung apices measuring no greater than 1-2 mm.  Findings similar to prior exam.     Impression: Scattered pulmonary nodules in the upper lobe similar to prior.  No further follow-up acute according to Fleischner criteria.  No convincing evidence for metastatic disease. Nodular appearing liver, correlation for cirrhosis.    Pathology:  10/19/2023:  COLON, RIGHT HEMICOLECTOMY (1.5 CM OF TERMINAL ILEUM, 25 CM LENGTH OF RIGHT COLON): POORLY DIFFERENTIATED ADENOCARCINOMA WITH FOCAL SIGNET RING CELL FEATURES, UNIFOCAL, RIGHT COLON, 8 CM GREATEST DIMENSION.   THE TUMOR INVADES THROUGH THE MUSCULARIS PROPRIA INTO PERICOLONIC TISSUE (PT3).   FOCAL LYMPHVASCULAR INVASION IS PRESENT.   THE SURGICAL MARGINS ARE FREE OF TUMOR.   METASTATIC ADENOCARCINOMA IS IDENTIFIED IN FOUR (4) OF TWENTY-SEVEN (27)    PERICOLONIC LYMPH NODES (LARGEST METASTATIC DEPOSIT 8 MM; FOCAL EXTRACAPSULAR EXTENSION OF TUMOR IS PRESENT).      PATHOLOGIC STAGING:  pT3 N2a Mx     Histologic Grade:  G3, poorly differentiated   Macroscopic Tumor Perforation:  Not identified   Lymphovascular Invasion:  Small vessel   Perineural Invasion:  Not identified        CLINICAL HISTORY:       Patient: Indu Azul is a 68 y.o. female. with multiple medical problems that I saw originally on 2022 for thrombocytopenia.  Patient platelet counts on 2021 were 132,000 and since that that has been slowly trending down.  2021 platelets 132,000  2022 platelets 121,000  2022 platelets 115,000     Of note patient have a UA done that same day that suggest possible UTI with positive nitrates, 1+ leukocytes and many bacteria.  Urine culture with E. coli.   Reviewing electronic medical record and going back to 12/15/2014 patient with occasional low platelets.  They were never lower than 100,000 and they always normalized.   As per patient in 2020 when her platelets were slightly decreased (117,000), this was shortly after she has her left foot surgery.  Then in May 17 and May 18 2021, platelets were 105k and 101k,  she had COVID.  Again in 2022 she have a UTI for which she completed antibiotics.     Patient's father diagnosed Blood disorder requiring blood transfusion that started q 4 weeks and the q week. He was diagnosed on his 80's but  at 91. Sister and niece had ovarian cancer. Sister  of it. Niece still alive.     She was found to have iron deficiency anemia.  EGD performed, grade I-II esophageal varices found, too small to band. She also had Colonoscopy by Dr Hammonds that showed an ulcerated malignant-appearing partially obstructing medium sized mass in the ascending colon.  She reports that she had a colonoscopy about 5 years ago at [facility] and everything was fine, no polyps or masses.   Biopsy and tattoo of the  "mass showed fragments of colonic mucosa, no evidence of dysplasia or malignancy.  Two other polyps were completely removed in the descending and sigmoid colon, pathology returned hyperplastic polyps.  CT abd/pel with no acute findings.     Despite of biopsy because of malignant-appearing partially obstructing mass of the ascending colon and photographs and description were consistent with colon cancer she underwent Lap/jaswinder right colon resection on 10/19/2023 by Dr Timothy Russ. Path c/w really differentiated adeno carcinoma.         Chief Complaint: Follow-up (No concerns today.)      Interval History:    24: Patient presents today for labs and TD prior to C4 of FOLFOX with dose reduction of Oxaliplatin due to neuropathy, accompanied by her son.  Tolerated C3 pretty well. Nausea improved with Compazine IV and IVFs on day of "take down". No fever, chills or sweats. No worsening in neuropathy as of yet.        Past Medical History:   Diagnosis Date    Anesthesia complication     trouble awakening    Charcot's joint of foot, left     Chronic kidney disease, unspecified     Cirrhosis of liver without ascites     Depression     Diabetes mellitus, type II, insulin dependent     Diabetic neuropathy     GERD (gastroesophageal reflux disease)     H/O: hysterectomy     Hepatic steatosis     Malignant neoplasm of ascending colon     Obesity, unspecified     Overactive bladder     Thrombocytopenia, unspecified     Vitamin D deficiency       Past Surgical History:   Procedure Laterality Date    APPENDECTOMY      BREAST BIOPSY  2016     SECTION      x3    charcot-leyden crystals identification      CHOLECYSTECTOMY      COLONOSCOPY N/A 2023    Procedure: COLON;  Surgeon: Luis Amador MD;  Location: [facility] ENDOSCOPY;  Service: Gastroenterology;  Laterality: N/A;    COLONOSCOPY, WITH 1 OR MORE BIOPSIES  2023    Procedure: COLONOSCOPY, WITH 1 OR MORE BIOPSIES;  Surgeon: Luis Amador " MD STEPHEN;  Location: [facility] ENDOSCOPY;  Service: Gastroenterology;;    COLONOSCOPY, WITH DIRECTED SUBMUCOSAL INJECTION  08/28/2023    Procedure: COLONOSCOPY, WITH DIRECTED SUBMUCOSAL INJECTION;  Surgeon: Luis Amador MD;  Location: [facility] ENDOSCOPY;  Service: Gastroenterology;;  8cc Colon Tattoo    COLONOSCOPY, WITH POLYPECTOMY USING SNARE  08/28/2023    Procedure: COLONOSCOPY, WITH POLYPECTOMY USING SNARE;  Surgeon: Luis Amador MD;  Location: [facility] ENDOSCOPY;  Service: Gastroenterology;;    ESOPHAGOGASTRODUODENOSCOPY N/A 08/28/2023    Procedure: DOUBLE;  Surgeon: Luis Amador MD;  Location: [facility] ENDOSCOPY;  Service: Gastroenterology;  Laterality: N/A;    HEMORRHOID SURGERY      HYSTERECTOMY  1990    total    INSERTION OF TUNNELED CENTRAL VENOUS CATHETER (CVC) WITH SUBCUTANEOUS PORT Right 11/20/2023    Procedure: [id number];  Surgeon: Timothy Russ MD;  Location: [facility] OR;  Service: General;  Laterality: Right;    right foot surgery Right 02/01/2022    XI ROBOTIC COLECTOMY, RIGHT N/A 10/19/2023    Procedure: XI ROBOTIC COLECTOMY, RIGHT;  Surgeon: Timothy Russ MD;  Location: [facility];  Service: General;  Laterality: N/A;     Family History   Problem Relation Age of Onset    Diabetes Mother     Alzheimer's disease Mother     Diabetes Father     Leukemia Father     Diabetes Sister     Liver cancer Sister     Diabetes Brother      Social Connections: Not on file       Review of patient's allergies indicates:  No Known Allergies   Current Outpatient Medications on File Prior to Visit   Medication Sig Dispense Refill    dulaglutide (TRULICITY) 3 mg/0.5 mL pen injector Inject 3 mg into the skin every 7 days. 4 pen 11    ferrous sulfate (FEOSOL) 325 mg (65 mg iron) Tab tablet Take 1 tablet (325 mg total) by mouth 2 (two) times daily. 60 tablet 11    folic acid (FOLVITE) 1 MG tablet Take 1 tablet (1 mg total) by mouth once daily. 100 tablet 3    furosemide  (LASIX) 40 MG tablet Take 40 mg by mouth as needed.      gabapentin (NEURONTIN) 300 MG capsule Take 2 capsules (600 mg total) by mouth every evening. 60 capsule 5    HYDROcodone-acetaminophen (NORCO) 5-325 mg per tablet Take 1 tablet by mouth every 6 (six) hours as needed for Pain. 15 tablet 0    insulin glargine, TOUJEO, (TOUJEO SOLOSTAR U-300 INSULIN) 300 unit/mL (1.5 mL) InPn pen INJECT 79 UNITS SUBCUTANEOUSLY ONCE DAILY 6 mL 11    insulin syringe-needle U-100 1 mL 31 gauge x 5/16 Syrg Inject 1 Syringe into the skin 3 (three) times daily with meals.      levoFLOXacin (LEVAQUIN) 500 MG tablet Take 1 tablet (500 mg total) by mouth once daily. 7 tablet 0    LYUMJEV KWIKPEN U-200 INSULIN 200 unit/mL (3 mL) pen Sliding scale      OLANZapine (ZYPREXA) 5 MG tablet Take 1 tablet (5 mg total) by mouth every evening. Take nightly on days 1-3 of each chemotherapy cycle. 6 tablet 5    ondansetron (ZOFRAN-ODT) 8 MG TbDL Take 1 tablet (8 mg total) by mouth every 6 (six) hours as needed. 10 tablet 0    prochlorperazine (COMPAZINE) 5 MG tablet Take 5 mg by mouth every 6 (six) hours as needed.      sertraline (ZOLOFT) 50 MG tablet Take 1 tablet by mouth once daily 90 tablet 0    solifenacin (VESICARE) 10 MG tablet Take 10 mg by mouth once daily.      spironolactone (ALDACTONE) 50 MG tablet Take 50 mg by mouth once daily.      k phos di & mono-sod phos mono (K-PHOS-NEUTRAL) 250 mg Tab Take 1 tablet by mouth 2 (two) times a day. for 2 days 4 tablet 0     Current Facility-Administered Medications on File Prior to Visit   Medication Dose Route Frequency Provider Last Rate Last Admin    promethazine (PHENERGAN) 12.5 mg in dextrose 5 % (D5W) 50 mL IVPB  12.5 mg Intravenous Once Gayatri Jaffe, HOLLYP          Review of Systems   Constitutional:  Positive for fatigue. Negative for activity change, appetite change, chills, fever and unexpected weight change.   HENT:  Negative for mouth dryness, mouth sores, nosebleeds, sore throat and  "trouble swallowing.    Eyes:  Negative for visual disturbance.   Respiratory:  Negative for cough and shortness of breath.    Cardiovascular:  Negative for chest pain, palpitations and leg swelling.   Gastrointestinal:  Positive for nausea. Negative for abdominal distention, abdominal pain, blood in stool, change in bowel habit, constipation, diarrhea and vomiting.   Endocrine: Negative.    Genitourinary:  Negative for dysuria, frequency, hematuria and urgency.   Musculoskeletal:  Negative for arthralgias, back pain, myalgias and neck pain.   Integumentary:  Negative for rash and wound.   Neurological:  Negative for dizziness, tremors, syncope, speech difficulty, weakness, light-headedness, numbness, headaches and memory loss.   Hematological:  Does not bruise/bleed easily.   Psychiatric/Behavioral:  Negative for confusion and suicidal ideas.               Vitals:    01/08/24 0852   BP: 117/75   BP Location: Right arm   Patient Position: Sitting   Pulse: 83   Resp: 20   Temp: 97.9 °F (36.6 °C)   TempSrc: Oral   SpO2: 98%   Weight: 106.9 kg (235 lb 9.6 oz)   Height: 5' 5" (1.651 m)              Wt Readings from Last 6 Encounters:   01/08/24 106.9 kg (235 lb 9.6 oz)   12/27/23 110 kg (242 lb 8.1 oz)   12/26/23 110 kg (242 lb 8.1 oz)   12/22/23 110 kg (242 lb 9.6 oz)   12/19/23 107.7 kg (237 lb 6.4 oz)   12/14/23 108 kg (238 lb 3.2 oz)     Body mass index is 39.21 kg/m².  Body surface area is 2.21 meters squared.  Physical Exam  Vitals and nursing note reviewed.   Constitutional:       General: She is not in acute distress.     Appearance: Normal appearance. She is obese.      Comments: Laparoscopic incisions well healed    HENT:      Head: Normocephalic and atraumatic.      Mouth/Throat:      Mouth: Mucous membranes are moist.   Eyes:      General: No scleral icterus.     Extraocular Movements: Extraocular movements intact.      Conjunctiva/sclera: Conjunctivae normal.      Pupils: Pupils are equal, round, and " reactive to light.   Neck:      Vascular: No JVD.   Cardiovascular:      Rate and Rhythm: Normal rate and regular rhythm.      Heart sounds: No murmur heard.  Pulmonary:      Effort: Pulmonary effort is normal.      Breath sounds: Normal breath sounds. No wheezing or rhonchi.   Chest:      Comments: Right chest wall mediport in place.    Abdominal:      General: Bowel sounds are normal. There is no distension.      Palpations: Abdomen is soft. There is no mass.      Tenderness: There is no abdominal tenderness.      Comments: Surgical incisions healing after colon surgery.    Musculoskeletal:         General: No swelling or deformity.      Cervical back: Neck supple.   Lymphadenopathy:      Head:      Right side of head: No submandibular adenopathy.      Left side of head: No submandibular adenopathy.      Cervical: No cervical adenopathy.      Upper Body:      Right upper body: No supraclavicular or axillary adenopathy.      Left upper body: No supraclavicular or axillary adenopathy.      Lower Body: No right inguinal adenopathy. No left inguinal adenopathy.   Skin:     General: Skin is warm.      Coloration: Skin is not jaundiced.      Findings: No lesion or rash.      Nails: There is no clubbing.   Neurological:      General: No focal deficit present.      Mental Status: She is alert and oriented to person, place, and time.      Cranial Nerves: Cranial nerves 2-12 are intact.   Psychiatric:         Attention and Perception: Attention normal.         Mood and Affect: Mood is depressed.         Behavior: Behavior is cooperative.         Judgment: Judgment normal.       ECOG SCORE    1 - Restricted in strenuous activity-ambulatory and able to carry out work of a light nature         Laboratory:  CBC with Differential:  Lab Results   Component Value Date    WBC 6.17 01/08/2024    RBC 4.25 01/08/2024    HGB 10.0 (L) 01/08/2024    HCT 32.9 (L) 01/08/2024    MCV 77.4 (L) 01/08/2024    MCH 23.5 (L) 01/08/2024    MCHC  30.4 (L) 01/08/2024    RDW 21.7 (H) 01/08/2024     (L) 01/08/2024    MPV  01/08/2024      Comment:      N/A          CMP:  Sodium   Date Value Ref Range Status   06/01/2022 140 136 - 145 mmol/L Final     Sodium Level   Date Value Ref Range Status   01/08/2024 135 (L) 136 - 145 mmol/L Final     Potassium   Date Value Ref Range Status   06/01/2022 4.9 3.5 - 5.1 mmol/L Final     Potassium Level   Date Value Ref Range Status   01/08/2024 4.6 3.5 - 5.1 mmol/L Final     Chloride   Date Value Ref Range Status   06/01/2022 104 100 - 109 mmol/L Final     Carbon Dioxide   Date Value Ref Range Status   01/08/2024 26 23 - 31 mmol/L Final   06/01/2022 29 22 - 33 mmol/L Final     Blood Urea Nitrogen   Date Value Ref Range Status   01/08/2024 26.6 (H) 9.8 - 20.1 mg/dL Final   06/01/2022 22 5 - 25 mg/dL Final     Creatinine   Date Value Ref Range Status   01/08/2024 1.65 (H) 0.55 - 1.02 mg/dL Final   06/01/2022 1.32 (H) 0.57 - 1.25 mg/dL Final     Calcium   Date Value Ref Range Status   06/01/2022 8.7 (L) 8.8 - 10.6 mg/dL Final     Calcium Level Total   Date Value Ref Range Status   01/08/2024 9.1 8.4 - 10.2 mg/dL Final     Albumin Level   Date Value Ref Range Status   01/08/2024 3.5 3.4 - 4.8 g/dL Final     Bilirubin Total   Date Value Ref Range Status   01/08/2024 0.4 <=1.5 mg/dL Final     Alkaline Phosphatase   Date Value Ref Range Status   01/08/2024 126 40 - 150 unit/L Final     Aspartate Aminotransferase   Date Value Ref Range Status   01/08/2024 19 5 - 34 unit/L Final     Alanine Aminotransferase   Date Value Ref Range Status   01/08/2024 17 0 - 55 unit/L Final     Anion Gap   Date Value Ref Range Status   06/01/2022 7 (L) 8 - 16 mmol/L Final     eGFR    Date Value Ref Range Status   06/01/2022 45 mL/min/1.73mSq Final     Comment:     In accordance with NKF-ASN Task Force recommendation, calculation based on the Chronic Kidney Disease Epidemiology Collaboration (CKD-EPI) equation without adjustment for  race. eGFR adjusted for gender and age and calculated in ml/min/1.73mSquared. eGFR cannot be calculated if patient is under 18 years of age.     Reference Range:   >= 60 ml/min/1.73mSquared.     Estimated GFR-Non    Date Value Ref Range Status   08/04/2022 43 mls/min/1.73/m2 Final         Assessment:       1. Malignant neoplasm of ascending colon    2. Microcytic anemia    3. Diabetic polyneuropathy associated with type 2 diabetes mellitus            1) Stage IIIB adenocarcinoma of the ascending colon  --Patient will benefit of adjuvant chemotherapy.   --Due to severe diabetic neuropathy, will try dose reduction of Oxaliplatin, starting 65 mg/m2.    --If not tolerated, then with d/c Oxaliplatin and cont 5FU and LV  2) Lung nodules-Stable. Will monitor  3) iron deficiency anemia-on iron PO  4) Thrombocytopenia-resolved. Will follow counts closely as she has splenomegaly and this can affects her counts mostly platelets.  5) Folate deficiency-she will start taking folic acid at this time.   6) Diabetic neuropathy-severe. She does not feel her feet.  --She will have nerve stimulator placement to see if can help her feet     Educated the patient on the risks versus benefits as well as toxicities associated with treatment.  Verbally consented the patient to the treatment plan and the patient was educated on the planned duration of the treatment and schedule of the treatment administration.  All questions were answered.      Plan:       Plan: Adjuvant FOLFOX x 6 months,with dose reduction of oxaliplatin due to severe (Grade 3) neuropathy. If not tolerated, then with d/c Oxaliplatin and cont 5FU and LV. Neuropathy same.    Okay to proceed with C4 tomorrow,  1/9/24.   Continue folate and iron by mouth  Keep follow ups with other physicians    Weekly CBC, CMP - standing order placed  RTC in 2 weeks with for TD/labs: CBC, CMP, CEA,- chemo next day    Encouraged to call with questions or problems  The patient  was given ample opportunity to ask questions and they were all answered to satisfaction; patient demonstrated understanding of what we discussed and is agreeable to the plan.     GHADA DegrootP

## 2024-01-09 ENCOUNTER — PATIENT MESSAGE (OUTPATIENT)
Dept: HEMATOLOGY/ONCOLOGY | Facility: CLINIC | Age: 69
End: 2024-01-09
Payer: MEDICARE

## 2024-01-09 ENCOUNTER — INFUSION (OUTPATIENT)
Dept: INFUSION THERAPY | Facility: HOSPITAL | Age: 69
End: 2024-01-09
Attending: INTERNAL MEDICINE
Payer: MEDICARE

## 2024-01-09 VITALS
DIASTOLIC BLOOD PRESSURE: 75 MMHG | RESPIRATION RATE: 20 BRPM | OXYGEN SATURATION: 99 % | TEMPERATURE: 98 F | HEIGHT: 65 IN | BODY MASS INDEX: 39.26 KG/M2 | SYSTOLIC BLOOD PRESSURE: 140 MMHG | WEIGHT: 235.63 LBS | HEART RATE: 91 BPM

## 2024-01-09 DIAGNOSIS — C18.2 MALIGNANT NEOPLASM OF ASCENDING COLON: Primary | ICD-10-CM

## 2024-01-09 PROCEDURE — 96367 TX/PROPH/DG ADDL SEQ IV INF: CPT

## 2024-01-09 PROCEDURE — 25000003 PHARM REV CODE 250: Performed by: NURSE PRACTITIONER

## 2024-01-09 PROCEDURE — 96413 CHEMO IV INFUSION 1 HR: CPT

## 2024-01-09 PROCEDURE — 96415 CHEMO IV INFUSION ADDL HR: CPT

## 2024-01-09 PROCEDURE — 96416 CHEMO PROLONG INFUSE W/PUMP: CPT

## 2024-01-09 PROCEDURE — 63600175 PHARM REV CODE 636 W HCPCS: Performed by: NURSE PRACTITIONER

## 2024-01-09 PROCEDURE — 96411 CHEMO IV PUSH ADDL DRUG: CPT

## 2024-01-09 PROCEDURE — 96368 THER/DIAG CONCURRENT INF: CPT

## 2024-01-09 RX ORDER — FLUOROURACIL 50 MG/ML
900 INJECTION, SOLUTION INTRAVENOUS
Status: COMPLETED | OUTPATIENT
Start: 2024-01-09 | End: 2024-01-09

## 2024-01-09 RX ORDER — SODIUM CHLORIDE 0.9 % (FLUSH) 0.9 %
10 SYRINGE (ML) INJECTION
Status: DISCONTINUED | OUTPATIENT
Start: 2024-01-09 | End: 2024-01-09 | Stop reason: HOSPADM

## 2024-01-09 RX ORDER — EPINEPHRINE 0.3 MG/.3ML
0.3 INJECTION SUBCUTANEOUS ONCE AS NEEDED
Status: DISCONTINUED | OUTPATIENT
Start: 2024-01-09 | End: 2024-01-09 | Stop reason: HOSPADM

## 2024-01-09 RX ORDER — DIPHENHYDRAMINE HYDROCHLORIDE 50 MG/ML
50 INJECTION, SOLUTION INTRAMUSCULAR; INTRAVENOUS ONCE AS NEEDED
Status: DISCONTINUED | OUTPATIENT
Start: 2024-01-09 | End: 2024-01-09 | Stop reason: HOSPADM

## 2024-01-09 RX ORDER — HEPARIN 100 UNIT/ML
500 SYRINGE INTRAVENOUS
Status: DISCONTINUED | OUTPATIENT
Start: 2024-01-09 | End: 2024-01-09 | Stop reason: HOSPADM

## 2024-01-09 RX ORDER — PROCHLORPERAZINE EDISYLATE 5 MG/ML
5 INJECTION INTRAMUSCULAR; INTRAVENOUS ONCE AS NEEDED
Status: DISCONTINUED | OUTPATIENT
Start: 2024-01-09 | End: 2024-01-09 | Stop reason: HOSPADM

## 2024-01-09 RX ADMIN — FLUOROURACIL 5350 MG: 50 INJECTION, SOLUTION INTRAVENOUS at 01:01

## 2024-01-09 RX ADMIN — DEXTROSE MONOHYDRATE 145 MG: 5 INJECTION, SOLUTION INTRAVENOUS at 10:01

## 2024-01-09 RX ADMIN — SODIUM CHLORIDE: 9 INJECTION, SOLUTION INTRAVENOUS at 10:01

## 2024-01-09 RX ADMIN — DEXTROSE MONOHYDRATE: 50 INJECTION, SOLUTION INTRAVENOUS at 10:01

## 2024-01-09 RX ADMIN — LEUCOVORIN CALCIUM 900 MG: 200 INJECTION, POWDER, LYOPHILIZED, FOR SOLUTION INTRAMUSCULAR; INTRAVENOUS at 10:01

## 2024-01-09 RX ADMIN — PALONOSETRON 0.25 MG: 0.25 INJECTION, SOLUTION INTRAVENOUS at 10:01

## 2024-01-09 RX ADMIN — FLUOROURACIL 900 MG: 50 INJECTION, SOLUTION INTRAVENOUS at 01:01

## 2024-01-09 NOTE — PLAN OF CARE
Plan of care reviewed with patient; patient in agreement. C4D1 completed. Appts reviewed and printed for patient.

## 2024-01-11 ENCOUNTER — INFUSION (OUTPATIENT)
Dept: INFUSION THERAPY | Facility: HOSPITAL | Age: 69
End: 2024-01-11
Attending: INTERNAL MEDICINE
Payer: MEDICARE

## 2024-01-11 ENCOUNTER — PATIENT OUTREACH (OUTPATIENT)
Dept: ADMINISTRATIVE | Facility: HOSPITAL | Age: 69
End: 2024-01-11
Payer: MEDICARE

## 2024-01-11 VITALS
DIASTOLIC BLOOD PRESSURE: 72 MMHG | TEMPERATURE: 98 F | OXYGEN SATURATION: 98 % | HEIGHT: 65 IN | HEART RATE: 92 BPM | BODY MASS INDEX: 39.22 KG/M2 | WEIGHT: 235.44 LBS | RESPIRATION RATE: 18 BRPM | SYSTOLIC BLOOD PRESSURE: 110 MMHG

## 2024-01-11 DIAGNOSIS — Z79.4 TYPE 2 DIABETES MELLITUS WITH HYPERGLYCEMIA, WITH LONG-TERM CURRENT USE OF INSULIN: Primary | ICD-10-CM

## 2024-01-11 DIAGNOSIS — C18.2 MALIGNANT NEOPLASM OF ASCENDING COLON: Primary | ICD-10-CM

## 2024-01-11 DIAGNOSIS — E11.65 TYPE 2 DIABETES MELLITUS WITH HYPERGLYCEMIA, WITH LONG-TERM CURRENT USE OF INSULIN: Primary | ICD-10-CM

## 2024-01-11 PROCEDURE — 96361 HYDRATE IV INFUSION ADD-ON: CPT

## 2024-01-11 PROCEDURE — 63600175 PHARM REV CODE 636 W HCPCS: Performed by: NURSE PRACTITIONER

## 2024-01-11 PROCEDURE — 96374 THER/PROPH/DIAG INJ IV PUSH: CPT

## 2024-01-11 PROCEDURE — 25000003 PHARM REV CODE 250: Performed by: NURSE PRACTITIONER

## 2024-01-11 RX ORDER — SODIUM CHLORIDE 9 MG/ML
INJECTION, SOLUTION INTRAVENOUS
Status: COMPLETED | OUTPATIENT
Start: 2024-01-11 | End: 2024-01-11

## 2024-01-11 RX ORDER — HEPARIN 100 UNIT/ML
500 SYRINGE INTRAVENOUS
Status: DISCONTINUED | OUTPATIENT
Start: 2024-01-11 | End: 2024-01-11 | Stop reason: HOSPADM

## 2024-01-11 RX ORDER — SODIUM CHLORIDE 0.9 % (FLUSH) 0.9 %
10 SYRINGE (ML) INJECTION
Status: DISCONTINUED | OUTPATIENT
Start: 2024-01-11 | End: 2024-01-11 | Stop reason: HOSPADM

## 2024-01-11 RX ORDER — PROCHLORPERAZINE EDISYLATE 5 MG/ML
5 INJECTION INTRAMUSCULAR; INTRAVENOUS ONCE AS NEEDED
Status: COMPLETED | OUTPATIENT
Start: 2024-01-11 | End: 2024-01-11

## 2024-01-11 RX ADMIN — HEPARIN 500 UNITS: 100 SYRINGE at 12:01

## 2024-01-11 RX ADMIN — PROCHLORPERAZINE EDISYLATE 5 MG: 5 INJECTION INTRAMUSCULAR; INTRAVENOUS at 11:01

## 2024-01-11 RX ADMIN — SODIUM CHLORIDE: 9 INJECTION, SOLUTION INTRAVENOUS at 11:01

## 2024-01-11 NOTE — PROGRESS NOTES
Population Health Chart Review & Patient Outreach Details      Further Action Needed If Patient Returns Outreach:      None      Updates Requested / Reviewed:     []  Care Everywhere    []     []  External Sources ([facility], [facility], [facility], etc.)    [] [facility]   [] [facility]   [] Other:    []  Care Team Updated   []  Removed  or Duplicate Orders   []  Immunization Reconciliation Completed / Queried    [] [place]   [] [place]   [] [place]   [] [place]      Health Maintenance Topics Addressed and Outreach Outcomes / Actions Taken:             Breast Cancer Screening []  Mammogram Order Placed    []  Mammogram Screening Scheduled    []  External Records Requested & Care Team Updated if Applicable    []  External Records Uploaded & Care Team Updated if Applicable    []  Pt Declined Scheduling Mammogram    []  Pt Will Schedule with External Provider / Order Routed & Care Team Updated if Applicable              Cervical Cancer Screening []  Pap Smear Scheduled in Primary Care or OBGYN    []  External Records Requested & Care Team Updated if Applicable       []  External Records Uploaded, Care Team Updated, & History Updated if Applicable    []  Patient Declined Scheduling Pap Smear    []  Patient Will Schedule with External Provider & Care Team Updated if Applicable                  Colorectal Cancer Screening []  Colonoscopy Case Request / Referral / Home Test Order Placed    []  External Records Requested & Care Team Updated if Applicable    []  External Records Uploaded, Care Team Updated, & History Updated if Applicable    []  Patient Declined Completing Colon Cancer Screening    []  Patient Will Schedule with External Provider & Care Team Updated if Applicable    []  Fit Kit Mailed (add the SmartPhrase under additional notes)    []  Reminded Patient to Complete Home Test                Diabetic Eye Exam []  Eye Exam Screening Order Placed    []  Eye Camera Scheduled or Optometry/Ophthalmology Referral  Placed    []  External Records Requested & Care Team Updated if Applicable    []  External Records Uploaded, Care Team Updated, & History Updated if Applicable    []  Patient Declined Scheduling Eye Exam    []  Patient Will Schedule with External Provider & Care Team Updated if Applicable             Blood Pressure Control []  Primary Care Follow Up Visit Scheduled     []  Remote Blood Pressure Reading Captured    []  Patient Declined Remote Reading or Scheduling Appt - Escalated to PCP    []  Patient Will Call Back or Send Portal Message with Reading                 HbA1c & Other Labs [x]  Overdue Lab(s) Ordered    []  Overdue Lab(s) Scheduled    []  External Records Uploaded & Care Team Updated if Applicable    []  Primary Care Follow Up Visit Scheduled     []  Reminded Patient to Complete A1c Home Test    []  Patient Declined Scheduling Labs or Will Call Back to Schedule    []  Patient Will Schedule with External Provider / Order Routed, & Care Team Updated if Applicable           Primary Care Appointment []  Primary Care Appt Scheduled    []  Patient Declined Scheduling or Will Call Back to Schedule    []  Pt Established with External Provider, Updated Care Team, & Informed Pt to Notify Payor if Applicable           Medication Adherence /    Statin Use []  Primary Care Appointment Scheduled    []  Patient Reminded to  Prescription    []  Patient Declined, Provider Notified if Needed    []  Sent Provider Message to Review to Evaluate Pt for Statin, Add Exclusion Dx Codes, Document   Exclusion in Problem List, Change Statin Intensity Level to Moderate or High Intensity if Applicable                Osteoporosis Screening []  Dexa Order Placed    []  Dexa Appointment Scheduled    []  External Records Requested & Care Team Updated    []  External Records Uploaded, Care Team Updated, & History Updated if Applicable    []  Patient Declined Scheduling Dexa or Will Call Back to Schedule    []  Patient Will  Schedule with External Provider / Order Routed & Care Team Updated if Applicable       Additional Notes:    A1C order placed  Patient notified of order through portal

## 2024-01-18 ENCOUNTER — LAB VISIT (OUTPATIENT)
Dept: LAB | Facility: HOSPITAL | Age: 69
End: 2024-01-18
Attending: INTERNAL MEDICINE
Payer: MEDICARE

## 2024-01-18 DIAGNOSIS — C18.2 MALIGNANT NEOPLASM OF ASCENDING COLON: ICD-10-CM

## 2024-01-18 LAB
ALBUMIN SERPL-MCNC: 3.5 G/DL (ref 3.4–4.8)
ALBUMIN/GLOB SERPL: 1 RATIO (ref 1.1–2)
ALP SERPL-CCNC: 124 UNIT/L (ref 40–150)
ALT SERPL-CCNC: 19 UNIT/L (ref 0–55)
AST SERPL-CCNC: 20 UNIT/L (ref 5–34)
BASOPHILS # BLD AUTO: 0.02 X10(3)/MCL
BASOPHILS NFR BLD AUTO: 0.4 %
BILIRUB SERPL-MCNC: 0.4 MG/DL
BUN SERPL-MCNC: 23.6 MG/DL (ref 9.8–20.1)
CALCIUM SERPL-MCNC: 8.7 MG/DL (ref 8.4–10.2)
CHLORIDE SERPL-SCNC: 100 MMOL/L (ref 98–107)
CO2 SERPL-SCNC: 27 MMOL/L (ref 23–31)
CREAT SERPL-MCNC: 1.63 MG/DL (ref 0.55–1.02)
EOSINOPHIL # BLD AUTO: 0.16 X10(3)/MCL (ref 0–0.9)
EOSINOPHIL NFR BLD AUTO: 2.9 %
ERYTHROCYTE [DISTWIDTH] IN BLOOD BY AUTOMATED COUNT: 20.9 % (ref 11.5–17)
GFR SERPLBLD CREATININE-BSD FMLA CKD-EPI: 34 MLS/MIN/1.73/M2
GLOBULIN SER-MCNC: 3.4 GM/DL (ref 2.4–3.5)
GLUCOSE SERPL-MCNC: 195 MG/DL (ref 82–115)
HCT VFR BLD AUTO: 31.3 % (ref 37–47)
HGB BLD-MCNC: 9.8 G/DL (ref 12–16)
IMM GRANULOCYTES # BLD AUTO: 0.03 X10(3)/MCL (ref 0–0.04)
IMM GRANULOCYTES NFR BLD AUTO: 0.5 %
LYMPHOCYTES # BLD AUTO: 1.4 X10(3)/MCL (ref 0.6–4.6)
LYMPHOCYTES NFR BLD AUTO: 25.5 %
MCH RBC QN AUTO: 24 PG (ref 27–31)
MCHC RBC AUTO-ENTMCNC: 31.3 G/DL (ref 33–36)
MCV RBC AUTO: 76.7 FL (ref 80–94)
MONOCYTES # BLD AUTO: 0.53 X10(3)/MCL (ref 0.1–1.3)
MONOCYTES NFR BLD AUTO: 9.6 %
NEUTROPHILS # BLD AUTO: 3.36 X10(3)/MCL (ref 2.1–9.2)
NEUTROPHILS NFR BLD AUTO: 61.1 %
PLATELET # BLD AUTO: 109 X10(3)/MCL (ref 130–400)
PMV BLD AUTO: ABNORMAL FL
POTASSIUM SERPL-SCNC: 4.7 MMOL/L (ref 3.5–5.1)
PROT SERPL-MCNC: 6.9 GM/DL (ref 5.8–7.6)
RBC # BLD AUTO: 4.08 X10(6)/MCL (ref 4.2–5.4)
SODIUM SERPL-SCNC: 134 MMOL/L (ref 136–145)
WBC # SPEC AUTO: 5.5 X10(3)/MCL (ref 4.5–11.5)

## 2024-01-18 PROCEDURE — 36415 COLL VENOUS BLD VENIPUNCTURE: CPT

## 2024-01-18 PROCEDURE — 80053 COMPREHEN METABOLIC PANEL: CPT

## 2024-01-18 PROCEDURE — 85025 COMPLETE CBC W/AUTO DIFF WBC: CPT

## 2024-01-22 ENCOUNTER — OFFICE VISIT (OUTPATIENT)
Dept: HEMATOLOGY/ONCOLOGY | Facility: CLINIC | Age: 69
End: 2024-01-22
Payer: MEDICARE

## 2024-01-22 VITALS
DIASTOLIC BLOOD PRESSURE: 83 MMHG | RESPIRATION RATE: 17 BRPM | WEIGHT: 233.69 LBS | SYSTOLIC BLOOD PRESSURE: 143 MMHG | TEMPERATURE: 98 F | OXYGEN SATURATION: 98 % | HEIGHT: 65 IN | BODY MASS INDEX: 38.93 KG/M2 | HEART RATE: 82 BPM

## 2024-01-22 DIAGNOSIS — C18.2 MALIGNANT NEOPLASM OF ASCENDING COLON: Primary | ICD-10-CM

## 2024-01-22 PROCEDURE — 3008F BODY MASS INDEX DOCD: CPT | Mod: CPTII,S$GLB,, | Performed by: NURSE PRACTITIONER

## 2024-01-22 PROCEDURE — 1126F AMNT PAIN NOTED NONE PRSNT: CPT | Mod: CPTII,S$GLB,, | Performed by: NURSE PRACTITIONER

## 2024-01-22 PROCEDURE — 99215 OFFICE O/P EST HI 40 MIN: CPT | Mod: S$GLB,,, | Performed by: NURSE PRACTITIONER

## 2024-01-22 PROCEDURE — 3079F DIAST BP 80-89 MM HG: CPT | Mod: CPTII,S$GLB,, | Performed by: NURSE PRACTITIONER

## 2024-01-22 PROCEDURE — 1159F MED LIST DOCD IN RCRD: CPT | Mod: CPTII,S$GLB,, | Performed by: NURSE PRACTITIONER

## 2024-01-22 PROCEDURE — 3077F SYST BP >= 140 MM HG: CPT | Mod: CPTII,S$GLB,, | Performed by: NURSE PRACTITIONER

## 2024-01-22 PROCEDURE — 1101F PT FALLS ASSESS-DOCD LE1/YR: CPT | Mod: CPTII,S$GLB,, | Performed by: NURSE PRACTITIONER

## 2024-01-22 PROCEDURE — 3288F FALL RISK ASSESSMENT DOCD: CPT | Mod: CPTII,S$GLB,, | Performed by: NURSE PRACTITIONER

## 2024-01-22 PROCEDURE — 99999 PR PBB SHADOW E&M-EST. PATIENT-LVL IV: CPT | Mod: PBBFAC,,, | Performed by: NURSE PRACTITIONER

## 2024-01-22 RX ORDER — PROCHLORPERAZINE EDISYLATE 5 MG/ML
5 INJECTION INTRAMUSCULAR; INTRAVENOUS ONCE AS NEEDED
Status: CANCELLED | OUTPATIENT
Start: 2024-01-25

## 2024-01-22 RX ORDER — SODIUM CHLORIDE 9 MG/ML
INJECTION, SOLUTION INTRAVENOUS
Status: CANCELLED
Start: 2024-01-25 | End: 2024-01-25

## 2024-01-22 RX ORDER — FLUOROURACIL 50 MG/ML
2400 INJECTION, SOLUTION INTRAVENOUS
Status: CANCELLED | OUTPATIENT
Start: 2024-01-23

## 2024-01-22 RX ORDER — SODIUM CHLORIDE 0.9 % (FLUSH) 0.9 %
10 SYRINGE (ML) INJECTION
Status: CANCELLED | OUTPATIENT
Start: 2024-01-23

## 2024-01-22 RX ORDER — SODIUM CHLORIDE 0.9 % (FLUSH) 0.9 %
10 SYRINGE (ML) INJECTION
Status: CANCELLED | OUTPATIENT
Start: 2024-01-25

## 2024-01-22 RX ORDER — DIPHENHYDRAMINE HYDROCHLORIDE 50 MG/ML
50 INJECTION INTRAMUSCULAR; INTRAVENOUS ONCE AS NEEDED
Status: CANCELLED | OUTPATIENT
Start: 2024-01-23

## 2024-01-22 RX ORDER — EPINEPHRINE 0.3 MG/.3ML
0.3 INJECTION SUBCUTANEOUS ONCE AS NEEDED
Status: CANCELLED | OUTPATIENT
Start: 2024-01-23

## 2024-01-22 RX ORDER — HEPARIN 100 UNIT/ML
500 SYRINGE INTRAVENOUS
Status: CANCELLED | OUTPATIENT
Start: 2024-01-25

## 2024-01-22 RX ORDER — FLUOROURACIL 50 MG/ML
400 INJECTION, SOLUTION INTRAVENOUS
Status: CANCELLED | OUTPATIENT
Start: 2024-01-23

## 2024-01-22 RX ORDER — HEPARIN 100 UNIT/ML
500 SYRINGE INTRAVENOUS
Status: CANCELLED | OUTPATIENT
Start: 2024-01-23

## 2024-01-22 RX ORDER — PROCHLORPERAZINE EDISYLATE 5 MG/ML
5 INJECTION INTRAMUSCULAR; INTRAVENOUS ONCE AS NEEDED
Status: CANCELLED | OUTPATIENT
Start: 2024-01-23

## 2024-01-22 NOTE — PROGRESS NOTES
HEMATOLOGY/ONCOLOGY OFFICE CLINIC VISIT    Visit Information:    Initial Evaluation: 4/22/2022  Referring Provider: Maggy Jaquez NP  Other providers: Dr Timothy Russ  Code status: Not addressed     Diagnosis/Problem list:   pT3 N2a Mx Stage IIIB Colon cancer. Dx 10/19/23  Microcytic anemia.   Folate deficiency     Present Treatment:  FOLFOX with dose reduction on Oxaliplatin due to neuropathy. Started on 11/28/23.    Treatment history:  10/19/2023 Lap/jaswinder right colon resection         Imaging studies:  CT C/A/P 6/3/2022: There is no significant hepatic steatosis.  The liver is cirrhotic.  No liver lesion is identified.  The spleen is enlarged, measuring 15.5 cm in length.  There is no retroperitoneal lymphadenopathy or abdominal aortic aneurysm.  A mildly prominent right external iliac lymph node measures 9 mm in short axis, nonspecific.  This is also similar in appearance when compared to 2015. Impression: Cirrhosis. Splenomegaly.  CT CAP 9/11/2023: There is no supraclavicular or axillary lymphadenopathy.  No mediastinal adenopathy. 3 mm right upper lobe pulmonary nodule. There are few additional pulmonary micronodules in the lung apices measuring no greater than 1-2 mm.  Findings similar to prior exam.     Impression: Scattered pulmonary nodules in the upper lobe similar to prior.  No further follow-up acute according to Fleischner criteria.  No convincing evidence for metastatic disease. Nodular appearing liver, correlation for cirrhosis.    Pathology:  10/19/2023:  COLON, RIGHT HEMICOLECTOMY (1.5 CM OF TERMINAL ILEUM, 25 CM LENGTH OF RIGHT COLON): POORLY DIFFERENTIATED ADENOCARCINOMA WITH FOCAL SIGNET RING CELL FEATURES, UNIFOCAL, RIGHT COLON, 8 CM GREATEST DIMENSION.   THE TUMOR INVADES THROUGH THE MUSCULARIS PROPRIA INTO PERICOLONIC TISSUE (PT3).   FOCAL LYMPHVASCULAR INVASION IS PRESENT.   THE SURGICAL MARGINS ARE FREE OF TUMOR.   METASTATIC ADENOCARCINOMA IS IDENTIFIED IN FOUR (4) OF TWENTY-SEVEN (27)    PERICOLONIC LYMPH NODES (LARGEST METASTATIC DEPOSIT 8 MM; FOCAL EXTRACAPSULAR EXTENSION OF TUMOR IS PRESENT).      PATHOLOGIC STAGING:  pT3 N2a Mx     Histologic Grade:  G3, poorly differentiated   Macroscopic Tumor Perforation:  Not identified   Lymphovascular Invasion:  Small vessel   Perineural Invasion:  Not identified        CLINICAL HISTORY:       Patient: Indu Azul is a 68 y.o. female. with multiple medical problems that I saw originally on 2022 for thrombocytopenia.  Patient platelet counts on 2021 were 132,000 and since that that has been slowly trending down.  2021 platelets 132,000  2022 platelets 121,000  2022 platelets 115,000     Of note patient have a UA done that same day that suggest possible UTI with positive nitrates, 1+ leukocytes and many bacteria.  Urine culture with E. coli.   Reviewing electronic medical record and going back to 12/15/2014 patient with occasional low platelets.  They were never lower than 100,000 and they always normalized.   As per patient in 2020 when her platelets were slightly decreased (117,000), this was shortly after she has her left foot surgery.  Then in May 17 and May 18 2021, platelets were 105k and 101k,  she had COVID.  Again in 2022 she have a UTI for which she completed antibiotics.     Patient's father diagnosed Blood disorder requiring blood transfusion that started q 4 weeks and the q week. He was diagnosed on his 80's but  at 91. Sister and niece had ovarian cancer. Sister  of it. Niece still alive.     She was found to have iron deficiency anemia.  EGD performed, grade I-II esophageal varices found, too small to band. She also had Colonoscopy by Dr Hammonds that showed an ulcerated malignant-appearing partially obstructing medium sized mass in the ascending colon.  She reports that she had a colonoscopy about 5 years ago at [facility] and everything was fine, no polyps or masses.   Biopsy and tattoo of the  "mass showed fragments of colonic mucosa, no evidence of dysplasia or malignancy.  Two other polyps were completely removed in the descending and sigmoid colon, pathology returned hyperplastic polyps.  CT abd/pel with no acute findings.     Despite of biopsy because of malignant-appearing partially obstructing mass of the ascending colon and photographs and description were consistent with colon cancer she underwent Lap/jaswinder right colon resection on 10/19/2023 by Dr Timothy Russ. Path c/w really differentiated adeno carcinoma.         Chief Complaint: Follow-up (PT states the inside of her fingers is turning blue for about a week.)      Interval History:    24: Patient presents today for labs and TD prior to C5 of FOLFOX with dose reduction of Oxaliplatin due to neuropathy, accompanied by her son.  Tolerated C4 pretty well. Nausea improved with Compazine IV and IVFs on day of "take down". No fever, chills or sweats. No worsening in neuropathy as of yet. Hyperpigmentation of nails and skin on hands commonly seen with 5FU.         Past Medical History:   Diagnosis Date    Anesthesia complication     trouble awakening    Charcot's joint of foot, left     Chronic kidney disease, unspecified     Cirrhosis of liver without ascites     Depression     Diabetes mellitus, type II, insulin dependent     Diabetic neuropathy     GERD (gastroesophageal reflux disease)     H/O: hysterectomy     Hepatic steatosis     Malignant neoplasm of ascending colon     Obesity, unspecified     Overactive bladder     Thrombocytopenia, unspecified     Vitamin D deficiency       Past Surgical History:   Procedure Laterality Date    APPENDECTOMY      BREAST BIOPSY  2016     SECTION      x3    charcot-leyden crystals identification      CHOLECYSTECTOMY  2006    COLONOSCOPY N/A 2023    Procedure: COLON;  Surgeon: Luis Amador MD;  Location: [facility] ENDOSCOPY;  Service: Gastroenterology;  Laterality: N/A;    " COLONOSCOPY, WITH 1 OR MORE BIOPSIES  08/28/2023    Procedure: COLONOSCOPY, WITH 1 OR MORE BIOPSIES;  Surgeon: Luis Amador MD;  Location: [facility] ENDOSCOPY;  Service: Gastroenterology;;    COLONOSCOPY, WITH DIRECTED SUBMUCOSAL INJECTION  08/28/2023    Procedure: COLONOSCOPY, WITH DIRECTED SUBMUCOSAL INJECTION;  Surgeon: Luis Amador MD;  Location: [facility] ENDOSCOPY;  Service: Gastroenterology;;  8cc Colon Tattoo    COLONOSCOPY, WITH POLYPECTOMY USING SNARE  08/28/2023    Procedure: COLONOSCOPY, WITH POLYPECTOMY USING SNARE;  Surgeon: Luis Amador MD;  Location: [facility] ENDOSCOPY;  Service: Gastroenterology;;    ESOPHAGOGASTRODUODENOSCOPY N/A 08/28/2023    Procedure: DOUBLE;  Surgeon: Luis Amador MD;  Location: [facility] ENDOSCOPY;  Service: Gastroenterology;  Laterality: N/A;    HEMORRHOID SURGERY      HYSTERECTOMY  1990    total    INSERTION OF TUNNELED CENTRAL VENOUS CATHETER (CVC) WITH SUBCUTANEOUS PORT Right 11/20/2023    Procedure: ERPRBJNNL-ABAP-A-CATH;  Surgeon: Timothy Russ MD;  Location: [facility];  Service: General;  Laterality: Right;    right foot surgery Right 02/01/2022    XI ROBOTIC COLECTOMY, RIGHT N/A 10/19/2023    Procedure: XI ROBOTIC COLECTOMY, RIGHT;  Surgeon: Timothy Russ MD;  Location: [facility];  Service: General;  Laterality: N/A;     Family History   Problem Relation Age of Onset    Diabetes Mother     Alzheimer's disease Mother     Diabetes Father     Leukemia Father     Diabetes Sister     Liver cancer Sister     Diabetes Brother      Social Connections: Not on file       Review of patient's allergies indicates:  No Known Allergies   Current Outpatient Medications on File Prior to Visit   Medication Sig Dispense Refill    dulaglutide (TRULICITY) 3 mg/0.5 mL pen injector Inject 3 mg into the skin every 7 days. 4 pen 11    ferrous sulfate (FEOSOL) 325 mg (65 mg iron) Tab tablet Take 1 tablet (325 mg total) by mouth 2 (two) times daily.  60 tablet 11    folic acid (FOLVITE) 1 MG tablet Take 1 tablet (1 mg total) by mouth once daily. 100 tablet 3    furosemide (LASIX) 40 MG tablet Take 40 mg by mouth as needed.      gabapentin (NEURONTIN) 300 MG capsule Take 2 capsules (600 mg total) by mouth every evening. 60 capsule 5    HYDROcodone-acetaminophen (NORCO) 5-325 mg per tablet Take 1 tablet by mouth every 6 (six) hours as needed for Pain. 15 tablet 0    insulin glargine, TOUJEO, (TOUJEO SOLOSTAR U-300 INSULIN) 300 unit/mL (1.5 mL) InPn pen INJECT 79 UNITS SUBCUTANEOUSLY ONCE DAILY 6 mL 11    insulin syringe-needle U-100 1 mL 31 gauge x 5/16 Syrg Inject 1 Syringe into the skin 3 (three) times daily with meals.      levoFLOXacin (LEVAQUIN) 500 MG tablet Take 1 tablet (500 mg total) by mouth once daily. 7 tablet 0    LYUMJEV KWIKPEN U-200 INSULIN 200 unit/mL (3 mL) pen Sliding scale      OLANZapine (ZYPREXA) 5 MG tablet Take 1 tablet (5 mg total) by mouth every evening. Take nightly on days 1-3 of each chemotherapy cycle. 6 tablet 5    ondansetron (ZOFRAN-ODT) 8 MG TbDL Take 1 tablet (8 mg total) by mouth every 6 (six) hours as needed. 10 tablet 0    prochlorperazine (COMPAZINE) 5 MG tablet Take 5 mg by mouth every 6 (six) hours as needed.      sertraline (ZOLOFT) 50 MG tablet Take 1 tablet by mouth once daily 90 tablet 0    solifenacin (VESICARE) 10 MG tablet Take 10 mg by mouth once daily.      spironolactone (ALDACTONE) 50 MG tablet Take 50 mg by mouth once daily.      k phos di & mono-sod phos mono (K-PHOS-NEUTRAL) 250 mg Tab Take 1 tablet by mouth 2 (two) times a day. for 2 days 4 tablet 0     Current Facility-Administered Medications on File Prior to Visit   Medication Dose Route Frequency Provider Last Rate Last Admin    promethazine (PHENERGAN) 12.5 mg in dextrose 5 % (D5W) 50 mL IVPB  12.5 mg Intravenous Once Gayatri Jaffe, HOLLYP          Review of Systems   Constitutional:  Positive for fatigue. Negative for activity change, appetite change,  "chills, fever and unexpected weight change.   HENT:  Negative for mouth dryness, mouth sores, nosebleeds, sore throat and trouble swallowing.    Eyes:  Negative for visual disturbance.   Respiratory:  Negative for cough and shortness of breath.    Cardiovascular:  Negative for chest pain, palpitations and leg swelling.   Gastrointestinal:  Positive for nausea. Negative for abdominal distention, abdominal pain, blood in stool, change in bowel habit, constipation, diarrhea and vomiting.   Endocrine: Negative.    Genitourinary:  Negative for dysuria, frequency, hematuria and urgency.   Musculoskeletal:  Negative for arthralgias, back pain, myalgias and neck pain.   Integumentary:  Negative for rash and wound.   Neurological:  Negative for dizziness, tremors, syncope, speech difficulty, weakness, light-headedness, numbness, headaches and memory loss.   Hematological:  Does not bruise/bleed easily.   Psychiatric/Behavioral:  Negative for confusion and suicidal ideas.               Vitals:    01/22/24 1332   BP: (!) 143/83   BP Location: Right arm   Patient Position: Sitting   Pulse: 82   Resp: 17   Temp: 98 °F (36.7 °C)   TempSrc: Oral   SpO2: 98%   Weight: 106 kg (233 lb 11.2 oz)   Height: 5' 5" (1.651 m)                Wt Readings from Last 6 Encounters:   01/22/24 106 kg (233 lb 11.2 oz)   01/11/24 106.8 kg (235 lb 7.2 oz)   01/09/24 106.9 kg (235 lb 9.6 oz)   01/08/24 106.9 kg (235 lb 9.6 oz)   12/27/23 110 kg (242 lb 8.1 oz)   12/26/23 110 kg (242 lb 8.1 oz)     Body mass index is 38.89 kg/m².  Body surface area is 2.2 meters squared.  Physical Exam  Vitals and nursing note reviewed.   Constitutional:       General: She is not in acute distress.     Appearance: Normal appearance. She is obese.      Comments: Laparoscopic incisions well healed    HENT:      Head: Normocephalic and atraumatic.      Mouth/Throat:      Mouth: Mucous membranes are moist.   Eyes:      General: No scleral icterus.     Extraocular " Movements: Extraocular movements intact.      Conjunctiva/sclera: Conjunctivae normal.      Pupils: Pupils are equal, round, and reactive to light.   Neck:      Vascular: No JVD.   Cardiovascular:      Rate and Rhythm: Normal rate and regular rhythm.      Heart sounds: No murmur heard.  Pulmonary:      Effort: Pulmonary effort is normal.      Breath sounds: Normal breath sounds. No wheezing or rhonchi.   Chest:      Comments: Right chest wall mediport in place.    Abdominal:      General: Bowel sounds are normal. There is no distension.      Palpations: Abdomen is soft. There is no mass.      Tenderness: There is no abdominal tenderness.      Comments: Surgical incisions healing after colon surgery.    Musculoskeletal:         General: No swelling or deformity.      Cervical back: Neck supple.   Lymphadenopathy:      Head:      Right side of head: No submandibular adenopathy.      Left side of head: No submandibular adenopathy.      Cervical: No cervical adenopathy.      Upper Body:      Right upper body: No supraclavicular or axillary adenopathy.      Left upper body: No supraclavicular or axillary adenopathy.      Lower Body: No right inguinal adenopathy. No left inguinal adenopathy.   Skin:     General: Skin is warm.      Coloration: Skin is not jaundiced.      Findings: No lesion or rash.      Nails: There is no clubbing.      Comments: Darkening of nails and skin on hands commonly seen with 5FU   Neurological:      General: No focal deficit present.      Mental Status: She is alert and oriented to person, place, and time.      Cranial Nerves: Cranial nerves 2-12 are intact.   Psychiatric:         Attention and Perception: Attention normal.         Mood and Affect: Mood is depressed.         Behavior: Behavior is cooperative.         Judgment: Judgment normal.     ECOG SCORE             Laboratory:  CBC with Differential:  Lab Results   Component Value Date    WBC 4.99 01/22/2024    RBC 4.19 (L) 01/22/2024     HGB 9.9 (L) 01/22/2024    HCT 32.6 (L) 01/22/2024    MCV 77.8 (L) 01/22/2024    MCH 23.6 (L) 01/22/2024    MCHC 30.4 (L) 01/22/2024    RDW 21.7 (H) 01/22/2024    PLT 92 (L) 01/22/2024    MPV  01/22/2024      Comment:      Unable to calculate MPV         CMP:  Sodium   Date Value Ref Range Status   06/01/2022 140 136 - 145 mmol/L Final     Sodium Level   Date Value Ref Range Status   01/18/2024 134 (L) 136 - 145 mmol/L Final     Potassium   Date Value Ref Range Status   06/01/2022 4.9 3.5 - 5.1 mmol/L Final     Potassium Level   Date Value Ref Range Status   01/18/2024 4.7 3.5 - 5.1 mmol/L Final     Chloride   Date Value Ref Range Status   06/01/2022 104 100 - 109 mmol/L Final     Carbon Dioxide   Date Value Ref Range Status   01/18/2024 27 23 - 31 mmol/L Final   06/01/2022 29 22 - 33 mmol/L Final     Blood Urea Nitrogen   Date Value Ref Range Status   01/18/2024 23.6 (H) 9.8 - 20.1 mg/dL Final   06/01/2022 22 5 - 25 mg/dL Final     Creatinine   Date Value Ref Range Status   01/18/2024 1.63 (H) 0.55 - 1.02 mg/dL Final   06/01/2022 1.32 (H) 0.57 - 1.25 mg/dL Final     Calcium   Date Value Ref Range Status   06/01/2022 8.7 (L) 8.8 - 10.6 mg/dL Final     Calcium Level Total   Date Value Ref Range Status   01/18/2024 8.7 8.4 - 10.2 mg/dL Final     Albumin Level   Date Value Ref Range Status   01/18/2024 3.5 3.4 - 4.8 g/dL Final     Bilirubin Total   Date Value Ref Range Status   01/18/2024 0.4 <=1.5 mg/dL Final     Alkaline Phosphatase   Date Value Ref Range Status   01/18/2024 124 40 - 150 unit/L Final     Aspartate Aminotransferase   Date Value Ref Range Status   01/18/2024 20 5 - 34 unit/L Final     Alanine Aminotransferase   Date Value Ref Range Status   01/18/2024 19 0 - 55 unit/L Final     Anion Gap   Date Value Ref Range Status   06/01/2022 7 (L) 8 - 16 mmol/L Final     eGFR    Date Value Ref Range Status   06/01/2022 45 mL/min/1.73mSq Final     Comment:     In accordance with NKF-ASN Task Force  recommendation, calculation based on the Chronic Kidney Disease Epidemiology Collaboration (CKD-EPI) equation without adjustment for race. eGFR adjusted for gender and age and calculated in ml/min/1.73mSquared. eGFR cannot be calculated if patient is under 18 years of age.     Reference Range:   >= 60 ml/min/1.73mSquared.     Estimated GFR-Non    Date Value Ref Range Status   08/04/2022 43 mls/min/1.73/m2 Final         Assessment:       No diagnosis found.          1) Stage IIIB adenocarcinoma of the ascending colon  --Patient will benefit of adjuvant chemotherapy.   --Due to severe diabetic neuropathy, will try dose reduction of Oxaliplatin, starting 65 mg/m2.    --If not tolerated, then with d/c Oxaliplatin and cont 5FU and LV  2) Lung nodules-Stable. Will monitor  3) iron deficiency anemia-on iron PO  4) Thrombocytopenia-resolved. Will follow counts closely as she has splenomegaly and this can affects her counts mostly platelets.  5) Folate deficiency-she will start taking folic acid at this time.   6) Diabetic neuropathy-severe. She does not feel her feet.  --She will have nerve stimulator placement to see if can help her feet     Educated the patient on the risks versus benefits as well as toxicities associated with treatment.  Verbally consented the patient to the treatment plan and the patient was educated on the planned duration of the treatment and schedule of the treatment administration.  All questions were answered.      Plan:       Plan: Adjuvant FOLFOX x 6 months,with dose reduction of oxaliplatin due to severe (Grade 3) neuropathy. If not tolerated, then with d/c Oxaliplatin and cont 5FU and LV. Neuropathy same.    Okay to proceed with C5 tomorrow,  1/23/24. May proceed with platelet count of 92k.   Weekly CBC, CMP - standing order placed  RTC in 2 weeks with for TD/labs: CBC, CMP, CEA,- chemo next day    Encouraged to call with questions or problems  The patient was given ample  opportunity to ask questions and they were all answered to satisfaction; patient demonstrated understanding of what we discussed and is agreeable to the plan.     LAURA Degroot

## 2024-01-23 ENCOUNTER — INFUSION (OUTPATIENT)
Dept: INFUSION THERAPY | Facility: HOSPITAL | Age: 69
End: 2024-01-23
Attending: INTERNAL MEDICINE
Payer: MEDICARE

## 2024-01-23 ENCOUNTER — DOCUMENTATION ONLY (OUTPATIENT)
Dept: HEMATOLOGY/ONCOLOGY | Facility: CLINIC | Age: 69
End: 2024-01-23
Payer: MEDICARE

## 2024-01-23 VITALS
RESPIRATION RATE: 18 BRPM | WEIGHT: 233.69 LBS | OXYGEN SATURATION: 100 % | HEART RATE: 84 BPM | HEIGHT: 65 IN | TEMPERATURE: 97 F | BODY MASS INDEX: 38.93 KG/M2

## 2024-01-23 DIAGNOSIS — C18.2 MALIGNANT NEOPLASM OF ASCENDING COLON: Primary | ICD-10-CM

## 2024-01-23 PROCEDURE — 96416 CHEMO PROLONG INFUSE W/PUMP: CPT

## 2024-01-23 PROCEDURE — 96366 THER/PROPH/DIAG IV INF ADDON: CPT

## 2024-01-23 PROCEDURE — 63600175 PHARM REV CODE 636 W HCPCS: Performed by: NURSE PRACTITIONER

## 2024-01-23 PROCEDURE — 96411 CHEMO IV PUSH ADDL DRUG: CPT

## 2024-01-23 PROCEDURE — 96368 THER/DIAG CONCURRENT INF: CPT

## 2024-01-23 PROCEDURE — 25000003 PHARM REV CODE 250: Performed by: NURSE PRACTITIONER

## 2024-01-23 PROCEDURE — 96415 CHEMO IV INFUSION ADDL HR: CPT

## 2024-01-23 PROCEDURE — 96413 CHEMO IV INFUSION 1 HR: CPT

## 2024-01-23 PROCEDURE — 96367 TX/PROPH/DG ADDL SEQ IV INF: CPT

## 2024-01-23 RX ORDER — SODIUM CHLORIDE 0.9 % (FLUSH) 0.9 %
10 SYRINGE (ML) INJECTION
Status: DISCONTINUED | OUTPATIENT
Start: 2024-01-23 | End: 2024-01-23 | Stop reason: HOSPADM

## 2024-01-23 RX ORDER — DIPHENHYDRAMINE HYDROCHLORIDE 50 MG/ML
50 INJECTION INTRAMUSCULAR; INTRAVENOUS ONCE AS NEEDED
Status: DISCONTINUED | OUTPATIENT
Start: 2024-01-23 | End: 2024-01-23 | Stop reason: HOSPADM

## 2024-01-23 RX ORDER — HEPARIN 100 UNIT/ML
500 SYRINGE INTRAVENOUS
Status: DISCONTINUED | OUTPATIENT
Start: 2024-01-23 | End: 2024-01-23 | Stop reason: HOSPADM

## 2024-01-23 RX ORDER — PROCHLORPERAZINE EDISYLATE 5 MG/ML
5 INJECTION INTRAMUSCULAR; INTRAVENOUS ONCE AS NEEDED
Status: DISCONTINUED | OUTPATIENT
Start: 2024-01-23 | End: 2024-01-23 | Stop reason: HOSPADM

## 2024-01-23 RX ORDER — EPINEPHRINE 0.3 MG/.3ML
0.3 INJECTION SUBCUTANEOUS ONCE AS NEEDED
Status: DISCONTINUED | OUTPATIENT
Start: 2024-01-23 | End: 2024-01-23 | Stop reason: HOSPADM

## 2024-01-23 RX ORDER — FLUOROURACIL 50 MG/ML
900 INJECTION, SOLUTION INTRAVENOUS
Status: COMPLETED | OUTPATIENT
Start: 2024-01-23 | End: 2024-01-23

## 2024-01-23 RX ADMIN — DEXAMETHASONE SODIUM PHOSPHATE 0.25 MG: 4 INJECTION, SOLUTION INTRA-ARTICULAR; INTRALESIONAL; INTRAMUSCULAR; INTRAVENOUS; SOFT TISSUE at 10:01

## 2024-01-23 RX ADMIN — FLUOROURACIL 900 MG: 50 INJECTION, SOLUTION INTRAVENOUS at 12:01

## 2024-01-23 RX ADMIN — OXALIPLATIN 145 MG: 5 INJECTION, SOLUTION, CONCENTRATE INTRAVENOUS at 10:01

## 2024-01-23 RX ADMIN — DEXTROSE MONOHYDRATE: 50 INJECTION, SOLUTION INTRAVENOUS at 10:01

## 2024-01-23 RX ADMIN — SODIUM CHLORIDE: 9 INJECTION, SOLUTION INTRAVENOUS at 10:01

## 2024-01-23 RX ADMIN — FLUOROURACIL 5350 MG: 50 INJECTION, SOLUTION INTRAVENOUS at 12:01

## 2024-01-23 RX ADMIN — LEUCOVORIN CALCIUM 900 MG: 100 INJECTION, POWDER, LYOPHILIZED, FOR SOLUTION INTRAMUSCULAR; INTRAVENOUS at 10:01

## 2024-01-23 NOTE — PLAN OF CARE
Plan of care reviewed with patient; in agreement. C5D1 completed. Appts reviewed and printed for patient.

## 2024-01-23 NOTE — NURSING
I met Catalina's daughter in the infusion clinic. I explained to her that I am a social work intern student. Catalina was asleep. I explained that Graham is the  who works alongside Dr. Olmstead and explained reasons to contact Graham. I asked if she noticed any signs of depression or anxiety in her mom and she stated she did notice some and it would come and go and is manageable. She asked me if there were any resources or support groups that her mom could refer to and Graham provided a list of resources to her. She is aware of  [facility]. I encouraged her daughter to reach out if ever needed.    Eileen Rodriguez, MSW Intern     COLT MillerW

## 2024-01-25 ENCOUNTER — INFUSION (OUTPATIENT)
Dept: INFUSION THERAPY | Facility: HOSPITAL | Age: 69
End: 2024-01-25
Attending: INTERNAL MEDICINE
Payer: MEDICARE

## 2024-01-25 VITALS
RESPIRATION RATE: 18 BRPM | DIASTOLIC BLOOD PRESSURE: 68 MMHG | OXYGEN SATURATION: 98 % | SYSTOLIC BLOOD PRESSURE: 106 MMHG | HEART RATE: 76 BPM | TEMPERATURE: 98 F

## 2024-01-25 DIAGNOSIS — C18.2 MALIGNANT NEOPLASM OF ASCENDING COLON: Primary | ICD-10-CM

## 2024-01-25 PROCEDURE — 96361 HYDRATE IV INFUSION ADD-ON: CPT

## 2024-01-25 PROCEDURE — 63600175 PHARM REV CODE 636 W HCPCS: Performed by: NURSE PRACTITIONER

## 2024-01-25 PROCEDURE — 25000003 PHARM REV CODE 250: Performed by: NURSE PRACTITIONER

## 2024-01-25 PROCEDURE — 96374 THER/PROPH/DIAG INJ IV PUSH: CPT

## 2024-01-25 RX ORDER — SODIUM CHLORIDE 9 MG/ML
INJECTION, SOLUTION INTRAVENOUS
Status: COMPLETED | OUTPATIENT
Start: 2024-01-25 | End: 2024-01-25

## 2024-01-25 RX ORDER — PROCHLORPERAZINE EDISYLATE 5 MG/ML
5 INJECTION INTRAMUSCULAR; INTRAVENOUS ONCE AS NEEDED
Status: COMPLETED | OUTPATIENT
Start: 2024-01-25 | End: 2024-01-25

## 2024-01-25 RX ORDER — HEPARIN 100 UNIT/ML
500 SYRINGE INTRAVENOUS
Status: DISCONTINUED | OUTPATIENT
Start: 2024-01-25 | End: 2024-01-25 | Stop reason: HOSPADM

## 2024-01-25 RX ORDER — SODIUM CHLORIDE 0.9 % (FLUSH) 0.9 %
10 SYRINGE (ML) INJECTION
Status: DISCONTINUED | OUTPATIENT
Start: 2024-01-25 | End: 2024-01-25 | Stop reason: HOSPADM

## 2024-01-25 RX ADMIN — SODIUM CHLORIDE: 9 INJECTION, SOLUTION INTRAVENOUS at 11:01

## 2024-01-25 RX ADMIN — HEPARIN 500 UNITS: 100 SYRINGE at 01:01

## 2024-01-25 RX ADMIN — PROCHLORPERAZINE EDISYLATE 5 MG: 5 INJECTION INTRAMUSCULAR; INTRAVENOUS at 12:01

## 2024-01-25 NOTE — PLAN OF CARE
C5D3 Pump dc'd with 100% infused. 1L NS given. Tolerated well. Next appts confirmed with pt. Dc'd home in stable condition.

## 2024-01-29 ENCOUNTER — LAB VISIT (OUTPATIENT)
Dept: LAB | Facility: HOSPITAL | Age: 69
End: 2024-01-29
Attending: INTERNAL MEDICINE
Payer: MEDICARE

## 2024-01-29 DIAGNOSIS — C18.2 MALIGNANT NEOPLASM OF ASCENDING COLON: ICD-10-CM

## 2024-01-29 LAB
ALBUMIN SERPL-MCNC: 3.3 G/DL (ref 3.4–4.8)
ALBUMIN/GLOB SERPL: 1.1 RATIO (ref 1.1–2)
ALP SERPL-CCNC: 112 UNIT/L (ref 40–150)
ALT SERPL-CCNC: 17 UNIT/L (ref 0–55)
AST SERPL-CCNC: 16 UNIT/L (ref 5–34)
BASOPHILS # BLD AUTO: 0.02 X10(3)/MCL
BASOPHILS NFR BLD AUTO: 0.8 %
BILIRUB SERPL-MCNC: 0.4 MG/DL
BUN SERPL-MCNC: 20.2 MG/DL (ref 9.8–20.1)
CALCIUM SERPL-MCNC: 8.7 MG/DL (ref 8.4–10.2)
CHLORIDE SERPL-SCNC: 106 MMOL/L (ref 98–107)
CO2 SERPL-SCNC: 26 MMOL/L (ref 23–31)
CREAT SERPL-MCNC: 1.4 MG/DL (ref 0.55–1.02)
EOSINOPHIL # BLD AUTO: 0.06 X10(3)/MCL (ref 0–0.9)
EOSINOPHIL NFR BLD AUTO: 2.5 %
ERYTHROCYTE [DISTWIDTH] IN BLOOD BY AUTOMATED COUNT: 21.5 % (ref 11.5–17)
GFR SERPLBLD CREATININE-BSD FMLA CKD-EPI: 41 MLS/MIN/1.73/M2
GLOBULIN SER-MCNC: 3.1 GM/DL (ref 2.4–3.5)
GLUCOSE SERPL-MCNC: 157 MG/DL (ref 82–115)
HCT VFR BLD AUTO: 30.1 % (ref 37–47)
HGB BLD-MCNC: 9.1 G/DL (ref 12–16)
IMM GRANULOCYTES # BLD AUTO: 0.01 X10(3)/MCL (ref 0–0.04)
IMM GRANULOCYTES NFR BLD AUTO: 0.4 %
LYMPHOCYTES # BLD AUTO: 0.96 X10(3)/MCL (ref 0.6–4.6)
LYMPHOCYTES NFR BLD AUTO: 39.7 %
MCH RBC QN AUTO: 23.8 PG (ref 27–31)
MCHC RBC AUTO-ENTMCNC: 30.2 G/DL (ref 33–36)
MCV RBC AUTO: 78.6 FL (ref 80–94)
MONOCYTES # BLD AUTO: 0.22 X10(3)/MCL (ref 0.1–1.3)
MONOCYTES NFR BLD AUTO: 9.1 %
NEUTROPHILS # BLD AUTO: 1.15 X10(3)/MCL (ref 2.1–9.2)
NEUTROPHILS NFR BLD AUTO: 47.5 %
PLATELET # BLD AUTO: 74 X10(3)/MCL (ref 130–400)
PMV BLD AUTO: ABNORMAL FL
POTASSIUM SERPL-SCNC: 4.8 MMOL/L (ref 3.5–5.1)
PROT SERPL-MCNC: 6.4 GM/DL (ref 5.8–7.6)
RBC # BLD AUTO: 3.83 X10(6)/MCL (ref 4.2–5.4)
SODIUM SERPL-SCNC: 139 MMOL/L (ref 136–145)
WBC # SPEC AUTO: 2.42 X10(3)/MCL (ref 4.5–11.5)

## 2024-01-29 PROCEDURE — 36415 COLL VENOUS BLD VENIPUNCTURE: CPT

## 2024-01-29 PROCEDURE — 85025 COMPLETE CBC W/AUTO DIFF WBC: CPT

## 2024-01-29 PROCEDURE — 80053 COMPREHEN METABOLIC PANEL: CPT

## 2024-02-01 DIAGNOSIS — F33.42 RECURRENT MAJOR DEPRESSIVE DISORDER, IN FULL REMISSION: ICD-10-CM

## 2024-02-01 RX ORDER — SERTRALINE HYDROCHLORIDE 50 MG/1
TABLET, FILM COATED ORAL
Qty: 90 TABLET | Refills: 3 | Status: SHIPPED | OUTPATIENT
Start: 2024-02-01

## 2024-02-05 ENCOUNTER — OFFICE VISIT (OUTPATIENT)
Dept: HEMATOLOGY/ONCOLOGY | Facility: CLINIC | Age: 69
End: 2024-02-05
Payer: MEDICARE

## 2024-02-05 ENCOUNTER — LAB VISIT (OUTPATIENT)
Dept: LAB | Facility: HOSPITAL | Age: 69
End: 2024-02-05
Attending: INTERNAL MEDICINE
Payer: MEDICARE

## 2024-02-05 ENCOUNTER — TELEPHONE (OUTPATIENT)
Dept: HEMATOLOGY/ONCOLOGY | Facility: CLINIC | Age: 69
End: 2024-02-05

## 2024-02-05 VITALS
HEIGHT: 65 IN | DIASTOLIC BLOOD PRESSURE: 75 MMHG | BODY MASS INDEX: 39.51 KG/M2 | OXYGEN SATURATION: 100 % | HEART RATE: 85 BPM | RESPIRATION RATE: 18 BRPM | WEIGHT: 237.13 LBS | SYSTOLIC BLOOD PRESSURE: 130 MMHG | TEMPERATURE: 98 F

## 2024-02-05 DIAGNOSIS — C18.2 MALIGNANT NEOPLASM OF ASCENDING COLON: ICD-10-CM

## 2024-02-05 DIAGNOSIS — R91.8 PULMONARY NODULES: ICD-10-CM

## 2024-02-05 DIAGNOSIS — E53.8 FOLIC ACID DEFICIENCY: ICD-10-CM

## 2024-02-05 DIAGNOSIS — R97.0 ELEVATED CEA: ICD-10-CM

## 2024-02-05 DIAGNOSIS — R10.9 RIGHT SIDED ABDOMINAL PAIN: Primary | ICD-10-CM

## 2024-02-05 DIAGNOSIS — E11.42 DIABETIC POLYNEUROPATHY ASSOCIATED WITH TYPE 2 DIABETES MELLITUS: ICD-10-CM

## 2024-02-05 DIAGNOSIS — B37.0 ORAL THRUSH: ICD-10-CM

## 2024-02-05 DIAGNOSIS — D50.9 MICROCYTIC ANEMIA: ICD-10-CM

## 2024-02-05 LAB
ALBUMIN SERPL-MCNC: 3.3 G/DL (ref 3.4–4.8)
ALBUMIN/GLOB SERPL: 1 RATIO (ref 1.1–2)
ALP SERPL-CCNC: 119 UNIT/L (ref 40–150)
ALT SERPL-CCNC: 17 UNIT/L (ref 0–55)
ANISOCYTOSIS BLD QL SMEAR: ABNORMAL
AST SERPL-CCNC: 20 UNIT/L (ref 5–34)
BASOPHILS # BLD AUTO: 0.02 X10(3)/MCL
BASOPHILS NFR BLD AUTO: 0.5 %
BILIRUB SERPL-MCNC: 0.5 MG/DL
BUN SERPL-MCNC: 21.8 MG/DL (ref 9.8–20.1)
CALCIUM SERPL-MCNC: 8.8 MG/DL (ref 8.4–10.2)
CHLORIDE SERPL-SCNC: 104 MMOL/L (ref 98–107)
CO2 SERPL-SCNC: 26 MMOL/L (ref 23–31)
CREAT SERPL-MCNC: 1.58 MG/DL (ref 0.55–1.02)
EOSINOPHIL # BLD AUTO: 0.11 X10(3)/MCL (ref 0–0.9)
EOSINOPHIL NFR BLD AUTO: 2.9 %
ERYTHROCYTE [DISTWIDTH] IN BLOOD BY AUTOMATED COUNT: 22.5 % (ref 11.5–17)
GFR SERPLBLD CREATININE-BSD FMLA CKD-EPI: 36 MLS/MIN/1.73/M2
GLOBULIN SER-MCNC: 3.3 GM/DL (ref 2.4–3.5)
GLUCOSE SERPL-MCNC: 155 MG/DL (ref 82–115)
HCT VFR BLD AUTO: 30.1 % (ref 37–47)
HGB BLD-MCNC: 9.3 G/DL (ref 12–16)
IMM GRANULOCYTES # BLD AUTO: 0.01 X10(3)/MCL (ref 0–0.04)
IMM GRANULOCYTES NFR BLD AUTO: 0.3 %
LYMPHOCYTES # BLD AUTO: 0.96 X10(3)/MCL (ref 0.6–4.6)
LYMPHOCYTES NFR BLD AUTO: 25.3 %
MACROCYTES BLD QL SMEAR: ABNORMAL
MCH RBC QN AUTO: 24.3 PG (ref 27–31)
MCHC RBC AUTO-ENTMCNC: 30.9 G/DL (ref 33–36)
MCV RBC AUTO: 78.6 FL (ref 80–94)
MONOCYTES # BLD AUTO: 0.34 X10(3)/MCL (ref 0.1–1.3)
MONOCYTES NFR BLD AUTO: 8.9 %
NEUTROPHILS # BLD AUTO: 2.36 X10(3)/MCL (ref 2.1–9.2)
NEUTROPHILS NFR BLD AUTO: 62.1 %
OVALOCYTES (OLG): ABNORMAL
PLATELET # BLD AUTO: 74 X10(3)/MCL (ref 130–400)
PLATELET # BLD EST: ABNORMAL 10*3/UL
PMV BLD AUTO: ABNORMAL FL
POIKILOCYTOSIS BLD QL SMEAR: ABNORMAL
POTASSIUM SERPL-SCNC: 4.9 MMOL/L (ref 3.5–5.1)
PROT SERPL-MCNC: 6.6 GM/DL (ref 5.8–7.6)
RBC # BLD AUTO: 3.83 X10(6)/MCL (ref 4.2–5.4)
RBC MORPH BLD: ABNORMAL
SCHISTOCYTE (OLG): ABNORMAL
SODIUM SERPL-SCNC: 139 MMOL/L (ref 136–145)
WBC # SPEC AUTO: 3.8 X10(3)/MCL (ref 4.5–11.5)

## 2024-02-05 PROCEDURE — 36415 COLL VENOUS BLD VENIPUNCTURE: CPT

## 2024-02-05 PROCEDURE — 3288F FALL RISK ASSESSMENT DOCD: CPT | Mod: CPTII,S$GLB,, | Performed by: INTERNAL MEDICINE

## 2024-02-05 PROCEDURE — 3008F BODY MASS INDEX DOCD: CPT | Mod: CPTII,S$GLB,, | Performed by: INTERNAL MEDICINE

## 2024-02-05 PROCEDURE — 99999 PR PBB SHADOW E&M-EST. PATIENT-LVL V: CPT | Mod: PBBFAC,,, | Performed by: INTERNAL MEDICINE

## 2024-02-05 PROCEDURE — 1160F RVW MEDS BY RX/DR IN RCRD: CPT | Mod: CPTII,S$GLB,, | Performed by: INTERNAL MEDICINE

## 2024-02-05 PROCEDURE — 1126F AMNT PAIN NOTED NONE PRSNT: CPT | Mod: CPTII,S$GLB,, | Performed by: INTERNAL MEDICINE

## 2024-02-05 PROCEDURE — 3078F DIAST BP <80 MM HG: CPT | Mod: CPTII,S$GLB,, | Performed by: INTERNAL MEDICINE

## 2024-02-05 PROCEDURE — 80053 COMPREHEN METABOLIC PANEL: CPT

## 2024-02-05 PROCEDURE — 85025 COMPLETE CBC W/AUTO DIFF WBC: CPT

## 2024-02-05 PROCEDURE — 3075F SYST BP GE 130 - 139MM HG: CPT | Mod: CPTII,S$GLB,, | Performed by: INTERNAL MEDICINE

## 2024-02-05 PROCEDURE — 99215 OFFICE O/P EST HI 40 MIN: CPT | Mod: S$GLB,,, | Performed by: INTERNAL MEDICINE

## 2024-02-05 PROCEDURE — 1159F MED LIST DOCD IN RCRD: CPT | Mod: CPTII,S$GLB,, | Performed by: INTERNAL MEDICINE

## 2024-02-05 PROCEDURE — 1101F PT FALLS ASSESS-DOCD LE1/YR: CPT | Mod: CPTII,S$GLB,, | Performed by: INTERNAL MEDICINE

## 2024-02-05 NOTE — PROGRESS NOTES
HEMATOLOGY/ONCOLOGY OFFICE CLINIC VISIT    Visit Information:    Initial Evaluation: 4/22/2022  Referring Provider: Maggy Jaquez NP  Other providers: Dr Timothy Russ  Code status: Not addressed     Diagnosis/Problem list:   pT3 N2a Mx Stage IIIB Colon cancer. Dx 10/19/23  Microcytic anemia.   Folate deficiency     Present Treatment:  FOLFOX with dose reduction on Oxaliplatin due to neuropathy. Started on 11/28/23.    Treatment history:  10/19/2023 Lap/jaswinder right colon resection         Imaging studies:  CT C/A/P 6/3/2022: There is no significant hepatic steatosis.  The liver is cirrhotic.  No liver lesion is identified.  The spleen is enlarged, measuring 15.5 cm in length.  There is no retroperitoneal lymphadenopathy or abdominal aortic aneurysm.  A mildly prominent right external iliac lymph node measures 9 mm in short axis, nonspecific.  This is also similar in appearance when compared to 2015. Impression: Cirrhosis. Splenomegaly.  CT CAP 9/11/2023: There is no supraclavicular or axillary lymphadenopathy.  No mediastinal adenopathy. 3 mm right upper lobe pulmonary nodule. There are few additional pulmonary micronodules in the lung apices measuring no greater than 1-2 mm.  Findings similar to prior exam.     Impression: Scattered pulmonary nodules in the upper lobe similar to prior.  No further follow-up acute according to Fleischner criteria.  No convincing evidence for metastatic disease. Nodular appearing liver, correlation for cirrhosis.    Pathology:  10/19/2023:  COLON, RIGHT HEMICOLECTOMY (1.5 CM OF TERMINAL ILEUM, 25 CM LENGTH OF RIGHT COLON): POORLY DIFFERENTIATED ADENOCARCINOMA WITH FOCAL SIGNET RING CELL FEATURES, UNIFOCAL, RIGHT COLON, 8 CM GREATEST DIMENSION.   THE TUMOR INVADES THROUGH THE MUSCULARIS PROPRIA INTO PERICOLONIC TISSUE (PT3).   FOCAL LYMPHVASCULAR INVASION IS PRESENT.   THE SURGICAL MARGINS ARE FREE OF TUMOR.   METASTATIC ADENOCARCINOMA IS IDENTIFIED IN FOUR (4) OF TWENTY-SEVEN (27)    PERICOLONIC LYMPH NODES (LARGEST METASTATIC DEPOSIT 8 MM; FOCAL EXTRACAPSULAR EXTENSION OF TUMOR IS PRESENT).      PATHOLOGIC STAGING:  pT3 N2a Mx     Histologic Grade:  G3, poorly differentiated   Macroscopic Tumor Perforation:  Not identified   Lymphovascular Invasion:  Small vessel   Perineural Invasion:  Not identified        CLINICAL HISTORY:       Patient: Indu Azul is a 68 y.o. female. with multiple medical problems that I saw originally on 2022 for thrombocytopenia.  Patient platelet counts on 2021 were 132,000 and since that that has been slowly trending down.  2021 platelets 132,000  2022 platelets 121,000  2022 platelets 115,000     Of note patient have a UA done that same day that suggest possible UTI with positive nitrates, 1+ leukocytes and many bacteria.  Urine culture with E. coli.   Reviewing electronic medical record and going back to 12/15/2014 patient with occasional low platelets.  They were never lower than 100,000 and they always normalized.   As per patient in 2020 when her platelets were slightly decreased (117,000), this was shortly after she has her left foot surgery.  Then in May 17 and May 18 2021, platelets were 105k and 101k,  she had COVID.  Again in 2022 she have a UTI for which she completed antibiotics.     Patient's father diagnosed Blood disorder requiring blood transfusion that started q 4 weeks and the q week. He was diagnosed on his 80's but  at 91. Sister and niece had ovarian cancer. Sister  of it. Niece still alive.     She was found to have iron deficiency anemia.  EGD performed, grade I-II esophageal varices found, too small to band. She also had Colonoscopy by Dr Hammonds that showed an ulcerated malignant-appearing partially obstructing medium sized mass in the ascending colon.  She reports that she had a colonoscopy about 5 years ago at [facility] and everything was fine, no polyps or masses.   Biopsy and tattoo of the  mass showed fragments of colonic mucosa, no evidence of dysplasia or malignancy.  Two other polyps were completely removed in the descending and sigmoid colon, pathology returned hyperplastic polyps.  CT abd/pel with no acute findings.     Despite of biopsy because of malignant-appearing partially obstructing mass of the ascending colon and photographs and description were consistent with colon cancer she underwent Lap/jaswinder right colon resection on 10/19/2023 by Dr Timothy Russ. Path c/w really differentiated adeno carcinoma.         Chief Complaint: 2 Week Follow Up (PT states she have white stuff on back if tongue and she have questions about her treatment.)      Interval History:  Patient with h/o Charcot feet for which she had surgery. She does not have sensation on her feet from ankle down. This was prior to her diagnosis of colon cancer. She is getting Oxaloplatin but neuropathy is not worse. She started dose reduced and will cont like same dose. If neuropathy worsen will d/c oxaliplatin.       2/5/2024: Patient presents today for labs and TD prior to C6 of FOLFOX with dose reduction of Oxaliplatin due to neuropathy, accompanied by her daughter. She complains of oral thrush and occasional right sided abdominal pain. No worsening in neuropathy as of yet. Hyperpigmentation of nails and skin on hands commonly seen with 5FU. Overall, she is doing fair. No fever, chills or sweats. Denies chest pain or shortness of breath. No bleeding. Bowels are moving without difficulty.  Today's labs are pending.      Past Medical History:   Diagnosis Date    Anesthesia complication     trouble awakening    Charcot's joint of foot, left     Chronic kidney disease, unspecified     Cirrhosis of liver without ascites     Depression     Diabetes mellitus, type II, insulin dependent     Diabetic neuropathy     GERD (gastroesophageal reflux disease)     H/O: hysterectomy     Hepatic steatosis     Malignant neoplasm of ascending colon      Obesity, unspecified     Overactive bladder     Thrombocytopenia, unspecified     Vitamin D deficiency       Past Surgical History:   Procedure Laterality Date    APPENDECTOMY      BREAST BIOPSY  2016     SECTION      x3    charcot-leyden crystals identification      CHOLECYSTECTOMY      COLONOSCOPY N/A 2023    Procedure: COLON;  Surgeon: Luis Amador MD;  Location: [facility] ENDOSCOPY;  Service: Gastroenterology;  Laterality: N/A;    COLONOSCOPY, WITH 1 OR MORE BIOPSIES  2023    Procedure: COLONOSCOPY, WITH 1 OR MORE BIOPSIES;  Surgeon: Luis Amador MD;  Location: [facility] ENDOSCOPY;  Service: Gastroenterology;;    COLONOSCOPY, WITH DIRECTED SUBMUCOSAL INJECTION  2023    Procedure: COLONOSCOPY, WITH DIRECTED SUBMUCOSAL INJECTION;  Surgeon: Luis Amador MD;  Location: [facility] ENDOSCOPY;  Service: Gastroenterology;;  8cc Colon Tattoo    COLONOSCOPY, WITH POLYPECTOMY USING SNARE  2023    Procedure: COLONOSCOPY, WITH POLYPECTOMY USING SNARE;  Surgeon: Luis Amador MD;  Location: [facility] ENDOSCOPY;  Service: Gastroenterology;;    ESOPHAGOGASTRODUODENOSCOPY N/A 2023    Procedure: DOUBLE;  Surgeon: Luis Amador MD;  Location: [facility] ENDOSCOPY;  Service: Gastroenterology;  Laterality: N/A;    HEMORRHOID SURGERY      HYSTERECTOMY      total    INSERTION OF TUNNELED CENTRAL VENOUS CATHETER (CVC) WITH SUBCUTANEOUS PORT Right 2023    Procedure: ZRILNLPIE-MDIP-F-CATH;  Surgeon: Timothy Russ MD;  Location: [facility];  Service: General;  Laterality: Right;    right foot surgery Right 2022    XI ROBOTIC COLECTOMY, RIGHT N/A 10/19/2023    Procedure: XI ROBOTIC COLECTOMY, RIGHT;  Surgeon: Timothy Russ MD;  Location: [facility];  Service: General;  Laterality: N/A;     Family History   Problem Relation Age of Onset    Diabetes Mother     Alzheimer's disease Mother     Diabetes Father     Leukemia Father      Diabetes Sister     Liver cancer Sister     Diabetes Brother      Social Connections: Not on file       Review of patient's allergies indicates:  No Known Allergies   Current Outpatient Medications on File Prior to Visit   Medication Sig Dispense Refill    dulaglutide (TRULICITY) 3 mg/0.5 mL pen injector Inject 3 mg into the skin every 7 days. 4 pen 11    ferrous sulfate (FEOSOL) 325 mg (65 mg iron) Tab tablet Take 1 tablet (325 mg total) by mouth 2 (two) times daily. 60 tablet 11    folic acid (FOLVITE) 1 MG tablet Take 1 tablet (1 mg total) by mouth once daily. 100 tablet 3    furosemide (LASIX) 40 MG tablet Take 40 mg by mouth as needed.      gabapentin (NEURONTIN) 300 MG capsule Take 2 capsules (600 mg total) by mouth every evening. 60 capsule 5    HYDROcodone-acetaminophen (NORCO) 5-325 mg per tablet Take 1 tablet by mouth every 6 (six) hours as needed for Pain. 15 tablet 0    insulin glargine, TOUJEO, (TOUJEO SOLOSTAR U-300 INSULIN) 300 unit/mL (1.5 mL) InPn pen INJECT 79 UNITS SUBCUTANEOUSLY ONCE DAILY 6 mL 11    insulin syringe-needle U-100 1 mL 31 gauge x 5/16 Syrg Inject 1 Syringe into the skin 3 (three) times daily with meals.      levoFLOXacin (LEVAQUIN) 500 MG tablet Take 1 tablet (500 mg total) by mouth once daily. 7 tablet 0    LYUMJEV KWIKPEN U-200 INSULIN 200 unit/mL (3 mL) pen Sliding scale      OLANZapine (ZYPREXA) 5 MG tablet Take 1 tablet (5 mg total) by mouth every evening. Take nightly on days 1-3 of each chemotherapy cycle. 6 tablet 5    ondansetron (ZOFRAN-ODT) 8 MG TbDL Take 1 tablet (8 mg total) by mouth every 6 (six) hours as needed. 10 tablet 0    prochlorperazine (COMPAZINE) 5 MG tablet Take 5 mg by mouth every 6 (six) hours as needed.      sertraline (ZOLOFT) 50 MG tablet Take 1 tablet by mouth once daily 90 tablet 3    solifenacin (VESICARE) 10 MG tablet Take 10 mg by mouth once daily.      spironolactone (ALDACTONE) 50 MG tablet Take 50 mg by mouth once daily.      k phos di &  "mono-sod phos mono (K-PHOS-NEUTRAL) 250 mg Tab Take 1 tablet by mouth 2 (two) times a day. for 2 days 4 tablet 0     Current Facility-Administered Medications on File Prior to Visit   Medication Dose Route Frequency Provider Last Rate Last Admin    promethazine (PHENERGAN) 12.5 mg in dextrose 5 % (D5W) 50 mL IVPB  12.5 mg Intravenous Once Gayatri Jaffe, DENIS          Review of Systems   Constitutional:  Positive for fatigue. Negative for activity change, appetite change, chills, fever and unexpected weight change.   HENT:  Positive for mouth dryness. Negative for mouth sores, nosebleeds, sore throat and trouble swallowing.         Thrush     Eyes: Negative.  Negative for visual disturbance.   Respiratory:  Negative for cough and shortness of breath.    Cardiovascular:  Negative for chest pain, palpitations and leg swelling.   Gastrointestinal:  Positive for nausea. Negative for abdominal distention, abdominal pain, blood in stool, change in bowel habit, constipation, diarrhea and vomiting.   Endocrine: Negative.    Genitourinary:  Positive for enuresis. Negative for dysuria, frequency, hematuria and urgency.   Musculoskeletal:  Negative for arthralgias, back pain, myalgias and neck pain.   Integumentary:  Negative for rash and wound.        Nail changes     Neurological:  Negative for dizziness, tremors, syncope, speech difficulty, weakness, light-headedness, numbness, headaches and memory loss.        Neuropathy same-not related to chemotherapy   Hematological:  Does not bruise/bleed easily.   Psychiatric/Behavioral:  Negative for confusion and suicidal ideas.             Vitals:    02/05/24 0904   BP: 130/75   BP Location: Right arm   Patient Position: Sitting   Pulse: 85   Resp: 18   Temp: 97.8 °F (36.6 °C)   TempSrc: Oral   SpO2: 100%   Weight: 107.5 kg (237 lb 1.6 oz)   Height: 5' 5" (1.651 m)        Wt Readings from Last 6 Encounters:   02/05/24 107.5 kg (237 lb 1.6 oz)   01/23/24 106 kg (233 lb 11.2 oz) "   01/22/24 106 kg (233 lb 11.2 oz)   01/11/24 106.8 kg (235 lb 7.2 oz)   01/09/24 106.9 kg (235 lb 9.6 oz)   01/08/24 106.9 kg (235 lb 9.6 oz)     Body mass index is 39.46 kg/m².  Body surface area is 2.22 meters squared.  Physical Exam  Vitals and nursing note reviewed.   Constitutional:       General: She is not in acute distress.     Appearance: Normal appearance. She is obese.      Comments: Laparoscopic incisions well healed    HENT:      Head: Normocephalic and atraumatic.      Mouth/Throat:      Mouth: Mucous membranes are moist.   Eyes:      General: No scleral icterus.     Extraocular Movements: Extraocular movements intact.      Conjunctiva/sclera: Conjunctivae normal.      Pupils: Pupils are equal, round, and reactive to light.   Neck:      Vascular: No JVD.   Cardiovascular:      Rate and Rhythm: Normal rate and regular rhythm.      Heart sounds: No murmur heard.  Pulmonary:      Effort: Pulmonary effort is normal.      Breath sounds: Normal breath sounds. No wheezing or rhonchi.   Chest:      Comments: Right chest wall mediport in place.    Abdominal:      General: Bowel sounds are normal. There is no distension.      Palpations: Abdomen is soft. There is no mass.      Tenderness: There is no abdominal tenderness.      Comments: Surgical incisions healing after colon surgery.    Musculoskeletal:         General: No swelling or deformity.      Cervical back: Neck supple.   Lymphadenopathy:      Head:      Right side of head: No submandibular adenopathy.      Left side of head: No submandibular adenopathy.      Cervical: No cervical adenopathy.      Upper Body:      Right upper body: No supraclavicular or axillary adenopathy.      Left upper body: No supraclavicular or axillary adenopathy.      Lower Body: No right inguinal adenopathy. No left inguinal adenopathy.   Skin:     General: Skin is warm.      Coloration: Skin is not jaundiced.      Findings: No lesion or rash.      Nails: There is no clubbing.       Comments: Darkening of nails and skin on hands commonly seen with 5FU   Neurological:      General: No focal deficit present.      Mental Status: She is alert and oriented to person, place, and time.      Cranial Nerves: Cranial nerves 2-12 are intact.   Psychiatric:         Attention and Perception: Attention normal.         Mood and Affect: Mood is depressed.         Behavior: Behavior is cooperative.         Judgment: Judgment normal.       ECOG SCORE    1 - Restricted in strenuous activity-ambulatory and able to carry out work of a light nature         Laboratory:  CBC with Differential:  Lab Results   Component Value Date    WBC 3.80 (L) 02/05/2024    RBC 3.83 (L) 02/05/2024    HGB 9.3 (L) 02/05/2024    HCT 30.1 (L) 02/05/2024    MCV 78.6 (L) 02/05/2024    MCH 24.3 (L) 02/05/2024    MCHC 30.9 (L) 02/05/2024    RDW 22.5 (H) 02/05/2024    PLT 74 (L) 02/05/2024    MPV  02/05/2024      Comment:      na        CMP:  Sodium   Date Value Ref Range Status   06/01/2022 140 136 - 145 mmol/L Final     Sodium Level   Date Value Ref Range Status   02/05/2024 139 136 - 145 mmol/L Final     Potassium   Date Value Ref Range Status   06/01/2022 4.9 3.5 - 5.1 mmol/L Final     Potassium Level   Date Value Ref Range Status   02/05/2024 4.9 3.5 - 5.1 mmol/L Final     Chloride   Date Value Ref Range Status   06/01/2022 104 100 - 109 mmol/L Final     Carbon Dioxide   Date Value Ref Range Status   02/05/2024 26 23 - 31 mmol/L Final   06/01/2022 29 22 - 33 mmol/L Final     Blood Urea Nitrogen   Date Value Ref Range Status   02/05/2024 21.8 (H) 9.8 - 20.1 mg/dL Final   06/01/2022 22 5 - 25 mg/dL Final     Creatinine   Date Value Ref Range Status   02/05/2024 1.58 (H) 0.55 - 1.02 mg/dL Final   06/01/2022 1.32 (H) 0.57 - 1.25 mg/dL Final     Calcium   Date Value Ref Range Status   06/01/2022 8.7 (L) 8.8 - 10.6 mg/dL Final     Calcium Level Total   Date Value Ref Range Status   02/05/2024 8.8 8.4 - 10.2 mg/dL Final     Albumin Level    Date Value Ref Range Status   02/05/2024 3.3 (L) 3.4 - 4.8 g/dL Final     Bilirubin Total   Date Value Ref Range Status   02/05/2024 0.5 <=1.5 mg/dL Final     Alkaline Phosphatase   Date Value Ref Range Status   02/05/2024 119 40 - 150 unit/L Final     Aspartate Aminotransferase   Date Value Ref Range Status   02/05/2024 20 5 - 34 unit/L Final     Alanine Aminotransferase   Date Value Ref Range Status   02/05/2024 17 0 - 55 unit/L Final     Anion Gap   Date Value Ref Range Status   06/01/2022 7 (L) 8 - 16 mmol/L Final     eGFR    Date Value Ref Range Status   06/01/2022 45 mL/min/1.73mSq Final     Comment:     In accordance with NKF-ASN Task Force recommendation, calculation based on the Chronic Kidney Disease Epidemiology Collaboration (CKD-EPI) equation without adjustment for race. eGFR adjusted for gender and age and calculated in ml/min/1.73mSquared. eGFR cannot be calculated if patient is under 18 years of age.     Reference Range:   >= 60 ml/min/1.73mSquared.     Estimated GFR-Non    Date Value Ref Range Status   08/04/2022 43 mls/min/1.73/m2 Final         Assessment:       1) Stage IIIB adenocarcinoma of the ascending colon  --Patient will benefit of adjuvant chemotherapy.   --Due to severe diabetic neuropathy, will try dose reduction of Oxaliplatin, starting 65 mg/m2.    --If not tolerated, then with d/c Oxaliplatin and cont 5FU and LV  2) Lung nodules-Stable. Will monitor  3) iron deficiency anemia-on iron PO  4) Thrombocytopenia-resolved. Will follow counts closely as she has splenomegaly and this can affects her counts mostly platelets.  5) Folate deficiency-she will start taking folic acid at this time.   6) Diabetic neuropathy-severe. She does not feel her feet.  --She will have nerve stimulator placement to see if can help her feet     Educated the patient on the risks versus benefits as well as toxicities associated with treatment.  Verbally consented the patient to  the treatment plan and the patient was educated on the planned duration of the treatment and schedule of the treatment administration.  All questions were answered.      Plan:       Plan: Adjuvant FOLFOX x 6 months,with dose reduction of oxaliplatin due to severe (Grade 3) neuropathy. If not tolerated, then with d/c Oxaliplatin and cont 5FU and LV. Neuropathy same.    Weekly CBC, CMP - standing order placed  Will send prescription for magic mouthwash for oral thrush  CT A/P to evaluate right-sided abdominal pain  Today's labs are pending - will call if counts are too low and she cannot have chemo tomorrow  RTC in 2 weeks with for TD/labs: CBC, CMP, CEA,- chemo next day    Encouraged to call with questions or problems  The patient was given ample opportunity to ask questions and they were all answered to satisfaction; patient demonstrated understanding of what we discussed and is agreeable to the plan.    Fatemeh Olmstead MD  Hematology/Oncology    Addendum: Will hold C6 tomorrow, 2/6/2024--platelets low 74k. TC 1 week for possible treatment with NP    Professional Services   I, Ashlie Duong LPN, acted solely as a scribe for and in the presence of Dr. Fatemeh Olmstead, who performed these services.       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 I called the patient and left message of findings and that I was calling a blood thinner to her pharmacy. Eliquis send to her pharmacy

## 2024-02-08 ENCOUNTER — HOSPITAL ENCOUNTER (OUTPATIENT)
Dept: RADIOLOGY | Facility: HOSPITAL | Age: 69
Discharge: HOME | End: 2024-02-08
Attending: INTERNAL MEDICINE
Payer: MEDICARE

## 2024-02-08 DIAGNOSIS — I81 PORTAL VEIN THROMBOSIS: ICD-10-CM

## 2024-02-08 DIAGNOSIS — R91.8 PULMONARY NODULES: ICD-10-CM

## 2024-02-08 DIAGNOSIS — C18.2 MALIGNANT NEOPLASM OF ASCENDING COLON: ICD-10-CM

## 2024-02-08 DIAGNOSIS — D50.9 MICROCYTIC ANEMIA: ICD-10-CM

## 2024-02-08 DIAGNOSIS — C18.2 MALIGNANT NEOPLASM OF ASCENDING COLON: Primary | ICD-10-CM

## 2024-02-08 DIAGNOSIS — R97.0 ELEVATED CEA: ICD-10-CM

## 2024-02-08 DIAGNOSIS — E53.8 FOLIC ACID DEFICIENCY: ICD-10-CM

## 2024-02-08 DIAGNOSIS — R10.9 RIGHT SIDED ABDOMINAL PAIN: ICD-10-CM

## 2024-02-08 PROCEDURE — 74170 CT ABD WO CNTRST FLWD CNTRST: CPT | Mod: TC

## 2024-02-08 PROCEDURE — 25500020 PHARM REV CODE 255: Performed by: INTERNAL MEDICINE

## 2024-02-08 PROCEDURE — 72194 CT PELVIS W/O & W/DYE: CPT | Mod: TC

## 2024-02-08 RX ADMIN — DIATRIZOATE MEGLUMINE AND DIATRIZOATE SODIUM 30 ML: 660; 100 LIQUID ORAL; RECTAL at 08:02

## 2024-02-08 RX ADMIN — IOPAMIDOL 100 ML: 755 INJECTION, SOLUTION INTRAVENOUS at 08:02

## 2024-02-12 ENCOUNTER — INFUSION (OUTPATIENT)
Dept: INFUSION THERAPY | Facility: HOSPITAL | Age: 69
End: 2024-02-12
Attending: INTERNAL MEDICINE
Payer: MEDICARE

## 2024-02-12 ENCOUNTER — OFFICE VISIT (OUTPATIENT)
Dept: HEMATOLOGY/ONCOLOGY | Facility: CLINIC | Age: 69
End: 2024-02-12
Payer: MEDICARE

## 2024-02-12 VITALS
HEART RATE: 83 BPM | HEIGHT: 65 IN | TEMPERATURE: 98 F | BODY MASS INDEX: 40.35 KG/M2 | OXYGEN SATURATION: 100 % | SYSTOLIC BLOOD PRESSURE: 127 MMHG | DIASTOLIC BLOOD PRESSURE: 79 MMHG | WEIGHT: 242.19 LBS | RESPIRATION RATE: 18 BRPM

## 2024-02-12 VITALS
TEMPERATURE: 98 F | BODY MASS INDEX: 40.35 KG/M2 | RESPIRATION RATE: 18 BRPM | SYSTOLIC BLOOD PRESSURE: 127 MMHG | DIASTOLIC BLOOD PRESSURE: 79 MMHG | OXYGEN SATURATION: 100 % | HEIGHT: 65 IN | HEART RATE: 83 BPM | WEIGHT: 242.19 LBS

## 2024-02-12 DIAGNOSIS — I81 PORTAL VEIN THROMBOSIS: ICD-10-CM

## 2024-02-12 DIAGNOSIS — T45.1X5A IMMUNODEFICIENCY DUE TO CHEMOTHERAPY: ICD-10-CM

## 2024-02-12 DIAGNOSIS — C18.2 MALIGNANT NEOPLASM OF ASCENDING COLON: Primary | ICD-10-CM

## 2024-02-12 DIAGNOSIS — R97.0 ELEVATED CEA: ICD-10-CM

## 2024-02-12 DIAGNOSIS — Z51.81 THERAPEUTIC DRUG MONITORING: ICD-10-CM

## 2024-02-12 DIAGNOSIS — Z79.899 IMMUNODEFICIENCY DUE TO CHEMOTHERAPY: ICD-10-CM

## 2024-02-12 DIAGNOSIS — D84.821 IMMUNODEFICIENCY DUE TO CHEMOTHERAPY: ICD-10-CM

## 2024-02-12 DIAGNOSIS — B37.0 ORAL THRUSH: ICD-10-CM

## 2024-02-12 PROBLEM — Z79.69 IMMUNODEFICIENCY DUE TO CHEMOTHERAPY: Status: ACTIVE | Noted: 2024-02-12

## 2024-02-12 PROCEDURE — 1159F MED LIST DOCD IN RCRD: CPT | Mod: CPTII,S$GLB,, | Performed by: INTERNAL MEDICINE

## 2024-02-12 PROCEDURE — 99999 PR PBB SHADOW E&M-EST. PATIENT-LVL V: CPT | Mod: PBBFAC,,, | Performed by: INTERNAL MEDICINE

## 2024-02-12 PROCEDURE — 3078F DIAST BP <80 MM HG: CPT | Mod: CPTII,S$GLB,, | Performed by: INTERNAL MEDICINE

## 2024-02-12 PROCEDURE — 96413 CHEMO IV INFUSION 1 HR: CPT

## 2024-02-12 PROCEDURE — 3008F BODY MASS INDEX DOCD: CPT | Mod: CPTII,S$GLB,, | Performed by: INTERNAL MEDICINE

## 2024-02-12 PROCEDURE — 3074F SYST BP LT 130 MM HG: CPT | Mod: CPTII,S$GLB,, | Performed by: INTERNAL MEDICINE

## 2024-02-12 PROCEDURE — 96415 CHEMO IV INFUSION ADDL HR: CPT

## 2024-02-12 PROCEDURE — 25000003 PHARM REV CODE 250: Performed by: INTERNAL MEDICINE

## 2024-02-12 PROCEDURE — 3288F FALL RISK ASSESSMENT DOCD: CPT | Mod: CPTII,S$GLB,, | Performed by: INTERNAL MEDICINE

## 2024-02-12 PROCEDURE — 96411 CHEMO IV PUSH ADDL DRUG: CPT

## 2024-02-12 PROCEDURE — 63600175 PHARM REV CODE 636 W HCPCS: Performed by: INTERNAL MEDICINE

## 2024-02-12 PROCEDURE — 1126F AMNT PAIN NOTED NONE PRSNT: CPT | Mod: CPTII,S$GLB,, | Performed by: INTERNAL MEDICINE

## 2024-02-12 PROCEDURE — 1101F PT FALLS ASSESS-DOCD LE1/YR: CPT | Mod: CPTII,S$GLB,, | Performed by: INTERNAL MEDICINE

## 2024-02-12 PROCEDURE — 99215 OFFICE O/P EST HI 40 MIN: CPT | Mod: S$GLB,,, | Performed by: INTERNAL MEDICINE

## 2024-02-12 PROCEDURE — 96416 CHEMO PROLONG INFUSE W/PUMP: CPT

## 2024-02-12 PROCEDURE — 96367 TX/PROPH/DG ADDL SEQ IV INF: CPT

## 2024-02-12 PROCEDURE — 96368 THER/DIAG CONCURRENT INF: CPT

## 2024-02-12 RX ORDER — FLUOROURACIL 50 MG/ML
900 INJECTION, SOLUTION INTRAVENOUS
Status: COMPLETED | OUTPATIENT
Start: 2024-02-12 | End: 2024-02-12

## 2024-02-12 RX ORDER — HEPARIN 100 UNIT/ML
500 SYRINGE INTRAVENOUS
Status: CANCELLED | OUTPATIENT
Start: 2024-02-12

## 2024-02-12 RX ORDER — SODIUM CHLORIDE 0.9 % (FLUSH) 0.9 %
10 SYRINGE (ML) INJECTION
Status: CANCELLED | OUTPATIENT
Start: 2024-02-13

## 2024-02-12 RX ORDER — FLUOROURACIL 50 MG/ML
2400 INJECTION, SOLUTION INTRAVENOUS
Status: CANCELLED | OUTPATIENT
Start: 2024-02-12

## 2024-02-12 RX ORDER — SODIUM CHLORIDE 0.9 % (FLUSH) 0.9 %
10 SYRINGE (ML) INJECTION
Status: CANCELLED | OUTPATIENT
Start: 2024-02-12

## 2024-02-12 RX ORDER — EPINEPHRINE 0.3 MG/.3ML
0.3 INJECTION SUBCUTANEOUS ONCE AS NEEDED
Status: CANCELLED | OUTPATIENT
Start: 2024-02-12

## 2024-02-12 RX ORDER — PROCHLORPERAZINE EDISYLATE 5 MG/ML
5 INJECTION INTRAMUSCULAR; INTRAVENOUS ONCE AS NEEDED
Status: CANCELLED | OUTPATIENT
Start: 2024-02-13

## 2024-02-12 RX ORDER — HEPARIN 100 UNIT/ML
500 SYRINGE INTRAVENOUS
Status: DISCONTINUED | OUTPATIENT
Start: 2024-02-12 | End: 2024-02-12 | Stop reason: HOSPADM

## 2024-02-12 RX ORDER — DIPHENHYDRAMINE HYDROCHLORIDE 50 MG/ML
50 INJECTION INTRAMUSCULAR; INTRAVENOUS ONCE AS NEEDED
Status: CANCELLED | OUTPATIENT
Start: 2024-02-12

## 2024-02-12 RX ORDER — PROCHLORPERAZINE EDISYLATE 5 MG/ML
5 INJECTION INTRAMUSCULAR; INTRAVENOUS ONCE AS NEEDED
Status: CANCELLED | OUTPATIENT
Start: 2024-02-12

## 2024-02-12 RX ORDER — PROCHLORPERAZINE EDISYLATE 5 MG/ML
5 INJECTION INTRAMUSCULAR; INTRAVENOUS ONCE AS NEEDED
Status: DISCONTINUED | OUTPATIENT
Start: 2024-02-12 | End: 2024-02-12 | Stop reason: HOSPADM

## 2024-02-12 RX ORDER — SODIUM CHLORIDE 0.9 % (FLUSH) 0.9 %
10 SYRINGE (ML) INJECTION
Status: DISCONTINUED | OUTPATIENT
Start: 2024-02-12 | End: 2024-02-12 | Stop reason: HOSPADM

## 2024-02-12 RX ORDER — EPINEPHRINE 0.3 MG/.3ML
0.3 INJECTION SUBCUTANEOUS ONCE AS NEEDED
Status: DISCONTINUED | OUTPATIENT
Start: 2024-02-12 | End: 2024-02-12 | Stop reason: HOSPADM

## 2024-02-12 RX ORDER — HEPARIN 100 UNIT/ML
500 SYRINGE INTRAVENOUS
Status: CANCELLED | OUTPATIENT
Start: 2024-02-13

## 2024-02-12 RX ORDER — DIPHENHYDRAMINE HYDROCHLORIDE 50 MG/ML
50 INJECTION INTRAMUSCULAR; INTRAVENOUS ONCE AS NEEDED
Status: DISCONTINUED | OUTPATIENT
Start: 2024-02-12 | End: 2024-02-12 | Stop reason: HOSPADM

## 2024-02-12 RX ORDER — SODIUM CHLORIDE 9 MG/ML
INJECTION, SOLUTION INTRAVENOUS
Status: CANCELLED
Start: 2024-02-13 | End: 2024-02-12

## 2024-02-12 RX ORDER — FLUOROURACIL 50 MG/ML
400 INJECTION, SOLUTION INTRAVENOUS
Status: CANCELLED | OUTPATIENT
Start: 2024-02-12

## 2024-02-12 RX ADMIN — FLUOROURACIL 5350 MG: 50 INJECTION, SOLUTION INTRAVENOUS at 01:02

## 2024-02-12 RX ADMIN — DEXAMETHASONE SODIUM PHOSPHATE 0.25 MG: 4 INJECTION, SOLUTION INTRA-ARTICULAR; INTRALESIONAL; INTRAMUSCULAR; INTRAVENOUS; SOFT TISSUE at 10:02

## 2024-02-12 RX ADMIN — OXALIPLATIN 145 MG: 5 INJECTION, SOLUTION INTRAVENOUS at 10:02

## 2024-02-12 RX ADMIN — DEXTROSE MONOHYDRATE: 50 INJECTION, SOLUTION INTRAVENOUS at 10:02

## 2024-02-12 RX ADMIN — FLUOROURACIL 900 MG: 50 INJECTION, SOLUTION INTRAVENOUS at 01:02

## 2024-02-12 RX ADMIN — SODIUM CHLORIDE: 9 INJECTION, SOLUTION INTRAVENOUS at 10:02

## 2024-02-12 RX ADMIN — LEUCOVORIN CALCIUM 900 MG: 350 INJECTION, POWDER, LYOPHILIZED, FOR SOLUTION INTRAMUSCULAR; INTRAVENOUS at 10:02

## 2024-02-12 NOTE — NURSING
Pt tolerated C6 D1 FOLFOX without any adverse events. Pt discharged home in stable condition, will return on 2-14 at 1100 for Pump disconnect/IVF/Neulasta OBI.

## 2024-02-14 ENCOUNTER — INFUSION (OUTPATIENT)
Dept: INFUSION THERAPY | Facility: HOSPITAL | Age: 69
End: 2024-02-14
Attending: INTERNAL MEDICINE
Payer: MEDICARE

## 2024-02-14 VITALS
TEMPERATURE: 98 F | DIASTOLIC BLOOD PRESSURE: 66 MMHG | SYSTOLIC BLOOD PRESSURE: 111 MMHG | OXYGEN SATURATION: 96 % | RESPIRATION RATE: 16 BRPM | HEART RATE: 79 BPM

## 2024-02-14 DIAGNOSIS — C18.2 MALIGNANT NEOPLASM OF ASCENDING COLON: Primary | ICD-10-CM

## 2024-02-14 PROCEDURE — 63600175 PHARM REV CODE 636 W HCPCS: Mod: JZ,JG | Performed by: INTERNAL MEDICINE

## 2024-02-14 PROCEDURE — 96377 APPLICATON ON-BODY INJECTOR: CPT | Mod: 59

## 2024-02-14 PROCEDURE — A4216 STERILE WATER/SALINE, 10 ML: HCPCS | Performed by: INTERNAL MEDICINE

## 2024-02-14 PROCEDURE — 96375 TX/PRO/DX INJ NEW DRUG ADDON: CPT

## 2024-02-14 PROCEDURE — 25000003 PHARM REV CODE 250: Performed by: INTERNAL MEDICINE

## 2024-02-14 PROCEDURE — 96360 HYDRATION IV INFUSION INIT: CPT

## 2024-02-14 RX ORDER — SODIUM CHLORIDE 0.9 % (FLUSH) 0.9 %
10 SYRINGE (ML) INJECTION
Status: DISCONTINUED | OUTPATIENT
Start: 2024-02-14 | End: 2024-02-14 | Stop reason: HOSPADM

## 2024-02-14 RX ORDER — HEPARIN 100 UNIT/ML
500 SYRINGE INTRAVENOUS
Status: DISCONTINUED | OUTPATIENT
Start: 2024-02-14 | End: 2024-02-14 | Stop reason: HOSPADM

## 2024-02-14 RX ORDER — SODIUM CHLORIDE 9 MG/ML
1000 INJECTION, SOLUTION INTRAVENOUS
Status: COMPLETED | OUTPATIENT
Start: 2024-02-14 | End: 2024-02-14

## 2024-02-14 RX ORDER — PROCHLORPERAZINE EDISYLATE 5 MG/ML
5 INJECTION INTRAMUSCULAR; INTRAVENOUS ONCE AS NEEDED
Status: COMPLETED | OUTPATIENT
Start: 2024-02-14 | End: 2024-02-14

## 2024-02-14 RX ADMIN — PROCHLORPERAZINE EDISYLATE 5 MG: 5 INJECTION INTRAMUSCULAR; INTRAVENOUS at 11:02

## 2024-02-14 RX ADMIN — SODIUM CHLORIDE 1000 ML: 9 INJECTION, SOLUTION INTRAVENOUS at 11:02

## 2024-02-14 RX ADMIN — Medication 10 ML: at 12:02

## 2024-02-14 RX ADMIN — HEPARIN 500 UNITS: 100 SYRINGE at 12:02

## 2024-02-14 RX ADMIN — PEGFILGRASTIM 6 MG: KIT SUBCUTANEOUS at 12:02

## 2024-02-14 NOTE — PLAN OF CARE
Pt here today to dc cadd pump, ivfs and also getting neulasta OBI this cycle as well. Pt notes feeling week and nauseated which seems typical for her on day 3. Ivfs given with prn compazine to help with her nausea. Neulasta placed on her abdomen bc her shirt sleeves were too tight. Explained to pt and daughter about the med. It will start around 3pm tomorrow and finish around 4. Typically taking 1 claritin every day for the next 6 days to prevent bone pain. Drink lots of water. Verbalized understanding. Will call if theres a problem

## 2024-02-15 ENCOUNTER — TELEPHONE (OUTPATIENT)
Dept: HEMATOLOGY/ONCOLOGY | Facility: CLINIC | Age: 69
End: 2024-02-15
Payer: MEDICARE

## 2024-02-15 NOTE — TELEPHONE ENCOUNTER
Patient's daughter, Elva, states patient has been having dry heaves. Forgot to take the zyprexa for the 3 nights following chemo. Last BM this morning. Currently only taking compazine 5mg. I instructed her to start the zofran ODT 8mg tid and add in compazine 10mg every 6hrs prn. I asked that Elva contact us by morning if this regimen does not provide relief. She voiced understanding.

## 2024-02-19 ENCOUNTER — OFFICE VISIT (OUTPATIENT)
Dept: HEMATOLOGY/ONCOLOGY | Facility: CLINIC | Age: 69
End: 2024-02-19
Payer: MEDICARE

## 2024-02-19 ENCOUNTER — LAB VISIT (OUTPATIENT)
Dept: LAB | Facility: HOSPITAL | Age: 69
End: 2024-02-19
Attending: INTERNAL MEDICINE
Payer: MEDICARE

## 2024-02-19 VITALS
TEMPERATURE: 98 F | HEART RATE: 83 BPM | BODY MASS INDEX: 38.4 KG/M2 | WEIGHT: 230.5 LBS | OXYGEN SATURATION: 100 % | SYSTOLIC BLOOD PRESSURE: 117 MMHG | RESPIRATION RATE: 18 BRPM | HEIGHT: 65 IN | DIASTOLIC BLOOD PRESSURE: 75 MMHG

## 2024-02-19 DIAGNOSIS — E53.8 FOLIC ACID DEFICIENCY: ICD-10-CM

## 2024-02-19 DIAGNOSIS — B37.0 ORAL THRUSH: ICD-10-CM

## 2024-02-19 DIAGNOSIS — R97.0 ELEVATED CEA: ICD-10-CM

## 2024-02-19 DIAGNOSIS — C18.2 MALIGNANT NEOPLASM OF ASCENDING COLON: ICD-10-CM

## 2024-02-19 DIAGNOSIS — R91.8 PULMONARY NODULES: ICD-10-CM

## 2024-02-19 DIAGNOSIS — D50.9 MICROCYTIC ANEMIA: ICD-10-CM

## 2024-02-19 LAB
ALBUMIN SERPL-MCNC: 3.4 G/DL (ref 3.4–4.8)
ALBUMIN/GLOB SERPL: 1.1 RATIO (ref 1.1–2)
ALP SERPL-CCNC: 163 UNIT/L (ref 40–150)
ALT SERPL-CCNC: 15 UNIT/L (ref 0–55)
AST SERPL-CCNC: 22 UNIT/L (ref 5–34)
BASOPHILS # BLD AUTO: 0.05 X10(3)/MCL
BASOPHILS NFR BLD AUTO: 0.3 %
BILIRUB SERPL-MCNC: 0.3 MG/DL
BUN SERPL-MCNC: 20.5 MG/DL (ref 9.8–20.1)
CALCIUM SERPL-MCNC: 8.8 MG/DL (ref 8.4–10.2)
CEA SERPL-MCNC: 3.81 NG/ML (ref 0–3)
CHLORIDE SERPL-SCNC: 101 MMOL/L (ref 98–107)
CO2 SERPL-SCNC: 26 MMOL/L (ref 23–31)
CREAT SERPL-MCNC: 1.71 MG/DL (ref 0.55–1.02)
EOSINOPHIL # BLD AUTO: 0.07 X10(3)/MCL (ref 0–0.9)
EOSINOPHIL NFR BLD AUTO: 0.4 %
ERYTHROCYTE [DISTWIDTH] IN BLOOD BY AUTOMATED COUNT: 21.9 % (ref 11.5–17)
GFR SERPLBLD CREATININE-BSD FMLA CKD-EPI: 33 MLS/MIN/1.73/M2
GLOBULIN SER-MCNC: 3.2 GM/DL (ref 2.4–3.5)
GLUCOSE SERPL-MCNC: 168 MG/DL (ref 82–115)
HCT VFR BLD AUTO: 31.1 % (ref 37–47)
HGB BLD-MCNC: 9.6 G/DL (ref 12–16)
IMM GRANULOCYTES # BLD AUTO: 0.06 X10(3)/MCL (ref 0–0.04)
IMM GRANULOCYTES NFR BLD AUTO: 0.3 %
LYMPHOCYTES # BLD AUTO: 1.75 X10(3)/MCL (ref 0.6–4.6)
LYMPHOCYTES NFR BLD AUTO: 9.7 %
MCH RBC QN AUTO: 24.6 PG (ref 27–31)
MCHC RBC AUTO-ENTMCNC: 30.9 G/DL (ref 33–36)
MCV RBC AUTO: 79.7 FL (ref 80–94)
MONOCYTES # BLD AUTO: 0.56 X10(3)/MCL (ref 0.1–1.3)
MONOCYTES NFR BLD AUTO: 3.1 %
NEUTROPHILS # BLD AUTO: 15.62 X10(3)/MCL (ref 2.1–9.2)
NEUTROPHILS NFR BLD AUTO: 86.2 %
PLATELET # BLD AUTO: 81 X10(3)/MCL (ref 130–400)
PMV BLD AUTO: ABNORMAL FL
POTASSIUM SERPL-SCNC: 4.2 MMOL/L (ref 3.5–5.1)
PROT SERPL-MCNC: 6.6 GM/DL (ref 5.8–7.6)
RBC # BLD AUTO: 3.9 X10(6)/MCL (ref 4.2–5.4)
SODIUM SERPL-SCNC: 136 MMOL/L (ref 136–145)
WBC # SPEC AUTO: 18.11 X10(3)/MCL (ref 4.5–11.5)

## 2024-02-19 PROCEDURE — 1101F PT FALLS ASSESS-DOCD LE1/YR: CPT | Mod: CPTII,S$GLB,, | Performed by: NURSE PRACTITIONER

## 2024-02-19 PROCEDURE — 85025 COMPLETE CBC W/AUTO DIFF WBC: CPT

## 2024-02-19 PROCEDURE — 3074F SYST BP LT 130 MM HG: CPT | Mod: CPTII,S$GLB,, | Performed by: NURSE PRACTITIONER

## 2024-02-19 PROCEDURE — 82378 CARCINOEMBRYONIC ANTIGEN: CPT

## 2024-02-19 PROCEDURE — 36415 COLL VENOUS BLD VENIPUNCTURE: CPT

## 2024-02-19 PROCEDURE — 1125F AMNT PAIN NOTED PAIN PRSNT: CPT | Mod: CPTII,S$GLB,, | Performed by: NURSE PRACTITIONER

## 2024-02-19 PROCEDURE — 3288F FALL RISK ASSESSMENT DOCD: CPT | Mod: CPTII,S$GLB,, | Performed by: NURSE PRACTITIONER

## 2024-02-19 PROCEDURE — 3008F BODY MASS INDEX DOCD: CPT | Mod: CPTII,S$GLB,, | Performed by: NURSE PRACTITIONER

## 2024-02-19 PROCEDURE — 80053 COMPREHEN METABOLIC PANEL: CPT

## 2024-02-19 PROCEDURE — 99215 OFFICE O/P EST HI 40 MIN: CPT | Mod: S$GLB,,, | Performed by: NURSE PRACTITIONER

## 2024-02-19 PROCEDURE — 3078F DIAST BP <80 MM HG: CPT | Mod: CPTII,S$GLB,, | Performed by: NURSE PRACTITIONER

## 2024-02-19 PROCEDURE — 99999 PR PBB SHADOW E&M-EST. PATIENT-LVL III: CPT | Mod: PBBFAC,,, | Performed by: NURSE PRACTITIONER

## 2024-02-19 NOTE — PROGRESS NOTES
HEMATOLOGY/ONCOLOGY OFFICE CLINIC VISIT    Visit Information:    Initial Evaluation: 4/22/2022  Referring Provider: Maggy Jaquez NP  Other providers: Dr Timothy Russ  Code status: Not addressed     Diagnosis/Problem list:   pT3 N2a Mx Stage IIIB Colon cancer. Dx 10/19/23  Microcytic anemia.   Folate deficiency     Present Treatment:  FOLFOX with dose reduction on Oxaliplatin due to neuropathy. Started on 11/28/23.    Treatment history:  10/19/2023 Lap/jaswinder right colon resection         Imaging studies:  CT C/A/P 6/3/2022: There is no significant hepatic steatosis.  The liver is cirrhotic.  No liver lesion is identified.  The spleen is enlarged, measuring 15.5 cm in length.  There is no retroperitoneal lymphadenopathy or abdominal aortic aneurysm.  A mildly prominent right external iliac lymph node measures 9 mm in short axis, nonspecific.  This is also similar in appearance when compared to 2015. Impression: Cirrhosis. Splenomegaly.  CT CAP 9/11/2023: There is no supraclavicular or axillary lymphadenopathy.  No mediastinal adenopathy. 3 mm right upper lobe pulmonary nodule. There are few additional pulmonary micronodules in the lung apices measuring no greater than 1-2 mm.  Findings similar to prior exam.     Impression: Scattered pulmonary nodules in the upper lobe similar to prior.  No further follow-up acute according to Fleischner criteria.  No convincing evidence for metastatic disease. Nodular appearing liver, correlation for cirrhosis.  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     Pathology:  10/19/2023:  COLON, RIGHT HEMICOLECTOMY (1.5 CM OF TERMINAL ILEUM, 25 CM LENGTH OF RIGHT COLON): POORLY DIFFERENTIATED ADENOCARCINOMA WITH FOCAL SIGNET RING CELL FEATURES, UNIFOCAL, RIGHT COLON, 8 CM GREATEST DIMENSION.   THE TUMOR INVADES THROUGH THE MUSCULARIS PROPRIA INTO PERICOLONIC TISSUE (PT3).   FOCAL LYMPHVASCULAR INVASION IS PRESENT.   THE SURGICAL  MARGINS ARE FREE OF TUMOR.   METASTATIC ADENOCARCINOMA IS IDENTIFIED IN FOUR (4) OF TWENTY-SEVEN (27)   PERICOLONIC LYMPH NODES (LARGEST METASTATIC DEPOSIT 8 MM; FOCAL EXTRACAPSULAR EXTENSION OF TUMOR IS PRESENT).      PATHOLOGIC STAGING:  pT3 N2a Mx     Histologic Grade:  G3, poorly differentiated   Macroscopic Tumor Perforation:  Not identified   Lymphovascular Invasion:  Small vessel   Perineural Invasion:  Not identified        CLINICAL HISTORY:       Patient: Indu Azul is a 68 y.o. female. with multiple medical problems that I saw originally on 2022 for thrombocytopenia.  Patient platelet counts on 2021 were 132,000 and since that that has been slowly trending down.  2021 platelets 132,000  2022 platelets 121,000  2022 platelets 115,000     Of note patient have a UA done that same day that suggest possible UTI with positive nitrates, 1+ leukocytes and many bacteria.  Urine culture with E. coli.   Reviewing electronic medical record and going back to 12/15/2014 patient with occasional low platelets.  They were never lower than 100,000 and they always normalized.   As per patient in 2020 when her platelets were slightly decreased (117,000), this was shortly after she has her left foot surgery.  Then in May 17 and May 18 2021, platelets were 105k and 101k,  she had COVID.  Again in 2022 she have a UTI for which she completed antibiotics.     Patient's father diagnosed Blood disorder requiring blood transfusion that started q 4 weeks and the q week. He was diagnosed on his 80's but  at 91. Sister and niece had ovarian cancer. Sister  of it. Niece still alive.     She was found to have iron deficiency anemia.  EGD performed, grade I-II esophageal varices found, too small to band. She also had Colonoscopy by Dr Hammonds that showed an ulcerated malignant-appearing partially obstructing medium sized mass in the ascending colon.  She reports that she had a  colonoscopy about 5 years ago at [facility] and everything was fine, no polyps or masses.   Biopsy and tattoo of the mass showed fragments of colonic mucosa, no evidence of dysplasia or malignancy.  Two other polyps were completely removed in the descending and sigmoid colon, pathology returned hyperplastic polyps.  CT abd/pel with no acute findings.     Despite of biopsy because of malignant-appearing partially obstructing mass of the ascending colon and photographs and description were consistent with colon cancer she underwent Lap/jaswinder right colon resection on 10/19/2023 by Dr Timothy Russ. Path c/w really differentiated adeno carcinoma.         Chief Complaint: Follow-up (PT states she has pain from the blood clot she has and it's hard to sleep on the RT side.)      Interval History:  Patient with h/o Charcot feet for which she had surgery. She does not have sensation on her feet from ankle down. This was prior to her diagnosis of colon cancer. She is getting Oxaloplatin but neuropathy is not worse. She started dose reduced and will cont like same dose. If neuropathy worsen will d/c oxaliplatin.       2/12/2024: Patient presents today for labs and TD prior to C6 of FOLFOX with dose reduction of Oxaliplatin due to neuropathy, accompanied by her daughter. C6 was held on 2/6/24 due to thrombocytopenia (plt 74k). 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 She is tolerating well. She continues with occasional right sided abdominal pain and oral thrush. Pharmacy did not have RX in stock and gave an alternative, but it does not help.  No worsening in neuropathy as of yet. Hyperpigmentation of nails and skin on hands commonly seen with 5FU. Overall, she is doing fair. No fever, chills or sweats. Denies chest pain or shortness of breath. No bleeding. Bowels are moving without difficulty.  Plts today 128k.    2/19/24: Patient presents  today as an add on. She has questions about her recent diagnosis of thrombus within the SMV and extending through the main portal vein . She complain of pain there so imaging was done. She was started on Eliquis. Last week after her chemo, she was nauseated so she didn't take any of her PO meds for 2 days and her right sided abdominal pain returned. Once she resumed Eliqius, her right sided abdominal pain decreased. Reports increased pain when she lays on her right side. She wants to know if we will be repeating imaging to eval her blood clot. Pancytopenia today. She treated last week (sonia). Denies fever.         Past Medical History:   Diagnosis Date    Anesthesia complication     trouble awakening    Charcot's joint of foot, left     Chronic kidney disease, unspecified     Cirrhosis of liver without ascites     Depression     Diabetes mellitus, type II, insulin dependent     Diabetic neuropathy     GERD (gastroesophageal reflux disease)     H/O: hysterectomy     Hepatic steatosis     Malignant neoplasm of ascending colon     Obesity, unspecified     Overactive bladder     Thrombocytopenia, unspecified     Vitamin D deficiency       Past Surgical History:   Procedure Laterality Date    APPENDECTOMY      BREAST BIOPSY  2016     SECTION      x3    charcot-leyden crystals identification      CHOLECYSTECTOMY      COLONOSCOPY N/A 2023    Procedure: COLON;  Surgeon: Luis Amador MD;  Location: [facility] ENDOSCOPY;  Service: Gastroenterology;  Laterality: N/A;    COLONOSCOPY, WITH 1 OR MORE BIOPSIES  2023    Procedure: COLONOSCOPY, WITH 1 OR MORE BIOPSIES;  Surgeon: Luis Amador MD;  Location: [facility] ENDOSCOPY;  Service: Gastroenterology;;    COLONOSCOPY, WITH DIRECTED SUBMUCOSAL INJECTION  2023    Procedure: COLONOSCOPY, WITH DIRECTED SUBMUCOSAL INJECTION;  Surgeon: Luis Amador MD;  Location: [facility] ENDOSCOPY;  Service: Gastroenterology;;  8cc  Colon Tattoo    COLONOSCOPY, WITH POLYPECTOMY USING SNARE  08/28/2023    Procedure: COLONOSCOPY, WITH POLYPECTOMY USING SNARE;  Surgeon: Luis Amador MD;  Location: [facility] ENDOSCOPY;  Service: Gastroenterology;;    ESOPHAGOGASTRODUODENOSCOPY N/A 08/28/2023    Procedure: DOUBLE;  Surgeon: Luis Amador MD;  Location: [facility] ENDOSCOPY;  Service: Gastroenterology;  Laterality: N/A;    HEMORRHOID SURGERY      HYSTERECTOMY  1990    total    INSERTION OF TUNNELED CENTRAL VENOUS CATHETER (CVC) WITH SUBCUTANEOUS PORT Right 11/20/2023    Procedure: [id number];  Surgeon: Timothy Russ MD;  Location: [facility] OR;  Service: General;  Laterality: Right;    right foot surgery Right 02/01/2022    XI ROBOTIC COLECTOMY, RIGHT N/A 10/19/2023    Procedure: XI ROBOTIC COLECTOMY, RIGHT;  Surgeon: Timothy Russ MD;  Location: [facility];  Service: General;  Laterality: N/A;     Family History   Problem Relation Age of Onset    Diabetes Mother     Alzheimer's disease Mother     Diabetes Father     Leukemia Father     Diabetes Sister     Liver cancer Sister     Diabetes Brother      Social Connections: Not on file       Review of patient's allergies indicates:  No Known Allergies   Current Outpatient Medications on File Prior to Visit   Medication Sig Dispense Refill    apixaban (ELIQUIS) 5 mg Tab Take 2 tablets (10 mg total) by mouth 2 (two) times daily for 7 days, THEN 1 tablet (5 mg total) 2 (two) times daily for 21 days. 70 tablet 0    diphenhydrAMINE-aluminum-magnesium hydroxide-simethicone-LIDOcaine viscous HCl 2% Swish and spit 15 mLs every 4 (four) hours as needed. 600 each 0    dulaglutide (TRULICITY) 3 mg/0.5 mL pen injector Inject 3 mg into the skin every 7 days. 4 pen 11    ferrous sulfate (FEOSOL) 325 mg (65 mg iron) Tab tablet Take 1 tablet (325 mg total) by mouth 2 (two) times daily. 60 tablet 11    folic acid (FOLVITE) 1 MG tablet Take 1 tablet (1 mg total) by mouth once daily. 100  tablet 3    furosemide (LASIX) 40 MG tablet Take 40 mg by mouth as needed.      gabapentin (NEURONTIN) 300 MG capsule Take 2 capsules (600 mg total) by mouth every evening. 60 capsule 5    HYDROcodone-acetaminophen (NORCO) 5-325 mg per tablet Take 1 tablet by mouth every 6 (six) hours as needed for Pain. 15 tablet 0    insulin glargine, TOUJEO, (TOUJEO SOLOSTAR U-300 INSULIN) 300 unit/mL (1.5 mL) InPn pen INJECT 79 UNITS SUBCUTANEOUSLY ONCE DAILY 6 mL 11    insulin syringe-needle U-100 1 mL 31 gauge x 5/16 Syrg Inject 1 Syringe into the skin 3 (three) times daily with meals.      levoFLOXacin (LEVAQUIN) 500 MG tablet Take 1 tablet (500 mg total) by mouth once daily. 7 tablet 0    LYUMJEV KWIKPEN U-200 INSULIN 200 unit/mL (3 mL) pen Sliding scale      OLANZapine (ZYPREXA) 5 MG tablet Take 1 tablet (5 mg total) by mouth every evening. Take nightly on days 1-3 of each chemotherapy cycle. 6 tablet 5    ondansetron (ZOFRAN-ODT) 8 MG TbDL Take 1 tablet (8 mg total) by mouth every 6 (six) hours as needed. 10 tablet 0    prochlorperazine (COMPAZINE) 5 MG tablet Take 5 mg by mouth every 6 (six) hours as needed.      sertraline (ZOLOFT) 50 MG tablet Take 1 tablet by mouth once daily 90 tablet 3    solifenacin (VESICARE) 10 MG tablet Take 10 mg by mouth once daily.      spironolactone (ALDACTONE) 50 MG tablet Take 50 mg by mouth once daily.      k phos di & mono-sod phos mono (K-PHOS-NEUTRAL) 250 mg Tab Take 1 tablet by mouth 2 (two) times a day. for 2 days 4 tablet 0     Current Facility-Administered Medications on File Prior to Visit   Medication Dose Route Frequency Provider Last Rate Last Admin    promethazine (PHENERGAN) 12.5 mg in dextrose 5 % (D5W) 50 mL IVPB  12.5 mg Intravenous Once Gayatri Jaffe FNP          Review of Systems   Constitutional:  Positive for fatigue. Negative for activity change, appetite change, chills, fever and unexpected weight change.   HENT:  Positive for mouth dryness. Negative for mouth  sores, nosebleeds, sore throat and trouble swallowing.              Eyes: Negative.  Negative for visual disturbance.   Respiratory:  Negative for cough and shortness of breath.    Cardiovascular:  Negative for chest pain, palpitations and leg swelling.   Gastrointestinal:  Positive for abdominal pain. Negative for abdominal distention, blood in stool, change in bowel habit, constipation, diarrhea, nausea and vomiting.   Endocrine: Negative.    Genitourinary:  Negative for dysuria, enuresis, frequency, hematuria and urgency.   Musculoskeletal:  Negative for arthralgias, back pain, myalgias and neck pain.   Integumentary:  Negative for rash and wound.        Nail changes     Neurological:  Negative for dizziness, tremors, syncope, speech difficulty, weakness, light-headedness, numbness, headaches and memory loss.        Neuropathy same-not related to chemotherapy   Hematological:  Does not bruise/bleed easily.   Psychiatric/Behavioral:  Negative for confusion and suicidal ideas.             Vitals:    02/19/24 1135   Weight: 104.6 kg (230 lb 8 oz)          Wt Readings from Last 6 Encounters:   02/19/24 104.6 kg (230 lb 8 oz)   02/12/24 109.9 kg (242 lb 3.2 oz)   02/12/24 109.9 kg (242 lb 3.2 oz)   02/05/24 107.5 kg (237 lb 1.6 oz)   01/23/24 106 kg (233 lb 11.2 oz)   01/22/24 106 kg (233 lb 11.2 oz)     Body mass index is 38.36 kg/m².  Body surface area is 2.19 meters squared.  Physical Exam  Vitals and nursing note reviewed.   Constitutional:       General: She is not in acute distress.     Appearance: Normal appearance. She is obese.   HENT:      Head: Normocephalic and atraumatic.      Mouth/Throat:      Mouth: Mucous membranes are moist.   Eyes:      General: No scleral icterus.     Extraocular Movements: Extraocular movements intact.      Conjunctiva/sclera: Conjunctivae normal.      Pupils: Pupils are equal, round, and reactive to light.   Neck:      Vascular: No JVD.   Cardiovascular:      Rate and Rhythm:  Normal rate and regular rhythm.      Heart sounds: No murmur heard.  Pulmonary:      Effort: Pulmonary effort is normal.      Breath sounds: Normal breath sounds. No wheezing or rhonchi.   Chest:      Comments: Right chest wall mediport in place.    Abdominal:      General: Bowel sounds are normal. There is no distension.      Palpations: Abdomen is soft. There is no mass.      Tenderness: There is no abdominal tenderness.      Comments: Surgical incisions healed after colon surgery.    Musculoskeletal:         General: No swelling or deformity.      Cervical back: Neck supple.   Lymphadenopathy:      Head:      Right side of head: No submandibular adenopathy.      Left side of head: No submandibular adenopathy.      Cervical: No cervical adenopathy.      Upper Body:      Right upper body: No supraclavicular or axillary adenopathy.      Left upper body: No supraclavicular or axillary adenopathy.      Lower Body: No right inguinal adenopathy. No left inguinal adenopathy.   Skin:     General: Skin is warm.      Coloration: Skin is not jaundiced.      Findings: No lesion or rash.      Nails: There is no clubbing.      Comments: Darkening of nails and skin on hands commonly seen with 5FU   Neurological:      General: No focal deficit present.      Mental Status: She is alert and oriented to person, place, and time.      Cranial Nerves: Cranial nerves 2-12 are intact.   Psychiatric:         Attention and Perception: Attention normal.         Mood and Affect: Mood is depressed.         Behavior: Behavior is cooperative.         Judgment: Judgment normal.     ECOG SCORE             Laboratory:  CBC with Differential:  Lab Results   Component Value Date    WBC 18.11 (H) 02/19/2024    RBC 3.90 (L) 02/19/2024    HGB 9.6 (L) 02/19/2024    HCT 31.1 (L) 02/19/2024    MCV 79.7 (L) 02/19/2024    MCH 24.6 (L) 02/19/2024    MCHC 30.9 (L) 02/19/2024    RDW 21.9 (H) 02/19/2024    PLT 81 (L) 02/19/2024    MPV  02/19/2024      Comment:       Unable to calculate MPV   This is a corrected result. Previous result was 9.7 fL on 2/19/2024 at 1041 CST.        CMP:  Sodium   Date Value Ref Range Status   06/01/2022 140 136 - 145 mmol/L Final     Sodium Level   Date Value Ref Range Status   02/12/2024 139 136 - 145 mmol/L Final     Potassium   Date Value Ref Range Status   06/01/2022 4.9 3.5 - 5.1 mmol/L Final     Potassium Level   Date Value Ref Range Status   02/12/2024 4.2 3.5 - 5.1 mmol/L Final     Chloride   Date Value Ref Range Status   06/01/2022 104 100 - 109 mmol/L Final     Carbon Dioxide   Date Value Ref Range Status   02/12/2024 24 23 - 31 mmol/L Final   06/01/2022 29 22 - 33 mmol/L Final     Blood Urea Nitrogen   Date Value Ref Range Status   02/12/2024 13.3 9.8 - 20.1 mg/dL Final   06/01/2022 22 5 - 25 mg/dL Final     Creatinine   Date Value Ref Range Status   02/12/2024 1.27 (H) 0.55 - 1.02 mg/dL Final   06/01/2022 1.32 (H) 0.57 - 1.25 mg/dL Final     Calcium   Date Value Ref Range Status   06/01/2022 8.7 (L) 8.8 - 10.6 mg/dL Final     Calcium Level Total   Date Value Ref Range Status   02/12/2024 9.0 8.4 - 10.2 mg/dL Final     Albumin Level   Date Value Ref Range Status   02/12/2024 3.1 (L) 3.4 - 4.8 g/dL Final     Bilirubin Total   Date Value Ref Range Status   02/12/2024 0.3 <=1.5 mg/dL Final     Alkaline Phosphatase   Date Value Ref Range Status   02/12/2024 115 40 - 150 unit/L Final     Aspartate Aminotransferase   Date Value Ref Range Status   02/12/2024 17 5 - 34 unit/L Final     Alanine Aminotransferase   Date Value Ref Range Status   02/12/2024 16 0 - 55 unit/L Final     Anion Gap   Date Value Ref Range Status   06/01/2022 7 (L) 8 - 16 mmol/L Final     eGFR    Date Value Ref Range Status   06/01/2022 45 mL/min/1.73mSq Final     Comment:     In accordance with NKF-ASN Task Force recommendation, calculation based on the Chronic Kidney Disease Epidemiology Collaboration (CKD-EPI) equation without adjustment for race.  eGFR adjusted for gender and age and calculated in ml/min/1.73mSquared. eGFR cannot be calculated if patient is under 18 years of age.     Reference Range:   >= 60 ml/min/1.73mSquared.     Estimated GFR-Non    Date Value Ref Range Status   08/04/2022 43 mls/min/1.73/m2 Final         Assessment:       1) Stage IIIB adenocarcinoma of the ascending colon  --Patient will benefit of adjuvant chemotherapy.   --Due to severe diabetic neuropathy, will try dose reduction of Oxaliplatin, starting 65 mg/m2.    --If not tolerated, then with d/c Oxaliplatin and cont 5FU and LV  2) Lung nodules-Stable. Will monitor  3) iron deficiency anemia-on iron PO  4) Thrombocytopenia-resolved. Will follow counts closely as she has splenomegaly and this can affects her counts mostly platelets.  5) Folate deficiency-she will start taking folic acid at this time.   6) Diabetic neuropathy-severe. She does not feel her feet.  --She will have nerve stimulator placement to see if can help her feet     Educated the patient on the risks versus benefits as well as toxicities associated with treatment.  Verbally consented the patient to the treatment plan and the patient was educated on the planned duration of the treatment and schedule of the treatment administration.  All questions were answered.      Plan:       Plan: Adjuvant FOLFOX x 6 months,with dose reduction of oxaliplatin due to severe (Grade 3) neuropathy. If not tolerated, then with d/c Oxaliplatin and cont 5FU and LV. Neuropathy same.      Discussed plan of care for thrombus. We will not be repeating imaging at this time as she had CT on 2/8/24 that revealed thrombus and anticoagulation was initiated. We plan to repeat imaging after to adjuvant chemotherapy when we initiate surveillance. All questions answered to  the best of my ability. Patient and daughter voiced understanding and expressed gratitude for my ability to explain the answers to their questions in a way that  they can understand.      Continue Eliquis  Keep appt next week for TD/labs: CBC, CMP, CEA,- chemo next day    Encouraged to call with questions or problems  The patient was given ample opportunity to ask questions and they were all answered to satisfaction; patient demonstrated understanding of what we discussed and is agreeable to the plan.     LAURA Degroot

## 2024-02-21 ENCOUNTER — DOCUMENTATION ONLY (OUTPATIENT)
Dept: HEMATOLOGY/ONCOLOGY | Facility: CLINIC | Age: 69
End: 2024-02-21
Payer: MEDICARE

## 2024-02-21 DIAGNOSIS — C18.2 MALIGNANT NEOPLASM OF ASCENDING COLON: Primary | ICD-10-CM

## 2024-02-21 NOTE — PROGRESS NOTES
[facility]  reached out; pt requesting supplement assistance. Faxed order to [facility] for Glucerna 2 cases 2 months.  to notify pt.

## 2024-02-26 ENCOUNTER — OFFICE VISIT (OUTPATIENT)
Dept: HEMATOLOGY/ONCOLOGY | Facility: CLINIC | Age: 69
End: 2024-02-26
Payer: MEDICARE

## 2024-02-26 ENCOUNTER — LAB VISIT (OUTPATIENT)
Dept: LAB | Facility: HOSPITAL | Age: 69
End: 2024-02-26
Attending: INTERNAL MEDICINE
Payer: MEDICARE

## 2024-02-26 VITALS
HEART RATE: 87 BPM | DIASTOLIC BLOOD PRESSURE: 84 MMHG | OXYGEN SATURATION: 96 % | TEMPERATURE: 98 F | BODY MASS INDEX: 38.7 KG/M2 | RESPIRATION RATE: 18 BRPM | SYSTOLIC BLOOD PRESSURE: 133 MMHG | HEIGHT: 65 IN | WEIGHT: 232.31 LBS

## 2024-02-26 DIAGNOSIS — R97.0 ELEVATED CEA: ICD-10-CM

## 2024-02-26 DIAGNOSIS — C18.2 MALIGNANT NEOPLASM OF ASCENDING COLON: ICD-10-CM

## 2024-02-26 DIAGNOSIS — T45.1X5A IMMUNODEFICIENCY DUE TO CHEMOTHERAPY: ICD-10-CM

## 2024-02-26 DIAGNOSIS — Z51.81 THERAPEUTIC DRUG MONITORING: ICD-10-CM

## 2024-02-26 DIAGNOSIS — Z79.899 IMMUNODEFICIENCY DUE TO CHEMOTHERAPY: ICD-10-CM

## 2024-02-26 DIAGNOSIS — C18.2 MALIGNANT NEOPLASM OF ASCENDING COLON: Primary | ICD-10-CM

## 2024-02-26 DIAGNOSIS — D84.821 IMMUNODEFICIENCY DUE TO CHEMOTHERAPY: ICD-10-CM

## 2024-02-26 DIAGNOSIS — I81 PORTAL VEIN THROMBOSIS: ICD-10-CM

## 2024-02-26 DIAGNOSIS — N18.4 CKD (CHRONIC KIDNEY DISEASE) STAGE 4, GFR 15-29 ML/MIN: ICD-10-CM

## 2024-02-26 LAB
ALBUMIN SERPL-MCNC: 3.4 G/DL (ref 3.4–4.8)
ALBUMIN/GLOB SERPL: 1 RATIO (ref 1.1–2)
ALP SERPL-CCNC: 173 UNIT/L (ref 40–150)
ALT SERPL-CCNC: 28 UNIT/L (ref 0–55)
APPEARANCE UR: CLEAR
AST SERPL-CCNC: 35 UNIT/L (ref 5–34)
BACTERIA #/AREA URNS AUTO: ABNORMAL /HPF
BASOPHILS # BLD AUTO: 0.03 X10(3)/MCL
BASOPHILS NFR BLD AUTO: 0.3 %
BILIRUB SERPL-MCNC: 0.4 MG/DL
BILIRUB UR QL STRIP.AUTO: NEGATIVE
BUN SERPL-MCNC: 23.5 MG/DL (ref 9.8–20.1)
CALCIUM SERPL-MCNC: 9.4 MG/DL (ref 8.4–10.2)
CEA SERPL-MCNC: 4.66 NG/ML (ref 0–3)
CHLORIDE SERPL-SCNC: 100 MMOL/L (ref 98–107)
CO2 SERPL-SCNC: 28 MMOL/L (ref 23–31)
COLOR UR AUTO: ABNORMAL
CREAT SERPL-MCNC: 1.47 MG/DL (ref 0.55–1.02)
CREAT UR-MCNC: 65.9 MG/DL (ref 45–106)
EOSINOPHIL # BLD AUTO: 0.35 X10(3)/MCL (ref 0–0.9)
EOSINOPHIL NFR BLD AUTO: 4.1 %
ERYTHROCYTE [DISTWIDTH] IN BLOOD BY AUTOMATED COUNT: 22.3 % (ref 11.5–17)
GFR SERPLBLD CREATININE-BSD FMLA CKD-EPI: 39 MLS/MIN/1.73/M2
GLOBULIN SER-MCNC: 3.3 GM/DL (ref 2.4–3.5)
GLUCOSE SERPL-MCNC: 189 MG/DL (ref 82–115)
GLUCOSE UR QL STRIP.AUTO: NORMAL
HCT VFR BLD AUTO: 30.1 % (ref 37–47)
HGB BLD-MCNC: 9.2 G/DL (ref 12–16)
HYALINE CASTS #/AREA URNS LPF: ABNORMAL /LPF
IMM GRANULOCYTES # BLD AUTO: 1.13 X10(3)/MCL (ref 0–0.04)
IMM GRANULOCYTES NFR BLD AUTO: 13.2 %
KETONES UR QL STRIP.AUTO: NEGATIVE
LEUKOCYTE ESTERASE UR QL STRIP.AUTO: NEGATIVE
LYMPHOCYTES # BLD AUTO: 1.49 X10(3)/MCL (ref 0.6–4.6)
LYMPHOCYTES NFR BLD AUTO: 17.3 %
MCH RBC QN AUTO: 24.6 PG (ref 27–31)
MCHC RBC AUTO-ENTMCNC: 30.6 G/DL (ref 33–36)
MCV RBC AUTO: 80.5 FL (ref 80–94)
MONOCYTES # BLD AUTO: 0.77 X10(3)/MCL (ref 0.1–1.3)
MONOCYTES NFR BLD AUTO: 9 %
MUCOUS THREADS URNS QL MICRO: ABNORMAL /LPF
NEUTROPHILS # BLD AUTO: 4.82 X10(3)/MCL (ref 2.1–9.2)
NEUTROPHILS NFR BLD AUTO: 56.1 %
NITRITE UR QL STRIP.AUTO: NEGATIVE
PH UR STRIP.AUTO: 6 [PH]
PLATELET # BLD AUTO: 75 X10(3)/MCL (ref 130–400)
PMV BLD AUTO: ABNORMAL FL
POTASSIUM SERPL-SCNC: 4.2 MMOL/L (ref 3.5–5.1)
PROT SERPL-MCNC: 6.7 GM/DL (ref 5.8–7.6)
PROT UR QL STRIP.AUTO: NEGATIVE
PROT UR STRIP-MCNC: 9.3 MG/DL
RBC # BLD AUTO: 3.74 X10(6)/MCL (ref 4.2–5.4)
RBC #/AREA URNS AUTO: ABNORMAL /HPF
RBC UR QL AUTO: NEGATIVE
SODIUM SERPL-SCNC: 137 MMOL/L (ref 136–145)
SP GR UR STRIP.AUTO: 1.01 (ref 1–1.03)
SQUAMOUS #/AREA URNS LPF: ABNORMAL /HPF
URINE PROTEIN/CREATININE RATIO (OLG): 0.1
UROBILINOGEN UR STRIP-ACNC: NORMAL
WBC # SPEC AUTO: 8.59 X10(3)/MCL (ref 4.5–11.5)
WBC #/AREA URNS AUTO: ABNORMAL /HPF

## 2024-02-26 PROCEDURE — 1126F AMNT PAIN NOTED NONE PRSNT: CPT | Mod: CPTII,S$GLB,, | Performed by: NURSE PRACTITIONER

## 2024-02-26 PROCEDURE — 82378 CARCINOEMBRYONIC ANTIGEN: CPT

## 2024-02-26 PROCEDURE — 85025 COMPLETE CBC W/AUTO DIFF WBC: CPT

## 2024-02-26 PROCEDURE — 82570 ASSAY OF URINE CREATININE: CPT

## 2024-02-26 PROCEDURE — 99215 OFFICE O/P EST HI 40 MIN: CPT | Mod: S$GLB,,, | Performed by: NURSE PRACTITIONER

## 2024-02-26 PROCEDURE — 36415 COLL VENOUS BLD VENIPUNCTURE: CPT

## 2024-02-26 PROCEDURE — 3075F SYST BP GE 130 - 139MM HG: CPT | Mod: CPTII,S$GLB,, | Performed by: NURSE PRACTITIONER

## 2024-02-26 PROCEDURE — 3079F DIAST BP 80-89 MM HG: CPT | Mod: CPTII,S$GLB,, | Performed by: NURSE PRACTITIONER

## 2024-02-26 PROCEDURE — 99999 PR PBB SHADOW E&M-EST. PATIENT-LVL V: CPT | Mod: PBBFAC,,, | Performed by: NURSE PRACTITIONER

## 2024-02-26 PROCEDURE — 3008F BODY MASS INDEX DOCD: CPT | Mod: CPTII,S$GLB,, | Performed by: NURSE PRACTITIONER

## 2024-02-26 PROCEDURE — 3288F FALL RISK ASSESSMENT DOCD: CPT | Mod: CPTII,S$GLB,, | Performed by: NURSE PRACTITIONER

## 2024-02-26 PROCEDURE — 1101F PT FALLS ASSESS-DOCD LE1/YR: CPT | Mod: CPTII,S$GLB,, | Performed by: NURSE PRACTITIONER

## 2024-02-26 PROCEDURE — 1159F MED LIST DOCD IN RCRD: CPT | Mod: CPTII,S$GLB,, | Performed by: NURSE PRACTITIONER

## 2024-02-26 PROCEDURE — 80053 COMPREHEN METABOLIC PANEL: CPT

## 2024-02-26 PROCEDURE — 81001 URINALYSIS AUTO W/SCOPE: CPT

## 2024-02-26 RX ORDER — HYDROCODONE BITARTRATE AND ACETAMINOPHEN 5; 325 MG/1; MG/1
1 TABLET ORAL EVERY 6 HOURS PRN
Qty: 30 TABLET | Refills: 0 | Status: SHIPPED | OUTPATIENT
Start: 2024-02-26

## 2024-02-26 NOTE — PROGRESS NOTES
HEMATOLOGY/ONCOLOGY OFFICE CLINIC VISIT    Visit Information:    Initial Evaluation: 4/22/2022  Referring Provider: Maggy Jaquez NP  Other providers: Dr Timothy Russ  Code status: Not addressed     Diagnosis/Problem list:   pT3 N2a Mx Stage IIIB Colon cancer. Dx 10/19/23  Microcytic anemia.   Folate deficiency     Present Treatment:  FOLFOX with dose reduction on Oxaliplatin due to neuropathy. Started on 11/28/23.    Treatment history:  10/19/2023 Lap/jaswinder right colon resection         Imaging studies:  CT C/A/P 6/3/2022: There is no significant hepatic steatosis.  The liver is cirrhotic.  No liver lesion is identified.  The spleen is enlarged, measuring 15.5 cm in length.  There is no retroperitoneal lymphadenopathy or abdominal aortic aneurysm.  A mildly prominent right external iliac lymph node measures 9 mm in short axis, nonspecific.  This is also similar in appearance when compared to 2015. Impression: Cirrhosis. Splenomegaly.  CT CAP 9/11/2023: There is no supraclavicular or axillary lymphadenopathy.  No mediastinal adenopathy. 3 mm right upper lobe pulmonary nodule. There are few additional pulmonary micronodules in the lung apices measuring no greater than 1-2 mm.  Findings similar to prior exam.     Impression: Scattered pulmonary nodules in the upper lobe similar to prior.  No further follow-up acute according to Fleischner criteria.  No convincing evidence for metastatic disease. Nodular appearing liver, correlation for cirrhosis.  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     Pathology:  10/19/2023:  COLON, RIGHT HEMICOLECTOMY (1.5 CM OF TERMINAL ILEUM, 25 CM LENGTH OF RIGHT COLON): POORLY DIFFERENTIATED ADENOCARCINOMA WITH FOCAL SIGNET RING CELL FEATURES, UNIFOCAL, RIGHT COLON, 8 CM GREATEST DIMENSION.   THE TUMOR INVADES THROUGH THE MUSCULARIS PROPRIA INTO PERICOLONIC TISSUE (PT3).   FOCAL LYMPHVASCULAR INVASION IS PRESENT.   THE SURGICAL  MARGINS ARE FREE OF TUMOR.   METASTATIC ADENOCARCINOMA IS IDENTIFIED IN FOUR (4) OF TWENTY-SEVEN (27)   PERICOLONIC LYMPH NODES (LARGEST METASTATIC DEPOSIT 8 MM; FOCAL EXTRACAPSULAR EXTENSION OF TUMOR IS PRESENT).      PATHOLOGIC STAGING:  pT3 N2a Mx     Histologic Grade:  G3, poorly differentiated   Macroscopic Tumor Perforation:  Not identified   Lymphovascular Invasion:  Small vessel   Perineural Invasion:  Not identified        CLINICAL HISTORY:       Patient: Indu Azul is a 68 y.o. female. with multiple medical problems that I saw originally on 2022 for thrombocytopenia.  Patient platelet counts on 2021 were 132,000 and since that that has been slowly trending down.  2021 platelets 132,000  2022 platelets 121,000  2022 platelets 115,000     Of note patient have a UA done that same day that suggest possible UTI with positive nitrates, 1+ leukocytes and many bacteria.  Urine culture with E. coli.   Reviewing electronic medical record and going back to 12/15/2014 patient with occasional low platelets.  They were never lower than 100,000 and they always normalized.   As per patient in 2020 when her platelets were slightly decreased (117,000), this was shortly after she has her left foot surgery.  Then in May 17 and May 18 2021, platelets were 105k and 101k,  she had COVID.  Again in 2022 she have a UTI for which she completed antibiotics.     Patient's father diagnosed Blood disorder requiring blood transfusion that started q 4 weeks and the q week. He was diagnosed on his 80's but  at 91. Sister and niece had ovarian cancer. Sister  of it. Niece still alive.     She was found to have iron deficiency anemia.  EGD performed, grade I-II esophageal varices found, too small to band. She also had Colonoscopy by Dr Hammonds that showed an ulcerated malignant-appearing partially obstructing medium sized mass in the ascending colon.  She reports that she had a  colonoscopy about 5 years ago at [facility] and everything was fine, no polyps or masses.   Biopsy and tattoo of the mass showed fragments of colonic mucosa, no evidence of dysplasia or malignancy.  Two other polyps were completely removed in the descending and sigmoid colon, pathology returned hyperplastic polyps.  CT abd/pel with no acute findings.     Despite of biopsy because of malignant-appearing partially obstructing mass of the ascending colon and photographs and description were consistent with colon cancer she underwent Lap/jaswinder right colon resection on 10/19/2023 by Dr Timothy Russ. Path c/w really differentiated adeno carcinoma.         Chief Complaint: No chief complaint on file.      Interval History:  Patient with h/o Charcot feet for which she had surgery. She does not have sensation on her feet from ankle down. This was prior to her diagnosis of colon cancer. She is getting Oxaloplatin but neuropathy is not worse. She started dose reduced and will cont like same dose. If neuropathy worsen will d/c oxaliplatin.       2/12/2024: Patient presents today for labs and TD prior to C6 of FOLFOX with dose reduction of Oxaliplatin due to neuropathy, accompanied by her daughter. C6 was held on 2/6/24 due to thrombocytopenia (plt 74k). 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 She is tolerating well. She continues with occasional right sided abdominal pain and oral thrush. Pharmacy did not have RX in stock and gave an alternative, but it does not help.  No worsening in neuropathy as of yet. Hyperpigmentation of nails and skin on hands commonly seen with 5FU. Overall, she is doing fair. No fever, chills or sweats. Denies chest pain or shortness of breath. No bleeding. Bowels are moving without difficulty.  Plts today 128k.    2/19/24: Patient presents today as an add on. She has questions about her recent diagnosis of  thrombus within the SMV and extending through the main portal vein . She complain of pain there so imaging was done. She was started on Eliquis. Last week after her chemo, she was nauseated so she didn't take any of her PO meds for 2 days and her right sided abdominal pain returned. Once she resumed Eliqius, her right sided abdominal pain decreased. Reports increased pain when she lays on her right side. She wants to know if we will be repeating imaging to eval her blood clot. Pancytopenia today. She treated last week (sonia). Denies fever.     24: Patient here for one week f/u. Due for C7 tomorrow. Platelet count is 75 k today. Denies any evidence of bleeding.         Past Medical History:   Diagnosis Date    Anesthesia complication     trouble awakening    Charcot's joint of foot, left     Chronic kidney disease, unspecified     Cirrhosis of liver without ascites     Depression     Diabetes mellitus, type II, insulin dependent     Diabetic neuropathy     GERD (gastroesophageal reflux disease)     H/O: hysterectomy     Hepatic steatosis     Malignant neoplasm of ascending colon     Obesity, unspecified     Overactive bladder     Thrombocytopenia, unspecified     Vitamin D deficiency       Past Surgical History:   Procedure Laterality Date    APPENDECTOMY      BREAST BIOPSY  2016     SECTION      x3    charcot-leyden crystals identification      CHOLECYSTECTOMY      COLONOSCOPY N/A 2023    Procedure: COLON;  Surgeon: Luis Amador MD;  Location: [facility] ENDOSCOPY;  Service: Gastroenterology;  Laterality: N/A;    COLONOSCOPY, WITH 1 OR MORE BIOPSIES  2023    Procedure: COLONOSCOPY, WITH 1 OR MORE BIOPSIES;  Surgeon: Luis Amador MD;  Location: [facility] ENDOSCOPY;  Service: Gastroenterology;;    COLONOSCOPY, WITH DIRECTED SUBMUCOSAL INJECTION  2023    Procedure: COLONOSCOPY, WITH DIRECTED SUBMUCOSAL INJECTION;  Surgeon: Luis Amador MD;   Location: [facility] ENDOSCOPY;  Service: Gastroenterology;;  8cc Colon Tattoo    COLONOSCOPY, WITH POLYPECTOMY USING SNARE  08/28/2023    Procedure: COLONOSCOPY, WITH POLYPECTOMY USING SNARE;  Surgeon: Luis Amador MD;  Location: [facility] ENDOSCOPY;  Service: Gastroenterology;;    ESOPHAGOGASTRODUODENOSCOPY N/A 08/28/2023    Procedure: DOUBLE;  Surgeon: Luis Amador MD;  Location: [facility] ENDOSCOPY;  Service: Gastroenterology;  Laterality: N/A;    HEMORRHOID SURGERY      HYSTERECTOMY  1990    total    INSERTION OF TUNNELED CENTRAL VENOUS CATHETER (CVC) WITH SUBCUTANEOUS PORT Right 11/20/2023    Procedure: [id number];  Surgeon: Timothy Russ MD;  Location: [facility];  Service: General;  Laterality: Right;    right foot surgery Right 02/01/2022    XI ROBOTIC COLECTOMY, RIGHT N/A 10/19/2023    Procedure: XI ROBOTIC COLECTOMY, RIGHT;  Surgeon: Timothy Russ MD;  Location: [facility];  Service: General;  Laterality: N/A;     Family History   Problem Relation Age of Onset    Diabetes Mother     Alzheimer's disease Mother     Diabetes Father     Leukemia Father     Diabetes Sister     Liver cancer Sister     Diabetes Brother      Social Connections: Not on file       Review of patient's allergies indicates:  No Known Allergies   Current Outpatient Medications on File Prior to Visit   Medication Sig Dispense Refill    apixaban (ELIQUIS) 5 mg Tab Take 2 tablets (10 mg total) by mouth 2 (two) times daily for 7 days, THEN 1 tablet (5 mg total) 2 (two) times daily for 21 days. 70 tablet 0    diphenhydrAMINE-aluminum-magnesium hydroxide-simethicone-LIDOcaine viscous HCl 2% Swish and spit 15 mLs every 4 (four) hours as needed (mouth pain). 1 each 3    dulaglutide (TRULICITY) 3 mg/0.5 mL pen injector Inject 3 mg into the skin every 7 days. 4 pen 11    ferrous sulfate (FEOSOL) 325 mg (65 mg iron) Tab tablet Take 1 tablet (325 mg total) by mouth 2 (two) times daily. 60 tablet 11    folic acid  (FOLVITE) 1 MG tablet Take 1 tablet (1 mg total) by mouth once daily. 100 tablet 3    furosemide (LASIX) 40 MG tablet Take 40 mg by mouth as needed.      gabapentin (NEURONTIN) 300 MG capsule Take 2 capsules (600 mg total) by mouth every evening. 60 capsule 5    HYDROcodone-acetaminophen (NORCO) 5-325 mg per tablet Take 1 tablet by mouth every 6 (six) hours as needed for Pain. 15 tablet 0    insulin glargine, TOUJEO, (TOUJEO SOLOSTAR U-300 INSULIN) 300 unit/mL (1.5 mL) InPn pen INJECT 79 UNITS SUBCUTANEOUSLY ONCE DAILY 6 mL 11    insulin syringe-needle U-100 1 mL 31 gauge x 5/16 Syrg Inject 1 Syringe into the skin 3 (three) times daily with meals.      k phos di & mono-sod phos mono (K-PHOS-NEUTRAL) 250 mg Tab Take 1 tablet by mouth 2 (two) times a day. for 2 days 4 tablet 0    levoFLOXacin (LEVAQUIN) 500 MG tablet Take 1 tablet (500 mg total) by mouth once daily. 7 tablet 0    LYUMJEV KWIKPEN U-200 INSULIN 200 unit/mL (3 mL) pen Sliding scale      OLANZapine (ZYPREXA) 5 MG tablet Take 1 tablet (5 mg total) by mouth every evening. Take nightly on days 1-3 of each chemotherapy cycle. 6 tablet 5    ondansetron (ZOFRAN-ODT) 8 MG TbDL Take 1 tablet (8 mg total) by mouth every 6 (six) hours as needed. 10 tablet 0    prochlorperazine (COMPAZINE) 5 MG tablet Take 5 mg by mouth every 6 (six) hours as needed.      sertraline (ZOLOFT) 50 MG tablet Take 1 tablet by mouth once daily 90 tablet 3    solifenacin (VESICARE) 10 MG tablet Take 10 mg by mouth once daily.      spironolactone (ALDACTONE) 50 MG tablet Take 50 mg by mouth once daily.       Current Facility-Administered Medications on File Prior to Visit   Medication Dose Route Frequency Provider Last Rate Last Admin    promethazine (PHENERGAN) 12.5 mg in dextrose 5 % (D5W) 50 mL IVPB  12.5 mg Intravenous Once Gayatri Jaffe FNP          Review of Systems         There were no vitals filed for this visit.         Wt Readings from Last 6 Encounters:   02/19/24 104.6 kg  (230 lb 8 oz)   02/12/24 109.9 kg (242 lb 3.2 oz)   02/12/24 109.9 kg (242 lb 3.2 oz)   02/05/24 107.5 kg (237 lb 1.6 oz)   01/23/24 106 kg (233 lb 11.2 oz)   01/22/24 106 kg (233 lb 11.2 oz)     There is no height or weight on file to calculate BMI.  There is no height or weight on file to calculate BSA.  Physical Exam  Vitals and nursing note reviewed.   Constitutional:       General: She is not in acute distress.     Appearance: Normal appearance. She is obese.   HENT:      Head: Normocephalic and atraumatic.      Mouth/Throat:      Mouth: Mucous membranes are moist.   Eyes:      General: No scleral icterus.     Extraocular Movements: Extraocular movements intact.      Conjunctiva/sclera: Conjunctivae normal.      Pupils: Pupils are equal, round, and reactive to light.   Neck:      Vascular: No JVD.   Cardiovascular:      Rate and Rhythm: Normal rate and regular rhythm.      Heart sounds: No murmur heard.  Pulmonary:      Effort: Pulmonary effort is normal.      Breath sounds: Normal breath sounds. No wheezing or rhonchi.   Chest:      Comments: Right chest wall mediport in place.    Abdominal:      General: Bowel sounds are normal. There is no distension.      Palpations: Abdomen is soft. There is no mass.      Tenderness: There is no abdominal tenderness.      Comments: Surgical incisions healed after colon surgery.    Musculoskeletal:         General: No swelling or deformity.      Cervical back: Neck supple.   Lymphadenopathy:      Head:      Right side of head: No submandibular adenopathy.      Left side of head: No submandibular adenopathy.      Cervical: No cervical adenopathy.      Upper Body:      Right upper body: No supraclavicular or axillary adenopathy.      Left upper body: No supraclavicular or axillary adenopathy.      Lower Body: No right inguinal adenopathy. No left inguinal adenopathy.   Skin:     General: Skin is warm.      Coloration: Skin is not jaundiced.      Findings: No lesion or rash.       Nails: There is no clubbing.      Comments: Darkening of nails and skin on hands commonly seen with 5FU   Neurological:      General: No focal deficit present.      Mental Status: She is alert and oriented to person, place, and time.      Cranial Nerves: Cranial nerves 2-12 are intact.   Psychiatric:         Attention and Perception: Attention normal.         Mood and Affect: Mood is depressed.         Behavior: Behavior is cooperative.         Judgment: Judgment normal.       ECOG SCORE             Laboratory:  CBC with Differential:  Lab Results   Component Value Date    WBC 18.11 (H) 02/19/2024    RBC 3.90 (L) 02/19/2024    HGB 9.6 (L) 02/19/2024    HCT 31.1 (L) 02/19/2024    MCV 79.7 (L) 02/19/2024    MCH 24.6 (L) 02/19/2024    MCHC 30.9 (L) 02/19/2024    RDW 21.9 (H) 02/19/2024    PLT 81 (L) 02/19/2024    MPV  02/19/2024      Comment:      Unable to calculate MPV   This is a corrected result. Previous result was 9.7 fL on 2/19/2024 at 1041 CST.        CMP:  Sodium   Date Value Ref Range Status   06/01/2022 140 136 - 145 mmol/L Final     Sodium Level   Date Value Ref Range Status   02/19/2024 136 136 - 145 mmol/L Final     Potassium   Date Value Ref Range Status   06/01/2022 4.9 3.5 - 5.1 mmol/L Final     Potassium Level   Date Value Ref Range Status   02/19/2024 4.2 3.5 - 5.1 mmol/L Final     Chloride   Date Value Ref Range Status   06/01/2022 104 100 - 109 mmol/L Final     Carbon Dioxide   Date Value Ref Range Status   02/19/2024 26 23 - 31 mmol/L Final   06/01/2022 29 22 - 33 mmol/L Final     Blood Urea Nitrogen   Date Value Ref Range Status   02/19/2024 20.5 (H) 9.8 - 20.1 mg/dL Final   06/01/2022 22 5 - 25 mg/dL Final     Creatinine   Date Value Ref Range Status   02/19/2024 1.71 (H) 0.55 - 1.02 mg/dL Final   06/01/2022 1.32 (H) 0.57 - 1.25 mg/dL Final     Calcium   Date Value Ref Range Status   06/01/2022 8.7 (L) 8.8 - 10.6 mg/dL Final     Calcium Level Total   Date Value Ref Range Status   02/19/2024  8.8 8.4 - 10.2 mg/dL Final     Albumin Level   Date Value Ref Range Status   02/19/2024 3.4 3.4 - 4.8 g/dL Final     Bilirubin Total   Date Value Ref Range Status   02/19/2024 0.3 <=1.5 mg/dL Final     Alkaline Phosphatase   Date Value Ref Range Status   02/19/2024 163 (H) 40 - 150 unit/L Final     Aspartate Aminotransferase   Date Value Ref Range Status   02/19/2024 22 5 - 34 unit/L Final     Alanine Aminotransferase   Date Value Ref Range Status   02/19/2024 15 0 - 55 unit/L Final     Anion Gap   Date Value Ref Range Status   06/01/2022 7 (L) 8 - 16 mmol/L Final     eGFR    Date Value Ref Range Status   06/01/2022 45 mL/min/1.73mSq Final     Comment:     In accordance with NKF-ASN Task Force recommendation, calculation based on the Chronic Kidney Disease Epidemiology Collaboration (CKD-EPI) equation without adjustment for race. eGFR adjusted for gender and age and calculated in ml/min/1.73mSquared. eGFR cannot be calculated if patient is under 18 years of age.     Reference Range:   >= 60 ml/min/1.73mSquared.     Estimated GFR-Non    Date Value Ref Range Status   08/04/2022 43 mls/min/1.73/m2 Final         Assessment:       1) Stage IIIB adenocarcinoma of the ascending colon  --Patient will benefit of adjuvant chemotherapy.   --Due to severe diabetic neuropathy, will try dose reduction of Oxaliplatin, starting 65 mg/m2.    --If not tolerated, then with d/c Oxaliplatin and cont 5FU and LV  2) Lung nodules-Stable. Will monitor  3) iron deficiency anemia-on iron PO  4) Thrombocytopenia-resolved. Will follow counts closely as she has splenomegaly and this can affects her counts mostly platelets.  5) Folate deficiency-she will start taking folic acid at this time.   6) Diabetic neuropathy-severe. She does not feel her feet.  --She will have nerve stimulator placement to see if can help her feet     Educated the patient on the risks versus benefits as well as toxicities associated with  treatment.  Verbally consented the patient to the treatment plan and the patient was educated on the planned duration of the treatment and schedule of the treatment administration.  All questions were answered.      Plan:       Plan: Adjuvant FOLFOX x 6 months,with dose reduction of oxaliplatin due to severe (Grade 3) neuropathy. If not tolerated, then with d/c Oxaliplatin and cont 5FU and LV. Neuropathy same.       HOLD chemo this week for platelet count of 75k.  Continue Eliquis  RTC 1 week for TD/labs: CBC, CMP, CEA,- chemo next day    Encouraged to call with questions or problems  The patient was given ample opportunity to ask questions and they were all answered to satisfaction; patient demonstrated understanding of what we discussed and is agreeable to the plan.     LAURA Degroot

## 2024-02-29 ENCOUNTER — OFFICE VISIT (OUTPATIENT)
Dept: NEPHROLOGY | Facility: CLINIC | Age: 69
End: 2024-02-29
Payer: MEDICARE

## 2024-02-29 VITALS
TEMPERATURE: 97 F | HEIGHT: 65 IN | SYSTOLIC BLOOD PRESSURE: 144 MMHG | HEART RATE: 97 BPM | DIASTOLIC BLOOD PRESSURE: 70 MMHG | RESPIRATION RATE: 20 BRPM | OXYGEN SATURATION: 96 % | BODY MASS INDEX: 39.05 KG/M2 | WEIGHT: 234.38 LBS

## 2024-02-29 DIAGNOSIS — I10 PRIMARY HYPERTENSION: ICD-10-CM

## 2024-02-29 DIAGNOSIS — E11.65 TYPE 2 DIABETES MELLITUS WITH HYPERGLYCEMIA, WITH LONG-TERM CURRENT USE OF INSULIN: ICD-10-CM

## 2024-02-29 DIAGNOSIS — C18.2 MALIGNANT NEOPLASM OF ASCENDING COLON: ICD-10-CM

## 2024-02-29 DIAGNOSIS — N18.32 STAGE 3B CHRONIC KIDNEY DISEASE: Primary | ICD-10-CM

## 2024-02-29 DIAGNOSIS — D84.821 IMMUNOSUPPRESSION DUE TO DRUG THERAPY: ICD-10-CM

## 2024-02-29 DIAGNOSIS — Z79.4 TYPE 2 DIABETES MELLITUS WITH HYPERGLYCEMIA, WITH LONG-TERM CURRENT USE OF INSULIN: ICD-10-CM

## 2024-02-29 DIAGNOSIS — Z79.899 IMMUNOSUPPRESSION DUE TO DRUG THERAPY: ICD-10-CM

## 2024-02-29 DIAGNOSIS — E83.39 HYPOPHOSPHATEMIA: ICD-10-CM

## 2024-02-29 PROCEDURE — 3078F DIAST BP <80 MM HG: CPT | Mod: CPTII,S$GLB,,

## 2024-02-29 PROCEDURE — 1101F PT FALLS ASSESS-DOCD LE1/YR: CPT | Mod: CPTII,S$GLB,,

## 2024-02-29 PROCEDURE — 3077F SYST BP >= 140 MM HG: CPT | Mod: CPTII,S$GLB,,

## 2024-02-29 PROCEDURE — 1126F AMNT PAIN NOTED NONE PRSNT: CPT | Mod: CPTII,S$GLB,,

## 2024-02-29 PROCEDURE — 99213 OFFICE O/P EST LOW 20 MIN: CPT | Mod: S$GLB,,,

## 2024-02-29 PROCEDURE — 3066F NEPHROPATHY DOC TX: CPT | Mod: CPTII,S$GLB,,

## 2024-02-29 PROCEDURE — 3008F BODY MASS INDEX DOCD: CPT | Mod: CPTII,S$GLB,,

## 2024-02-29 PROCEDURE — 3288F FALL RISK ASSESSMENT DOCD: CPT | Mod: CPTII,S$GLB,,

## 2024-02-29 PROCEDURE — 99999 PR PBB SHADOW E&M-EST. PATIENT-LVL IV: CPT | Mod: PBBFAC,,,

## 2024-02-29 PROCEDURE — 1159F MED LIST DOCD IN RCRD: CPT | Mod: CPTII,S$GLB,,

## 2024-03-01 DIAGNOSIS — C18.2 MALIGNANT NEOPLASM OF ASCENDING COLON: Primary | ICD-10-CM

## 2024-03-04 ENCOUNTER — LAB VISIT (OUTPATIENT)
Dept: LAB | Facility: HOSPITAL | Age: 69
End: 2024-03-04
Attending: INTERNAL MEDICINE
Payer: MEDICARE

## 2024-03-04 ENCOUNTER — OFFICE VISIT (OUTPATIENT)
Dept: HEMATOLOGY/ONCOLOGY | Facility: CLINIC | Age: 69
End: 2024-03-04
Payer: MEDICARE

## 2024-03-04 VITALS
BODY MASS INDEX: 39.42 KG/M2 | HEART RATE: 90 BPM | HEIGHT: 65 IN | TEMPERATURE: 98 F | WEIGHT: 236.63 LBS | OXYGEN SATURATION: 100 % | DIASTOLIC BLOOD PRESSURE: 60 MMHG | SYSTOLIC BLOOD PRESSURE: 124 MMHG | RESPIRATION RATE: 18 BRPM

## 2024-03-04 DIAGNOSIS — C18.2 MALIGNANT NEOPLASM OF ASCENDING COLON: ICD-10-CM

## 2024-03-04 DIAGNOSIS — I81 PORTAL VEIN THROMBOSIS: ICD-10-CM

## 2024-03-04 LAB
ALBUMIN SERPL-MCNC: 3.2 G/DL (ref 3.4–4.8)
ALBUMIN/GLOB SERPL: 0.9 RATIO (ref 1.1–2)
ALP SERPL-CCNC: 146 UNIT/L (ref 40–150)
ALT SERPL-CCNC: 20 UNIT/L (ref 0–55)
AST SERPL-CCNC: 25 UNIT/L (ref 5–34)
BASOPHILS # BLD AUTO: 0.05 X10(3)/MCL
BASOPHILS NFR BLD AUTO: 0.6 %
BILIRUB SERPL-MCNC: 0.3 MG/DL
BUN SERPL-MCNC: 19.8 MG/DL (ref 9.8–20.1)
CALCIUM SERPL-MCNC: 9 MG/DL (ref 8.4–10.2)
CEA SERPL-MCNC: 3.85 NG/ML (ref 0–3)
CHLORIDE SERPL-SCNC: 106 MMOL/L (ref 98–107)
CO2 SERPL-SCNC: 26 MMOL/L (ref 23–31)
CREAT SERPL-MCNC: 1.45 MG/DL (ref 0.55–1.02)
EOSINOPHIL # BLD AUTO: 0.21 X10(3)/MCL (ref 0–0.9)
EOSINOPHIL NFR BLD AUTO: 2.7 %
ERYTHROCYTE [DISTWIDTH] IN BLOOD BY AUTOMATED COUNT: 22.6 % (ref 11.5–17)
GFR SERPLBLD CREATININE-BSD FMLA CKD-EPI: 40 MLS/MIN/1.73/M2
GLOBULIN SER-MCNC: 3.4 GM/DL (ref 2.4–3.5)
GLUCOSE SERPL-MCNC: 153 MG/DL (ref 82–115)
HCT VFR BLD AUTO: 30.1 % (ref 37–47)
HGB BLD-MCNC: 9.1 G/DL (ref 12–16)
IMM GRANULOCYTES # BLD AUTO: 0.11 X10(3)/MCL (ref 0–0.04)
IMM GRANULOCYTES NFR BLD AUTO: 1.4 %
LYMPHOCYTES # BLD AUTO: 1.23 X10(3)/MCL (ref 0.6–4.6)
LYMPHOCYTES NFR BLD AUTO: 15.6 %
MCH RBC QN AUTO: 25 PG (ref 27–31)
MCHC RBC AUTO-ENTMCNC: 30.2 G/DL (ref 33–36)
MCV RBC AUTO: 82.7 FL (ref 80–94)
MONOCYTES # BLD AUTO: 0.58 X10(3)/MCL (ref 0.1–1.3)
MONOCYTES NFR BLD AUTO: 7.4 %
NEUTROPHILS # BLD AUTO: 5.7 X10(3)/MCL (ref 2.1–9.2)
NEUTROPHILS NFR BLD AUTO: 72.3 %
PLATELET # BLD AUTO: 115 X10(3)/MCL (ref 130–400)
PMV BLD AUTO: ABNORMAL FL
POTASSIUM SERPL-SCNC: 5 MMOL/L (ref 3.5–5.1)
PROT SERPL-MCNC: 6.6 GM/DL (ref 5.8–7.6)
RBC # BLD AUTO: 3.64 X10(6)/MCL (ref 4.2–5.4)
SODIUM SERPL-SCNC: 140 MMOL/L (ref 136–145)
WBC # SPEC AUTO: 7.88 X10(3)/MCL (ref 4.5–11.5)

## 2024-03-04 PROCEDURE — 1126F AMNT PAIN NOTED NONE PRSNT: CPT | Mod: CPTII,S$GLB,, | Performed by: NURSE PRACTITIONER

## 2024-03-04 PROCEDURE — 99999 PR PBB SHADOW E&M-EST. PATIENT-LVL IV: CPT | Mod: PBBFAC,,, | Performed by: NURSE PRACTITIONER

## 2024-03-04 PROCEDURE — 82378 CARCINOEMBRYONIC ANTIGEN: CPT

## 2024-03-04 PROCEDURE — 3008F BODY MASS INDEX DOCD: CPT | Mod: CPTII,S$GLB,, | Performed by: NURSE PRACTITIONER

## 2024-03-04 PROCEDURE — 3074F SYST BP LT 130 MM HG: CPT | Mod: CPTII,S$GLB,, | Performed by: NURSE PRACTITIONER

## 2024-03-04 PROCEDURE — 3066F NEPHROPATHY DOC TX: CPT | Mod: CPTII,S$GLB,, | Performed by: NURSE PRACTITIONER

## 2024-03-04 PROCEDURE — 1159F MED LIST DOCD IN RCRD: CPT | Mod: CPTII,S$GLB,, | Performed by: NURSE PRACTITIONER

## 2024-03-04 PROCEDURE — 99215 OFFICE O/P EST HI 40 MIN: CPT | Mod: S$GLB,,, | Performed by: NURSE PRACTITIONER

## 2024-03-04 PROCEDURE — 36415 COLL VENOUS BLD VENIPUNCTURE: CPT

## 2024-03-04 PROCEDURE — 80053 COMPREHEN METABOLIC PANEL: CPT

## 2024-03-04 PROCEDURE — 85025 COMPLETE CBC W/AUTO DIFF WBC: CPT

## 2024-03-04 PROCEDURE — 3078F DIAST BP <80 MM HG: CPT | Mod: CPTII,S$GLB,, | Performed by: NURSE PRACTITIONER

## 2024-03-04 PROCEDURE — 1101F PT FALLS ASSESS-DOCD LE1/YR: CPT | Mod: CPTII,S$GLB,, | Performed by: NURSE PRACTITIONER

## 2024-03-04 PROCEDURE — 3288F FALL RISK ASSESSMENT DOCD: CPT | Mod: CPTII,S$GLB,, | Performed by: NURSE PRACTITIONER

## 2024-03-04 RX ORDER — DIPHENHYDRAMINE HYDROCHLORIDE 50 MG/ML
50 INJECTION INTRAMUSCULAR; INTRAVENOUS ONCE AS NEEDED
Status: CANCELLED | OUTPATIENT
Start: 2024-03-05

## 2024-03-04 RX ORDER — SODIUM CHLORIDE 0.9 % (FLUSH) 0.9 %
10 SYRINGE (ML) INJECTION
Status: CANCELLED | OUTPATIENT
Start: 2024-03-05

## 2024-03-04 RX ORDER — PROCHLORPERAZINE EDISYLATE 5 MG/ML
5 INJECTION INTRAMUSCULAR; INTRAVENOUS ONCE AS NEEDED
Status: CANCELLED | OUTPATIENT
Start: 2024-03-05

## 2024-03-04 RX ORDER — EPINEPHRINE 0.3 MG/.3ML
0.3 INJECTION SUBCUTANEOUS ONCE AS NEEDED
Status: CANCELLED | OUTPATIENT
Start: 2024-03-05

## 2024-03-04 RX ORDER — HEPARIN 100 UNIT/ML
500 SYRINGE INTRAVENOUS
Status: CANCELLED | OUTPATIENT
Start: 2024-03-05

## 2024-03-04 RX ORDER — FLUOROURACIL 50 MG/ML
400 INJECTION, SOLUTION INTRAVENOUS
Status: CANCELLED | OUTPATIENT
Start: 2024-03-05

## 2024-03-04 NOTE — PROGRESS NOTES
HEMATOLOGY/ONCOLOGY OFFICE CLINIC VISIT    Visit Information:    Initial Evaluation: 4/22/2022  Referring Provider: Maggy Jaquez NP  Other providers: Dr Timothy Russ  Code status: Not addressed     Diagnosis/Problem list:   pT3 N2a Mx Stage IIIB Colon cancer. Dx 10/19/23  Microcytic anemia.   Folate deficiency     Present Treatment:  FOLFOX with dose reduction on Oxaliplatin due to neuropathy. Started on 11/28/23.    Treatment history:  10/19/2023 Lap/jaswinder right colon resection         Imaging studies:  CT C/A/P 6/3/2022: There is no significant hepatic steatosis.  The liver is cirrhotic.  No liver lesion is identified.  The spleen is enlarged, measuring 15.5 cm in length.  There is no retroperitoneal lymphadenopathy or abdominal aortic aneurysm.  A mildly prominent right external iliac lymph node measures 9 mm in short axis, nonspecific.  This is also similar in appearance when compared to 2015. Impression: Cirrhosis. Splenomegaly.  CT CAP 9/11/2023: There is no supraclavicular or axillary lymphadenopathy.  No mediastinal adenopathy. 3 mm right upper lobe pulmonary nodule. There are few additional pulmonary micronodules in the lung apices measuring no greater than 1-2 mm.  Findings similar to prior exam.     Impression: Scattered pulmonary nodules in the upper lobe similar to prior.  No further follow-up acute according to Fleischner criteria.  No convincing evidence for metastatic disease. Nodular appearing liver, correlation for cirrhosis.  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     Pathology:  10/19/2023:  COLON, RIGHT HEMICOLECTOMY (1.5 CM OF TERMINAL ILEUM, 25 CM LENGTH OF RIGHT COLON): POORLY DIFFERENTIATED ADENOCARCINOMA WITH FOCAL SIGNET RING CELL FEATURES, UNIFOCAL, RIGHT COLON, 8 CM GREATEST DIMENSION.   THE TUMOR INVADES THROUGH THE MUSCULARIS PROPRIA INTO PERICOLONIC TISSUE (PT3).   FOCAL LYMPHVASCULAR INVASION IS PRESENT.   THE SURGICAL  MARGINS ARE FREE OF TUMOR.   METASTATIC ADENOCARCINOMA IS IDENTIFIED IN FOUR (4) OF TWENTY-SEVEN (27)   PERICOLONIC LYMPH NODES (LARGEST METASTATIC DEPOSIT 8 MM; FOCAL EXTRACAPSULAR EXTENSION OF TUMOR IS PRESENT).      PATHOLOGIC STAGING:  pT3 N2a Mx     Histologic Grade:  G3, poorly differentiated   Macroscopic Tumor Perforation:  Not identified   Lymphovascular Invasion:  Small vessel   Perineural Invasion:  Not identified        CLINICAL HISTORY:       Patient: Indu Azul is a 68 y.o. female. with multiple medical problems that I saw originally on 2022 for thrombocytopenia.  Patient platelet counts on 2021 were 132,000 and since that that has been slowly trending down.  2021 platelets 132,000  2022 platelets 121,000  2022 platelets 115,000     Of note patient have a UA done that same day that suggest possible UTI with positive nitrates, 1+ leukocytes and many bacteria.  Urine culture with E. coli.   Reviewing electronic medical record and going back to 12/15/2014 patient with occasional low platelets.  They were never lower than 100,000 and they always normalized.   As per patient in 2020 when her platelets were slightly decreased (117,000), this was shortly after she has her left foot surgery.  Then in May 17 and May 18 2021, platelets were 105k and 101k,  she had COVID.  Again in 2022 she have a UTI for which she completed antibiotics.     Patient's father diagnosed Blood disorder requiring blood transfusion that started q 4 weeks and the q week. He was diagnosed on his 80's but  at 91. Sister and niece had ovarian cancer. Sister  of it. Niece still alive.     She was found to have iron deficiency anemia.  EGD performed, grade I-II esophageal varices found, too small to band. She also had Colonoscopy by Dr Hammonds that showed an ulcerated malignant-appearing partially obstructing medium sized mass in the ascending colon.  She reports that she had a  colonoscopy about 5 years ago at [facility] and everything was fine, no polyps or masses.   Biopsy and tattoo of the mass showed fragments of colonic mucosa, no evidence of dysplasia or malignancy.  Two other polyps were completely removed in the descending and sigmoid colon, pathology returned hyperplastic polyps.  CT abd/pel with no acute findings.     Despite of biopsy because of malignant-appearing partially obstructing mass of the ascending colon and photographs and description were consistent with colon cancer she underwent Lap/jaswinder right colon resection on 10/19/2023 by Dr Timothy Russ. Path c/w really differentiated adeno carcinoma.         Chief Complaint: 1 Week Follow Up      Interval History:  Patient with h/o Charcot feet for which she had surgery. She does not have sensation on her feet from ankle down. This was prior to her diagnosis of colon cancer. She is getting Oxaloplatin but neuropathy is not worse. She started dose reduced and will cont like same dose. If neuropathy worsen will d/c oxaliplatin.       2/12/2024: Patient presents today for labs and TD prior to C6 of FOLFOX with dose reduction of Oxaliplatin due to neuropathy, accompanied by her daughter. C6 was held on 2/6/24 due to thrombocytopenia (plt 74k). 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 She is tolerating well. She continues with occasional right sided abdominal pain and oral thrush. Pharmacy did not have RX in stock and gave an alternative, but it does not help.  No worsening in neuropathy as of yet. Hyperpigmentation of nails and skin on hands commonly seen with 5FU. Overall, she is doing fair. No fever, chills or sweats. Denies chest pain or shortness of breath. No bleeding. Bowels are moving without difficulty.  Plts today 128k.    2/19/24: Patient presents today as an add on. She has questions about her recent diagnosis of thrombus within  the SMV and extending through the main portal vein . She complain of pain there so imaging was done. She was started on Eliquis. Last week after her chemo, she was nauseated so she didn't take any of her PO meds for 2 days and her right sided abdominal pain returned. Once she resumed Eliqius, her right sided abdominal pain decreased. Reports increased pain when she lays on her right side. She wants to know if we will be repeating imaging to eval her blood clot. Pancytopenia today. She treated last week (sonia). Denies fever.     24: Patient here for one week f/u. Due for C7 tomorrow. Platelet count is 75 k today. Denies any evidence of bleeding.     3/4/24: Patient presents today for one week f/u. Her platelet count increased to 115k. She has no complaints to report this week. She continues on Eliquis 5 mg bid. Denies any evidence of bleeding. Her neuropathy has not worsened as of yet.         Past Medical History:   Diagnosis Date    Anesthesia complication     trouble awakening    Charcot's joint of foot, left     Chronic kidney disease, unspecified     Cirrhosis of liver without ascites     Depression     Diabetes mellitus, type II, insulin dependent     Diabetic neuropathy     GERD (gastroesophageal reflux disease)     H/O: hysterectomy     Hepatic steatosis     Malignant neoplasm of ascending colon     Obesity, unspecified     Overactive bladder     Thrombocytopenia, unspecified     Vitamin D deficiency       Past Surgical History:   Procedure Laterality Date    APPENDECTOMY      BREAST BIOPSY  2016     SECTION      x3    charcot-leyden crystals identification      CHOLECYSTECTOMY      COLONOSCOPY N/A 2023    Procedure: COLON;  Surgeon: Luis Amador MD;  Location: [facility] ENDOSCOPY;  Service: Gastroenterology;  Laterality: N/A;    COLONOSCOPY, WITH 1 OR MORE BIOPSIES  2023    Procedure: COLONOSCOPY, WITH 1 OR MORE BIOPSIES;  Surgeon: Luis Amador MD;   Location: [facility] ENDOSCOPY;  Service: Gastroenterology;;    COLONOSCOPY, WITH DIRECTED SUBMUCOSAL INJECTION  08/28/2023    Procedure: COLONOSCOPY, WITH DIRECTED SUBMUCOSAL INJECTION;  Surgeon: Luis Amador MD;  Location: [facility] ENDOSCOPY;  Service: Gastroenterology;;  8cc Colon Tattoo    COLONOSCOPY, WITH POLYPECTOMY USING SNARE  08/28/2023    Procedure: COLONOSCOPY, WITH POLYPECTOMY USING SNARE;  Surgeon: Luis Amador MD;  Location: [facility] ENDOSCOPY;  Service: Gastroenterology;;    ESOPHAGOGASTRODUODENOSCOPY N/A 08/28/2023    Procedure: DOUBLE;  Surgeon: Luis Amador MD;  Location: [facility] ENDOSCOPY;  Service: Gastroenterology;  Laterality: N/A;    HEMORRHOID SURGERY      HYSTERECTOMY  1990    total    INSERTION OF TUNNELED CENTRAL VENOUS CATHETER (CVC) WITH SUBCUTANEOUS PORT Right 11/20/2023    Procedure: [id number];  Surgeon: Timothy Russ MD;  Location: [facility];  Service: General;  Laterality: Right;    right foot surgery Right 02/01/2022    XI ROBOTIC COLECTOMY, RIGHT N/A 10/19/2023    Procedure: XI ROBOTIC COLECTOMY, RIGHT;  Surgeon: Timothy Russ MD;  Location: [facility];  Service: General;  Laterality: N/A;     Family History   Problem Relation Age of Onset    Diabetes Mother     Alzheimer's disease Mother     Diabetes Father     Leukemia Father     Diabetes Sister     Liver cancer Sister     Diabetes Brother      Social Connections: Not on file       Review of patient's allergies indicates:  No Known Allergies   Current Outpatient Medications on File Prior to Visit   Medication Sig Dispense Refill    apixaban (ELIQUIS) 5 mg Tab Take 2 tablets (10 mg total) by mouth 2 (two) times daily for 7 days, THEN 1 tablet (5 mg total) 2 (two) times daily for 21 days. 70 tablet 0    diphenhydrAMINE-aluminum-magnesium hydroxide-simethicone-LIDOcaine viscous HCl 2% Swish and spit 15 mLs every 4 (four) hours as needed (mouth pain). 1 each 3    dulaglutide  (TRULICITY) 3 mg/0.5 mL pen injector Inject 3 mg into the skin every 7 days. 4 pen 11    ferrous sulfate (FEOSOL) 325 mg (65 mg iron) Tab tablet Take 1 tablet (325 mg total) by mouth 2 (two) times daily. 60 tablet 11    folic acid (FOLVITE) 1 MG tablet Take 1 tablet (1 mg total) by mouth once daily. 100 tablet 3    furosemide (LASIX) 40 MG tablet Take 40 mg by mouth as needed.      gabapentin (NEURONTIN) 300 MG capsule Take 2 capsules (600 mg total) by mouth every evening. 60 capsule 5    HYDROcodone-acetaminophen (NORCO) 5-325 mg per tablet Take 1 tablet by mouth every 6 (six) hours as needed for Pain. 30 tablet 0    insulin glargine, TOUJEO, (TOUJEO SOLOSTAR U-300 INSULIN) 300 unit/mL (1.5 mL) InPn pen INJECT 79 UNITS SUBCUTANEOUSLY ONCE DAILY 6 mL 11    insulin syringe-needle U-100 1 mL 31 gauge x 5/16 Syrg Inject 1 Syringe into the skin 3 (three) times daily with meals.      LYUMJEV KWIKPEN U-200 INSULIN 200 unit/mL (3 mL) pen Sliding scale      OLANZapine (ZYPREXA) 5 MG tablet Take 1 tablet (5 mg total) by mouth every evening. Take nightly on days 1-3 of each chemotherapy cycle. 6 tablet 5    ondansetron (ZOFRAN-ODT) 8 MG TbDL Take 1 tablet (8 mg total) by mouth every 6 (six) hours as needed. 10 tablet 0    prochlorperazine (COMPAZINE) 5 MG tablet Take 5 mg by mouth every 6 (six) hours as needed.      sertraline (ZOLOFT) 50 MG tablet Take 1 tablet by mouth once daily 90 tablet 3    solifenacin (VESICARE) 10 MG tablet Take 10 mg by mouth once daily.      spironolactone (ALDACTONE) 50 MG tablet Take 50 mg by mouth once daily.       Current Facility-Administered Medications on File Prior to Visit   Medication Dose Route Frequency Provider Last Rate Last Admin    promethazine (PHENERGAN) 12.5 mg in dextrose 5 % (D5W) 50 mL IVPB  12.5 mg Intravenous Once Gayatri Jaffe, DENIS          Review of Systems   Neurological:  Positive for numbness.          Vitals:    03/04/24 1006   Weight: 107.3 kg (236 lb 9.6 oz)             Wt Readings from Last 6 Encounters:   03/04/24 107.3 kg (236 lb 9.6 oz)   02/29/24 106.3 kg (234 lb 6.4 oz)   02/26/24 105.4 kg (232 lb 4.8 oz)   02/19/24 104.6 kg (230 lb 8 oz)   02/12/24 109.9 kg (242 lb 3.2 oz)   02/12/24 109.9 kg (242 lb 3.2 oz)     Body mass index is 39.37 kg/m².  Body surface area is 2.22 meters squared.  Physical Exam  Vitals and nursing note reviewed.   Constitutional:       General: She is not in acute distress.     Appearance: Normal appearance. She is obese.   HENT:      Head: Normocephalic and atraumatic.      Mouth/Throat:      Mouth: Mucous membranes are moist.   Eyes:      General: No scleral icterus.     Extraocular Movements: Extraocular movements intact.      Conjunctiva/sclera: Conjunctivae normal.      Pupils: Pupils are equal, round, and reactive to light.   Neck:      Vascular: No JVD.   Cardiovascular:      Rate and Rhythm: Normal rate and regular rhythm.      Heart sounds: No murmur heard.  Pulmonary:      Effort: Pulmonary effort is normal.      Breath sounds: Normal breath sounds. No wheezing or rhonchi.   Chest:      Comments: Right chest wall mediport in place.    Abdominal:      General: Bowel sounds are normal. There is no distension.      Palpations: Abdomen is soft. There is no mass.      Tenderness: There is no abdominal tenderness.      Comments: Surgical incisions healed after colon surgery.    Musculoskeletal:         General: No swelling or deformity.      Cervical back: Neck supple.   Lymphadenopathy:      Head:      Right side of head: No submandibular adenopathy.      Left side of head: No submandibular adenopathy.      Cervical: No cervical adenopathy.      Upper Body:      Right upper body: No supraclavicular or axillary adenopathy.      Left upper body: No supraclavicular or axillary adenopathy.      Lower Body: No right inguinal adenopathy. No left inguinal adenopathy.   Skin:     General: Skin is warm.      Coloration: Skin is not jaundiced.       Findings: No lesion or rash.      Nails: There is no clubbing.      Comments: Darkening of nails and skin on hands commonly seen with 5FU   Neurological:      General: No focal deficit present.      Mental Status: She is alert and oriented to person, place, and time.      Cranial Nerves: Cranial nerves 2-12 are intact.   Psychiatric:         Attention and Perception: Attention normal.         Mood and Affect: Mood is depressed.         Behavior: Behavior is cooperative.         Judgment: Judgment normal.     ECOG SCORE             Laboratory:  CBC with Differential:  Lab Results   Component Value Date    WBC 7.88 03/04/2024    RBC 3.64 (L) 03/04/2024    HGB 9.1 (L) 03/04/2024    HCT 30.1 (L) 03/04/2024    MCV 82.7 03/04/2024    MCH 25.0 (L) 03/04/2024    MCHC 30.2 (L) 03/04/2024    RDW 22.6 (H) 03/04/2024     (L) 03/04/2024    MPV  03/04/2024      Comment:      Unable to calculate MPV         CMP:  Sodium   Date Value Ref Range Status   06/01/2022 140 136 - 145 mmol/L Final     Sodium Level   Date Value Ref Range Status   02/26/2024 137 136 - 145 mmol/L Final     Potassium   Date Value Ref Range Status   06/01/2022 4.9 3.5 - 5.1 mmol/L Final     Potassium Level   Date Value Ref Range Status   02/26/2024 4.2 3.5 - 5.1 mmol/L Final     Chloride   Date Value Ref Range Status   06/01/2022 104 100 - 109 mmol/L Final     Carbon Dioxide   Date Value Ref Range Status   02/26/2024 28 23 - 31 mmol/L Final   06/01/2022 29 22 - 33 mmol/L Final     Blood Urea Nitrogen   Date Value Ref Range Status   02/26/2024 23.5 (H) 9.8 - 20.1 mg/dL Final   06/01/2022 22 5 - 25 mg/dL Final     Creatinine   Date Value Ref Range Status   02/26/2024 1.47 (H) 0.55 - 1.02 mg/dL Final   06/01/2022 1.32 (H) 0.57 - 1.25 mg/dL Final     Calcium   Date Value Ref Range Status   06/01/2022 8.7 (L) 8.8 - 10.6 mg/dL Final     Calcium Level Total   Date Value Ref Range Status   02/26/2024 9.4 8.4 - 10.2 mg/dL Final     Albumin Level   Date Value Ref  Range Status   02/26/2024 3.4 3.4 - 4.8 g/dL Final     Bilirubin Total   Date Value Ref Range Status   02/26/2024 0.4 <=1.5 mg/dL Final     Alkaline Phosphatase   Date Value Ref Range Status   02/26/2024 173 (H) 40 - 150 unit/L Final     Aspartate Aminotransferase   Date Value Ref Range Status   02/26/2024 35 (H) 5 - 34 unit/L Final     Alanine Aminotransferase   Date Value Ref Range Status   02/26/2024 28 0 - 55 unit/L Final     Anion Gap   Date Value Ref Range Status   06/01/2022 7 (L) 8 - 16 mmol/L Final     eGFR    Date Value Ref Range Status   06/01/2022 45 mL/min/1.73mSq Final     Comment:     In accordance with NKF-ASN Task Force recommendation, calculation based on the Chronic Kidney Disease Epidemiology Collaboration (CKD-EPI) equation without adjustment for race. eGFR adjusted for gender and age and calculated in ml/min/1.73mSquared. eGFR cannot be calculated if patient is under 18 years of age.     Reference Range:   >= 60 ml/min/1.73mSquared.     Estimated GFR-Non    Date Value Ref Range Status   08/04/2022 43 mls/min/1.73/m2 Final         Assessment:       1) Stage IIIB adenocarcinoma of the ascending colon  --Patient will benefit of adjuvant chemotherapy.   --Due to severe diabetic neuropathy, will try dose reduction of Oxaliplatin, starting 65 mg/m2.    --If not tolerated, then with d/c Oxaliplatin and cont 5FU and LV  2) Lung nodules-Stable. Will monitor  3) iron deficiency anemia-on iron PO  4) Thrombocytopenia-resolved. Will follow counts closely as she has splenomegaly and this can affects her counts mostly platelets.  5) Folate deficiency-she will start taking folic acid at this time.   6) Diabetic neuropathy-severe. She does not feel her feet.  --She will have nerve stimulator placement to see if can help her feet     Educated the patient on the risks versus benefits as well as toxicities associated with treatment.  Verbally consented the patient to the treatment  plan and the patient was educated on the planned duration of the treatment and schedule of the treatment administration.  All questions were answered.      Plan:       Plan: Adjuvant FOLFOX x 6 months,with dose reduction of oxaliplatin due to severe (Grade 3) neuropathy. If not tolerated, then with d/c Oxaliplatin and cont 5FU and LV. Neuropathy same.       May proceed with C7 tomorrow.   Continue Eliquis  RTC 2 week for TD/labs: CBC, CMP, CEA,- chemo next day    Encouraged to call with questions or problems  The patient was given ample opportunity to ask questions and they were all answered to satisfaction; patient demonstrated understanding of what we discussed and is agreeable to the plan.     LAURA Degroot

## 2024-03-05 ENCOUNTER — INFUSION (OUTPATIENT)
Dept: INFUSION THERAPY | Facility: HOSPITAL | Age: 69
End: 2024-03-05
Attending: INTERNAL MEDICINE
Payer: MEDICARE

## 2024-03-05 VITALS
TEMPERATURE: 98 F | HEIGHT: 65 IN | OXYGEN SATURATION: 99 % | WEIGHT: 236.63 LBS | HEART RATE: 93 BPM | BODY MASS INDEX: 39.42 KG/M2 | SYSTOLIC BLOOD PRESSURE: 129 MMHG | RESPIRATION RATE: 16 BRPM | DIASTOLIC BLOOD PRESSURE: 73 MMHG

## 2024-03-05 DIAGNOSIS — C18.2 MALIGNANT NEOPLASM OF ASCENDING COLON: Primary | ICD-10-CM

## 2024-03-05 PROCEDURE — 96367 TX/PROPH/DG ADDL SEQ IV INF: CPT

## 2024-03-05 PROCEDURE — 96416 CHEMO PROLONG INFUSE W/PUMP: CPT

## 2024-03-05 PROCEDURE — 96413 CHEMO IV INFUSION 1 HR: CPT

## 2024-03-05 PROCEDURE — 96415 CHEMO IV INFUSION ADDL HR: CPT

## 2024-03-05 PROCEDURE — 63600175 PHARM REV CODE 636 W HCPCS: Performed by: NURSE PRACTITIONER

## 2024-03-05 PROCEDURE — 25000003 PHARM REV CODE 250: Performed by: NURSE PRACTITIONER

## 2024-03-05 PROCEDURE — 96411 CHEMO IV PUSH ADDL DRUG: CPT

## 2024-03-05 PROCEDURE — 96366 THER/PROPH/DIAG IV INF ADDON: CPT

## 2024-03-05 RX ORDER — DIPHENHYDRAMINE HYDROCHLORIDE 50 MG/ML
50 INJECTION INTRAMUSCULAR; INTRAVENOUS ONCE AS NEEDED
Status: DISCONTINUED | OUTPATIENT
Start: 2024-03-05 | End: 2024-03-05 | Stop reason: HOSPADM

## 2024-03-05 RX ORDER — PROCHLORPERAZINE EDISYLATE 5 MG/ML
5 INJECTION INTRAMUSCULAR; INTRAVENOUS ONCE AS NEEDED
Status: CANCELLED | OUTPATIENT
Start: 2024-03-07

## 2024-03-05 RX ORDER — SODIUM CHLORIDE 0.9 % (FLUSH) 0.9 %
10 SYRINGE (ML) INJECTION
Status: DISCONTINUED | OUTPATIENT
Start: 2024-03-05 | End: 2024-03-05 | Stop reason: HOSPADM

## 2024-03-05 RX ORDER — SODIUM CHLORIDE 0.9 % (FLUSH) 0.9 %
10 SYRINGE (ML) INJECTION
Status: CANCELLED | OUTPATIENT
Start: 2024-03-07

## 2024-03-05 RX ORDER — SODIUM CHLORIDE 9 MG/ML
INJECTION, SOLUTION INTRAVENOUS
Status: CANCELLED
Start: 2024-03-07 | End: 2024-03-07

## 2024-03-05 RX ORDER — PROCHLORPERAZINE EDISYLATE 5 MG/ML
5 INJECTION INTRAMUSCULAR; INTRAVENOUS ONCE AS NEEDED
Status: DISCONTINUED | OUTPATIENT
Start: 2024-03-05 | End: 2024-03-05 | Stop reason: HOSPADM

## 2024-03-05 RX ORDER — HEPARIN 100 UNIT/ML
500 SYRINGE INTRAVENOUS
Status: DISCONTINUED | OUTPATIENT
Start: 2024-03-05 | End: 2024-03-05 | Stop reason: HOSPADM

## 2024-03-05 RX ORDER — HEPARIN 100 UNIT/ML
500 SYRINGE INTRAVENOUS
Status: CANCELLED | OUTPATIENT
Start: 2024-03-07

## 2024-03-05 RX ORDER — FLUOROURACIL 50 MG/ML
900 INJECTION, SOLUTION INTRAVENOUS
Status: COMPLETED | OUTPATIENT
Start: 2024-03-05 | End: 2024-03-05

## 2024-03-05 RX ORDER — EPINEPHRINE 0.3 MG/.3ML
0.3 INJECTION SUBCUTANEOUS ONCE AS NEEDED
Status: DISCONTINUED | OUTPATIENT
Start: 2024-03-05 | End: 2024-03-05 | Stop reason: HOSPADM

## 2024-03-05 RX ADMIN — PALONOSETRON HYDROCHLORIDE 0.25 MG: 0.25 INJECTION INTRAVENOUS at 08:03

## 2024-03-05 RX ADMIN — FLUOROURACIL 5350 MG: 50 INJECTION, SOLUTION INTRAVENOUS at 11:03

## 2024-03-05 RX ADMIN — FLUOROURACIL 900 MG: 50 INJECTION, SOLUTION INTRAVENOUS at 10:03

## 2024-03-05 RX ADMIN — OXALIPLATIN 145 MG: 5 INJECTION, SOLUTION INTRAVENOUS at 08:03

## 2024-03-05 RX ADMIN — LEUCOVORIN CALCIUM 900 MG: 350 INJECTION, POWDER, LYOPHILIZED, FOR SOLUTION INTRAMUSCULAR; INTRAVENOUS at 08:03

## 2024-03-07 ENCOUNTER — INFUSION (OUTPATIENT)
Dept: INFUSION THERAPY | Facility: HOSPITAL | Age: 69
End: 2024-03-07
Attending: INTERNAL MEDICINE
Payer: MEDICARE

## 2024-03-07 VITALS — WEIGHT: 236.63 LBS | HEIGHT: 65 IN | BODY MASS INDEX: 39.42 KG/M2

## 2024-03-07 DIAGNOSIS — C18.2 MALIGNANT NEOPLASM OF ASCENDING COLON: Primary | ICD-10-CM

## 2024-03-07 PROCEDURE — 96360 HYDRATION IV INFUSION INIT: CPT

## 2024-03-07 PROCEDURE — 63600175 PHARM REV CODE 636 W HCPCS: Performed by: NURSE PRACTITIONER

## 2024-03-07 PROCEDURE — 63600175 PHARM REV CODE 636 W HCPCS: Mod: JZ,JG | Performed by: INTERNAL MEDICINE

## 2024-03-07 PROCEDURE — 96372 THER/PROPH/DIAG INJ SC/IM: CPT | Mod: 59

## 2024-03-07 PROCEDURE — 25000003 PHARM REV CODE 250: Performed by: NURSE PRACTITIONER

## 2024-03-07 RX ORDER — HEPARIN 100 UNIT/ML
500 SYRINGE INTRAVENOUS
Status: DISCONTINUED | OUTPATIENT
Start: 2024-03-07 | End: 2024-03-07 | Stop reason: HOSPADM

## 2024-03-07 RX ORDER — SODIUM CHLORIDE 0.9 % (FLUSH) 0.9 %
10 SYRINGE (ML) INJECTION
Status: DISCONTINUED | OUTPATIENT
Start: 2024-03-07 | End: 2024-03-07 | Stop reason: HOSPADM

## 2024-03-07 RX ORDER — SODIUM CHLORIDE 9 MG/ML
INJECTION, SOLUTION INTRAVENOUS
Status: COMPLETED | OUTPATIENT
Start: 2024-03-07 | End: 2024-03-07

## 2024-03-07 RX ORDER — PROCHLORPERAZINE EDISYLATE 5 MG/ML
5 INJECTION INTRAMUSCULAR; INTRAVENOUS ONCE AS NEEDED
Status: DISCONTINUED | OUTPATIENT
Start: 2024-03-07 | End: 2024-03-07 | Stop reason: HOSPADM

## 2024-03-07 RX ADMIN — PEGFILGRASTIM-CBQV 6 MG: 6 INJECTION, SOLUTION SUBCUTANEOUS at 10:03

## 2024-03-07 RX ADMIN — SODIUM CHLORIDE: 9 INJECTION, SOLUTION INTRAVENOUS at 10:03

## 2024-03-07 RX ADMIN — HEPARIN 500 UNITS: 100 SYRINGE at 12:03

## 2024-03-08 ENCOUNTER — OFFICE VISIT (OUTPATIENT)
Dept: FAMILY MEDICINE | Facility: CLINIC | Age: 69
End: 2024-03-08
Payer: MEDICARE

## 2024-03-08 VITALS
DIASTOLIC BLOOD PRESSURE: 72 MMHG | OXYGEN SATURATION: 98 % | RESPIRATION RATE: 18 BRPM | HEIGHT: 65 IN | WEIGHT: 235.69 LBS | SYSTOLIC BLOOD PRESSURE: 118 MMHG | BODY MASS INDEX: 39.27 KG/M2 | HEART RATE: 97 BPM

## 2024-03-08 DIAGNOSIS — E11.69 HYPERLIPIDEMIA ASSOCIATED WITH TYPE 2 DIABETES MELLITUS: ICD-10-CM

## 2024-03-08 DIAGNOSIS — E11.65 TYPE 2 DIABETES MELLITUS WITH HYPERGLYCEMIA, WITH LONG-TERM CURRENT USE OF INSULIN: Primary | ICD-10-CM

## 2024-03-08 DIAGNOSIS — E11.22 CKD STAGE 3 DUE TO TYPE 2 DIABETES MELLITUS: ICD-10-CM

## 2024-03-08 DIAGNOSIS — N18.30 CKD STAGE 3 DUE TO TYPE 2 DIABETES MELLITUS: ICD-10-CM

## 2024-03-08 DIAGNOSIS — E78.5 HYPERLIPIDEMIA ASSOCIATED WITH TYPE 2 DIABETES MELLITUS: ICD-10-CM

## 2024-03-08 DIAGNOSIS — Z79.4 TYPE 2 DIABETES MELLITUS WITH HYPERGLYCEMIA, WITH LONG-TERM CURRENT USE OF INSULIN: Primary | ICD-10-CM

## 2024-03-08 PROBLEM — Z23 NEED FOR INFLUENZA VACCINATION: Status: RESOLVED | Noted: 2023-01-11 | Resolved: 2024-03-08

## 2024-03-08 PROBLEM — E83.39 HYPOPHOSPHATEMIA: Status: RESOLVED | Noted: 2024-02-29 | Resolved: 2024-03-08

## 2024-03-08 PROBLEM — K63.89 COLONIC MASS: Status: RESOLVED | Noted: 2023-10-23 | Resolved: 2024-03-08

## 2024-03-08 PROBLEM — I15.2 HYPERTENSION ASSOCIATED WITH TYPE 2 DIABETES MELLITUS: Status: RESOLVED | Noted: 2022-06-02 | Resolved: 2024-03-08

## 2024-03-08 PROBLEM — I10 PRIMARY HYPERTENSION: Status: RESOLVED | Noted: 2024-02-29 | Resolved: 2024-03-08

## 2024-03-08 PROBLEM — Z12.11 COLON CANCER SCREENING: Status: RESOLVED | Noted: 2022-08-09 | Resolved: 2024-03-08

## 2024-03-08 PROBLEM — E11.59 HYPERTENSION ASSOCIATED WITH TYPE 2 DIABETES MELLITUS: Status: RESOLVED | Noted: 2022-06-02 | Resolved: 2024-03-08

## 2024-03-08 PROBLEM — N18.4 CKD (CHRONIC KIDNEY DISEASE) STAGE 4, GFR 15-29 ML/MIN: Status: RESOLVED | Noted: 2023-07-13 | Resolved: 2024-03-08

## 2024-03-08 PROBLEM — N18.32 STAGE 3B CHRONIC KIDNEY DISEASE: Status: RESOLVED | Noted: 2024-02-29 | Resolved: 2024-03-08

## 2024-03-08 PROCEDURE — 1101F PT FALLS ASSESS-DOCD LE1/YR: CPT | Mod: CPTII,,, | Performed by: INTERNAL MEDICINE

## 2024-03-08 PROCEDURE — 3066F NEPHROPATHY DOC TX: CPT | Mod: CPTII,,, | Performed by: INTERNAL MEDICINE

## 2024-03-08 PROCEDURE — 3074F SYST BP LT 130 MM HG: CPT | Mod: CPTII,,, | Performed by: INTERNAL MEDICINE

## 2024-03-08 PROCEDURE — 1159F MED LIST DOCD IN RCRD: CPT | Mod: CPTII,,, | Performed by: INTERNAL MEDICINE

## 2024-03-08 PROCEDURE — 3288F FALL RISK ASSESSMENT DOCD: CPT | Mod: CPTII,,, | Performed by: INTERNAL MEDICINE

## 2024-03-08 PROCEDURE — 3078F DIAST BP <80 MM HG: CPT | Mod: CPTII,,, | Performed by: INTERNAL MEDICINE

## 2024-03-08 PROCEDURE — 3008F BODY MASS INDEX DOCD: CPT | Mod: CPTII,,, | Performed by: INTERNAL MEDICINE

## 2024-03-08 PROCEDURE — 1160F RVW MEDS BY RX/DR IN RCRD: CPT | Mod: CPTII,,, | Performed by: INTERNAL MEDICINE

## 2024-03-08 PROCEDURE — 1126F AMNT PAIN NOTED NONE PRSNT: CPT | Mod: CPTII,,, | Performed by: INTERNAL MEDICINE

## 2024-03-08 PROCEDURE — 99214 OFFICE O/P EST MOD 30 MIN: CPT | Mod: ,,, | Performed by: INTERNAL MEDICINE

## 2024-03-08 NOTE — PROGRESS NOTES
Subjective:      Patient ID: Indu Azul is a 68 y.o. female.    Chief Complaint: Follow-up (Follow up /Diabetes Concerns )    HPI    Last wellness with ADELE 06/19/2023  Last OV with me 2022    Labs previously done and reviewed with patient  DM eye exam up to date (1/2023)  Colon cancer screening due; Colonoscopy ordered  Shingle; Tdap; Pnemovax due; declines  Declines CT Lung Cancer screening      Diagnosis/Problem list:   pT3 N2a Mx Stage IIIB Colon cancer. Dx 10/19/23  Microcytic anemia.   Folate deficiency      Present Treatment:  FOLFOX with dose reduction on Oxaliplatin due to neuropathy. Started on 11/28/23.     Treatment history:  10/19/2023 Lap/jaswinder right colon resection      DM:     No recent A1c   Patient reports BG around 150 but on chemotherapy days because of steroids, sugar goes up to 200+, they do use short acting insulin but on 2 units once for this and it does not drop much   Currently taking Toujeo 79 units at bedtime, Trulicity 3 mg  q week,  and NovoLog 5 units TIDAC prn CBGs >200.    DM eye exam  Urine M/C      thrombocytopenia:  Following Dr. Olmstead     Hepatitic Steatosis/Cirrhosis:  Cirrhotic appearance of liver per CT.  Evaluated per Dr. Amador, who started her on Aldactone and Furosemide.        CKD:  Most recent GFR 43.  nephrology following, Dr. Weir,  kidneys done, nephrosclerosis, stable per chart review       HLD: no longer on statin therapy      HTN: BP controlled, on aldactone only      Vitamin D deficiency: Recent vitamin D level 52.8.  Currently taking Vitamin D3 50,000 IUs weekly.          Depression: Stable.  Currently taking Zoloft 50 mg (1 tablet) daily.  Denies any suicidal/homicidal ideations.      Charcot's joint of left foot:  Recent surgery February 1st, 2022.      Overactive Bladder/Urge Incontinence: Specialist is Dr. Crenshaw, urology.  Cystoscopy done 6/14/2022; states normal. Vesicare use, stable      Tobacco user:  Smokes 1/2 PPD x 40 years.         "  Health Maintenance Due   Topic Date Due    Foot Exam  Never done    RSV Vaccine (Age 60+ and Pregnant patients) (1 - 1-dose 60+ series) Never done    Influenza Vaccine (1) 09/01/2023    Hemoglobin A1c  01/11/2024    Eye Exam  05/26/2024     Review of Systems   Constitutional:  Negative for chills and fever.   HENT:  Negative for congestion, sinus pressure and sore throat.    Respiratory:  Negative for cough and shortness of breath.    Cardiovascular:  Negative for chest pain and palpitations.   Gastrointestinal:  Negative for abdominal pain and nausea.   Skin:  Negative for rash.       Objective:     Vitals:    03/08/24 1054   BP: 118/72   BP Location: Left arm   Patient Position: Sitting   BP Method: Small (Manual)   Pulse: 97   Resp: 18   SpO2: 98%   Weight: 106.9 kg (235 lb 11.2 oz)   Height: 5' 5" (1.651 m)     Physical Exam  Vitals and nursing note reviewed.   Constitutional:       Appearance: Normal appearance.   HENT:      Head: Normocephalic and atraumatic.   Eyes:      Pupils: Pupils are equal, round, and reactive to light.   Cardiovascular:      Rate and Rhythm: Regular rhythm. Tachycardia present.   Pulmonary:      Effort: Pulmonary effort is normal.      Breath sounds: Normal breath sounds.   Musculoskeletal:      Right lower leg: Edema present.      Left lower leg: Edema present.   Neurological:      Mental Status: She is alert.       Assessment/Plan:     1. Type 2 diabetes mellitus with hyperglycemia, with long-term current use of insulin  -     Lipid Panel; Future; Expected date: 03/08/2024  -     Hemoglobin A1C; Future; Expected date: 03/08/2024  -     Microalbumin/Creatinine Ratio, Urine; Future; Expected date: 03/08/2024  BG is running high with chemotherapy  Need to check A1c now  Advised to use short acting insulin- 5 units once on day of chemotherapy  She has dex com  Notify me of readings, aiming for BG <180; can increase up by 2 units of short acting insulin to get to this goal on " chemotherapy days  Otherwise no med changes  Check labs now  2. Hyperlipidemia associated with type 2 diabetes mellitus  -     Lipid Panel; Future; Expected date: 03/08/2024  -     Hemoglobin A1C; Future; Expected date: 03/08/2024  -     Microalbumin/Creatinine Ratio, Urine; Future; Expected date: 03/08/2024  Check lipid panel  Given her guarded status with treatment, will monitor for now, do not think she's a good candidate for statin therapy given her medical history at this time  3. CKD stage 3 due to type 2 diabetes mellitus  Stable  Avoid NSAID use  Hydrate with water      Follow up in about 3 months (around 6/8/2024) for Follow Up - Chronic Conditions, 30 min.    This includes face to face time and non-face to face time preparing to see the patient (eg, review of tests), obtaining and/or reviewing separately obtained history, documenting clinical information in the electronic or other health record, independently interpreting results and communicating results to the patient/family/caregiver, or care coordinator.

## 2024-03-11 ENCOUNTER — LAB VISIT (OUTPATIENT)
Dept: LAB | Facility: HOSPITAL | Age: 69
End: 2024-03-11
Attending: INTERNAL MEDICINE
Payer: MEDICARE

## 2024-03-11 DIAGNOSIS — E11.69 HYPERLIPIDEMIA ASSOCIATED WITH TYPE 2 DIABETES MELLITUS: ICD-10-CM

## 2024-03-11 DIAGNOSIS — Z51.81 THERAPEUTIC DRUG MONITORING: ICD-10-CM

## 2024-03-11 DIAGNOSIS — C18.2 MALIGNANT NEOPLASM OF ASCENDING COLON: ICD-10-CM

## 2024-03-11 DIAGNOSIS — E78.5 HYPERLIPIDEMIA ASSOCIATED WITH TYPE 2 DIABETES MELLITUS: ICD-10-CM

## 2024-03-11 DIAGNOSIS — C18.2 MALIGNANT NEOPLASM OF ASCENDING COLON: Primary | ICD-10-CM

## 2024-03-11 DIAGNOSIS — Z79.4 TYPE 2 DIABETES MELLITUS WITH HYPERGLYCEMIA, WITH LONG-TERM CURRENT USE OF INSULIN: ICD-10-CM

## 2024-03-11 DIAGNOSIS — E11.42 DIABETIC POLYNEUROPATHY ASSOCIATED WITH TYPE 2 DIABETES MELLITUS: ICD-10-CM

## 2024-03-11 DIAGNOSIS — E11.65 TYPE 2 DIABETES MELLITUS WITH HYPERGLYCEMIA, WITH LONG-TERM CURRENT USE OF INSULIN: ICD-10-CM

## 2024-03-11 LAB
ALBUMIN SERPL-MCNC: 3.2 G/DL (ref 3.4–4.8)
ALBUMIN/GLOB SERPL: 1.1 RATIO (ref 1.1–2)
ALP SERPL-CCNC: 152 UNIT/L (ref 40–150)
ALT SERPL-CCNC: 17 UNIT/L (ref 0–55)
AST SERPL-CCNC: 21 UNIT/L (ref 5–34)
BASOPHILS # BLD AUTO: 0.03 X10(3)/MCL
BASOPHILS NFR BLD AUTO: 0.2 %
BILIRUB SERPL-MCNC: 0.3 MG/DL
BUN SERPL-MCNC: 15.9 MG/DL (ref 9.8–20.1)
CALCIUM SERPL-MCNC: 8.5 MG/DL (ref 8.4–10.2)
CHLORIDE SERPL-SCNC: 105 MMOL/L (ref 98–107)
CO2 SERPL-SCNC: 24 MMOL/L (ref 23–31)
CREAT SERPL-MCNC: 1.28 MG/DL (ref 0.55–1.02)
EOSINOPHIL # BLD AUTO: 0.08 X10(3)/MCL (ref 0–0.9)
EOSINOPHIL NFR BLD AUTO: 0.5 %
ERYTHROCYTE [DISTWIDTH] IN BLOOD BY AUTOMATED COUNT: 22.6 % (ref 11.5–17)
GFR SERPLBLD CREATININE-BSD FMLA CKD-EPI: 46 MLS/MIN/1.73/M2
GLOBULIN SER-MCNC: 3 GM/DL (ref 2.4–3.5)
GLUCOSE SERPL-MCNC: 192 MG/DL (ref 82–115)
HCT VFR BLD AUTO: 25.3 % (ref 37–47)
HGB BLD-MCNC: 7.9 G/DL (ref 12–16)
IMM GRANULOCYTES # BLD AUTO: 0.04 X10(3)/MCL (ref 0–0.04)
IMM GRANULOCYTES NFR BLD AUTO: 0.2 %
LYMPHOCYTES # BLD AUTO: 1.27 X10(3)/MCL (ref 0.6–4.6)
LYMPHOCYTES NFR BLD AUTO: 7.6 %
MCH RBC QN AUTO: 25.3 PG (ref 27–31)
MCHC RBC AUTO-ENTMCNC: 31.2 G/DL (ref 33–36)
MCV RBC AUTO: 81.1 FL (ref 80–94)
MONOCYTES # BLD AUTO: 0.38 X10(3)/MCL (ref 0.1–1.3)
MONOCYTES NFR BLD AUTO: 2.3 %
NEUTROPHILS # BLD AUTO: 14.98 X10(3)/MCL (ref 2.1–9.2)
NEUTROPHILS NFR BLD AUTO: 89.2 %
PLATELET # BLD AUTO: 76 X10(3)/MCL (ref 130–400)
PMV BLD AUTO: ABNORMAL FL
POTASSIUM SERPL-SCNC: 4.2 MMOL/L (ref 3.5–5.1)
PROT SERPL-MCNC: 6.2 GM/DL (ref 5.8–7.6)
RBC # BLD AUTO: 3.12 X10(6)/MCL (ref 4.2–5.4)
SODIUM SERPL-SCNC: 139 MMOL/L (ref 136–145)
WBC # SPEC AUTO: 16.78 X10(3)/MCL (ref 4.5–11.5)

## 2024-03-11 PROCEDURE — 36415 COLL VENOUS BLD VENIPUNCTURE: CPT

## 2024-03-11 PROCEDURE — 80053 COMPREHEN METABOLIC PANEL: CPT

## 2024-03-11 PROCEDURE — 85025 COMPLETE CBC W/AUTO DIFF WBC: CPT

## 2024-03-18 ENCOUNTER — OFFICE VISIT (OUTPATIENT)
Dept: HEMATOLOGY/ONCOLOGY | Facility: CLINIC | Age: 69
End: 2024-03-18
Payer: MEDICARE

## 2024-03-18 ENCOUNTER — LAB VISIT (OUTPATIENT)
Dept: LAB | Facility: HOSPITAL | Age: 69
End: 2024-03-18
Attending: NURSE PRACTITIONER
Payer: MEDICARE

## 2024-03-18 VITALS
BODY MASS INDEX: 41.57 KG/M2 | OXYGEN SATURATION: 97 % | TEMPERATURE: 98 F | SYSTOLIC BLOOD PRESSURE: 106 MMHG | HEART RATE: 89 BPM | WEIGHT: 234.63 LBS | DIASTOLIC BLOOD PRESSURE: 70 MMHG | HEIGHT: 63 IN | RESPIRATION RATE: 14 BRPM

## 2024-03-18 DIAGNOSIS — E11.42 DIABETIC POLYNEUROPATHY ASSOCIATED WITH TYPE 2 DIABETES MELLITUS: ICD-10-CM

## 2024-03-18 DIAGNOSIS — D84.821 IMMUNODEFICIENCY DUE TO CHEMOTHERAPY: ICD-10-CM

## 2024-03-18 DIAGNOSIS — C18.2 MALIGNANT NEOPLASM OF ASCENDING COLON: Primary | ICD-10-CM

## 2024-03-18 DIAGNOSIS — T45.1X5A IMMUNODEFICIENCY DUE TO CHEMOTHERAPY: ICD-10-CM

## 2024-03-18 DIAGNOSIS — I15.2 HYPERTENSION ASSOCIATED WITH TYPE 2 DIABETES MELLITUS: ICD-10-CM

## 2024-03-18 DIAGNOSIS — E11.59 HYPERTENSION ASSOCIATED WITH TYPE 2 DIABETES MELLITUS: ICD-10-CM

## 2024-03-18 DIAGNOSIS — E11.65 TYPE 2 DIABETES MELLITUS WITH HYPERGLYCEMIA, WITH LONG-TERM CURRENT USE OF INSULIN: ICD-10-CM

## 2024-03-18 DIAGNOSIS — E11.69 HYPERLIPIDEMIA ASSOCIATED WITH TYPE 2 DIABETES MELLITUS: ICD-10-CM

## 2024-03-18 DIAGNOSIS — C18.2 MALIGNANT NEOPLASM OF ASCENDING COLON: ICD-10-CM

## 2024-03-18 DIAGNOSIS — Z79.4 TYPE 2 DIABETES MELLITUS WITH HYPERGLYCEMIA, WITH LONG-TERM CURRENT USE OF INSULIN: ICD-10-CM

## 2024-03-18 DIAGNOSIS — Z51.81 THERAPEUTIC DRUG MONITORING: ICD-10-CM

## 2024-03-18 DIAGNOSIS — Z79.899 IMMUNODEFICIENCY DUE TO CHEMOTHERAPY: ICD-10-CM

## 2024-03-18 DIAGNOSIS — E78.5 HYPERLIPIDEMIA ASSOCIATED WITH TYPE 2 DIABETES MELLITUS: ICD-10-CM

## 2024-03-18 LAB
ALBUMIN SERPL-MCNC: 3.3 G/DL (ref 3.4–4.8)
ALBUMIN/GLOB SERPL: 1 RATIO (ref 1.1–2)
ALP SERPL-CCNC: 147 UNIT/L (ref 40–150)
ALT SERPL-CCNC: 19 UNIT/L (ref 0–55)
AST SERPL-CCNC: 25 UNIT/L (ref 5–34)
BASOPHILS # BLD AUTO: 0.02 X10(3)/MCL
BASOPHILS NFR BLD AUTO: 0.3 %
BILIRUB SERPL-MCNC: 0.4 MG/DL
BUN SERPL-MCNC: 16.9 MG/DL (ref 9.8–20.1)
CALCIUM SERPL-MCNC: 8.8 MG/DL (ref 8.4–10.2)
CEA SERPL-MCNC: 4.82 NG/ML (ref 0–3)
CHLORIDE SERPL-SCNC: 106 MMOL/L (ref 98–107)
CHOLEST SERPL-MCNC: 149 MG/DL
CHOLEST/HDLC SERPL: 3 {RATIO} (ref 0–5)
CO2 SERPL-SCNC: 25 MMOL/L (ref 23–31)
CREAT SERPL-MCNC: 1.46 MG/DL (ref 0.55–1.02)
EOSINOPHIL # BLD AUTO: 0.26 X10(3)/MCL (ref 0–0.9)
EOSINOPHIL NFR BLD AUTO: 4.2 %
ERYTHROCYTE [DISTWIDTH] IN BLOOD BY AUTOMATED COUNT: 22.3 % (ref 11.5–17)
EST. AVERAGE GLUCOSE BLD GHB EST-MCNC: 180 MG/DL
GFR SERPLBLD CREATININE-BSD FMLA CKD-EPI: 39 MLS/MIN/1.73/M2
GLOBULIN SER-MCNC: 3.2 GM/DL (ref 2.4–3.5)
GLUCOSE SERPL-MCNC: 99 MG/DL (ref 82–115)
HBA1C MFR BLD: 7.9 %
HCT VFR BLD AUTO: 26.7 % (ref 37–47)
HDLC SERPL-MCNC: 49 MG/DL (ref 35–60)
HGB BLD-MCNC: 8.3 G/DL (ref 12–16)
IMM GRANULOCYTES # BLD AUTO: 0.24 X10(3)/MCL (ref 0–0.04)
IMM GRANULOCYTES NFR BLD AUTO: 3.9 %
LDLC SERPL CALC-MCNC: 79 MG/DL (ref 50–140)
LYMPHOCYTES # BLD AUTO: 1.05 X10(3)/MCL (ref 0.6–4.6)
LYMPHOCYTES NFR BLD AUTO: 16.9 %
MCH RBC QN AUTO: 25.6 PG (ref 27–31)
MCHC RBC AUTO-ENTMCNC: 31.1 G/DL (ref 33–36)
MCV RBC AUTO: 82.4 FL (ref 80–94)
MONOCYTES # BLD AUTO: 0.53 X10(3)/MCL (ref 0.1–1.3)
MONOCYTES NFR BLD AUTO: 8.5 %
NEUTROPHILS # BLD AUTO: 4.12 X10(3)/MCL (ref 2.1–9.2)
NEUTROPHILS NFR BLD AUTO: 66.2 %
PLATELET # BLD AUTO: 87 X10(3)/MCL (ref 130–400)
PMV BLD AUTO: ABNORMAL FL
POTASSIUM SERPL-SCNC: 4.5 MMOL/L (ref 3.5–5.1)
PROT SERPL-MCNC: 6.5 GM/DL (ref 5.8–7.6)
RBC # BLD AUTO: 3.24 X10(6)/MCL (ref 4.2–5.4)
SODIUM SERPL-SCNC: 140 MMOL/L (ref 136–145)
TRIGL SERPL-MCNC: 105 MG/DL (ref 37–140)
VLDLC SERPL CALC-MCNC: 21 MG/DL
WBC # SPEC AUTO: 6.22 X10(3)/MCL (ref 4.5–11.5)

## 2024-03-18 PROCEDURE — 80061 LIPID PANEL: CPT

## 2024-03-18 PROCEDURE — 3074F SYST BP LT 130 MM HG: CPT | Mod: CPTII,S$GLB,, | Performed by: NURSE PRACTITIONER

## 2024-03-18 PROCEDURE — 3066F NEPHROPATHY DOC TX: CPT | Mod: CPTII,S$GLB,, | Performed by: NURSE PRACTITIONER

## 2024-03-18 PROCEDURE — 1126F AMNT PAIN NOTED NONE PRSNT: CPT | Mod: CPTII,S$GLB,, | Performed by: NURSE PRACTITIONER

## 2024-03-18 PROCEDURE — 83036 HEMOGLOBIN GLYCOSYLATED A1C: CPT

## 2024-03-18 PROCEDURE — 3008F BODY MASS INDEX DOCD: CPT | Mod: CPTII,S$GLB,, | Performed by: NURSE PRACTITIONER

## 2024-03-18 PROCEDURE — 1101F PT FALLS ASSESS-DOCD LE1/YR: CPT | Mod: CPTII,S$GLB,, | Performed by: NURSE PRACTITIONER

## 2024-03-18 PROCEDURE — 99999 PR PBB SHADOW E&M-EST. PATIENT-LVL IV: CPT | Mod: PBBFAC,,, | Performed by: NURSE PRACTITIONER

## 2024-03-18 PROCEDURE — 82378 CARCINOEMBRYONIC ANTIGEN: CPT

## 2024-03-18 PROCEDURE — 3051F HG A1C>EQUAL 7.0%<8.0%: CPT | Mod: CPTII,S$GLB,, | Performed by: NURSE PRACTITIONER

## 2024-03-18 PROCEDURE — 80053 COMPREHEN METABOLIC PANEL: CPT

## 2024-03-18 PROCEDURE — 1159F MED LIST DOCD IN RCRD: CPT | Mod: CPTII,S$GLB,, | Performed by: NURSE PRACTITIONER

## 2024-03-18 PROCEDURE — 3078F DIAST BP <80 MM HG: CPT | Mod: CPTII,S$GLB,, | Performed by: NURSE PRACTITIONER

## 2024-03-18 PROCEDURE — 85025 COMPLETE CBC W/AUTO DIFF WBC: CPT

## 2024-03-18 PROCEDURE — 36415 COLL VENOUS BLD VENIPUNCTURE: CPT

## 2024-03-18 PROCEDURE — 99215 OFFICE O/P EST HI 40 MIN: CPT | Mod: S$GLB,,, | Performed by: NURSE PRACTITIONER

## 2024-03-18 PROCEDURE — 3288F FALL RISK ASSESSMENT DOCD: CPT | Mod: CPTII,S$GLB,, | Performed by: NURSE PRACTITIONER

## 2024-03-18 NOTE — PROGRESS NOTES
HEMATOLOGY/ONCOLOGY OFFICE CLINIC VISIT    Visit Information:    Initial Evaluation: 4/22/2022  Referring Provider: Maggy Jaquez NP  Other providers: Dr Timothy Russ  Code status: Not addressed     Diagnosis/Problem list:   pT3 N2a Mx Stage IIIB Colon cancer. Dx 10/19/23  Microcytic anemia.   Folate deficiency     Present Treatment:  Xelox to start on 3/26/24 ( for 5 cycles)    Treatment history:  10/19/2023 Lap/jaswinder right colon resection   FOLFOX with dose reduction on Oxaliplatin due to neuropathy. Started on 11/28/23. x 7 cycles then switched to XELOX      Imaging studies:  CT C/A/P 6/3/2022: There is no significant hepatic steatosis.  The liver is cirrhotic.  No liver lesion is identified.  The spleen is enlarged, measuring 15.5 cm in length.  There is no retroperitoneal lymphadenopathy or abdominal aortic aneurysm.  A mildly prominent right external iliac lymph node measures 9 mm in short axis, nonspecific.  This is also similar in appearance when compared to 2015. Impression: Cirrhosis. Splenomegaly.  CT CAP 9/11/2023: There is no supraclavicular or axillary lymphadenopathy.  No mediastinal adenopathy. 3 mm right upper lobe pulmonary nodule. There are few additional pulmonary micronodules in the lung apices measuring no greater than 1-2 mm.  Findings similar to prior exam.     Impression: Scattered pulmonary nodules in the upper lobe similar to prior.  No further follow-up acute according to Fleischner criteria.  No convincing evidence for metastatic disease. Nodular appearing liver, correlation for cirrhosis.  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     Pathology:  10/19/2023:  COLON, RIGHT HEMICOLECTOMY (1.5 CM OF TERMINAL ILEUM, 25 CM LENGTH OF RIGHT COLON): POORLY DIFFERENTIATED ADENOCARCINOMA WITH FOCAL SIGNET RING CELL FEATURES, UNIFOCAL, RIGHT COLON, 8 CM GREATEST DIMENSION.   THE TUMOR INVADES THROUGH THE MUSCULARIS PROPRIA INTO PERICOLONIC  TISSUE (PT3).   FOCAL LYMPHVASCULAR INVASION IS PRESENT.   THE SURGICAL MARGINS ARE FREE OF TUMOR.   METASTATIC ADENOCARCINOMA IS IDENTIFIED IN FOUR (4) OF TWENTY-SEVEN (27)   PERICOLONIC LYMPH NODES (LARGEST METASTATIC DEPOSIT 8 MM; FOCAL EXTRACAPSULAR EXTENSION OF TUMOR IS PRESENT).      PATHOLOGIC STAGING:  pT3 N2a Mx     Histologic Grade:  G3, poorly differentiated   Macroscopic Tumor Perforation:  Not identified   Lymphovascular Invasion:  Small vessel   Perineural Invasion:  Not identified        CLINICAL HISTORY:       Patient: Indu Azul is a 68 y.o. female. with multiple medical problems that I saw originally on 2022 for thrombocytopenia.  Patient platelet counts on 2021 were 132,000 and since that that has been slowly trending down.  2021 platelets 132,000  2022 platelets 121,000  2022 platelets 115,000     Of note patient have a UA done that same day that suggest possible UTI with positive nitrates, 1+ leukocytes and many bacteria.  Urine culture with E. coli.   Reviewing electronic medical record and going back to 12/15/2014 patient with occasional low platelets.  They were never lower than 100,000 and they always normalized.   As per patient in 2020 when her platelets were slightly decreased (117,000), this was shortly after she has her left foot surgery.  Then in May 17 and May 18 2021, platelets were 105k and 101k,  she had COVID.  Again in 2022 she have a UTI for which she completed antibiotics.     Patient's father diagnosed Blood disorder requiring blood transfusion that started q 4 weeks and the q week. He was diagnosed on his 80's but  at 91. Sister and niece had ovarian cancer. Sister  of it. Niece still alive.     She was found to have iron deficiency anemia.  EGD performed, grade I-II esophageal varices found, too small to band. She also had Colonoscopy by Dr Hammonds that showed an ulcerated malignant-appearing partially obstructing  medium sized mass in the ascending colon.  She reports that she had a colonoscopy about 5 years ago at [facility] and everything was fine, no polyps or masses.   Biopsy and tattoo of the mass showed fragments of colonic mucosa, no evidence of dysplasia or malignancy.  Two other polyps were completely removed in the descending and sigmoid colon, pathology returned hyperplastic polyps.  CT abd/pel with no acute findings.     Despite of biopsy because of malignant-appearing partially obstructing mass of the ascending colon and photographs and description were consistent with colon cancer she underwent Lap/jaswinder right colon resection on 10/19/2023 by Dr Timothy Russ. Path c/w really differentiated adeno carcinoma.         Chief Complaint: no complaints      Interval History:  Patient with h/o Charcot feet for which she had surgery. She does not have sensation on her feet from ankle down. This was prior to her diagnosis of colon cancer. She is getting Oxaloplatin but neuropathy is not worse. She started dose reduced and will cont like same dose. If neuropathy worsen will d/c oxaliplatin.       2/12/2024: Patient presents today for labs and TD prior to C6 of FOLFOX with dose reduction of Oxaliplatin due to neuropathy, accompanied by her daughter. C6 was held on 2/6/24 due to thrombocytopenia (plt 74k). 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 She is tolerating well. She continues with occasional right sided abdominal pain and oral thrush. Pharmacy did not have RX in stock and gave an alternative, but it does not help.  No worsening in neuropathy as of yet. Hyperpigmentation of nails and skin on hands commonly seen with 5FU. Overall, she is doing fair. No fever, chills or sweats. Denies chest pain or shortness of breath. No bleeding. Bowels are moving without difficulty.  Plts today 128k.    2/19/24: Patient presents today as an add  on. She has questions about her recent diagnosis of thrombus within the SMV and extending through the main portal vein . She complain of pain there so imaging was done. She was started on Eliquis. Last week after her chemo, she was nauseated so she didn't take any of her PO meds for 2 days and her right sided abdominal pain returned. Once she resumed Eliqius, her right sided abdominal pain decreased. Reports increased pain when she lays on her right side. She wants to know if we will be repeating imaging to eval her blood clot. Pancytopenia today. She treated last week (sonia). Denies fever.     24: Patient here for one week f/u. Due for C7 tomorrow. Platelet count is 75 k today. Denies any evidence of bleeding.     3/4/24: Patient presents today for one week f/u. Her platelet count increased to 115k. She has no complaints to report this week. She continues on Eliquis 5 mg bid. Denies any evidence of bleeding. Her neuropathy has not worsened as of yet.     3/18/24: Patient presents today for 2 week f/u due for C8 of FOLFOX. He platelet count is 87k so we will have to delay again. She has no complaints today. The right lower abdominal pain subsided.         Past Medical History:   Diagnosis Date    Anesthesia complication     trouble awakening    Charcot's joint of foot, left     Chronic kidney disease, unspecified     Cirrhosis of liver without ascites     Depression     Diabetes mellitus, type II, insulin dependent     Diabetic neuropathy     GERD (gastroesophageal reflux disease)     H/O: hysterectomy     Hepatic steatosis     Malignant neoplasm of ascending colon     Obesity, unspecified     Overactive bladder     Thrombocytopenia, unspecified     Vitamin D deficiency       Past Surgical History:   Procedure Laterality Date    APPENDECTOMY      BREAST BIOPSY  2016     SECTION      x3    charcot-leyden crystals identification      CHOLECYSTECTOMY  2006    COLONOSCOPY N/A 2023    Procedure:  COLON;  Surgeon: Luis Amador MD;  Location: [facility];  Service: Gastroenterology;  Laterality: N/A;    COLONOSCOPY, WITH 1 OR MORE BIOPSIES  08/28/2023    Procedure: COLONOSCOPY, WITH 1 OR MORE BIOPSIES;  Surgeon: Luis Amador MD;  Location: [facility];  Service: Gastroenterology;;    COLONOSCOPY, WITH DIRECTED SUBMUCOSAL INJECTION  08/28/2023    Procedure: COLONOSCOPY, WITH DIRECTED SUBMUCOSAL INJECTION;  Surgeon: Luis Amador MD;  Location: [facility];  Service: Gastroenterology;;  8cc Colon Tattoo    COLONOSCOPY, WITH POLYPECTOMY USING SNARE  08/28/2023    Procedure: COLONOSCOPY, WITH POLYPECTOMY USING SNARE;  Surgeon: Luis Amador MD;  Location: [facility];  Service: Gastroenterology;;    ESOPHAGOGASTRODUODENOSCOPY N/A 08/28/2023    Procedure: DOUBLE;  Surgeon: Luis Amador MD;  Location: [facility];  Service: Gastroenterology;  Laterality: N/A;    HEMORRHOID SURGERY      HYSTERECTOMY  1990    total    INSERTION OF SUBCUTANEOUS PORT Right     INSERTION OF TUNNELED CENTRAL VENOUS CATHETER (CVC) WITH SUBCUTANEOUS PORT Right 11/20/2023    Procedure: [id number];  Surgeon: Timothy Russ MD;  Location: [facility];  Service: General;  Laterality: Right;    POLYPECTOMY      right foot surgery Right 02/01/2022    XI ROBOTIC COLECTOMY, RIGHT N/A 10/19/2023    Procedure: XI ROBOTIC COLECTOMY, RIGHT;  Surgeon: Timothy Russ MD;  Location: [facility];  Service: General;  Laterality: N/A;     Family History   Problem Relation Age of Onset    Diabetes Mother     Alzheimer's disease Mother     Diabetes Father     Leukemia Father     Diabetes Sister     Liver cancer Sister     Diabetes Brother      Social Connections: Not on file       Review of patient's allergies indicates:  No Known Allergies   Current Outpatient Medications on File Prior to Visit   Medication Sig Dispense Refill    apixaban (ELIQUIS) 5 mg Tab  Take 1 tablet (5 mg total) by mouth 2 (two) times daily. 60 tablet 4    diphenhydrAMINE-aluminum-magnesium hydroxide-simethicone-LIDOcaine viscous HCl 2% Swish and spit 15 mLs every 4 (four) hours as needed (mouth pain). 1 each 3    dulaglutide (TRULICITY) 3 mg/0.5 mL pen injector Inject 3 mg into the skin every 7 days. 4 pen 11    furosemide (LASIX) 40 MG tablet Take 40 mg by mouth as needed.      gabapentin (NEURONTIN) 300 MG capsule Take 2 capsules (600 mg total) by mouth every evening. 60 capsule 5    HYDROcodone-acetaminophen (NORCO) 5-325 mg per tablet Take 1 tablet by mouth every 6 (six) hours as needed for Pain. 30 tablet 0    insulin glargine, TOUJEO, (TOUJEO SOLOSTAR U-300 INSULIN) 300 unit/mL (1.5 mL) InPn pen INJECT 79 UNITS SUBCUTANEOUSLY ONCE DAILY 6 mL 11    insulin syringe-needle U-100 1 mL 31 gauge x 5/16 Syrg Inject 1 Syringe into the skin 3 (three) times daily with meals.      OLANZapine (ZYPREXA) 5 MG tablet Take 1 tablet (5 mg total) by mouth every evening. Take nightly on days 1-3 of each chemotherapy cycle. 6 tablet 5    ondansetron (ZOFRAN-ODT) 8 MG TbDL Take 1 tablet (8 mg total) by mouth every 6 (six) hours as needed. 10 tablet 0    prochlorperazine (COMPAZINE) 5 MG tablet Take 5 mg by mouth every 6 (six) hours as needed.      sertraline (ZOLOFT) 50 MG tablet Take 1 tablet by mouth once daily 90 tablet 3    solifenacin (VESICARE) 10 MG tablet Take 10 mg by mouth once daily.      spironolactone (ALDACTONE) 50 MG tablet Take 50 mg by mouth once daily.       Current Facility-Administered Medications on File Prior to Visit   Medication Dose Route Frequency Provider Last Rate Last Admin    promethazine (PHENERGAN) 12.5 mg in dextrose 5 % (D5W) 50 mL IVPB  12.5 mg Intravenous Once Gayatri Jaffe FNP          Review of Systems   Neurological:  Positive for numbness.            Vitals:    03/18/24 1039   BP: 106/70   Pulse: 89   Resp: 14   Temp: 97.6 °F (36.4 °C)   SpO2: 97%   Weight: 106.4 kg  "(234 lb 9.6 oz)   Height: 5' 3" (1.6 m)              Wt Readings from Last 6 Encounters:   03/18/24 106.4 kg (234 lb 9.6 oz)   03/08/24 106.9 kg (235 lb 11.2 oz)   03/07/24 107.3 kg (236 lb 9.6 oz)   03/05/24 107.3 kg (236 lb 9.6 oz)   03/04/24 107.3 kg (236 lb 9.6 oz)   02/29/24 106.3 kg (234 lb 6.4 oz)     Body mass index is 41.56 kg/m².  Body surface area is 2.17 meters squared.  Physical Exam  Vitals and nursing note reviewed.   Constitutional:       General: She is not in acute distress.     Appearance: Normal appearance. She is obese.   HENT:      Head: Normocephalic and atraumatic.      Mouth/Throat:      Mouth: Mucous membranes are moist.   Eyes:      General: No scleral icterus.     Extraocular Movements: Extraocular movements intact.      Conjunctiva/sclera: Conjunctivae normal.      Pupils: Pupils are equal, round, and reactive to light.   Neck:      Vascular: No JVD.   Cardiovascular:      Rate and Rhythm: Normal rate and regular rhythm.      Heart sounds: No murmur heard.  Pulmonary:      Effort: Pulmonary effort is normal.      Breath sounds: Normal breath sounds. No wheezing or rhonchi.   Chest:      Comments: Right chest wall mediport in place.    Abdominal:      General: Bowel sounds are normal. There is no distension.      Palpations: Abdomen is soft. There is no mass.      Tenderness: There is no abdominal tenderness.      Comments: Surgical incisions healed after colon surgery.    Musculoskeletal:         General: No swelling or deformity.      Cervical back: Neck supple.   Lymphadenopathy:      Head:      Right side of head: No submandibular adenopathy.      Left side of head: No submandibular adenopathy.      Cervical: No cervical adenopathy.      Upper Body:      Right upper body: No supraclavicular or axillary adenopathy.      Left upper body: No supraclavicular or axillary adenopathy.      Lower Body: No right inguinal adenopathy. No left inguinal adenopathy.   Skin:     General: Skin is " warm.      Coloration: Skin is not jaundiced.      Findings: No lesion or rash.      Nails: There is no clubbing.      Comments: Darkening of nails and skin on hands commonly seen with 5FU   Neurological:      General: No focal deficit present.      Mental Status: She is alert and oriented to person, place, and time.      Cranial Nerves: Cranial nerves 2-12 are intact.   Psychiatric:         Attention and Perception: Attention normal.         Mood and Affect: Mood is depressed.         Behavior: Behavior is cooperative.         Judgment: Judgment normal.       ECOG SCORE             Laboratory:  CBC with Differential:  Lab Results   Component Value Date    WBC 6.22 03/18/2024    RBC 3.24 (L) 03/18/2024    HGB 8.3 (L) 03/18/2024    HCT 26.7 (L) 03/18/2024    MCV 82.4 03/18/2024    MCH 25.6 (L) 03/18/2024    MCHC 31.1 (L) 03/18/2024    RDW 22.3 (H) 03/18/2024    PLT 87 (L) 03/18/2024    MPV  03/18/2024      Comment:      Unable to calculate MPV         CMP:  Sodium   Date Value Ref Range Status   06/01/2022 140 136 - 145 mmol/L Final     Sodium Level   Date Value Ref Range Status   03/18/2024 140 136 - 145 mmol/L Final     Potassium   Date Value Ref Range Status   06/01/2022 4.9 3.5 - 5.1 mmol/L Final     Potassium Level   Date Value Ref Range Status   03/18/2024 4.5 3.5 - 5.1 mmol/L Final     Chloride   Date Value Ref Range Status   06/01/2022 104 100 - 109 mmol/L Final     Carbon Dioxide   Date Value Ref Range Status   03/18/2024 25 23 - 31 mmol/L Final   06/01/2022 29 22 - 33 mmol/L Final     Blood Urea Nitrogen   Date Value Ref Range Status   03/18/2024 16.9 9.8 - 20.1 mg/dL Final   06/01/2022 22 5 - 25 mg/dL Final     Creatinine   Date Value Ref Range Status   03/18/2024 1.46 (H) 0.55 - 1.02 mg/dL Final   06/01/2022 1.32 (H) 0.57 - 1.25 mg/dL Final     Calcium   Date Value Ref Range Status   06/01/2022 8.7 (L) 8.8 - 10.6 mg/dL Final     Calcium Level Total   Date Value Ref Range Status   03/18/2024 8.8 8.4 - 10.2  mg/dL Final     Albumin Level   Date Value Ref Range Status   03/18/2024 3.3 (L) 3.4 - 4.8 g/dL Final     Bilirubin Total   Date Value Ref Range Status   03/18/2024 0.4 <=1.5 mg/dL Final     Alkaline Phosphatase   Date Value Ref Range Status   03/18/2024 147 40 - 150 unit/L Final     Aspartate Aminotransferase   Date Value Ref Range Status   03/18/2024 25 5 - 34 unit/L Final     Alanine Aminotransferase   Date Value Ref Range Status   03/18/2024 19 0 - 55 unit/L Final     Anion Gap   Date Value Ref Range Status   06/01/2022 7 (L) 8 - 16 mmol/L Final     eGFR    Date Value Ref Range Status   06/01/2022 45 mL/min/1.73mSq Final     Comment:     In accordance with NKF-ASN Task Force recommendation, calculation based on the Chronic Kidney Disease Epidemiology Collaboration (CKD-EPI) equation without adjustment for race. eGFR adjusted for gender and age and calculated in ml/min/1.73mSquared. eGFR cannot be calculated if patient is under 18 years of age.     Reference Range:   >= 60 ml/min/1.73mSquared.     Estimated GFR-Non    Date Value Ref Range Status   08/04/2022 43 mls/min/1.73/m2 Final         Assessment:       1) Stage IIIB adenocarcinoma of the ascending colon  --Patient will benefit of adjuvant chemotherapy.   --Due to severe diabetic neuropathy, will try dose reduction of Oxaliplatin, starting 65 mg/m2.    --If not tolerated, then with d/c Oxaliplatin and cont 5FU and LV  2) Lung nodules-Stable. Will monitor  3) iron deficiency anemia-on iron PO  4) Thrombocytopenia-resolved. Will follow counts closely as she has splenomegaly and this can affects her counts mostly platelets.  5) Folate deficiency-she will start taking folic acid at this time.   6) Diabetic neuropathy-severe. She does not feel her feet.  --She will have nerve stimulator placement to see if can help her feet     Educated the patient on the risks versus benefits as well as toxicities associated with treatment.   Verbally consented the patient to the treatment plan and the patient was educated on the planned duration of the treatment and schedule of the treatment administration.  All questions were answered.      Plan:       Plan: Adjuvant FOLFOX x 6 months,with dose reduction of oxaliplatin due to severe (Grade 3) neuropathy. If not tolerated, then with d/c Oxaliplatin and cont 5FU and LV. Neuropathy same.      Case discussed with Dr. Olmstead . Will switch from FOLFOX to XELOX at this time. We are having to delay chemo each cycle due to cytopenias and she is already on a reduced dose of Oxaliplatin. She has 5 cycles left. Her platelet count is 87k today so we will hold this week. Will order xeloda and give 1500 mg bid x 14 days with 7 day break, to start in day 1 of each cycle with oxaliplatin. Will continue same reduced dose of Oxaliplatin at 65mg/m2.   Continue Eliquis  RTC 1 week for TD/labs: CBC, CMP, CEA,- chemo next day    Encouraged to call with questions or problems  The patient was given ample opportunity to ask questions and they were all answered to satisfaction; patient demonstrated understanding of what we discussed and is agreeable to the plan.     LAURA Degroot

## 2024-03-19 DIAGNOSIS — C18.2 MALIGNANT NEOPLASM OF ASCENDING COLON: Primary | ICD-10-CM

## 2024-03-19 RX ORDER — CAPECITABINE 500 MG/1
1500 TABLET, FILM COATED ORAL 2 TIMES DAILY
Qty: 84 TABLET | Refills: 4 | Status: ACTIVE | OUTPATIENT
Start: 2024-03-19

## 2024-03-19 RX ORDER — CAPECITABINE 500 MG/1
TABLET, FILM COATED ORAL
Qty: 84 TABLET | Refills: 4 | Status: SHIPPED | OUTPATIENT
Start: 2024-03-25 | End: 2024-03-19

## 2024-03-25 ENCOUNTER — LAB VISIT (OUTPATIENT)
Dept: LAB | Facility: HOSPITAL | Age: 69
End: 2024-03-25
Attending: INTERNAL MEDICINE
Payer: MEDICARE

## 2024-03-25 ENCOUNTER — OFFICE VISIT (OUTPATIENT)
Dept: HEMATOLOGY/ONCOLOGY | Facility: CLINIC | Age: 69
End: 2024-03-25
Payer: MEDICARE

## 2024-03-25 VITALS
HEART RATE: 86 BPM | HEIGHT: 63 IN | TEMPERATURE: 98 F | BODY MASS INDEX: 41.04 KG/M2 | RESPIRATION RATE: 18 BRPM | OXYGEN SATURATION: 97 % | DIASTOLIC BLOOD PRESSURE: 82 MMHG | WEIGHT: 231.63 LBS | SYSTOLIC BLOOD PRESSURE: 137 MMHG

## 2024-03-25 DIAGNOSIS — D84.821 IMMUNOSUPPRESSION DUE TO DRUG THERAPY: ICD-10-CM

## 2024-03-25 DIAGNOSIS — E11.42 DIABETIC POLYNEUROPATHY ASSOCIATED WITH TYPE 2 DIABETES MELLITUS: ICD-10-CM

## 2024-03-25 DIAGNOSIS — Z79.899 IMMUNOSUPPRESSION DUE TO DRUG THERAPY: ICD-10-CM

## 2024-03-25 DIAGNOSIS — C18.2 MALIGNANT NEOPLASM OF ASCENDING COLON: ICD-10-CM

## 2024-03-25 DIAGNOSIS — C18.2 MALIGNANT NEOPLASM OF ASCENDING COLON: Primary | ICD-10-CM

## 2024-03-25 LAB
ALBUMIN SERPL-MCNC: 3.4 G/DL (ref 3.4–4.8)
ALBUMIN/GLOB SERPL: 1 RATIO (ref 1.1–2)
ALP SERPL-CCNC: 132 UNIT/L (ref 40–150)
ALT SERPL-CCNC: 16 UNIT/L (ref 0–55)
AST SERPL-CCNC: 22 UNIT/L (ref 5–34)
BASOPHILS # BLD AUTO: 0.05 X10(3)/MCL
BASOPHILS NFR BLD AUTO: 0.7 %
BILIRUB SERPL-MCNC: 0.3 MG/DL
BUN SERPL-MCNC: 23.3 MG/DL (ref 9.8–20.1)
CALCIUM SERPL-MCNC: 9.3 MG/DL (ref 8.4–10.2)
CEA SERPL-MCNC: 3.82 NG/ML (ref 0–3)
CHLORIDE SERPL-SCNC: 102 MMOL/L (ref 98–107)
CO2 SERPL-SCNC: 28 MMOL/L (ref 23–31)
CREAT SERPL-MCNC: 1.55 MG/DL (ref 0.55–1.02)
EOSINOPHIL # BLD AUTO: 0.18 X10(3)/MCL (ref 0–0.9)
EOSINOPHIL NFR BLD AUTO: 2.4 %
ERYTHROCYTE [DISTWIDTH] IN BLOOD BY AUTOMATED COUNT: 22 % (ref 11.5–17)
GFR SERPLBLD CREATININE-BSD FMLA CKD-EPI: 37 MLS/MIN/1.73/M2
GLOBULIN SER-MCNC: 3.4 GM/DL (ref 2.4–3.5)
GLUCOSE SERPL-MCNC: 113 MG/DL (ref 82–115)
HCT VFR BLD AUTO: 27.9 % (ref 37–47)
HGB BLD-MCNC: 8.6 G/DL (ref 12–16)
IMM GRANULOCYTES # BLD AUTO: 0.1 X10(3)/MCL (ref 0–0.04)
IMM GRANULOCYTES NFR BLD AUTO: 1.3 %
LYMPHOCYTES # BLD AUTO: 1.27 X10(3)/MCL (ref 0.6–4.6)
LYMPHOCYTES NFR BLD AUTO: 16.7 %
MCH RBC QN AUTO: 25.3 PG (ref 27–31)
MCHC RBC AUTO-ENTMCNC: 30.8 G/DL (ref 33–36)
MCV RBC AUTO: 82.1 FL (ref 80–94)
MONOCYTES # BLD AUTO: 0.56 X10(3)/MCL (ref 0.1–1.3)
MONOCYTES NFR BLD AUTO: 7.3 %
NEUTROPHILS # BLD AUTO: 5.46 X10(3)/MCL (ref 2.1–9.2)
NEUTROPHILS NFR BLD AUTO: 71.6 %
PLATELET # BLD AUTO: 109 X10(3)/MCL (ref 130–400)
PMV BLD AUTO: ABNORMAL FL
POTASSIUM SERPL-SCNC: 4.8 MMOL/L (ref 3.5–5.1)
PROT SERPL-MCNC: 6.8 GM/DL (ref 5.8–7.6)
RBC # BLD AUTO: 3.4 X10(6)/MCL (ref 4.2–5.4)
SODIUM SERPL-SCNC: 140 MMOL/L (ref 136–145)
WBC # SPEC AUTO: 7.62 X10(3)/MCL (ref 4.5–11.5)

## 2024-03-25 PROCEDURE — 85025 COMPLETE CBC W/AUTO DIFF WBC: CPT

## 2024-03-25 PROCEDURE — 80053 COMPREHEN METABOLIC PANEL: CPT

## 2024-03-25 PROCEDURE — 3075F SYST BP GE 130 - 139MM HG: CPT | Mod: CPTII,S$GLB,, | Performed by: NURSE PRACTITIONER

## 2024-03-25 PROCEDURE — 99215 OFFICE O/P EST HI 40 MIN: CPT | Mod: S$GLB,,, | Performed by: NURSE PRACTITIONER

## 2024-03-25 PROCEDURE — 82378 CARCINOEMBRYONIC ANTIGEN: CPT

## 2024-03-25 PROCEDURE — 3079F DIAST BP 80-89 MM HG: CPT | Mod: CPTII,S$GLB,, | Performed by: NURSE PRACTITIONER

## 2024-03-25 PROCEDURE — 1101F PT FALLS ASSESS-DOCD LE1/YR: CPT | Mod: CPTII,S$GLB,, | Performed by: NURSE PRACTITIONER

## 2024-03-25 PROCEDURE — 3051F HG A1C>EQUAL 7.0%<8.0%: CPT | Mod: CPTII,S$GLB,, | Performed by: NURSE PRACTITIONER

## 2024-03-25 PROCEDURE — 99999 PR PBB SHADOW E&M-EST. PATIENT-LVL IV: CPT | Mod: PBBFAC,,, | Performed by: NURSE PRACTITIONER

## 2024-03-25 PROCEDURE — 3008F BODY MASS INDEX DOCD: CPT | Mod: CPTII,S$GLB,, | Performed by: NURSE PRACTITIONER

## 2024-03-25 PROCEDURE — 1126F AMNT PAIN NOTED NONE PRSNT: CPT | Mod: CPTII,S$GLB,, | Performed by: NURSE PRACTITIONER

## 2024-03-25 PROCEDURE — 3288F FALL RISK ASSESSMENT DOCD: CPT | Mod: CPTII,S$GLB,, | Performed by: NURSE PRACTITIONER

## 2024-03-25 PROCEDURE — 3066F NEPHROPATHY DOC TX: CPT | Mod: CPTII,S$GLB,, | Performed by: NURSE PRACTITIONER

## 2024-03-25 PROCEDURE — 1159F MED LIST DOCD IN RCRD: CPT | Mod: CPTII,S$GLB,, | Performed by: NURSE PRACTITIONER

## 2024-03-25 PROCEDURE — 36415 COLL VENOUS BLD VENIPUNCTURE: CPT

## 2024-03-25 RX ORDER — SODIUM CHLORIDE 0.9 % (FLUSH) 0.9 %
10 SYRINGE (ML) INJECTION
Status: CANCELLED | OUTPATIENT
Start: 2024-03-26

## 2024-03-25 RX ORDER — DIPHENHYDRAMINE HYDROCHLORIDE 50 MG/ML
50 INJECTION INTRAMUSCULAR; INTRAVENOUS ONCE AS NEEDED
Status: CANCELLED | OUTPATIENT
Start: 2024-03-26

## 2024-03-25 RX ORDER — EPINEPHRINE 0.3 MG/.3ML
0.3 INJECTION SUBCUTANEOUS ONCE AS NEEDED
Status: CANCELLED | OUTPATIENT
Start: 2024-03-26

## 2024-03-25 RX ORDER — HEPARIN 100 UNIT/ML
500 SYRINGE INTRAVENOUS
Status: CANCELLED | OUTPATIENT
Start: 2024-03-26

## 2024-03-25 NOTE — PROGRESS NOTES
HEMATOLOGY/ONCOLOGY OFFICE CLINIC VISIT    Visit Information:    Initial Evaluation: 4/22/2022  Referring Provider: Maggy Jaquez NP  Other providers: Dr Timothy Russ  Code status: Not addressed     Diagnosis/Problem list:   pT3 N2a Mx Stage IIIB Colon cancer. Dx 10/19/23  Microcytic anemia.   Folate deficiency     Present Treatment:  Xelox to start on 3/26/24 ( for 5 cycles)    Treatment history:  10/19/2023 Lap/jaswinder right colon resection   FOLFOX with dose reduction on Oxaliplatin due to neuropathy. Started on 11/28/23. x 7 cycles then switched to XELOX      Imaging studies:  CT C/A/P 6/3/2022: There is no significant hepatic steatosis.  The liver is cirrhotic.  No liver lesion is identified.  The spleen is enlarged, measuring 15.5 cm in length.  There is no retroperitoneal lymphadenopathy or abdominal aortic aneurysm.  A mildly prominent right external iliac lymph node measures 9 mm in short axis, nonspecific.  This is also similar in appearance when compared to 2015. Impression: Cirrhosis. Splenomegaly.  CT CAP 9/11/2023: There is no supraclavicular or axillary lymphadenopathy.  No mediastinal adenopathy. 3 mm right upper lobe pulmonary nodule. There are few additional pulmonary micronodules in the lung apices measuring no greater than 1-2 mm.  Findings similar to prior exam.     Impression: Scattered pulmonary nodules in the upper lobe similar to prior.  No further follow-up acute according to Fleischner criteria.  No convincing evidence for metastatic disease. Nodular appearing liver, correlation for cirrhosis.  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     Pathology:  10/19/2023:  COLON, RIGHT HEMICOLECTOMY (1.5 CM OF TERMINAL ILEUM, 25 CM LENGTH OF RIGHT COLON): POORLY DIFFERENTIATED ADENOCARCINOMA WITH FOCAL SIGNET RING CELL FEATURES, UNIFOCAL, RIGHT COLON, 8 CM GREATEST DIMENSION.   THE TUMOR INVADES THROUGH THE MUSCULARIS PROPRIA INTO PERICOLONIC  TISSUE (PT3).   FOCAL LYMPHVASCULAR INVASION IS PRESENT.   THE SURGICAL MARGINS ARE FREE OF TUMOR.   METASTATIC ADENOCARCINOMA IS IDENTIFIED IN FOUR (4) OF TWENTY-SEVEN (27)   PERICOLONIC LYMPH NODES (LARGEST METASTATIC DEPOSIT 8 MM; FOCAL EXTRACAPSULAR EXTENSION OF TUMOR IS PRESENT).      PATHOLOGIC STAGING:  pT3 N2a Mx     Histologic Grade:  G3, poorly differentiated   Macroscopic Tumor Perforation:  Not identified   Lymphovascular Invasion:  Small vessel   Perineural Invasion:  Not identified        CLINICAL HISTORY:       Patient: Indu Azul is a 68 y.o. female. with multiple medical problems that I saw originally on 2022 for thrombocytopenia.  Patient platelet counts on 2021 were 132,000 and since that that has been slowly trending down.  2021 platelets 132,000  2022 platelets 121,000  2022 platelets 115,000     Of note patient have a UA done that same day that suggest possible UTI with positive nitrates, 1+ leukocytes and many bacteria.  Urine culture with E. coli.   Reviewing electronic medical record and going back to 12/15/2014 patient with occasional low platelets.  They were never lower than 100,000 and they always normalized.   As per patient in 2020 when her platelets were slightly decreased (117,000), this was shortly after she has her left foot surgery.  Then in May 17 and May 18 2021, platelets were 105k and 101k,  she had COVID.  Again in 2022 she have a UTI for which she completed antibiotics.     Patient's father diagnosed Blood disorder requiring blood transfusion that started q 4 weeks and the q week. He was diagnosed on his 80's but  at 91. Sister and niece had ovarian cancer. Sister  of it. Niece still alive.     She was found to have iron deficiency anemia.  EGD performed, grade I-II esophageal varices found, too small to band. She also had Colonoscopy by Dr Hammonds that showed an ulcerated malignant-appearing partially obstructing  medium sized mass in the ascending colon.  She reports that she had a colonoscopy about 5 years ago at [facility] and everything was fine, no polyps or masses.   Biopsy and tattoo of the mass showed fragments of colonic mucosa, no evidence of dysplasia or malignancy.  Two other polyps were completely removed in the descending and sigmoid colon, pathology returned hyperplastic polyps.  CT abd/pel with no acute findings.     Despite of biopsy because of malignant-appearing partially obstructing mass of the ascending colon and photographs and description were consistent with colon cancer she underwent Lap/jaswinder right colon resection on 10/19/2023 by Dr Timothy Russ. Path c/w really differentiated adeno carcinoma.         Chief Complaint: 1 Week Follow Up      Interval History:  Patient with h/o Charcot feet for which she had surgery. She does not have sensation on her feet from ankle down. This was prior to her diagnosis of colon cancer. She is getting Oxaloplatin but neuropathy is not worse. She started dose reduced and will cont like same dose. If neuropathy worsen will d/c oxaliplatin.       2/12/2024: Patient presents today for labs and TD prior to C6 of FOLFOX with dose reduction of Oxaliplatin due to neuropathy, accompanied by her daughter. C6 was held on 2/6/24 due to thrombocytopenia (plt 74k). 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 She is tolerating well. She continues with occasional right sided abdominal pain and oral thrush. Pharmacy did not have RX in stock and gave an alternative, but it does not help.  No worsening in neuropathy as of yet. Hyperpigmentation of nails and skin on hands commonly seen with 5FU. Overall, she is doing fair. No fever, chills or sweats. Denies chest pain or shortness of breath. No bleeding. Bowels are moving without difficulty.  Plts today 128k.    2/19/24: Patient presents today as an add  on. She has questions about her recent diagnosis of thrombus within the SMV and extending through the main portal vein . She complain of pain there so imaging was done. She was started on Eliquis. Last week after her chemo, she was nauseated so she didn't take any of her PO meds for 2 days and her right sided abdominal pain returned. Once she resumed Eliqius, her right sided abdominal pain decreased. Reports increased pain when she lays on her right side. She wants to know if we will be repeating imaging to eval her blood clot. Pancytopenia today. She treated last week (sonia). Denies fever.     2/26/24: Patient here for one week f/u. Due for C7 tomorrow. Platelet count is 75 k today. Denies any evidence of bleeding.     3/4/24: Patient presents today for one week f/u. Her platelet count increased to 115k. She has no complaints to report this week. She continues on Eliquis 5 mg bid. Denies any evidence of bleeding. Her neuropathy has not worsened as of yet.     3/18/24: Patient presents today for 2 week f/u due for C8 of FOLFOX. Her platelet count is 87k so we will have to delay again. She has no complaints today. The right lower abdominal pain subsided.     3/25/24: Patient presents today for 1 week f/u. We plan to start XELOX tomorrow for the remainder of her adjuvant treatment. Platelet count is now 109k today.         Past Medical History:   Diagnosis Date    Anesthesia complication     trouble awakening    Charcot's joint of foot, left     Chronic kidney disease, unspecified     Cirrhosis of liver without ascites     Depression     Diabetes mellitus, type II, insulin dependent     Diabetic neuropathy     GERD (gastroesophageal reflux disease)     H/O: hysterectomy     Hepatic steatosis     Malignant neoplasm of ascending colon     Obesity, unspecified     Overactive bladder     Thrombocytopenia, unspecified     Vitamin D deficiency       Past Surgical History:   Procedure Laterality Date    APPENDECTOMY  1978     BREAST BIOPSY  2016     SECTION      x3    charcot-leyden crystals identification      CHOLECYSTECTOMY  2006    COLONOSCOPY N/A 2023    Procedure: COLON;  Surgeon: Luis Amador MD;  Location: [facility] ENDOSCOPY;  Service: Gastroenterology;  Laterality: N/A;    COLONOSCOPY, WITH 1 OR MORE BIOPSIES  2023    Procedure: COLONOSCOPY, WITH 1 OR MORE BIOPSIES;  Surgeon: Luis Amador MD;  Location: [facility] ENDOSCOPY;  Service: Gastroenterology;;    COLONOSCOPY, WITH DIRECTED SUBMUCOSAL INJECTION  2023    Procedure: COLONOSCOPY, WITH DIRECTED SUBMUCOSAL INJECTION;  Surgeon: Luis Amador MD;  Location: [facility] ENDOSCOPY;  Service: Gastroenterology;;  8cc Colon Tattoo    COLONOSCOPY, WITH POLYPECTOMY USING SNARE  2023    Procedure: COLONOSCOPY, WITH POLYPECTOMY USING SNARE;  Surgeon: Luis Amador MD;  Location: [facility] ENDOSCOPY;  Service: Gastroenterology;;    ESOPHAGOGASTRODUODENOSCOPY N/A 2023    Procedure: DOUBLE;  Surgeon: Luis Amador MD;  Location: [facility] ENDOSCOPY;  Service: Gastroenterology;  Laterality: N/A;    HEMORRHOID SURGERY      HYSTERECTOMY      total    INSERTION OF SUBCUTANEOUS PORT Right     INSERTION OF TUNNELED CENTRAL VENOUS CATHETER (CVC) WITH SUBCUTANEOUS PORT Right 2023    Procedure: [id number];  Surgeon: Timothy Russ MD;  Location: [facility];  Service: General;  Laterality: Right;    POLYPECTOMY      right foot surgery Right 2022    XI ROBOTIC COLECTOMY, RIGHT N/A 10/19/2023    Procedure: XI ROBOTIC COLECTOMY, RIGHT;  Surgeon: Timothy Russ MD;  Location: [facility];  Service: General;  Laterality: N/A;     Family History   Problem Relation Age of Onset    Diabetes Mother     Alzheimer's disease Mother     Diabetes Father     Leukemia Father     Diabetes Sister     Liver cancer Sister     Diabetes Brother      Social Connections: Not on file       Review of patient's  allergies indicates:  No Known Allergies   Current Outpatient Medications on File Prior to Visit   Medication Sig Dispense Refill    apixaban (ELIQUIS) 5 mg Tab Take 1 tablet (5 mg total) by mouth 2 (two) times daily. 60 tablet 4    capecitabine (XELODA) 500 MG Tab Take 3 tablets (1,500 mg total) by mouth 2 (two) times daily on days 1-14 of each 21 day chemotherapy cycle. 84 tablet 4    dulaglutide (TRULICITY) 3 mg/0.5 mL pen injector Inject 3 mg into the skin every 7 days. 4 pen 11    furosemide (LASIX) 40 MG tablet Take 40 mg by mouth as needed.      gabapentin (NEURONTIN) 300 MG capsule Take 2 capsules (600 mg total) by mouth every evening. 60 capsule 5    HYDROcodone-acetaminophen (NORCO) 5-325 mg per tablet Take 1 tablet by mouth every 6 (six) hours as needed for Pain. 30 tablet 0    insulin glargine, TOUJEO, (TOUJEO SOLOSTAR U-300 INSULIN) 300 unit/mL (1.5 mL) InPn pen INJECT 79 UNITS SUBCUTANEOUSLY ONCE DAILY 6 mL 11    insulin syringe-needle U-100 1 mL 31 gauge x 5/16 Syrg Inject 1 Syringe into the skin 3 (three) times daily with meals.      OLANZapine (ZYPREXA) 5 MG tablet Take 1 tablet (5 mg total) by mouth every evening. Take nightly on days 1-3 of each chemotherapy cycle. 6 tablet 5    ondansetron (ZOFRAN-ODT) 8 MG TbDL Take 1 tablet (8 mg total) by mouth every 6 (six) hours as needed. 10 tablet 0    prochlorperazine (COMPAZINE) 5 MG tablet Take 5 mg by mouth every 6 (six) hours as needed.      sertraline (ZOLOFT) 50 MG tablet Take 1 tablet by mouth once daily 90 tablet 3    solifenacin (VESICARE) 10 MG tablet Take 10 mg by mouth once daily.      spironolactone (ALDACTONE) 50 MG tablet Take 50 mg by mouth once daily.       Current Facility-Administered Medications on File Prior to Visit   Medication Dose Route Frequency Provider Last Rate Last Admin    promethazine (PHENERGAN) 12.5 mg in dextrose 5 % (D5W) 50 mL IVPB  12.5 mg Intravenous Once Gayatri Jaffe, HOLLYP          Review of Systems   Neurological:   Positive for numbness.            There were no vitals filed for this visit.             Wt Readings from Last 6 Encounters:   03/18/24 106.4 kg (234 lb 9.6 oz)   03/08/24 106.9 kg (235 lb 11.2 oz)   03/07/24 107.3 kg (236 lb 9.6 oz)   03/05/24 107.3 kg (236 lb 9.6 oz)   03/04/24 107.3 kg (236 lb 9.6 oz)   02/29/24 106.3 kg (234 lb 6.4 oz)     There is no height or weight on file to calculate BMI.  There is no height or weight on file to calculate BSA.  Physical Exam  Vitals and nursing note reviewed.   Constitutional:       General: She is not in acute distress.     Appearance: Normal appearance. She is obese.   HENT:      Head: Normocephalic and atraumatic.      Mouth/Throat:      Mouth: Mucous membranes are moist.   Eyes:      General: No scleral icterus.     Extraocular Movements: Extraocular movements intact.      Conjunctiva/sclera: Conjunctivae normal.      Pupils: Pupils are equal, round, and reactive to light.   Neck:      Vascular: No JVD.   Cardiovascular:      Rate and Rhythm: Normal rate and regular rhythm.      Heart sounds: No murmur heard.  Pulmonary:      Effort: Pulmonary effort is normal.      Breath sounds: Normal breath sounds. No wheezing or rhonchi.   Chest:      Comments: Right chest wall mediport in place.    Abdominal:      General: Bowel sounds are normal. There is no distension.      Palpations: Abdomen is soft. There is no mass.      Tenderness: There is no abdominal tenderness.      Comments: Surgical incisions healed after colon surgery.    Musculoskeletal:         General: No swelling or deformity.      Cervical back: Neck supple.   Lymphadenopathy:      Head:      Right side of head: No submandibular adenopathy.      Left side of head: No submandibular adenopathy.      Cervical: No cervical adenopathy.      Upper Body:      Right upper body: No supraclavicular or axillary adenopathy.      Left upper body: No supraclavicular or axillary adenopathy.      Lower Body: No right inguinal  adenopathy. No left inguinal adenopathy.   Skin:     General: Skin is warm.      Coloration: Skin is not jaundiced.      Findings: No lesion or rash.      Nails: There is no clubbing.      Comments: Darkening of nails and skin on hands commonly seen with 5FU   Neurological:      General: No focal deficit present.      Mental Status: She is alert and oriented to person, place, and time.      Cranial Nerves: Cranial nerves 2-12 are intact.   Psychiatric:         Attention and Perception: Attention normal.         Mood and Affect: Mood is depressed.         Behavior: Behavior is cooperative.         Judgment: Judgment normal.       ECOG SCORE    1 - Restricted in strenuous activity-ambulatory and able to carry out work of a light nature         Laboratory:  CBC with Differential:  Lab Results   Component Value Date    WBC 6.22 03/18/2024    RBC 3.24 (L) 03/18/2024    HGB 8.3 (L) 03/18/2024    HCT 26.7 (L) 03/18/2024    MCV 82.4 03/18/2024    MCH 25.6 (L) 03/18/2024    MCHC 31.1 (L) 03/18/2024    RDW 22.3 (H) 03/18/2024    PLT 87 (L) 03/18/2024    MPV  03/18/2024      Comment:      Unable to calculate MPV         CMP:  Sodium   Date Value Ref Range Status   06/01/2022 140 136 - 145 mmol/L Final     Sodium Level   Date Value Ref Range Status   03/18/2024 140 136 - 145 mmol/L Final     Potassium   Date Value Ref Range Status   06/01/2022 4.9 3.5 - 5.1 mmol/L Final     Potassium Level   Date Value Ref Range Status   03/18/2024 4.5 3.5 - 5.1 mmol/L Final     Chloride   Date Value Ref Range Status   06/01/2022 104 100 - 109 mmol/L Final     Carbon Dioxide   Date Value Ref Range Status   03/18/2024 25 23 - 31 mmol/L Final   06/01/2022 29 22 - 33 mmol/L Final     Blood Urea Nitrogen   Date Value Ref Range Status   03/18/2024 16.9 9.8 - 20.1 mg/dL Final   06/01/2022 22 5 - 25 mg/dL Final     Creatinine   Date Value Ref Range Status   03/18/2024 1.46 (H) 0.55 - 1.02 mg/dL Final   06/01/2022 1.32 (H) 0.57 - 1.25 mg/dL Final      Calcium   Date Value Ref Range Status   06/01/2022 8.7 (L) 8.8 - 10.6 mg/dL Final     Calcium Level Total   Date Value Ref Range Status   03/18/2024 8.8 8.4 - 10.2 mg/dL Final     Albumin Level   Date Value Ref Range Status   03/18/2024 3.3 (L) 3.4 - 4.8 g/dL Final     Bilirubin Total   Date Value Ref Range Status   03/18/2024 0.4 <=1.5 mg/dL Final     Alkaline Phosphatase   Date Value Ref Range Status   03/18/2024 147 40 - 150 unit/L Final     Aspartate Aminotransferase   Date Value Ref Range Status   03/18/2024 25 5 - 34 unit/L Final     Alanine Aminotransferase   Date Value Ref Range Status   03/18/2024 19 0 - 55 unit/L Final     Anion Gap   Date Value Ref Range Status   06/01/2022 7 (L) 8 - 16 mmol/L Final     eGFR    Date Value Ref Range Status   06/01/2022 45 mL/min/1.73mSq Final     Comment:     In accordance with NKF-ASN Task Force recommendation, calculation based on the Chronic Kidney Disease Epidemiology Collaboration (CKD-EPI) equation without adjustment for race. eGFR adjusted for gender and age and calculated in ml/min/1.73mSquared. eGFR cannot be calculated if patient is under 18 years of age.     Reference Range:   >= 60 ml/min/1.73mSquared.     Estimated GFR-Non    Date Value Ref Range Status   08/04/2022 43 mls/min/1.73/m2 Final         Assessment:       1) Stage IIIB adenocarcinoma of the ascending colon  --Patient will benefit of adjuvant chemotherapy.   --Due to severe diabetic neuropathy, will try dose reduction of Oxaliplatin, starting 65 mg/m2.    --If not tolerated, then with d/c Oxaliplatin and cont 5FU and LV  2) Lung nodules-Stable. Will monitor  3) iron deficiency anemia-on iron PO  4) Thrombocytopenia-resolved. Will follow counts closely as she has splenomegaly and this can affects her counts mostly platelets.  5) Folate deficiency-she will start taking folic acid at this time.   6) Diabetic neuropathy-severe. She does not feel her feet.  --She will  have nerve stimulator placement to see if can help her feet     Educated the patient on the risks versus benefits as well as toxicities associated with treatment.  Verbally consented the patient to the treatment plan and the patient was educated on the planned duration of the treatment and schedule of the treatment administration.  All questions were answered.      Plan:       Plan: Adjuvant FOLFOX x 6 months,with dose reduction of oxaliplatin due to severe (Grade 3) neuropathy. If not tolerated, then with d/c Oxaliplatin and cont 5FU and LV. Neuropathy same.      Case discussed with Dr. Olmstead . Will switch from FOLFOX to XELOX at this time. We are having to delay chemo each cycle due to cytopenias and she is already on a reduced dose of Oxaliplatin. She has 5 cycles left. Her platelet count is 109k today so we will proceed with C1 XELOX tomorrow. 1500 mg bid x 14 days with 7 day break, to start in day 1 of each cycle with oxaliplatin. Will continue same reduced dose of Oxaliplatin at 65mg/m2.   Continue Eliquis  RTC 1 week for TD/labs: CBC, CMP for toxicity check    Encouraged to call with questions or problems  The patient was given ample opportunity to ask questions and they were all answered to satisfaction; patient demonstrated understanding of what we discussed and is agreeable to the plan.     LAURA Degroot

## 2024-03-26 ENCOUNTER — INFUSION (OUTPATIENT)
Dept: INFUSION THERAPY | Facility: HOSPITAL | Age: 69
End: 2024-03-26
Attending: INTERNAL MEDICINE
Payer: MEDICARE

## 2024-03-26 VITALS
OXYGEN SATURATION: 100 % | BODY MASS INDEX: 41.23 KG/M2 | WEIGHT: 232.69 LBS | HEART RATE: 95 BPM | TEMPERATURE: 98 F | SYSTOLIC BLOOD PRESSURE: 132 MMHG | DIASTOLIC BLOOD PRESSURE: 78 MMHG | RESPIRATION RATE: 16 BRPM | HEIGHT: 63 IN

## 2024-03-26 DIAGNOSIS — C18.2 MALIGNANT NEOPLASM OF ASCENDING COLON: Primary | ICD-10-CM

## 2024-03-26 PROCEDURE — A4216 STERILE WATER/SALINE, 10 ML: HCPCS | Performed by: INTERNAL MEDICINE

## 2024-03-26 PROCEDURE — 63600175 PHARM REV CODE 636 W HCPCS: Performed by: INTERNAL MEDICINE

## 2024-03-26 PROCEDURE — 96375 TX/PRO/DX INJ NEW DRUG ADDON: CPT

## 2024-03-26 PROCEDURE — 96413 CHEMO IV INFUSION 1 HR: CPT

## 2024-03-26 PROCEDURE — 25000003 PHARM REV CODE 250: Performed by: INTERNAL MEDICINE

## 2024-03-26 PROCEDURE — 96415 CHEMO IV INFUSION ADDL HR: CPT

## 2024-03-26 RX ORDER — HEPARIN 100 UNIT/ML
500 SYRINGE INTRAVENOUS
Status: DISCONTINUED | OUTPATIENT
Start: 2024-03-26 | End: 2024-03-26 | Stop reason: HOSPADM

## 2024-03-26 RX ORDER — EPINEPHRINE 0.3 MG/.3ML
0.3 INJECTION SUBCUTANEOUS ONCE AS NEEDED
Status: DISCONTINUED | OUTPATIENT
Start: 2024-03-26 | End: 2024-03-26 | Stop reason: HOSPADM

## 2024-03-26 RX ORDER — SODIUM CHLORIDE 0.9 % (FLUSH) 0.9 %
10 SYRINGE (ML) INJECTION
Status: DISCONTINUED | OUTPATIENT
Start: 2024-03-26 | End: 2024-03-26 | Stop reason: HOSPADM

## 2024-03-26 RX ORDER — DIPHENHYDRAMINE HYDROCHLORIDE 50 MG/ML
50 INJECTION INTRAMUSCULAR; INTRAVENOUS ONCE AS NEEDED
Status: DISCONTINUED | OUTPATIENT
Start: 2024-03-26 | End: 2024-03-26 | Stop reason: HOSPADM

## 2024-03-26 RX ADMIN — Medication 10 ML: at 11:03

## 2024-03-26 RX ADMIN — OXALIPLATIN 141 MG: 5 INJECTION, SOLUTION INTRAVENOUS at 09:03

## 2024-03-26 RX ADMIN — HEPARIN 500 UNITS: 100 SYRINGE at 11:03

## 2024-03-26 RX ADMIN — DEXAMETHASONE SODIUM PHOSPHATE 0.25 MG: 4 INJECTION, SOLUTION INTRA-ARTICULAR; INTRALESIONAL; INTRAMUSCULAR; INTRAVENOUS; SOFT TISSUE at 09:03

## 2024-03-26 RX ADMIN — DEXTROSE MONOHYDRATE: 50 INJECTION, SOLUTION INTRAVENOUS at 09:03

## 2024-03-26 NOTE — NURSING
Pt tolerated treatment with no complaints. [place] needle removed from mediport. Site without signs and symptoms of complications. Dressing applied.

## 2024-04-02 ENCOUNTER — OFFICE VISIT (OUTPATIENT)
Dept: HEMATOLOGY/ONCOLOGY | Facility: CLINIC | Age: 69
End: 2024-04-02
Payer: MEDICARE

## 2024-04-02 ENCOUNTER — LAB VISIT (OUTPATIENT)
Dept: LAB | Facility: HOSPITAL | Age: 69
End: 2024-04-02
Attending: INTERNAL MEDICINE
Payer: MEDICARE

## 2024-04-02 VITALS
HEIGHT: 63 IN | RESPIRATION RATE: 18 BRPM | BODY MASS INDEX: 42.11 KG/M2 | WEIGHT: 237.69 LBS | TEMPERATURE: 98 F | HEART RATE: 87 BPM | OXYGEN SATURATION: 98 % | SYSTOLIC BLOOD PRESSURE: 129 MMHG | DIASTOLIC BLOOD PRESSURE: 78 MMHG

## 2024-04-02 DIAGNOSIS — C18.2 MALIGNANT NEOPLASM OF ASCENDING COLON: ICD-10-CM

## 2024-04-02 DIAGNOSIS — D50.9 MICROCYTIC ANEMIA: Primary | ICD-10-CM

## 2024-04-02 DIAGNOSIS — D50.9 MICROCYTIC ANEMIA: ICD-10-CM

## 2024-04-02 LAB
ALBUMIN SERPL-MCNC: 3.3 G/DL (ref 3.4–4.8)
ALBUMIN/GLOB SERPL: 1 RATIO (ref 1.1–2)
ALP SERPL-CCNC: 101 UNIT/L (ref 40–150)
ALT SERPL-CCNC: 22 UNIT/L (ref 0–55)
AST SERPL-CCNC: 26 UNIT/L (ref 5–34)
BASOPHILS # BLD AUTO: 0.02 X10(3)/MCL
BASOPHILS NFR BLD AUTO: 0.4 %
BILIRUB SERPL-MCNC: 0.5 MG/DL
BUN SERPL-MCNC: 18.3 MG/DL (ref 9.8–20.1)
CALCIUM SERPL-MCNC: 9.3 MG/DL (ref 8.4–10.2)
CHLORIDE SERPL-SCNC: 108 MMOL/L (ref 98–107)
CO2 SERPL-SCNC: 22 MMOL/L (ref 23–31)
CREAT SERPL-MCNC: 1.59 MG/DL (ref 0.55–1.02)
EOSINOPHIL # BLD AUTO: 0.05 X10(3)/MCL (ref 0–0.9)
EOSINOPHIL NFR BLD AUTO: 1 %
ERYTHROCYTE [DISTWIDTH] IN BLOOD BY AUTOMATED COUNT: 21.8 % (ref 11.5–17)
GFR SERPLBLD CREATININE-BSD FMLA CKD-EPI: 35 MLS/MIN/1.73/M2
GLOBULIN SER-MCNC: 3.4 GM/DL (ref 2.4–3.5)
GLUCOSE SERPL-MCNC: 209 MG/DL (ref 82–115)
GROUP & RH: NORMAL
HCT VFR BLD AUTO: 26.5 % (ref 37–47)
HGB BLD-MCNC: 8 G/DL (ref 12–16)
IMM GRANULOCYTES # BLD AUTO: 0.02 X10(3)/MCL (ref 0–0.04)
IMM GRANULOCYTES NFR BLD AUTO: 0.4 %
INDIRECT COOMBS: NORMAL
LYMPHOCYTES # BLD AUTO: 1.1 X10(3)/MCL (ref 0.6–4.6)
LYMPHOCYTES NFR BLD AUTO: 22.1 %
MCH RBC QN AUTO: 25.3 PG (ref 27–31)
MCHC RBC AUTO-ENTMCNC: 30.2 G/DL (ref 33–36)
MCV RBC AUTO: 83.9 FL (ref 80–94)
MONOCYTES # BLD AUTO: 0.36 X10(3)/MCL (ref 0.1–1.3)
MONOCYTES NFR BLD AUTO: 7.2 %
NEUTROPHILS # BLD AUTO: 3.43 X10(3)/MCL (ref 2.1–9.2)
NEUTROPHILS NFR BLD AUTO: 68.9 %
PLATELET # BLD AUTO: 103 X10(3)/MCL (ref 130–400)
PMV BLD AUTO: ABNORMAL FL
POTASSIUM SERPL-SCNC: 4.6 MMOL/L (ref 3.5–5.1)
PROT SERPL-MCNC: 6.7 GM/DL (ref 5.8–7.6)
RBC # BLD AUTO: 3.16 X10(6)/MCL (ref 4.2–5.4)
SODIUM SERPL-SCNC: 137 MMOL/L (ref 136–145)
SPECIMEN OUTDATE: NORMAL
WBC # SPEC AUTO: 4.98 X10(3)/MCL (ref 4.5–11.5)

## 2024-04-02 PROCEDURE — 99215 OFFICE O/P EST HI 40 MIN: CPT | Mod: S$GLB,,, | Performed by: NURSE PRACTITIONER

## 2024-04-02 PROCEDURE — 36415 COLL VENOUS BLD VENIPUNCTURE: CPT

## 2024-04-02 PROCEDURE — 80053 COMPREHEN METABOLIC PANEL: CPT

## 2024-04-02 PROCEDURE — 86850 RBC ANTIBODY SCREEN: CPT | Performed by: NURSE PRACTITIONER

## 2024-04-02 PROCEDURE — 1126F AMNT PAIN NOTED NONE PRSNT: CPT | Mod: CPTII,S$GLB,, | Performed by: NURSE PRACTITIONER

## 2024-04-02 PROCEDURE — 3288F FALL RISK ASSESSMENT DOCD: CPT | Mod: CPTII,S$GLB,, | Performed by: NURSE PRACTITIONER

## 2024-04-02 PROCEDURE — 3078F DIAST BP <80 MM HG: CPT | Mod: CPTII,S$GLB,, | Performed by: NURSE PRACTITIONER

## 2024-04-02 PROCEDURE — 1159F MED LIST DOCD IN RCRD: CPT | Mod: CPTII,S$GLB,, | Performed by: NURSE PRACTITIONER

## 2024-04-02 PROCEDURE — 3074F SYST BP LT 130 MM HG: CPT | Mod: CPTII,S$GLB,, | Performed by: NURSE PRACTITIONER

## 2024-04-02 PROCEDURE — 3051F HG A1C>EQUAL 7.0%<8.0%: CPT | Mod: CPTII,S$GLB,, | Performed by: NURSE PRACTITIONER

## 2024-04-02 PROCEDURE — 99999 PR PBB SHADOW E&M-EST. PATIENT-LVL IV: CPT | Mod: PBBFAC,,, | Performed by: NURSE PRACTITIONER

## 2024-04-02 PROCEDURE — 85025 COMPLETE CBC W/AUTO DIFF WBC: CPT

## 2024-04-02 PROCEDURE — 3066F NEPHROPATHY DOC TX: CPT | Mod: CPTII,S$GLB,, | Performed by: NURSE PRACTITIONER

## 2024-04-02 PROCEDURE — 3008F BODY MASS INDEX DOCD: CPT | Mod: CPTII,S$GLB,, | Performed by: NURSE PRACTITIONER

## 2024-04-02 PROCEDURE — 1101F PT FALLS ASSESS-DOCD LE1/YR: CPT | Mod: CPTII,S$GLB,, | Performed by: NURSE PRACTITIONER

## 2024-04-02 RX ORDER — ACETAMINOPHEN 325 MG/1
650 TABLET ORAL ONCE
Status: CANCELLED | OUTPATIENT
Start: 2024-04-03

## 2024-04-02 RX ORDER — DIPHENHYDRAMINE HCL 25 MG
25 CAPSULE ORAL ONCE
Status: CANCELLED | OUTPATIENT
Start: 2024-04-03

## 2024-04-02 RX ORDER — HYDROCODONE BITARTRATE AND ACETAMINOPHEN 500; 5 MG/1; MG/1
TABLET ORAL ONCE
Status: CANCELLED | OUTPATIENT
Start: 2024-04-03

## 2024-04-02 NOTE — PROGRESS NOTES
HEMATOLOGY/ONCOLOGY OFFICE CLINIC VISIT    Visit Information:    Initial Evaluation: 4/22/2022  Referring Provider: Maggy Jaquez NP  Other providers: Dr Timothy Russ  Code status: Not addressed     Diagnosis/Problem list:   pT3 N2a Mx Stage IIIB Colon cancer. Dx 10/19/23  Microcytic anemia.   Folate deficiency     Present Treatment:  Xelox to start on 3/26/24 ( for 5 cycles)    Treatment history:  10/19/2023 Lap/jaswinder right colon resection   FOLFOX with dose reduction on Oxaliplatin due to neuropathy. Started on 11/28/23. x 7 cycles then switched to XELOX      Imaging studies:  CT C/A/P 6/3/2022: There is no significant hepatic steatosis.  The liver is cirrhotic.  No liver lesion is identified.  The spleen is enlarged, measuring 15.5 cm in length.  There is no retroperitoneal lymphadenopathy or abdominal aortic aneurysm.  A mildly prominent right external iliac lymph node measures 9 mm in short axis, nonspecific.  This is also similar in appearance when compared to 2015. Impression: Cirrhosis. Splenomegaly.  CT CAP 9/11/2023: There is no supraclavicular or axillary lymphadenopathy.  No mediastinal adenopathy. 3 mm right upper lobe pulmonary nodule. There are few additional pulmonary micronodules in the lung apices measuring no greater than 1-2 mm.  Findings similar to prior exam.     Impression: Scattered pulmonary nodules in the upper lobe similar to prior.  No further follow-up acute according to Fleischner criteria.  No convincing evidence for metastatic disease. Nodular appearing liver, correlation for cirrhosis.  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     Pathology:  10/19/2023:  COLON, RIGHT HEMICOLECTOMY (1.5 CM OF TERMINAL ILEUM, 25 CM LENGTH OF RIGHT COLON): POORLY DIFFERENTIATED ADENOCARCINOMA WITH FOCAL SIGNET RING CELL FEATURES, UNIFOCAL, RIGHT COLON, 8 CM GREATEST DIMENSION.   THE TUMOR INVADES THROUGH THE MUSCULARIS PROPRIA INTO PERICOLONIC  TISSUE (PT3).   FOCAL LYMPHVASCULAR INVASION IS PRESENT.   THE SURGICAL MARGINS ARE FREE OF TUMOR.   METASTATIC ADENOCARCINOMA IS IDENTIFIED IN FOUR (4) OF TWENTY-SEVEN (27)   PERICOLONIC LYMPH NODES (LARGEST METASTATIC DEPOSIT 8 MM; FOCAL EXTRACAPSULAR EXTENSION OF TUMOR IS PRESENT).      PATHOLOGIC STAGING:  pT3 N2a Mx     Histologic Grade:  G3, poorly differentiated   Macroscopic Tumor Perforation:  Not identified   Lymphovascular Invasion:  Small vessel   Perineural Invasion:  Not identified        CLINICAL HISTORY:       Patient: Indu Azul is a 68 y.o. female. with multiple medical problems that I saw originally on 2022 for thrombocytopenia.  Patient platelet counts on 2021 were 132,000 and since that that has been slowly trending down.  2021 platelets 132,000  2022 platelets 121,000  2022 platelets 115,000     Of note patient have a UA done that same day that suggest possible UTI with positive nitrates, 1+ leukocytes and many bacteria.  Urine culture with E. coli.   Reviewing electronic medical record and going back to 12/15/2014 patient with occasional low platelets.  They were never lower than 100,000 and they always normalized.   As per patient in 2020 when her platelets were slightly decreased (117,000), this was shortly after she has her left foot surgery.  Then in May 17 and May 18 2021, platelets were 105k and 101k,  she had COVID.  Again in 2022 she have a UTI for which she completed antibiotics.     Patient's father diagnosed Blood disorder requiring blood transfusion that started q 4 weeks and the q week. He was diagnosed on his 80's but  at 91. Sister and niece had ovarian cancer. Sister  of it. Niece still alive.     She was found to have iron deficiency anemia.  EGD performed, grade I-II esophageal varices found, too small to band. She also had Colonoscopy by Dr Hammonds that showed an ulcerated malignant-appearing partially obstructing  medium sized mass in the ascending colon.  She reports that she had a colonoscopy about 5 years ago at [facility] and everything was fine, no polyps or masses.   Biopsy and tattoo of the mass showed fragments of colonic mucosa, no evidence of dysplasia or malignancy.  Two other polyps were completely removed in the descending and sigmoid colon, pathology returned hyperplastic polyps.  CT abd/pel with no acute findings.     Despite of biopsy because of malignant-appearing partially obstructing mass of the ascending colon and photographs and description were consistent with colon cancer she underwent Lap/jaswinder right colon resection on 10/19/2023 by Dr Timothy Russ. Path c/w really differentiated adeno carcinoma.         Chief Complaint: Follow-up      Interval History:  Patient with h/o Charcot feet for which she had surgery. She does not have sensation on her feet from ankle down. This was prior to her diagnosis of colon cancer. She is getting Oxaloplatin but neuropathy is not worse. She started dose reduced and will cont like same dose. If neuropathy worsen will d/c oxaliplatin.       2/12/2024: Patient presents today for labs and TD prior to C6 of FOLFOX with dose reduction of Oxaliplatin due to neuropathy, accompanied by her daughter. C6 was held on 2/6/24 due to thrombocytopenia (plt 74k). 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 She is tolerating well. She continues with occasional right sided abdominal pain and oral thrush. Pharmacy did not have RX in stock and gave an alternative, but it does not help.  No worsening in neuropathy as of yet. Hyperpigmentation of nails and skin on hands commonly seen with 5FU. Overall, she is doing fair. No fever, chills or sweats. Denies chest pain or shortness of breath. No bleeding. Bowels are moving without difficulty.  Plts today 128k.    2/19/24: Patient presents today as an add on.  She has questions about her recent diagnosis of thrombus within the SMV and extending through the main portal vein . She complain of pain there so imaging was done. She was started on Eliquis. Last week after her chemo, she was nauseated so she didn't take any of her PO meds for 2 days and her right sided abdominal pain returned. Once she resumed Eliqius, her right sided abdominal pain decreased. Reports increased pain when she lays on her right side. She wants to know if we will be repeating imaging to eval her blood clot. Pancytopenia today. She treated last week (sonia). Denies fever.     2/26/24: Patient here for one week f/u. Due for C7 tomorrow. Platelet count is 75 k today. Denies any evidence of bleeding.     3/4/24: Patient presents today for one week f/u. Her platelet count increased to 115k. She has no complaints to report this week. She continues on Eliquis 5 mg bid. Denies any evidence of bleeding. Her neuropathy has not worsened as of yet.     3/18/24: Patient presents today for 2 week f/u due for C8 of FOLFOX. Her platelet count is 87k so we will have to delay again. She has no complaints today. The right lower abdominal pain subsided.     3/25/24: Patient presents today for 1 week f/u. We plan to start XELOX tomorrow for the remainder of her adjuvant treatment. Platelet count is now 109k today.     4/2/24: Patient presents today for once week f/u for toxicity check. She started XELOX on 3/26/24. Tolerating xeloda much better than infusional 5fu. Reports less nausea and she is not sleeping during the day like she was before. She reports chest pain that was transient. Hgb is 8 today.         Past Medical History:   Diagnosis Date    Anesthesia complication     trouble awakening    Charcot's joint of foot, left     Chronic kidney disease, unspecified     Cirrhosis of liver without ascites     Depression     Diabetes mellitus, type II, insulin dependent     Diabetic neuropathy     GERD (gastroesophageal  reflux disease)     H/O: hysterectomy     Hepatic steatosis     Malignant neoplasm of ascending colon     Obesity, unspecified     Overactive bladder     Thrombocytopenia, unspecified     Vitamin D deficiency       Past Surgical History:   Procedure Laterality Date    APPENDECTOMY      BREAST BIOPSY  2016     SECTION      x3    charcot-leyden crystals identification      CHOLECYSTECTOMY  2006    COLONOSCOPY N/A 2023    Procedure: COLON;  Surgeon: Luis Amador MD;  Location: [facility] ENDOSCOPY;  Service: Gastroenterology;  Laterality: N/A;    COLONOSCOPY, WITH 1 OR MORE BIOPSIES  2023    Procedure: COLONOSCOPY, WITH 1 OR MORE BIOPSIES;  Surgeon: Luis Amador MD;  Location: [facility] ENDOSCOPY;  Service: Gastroenterology;;    COLONOSCOPY, WITH DIRECTED SUBMUCOSAL INJECTION  2023    Procedure: COLONOSCOPY, WITH DIRECTED SUBMUCOSAL INJECTION;  Surgeon: Luis Amador MD;  Location: [facility] ENDOSCOPY;  Service: Gastroenterology;;  8cc Colon Tattoo    COLONOSCOPY, WITH POLYPECTOMY USING SNARE  2023    Procedure: COLONOSCOPY, WITH POLYPECTOMY USING SNARE;  Surgeon: Luis Amador MD;  Location: [facility] ENDOSCOPY;  Service: Gastroenterology;;    ESOPHAGOGASTRODUODENOSCOPY N/A 2023    Procedure: DOUBLE;  Surgeon: Luis Amador MD;  Location: [facility] ENDOSCOPY;  Service: Gastroenterology;  Laterality: N/A;    HEMORRHOID SURGERY      HYSTERECTOMY      total    INSERTION OF SUBCUTANEOUS PORT Right     INSERTION OF TUNNELED CENTRAL VENOUS CATHETER (CVC) WITH SUBCUTANEOUS PORT Right 2023    Procedure: [id number];  Surgeon: Timothy Russ MD;  Location: [facility];  Service: General;  Laterality: Right;    POLYPECTOMY      right foot surgery Right 2022    XI ROBOTIC COLECTOMY, RIGHT N/A 10/19/2023    Procedure: XI ROBOTIC COLECTOMY, RIGHT;  Surgeon: Timothy Russ MD;  Location: [facility];  Service: General;   Laterality: N/A;     Family History   Problem Relation Age of Onset    Diabetes Mother     Alzheimer's disease Mother     Diabetes Father     Leukemia Father     Diabetes Sister     Liver cancer Sister     Diabetes Brother      Social Connections: Not on file       Review of patient's allergies indicates:  No Known Allergies   Current Outpatient Medications on File Prior to Visit   Medication Sig Dispense Refill    apixaban (ELIQUIS) 5 mg Tab Take 1 tablet (5 mg total) by mouth 2 (two) times daily. 60 tablet 4    capecitabine (XELODA) 500 MG Tab Take 3 tablets (1,500 mg total) by mouth 2 (two) times daily on days 1-14 of each 21 day chemotherapy cycle. 84 tablet 4    dulaglutide (TRULICITY) 3 mg/0.5 mL pen injector Inject 3 mg into the skin every 7 days. 4 pen 11    furosemide (LASIX) 40 MG tablet Take 40 mg by mouth as needed.      gabapentin (NEURONTIN) 300 MG capsule Take 2 capsules (600 mg total) by mouth every evening. 60 capsule 5    HYDROcodone-acetaminophen (NORCO) 5-325 mg per tablet Take 1 tablet by mouth every 6 (six) hours as needed for Pain. 30 tablet 0    insulin glargine, TOUJEO, (TOUJEO SOLOSTAR U-300 INSULIN) 300 unit/mL (1.5 mL) InPn pen INJECT 79 UNITS SUBCUTANEOUSLY ONCE DAILY 6 mL 11    insulin syringe-needle U-100 1 mL 31 gauge x 5/16 Syrg Inject 1 Syringe into the skin 3 (three) times daily with meals.      OLANZapine (ZYPREXA) 5 MG tablet Take 1 tablet (5 mg total) by mouth every evening. Take nightly on days 1-3 of each chemotherapy cycle. 6 tablet 5    ondansetron (ZOFRAN-ODT) 8 MG TbDL Take 1 tablet (8 mg total) by mouth every 6 (six) hours as needed. 10 tablet 0    prochlorperazine (COMPAZINE) 5 MG tablet Take 5 mg by mouth every 6 (six) hours as needed.      sertraline (ZOLOFT) 50 MG tablet Take 1 tablet by mouth once daily 90 tablet 3    solifenacin (VESICARE) 10 MG tablet Take 10 mg by mouth once daily.      spironolactone (ALDACTONE) 50 MG tablet Take 50 mg by mouth once daily.    "    Current Facility-Administered Medications on File Prior to Visit   Medication Dose Route Frequency Provider Last Rate Last Admin    promethazine (PHENERGAN) 12.5 mg in dextrose 5 % (D5W) 50 mL IVPB  12.5 mg Intravenous Once Gayatri Jaffe FNP          Review of Systems   Neurological:  Positive for numbness.            Vitals:    04/02/24 1440   BP: 129/78   BP Location: Left arm   Patient Position: Sitting   Pulse: 87   Resp: 18   Temp: 97.8 °F (36.6 °C)   TempSrc: Oral   SpO2: 98%   Weight: 107.8 kg (237 lb 11.2 oz)   Height: 5' 3" (1.6 m)                Wt Readings from Last 6 Encounters:   04/02/24 107.8 kg (237 lb 11.2 oz)   03/26/24 105.6 kg (232 lb 11.2 oz)   03/25/24 105.1 kg (231 lb 9.6 oz)   03/18/24 106.4 kg (234 lb 9.6 oz)   03/08/24 106.9 kg (235 lb 11.2 oz)   03/07/24 107.3 kg (236 lb 9.6 oz)     Body mass index is 42.11 kg/m².  Body surface area is 2.19 meters squared.  Physical Exam  Vitals and nursing note reviewed.   Constitutional:       General: She is not in acute distress.     Appearance: Normal appearance. She is obese.   HENT:      Head: Normocephalic and atraumatic.      Mouth/Throat:      Mouth: Mucous membranes are moist.   Eyes:      General: No scleral icterus.     Extraocular Movements: Extraocular movements intact.      Conjunctiva/sclera: Conjunctivae normal.      Pupils: Pupils are equal, round, and reactive to light.   Neck:      Vascular: No JVD.   Cardiovascular:      Rate and Rhythm: Normal rate and regular rhythm.      Heart sounds: No murmur heard.  Pulmonary:      Effort: Pulmonary effort is normal.      Breath sounds: Normal breath sounds. No wheezing or rhonchi.   Chest:      Comments: Right chest wall mediport in place.    Abdominal:      General: Bowel sounds are normal. There is no distension.      Palpations: Abdomen is soft. There is no mass.      Tenderness: There is no abdominal tenderness.      Comments: Surgical incisions healed after colon surgery.  "   Musculoskeletal:         General: No swelling or deformity.      Cervical back: Neck supple.   Lymphadenopathy:      Head:      Right side of head: No submandibular adenopathy.      Left side of head: No submandibular adenopathy.      Cervical: No cervical adenopathy.      Upper Body:      Right upper body: No supraclavicular or axillary adenopathy.      Left upper body: No supraclavicular or axillary adenopathy.      Lower Body: No right inguinal adenopathy. No left inguinal adenopathy.   Skin:     General: Skin is warm.      Coloration: Skin is not jaundiced.      Findings: No lesion or rash.      Nails: There is no clubbing.      Comments: Darkening of nails and skin on hands commonly seen with 5FU   Neurological:      General: No focal deficit present.      Mental Status: She is alert and oriented to person, place, and time.      Cranial Nerves: Cranial nerves 2-12 are intact.   Psychiatric:         Attention and Perception: Attention normal.         Mood and Affect: Mood is depressed.         Behavior: Behavior is cooperative.         Judgment: Judgment normal.       ECOG SCORE             Laboratory:  CBC with Differential:  Lab Results   Component Value Date    WBC 4.98 04/02/2024    RBC 3.16 (L) 04/02/2024    HGB 8.0 (L) 04/02/2024    HCT 26.5 (L) 04/02/2024    MCV 83.9 04/02/2024    MCH 25.3 (L) 04/02/2024    MCHC 30.2 (L) 04/02/2024    RDW 21.8 (H) 04/02/2024     (L) 04/02/2024    MPV  04/02/2024      Comment:      Unable to calculate MPV         CMP:  Sodium   Date Value Ref Range Status   06/01/2022 140 136 - 145 mmol/L Final     Sodium Level   Date Value Ref Range Status   03/25/2024 140 136 - 145 mmol/L Final     Potassium   Date Value Ref Range Status   06/01/2022 4.9 3.5 - 5.1 mmol/L Final     Potassium Level   Date Value Ref Range Status   03/25/2024 4.8 3.5 - 5.1 mmol/L Final     Chloride   Date Value Ref Range Status   06/01/2022 104 100 - 109 mmol/L Final     Carbon Dioxide   Date Value  Ref Range Status   03/25/2024 28 23 - 31 mmol/L Final   06/01/2022 29 22 - 33 mmol/L Final     Blood Urea Nitrogen   Date Value Ref Range Status   03/25/2024 23.3 (H) 9.8 - 20.1 mg/dL Final   06/01/2022 22 5 - 25 mg/dL Final     Creatinine   Date Value Ref Range Status   03/25/2024 1.55 (H) 0.55 - 1.02 mg/dL Final   06/01/2022 1.32 (H) 0.57 - 1.25 mg/dL Final     Calcium   Date Value Ref Range Status   06/01/2022 8.7 (L) 8.8 - 10.6 mg/dL Final     Calcium Level Total   Date Value Ref Range Status   03/25/2024 9.3 8.4 - 10.2 mg/dL Final     Albumin Level   Date Value Ref Range Status   03/25/2024 3.4 3.4 - 4.8 g/dL Final     Bilirubin Total   Date Value Ref Range Status   03/25/2024 0.3 <=1.5 mg/dL Final     Alkaline Phosphatase   Date Value Ref Range Status   03/25/2024 132 40 - 150 unit/L Final     Aspartate Aminotransferase   Date Value Ref Range Status   03/25/2024 22 5 - 34 unit/L Final     Alanine Aminotransferase   Date Value Ref Range Status   03/25/2024 16 0 - 55 unit/L Final     Anion Gap   Date Value Ref Range Status   06/01/2022 7 (L) 8 - 16 mmol/L Final     eGFR    Date Value Ref Range Status   06/01/2022 45 mL/min/1.73mSq Final     Comment:     In accordance with NKF-ASN Task Force recommendation, calculation based on the Chronic Kidney Disease Epidemiology Collaboration (CKD-EPI) equation without adjustment for race. eGFR adjusted for gender and age and calculated in ml/min/1.73mSquared. eGFR cannot be calculated if patient is under 18 years of age.     Reference Range:   >= 60 ml/min/1.73mSquared.     Estimated GFR-Non    Date Value Ref Range Status   08/04/2022 43 mls/min/1.73/m2 Final         Assessment:       1) Stage IIIB adenocarcinoma of the ascending colon  --Patient will benefit of adjuvant chemotherapy.   --Due to severe diabetic neuropathy, will try dose reduction of Oxaliplatin, starting 65 mg/m2.    --If not tolerated, then with d/c Oxaliplatin and cont 5FU  and LV  2) Lung nodules-Stable. Will monitor  3) iron deficiency anemia-on iron PO  4) Thrombocytopenia-resolved. Will follow counts closely as she has splenomegaly and this can affects her counts mostly platelets.  5) Folate deficiency-she will start taking folic acid at this time.   6) Diabetic neuropathy-severe. She does not feel her feet.  --She will have nerve stimulator placement to see if can help her feet     Educated the patient on the risks versus benefits as well as toxicities associated with treatment.  Verbally consented the patient to the treatment plan and the patient was educated on the planned duration of the treatment and schedule of the treatment administration.  All questions were answered.      Plan:       Plan: Adjuvant FOLFOX x 6 months,with dose reduction of oxaliplatin due to severe (Grade 3) neuropathy. If not tolerated, then with d/c Oxaliplatin and cont 5FU and LV. Neuropathy same.      Case discussed with Dr. Olmstead . We switched from  FOLFOX to XELOX.  We were having to delay chemo each cycle due to cytopenias and she is already on a reduced dose of Oxaliplatin. She has 4 cycles left. Her platelet count is 103k today. Hgb is 8. Will give a unit of PRBC s tomorrow. Continue with  C1 XELOX 1500 mg bid x 14 days with 7 day break, to start in day 1 of each cycle with oxaliplatin. Will continue same reduced dose of Oxaliplatin at 65mg/m2.   Continue Eliquis  Continue weekly labs.   RTC 2 week for TD/labs: CBC, CMP for C2 (of 5) XELOX    Encouraged to call with questions or problems  The patient was given ample opportunity to ask questions and they were all answered to satisfaction; patient demonstrated understanding of what we discussed and is agreeable to the plan.     LAURA Degroot

## 2024-04-03 ENCOUNTER — INFUSION (OUTPATIENT)
Dept: INFUSION THERAPY | Facility: HOSPITAL | Age: 69
End: 2024-04-03
Attending: INTERNAL MEDICINE
Payer: MEDICARE

## 2024-04-03 VITALS
OXYGEN SATURATION: 98 % | TEMPERATURE: 98 F | RESPIRATION RATE: 16 BRPM | HEIGHT: 63 IN | BODY MASS INDEX: 42.11 KG/M2 | SYSTOLIC BLOOD PRESSURE: 114 MMHG | DIASTOLIC BLOOD PRESSURE: 70 MMHG | HEART RATE: 79 BPM

## 2024-04-03 DIAGNOSIS — D50.9 MICROCYTIC ANEMIA: ICD-10-CM

## 2024-04-03 DIAGNOSIS — C18.2 MALIGNANT NEOPLASM OF ASCENDING COLON: ICD-10-CM

## 2024-04-03 LAB
ABO + RH BLD: NORMAL
BLD PROD TYP BPU: NORMAL
BLOOD UNIT EXPIRATION DATE: NORMAL
BLOOD UNIT TYPE CODE: 5100
CROSSMATCH INTERPRETATION: NORMAL
DISPENSE STATUS: NORMAL
UNIT NUMBER: NORMAL

## 2024-04-03 PROCEDURE — 36430 TRANSFUSION BLD/BLD COMPNT: CPT

## 2024-04-03 PROCEDURE — P9016 RBC LEUKOCYTES REDUCED: HCPCS | Performed by: NURSE PRACTITIONER

## 2024-04-03 PROCEDURE — 86923 COMPATIBILITY TEST ELECTRIC: CPT | Performed by: NURSE PRACTITIONER

## 2024-04-03 PROCEDURE — 25000003 PHARM REV CODE 250: Performed by: NURSE PRACTITIONER

## 2024-04-03 RX ORDER — OLANZAPINE 5 MG/1
TABLET ORAL
Qty: 6 TABLET | Refills: 0 | Status: SHIPPED | OUTPATIENT
Start: 2024-04-03

## 2024-04-03 RX ORDER — HYDROCODONE BITARTRATE AND ACETAMINOPHEN 500; 5 MG/1; MG/1
TABLET ORAL ONCE
Status: ACTIVE | OUTPATIENT
Start: 2024-04-03

## 2024-04-03 RX ORDER — DIPHENHYDRAMINE HCL 25 MG
25 CAPSULE ORAL ONCE
Status: COMPLETED | OUTPATIENT
Start: 2024-04-03 | End: 2024-04-03

## 2024-04-03 RX ORDER — ACETAMINOPHEN 325 MG/1
650 TABLET ORAL ONCE
Status: COMPLETED | OUTPATIENT
Start: 2024-04-03 | End: 2024-04-03

## 2024-04-03 RX ADMIN — DIPHENHYDRAMINE HYDROCHLORIDE 25 MG: 25 CAPSULE ORAL at 08:04

## 2024-04-03 RX ADMIN — ACETAMINOPHEN 650 MG: 325 TABLET ORAL at 08:04

## 2024-04-03 NOTE — NURSING
Patient arrived ambulatory with rolling walker to infusion. Pt is alert and oriented with no acute complaints at this time.  Pt is here for 1 unit PRBC, pt tolerated transfusion well.    Leslie Roman RN

## 2024-04-08 ENCOUNTER — LAB VISIT (OUTPATIENT)
Dept: LAB | Facility: HOSPITAL | Age: 69
End: 2024-04-08
Attending: INTERNAL MEDICINE
Payer: MEDICARE

## 2024-04-08 DIAGNOSIS — C18.2 MALIGNANT NEOPLASM OF ASCENDING COLON: ICD-10-CM

## 2024-04-08 LAB
ALBUMIN SERPL-MCNC: 3.5 G/DL (ref 3.4–4.8)
ALBUMIN/GLOB SERPL: 1.1 RATIO (ref 1.1–2)
ALP SERPL-CCNC: 88 UNIT/L (ref 40–150)
ALT SERPL-CCNC: 25 UNIT/L (ref 0–55)
AST SERPL-CCNC: 32 UNIT/L (ref 5–34)
BASOPHILS # BLD AUTO: 0.02 X10(3)/MCL
BASOPHILS NFR BLD AUTO: 0.4 %
BILIRUB SERPL-MCNC: 0.7 MG/DL
BUN SERPL-MCNC: 24.8 MG/DL (ref 9.8–20.1)
CALCIUM SERPL-MCNC: 9.1 MG/DL (ref 8.4–10.2)
CHLORIDE SERPL-SCNC: 104 MMOL/L (ref 98–107)
CO2 SERPL-SCNC: 23 MMOL/L (ref 23–31)
CREAT SERPL-MCNC: 1.64 MG/DL (ref 0.55–1.02)
EOSINOPHIL # BLD AUTO: 0.14 X10(3)/MCL (ref 0–0.9)
EOSINOPHIL NFR BLD AUTO: 2.7 %
ERYTHROCYTE [DISTWIDTH] IN BLOOD BY AUTOMATED COUNT: 22.1 % (ref 11.5–17)
GFR SERPLBLD CREATININE-BSD FMLA CKD-EPI: 34 MLS/MIN/1.73/M2
GLOBULIN SER-MCNC: 3.1 GM/DL (ref 2.4–3.5)
GLUCOSE SERPL-MCNC: 166 MG/DL (ref 82–115)
HCT VFR BLD AUTO: 28.1 % (ref 37–47)
HGB BLD-MCNC: 8.8 G/DL (ref 12–16)
IMM GRANULOCYTES # BLD AUTO: 0.01 X10(3)/MCL (ref 0–0.04)
IMM GRANULOCYTES NFR BLD AUTO: 0.2 %
LYMPHOCYTES # BLD AUTO: 0.85 X10(3)/MCL (ref 0.6–4.6)
LYMPHOCYTES NFR BLD AUTO: 16.6 %
MCH RBC QN AUTO: 26.3 PG (ref 27–31)
MCHC RBC AUTO-ENTMCNC: 31.3 G/DL (ref 33–36)
MCV RBC AUTO: 83.9 FL (ref 80–94)
MONOCYTES # BLD AUTO: 0.45 X10(3)/MCL (ref 0.1–1.3)
MONOCYTES NFR BLD AUTO: 8.8 %
NEUTROPHILS # BLD AUTO: 3.65 X10(3)/MCL (ref 2.1–9.2)
NEUTROPHILS NFR BLD AUTO: 71.3 %
PLATELET # BLD AUTO: 111 X10(3)/MCL (ref 130–400)
PMV BLD AUTO: 10.7 FL (ref 7.4–10.4)
POTASSIUM SERPL-SCNC: 4.5 MMOL/L (ref 3.5–5.1)
PROT SERPL-MCNC: 6.6 GM/DL (ref 5.8–7.6)
RBC # BLD AUTO: 3.35 X10(6)/MCL (ref 4.2–5.4)
SODIUM SERPL-SCNC: 137 MMOL/L (ref 136–145)
WBC # SPEC AUTO: 5.12 X10(3)/MCL (ref 4.5–11.5)

## 2024-04-08 PROCEDURE — 85025 COMPLETE CBC W/AUTO DIFF WBC: CPT

## 2024-04-08 PROCEDURE — 36415 COLL VENOUS BLD VENIPUNCTURE: CPT

## 2024-04-08 PROCEDURE — 80053 COMPREHEN METABOLIC PANEL: CPT

## 2024-04-13 DIAGNOSIS — E11.42 DIABETIC POLYNEUROPATHY ASSOCIATED WITH TYPE 2 DIABETES MELLITUS: Primary | ICD-10-CM

## 2024-04-15 ENCOUNTER — HOSPITAL ENCOUNTER (EMERGENCY)
Facility: HOSPITAL | Age: 69
Discharge: LEFT BEFORE DISCHARGE | End: 2024-04-15
Payer: MEDICARE

## 2024-04-15 ENCOUNTER — OFFICE VISIT (OUTPATIENT)
Dept: HEMATOLOGY/ONCOLOGY | Facility: CLINIC | Age: 69
End: 2024-04-15
Payer: MEDICARE

## 2024-04-15 VITALS
TEMPERATURE: 99 F | BODY MASS INDEX: 40.93 KG/M2 | WEIGHT: 231 LBS | SYSTOLIC BLOOD PRESSURE: 119 MMHG | HEART RATE: 92 BPM | RESPIRATION RATE: 18 BRPM | HEIGHT: 63 IN | DIASTOLIC BLOOD PRESSURE: 76 MMHG | OXYGEN SATURATION: 96 %

## 2024-04-15 VITALS
DIASTOLIC BLOOD PRESSURE: 60 MMHG | WEIGHT: 231 LBS | HEART RATE: 103 BPM | BODY MASS INDEX: 40.93 KG/M2 | OXYGEN SATURATION: 95 % | SYSTOLIC BLOOD PRESSURE: 102 MMHG | RESPIRATION RATE: 17 BRPM | HEIGHT: 63 IN | TEMPERATURE: 99 F

## 2024-04-15 DIAGNOSIS — Z79.899 IMMUNODEFICIENCY DUE TO CHEMOTHERAPY: ICD-10-CM

## 2024-04-15 DIAGNOSIS — D84.821 IMMUNODEFICIENCY DUE TO CHEMOTHERAPY: ICD-10-CM

## 2024-04-15 DIAGNOSIS — C18.2 MALIGNANT NEOPLASM OF ASCENDING COLON: Primary | ICD-10-CM

## 2024-04-15 DIAGNOSIS — T45.1X5A IMMUNODEFICIENCY DUE TO CHEMOTHERAPY: ICD-10-CM

## 2024-04-15 LAB
ALBUMIN SERPL-MCNC: 3.1 G/DL (ref 3.4–4.8)
ALBUMIN/GLOB SERPL: 1 RATIO (ref 1.1–2)
ALP SERPL-CCNC: 90 UNIT/L (ref 40–150)
ALT SERPL-CCNC: 16 UNIT/L (ref 0–55)
ANISOCYTOSIS BLD QL SMEAR: ABNORMAL
AST SERPL-CCNC: 19 UNIT/L (ref 5–34)
BASOPHILS # BLD AUTO: 0.05 X10(3)/MCL
BASOPHILS NFR BLD AUTO: 1 %
BILIRUB SERPL-MCNC: 0.9 MG/DL
BUN SERPL-MCNC: 30.3 MG/DL (ref 9.8–20.1)
CALCIUM SERPL-MCNC: 8.5 MG/DL (ref 8.4–10.2)
CHLORIDE SERPL-SCNC: 103 MMOL/L (ref 98–107)
CO2 SERPL-SCNC: 20 MMOL/L (ref 23–31)
CREAT SERPL-MCNC: 1.61 MG/DL (ref 0.55–1.02)
EOSINOPHIL # BLD AUTO: 0.19 X10(3)/MCL (ref 0–0.9)
EOSINOPHIL NFR BLD AUTO: 3.7 %
ERYTHROCYTE [DISTWIDTH] IN BLOOD BY AUTOMATED COUNT: 24.4 % (ref 11.5–17)
GFR SERPLBLD CREATININE-BSD FMLA CKD-EPI: 35 MLS/MIN/1.73/M2
GLOBULIN SER-MCNC: 3 GM/DL (ref 2.4–3.5)
GLUCOSE SERPL-MCNC: 156 MG/DL (ref 82–115)
HCT VFR BLD AUTO: 29.5 % (ref 37–47)
HGB BLD-MCNC: 9.3 G/DL (ref 12–16)
IMM GRANULOCYTES # BLD AUTO: 0.01 X10(3)/MCL (ref 0–0.04)
IMM GRANULOCYTES NFR BLD AUTO: 0.2 %
LYMPHOCYTES # BLD AUTO: 0.7 X10(3)/MCL (ref 0.6–4.6)
LYMPHOCYTES NFR BLD AUTO: 13.5 %
MACROCYTES BLD QL SMEAR: ABNORMAL
MCH RBC QN AUTO: 26.8 PG (ref 27–31)
MCHC RBC AUTO-ENTMCNC: 31.5 G/DL (ref 33–36)
MCV RBC AUTO: 85 FL (ref 80–94)
MONOCYTES # BLD AUTO: 1.04 X10(3)/MCL (ref 0.1–1.3)
MONOCYTES NFR BLD AUTO: 20 %
NEUTROPHILS # BLD AUTO: 3.21 X10(3)/MCL (ref 2.1–9.2)
NEUTROPHILS NFR BLD AUTO: 61.6 %
NRBC BLD AUTO-RTO: 0 %
PLATELET # BLD AUTO: 97 X10(3)/MCL (ref 130–400)
PLATELET # BLD EST: ABNORMAL 10*3/UL
PLATELETS.RETICULATED NFR BLD AUTO: 6.2 % (ref 0.9–11.2)
PMV BLD AUTO: ABNORMAL FL
POIKILOCYTOSIS BLD QL SMEAR: ABNORMAL
POTASSIUM SERPL-SCNC: 4 MMOL/L (ref 3.5–5.1)
PROT SERPL-MCNC: 6.1 GM/DL (ref 5.8–7.6)
RBC # BLD AUTO: 3.47 X10(6)/MCL (ref 4.2–5.4)
RBC MORPH BLD: ABNORMAL
SODIUM SERPL-SCNC: 134 MMOL/L (ref 136–145)
WBC # SPEC AUTO: 5.2 X10(3)/MCL (ref 4.5–11.5)

## 2024-04-15 PROCEDURE — 3051F HG A1C>EQUAL 7.0%<8.0%: CPT | Mod: CPTII,S$GLB,, | Performed by: NURSE PRACTITIONER

## 2024-04-15 PROCEDURE — 85025 COMPLETE CBC W/AUTO DIFF WBC: CPT

## 2024-04-15 PROCEDURE — 3066F NEPHROPATHY DOC TX: CPT | Mod: CPTII,S$GLB,, | Performed by: NURSE PRACTITIONER

## 2024-04-15 PROCEDURE — 1159F MED LIST DOCD IN RCRD: CPT | Mod: CPTII,S$GLB,, | Performed by: NURSE PRACTITIONER

## 2024-04-15 PROCEDURE — 3008F BODY MASS INDEX DOCD: CPT | Mod: CPTII,S$GLB,, | Performed by: NURSE PRACTITIONER

## 2024-04-15 PROCEDURE — 3074F SYST BP LT 130 MM HG: CPT | Mod: CPTII,S$GLB,, | Performed by: NURSE PRACTITIONER

## 2024-04-15 PROCEDURE — 1101F PT FALLS ASSESS-DOCD LE1/YR: CPT | Mod: CPTII,S$GLB,, | Performed by: NURSE PRACTITIONER

## 2024-04-15 PROCEDURE — 99215 OFFICE O/P EST HI 40 MIN: CPT | Mod: S$GLB,,, | Performed by: NURSE PRACTITIONER

## 2024-04-15 PROCEDURE — 99283 EMERGENCY DEPT VISIT LOW MDM: CPT

## 2024-04-15 PROCEDURE — 80053 COMPREHEN METABOLIC PANEL: CPT

## 2024-04-15 PROCEDURE — 1125F AMNT PAIN NOTED PAIN PRSNT: CPT | Mod: CPTII,S$GLB,, | Performed by: NURSE PRACTITIONER

## 2024-04-15 PROCEDURE — 99999 PR PBB SHADOW E&M-EST. PATIENT-LVL IV: CPT | Mod: PBBFAC,,, | Performed by: NURSE PRACTITIONER

## 2024-04-15 PROCEDURE — 3288F FALL RISK ASSESSMENT DOCD: CPT | Mod: CPTII,S$GLB,, | Performed by: NURSE PRACTITIONER

## 2024-04-15 PROCEDURE — 3078F DIAST BP <80 MM HG: CPT | Mod: CPTII,S$GLB,, | Performed by: NURSE PRACTITIONER

## 2024-04-15 RX ORDER — GABAPENTIN 300 MG/1
600 CAPSULE ORAL NIGHTLY
Qty: 60 CAPSULE | Refills: 6 | Status: SHIPPED | OUTPATIENT
Start: 2024-04-15

## 2024-04-15 NOTE — PROGRESS NOTES
HEMATOLOGY/ONCOLOGY OFFICE CLINIC VISIT    Visit Information:    Initial Evaluation: 4/22/2022  Referring Provider: Maggy Jaquez NP  Other providers: Dr Timothy Russ  Code status: Not addressed     Diagnosis/Problem list:   pT3 N2a Mx Stage IIIB Colon cancer. Dx 10/19/23  Microcytic anemia.   Folate deficiency     Present Treatment:  Xelox to start on 3/26/24 ( for 5 cycles)    Treatment history:  10/19/2023 Lap/jaswinder right colon resection   FOLFOX with dose reduction on Oxaliplatin due to neuropathy. Started on 11/28/23. x 7 cycles then switched to XELOX      Imaging studies:  CT C/A/P 6/3/2022: There is no significant hepatic steatosis.  The liver is cirrhotic.  No liver lesion is identified.  The spleen is enlarged, measuring 15.5 cm in length.  There is no retroperitoneal lymphadenopathy or abdominal aortic aneurysm.  A mildly prominent right external iliac lymph node measures 9 mm in short axis, nonspecific.  This is also similar in appearance when compared to 2015. Impression: Cirrhosis. Splenomegaly.  CT CAP 9/11/2023: There is no supraclavicular or axillary lymphadenopathy.  No mediastinal adenopathy. 3 mm right upper lobe pulmonary nodule. There are few additional pulmonary micronodules in the lung apices measuring no greater than 1-2 mm.  Findings similar to prior exam.     Impression: Scattered pulmonary nodules in the upper lobe similar to prior.  No further follow-up acute according to Fleischner criteria.  No convincing evidence for metastatic disease. Nodular appearing liver, correlation for cirrhosis.  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     Pathology:  10/19/2023:  COLON, RIGHT HEMICOLECTOMY (1.5 CM OF TERMINAL ILEUM, 25 CM LENGTH OF RIGHT COLON): POORLY DIFFERENTIATED ADENOCARCINOMA WITH FOCAL SIGNET RING CELL FEATURES, UNIFOCAL, RIGHT COLON, 8 CM GREATEST DIMENSION.   THE TUMOR INVADES THROUGH THE MUSCULARIS PROPRIA INTO PERICOLONIC  TISSUE (PT3).   FOCAL LYMPHVASCULAR INVASION IS PRESENT.   THE SURGICAL MARGINS ARE FREE OF TUMOR.   METASTATIC ADENOCARCINOMA IS IDENTIFIED IN FOUR (4) OF TWENTY-SEVEN (27)   PERICOLONIC LYMPH NODES (LARGEST METASTATIC DEPOSIT 8 MM; FOCAL EXTRACAPSULAR EXTENSION OF TUMOR IS PRESENT).      PATHOLOGIC STAGING:  pT3 N2a Mx     Histologic Grade:  G3, poorly differentiated   Macroscopic Tumor Perforation:  Not identified   Lymphovascular Invasion:  Small vessel   Perineural Invasion:  Not identified        CLINICAL HISTORY:       Patient: Indu Azul is a 68 y.o. female. with multiple medical problems that I saw originally on 2022 for thrombocytopenia.  Patient platelet counts on 2021 were 132,000 and since that that has been slowly trending down.  2021 platelets 132,000  2022 platelets 121,000  2022 platelets 115,000     Of note patient have a UA done that same day that suggest possible UTI with positive nitrates, 1+ leukocytes and many bacteria.  Urine culture with E. coli.   Reviewing electronic medical record and going back to 12/15/2014 patient with occasional low platelets.  They were never lower than 100,000 and they always normalized.   As per patient in 2020 when her platelets were slightly decreased (117,000), this was shortly after she has her left foot surgery.  Then in May 17 and May 18 2021, platelets were 105k and 101k,  she had COVID.  Again in 2022 she have a UTI for which she completed antibiotics.     Patient's father diagnosed Blood disorder requiring blood transfusion that started q 4 weeks and the q week. He was diagnosed on his 80's but  at 91. Sister and niece had ovarian cancer. Sister  of it. Niece still alive.     She was found to have iron deficiency anemia.  EGD performed, grade I-II esophageal varices found, too small to band. She also had Colonoscopy by Dr Hammonds that showed an ulcerated malignant-appearing partially obstructing  medium sized mass in the ascending colon.  She reports that she had a colonoscopy about 5 years ago at [facility] and everything was fine, no polyps or masses.   Biopsy and tattoo of the mass showed fragments of colonic mucosa, no evidence of dysplasia or malignancy.  Two other polyps were completely removed in the descending and sigmoid colon, pathology returned hyperplastic polyps.  CT abd/pel with no acute findings.     Despite of biopsy because of malignant-appearing partially obstructing mass of the ascending colon and photographs and description were consistent with colon cancer she underwent Lap/jaswinder right colon resection on 10/19/2023 by Dr Timothy Russ. Path c/w really differentiated adeno carcinoma.         Chief Complaint: Follow-up (PT states she been having abd pain since Saturday and she been having small BM.)      Interval History:  Patient with h/o Charcot feet for which she had surgery. She does not have sensation on her feet from ankle down. This was prior to her diagnosis of colon cancer. She is getting Oxaloplatin but neuropathy is not worse. She started dose reduced and will cont like same dose. If neuropathy worsen will d/c oxaliplatin.       2/12/2024: Patient presents today for labs and TD prior to C6 of FOLFOX with dose reduction of Oxaliplatin due to neuropathy, accompanied by her daughter. C6 was held on 2/6/24 due to thrombocytopenia (plt 74k). 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 She is tolerating well. She continues with occasional right sided abdominal pain and oral thrush. Pharmacy did not have RX in stock and gave an alternative, but it does not help.  No worsening in neuropathy as of yet. Hyperpigmentation of nails and skin on hands commonly seen with 5FU. Overall, she is doing fair. No fever, chills or sweats. Denies chest pain or shortness of breath. No bleeding. Bowels are moving without  difficulty.  Plts today 128k.    2/19/24: Patient presents today as an add on. She has questions about her recent diagnosis of thrombus within the SMV and extending through the main portal vein . She complain of pain there so imaging was done. She was started on Eliquis. Last week after her chemo, she was nauseated so she didn't take any of her PO meds for 2 days and her right sided abdominal pain returned. Once she resumed Eliqius, her right sided abdominal pain decreased. Reports increased pain when she lays on her right side. She wants to know if we will be repeating imaging to eval her blood clot. Pancytopenia today. She treated last week (sonia). Denies fever.     2/26/24: Patient here for one week f/u. Due for C7 tomorrow. Platelet count is 75 k today. Denies any evidence of bleeding.     3/4/24: Patient presents today for one week f/u. Her platelet count increased to 115k. She has no complaints to report this week. She continues on Eliquis 5 mg bid. Denies any evidence of bleeding. Her neuropathy has not worsened as of yet.     3/18/24: Patient presents today for 2 week f/u due for C8 of FOLFOX. Her platelet count is 87k so we will have to delay again. She has no complaints today. The right lower abdominal pain subsided.     3/25/24: Patient presents today for 1 week f/u. We plan to start XELOX tomorrow for the remainder of her adjuvant treatment. Platelet count is now 109k today.     4/2/24: Patient presents today for once week f/u for toxicity check. She started XELOX on 3/26/24. Tolerating xeloda much better than infusional 5fu. Reports less nausea and she is not sleeping during the day like she was before. She reports chest pain that was transient. Hgb is 8 today.     4/15/24: Patient presents today prior to C2 of XELOX. She is c/o abdominal pain and constipation. Reports no BM since Wednesday ( 5 days). Platelet count is 89k        Past Medical History:   Diagnosis Date    Anesthesia complication      trouble awakening    Charcot's joint of foot, left     Chronic kidney disease, unspecified     Cirrhosis of liver without ascites     Depression     Diabetes mellitus, type II, insulin dependent     Diabetic neuropathy     GERD (gastroesophageal reflux disease)     H/O: hysterectomy     Hepatic steatosis     Malignant neoplasm of ascending colon     Obesity, unspecified     Overactive bladder     Thrombocytopenia, unspecified     Vitamin D deficiency       Past Surgical History:   Procedure Laterality Date    APPENDECTOMY      BREAST BIOPSY  2016     SECTION      x3    charcot-leyden crystals identification      CHOLECYSTECTOMY      COLONOSCOPY N/A 2023    Procedure: COLON;  Surgeon: Luis Amador MD;  Location: [facility] ENDOSCOPY;  Service: Gastroenterology;  Laterality: N/A;    COLONOSCOPY, WITH 1 OR MORE BIOPSIES  2023    Procedure: COLONOSCOPY, WITH 1 OR MORE BIOPSIES;  Surgeon: Luis Amador MD;  Location: [facility] ENDOSCOPY;  Service: Gastroenterology;;    COLONOSCOPY, WITH DIRECTED SUBMUCOSAL INJECTION  2023    Procedure: COLONOSCOPY, WITH DIRECTED SUBMUCOSAL INJECTION;  Surgeon: Luis Amador MD;  Location: [facility] ENDOSCOPY;  Service: Gastroenterology;;  8cc Colon Tattoo    COLONOSCOPY, WITH POLYPECTOMY USING SNARE  2023    Procedure: COLONOSCOPY, WITH POLYPECTOMY USING SNARE;  Surgeon: Luis Amador MD;  Location: [facility] ENDOSCOPY;  Service: Gastroenterology;;    ESOPHAGOGASTRODUODENOSCOPY N/A 2023    Procedure: DOUBLE;  Surgeon: Luis Amador MD;  Location: [facility] ENDOSCOPY;  Service: Gastroenterology;  Laterality: N/A;    HEMORRHOID SURGERY      HYSTERECTOMY      total    INSERTION OF SUBCUTANEOUS PORT Right     INSERTION OF TUNNELED CENTRAL VENOUS CATHETER (CVC) WITH SUBCUTANEOUS PORT Right 2023    Procedure: RUSTOLTLL-RRXO-E-CATH;  Surgeon: Timothy Russ MD;  Location: [facility] OR;   Service: General;  Laterality: Right;    POLYPECTOMY      right foot surgery Right 02/01/2022    XI ROBOTIC COLECTOMY, RIGHT N/A 10/19/2023    Procedure: XI ROBOTIC COLECTOMY, RIGHT;  Surgeon: Timothy Russ MD;  Location: [facility];  Service: General;  Laterality: N/A;     Family History   Problem Relation Name Age of Onset    Diabetes Mother      Alzheimer's disease Mother      Diabetes Father      Leukemia Father      Diabetes Sister      Liver cancer Sister      Diabetes Brother       Social Connections: Not on file       Review of patient's allergies indicates:  No Known Allergies   Current Outpatient Medications on File Prior to Visit   Medication Sig Dispense Refill    apixaban (ELIQUIS) 5 mg Tab Take 1 tablet (5 mg total) by mouth 2 (two) times daily. 60 tablet 4    capecitabine (XELODA) 500 MG Tab Take 3 tablets (1,500 mg total) by mouth 2 (two) times daily on days 1-14 of each 21 day chemotherapy cycle. 84 tablet 4    dulaglutide (TRULICITY) 3 mg/0.5 mL pen injector Inject 3 mg into the skin every 7 days. 4 pen 11    furosemide (LASIX) 40 MG tablet Take 40 mg by mouth as needed.      gabapentin (NEURONTIN) 300 MG capsule TAKE 2 CAPSULES BY MOUTH IN THE EVENING 60 capsule 6    HYDROcodone-acetaminophen (NORCO) 5-325 mg per tablet Take 1 tablet by mouth every 6 (six) hours as needed for Pain. 30 tablet 0    insulin glargine, TOUJEO, (TOUJEO SOLOSTAR U-300 INSULIN) 300 unit/mL (1.5 mL) InPn pen INJECT 79 UNITS SUBCUTANEOUSLY ONCE DAILY 6 mL 11    insulin syringe-needle U-100 1 mL 31 gauge x 5/16 Syrg Inject 1 Syringe into the skin 3 (three) times daily with meals.      OLANZapine (ZYPREXA) 5 MG tablet TAKE 1 TABLET BY MOUTH IN THE EVENING **NIGHTLY  ON  DAYS  1-3  OF  EACH  CHEMOTHERAPY  CYCLE 6 tablet 0    ondansetron (ZOFRAN-ODT) 8 MG TbDL Take 1 tablet (8 mg total) by mouth every 6 (six) hours as needed. 10 tablet 0    prochlorperazine (COMPAZINE) 5 MG tablet Take 5 mg by mouth every 6 (six) hours as  needed.      sertraline (ZOLOFT) 50 MG tablet Take 1 tablet by mouth once daily 90 tablet 3    solifenacin (VESICARE) 10 MG tablet Take 10 mg by mouth once daily.      spironolactone (ALDACTONE) 50 MG tablet Take 50 mg by mouth once daily.      [DISCONTINUED] gabapentin (NEURONTIN) 300 MG capsule Take 2 capsules (600 mg total) by mouth every evening. 60 capsule 5     Current Facility-Administered Medications on File Prior to Visit   Medication Dose Route Frequency Provider Last Rate Last Admin    0.9%  NaCl infusion (for blood administration)   Intravenous Once Gayatri Jaffe FNP        promethazine (PHENERGAN) 12.5 mg in dextrose 5 % (D5W) 50 mL IVPB  12.5 mg Intravenous Once Gayatri Jaffe FNP          Review of Systems   Gastrointestinal:  Positive for abdominal pain and change in bowel habit.   Neurological:  Positive for numbness.            Vitals:    04/15/24 1331   Weight: 104.8 kg (231 lb)                  Wt Readings from Last 6 Encounters:   04/15/24 104.8 kg (231 lb)   04/02/24 107.8 kg (237 lb 11.2 oz)   03/26/24 105.6 kg (232 lb 11.2 oz)   03/25/24 105.1 kg (231 lb 9.6 oz)   03/18/24 106.4 kg (234 lb 9.6 oz)   03/08/24 106.9 kg (235 lb 11.2 oz)     Body mass index is 40.92 kg/m².  Body surface area is 2.16 meters squared.  Physical Exam  Vitals and nursing note reviewed.   Constitutional:       General: She is not in acute distress.     Appearance: Normal appearance. She is obese.   HENT:      Head: Normocephalic and atraumatic.      Mouth/Throat:      Mouth: Mucous membranes are moist.   Eyes:      General: No scleral icterus.     Extraocular Movements: Extraocular movements intact.      Conjunctiva/sclera: Conjunctivae normal.      Pupils: Pupils are equal, round, and reactive to light.   Neck:      Vascular: No JVD.   Cardiovascular:      Rate and Rhythm: Normal rate and regular rhythm.      Heart sounds: No murmur heard.  Pulmonary:      Effort: Pulmonary effort is normal.      Breath sounds:  Normal breath sounds. No wheezing or rhonchi.   Chest:      Comments: Right chest wall mediport in place.    Abdominal:      General: Bowel sounds are normal. There is no distension.      Palpations: Abdomen is soft. There is no mass.      Tenderness: There is generalized abdominal tenderness and tenderness in the right upper quadrant and right lower quadrant.      Comments: Surgical incisions healed after colon surgery.    Musculoskeletal:         General: No swelling or deformity.      Cervical back: Neck supple.   Lymphadenopathy:      Head:      Right side of head: No submandibular adenopathy.      Left side of head: No submandibular adenopathy.      Cervical: No cervical adenopathy.      Upper Body:      Right upper body: No supraclavicular or axillary adenopathy.      Left upper body: No supraclavicular or axillary adenopathy.      Lower Body: No right inguinal adenopathy. No left inguinal adenopathy.   Skin:     General: Skin is warm.      Coloration: Skin is not jaundiced.      Findings: No lesion or rash.      Nails: There is no clubbing.      Comments: Darkening of nails and skin on hands commonly seen with 5FU   Neurological:      General: No focal deficit present.      Mental Status: She is alert and oriented to person, place, and time.      Cranial Nerves: Cranial nerves 2-12 are intact.   Psychiatric:         Attention and Perception: Attention normal.         Mood and Affect: Mood is depressed.         Behavior: Behavior is cooperative.         Judgment: Judgment normal.     ECOG SCORE             Laboratory:  CBC with Differential:  Lab Results   Component Value Date    WBC 5.06 04/15/2024    RBC 3.29 (L) 04/15/2024    HGB 9.0 (L) 04/15/2024    HCT 27.9 (L) 04/15/2024    MCV 84.8 04/15/2024    MCH 27.4 04/15/2024    MCHC 32.3 (L) 04/15/2024    RDW 24.2 (H) 04/15/2024    PLT 89 (L) 04/15/2024    MPV  04/15/2024      Comment:      Unable to calculate MPV         CMP:  Sodium   Date Value Ref Range  Status   06/01/2022 140 136 - 145 mmol/L Final     Sodium Level   Date Value Ref Range Status   04/08/2024 137 136 - 145 mmol/L Final     Potassium   Date Value Ref Range Status   06/01/2022 4.9 3.5 - 5.1 mmol/L Final     Potassium Level   Date Value Ref Range Status   04/08/2024 4.5 3.5 - 5.1 mmol/L Final     Chloride   Date Value Ref Range Status   06/01/2022 104 100 - 109 mmol/L Final     Carbon Dioxide   Date Value Ref Range Status   04/08/2024 23 23 - 31 mmol/L Final   06/01/2022 29 22 - 33 mmol/L Final     Blood Urea Nitrogen   Date Value Ref Range Status   04/08/2024 24.8 (H) 9.8 - 20.1 mg/dL Final   06/01/2022 22 5 - 25 mg/dL Final     Creatinine   Date Value Ref Range Status   04/08/2024 1.64 (H) 0.55 - 1.02 mg/dL Final   06/01/2022 1.32 (H) 0.57 - 1.25 mg/dL Final     Calcium   Date Value Ref Range Status   06/01/2022 8.7 (L) 8.8 - 10.6 mg/dL Final     Calcium Level Total   Date Value Ref Range Status   04/08/2024 9.1 8.4 - 10.2 mg/dL Final     Albumin Level   Date Value Ref Range Status   04/08/2024 3.5 3.4 - 4.8 g/dL Final     Bilirubin Total   Date Value Ref Range Status   04/08/2024 0.7 <=1.5 mg/dL Final     Alkaline Phosphatase   Date Value Ref Range Status   04/08/2024 88 40 - 150 unit/L Final     Aspartate Aminotransferase   Date Value Ref Range Status   04/08/2024 32 5 - 34 unit/L Final     Alanine Aminotransferase   Date Value Ref Range Status   04/08/2024 25 0 - 55 unit/L Final     Anion Gap   Date Value Ref Range Status   06/01/2022 7 (L) 8 - 16 mmol/L Final     eGFR    Date Value Ref Range Status   06/01/2022 45 mL/min/1.73mSq Final     Comment:     In accordance with NKF-ASN Task Force recommendation, calculation based on the Chronic Kidney Disease Epidemiology Collaboration (CKD-EPI) equation without adjustment for race. eGFR adjusted for gender and age and calculated in ml/min/1.73mSquared. eGFR cannot be calculated if patient is under 18 years of age.     Reference Range:    >= 60 ml/min/1.73mSquared.     Estimated GFR-Non    Date Value Ref Range Status   08/04/2022 43 mls/min/1.73/m2 Final         Assessment:       1) Stage IIIB adenocarcinoma of the ascending colon  --Patient will benefit of adjuvant chemotherapy.   --Due to severe diabetic neuropathy, will try dose reduction of Oxaliplatin, starting 65 mg/m2.    --If not tolerated, then with d/c Oxaliplatin and cont 5FU and LV  2) Lung nodules-Stable. Will monitor  3) iron deficiency anemia-on iron PO  4) Thrombocytopenia-resolved. Will follow counts closely as she has splenomegaly and this can affects her counts mostly platelets.  5) Folate deficiency-she will start taking folic acid at this time.   6) Diabetic neuropathy-severe. She does not feel her feet.  --She will have nerve stimulator placement to see if can help her feet     Educated the patient on the risks versus benefits as well as toxicities associated with treatment.  Verbally consented the patient to the treatment plan and the patient was educated on the planned duration of the treatment and schedule of the treatment administration.  All questions were answered.      Plan:       Plan: Adjuvant FOLFOX x 6 months,with dose reduction of oxaliplatin due to severe (Grade 3) neuropathy. If not tolerated, then with d/c Oxaliplatin and cont 5FU and LV. Neuropathy same.      Case discussed with Dr. Olmstead . We switched from  FOLFOX to XELOX.  We were having to delay chemo each cycle due to cytopenias and she is already on a reduced dose of Oxaliplatin. Her platelet count is 89k today so we will HOLD. Instructed her to go to ED to be evaluated for bowel obstruction.   Added lab appt and possible infusion for C2 on Thursday, 4/18/24.  XELOX 1500 mg bid x 14 days with 7 day break, to start in day 1 of each cycle with oxaliplatin. Will continue same reduced dose of Oxaliplatin at 65mg/m2.   Continue Eliquis  Continue weekly labs.   RTC 3 week for TD/labs: CBC, CMP  for C3 (of 5) XELOX    Encouraged to call with questions or problems  The patient was given ample opportunity to ask questions and they were all answered to satisfaction; patient demonstrated understanding of what we discussed and is agreeable to the plan.     LAURA Degroot

## 2024-04-15 NOTE — FIRST PROVIDER EVALUATION
"Medical screening examination initiated.  I have conducted a focused provider triage encounter, findings are as follows:    Brief history of present illness:  arrived to ED due to abdominal pain. Hx of colon ca; had mass removed in Oct 2023 and is currently on chemo. LBM: today (but was not a normal amount). Also c/o nausea.     Vitals:    04/15/24 1458   BP: 113/61   Pulse: 103   Resp: 20   Temp: 98.6 °F (37 °C)   TempSrc: Oral   SpO2: 98%   Weight: 104.8 kg (231 lb)   Height: 5' 3" (1.6 m)       Pertinent physical exam:  awake, alert, has non-labored breathing, follows commands, ambulatory into triage    Brief workup plan:  labs     Preliminary workup initiated; this workup will be continued and followed by the physician or advanced practice provider that is assigned to the patient when roomed.  "

## 2024-04-19 ENCOUNTER — ANESTHESIA EVENT (OUTPATIENT)
Dept: ENDOSCOPY | Facility: HOSPITAL | Age: 69
DRG: 393 | End: 2024-04-19
Payer: MEDICARE

## 2024-04-19 ENCOUNTER — ANESTHESIA (OUTPATIENT)
Dept: ENDOSCOPY | Facility: HOSPITAL | Age: 69
DRG: 393 | End: 2024-04-19
Payer: MEDICARE

## 2024-04-19 ENCOUNTER — HOSPITAL ENCOUNTER (INPATIENT)
Facility: HOSPITAL | Age: 69
LOS: 5 days | Discharge: HOME | DRG: 393 | End: 2024-04-24
Attending: STUDENT IN AN ORGANIZED HEALTH CARE EDUCATION/TRAINING PROGRAM | Admitting: INTERNAL MEDICINE
Payer: MEDICARE

## 2024-04-19 DIAGNOSIS — R19.7 DIARRHEA, UNSPECIFIED TYPE: ICD-10-CM

## 2024-04-19 DIAGNOSIS — R53.1 WEAKNESS: ICD-10-CM

## 2024-04-19 DIAGNOSIS — K92.2 GASTROINTESTINAL HEMORRHAGE, UNSPECIFIED GASTROINTESTINAL HEMORRHAGE TYPE: ICD-10-CM

## 2024-04-19 DIAGNOSIS — Z79.01 ANTICOAGULATED: Primary | ICD-10-CM

## 2024-04-19 LAB
ALBUMIN SERPL-MCNC: 2.7 G/DL (ref 3.4–4.8)
ALBUMIN/GLOB SERPL: 0.8 RATIO (ref 1.1–2)
ALP SERPL-CCNC: 114 UNIT/L (ref 40–150)
ALT SERPL-CCNC: 20 UNIT/L (ref 0–55)
ANION GAP SERPL CALC-SCNC: 9 MEQ/L
APTT PPP: 29.6 SECONDS (ref 23.2–33.7)
AST SERPL-CCNC: 22 UNIT/L (ref 5–34)
BASOPHILS # BLD AUTO: 0.06 X10(3)/MCL
BASOPHILS NFR BLD AUTO: 1.4 %
BILIRUB SERPL-MCNC: 0.6 MG/DL
BUN SERPL-MCNC: 22.1 MG/DL (ref 9.8–20.1)
BUN SERPL-MCNC: 27.8 MG/DL (ref 9.8–20.1)
CALCIUM SERPL-MCNC: 7.9 MG/DL (ref 8.4–10.2)
CALCIUM SERPL-MCNC: 8.5 MG/DL (ref 8.4–10.2)
CHLORIDE SERPL-SCNC: 106 MMOL/L (ref 98–107)
CHLORIDE SERPL-SCNC: 110 MMOL/L (ref 98–107)
CO2 SERPL-SCNC: 20 MMOL/L (ref 23–31)
CO2 SERPL-SCNC: 21 MMOL/L (ref 23–31)
CREAT SERPL-MCNC: 1.07 MG/DL (ref 0.55–1.02)
CREAT SERPL-MCNC: 1.28 MG/DL (ref 0.55–1.02)
CREAT/UREA NIT SERPL: 21
EOSINOPHIL # BLD AUTO: 0.36 X10(3)/MCL (ref 0–0.9)
EOSINOPHIL NFR BLD AUTO: 8.5 %
ERYTHROCYTE [DISTWIDTH] IN BLOOD BY AUTOMATED COUNT: 22.6 % (ref 11.5–17)
GFR SERPLBLD CREATININE-BSD FMLA CKD-EPI: 46 MLS/MIN/1.73/M2
GFR SERPLBLD CREATININE-BSD FMLA CKD-EPI: 57 MLS/MIN/1.73/M2
GLOBULIN SER-MCNC: 3.2 GM/DL (ref 2.4–3.5)
GLUCOSE SERPL-MCNC: 135 MG/DL (ref 82–115)
GLUCOSE SERPL-MCNC: 162 MG/DL (ref 82–115)
GROUP & RH: NORMAL
HCT VFR BLD AUTO: 24.7 % (ref 37–47)
HCT VFR BLD AUTO: 25.1 % (ref 37–47)
HCT VFR BLD AUTO: 26.6 % (ref 37–47)
HGB BLD-MCNC: 7.7 G/DL (ref 12–16)
HGB BLD-MCNC: 7.9 G/DL (ref 12–16)
HGB BLD-MCNC: 8.4 G/DL (ref 12–16)
IMM GRANULOCYTES # BLD AUTO: 0.04 X10(3)/MCL (ref 0–0.04)
IMM GRANULOCYTES NFR BLD AUTO: 0.9 %
INDIRECT COOMBS: NORMAL
INR PPP: 1.8
LYMPHOCYTES # BLD AUTO: 0.9 X10(3)/MCL (ref 0.6–4.6)
LYMPHOCYTES NFR BLD AUTO: 21.3 %
MAGNESIUM SERPL-MCNC: 1.9 MG/DL (ref 1.6–2.6)
MCH RBC QN AUTO: 26.7 PG (ref 27–31)
MCHC RBC AUTO-ENTMCNC: 31.6 G/DL (ref 33–36)
MCV RBC AUTO: 84.4 FL (ref 80–94)
MONOCYTES # BLD AUTO: 0.72 X10(3)/MCL (ref 0.1–1.3)
MONOCYTES NFR BLD AUTO: 17 %
NEUTROPHILS # BLD AUTO: 2.15 X10(3)/MCL (ref 2.1–9.2)
NEUTROPHILS NFR BLD AUTO: 50.9 %
NRBC BLD AUTO-RTO: 0 %
OHS QRS DURATION: 72 MS
OHS QTC CALCULATION: 481 MS
PLATELET # BLD AUTO: 95 X10(3)/MCL (ref 130–400)
PLATELETS.RETICULATED NFR BLD AUTO: 5.9 % (ref 0.9–11.2)
PMV BLD AUTO: ABNORMAL FL
POCT GLUCOSE: 149 MG/DL (ref 70–110)
POCT GLUCOSE: 156 MG/DL (ref 70–110)
POTASSIUM SERPL-SCNC: 2.9 MMOL/L (ref 3.5–5.1)
POTASSIUM SERPL-SCNC: 4 MMOL/L (ref 3.5–5.1)
PROT SERPL-MCNC: 5.9 GM/DL (ref 5.8–7.6)
PROTHROMBIN TIME: 20.4 SECONDS (ref 12.5–14.5)
RBC # BLD AUTO: 3.15 X10(6)/MCL (ref 4.2–5.4)
SODIUM SERPL-SCNC: 136 MMOL/L (ref 136–145)
SODIUM SERPL-SCNC: 139 MMOL/L (ref 136–145)
SPECIMEN OUTDATE: NORMAL
WBC # SPEC AUTO: 4.23 X10(3)/MCL (ref 4.5–11.5)

## 2024-04-19 PROCEDURE — 63600175 PHARM REV CODE 636 W HCPCS: Performed by: NURSE PRACTITIONER

## 2024-04-19 PROCEDURE — 63600175 PHARM REV CODE 636 W HCPCS: Performed by: STUDENT IN AN ORGANIZED HEALTH CARE EDUCATION/TRAINING PROGRAM

## 2024-04-19 PROCEDURE — 63600175 PHARM REV CODE 636 W HCPCS: Performed by: INTERNAL MEDICINE

## 2024-04-19 PROCEDURE — 25000003 PHARM REV CODE 250: Performed by: INTERNAL MEDICINE

## 2024-04-19 PROCEDURE — 85610 PROTHROMBIN TIME: CPT | Performed by: STUDENT IN AN ORGANIZED HEALTH CARE EDUCATION/TRAINING PROGRAM

## 2024-04-19 PROCEDURE — 25000003 PHARM REV CODE 250: Performed by: PHYSICIAN ASSISTANT

## 2024-04-19 PROCEDURE — 0DJ08ZZ INSPECTION OF UPPER INTESTINAL TRACT, VIA NATURAL OR ARTIFICIAL OPENING ENDOSCOPIC: ICD-10-PCS | Performed by: INTERNAL MEDICINE

## 2024-04-19 PROCEDURE — C9113 INJ PANTOPRAZOLE SODIUM, VIA: HCPCS | Performed by: NURSE PRACTITIONER

## 2024-04-19 PROCEDURE — 99285 EMERGENCY DEPT VISIT HI MDM: CPT | Mod: 25

## 2024-04-19 PROCEDURE — 43255 EGD CONTROL BLEEDING ANY: CPT | Performed by: INTERNAL MEDICINE

## 2024-04-19 PROCEDURE — 85014 HEMATOCRIT: CPT | Performed by: NURSE PRACTITIONER

## 2024-04-19 PROCEDURE — 80053 COMPREHEN METABOLIC PANEL: CPT | Performed by: STUDENT IN AN ORGANIZED HEALTH CARE EDUCATION/TRAINING PROGRAM

## 2024-04-19 PROCEDURE — 85730 THROMBOPLASTIN TIME PARTIAL: CPT | Performed by: STUDENT IN AN ORGANIZED HEALTH CARE EDUCATION/TRAINING PROGRAM

## 2024-04-19 PROCEDURE — 37000008 HC ANESTHESIA 1ST 15 MINUTES: Performed by: INTERNAL MEDICINE

## 2024-04-19 PROCEDURE — D9220A PRA ANESTHESIA: Mod: CRNA,,, | Performed by: NURSE ANESTHETIST, CERTIFIED REGISTERED

## 2024-04-19 PROCEDURE — 86850 RBC ANTIBODY SCREEN: CPT | Performed by: STUDENT IN AN ORGANIZED HEALTH CARE EDUCATION/TRAINING PROGRAM

## 2024-04-19 PROCEDURE — 96360 HYDRATION IV INFUSION INIT: CPT

## 2024-04-19 PROCEDURE — 85025 COMPLETE CBC W/AUTO DIFF WBC: CPT | Performed by: STUDENT IN AN ORGANIZED HEALTH CARE EDUCATION/TRAINING PROGRAM

## 2024-04-19 PROCEDURE — 83735 ASSAY OF MAGNESIUM: CPT | Performed by: NURSE PRACTITIONER

## 2024-04-19 PROCEDURE — 25000003 PHARM REV CODE 250: Performed by: NURSE PRACTITIONER

## 2024-04-19 PROCEDURE — 85014 HEMATOCRIT: CPT | Performed by: PHYSICIAN ASSISTANT

## 2024-04-19 PROCEDURE — D9220A PRA ANESTHESIA: Mod: ANES,,, | Performed by: STUDENT IN AN ORGANIZED HEALTH CARE EDUCATION/TRAINING PROGRAM

## 2024-04-19 PROCEDURE — 93005 ELECTROCARDIOGRAM TRACING: CPT

## 2024-04-19 PROCEDURE — 11000001 HC ACUTE MED/SURG PRIVATE ROOM

## 2024-04-19 PROCEDURE — 25000003 PHARM REV CODE 250: Performed by: NURSE ANESTHETIST, CERTIFIED REGISTERED

## 2024-04-19 PROCEDURE — 63600175 PHARM REV CODE 636 W HCPCS: Performed by: NURSE ANESTHETIST, CERTIFIED REGISTERED

## 2024-04-19 RX ORDER — LIDOCAINE HYDROCHLORIDE 20 MG/ML
INJECTION, SOLUTION EPIDURAL; INFILTRATION; INTRACAUDAL; PERINEURAL
Status: DISPENSED
Start: 2024-04-19 | End: 2024-04-19

## 2024-04-19 RX ORDER — PANTOPRAZOLE SODIUM 40 MG/10ML
40 INJECTION, POWDER, LYOPHILIZED, FOR SOLUTION INTRAVENOUS 2 TIMES DAILY
Status: DISCONTINUED | OUTPATIENT
Start: 2024-04-19 | End: 2024-04-24 | Stop reason: HOSPADM

## 2024-04-19 RX ORDER — MUPIROCIN 20 MG/G
OINTMENT TOPICAL 2 TIMES DAILY
Status: DISPENSED | OUTPATIENT
Start: 2024-04-19 | End: 2024-04-24

## 2024-04-19 RX ORDER — PROPOFOL 10 MG/ML
VIAL (ML) INTRAVENOUS CONTINUOUS PRN
Status: DISCONTINUED | OUTPATIENT
Start: 2024-04-19 | End: 2024-04-19

## 2024-04-19 RX ORDER — GABAPENTIN 300 MG/1
600 CAPSULE ORAL NIGHTLY
Status: DISCONTINUED | OUTPATIENT
Start: 2024-04-19 | End: 2024-04-24 | Stop reason: HOSPADM

## 2024-04-19 RX ORDER — POTASSIUM CHLORIDE 7.45 MG/ML
10 INJECTION INTRAVENOUS
Status: COMPLETED | OUTPATIENT
Start: 2024-04-19 | End: 2024-04-19

## 2024-04-19 RX ORDER — PROPOFOL 10 MG/ML
VIAL (ML) INTRAVENOUS
Status: DISPENSED
Start: 2024-04-19 | End: 2024-04-19

## 2024-04-19 RX ORDER — INSULIN ASPART 100 [IU]/ML
1-10 INJECTION, SOLUTION INTRAVENOUS; SUBCUTANEOUS EVERY 6 HOURS PRN
Status: DISCONTINUED | OUTPATIENT
Start: 2024-04-19 | End: 2024-04-24 | Stop reason: HOSPADM

## 2024-04-19 RX ORDER — POTASSIUM CHLORIDE 20 MEQ/1
40 TABLET, EXTENDED RELEASE ORAL ONCE
Status: COMPLETED | OUTPATIENT
Start: 2024-04-19 | End: 2024-04-19

## 2024-04-19 RX ORDER — ONDANSETRON HYDROCHLORIDE 2 MG/ML
4 INJECTION, SOLUTION INTRAVENOUS EVERY 8 HOURS PRN
Status: DISCONTINUED | OUTPATIENT
Start: 2024-04-19 | End: 2024-04-24 | Stop reason: HOSPADM

## 2024-04-19 RX ORDER — SODIUM, POTASSIUM,MAG SULFATES 17.5-3.13G
1 SOLUTION, RECONSTITUTED, ORAL ORAL 2 TIMES DAILY
Qty: 2 KIT | Refills: 0 | Status: COMPLETED | OUTPATIENT
Start: 2024-04-19 | End: 2024-04-20

## 2024-04-19 RX ORDER — GLYCOPYRROLATE 0.2 MG/ML
INJECTION INTRAMUSCULAR; INTRAVENOUS
Status: DISPENSED
Start: 2024-04-19 | End: 2024-04-19

## 2024-04-19 RX ORDER — SERTRALINE HYDROCHLORIDE 50 MG/1
50 TABLET, FILM COATED ORAL NIGHTLY
Status: DISCONTINUED | OUTPATIENT
Start: 2024-04-19 | End: 2024-04-24 | Stop reason: HOSPADM

## 2024-04-19 RX ORDER — GLYCOPYRROLATE 0.2 MG/ML
INJECTION INTRAMUSCULAR; INTRAVENOUS
Status: DISCONTINUED | OUTPATIENT
Start: 2024-04-19 | End: 2024-04-19

## 2024-04-19 RX ORDER — ACETAMINOPHEN 325 MG/1
650 TABLET ORAL EVERY 4 HOURS PRN
Status: DISCONTINUED | OUTPATIENT
Start: 2024-04-19 | End: 2024-04-24 | Stop reason: HOSPADM

## 2024-04-19 RX ORDER — ACETAMINOPHEN 325 MG/1
650 TABLET ORAL EVERY 8 HOURS PRN
Status: DISCONTINUED | OUTPATIENT
Start: 2024-04-19 | End: 2024-04-24 | Stop reason: HOSPADM

## 2024-04-19 RX ORDER — SERTRALINE HYDROCHLORIDE 50 MG/1
50 TABLET, FILM COATED ORAL DAILY
Status: DISCONTINUED | OUTPATIENT
Start: 2024-04-20 | End: 2024-04-19

## 2024-04-19 RX ORDER — POTASSIUM CHLORIDE 14.9 MG/ML
40 INJECTION INTRAVENOUS ONCE
Status: DISCONTINUED | OUTPATIENT
Start: 2024-04-19 | End: 2024-04-19

## 2024-04-19 RX ORDER — GLUCAGON 1 MG
1 KIT INJECTION
Status: DISCONTINUED | OUTPATIENT
Start: 2024-04-19 | End: 2024-04-24 | Stop reason: HOSPADM

## 2024-04-19 RX ORDER — LIDOCAINE HYDROCHLORIDE 20 MG/ML
INJECTION, SOLUTION EPIDURAL; INFILTRATION; INTRACAUDAL; PERINEURAL
Status: DISCONTINUED | OUTPATIENT
Start: 2024-04-19 | End: 2024-04-19

## 2024-04-19 RX ADMIN — LIDOCAINE HYDROCHLORIDE 100 MG: 20 INJECTION, SOLUTION INTRAVENOUS at 11:04

## 2024-04-19 RX ADMIN — POTASSIUM CHLORIDE 40 MEQ: 1500 TABLET, EXTENDED RELEASE ORAL at 05:04

## 2024-04-19 RX ADMIN — GLYCOPYRROLATE 0.1 MG: 0.2 INJECTION INTRAMUSCULAR; INTRAVENOUS at 11:04

## 2024-04-19 RX ADMIN — PANTOPRAZOLE SODIUM 40 MG: 40 INJECTION, POWDER, LYOPHILIZED, FOR SOLUTION INTRAVENOUS at 10:04

## 2024-04-19 RX ADMIN — PANTOPRAZOLE SODIUM 40 MG: 40 INJECTION, POWDER, LYOPHILIZED, FOR SOLUTION INTRAVENOUS at 08:04

## 2024-04-19 RX ADMIN — GABAPENTIN 600 MG: 300 CAPSULE ORAL at 08:04

## 2024-04-19 RX ADMIN — POTASSIUM CHLORIDE 10 MEQ: 7.46 INJECTION, SOLUTION INTRAVENOUS at 05:04

## 2024-04-19 RX ADMIN — POTASSIUM CHLORIDE 10 MEQ: 7.46 INJECTION, SOLUTION INTRAVENOUS at 08:04

## 2024-04-19 RX ADMIN — PROPOFOL 85 MCG/KG/MIN: 10 INJECTION, EMULSION INTRAVENOUS at 11:04

## 2024-04-19 RX ADMIN — ACETAMINOPHEN 650 MG: 325 TABLET, FILM COATED ORAL at 07:04

## 2024-04-19 RX ADMIN — MUPIROCIN: 20 OINTMENT TOPICAL at 08:04

## 2024-04-19 RX ADMIN — POTASSIUM CHLORIDE 10 MEQ: 7.46 INJECTION, SOLUTION INTRAVENOUS at 06:04

## 2024-04-19 RX ADMIN — SODIUM CHLORIDE, SODIUM GLUCONATE, SODIUM ACETATE, POTASSIUM CHLORIDE AND MAGNESIUM CHLORIDE: 526; 502; 368; 37; 30 INJECTION, SOLUTION INTRAVENOUS at 11:04

## 2024-04-19 RX ADMIN — SODIUM CHLORIDE, POTASSIUM CHLORIDE, SODIUM LACTATE AND CALCIUM CHLORIDE 1000 ML: 600; 310; 30; 20 INJECTION, SOLUTION INTRAVENOUS at 02:04

## 2024-04-19 RX ADMIN — POTASSIUM CHLORIDE 10 MEQ: 7.46 INJECTION, SOLUTION INTRAVENOUS at 07:04

## 2024-04-19 RX ADMIN — SERTRALINE HYDROCHLORIDE 50 MG: 50 TABLET ORAL at 11:04

## 2024-04-19 RX ADMIN — SODIUM SULFATE, POTASSIUM SULFATE, MAGNESIUM SULFATE 354 ML: 17.5; 3.13; 1.6 SOLUTION, CONCENTRATE ORAL at 06:04

## 2024-04-19 NOTE — H&P
"[facility]  Hospital Medicine History & Physical Examination       Patient Name: Indu Azul  MRN: [id number]  Patient Class: IP- Inpatient   Admission Date: 04/19/2024   Admitting Service: Hospital Medicine   Length of Stay: 0  Attending Physician: Kenna Choi MD  Primary Care Provider: Alejandra Pinedo MD  Face-to-Face encounter date: 04/19/2024  Code Status: Full  Chief Complaint: Diarrhea (Pt arrives via [facility], EMS / Pt reports diarrhea x several days, pt reports bloody stools "clots" starting today. Pt has colon cancer, receiving Chemo. )      Patient information was obtained from patient, patient's family, past medical records and ER records.      HISTORY OF PRESENT ILLNESS:   Indu Azul is a 68 y.o. 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  Patient endorsed diarrhea over the past several days.  She reports constipation x4 days for which she took laxatives and then began having diarrhea for which she then took Imodium.  She denied abdominal pain, nausea, vomiting, fever or chills.    Vital Signs upon presentation to the ED included /60, HR 87, RR 16, SpO2 99% on room air, temperature 97.3° F.  Labs were notable for WBC 4.23, hemoglobin 7.9, hematocrit 25.1, platelets 95, INR 1.8, potassium 2.9, CO2 21, BUN 27.8, creatinine 1.28, glucose 162, albumin 2.7. Stool was guaiac positive. She received potassium chloride 40 mEq p.o. and 10 mEq IV x 4 doses, also 1 L of LR in ED. admitted to hospital medicine service for further medical management.    REVIEW OF SYSTEMS:   Except as " documented, all other systems reviewed and negative.    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    PAST SURGICAL HISTORY:     Past Surgical History:   Procedure Laterality Date    APPENDECTOMY      BREAST BIOPSY  2016     SECTION      x3    charcot-leyden crystals identification      CHOLECYSTECTOMY      COLONOSCOPY N/A 2023    Procedure: COLON;  Surgeon: Luis Amador MD;  Location: [facility] ENDOSCOPY;  Service: Gastroenterology;  Laterality: N/A;    COLONOSCOPY, WITH 1 OR MORE BIOPSIES  2023    Procedure: COLONOSCOPY, WITH 1 OR MORE BIOPSIES;  Surgeon: Luis Amador MD;  Location: [facility] ENDOSCOPY;  Service: Gastroenterology;;    COLONOSCOPY, WITH DIRECTED SUBMUCOSAL INJECTION  2023    Procedure: COLONOSCOPY, WITH DIRECTED SUBMUCOSAL INJECTION;  Surgeon: Luis Amador MD;  Location: [facility] ENDOSCOPY;  Service: Gastroenterology;;  8cc Colon Tattoo    COLONOSCOPY, WITH POLYPECTOMY USING SNARE  2023    Procedure: COLONOSCOPY, WITH POLYPECTOMY USING SNARE;  Surgeon: Luis Amador MD;  Location: [facility] ENDOSCOPY;  Service: Gastroenterology;;    ESOPHAGOGASTRODUODENOSCOPY N/A 2023    Procedure: DOUBLE;  Surgeon: Luis Amador MD;  Location: [facility] ENDOSCOPY;  Service: Gastroenterology;  Laterality: N/A;    HEMORRHOID SURGERY      HYSTERECTOMY      total    INSERTION OF SUBCUTANEOUS PORT Right     INSERTION OF TUNNELED CENTRAL VENOUS CATHETER (CVC) WITH SUBCUTANEOUS PORT Right 2023    Procedure: [id number];  Surgeon: Timothy Russ MD;   Location: [facility] OR;  Service: General;  Laterality: Right;    POLYPECTOMY      right foot surgery Right 02/01/2022    XI ROBOTIC COLECTOMY, RIGHT N/A 10/19/2023    Procedure: XI ROBOTIC COLECTOMY, RIGHT;  Surgeon: Timothy Russ MD;  Location: [facility] OR;  Service: General;  Laterality: N/A;       FAMILY HISTORY:   Father:  Leukemia, diabetes   Mother:  Diabetes, Alzheimer's disease  Sister:  Diabetes, liver cancer  Brother: Diabetes    SOCIAL HISTORY:   Denied alcohol, illicit drug use.  Smokes half a pack of cigarettes per day.    ALLERGIES:   Patient has no known allergies.    HOME MEDICATIONS:     Prior to Admission medications    Medication Sig Start Date End Date Taking? Authorizing Provider   apixaban (ELIQUIS) 5 mg Tab Take 1 tablet (5 mg total) by mouth 2 (two) times daily. 3/4/24  Yes Gayatri Jaffe FNP   capecitabine (XELODA) 500 MG Tab Take 3 tablets (1,500 mg total) by mouth 2 (two) times daily on days 1-14 of each 21 day chemotherapy cycle. 3/19/24  Yes Gayatri Jaffe FNP   dulaglutide (TRULICITY) 3 mg/0.5 mL pen injector Inject 3 mg into the skin every 7 days. 7/20/23 7/19/24 Yes Alejandra Pinedo MD   furosemide (LASIX) 40 MG tablet Take 40 mg by mouth as needed. 5/8/23  Yes Provider, Historical   gabapentin (NEURONTIN) 300 MG capsule TAKE 2 CAPSULES BY MOUTH IN THE EVENING 4/15/24  Yes Alejandra Pinedo MD   insulin glargine, TOUJEO, (TOUJEO SOLOSTAR U-300 INSULIN) 300 unit/mL (1.5 mL) InPn pen INJECT 79 UNITS SUBCUTANEOUSLY ONCE DAILY 12/5/23  Yes Alejandra Pinedo MD   OLANZapine (ZYPREXA) 5 MG tablet TAKE 1 TABLET BY MOUTH IN THE EVENING **NIGHTLY  ON  DAYS  1-3  OF  EACH  CHEMOTHERAPY  CYCLE 4/3/24  Yes Gayatri Jaffe FNP   ondansetron (ZOFRAN-ODT) 8 MG TbDL Take 1 tablet (8 mg total) by mouth every 6 (six) hours as needed. 10/23/23  Yes Hill, Dejah C, FNP   prochlorperazine (COMPAZINE) 5 MG tablet Take 5 mg by mouth every 6 (six) hours as needed. 11/13/23  Yes Provider, Historical   sertraline  (ZOLOFT) 50 MG tablet Take 1 tablet by mouth once daily 2/1/24  Yes Alejandra Pinedo MD   solifenacin (VESICARE) 10 MG tablet Take 10 mg by mouth once daily.   Yes Provider, Historical   spironolactone (ALDACTONE) 50 MG tablet Take 50 mg by mouth once daily. 11/9/22  Yes Provider, Historical   HYDROcodone-acetaminophen (NORCO) 5-325 mg per tablet Take 1 tablet by mouth every 6 (six) hours as needed for Pain. 2/26/24   Gayatri Jaffe, DENIS   insulin syringe-needle U-100 1 mL 31 gauge x 5/16 Syrg Inject 1 Syringe into the skin 3 (three) times daily with meals. 11/20/21   Provider, Historical     ________________________________________________________________________  INPATIENT LIST OF MEDICATIONS     Current Facility-Administered Medications:     acetaminophen tablet 650 mg, 650 mg, Oral, Q8H PRN, Brunilda Hinds AGACNP-BC    acetaminophen tablet 650 mg, 650 mg, Oral, Q4H PRN, Brunilda Hinds AGACNP-BC    mupirocin 2 % ointment, , Nasal, BID, Rashmi Isidro MD    ondansetron injection 4 mg, 4 mg, Intravenous, Q8H PRN, Brunilda Hinds AGACNP-BC    potassium chloride 10 mEq in 100 mL IVPB, 10 mEq, Intravenous, Q1H, Rashmi Isidro MD, Last Rate: 100 mL/hr at 04/19/24 0725, 10 mEq at 04/19/24 0725    Scheduled Meds:  Current Facility-Administered Medications   Medication Dose Route Frequency    mupirocin   Nasal BID    potassium chloride in water  10 mEq Intravenous Q1H     Continuous Infusions:  Current Facility-Administered Medications   Medication Dose Route Frequency Last Rate Last Admin     PRN Meds:.  Current Facility-Administered Medications:     acetaminophen, 650 mg, Oral, Q8H PRN    acetaminophen, 650 mg, Oral, Q4H PRN    ondansetron, 4 mg, Intravenous, Q8H PRN    PHYSICAL EXAM:     VITAL SIGNS: 24 HRS MIN & MAX LAST   Temp  Min: 97.3 °F (36.3 °C)  Max: 97.6 °F (36.4 °C) 97.6 °F (36.4 °C)   BP  Min: 102/63  Max: 124/61 102/63   Pulse  Min: 73  Max: 87  74   Resp  Min: 15  Max: 20 20   SpO2  Min: 95 %  Max:  100 % 98 %       General appearance: Well-developed female in no apparent distress.  HENT: Atraumatic head. Moist mucous membranes of oral cavity.  Eyes: Normal extraocular movements.   Neck: Supple.   Lungs: Clear to auscultation bilaterally.   Heart: Regular rate and rhythm. S1 and S2 present. No pedal edema.  Abdomen: Soft, non-distended, non-tender.  Extremities: No cyanosis, clubbing, or edema.  Skin: No Rash.   Neuro: Motor and sensory exams grossly intact.   Psych/mental status: Awake and alert. Appropriate mood and affect. Responds appropriately to questions.     LABS AND IMAGING:     Recent Labs   Lab 04/15/24  1259 04/15/24  1542 04/19/24  0105 04/19/24  0511   WBC 5.06 5.20 4.23*  --    RBC 3.29* 3.47* 3.15*  --    HGB 9.0* 9.3* 8.4* 7.9*   HCT 27.9* 29.5* 26.6* 25.1*   MCV 84.8 85.0 84.4  --    MCH 27.4 26.8* 26.7*  --    MCHC 32.3* 31.5* 31.6*  --    RDW 24.2* 24.4* 22.6*  --    PLT 89* 97* 95*  --        Recent Labs   Lab 04/15/24  1259 04/15/24  1542 04/19/24  0105   * 134* 136   K 4.0 4.0 2.9*   CO2 21* 20* 21*   BUN 29.8* 30.3* 27.8*   CREATININE 1.60* 1.61* 1.28*   CALCIUM 8.4 8.5 8.5   MG  --   --  1.90   ALBUMIN 3.1* 3.1* 2.7*   ALKPHOS 87 90 114   ALT 15 16 20   AST 17 19 22   BILITOT 0.8 0.9 0.6       Microbiology Results (last 7 days)       ** No results found for the last 168 hours. **             CT Abdomen With Without Contrast  Narrative: EXAMINATION:  CT ABDOMEN W WO CONTRAST; CT PELVIS WITH AND WITHOUT CONTRAST    CLINICAL HISTORY:  MALIGNANT NEOPLASM OF ASCENDING COLON/ ABDOMINAL PAIN;Malignant neoplasm of ascending colon    TECHNIQUE:  CT imaging of the abdomen and pelvis before and after intravenous contrast.  Axial, coronal and sagittal images reviewed. Dose length product 1425 mGycm. Automatic exposure control, adjustment of mA/kV or iterative reconstruction technique used to limit radiation dose.    COMPARISON:  CT 09/07/2023    FINDINGS:  Liver/biliary: Cirrhosis.  No  suspicious liver lesion identified.  Prior cholecystectomy. No biliary dilatation.    Pancreas: Normal.    Spleen: Splenomegaly measures 16.5 cm.    Adrenals: Normal.    Genitourinary: Symmetric enhancement of the kidneys.  No hydronephrosis.  Normal bladder.    Stomach and bowel: Interval right hemicolectomy.  No dilated bowel.  No suspicious findings near the anastomosis.    Lymph nodes/peritoneum: Stable 10 mm aortocaval lymph node (series 4, image 55).  No ascites or free air. No fluid collection.    Vasculature: Mostly eccentric thrombus within the SMV and extending through the main portal vein.    Abdominal wall: Very mild postsurgical changes anteriorly.    Lung bases: No consolidation or pleural effusion.  Stable 3 mm solid lingular nodule (series 7, image 3).    Musculoskeletal: Similar mildly heterogeneous overall bone mineralization.  No aggressive osseous lesion identified.  Impression: Nonocclusive thrombus within the SMV and extending through the main portal vein.    Findings discussed with Dr. Olmstead at the time of dictation.    Electronically signed by: Abner Mcgregor  Date:    02/08/2024  Time:    11:27  CT Pelvis W Wo  Contrast  Narrative: EXAMINATION:  CT ABDOMEN W WO CONTRAST; CT PELVIS WITH AND WITHOUT CONTRAST    CLINICAL HISTORY:  MALIGNANT NEOPLASM OF ASCENDING COLON/ ABDOMINAL PAIN;Malignant neoplasm of ascending colon    TECHNIQUE:  CT imaging of the abdomen and pelvis before and after intravenous contrast.  Axial, coronal and sagittal images reviewed. Dose length product 1425 mGycm. Automatic exposure control, adjustment of mA/kV or iterative reconstruction technique used to limit radiation dose.    COMPARISON:  CT 09/07/2023    FINDINGS:  Liver/biliary: Cirrhosis.  No suspicious liver lesion identified.  Prior cholecystectomy. No biliary dilatation.    Pancreas: Normal.    Spleen: Splenomegaly measures 16.5 cm.    Adrenals: Normal.    Genitourinary: Symmetric enhancement of the kidneys.   No hydronephrosis.  Normal bladder.    Stomach and bowel: Interval right hemicolectomy.  No dilated bowel.  No suspicious findings near the anastomosis.    Lymph nodes/peritoneum: Stable 10 mm aortocaval lymph node (series 4, image 55).  No ascites or free air. No fluid collection.    Vasculature: Mostly eccentric thrombus within the SMV and extending through the main portal vein.    Abdominal wall: Very mild postsurgical changes anteriorly.    Lung bases: No consolidation or pleural effusion.  Stable 3 mm solid lingular nodule (series 7, image 3).    Musculoskeletal: Similar mildly heterogeneous overall bone mineralization.  No aggressive osseous lesion identified.  Impression: Nonocclusive thrombus within the SMV and extending through the main portal vein.    Findings discussed with Dr. Olmstead at the time of dictation.    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  Type 2 DM, insulin dependent   Thrombocytopenia  CKD stage IIIb - stable    History:  Liver cirrhosis secondary to fatty liver disease, Depression, Diabetic neuropathy, GERD, Vitamin-D deficiency, Folate deficiency, iron-deficiency, Esophageal varices, Essential hypertension, Charcot's joint of left foot, Nicotine dependence      Plan:  GI consulted, appreciate recommendations  NPO  Hold Eliquis  IV PPI BID  Replete Potassium   Accu-checks with ISS  Resume home medications as deemed appropriate once medication reconciliation is updated  Labs in AM    VTE Prophylaxis: SCDs    Discharge Planning and Disposition: TBD    I, Brunilda Hinds NP have reviewed and discussed the case with Kenna Choi MD  Please see the attending MD's addendum for further assessment and plan.    SELENE Metcalf-BC  Department of Hospital Medicine   [facility]   04/19/2024    This note was created with the assistance of [employer] Voice Recognition Software. There may be transcription errors as a result of using this technology, however minimal and effort has been made to assure accuracy of transcription, but any obvious errors or omissions should be clarified with the author of the document.    _______________________________________________________________________________  MD Addendum:  I, Dr. Kenna king MD   assumed care of this patient today For the patient encounter, I performed the substantive portion of the visit, I reviewed the NP/PA documentation, treatment plan, and medical decision making.  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  Patient's hemoglobin was found to be 7.9 INR of 1.8 with platelet count of 92263.  Patient has been admitted to hospitalist service GI has been consulted  General alert awake oriented  Chest clear to auscultate   Abdomen soft nontender    EGD today that showed small esophageal varices plan for colonoscopy tomorrow   We will repeat blood work in a.m.   Clear liquid diet and then NPO after midnight  Will continue with Protonix b.i.d.  Continue with insulin sliding scale with Accu-Cheks AC and HS  Will do diabetic diet  Hold off on blood pressure medication and Lasix for now considering borderline blood pressure    Prophylaxis: SCD        All diagnosis and differential diagnosis have been reviewed; assessment and plan has been documented; I have personally reviewed the labs and test results that are presently available; I have reviewed the  patients medication list; I have reviewed the consulting providers response and recommendations. I have reviewed or attempted to review medical records based upon their availability.    All of the patient and family questions have been addressed and answered. Patient's is agreeable to the above stated plan. I will continue to monitor closely and make adjustments to medical management as needed.      04/19/2024

## 2024-04-19 NOTE — TRANSFER OF CARE
"Anesthesia Transfer of Care Note    Patient: Indu Azul    Procedure(s) Performed: Procedure(s) (LRB):  EGD (N/A)    Patient location: PACU    Anesthesia Type: general    Transport from OR: Transported from OR on room air with adequate spontaneous ventilation    Post pain: adequate analgesia    Post assessment: no apparent anesthetic complications    Post vital signs: stable    Level of consciousness: sedated    Nausea/Vomiting: no nausea/vomiting    Complications: none    Transfer of care protocol was followed      Last vitals: Visit Vitals  BP (!) 107/56   Pulse 78   Temp 35.7 °C (96.3 °F)   Resp 14   Ht 5' 3" (1.6 m)   Wt 101.5 kg (223 lb 12.3 oz)   SpO2 98%   BMI 39.64 kg/m²     "

## 2024-04-19 NOTE — ED PROVIDER NOTES
"Encounter Date: 2024    SCRIBE #1 NOTE: I, Jony Rosa, am scribing for, and in the presence of,  Joshua Arellano MD. I have scribed the following portions of the note - Other sections scribed: HPI, ROS, PE.       History     Chief Complaint   Patient presents with    Diarrhea     Pt arrives via [facility], EMS / Pt reports diarrhea x several days, pt reports bloody stools "clots" starting today. Pt has colon cancer, receiving Chemo.      68 year old female with pmhx of CKD, DMII, GERD, hysterectomy, thrombocytopenia, ad colon cancer on chemo presents to ED c/o blood in stool onset tonight. Pt reports that blood is dark red clots mixed with diarrhea, denies BRB. Pt states that on  she felt constipated so she took a laxative and has been having diarrhea since, imodium did not resolve diarrhea. Pt denies abdominal pain, nausea, vomiting, fever, and chills    The history is provided by the patient and medical records. No  was used.     Review of patient's allergies indicates:  No Known Allergies  Past Medical History:   Diagnosis Date    Anesthesia complication     trouble awakening    Charcot's joint of foot, left     Chronic kidney disease, unspecified     Cirrhosis of liver without ascites     Depression     Diabetes mellitus, type II, insulin dependent     Diabetic neuropathy     GERD (gastroesophageal reflux disease)     H/O: hysterectomy     Hepatic steatosis     Malignant neoplasm of ascending colon     Obesity, unspecified     Overactive bladder     Thrombocytopenia, unspecified     Vitamin D deficiency      Past Surgical History:   Procedure Laterality Date    APPENDECTOMY  1978    BREAST BIOPSY  2016     SECTION      x3    charcot-leyden crystals identification      CHOLECYSTECTOMY  2006    COLONOSCOPY N/A 2023    Procedure: COLON;  Surgeon: Luis Amador MD;  Location: [facility] ENDOSCOPY;  Service: Gastroenterology;  Laterality: N/A;    COLONOSCOPY, " WITH 1 OR MORE BIOPSIES  08/28/2023    Procedure: COLONOSCOPY, WITH 1 OR MORE BIOPSIES;  Surgeon: Luis Amador MD;  Location: [facility] ENDOSCOPY;  Service: Gastroenterology;;    COLONOSCOPY, WITH DIRECTED SUBMUCOSAL INJECTION  08/28/2023    Procedure: COLONOSCOPY, WITH DIRECTED SUBMUCOSAL INJECTION;  Surgeon: Luis Amador MD;  Location: [facility] ENDOSCOPY;  Service: Gastroenterology;;  8cc Colon Tattoo    COLONOSCOPY, WITH POLYPECTOMY USING SNARE  08/28/2023    Procedure: COLONOSCOPY, WITH POLYPECTOMY USING SNARE;  Surgeon: Luis Amador MD;  Location: [facility] ENDOSCOPY;  Service: Gastroenterology;;    ESOPHAGOGASTRODUODENOSCOPY N/A 08/28/2023    Procedure: DOUBLE;  Surgeon: Luis Amador MD;  Location: [facility] ENDOSCOPY;  Service: Gastroenterology;  Laterality: N/A;    HEMORRHOID SURGERY      HYSTERECTOMY  1990    total    INSERTION OF SUBCUTANEOUS PORT Right     INSERTION OF TUNNELED CENTRAL VENOUS CATHETER (CVC) WITH SUBCUTANEOUS PORT Right 11/20/2023    Procedure: [id number];  Surgeon: Timothy Russ MD;  Location: [facility];  Service: General;  Laterality: Right;    POLYPECTOMY      right foot surgery Right 02/01/2022    XI ROBOTIC COLECTOMY, RIGHT N/A 10/19/2023    Procedure: XI ROBOTIC COLECTOMY, RIGHT;  Surgeon: Timothy Russ MD;  Location: [facility];  Service: General;  Laterality: N/A;     Family History   Problem Relation Name Age of Onset    Diabetes Mother      Alzheimer's disease Mother      Diabetes Father      Leukemia Father      Diabetes Sister      Liver cancer Sister      Diabetes Brother       Social History     Tobacco Use    Smoking status: Every Day     Current packs/day: 0.50     Average packs/day: 0.5 packs/day for 61.3 years (30.6 ttl pk-yrs)     Types: Cigarettes     Start date: 2003     Passive exposure: Current    Smokeless tobacco: Never   Substance Use Topics    Alcohol use: Not Currently     Comment: occassionally    Drug  use: Never     Review of Systems   Constitutional:  Negative for chills and fever.   Respiratory:  Negative for shortness of breath.    Gastrointestinal:  Positive for blood in stool and diarrhea. Negative for abdominal pain.   Musculoskeletal:         Generalized weakness       Physical Exam     Initial Vitals [04/19/24 0046]   BP Pulse Resp Temp SpO2   103/60 87 16 97.3 °F (36.3 °C) 99 %      MAP       --         Physical Exam    Nursing note and vitals reviewed.  Constitutional: She appears well-developed and well-nourished. She is not diaphoretic. No distress.   HENT:   Head: Normocephalic and atraumatic.   Right Ear: External ear normal.   Left Ear: External ear normal.   Nose: Nose normal.   Eyes: Conjunctivae and EOM are normal. Pupils are equal, round, and reactive to light. Right eye exhibits no discharge. Left eye exhibits no discharge.   Cardiovascular:  Normal rate, regular rhythm, normal heart sounds and intact distal pulses.     Exam reveals no gallop and no friction rub.       No murmur heard.  Pulmonary/Chest: Breath sounds normal. No respiratory distress. She has no wheezes. She has no rhonchi. She has no rales. She exhibits no tenderness.   Abdominal: Abdomen is soft. Bowel sounds are normal. She exhibits no distension and no mass. There is no abdominal tenderness.   Well healed surgical scar to abdomen There is no rebound and no guarding.   Genitourinary:    Genitourinary Comments: Soft maroon colored stool hemoccult positive    Nurse chaperone presents fantasma pope RN     Musculoskeletal:         General: No edema. Normal range of motion.     Neurological: She is alert and oriented to person, place, and time. No cranial nerve deficit or sensory deficit.   Skin: Skin is warm and dry. Capillary refill takes less than 2 seconds. No erythema. No pallor.         ED Course   Procedures  Labs Reviewed   COMPREHENSIVE METABOLIC PANEL - Abnormal; Notable for the following components:       Result Value     Potassium Level 2.9 (*)     Carbon Dioxide 21 (*)     Glucose Level 162 (*)     Blood Urea Nitrogen 27.8 (*)     Creatinine 1.28 (*)     Albumin Level 2.7 (*)     Albumin/Globulin Ratio 0.8 (*)     All other components within normal limits   PROTIME-INR - Abnormal; Notable for the following components:    PT 20.4 (*)     INR 1.8 (*)     All other components within normal limits   CBC WITH DIFFERENTIAL - Abnormal; Notable for the following components:    WBC 4.23 (*)     RBC 3.15 (*)     Hgb 8.4 (*)     Hct 26.6 (*)     MCH 26.7 (*)     MCHC 31.6 (*)     RDW 22.6 (*)     Platelet 95 (*)     All other components within normal limits   APTT - Normal   CBC W/ AUTO DIFFERENTIAL    Narrative:     The following orders were created for panel order CBC auto differential.  Procedure                               Abnormality         Status                     ---------                               -----------         ------                     CBC with Differential[[id number]]       Abnormal            Final result                 Please view results for these tests on the individual orders.   MAGNESIUM   TYPE & SCREEN          Imaging Results    None          Medications   potassium chloride SA CR tablet 40 mEq (has no administration in time range)   potassium chloride 20 mEq in 100 mL IVPB (FOR CENTRAL LINE ADMINISTRATION ONLY) (has no administration in time range)   lactated ringers bolus 1,000 mL (0 mLs Intravenous Stopped 4/19/24 3733)     Medical Decision Making  Differential diagnosis includes but is not limited to Upper GI bleed: PUD, gastritis, varices, AVM, tumors, erosions, AE fistula   Lower GI bleed: diverticular bleed, colon cancer, AVM, hemorrhoid, AE fistula C diff.      Patient was awake alert well-appearing.  Her vitals are stable.  Her hemoglobin has dropped proximally one point in 4 days..  She also has some mild hypokalemia likely secondary to her losses secondary to diarrhea.  Abdomen is soft history  of colon cancer is on Eliquis for history of clot in past.  Given her constellation of findings here today I recommended admission to release observation further evaluation management.  Patient was comfortable with the plan.  Care transferred.  Discussed with Hilary on-call for hospitalist.  Will admit          Amount and/or Complexity of Data Reviewed  External Data Reviewed: notes.     Details: See ED course  Labs: ordered. Decision-making details documented in ED Course.  ECG/medicine tests: ordered and independent interpretation performed. Decision-making details documented in ED Course.    Risk  OTC drugs.  Prescription drug management.  Decision regarding hospitalization.            Scribe Attestation:   Scribe #1: I performed the above scribed service and the documentation accurately describes the services I performed. I attest to the accuracy of the note.    Attending Attestation:           Physician Attestation for Scribe:  Physician Attestation Statement for Scribe #1: I, Joshua Arellano MD, reviewed documentation, as scribed by Jony Rosa in my presence, and it is both accurate and complete.             ED Course as of 04/19/24 0456   Fri Apr 19, 2024   0110 Chart review reveals history of pT3 N2a Mx Stage IIIB Colon cancer. Dx 10/19/23  Microcytic anemia.   Folate deficiency       [MM]   0133 EKG from 01/23 shows sinus rhythm rate of 79  [MM]   0148 CBC auto differential(!)  Decreasing hemoglobin by 1 point since 4 days ago.  No significant leukocytosis.  Platelets baseline at 95,000 [MM]   0149 Comprehensive metabolic panel(!)  Mild hypokalemia likely secondary to diarrhea.  Stable hyperglycemia.  Improvement in patient was BUN and creatinine when compared to labs from 4 days ago.  No elevation in bilirubin or transaminase. [MM]   0149 INR(!): 1.8 [MM]   0149 PTT: 29.6 [MM]      ED Course User Index  [MM] Joshua Arellano MD                           Clinical Impression:  Final  diagnoses:  [R53.1] Weakness  [Z79.01] Anticoagulated (Primary)  [K92.2] Gastrointestinal hemorrhage, unspecified gastrointestinal hemorrhage type  [R19.7] Diarrhea, unspecified type          ED Disposition Condition    Admit Stable                Joshua Arellano MD  04/19/24 0457

## 2024-04-19 NOTE — CONSULTS
Consult Note    Reason for Consult:      We were consulted by Dr. Choi to evaluate this patient for LGIB.    HPI:     68-year-old  female known to Dr. Amador with a PMH of DASH cirrhosis, grade 1-2 esophageal varices, thrombocytopenia, stage IIIB colon adenocarcinoma diagnosed 10/2023 s/p right hemicolectomy (Dr. 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    Patient presented to the ED overnight with complaints of GI bleeding.  Patient states that on Tuesday she was constipated in that she had gone 4-5 days without a BM.  She had associated mid abdominal pain described as cramping.  She took one dulcolax tablet and then began having diarrhea which was frequent watery brown stool without melena or hematochezia.  Her abdominal pain resolved.  Last night around 11pm she began having seeing blood.  She reports dark red blood with clots without significant stool.  No melena.  No hematemesis.  She has been weak as of late but generalized denies presyncope.  Of note, her chemo has been on hold due to cytopenias.  Last dose of eliquis was at 9pm last night.      On presentation, patient afebrile and VSS.  Labs notable for pancytopenia with normocytic anemia hemoglobin 8.4, platelets 95, INR 1.8.  ED rectal exam noted soft maroon colored stool.  No imaging performed.  Patient was admitted and GI was consulted.  Today, hemoglobin is 7.9.  She has had a total of 7 episodes of bleeding with the last being around 20 minutes ago.     Previous records reviewed...  EGD for variceal screen 8/28/2023:  Grade 1-2 esophageal varices too small for banding, gastritis biopsied, normal duodenum.  Plan was to hold off on beta blockers due to size of varices and repeat EGD recommended in 1 year.    Last colonoscopy was 08/28/2023 at the time of finding colon mass for anemia:  Hemorrhoids.  malignant-appearing tumor in distal ascending colon, biopsied, tattooed.  Two hyperplastic polyps removed.  Ascending colon mass biopsies were negative for dysplasia or malignancy, but subsequent CT scan confirmed the mass and patient ultimately went for surgery.    Most recent imaging was performed on 02/08/2024 for staging and abdominal pain - CT abdomen/pelvis with/without IV contrast:  Cirrhosis, no liver lesion, no dilated bowel, no suspicious findings anastomosis, nonocclusive thrombus within the SMV extending main portal vein.    Most recent labs available for review are from 04/15/2024 at which time hemoglobin was 9, platelets 97.  She was transfused 1u prcs on 4/3/24 for hgb 8.     Patient has been noncompliant with followups with our office.    Collateral information obtained from daughter present at bedside.    PCP:  Alejandra Pinedo MD    Review of patient's allergies indicates:  No Known Allergies     Current Facility-Administered Medications   Medication Dose Route Frequency Provider Last Rate Last Admin    acetaminophen tablet 650 mg  650 mg Oral Q8H PRN Brunilda Hinds AGACNP-BC        acetaminophen tablet 650 mg  650 mg Oral Q4H PRN Brunilda Hinds AGACNP-BC        dextrose 10% bolus 125 mL 125 mL  12.5 g Intravenous PRN Brunilda Hinds AGACNP-BC        dextrose 10% bolus 250 mL 250 mL  25 g Intravenous PRN Brunilda Hinds AGACNP-BC        glucagon (human recombinant) injection 1 mg  1 mg Intramuscular PRN Brunilda Hinds AGACNP-BC        insulin aspart U-100 injection 1-10 Units  1-10 Units Subcutaneous Q6H PRN Brunilda Hinds AGACNP-BC        mupirocin 2 % ointment   Nasal BID Rashmi Isidro MD        ondansetron injection 4 mg  4 mg Intravenous Q8H PRN Brunilda Hinds AGACNP-BC        potassium chloride 10 mEq in 100 mL IVPB  10 mEq Intravenous Q1H Rashmi Isidro  mL/hr at 04/19/24 0725 10 mEq at 04/19/24 0725     Facility-Administered Medications Prior to Admission   Medication  Dose Route Frequency Provider Last Rate Last Admin    0.9%  NaCl infusion (for blood administration)   Intravenous Once Gayatri Jaffe FNP        promethazine (PHENERGAN) 12.5 mg in dextrose 5 % (D5W) 50 mL IVPB  12.5 mg Intravenous Once Gayatri Jaffe FNP         Medications Prior to Admission   Medication Sig Dispense Refill Last Dose    apixaban (ELIQUIS) 5 mg Tab Take 1 tablet (5 mg total) by mouth 2 (two) times daily. 60 tablet 4     capecitabine (XELODA) 500 MG Tab Take 3 tablets (1,500 mg total) by mouth 2 (two) times daily on days 1-14 of each 21 day chemotherapy cycle. 84 tablet 4     dulaglutide (TRULICITY) 3 mg/0.5 mL pen injector Inject 3 mg into the skin every 7 days. 4 pen 11     furosemide (LASIX) 40 MG tablet Take 40 mg by mouth as needed.       gabapentin (NEURONTIN) 300 MG capsule TAKE 2 CAPSULES BY MOUTH IN THE EVENING 60 capsule 6     insulin glargine, TOUJEO, (TOUJEO SOLOSTAR U-300 INSULIN) 300 unit/mL (1.5 mL) InPn pen INJECT 79 UNITS SUBCUTANEOUSLY ONCE DAILY 6 mL 11     OLANZapine (ZYPREXA) 5 MG tablet TAKE 1 TABLET BY MOUTH IN THE EVENING **NIGHTLY  ON  DAYS  1-3  OF  EACH  CHEMOTHERAPY  CYCLE 6 tablet 0     ondansetron (ZOFRAN-ODT) 8 MG TbDL Take 1 tablet (8 mg total) by mouth every 6 (six) hours as needed. 10 tablet 0     prochlorperazine (COMPAZINE) 5 MG tablet Take 5 mg by mouth every 6 (six) hours as needed.       sertraline (ZOLOFT) 50 MG tablet Take 1 tablet by mouth once daily 90 tablet 3     solifenacin (VESICARE) 10 MG tablet Take 10 mg by mouth once daily.       spironolactone (ALDACTONE) 50 MG tablet Take 50 mg by mouth once daily.       HYDROcodone-acetaminophen (NORCO) 5-325 mg per tablet Take 1 tablet by mouth every 6 (six) hours as needed for Pain. 30 tablet 0     insulin syringe-needle U-100 1 mL 31 gauge x 5/16 Syrg Inject 1 Syringe into the skin 3 (three) times daily with meals.          Past Medical History:  Past Medical History:   Diagnosis Date    Anesthesia  complication     trouble awakening    Charcot's joint of foot, left     Chronic kidney disease, unspecified     Cirrhosis of liver without ascites     Depression     Diabetes mellitus, type II, insulin dependent     Diabetic neuropathy     GERD (gastroesophageal reflux disease)     H/O: hysterectomy     Hepatic steatosis     Malignant neoplasm of ascending colon     Obesity, unspecified     Overactive bladder     Thrombocytopenia, unspecified     Vitamin D deficiency       Past Surgical History:  Past Surgical History:   Procedure Laterality Date    APPENDECTOMY      BREAST BIOPSY  2016     SECTION      x3    charcot-leyden crystals identification      CHOLECYSTECTOMY      COLONOSCOPY N/A 2023    Procedure: COLON;  Surgeon: Luis Amador MD;  Location: [facility] ENDOSCOPY;  Service: Gastroenterology;  Laterality: N/A;    COLONOSCOPY, WITH 1 OR MORE BIOPSIES  2023    Procedure: COLONOSCOPY, WITH 1 OR MORE BIOPSIES;  Surgeon: Luis Amador MD;  Location: [facility] ENDOSCOPY;  Service: Gastroenterology;;    COLONOSCOPY, WITH DIRECTED SUBMUCOSAL INJECTION  2023    Procedure: COLONOSCOPY, WITH DIRECTED SUBMUCOSAL INJECTION;  Surgeon: Luis Amador MD;  Location: [facility] ENDOSCOPY;  Service: Gastroenterology;;  8cc Colon Tattoo    COLONOSCOPY, WITH POLYPECTOMY USING SNARE  2023    Procedure: COLONOSCOPY, WITH POLYPECTOMY USING SNARE;  Surgeon: Luis Amador MD;  Location: [facility] ENDOSCOPY;  Service: Gastroenterology;;    ESOPHAGOGASTRODUODENOSCOPY N/A 2023    Procedure: DOUBLE;  Surgeon: Luis Amador MD;  Location: [facility] ENDOSCOPY;  Service: Gastroenterology;  Laterality: N/A;    HEMORRHOID SURGERY      HYSTERECTOMY      total    INSERTION OF SUBCUTANEOUS PORT Right     INSERTION OF TUNNELED CENTRAL VENOUS CATHETER (CVC) WITH SUBCUTANEOUS PORT Right 2023    Procedure: [id number];  Surgeon: Petrona  Timothy HERNANDEZ MD;  Location: [facility] OR;  Service: General;  Laterality: Right;    POLYPECTOMY      right foot surgery Right 02/01/2022    XI ROBOTIC COLECTOMY, RIGHT N/A 10/19/2023    Procedure: XI ROBOTIC COLECTOMY, RIGHT;  Surgeon: Timothy Russ MD;  Location: [facility] OR;  Service: General;  Laterality: N/A;      Family History:  Family History   Problem Relation Name Age of Onset    Diabetes Mother      Alzheimer's disease Mother      Diabetes Father      Leukemia Father      Diabetes Sister      Liver cancer Sister      Diabetes Brother       Social History:  Social History     Tobacco Use    Smoking status: Every Day     Current packs/day: 0.50     Average packs/day: 0.5 packs/day for 61.3 years (30.6 ttl pk-yrs)     Types: Cigarettes     Start date: 2003     Passive exposure: Current    Smokeless tobacco: Never   Substance Use Topics    Alcohol use: Not Currently     Comment: occassionally       Review of Systems:     Review of Systems   Constitutional:  Positive for appetite change. Negative for chills, diaphoresis, fatigue, fever and unexpected weight change.   HENT:  Negative for trouble swallowing.    Respiratory:  Negative for cough, chest tightness and shortness of breath.    Cardiovascular:  Negative for chest pain, palpitations and leg swelling.   Gastrointestinal:  Positive for abdominal pain (resolved), blood in stool, constipation and diarrhea. Negative for abdominal distention, nausea, rectal pain and vomiting.   Skin:  Negative for color change and pallor.   Neurological:  Positive for weakness. Negative for dizziness and light-headedness.   Psychiatric/Behavioral:  Negative for confusion.        Objective:     VITAL SIGNS: 24 HR MIN & MAX LAST    Temp  Min: 97.3 °F (36.3 °C)  Max: 97.6 °F (36.4 °C)  97.6 °F (36.4 °C)        BP  Min: 102/63  Max: 124/61  102/63     Pulse  Min: 73  Max: 87  74     Resp  Min: 15  Max: 20  20    SpO2  Min: 95 %  Max: 100 %  98 %        Intake/Output Summary (Last 24 hours)  at 4/19/2024 0849  Last data filed at 4/19/2024 0233  Gross per 24 hour   Intake 999 ml   Output --   Net 999 ml       Physical Exam  Constitutional:       General: She is not in acute distress.     Appearance: She is obese. She is not ill-appearing.   HENT:      Head: Normocephalic and atraumatic.   Eyes:      General: No scleral icterus.     Extraocular Movements: Extraocular movements intact.   Cardiovascular:      Rate and Rhythm: Normal rate and regular rhythm.   Pulmonary:      Effort: Pulmonary effort is normal. No respiratory distress.   Abdominal:      General: Bowel sounds are normal. There is no distension.      Palpations: Abdomen is soft. There is no mass.      Tenderness: There is abdominal tenderness (perumbilical and RLQ mild). There is no guarding or rebound.   Musculoskeletal:         General: Normal range of motion.      Right lower leg: No edema.      Left lower leg: No edema.   Skin:     General: Skin is warm and dry.   Neurological:      Mental Status: She is alert and oriented to person, place, and time.   Psychiatric:         Mood and Affect: Mood and affect normal.           Recent Results (from the past 48 hour(s))   Comprehensive metabolic panel    Collection Time: 04/19/24  1:05 AM   Result Value Ref Range    Sodium Level 136 136 - 145 mmol/L    Potassium Level 2.9 (L) 3.5 - 5.1 mmol/L    Chloride 106 98 - 107 mmol/L    Carbon Dioxide 21 (L) 23 - 31 mmol/L    Glucose Level 162 (H) 82 - 115 mg/dL    Blood Urea Nitrogen 27.8 (H) 9.8 - 20.1 mg/dL    Creatinine 1.28 (H) 0.55 - 1.02 mg/dL    Calcium Level Total 8.5 8.4 - 10.2 mg/dL    Protein Total 5.9 5.8 - 7.6 gm/dL    Albumin Level 2.7 (L) 3.4 - 4.8 g/dL    Globulin 3.2 2.4 - 3.5 gm/dL    Albumin/Globulin Ratio 0.8 (L) 1.1 - 2.0 ratio    Bilirubin Total 0.6 <=1.5 mg/dL    Alkaline Phosphatase 114 40 - 150 unit/L    Alanine Aminotransferase 20 0 - 55 unit/L    Aspartate Aminotransferase 22 5 - 34 unit/L    eGFR 46 mls/min/1.73/m2   APTT     Collection Time: 04/19/24  1:05 AM   Result Value Ref Range    PTT 29.6 23.2 - 33.7 seconds   Protime-INR    Collection Time: 04/19/24  1:05 AM   Result Value Ref Range    PT 20.4 (H) 12.5 - 14.5 seconds    INR 1.8 (H) <=1.3   CBC with Differential    Collection Time: 04/19/24  1:05 AM   Result Value Ref Range    WBC 4.23 (L) 4.50 - 11.50 x10(3)/mcL    RBC 3.15 (L) 4.20 - 5.40 x10(6)/mcL    Hgb 8.4 (L) 12.0 - 16.0 g/dL    Hct 26.6 (L) 37.0 - 47.0 %    MCV 84.4 80.0 - 94.0 fL    MCH 26.7 (L) 27.0 - 31.0 pg    MCHC 31.6 (L) 33.0 - 36.0 g/dL    RDW 22.6 (H) 11.5 - 17.0 %    Platelet 95 (L) 130 - 400 x10(3)/mcL    MPV      Neut % 50.9 %    Lymph % 21.3 %    Mono % 17.0 %    Eos % 8.5 %    Basophil % 1.4 %    Lymph # 0.90 0.6 - 4.6 x10(3)/mcL    Neut # 2.15 2.1 - 9.2 x10(3)/mcL    Mono # 0.72 0.1 - 1.3 x10(3)/mcL    Eos # 0.36 0 - 0.9 x10(3)/mcL    Baso # 0.06 <=0.2 x10(3)/mcL    IG# 0.04 0 - 0.04 x10(3)/mcL    IG% 0.9 %    NRBC% 0.0 %    IPF 5.9 0.9 - 11.2 %   Type & Screen    Collection Time: 04/19/24  1:05 AM   Result Value Ref Range    Group & Rh O POS     Indirect Marshall GEL NEG     Specimen Outdate 04/22/2024 23:59    Magnesium    Collection Time: 04/19/24  1:05 AM   Result Value Ref Range    Magnesium Level 1.90 1.60 - 2.60 mg/dL   Hemoglobin and Hematocrit    Collection Time: 04/19/24  5:11 AM   Result Value Ref Range    Hgb 7.9 (L) 12.0 - 16.0 g/dL    Hct 25.1 (L) 37.0 - 47.0 %       No results found.    Assessment / Plan:     67 yo CF known to Dr. Amador with a PMH of DASH cirrhosis, grade 1-2 esophageal varices, thrombocytopenia, stage IIIB colon adenocarcinoma diagnosed 10/2023 s/p right hemicolectomy (Dr. 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  Here with GI bleeding.     GI bleeding: dark red with clots  Acute on chronic anemia   Thrombocytopenia  AC use  H/O esophageal  varices    Hgb 8.4 -- 7.9     - Given hx of esophageal varices and AC use, will proceed with EGD to evaluate for brisk UGIB for completeness. NPO.   - If EGD negative may consider CT GIB protocol to try to localize bleeding. Another potential source is anastomotic site.   -Continue iv ppi  -Monitor H/H q6 hours for now and transfuse as needed to Hgb 7  - monitor plts and transfuse to 50k in setting of active bleeding. No need at this time.   -Monitor stools for bleeding  - eliquis on hold. Last dose 4/18 at 9pm.      Thank you for allowing us to participate in this patient's care.

## 2024-04-19 NOTE — ANESTHESIA PREPROCEDURE EVALUATION
04/19/2024  Indu Azul is a 68 y.o., female.    8 / 26 / 95k, INR 1.8  Type and screen active  Egd for acute blood loss anemia  Hx of cirrhosis with known varices     Pre-op Assessment    I have reviewed the Patient Summary Reports.     I have reviewed the Nursing Notes. I have reviewed the NPO Status.   I have reviewed the Medications.     Review of Systems  Anesthesia Hx:               Denies Personal Hx of Anesthesia complications.                    Renal/:  Chronic Renal Disease, CKD                Hepatic/GI:     GERD Liver Disease, (cirrhosis)            Endocrine:  Diabetes, using insulin         Obesity / BMI > 30  Psych:  Psychiatric History  depression                Physical Exam  General: Well nourished, Cooperative and Alert    Airway:  Mallampati: II   Mouth Opening: Normal  TM Distance: Normal    Dental:  Intact    Chest/Lungs:  Normal Respiratory Rate    Heart:  Rate: Normal        Anesthesia Plan  Type of Anesthesia, risks & benefits discussed:    Anesthesia Type: Gen Natural Airway  Intra-op Monitoring Plan: Standard ASA Monitors  Post Op Pain Control Plan: IV/PO Opioids PRN  Induction:  IV  Informed Consent: Informed consent signed with the Patient and all parties understand the risks and agree with anesthesia plan.  All questions answered.   ASA Score: 4  Day of Surgery Review of History & Physical: H&P Update referred to the surgeon/provider.    Ready For Surgery From Anesthesia Perspective.     .

## 2024-04-19 NOTE — [employer]OVATION PATIENT INSTRUCTIONS
Discharge Summary/Instructions after an Endoscopic Procedure  Patient Name: Indu Azul  Patient MRN: [id number]  Patient YOB: 1955  Friday, April 19, 2024  Nolan Massey MD  Dear patient,  As a result of recent federal legislation (The Federal Cures Act), you may   receive lab or pathology results from your procedure in your [facility]   account before your physician is able to contact you. Your physician or   their representative will relay the results to you with their   recommendations at their soonest availability.  Thank you,  RESTRICTIONS:  During your procedure today, you received medications for sedation.  These   medications may affect your judgment, balance and coordination.  Therefore,   for 24 hours, you have the following restrictions:   - DO NOT drive a car, operate machinery, make legal/financial decisions,   sign important papers or drink alcohol.    ACTIVITY:  Today: no heavy lifting, straining or running due to procedural   sedation/anesthesia.  The following day: return to full activity including work.  DIET:  Eat and drink normally unless instructed otherwise.     TREATMENT FOR COMMON SIDE EFFECTS:  - Mild abdominal pain, nausea, belching, bloating or excessive gas:  rest,   eat lightly and use a heating pad.  - Sore Throat: treat with throat lozenges and/or gargle with warm salt   water.  - Because air was used during the procedure, expelling large amounts of air   from your rectum or belching is normal.  - If a bowel prep was taken, you may not have a bowel movement for 1-3 days.    This is normal.  SYMPTOMS TO WATCH FOR AND REPORT TO YOUR PHYSICIAN:  1. Abdominal pain or bloating, other than gas cramps.  2. Chest pain.  3. Back pain.  4. Signs of infection such as: chills or fever occurring within 24 hours   after the procedure.  5. Rectal bleeding, which would show as bright red, maroon, or black stools.   (A tablespoon of blood from the rectum is not serious, especially if    hemorrhoids are present.)  6. Vomiting.  7. Weakness or dizziness.  GO DIRECTLY TO THE NEAREST EMERGENCY ROOM IF YOU HAVE ANY OF THE FOLLOWING:      Difficulty breathing              Chills and/or fever over 101 F   Persistent vomiting and/or vomiting blood   Severe abdominal pain   Severe chest pain   Black, tarry stools   Bleeding- more than one tablespoon   Any other symptom or condition that you feel may need urgent attention  Your doctor recommends these additional instructions:  If any biopsies were taken, your doctors clinic will contact you in 1 to 2   weeks with any results.  - Return patient to hospital banks for ongoing care.   - Perform a colonoscopy tomorrow.   - The findings and recommendations were discussed with the patient.  For questions, problems or results please call your physician - Nolan Massey MD at Work:  [phone].  [facility], EMERGENCY ROOM PHONE NUMBER: [phone]  IF A COMPLICATION OR EMERGENCY SITUATION ARISES AND YOU ARE UNABLE TO REACH   YOUR PHYSICIAN - GO DIRECTLY TO THE EMERGENCY ROOM.  MD Nolan Ulloa MD  4/19/2024 1:16:22 PM  This report has been verified and signed electronically.  Dear patient,  As a result of recent federal legislation (The Federal Cures Act), you may   receive lab or pathology results from your procedure in your [facility]   account before your physician is able to contact you. Your physician or   their representative will relay the results to you with their   recommendations at their soonest availability.  Thank you,  PROVATION

## 2024-04-19 NOTE — Clinical Note
Diagnosis: Weakness [057409]   Future Attending Provider: BETTINA PATIÑO [[id number]]   Admit to which facility:: [facility] [[id number]]   Reason for IP Medical Treatment  (Clinical interventions that can only be accomplished in the IP setting? ) :: GI bleed, on thinners   I certify that Inpatient services for greater than or equal to 2 midnights are medically necessary:: Yes   Plans for Post-Acute care--if anticipated (pick the single best option):: A. No post acute care anticipated at this time

## 2024-04-19 NOTE — NURSING
Nurses Note -- 4 Eyes      4/19/2024   6:15AM      Skin assessed during: Admit      [x] No Altered Skin Integrity Present    []Prevention Measures Documented      [] Yes- Altered Skin Integrity Present or Discovered   [] LDA Added if Not in Epic (Describe Wound)   [] New Altered Skin Integrity was Present on Admit and Documented in LDA   [] Wound Image Taken    Wound Care Consulted? No    Attending Nurse:  ZACH Adrian    Second RN/Staff Member:  CHARBEL Zimmerman

## 2024-04-20 ENCOUNTER — ANESTHESIA (OUTPATIENT)
Dept: SURGERY | Facility: HOSPITAL | Age: 69
DRG: 393 | End: 2024-04-20
Payer: MEDICARE

## 2024-04-20 ENCOUNTER — ANESTHESIA EVENT (OUTPATIENT)
Dept: SURGERY | Facility: HOSPITAL | Age: 69
DRG: 393 | End: 2024-04-20
Payer: MEDICARE

## 2024-04-20 LAB
ALBUMIN SERPL-MCNC: 2.5 G/DL (ref 3.4–4.8)
ALBUMIN/GLOB SERPL: 1.1 RATIO (ref 1.1–2)
ALP SERPL-CCNC: 97 UNIT/L (ref 40–150)
ALT SERPL-CCNC: 16 UNIT/L (ref 0–55)
AST SERPL-CCNC: 19 UNIT/L (ref 5–34)
BASOPHILS # BLD AUTO: 0.03 X10(3)/MCL
BASOPHILS NFR BLD AUTO: 1.1 %
BILIRUB SERPL-MCNC: 0.5 MG/DL
BUN SERPL-MCNC: 18.6 MG/DL (ref 9.8–20.1)
CALCIUM SERPL-MCNC: 7.9 MG/DL (ref 8.4–10.2)
CHLORIDE SERPL-SCNC: 112 MMOL/L (ref 98–107)
CO2 SERPL-SCNC: 21 MMOL/L (ref 23–31)
CREAT SERPL-MCNC: 1.07 MG/DL (ref 0.55–1.02)
EOSINOPHIL # BLD AUTO: 0.16 X10(3)/MCL (ref 0–0.9)
EOSINOPHIL NFR BLD AUTO: 5.6 %
ERYTHROCYTE [DISTWIDTH] IN BLOOD BY AUTOMATED COUNT: 22.8 % (ref 11.5–17)
GFR SERPLBLD CREATININE-BSD FMLA CKD-EPI: 57 MLS/MIN/1.73/M2
GLOBULIN SER-MCNC: 2.2 GM/DL (ref 2.4–3.5)
GLUCOSE SERPL-MCNC: 120 MG/DL (ref 82–115)
HCT VFR BLD AUTO: 24.3 % (ref 37–47)
HCT VFR BLD AUTO: 25.5 % (ref 37–47)
HGB BLD-MCNC: 7.6 G/DL (ref 12–16)
HGB BLD-MCNC: 8 G/DL (ref 12–16)
IMM GRANULOCYTES # BLD AUTO: 0.06 X10(3)/MCL (ref 0–0.04)
IMM GRANULOCYTES NFR BLD AUTO: 2.1 %
INR PPP: 1.3
LYMPHOCYTES # BLD AUTO: 0.71 X10(3)/MCL (ref 0.6–4.6)
LYMPHOCYTES NFR BLD AUTO: 24.9 %
MCH RBC QN AUTO: 26.5 PG (ref 27–31)
MCHC RBC AUTO-ENTMCNC: 30.9 G/DL (ref 33–36)
MCV RBC AUTO: 85.9 FL (ref 80–94)
MONOCYTES # BLD AUTO: 0.38 X10(3)/MCL (ref 0.1–1.3)
MONOCYTES NFR BLD AUTO: 13.3 %
NEUTROPHILS # BLD AUTO: 1.51 X10(3)/MCL (ref 2.1–9.2)
NEUTROPHILS NFR BLD AUTO: 53 %
NRBC BLD AUTO-RTO: 0 %
PLATELET # BLD AUTO: 82 X10(3)/MCL (ref 130–400)
PLATELETS.RETICULATED NFR BLD AUTO: 4.8 % (ref 0.9–11.2)
PMV BLD AUTO: ABNORMAL FL
POCT GLUCOSE: 121 MG/DL (ref 70–110)
POCT GLUCOSE: 128 MG/DL (ref 70–110)
POCT GLUCOSE: 136 MG/DL (ref 70–110)
POCT GLUCOSE: 137 MG/DL (ref 70–110)
POCT GLUCOSE: 138 MG/DL (ref 70–110)
POCT GLUCOSE: 144 MG/DL (ref 70–110)
POTASSIUM SERPL-SCNC: 3.4 MMOL/L (ref 3.5–5.1)
PROT SERPL-MCNC: 4.7 GM/DL (ref 5.8–7.6)
PROTHROMBIN TIME: 16.1 SECONDS (ref 12.5–14.5)
RBC # BLD AUTO: 2.98 X10(6)/MCL (ref 4.2–5.4)
SODIUM SERPL-SCNC: 140 MMOL/L (ref 136–145)
WBC # SPEC AUTO: 2.85 X10(3)/MCL (ref 4.5–11.5)

## 2024-04-20 PROCEDURE — 25000003 PHARM REV CODE 250

## 2024-04-20 PROCEDURE — 45380 COLONOSCOPY AND BIOPSY: CPT | Performed by: INTERNAL MEDICINE

## 2024-04-20 PROCEDURE — C9113 INJ PANTOPRAZOLE SODIUM, VIA: HCPCS | Performed by: NURSE PRACTITIONER

## 2024-04-20 PROCEDURE — 85610 PROTHROMBIN TIME: CPT | Performed by: INTERNAL MEDICINE

## 2024-04-20 PROCEDURE — 37000009 HC ANESTHESIA EA ADD 15 MINS: Performed by: INTERNAL MEDICINE

## 2024-04-20 PROCEDURE — 11000001 HC ACUTE MED/SURG PRIVATE ROOM

## 2024-04-20 PROCEDURE — 0DB88ZX EXCISION OF SMALL INTESTINE, VIA NATURAL OR ARTIFICIAL OPENING ENDOSCOPIC, DIAGNOSTIC: ICD-10-PCS | Performed by: INTERNAL MEDICINE

## 2024-04-20 PROCEDURE — 80053 COMPREHEN METABOLIC PANEL: CPT | Performed by: NURSE PRACTITIONER

## 2024-04-20 PROCEDURE — 88305 TISSUE EXAM BY PATHOLOGIST: CPT | Performed by: INTERNAL MEDICINE

## 2024-04-20 PROCEDURE — 37000008 HC ANESTHESIA 1ST 15 MINUTES: Performed by: INTERNAL MEDICINE

## 2024-04-20 PROCEDURE — 63600175 PHARM REV CODE 636 W HCPCS: Performed by: NURSE PRACTITIONER

## 2024-04-20 PROCEDURE — 63600175 PHARM REV CODE 636 W HCPCS

## 2024-04-20 PROCEDURE — 25000003 PHARM REV CODE 250: Performed by: PHYSICIAN ASSISTANT

## 2024-04-20 PROCEDURE — 25000003 PHARM REV CODE 250: Performed by: INTERNAL MEDICINE

## 2024-04-20 PROCEDURE — D9220A PRA ANESTHESIA: Mod: ANES,,, | Performed by: ANESTHESIOLOGY

## 2024-04-20 PROCEDURE — D9220A PRA ANESTHESIA: Mod: CRNA,,,

## 2024-04-20 PROCEDURE — 27201423 OPTIME MED/SURG SUP & DEVICES STERILE SUPPLY: Performed by: INTERNAL MEDICINE

## 2024-04-20 PROCEDURE — 85025 COMPLETE CBC W/AUTO DIFF WBC: CPT | Performed by: INTERNAL MEDICINE

## 2024-04-20 RX ORDER — LIDOCAINE HYDROCHLORIDE 10 MG/ML
INJECTION, SOLUTION EPIDURAL; INFILTRATION; INTRACAUDAL; PERINEURAL
Status: DISCONTINUED | OUTPATIENT
Start: 2024-04-20 | End: 2024-04-20

## 2024-04-20 RX ORDER — PROPOFOL 10 MG/ML
VIAL (ML) INTRAVENOUS
Status: DISCONTINUED | OUTPATIENT
Start: 2024-04-20 | End: 2024-04-20

## 2024-04-20 RX ADMIN — PANTOPRAZOLE SODIUM 40 MG: 40 INJECTION, POWDER, LYOPHILIZED, FOR SOLUTION INTRAVENOUS at 08:04

## 2024-04-20 RX ADMIN — LIDOCAINE HYDROCHLORIDE 80 MG: 10 INJECTION, SOLUTION EPIDURAL; INFILTRATION; INTRACAUDAL; PERINEURAL at 11:04

## 2024-04-20 RX ADMIN — SODIUM CHLORIDE, SODIUM GLUCONATE, SODIUM ACETATE, POTASSIUM CHLORIDE AND MAGNESIUM CHLORIDE: 526; 502; 368; 37; 30 INJECTION, SOLUTION INTRAVENOUS at 10:04

## 2024-04-20 RX ADMIN — SODIUM SULFATE, POTASSIUM SULFATE, MAGNESIUM SULFATE 354 ML: 17.5; 3.13; 1.6 SOLUTION, CONCENTRATE ORAL at 04:04

## 2024-04-20 RX ADMIN — GABAPENTIN 600 MG: 300 CAPSULE ORAL at 08:04

## 2024-04-20 RX ADMIN — MUPIROCIN: 20 OINTMENT TOPICAL at 08:04

## 2024-04-20 RX ADMIN — SERTRALINE HYDROCHLORIDE 50 MG: 50 TABLET ORAL at 08:04

## 2024-04-20 RX ADMIN — PROPOFOL 70 MG: 10 INJECTION, EMULSION INTRAVENOUS at 11:04

## 2024-04-20 NOTE — ANESTHESIA PREPROCEDURE EVALUATION
04/20/2024  Indu Azul is a 68 y.o., female.  8 / 26 / 95k, INR 1.8  Type and screen active  Egd for acute blood loss anemia  Hx of cirrhosis with known varices   HGB 7.6 ytd  EGD ytd    Pre-op Assessment    I have reviewed the Patient Summary Reports.     I have reviewed the Nursing Notes. I have reviewed the NPO Status.   I have reviewed the Medications.     Review of Systems  Renal/:  Chronic Renal Disease        Kidney Function/Disease             Hepatic/GI:     GERD Liver Disease,     Gerd       Liver Disease        Neurological:    Neuromuscular Disease,                                 Neuromuscular Disease   Endocrine:  Diabetes    Diabetes                      Psych:  Psychiatric History                  Physical Exam  General: Well nourished, Cooperative, Alert and Oriented    Airway:  Mallampati: II   Mouth Opening: Normal  TM Distance: Normal  Tongue: Normal  Neck ROM: Normal ROM    Dental:  Intact        Anesthesia Plan  Type of Anesthesia, risks & benefits discussed:    Anesthesia Type: Gen Natural Airway  Intra-op Monitoring Plan: Standard ASA Monitors  Post Op Pain Control Plan: multimodal analgesia  Induction:  IV  Airway Plan: Direct, Post-Induction  Informed Consent: Informed consent signed with the Patient representative and all parties understand the risks and agree with anesthesia plan.  All questions answered. Patient consented to blood products? Yes  ASA Score: 3  Day of Surgery Review of History & Physical: H&P Update referred to the surgeon/provider.    Ready For Surgery From Anesthesia Perspective.     .

## 2024-04-20 NOTE — PROGRESS NOTES
Events noted.  Patient reported to have some passage of blood clots after the colon prep.  Lab workup reviewed from this morning.  Hemoglobin around 8 which is improved since yesterday.  INR is down to 1.3.  Patient is scheduled for colonoscopy today.  We will avoid biopsies as patient had Eliquis in the last 48 hours.  Discussed with the patient and the daughter at the bedside in details.

## 2024-04-20 NOTE — TRANSFER OF CARE
"Anesthesia Transfer of Care Note    Patient: Indu Azul    Procedure(s) Performed: Procedure(s) (LRB):  COLON (N/A)  COLONOSCOPY, WITH 1 OR MORE BIOPSIES (N/A)    Patient location: PACU    Anesthesia Type: MAC    Transport from OR: Transported from OR on room air with adequate spontaneous ventilation    Post pain: adequate analgesia    Post assessment: no apparent anesthetic complications and tolerated procedure well    Post vital signs: stable    Level of consciousness: awake    Nausea/Vomiting: no nausea/vomiting    Complications: none    Transfer of care protocol was followed      Last vitals: Visit Vitals  BP (!) 127/58   Pulse 79   Temp 36.2 °C (97.2 °F) (Tympanic)   Resp 14   Ht 5' 3" (1.6 m)   Wt 101.5 kg (223 lb 12.3 oz)   SpO2 97%   BMI 39.64 kg/m²     "

## 2024-04-20 NOTE — PROCEDURES
Indu Azul   MRN: [id number]   ADMISSION DATE: 2024  : 1955  AGE: 68 y.o.    PROCEDURE:  Colonoscopy with biopsy    DATE OF PROCEDURE:  2024     SURGEON: RACHEL HEATON    PREOPERATIVE DIAGNOSIS:  Anemia, hematochezia, thromboembolic disorder (on anticoagulation), history of colon cancer status right hemicolectomy.      POSTOPERATIVE DIAGNOSIS:  1.  Multiple erosions with small clean based ulcerations, distal small bowel.  Photodocumentation obtained.  2. Hyperemic anastomotic site with no evidence of active bleed.  Biopsies obtained.    3. Grade 1-2 internal hemorrhoids.  Otherwise normal exam.    HISTORY AND PHYSICAL:  As per preoperative note.      DESCRIPTION OF PROCEDURE:  Informed consent was obtained.  Patient was placed in left lateral position.  Sedation per anesthesia service.  Rectal exam was unremarkable.  Olympus video colonoscope was introduced into the rectum and was carefully advanced the anastomotic site.    .  Quality of the preparation was fair.  Periodic irrigation was done throughout the procedure for better visualization. Patient tolerated the procedure well without any complications.    FINDINGS:  Evidence of previous surgical resection with hyperemia of the anastomotic site.  Biopsies were obtained.  No evidence of active bleed.  Multiple small erosions with clean based ulcerations noted in the distal small bowel proximal to the anastomosis.  No biopsies were obtained in view of recent Eliquis intake in the last 48 hours.  Colonic mucosa appeared unremarkable to the extent of visualization.  Periodic irrigation was done throughout the procedure for better visualization.  Scattered mucosal abrasions noted in transverse and left colon on withdrawal most likely related to suctioning during procedure.  There were grade 1-2 internal hemorrhoids noted on withdrawal of the scope.    ESTIMATED BLOOD LOSS:  5-6 cc    COMPLICATIONS:  None.    RECOMMENDATIONS:  1.  Follow up biopsy  results.  2. Anticoagulation on hold for now.  3. Serial hemoglobin/hematocrit.  Transfuse if hemoglobin less than 7.    4. Clear liquid diet for now.  Can advance to full liquid tomorrow if continues to remain stable.

## 2024-04-20 NOTE — NURSING
Patient was NPO from midnight, BP slightly low (97/60mmhg), latest H/H 7.6/24.3, Hospitalist made aware.

## 2024-04-20 NOTE — PROGRESS NOTES
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 Patient's hemoglobin was found to be 7.9 INR of 1.8 with [place] [address]. Patient has been admitted to hospitalist service GI has been consulted     Interval Hx:   Patient seen and examined plan for colonoscopy today reported passage of blood clots after the colon prep  Case was discussed with patient's nurse and  on the floor.    Objective/physical exam:  General: In no acute distress, afebrile  Chest: Clear to auscultation bilaterally  Heart: RRR, +S1, S2, no appreciable murmur  Abdomen: Soft, nontender, BS +  MSK: Warm, no lower extremity edema, no clubbing or cyanosis  Neurologic: Alert and oriented x4, Cranial nerve II-XII intact, Strength 5/5 in all 4 extremities    VITAL SIGNS: 24 HRS MIN & MAX LAST   Temp  Min: 96.4 °F (35.8 °C)  Max: 97.8 °F (36.6 °C) 97.2 °F (36.2 °C)   BP  Min: 93/61  Max: 134/64 (!) 127/58   Pulse  Min: 74  Max: 87  79   Resp  Min: 12  Max: 18 14   SpO2  Min: 96 %  Max: 100 % 97 % (RA)     I have reviewed the following labs:  Recent Labs   Lab 04/15/24  1542 04/19/24  0105 04/19/24  0511 04/19/24  1452 04/19/24  2355 04/20/24  0753   WBC 5.20 4.23*  --   --   --  2.85*   RBC 3.47* 3.15*  --   --   --  2.98*   HGB 9.3* 8.4*   < > 7.7* 7.6* 8.0*   HCT 29.5* 26.6*   < > 24.7* 24.3* 25.5*   MCV 85.0 84.4  --   --   --  85.9   MCH 26.8* 26.7*  --   --   --  26.5*   MCHC 31.5* 31.6*  --   --   --  30.9*   RDW 24.4* 22.6*  --   --   --  22.8*   PLT 97* 95*  --   --   --  82*    < > =  values in this interval not displayed.     Recent Labs   Lab 04/15/24  1542 04/19/24  0105 04/19/24  1452 04/20/24  0546   * 136 139 140   K 4.0 2.9* 4.0 3.4*   CO2 20* 21* 20* 21*   BUN 30.3* 27.8* 22.1* 18.6   CREATININE 1.61* 1.28* 1.07* 1.07*   CALCIUM 8.5 8.5 7.9* 7.9*   MG  --  1.90  --   --    ALBUMIN 3.1* 2.7*  --  2.5*   ALKPHOS 90 114  --  97   ALT 16 20  --  16   AST 19 22  --  19   BILITOT 0.9 0.6  --  0.5     Microbiology Results (last 7 days)       ** No results found for the last 168 hours. **             See below for Radiology    Scheduled Med:  Current Facility-Administered Medications   Medication Dose Route Frequency    gabapentin  600 mg Oral QHS    mupirocin   Nasal BID    pantoprazole  40 mg Intravenous BID    sertraline  50 mg Oral Nightly      Continuous Infusions:  Current Facility-Administered Medications   Medication Dose Route Frequency Last Rate Last Admin      PRN Meds:    Current Facility-Administered Medications:     acetaminophen, 650 mg, Oral, Q8H PRN    acetaminophen, 650 mg, Oral, Q4H PRN    dextrose 10%, 12.5 g, Intravenous, PRN    dextrose 10%, 25 g, Intravenous, PRN    glucagon (human recombinant), 1 mg, Intramuscular, PRN    insulin aspart U-100, 1-10 Units, Subcutaneous, Q6H PRN    ondansetron, 4 mg, Intravenous, Q8H PRN     Assessment/Plan:  GI bleed   Acute on chronic anemia secondary to above   Hypokalemia   Stage IIIB colon cancer s/p right hemicolectomy, on chemotherapy   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  Diet as per GI   Other home medications have been resumed   Repeat H&H in a.m.    VTE prophylaxis:  SCD for now    Patient condition:  Stable/Fair/Guarded/ Serious/ Critical    Anticipated discharge and Disposition:         All diagnosis and differential diagnosis have been reviewed; assessment and plan has been documented; I have personally reviewed the labs and test results that are presently available; I have reviewed the patients medication list; I have reviewed the consulting providers response and recommendations. I have reviewed or attempted to review medical records based upon their availability    All of the patient's questions have been  addressed and answered. Patient's is agreeable to the above stated plan. I will continue to monitor closely and make adjustments to medical management as needed.  _____________________________________________________________________    Nutrition Status:    Radiology:  I have personally reviewed the following imaging and agree with the radiologist.     CT Abdomen With Without Contrast  Narrative: EXAMINATION:  CT ABDOMEN W WO CONTRAST; CT PELVIS WITH AND WITHOUT CONTRAST    CLINICAL HISTORY:  MALIGNANT NEOPLASM OF ASCENDING COLON/ ABDOMINAL PAIN;Malignant neoplasm of ascending colon    TECHNIQUE:  CT imaging of the abdomen and pelvis before and after intravenous contrast.  Axial, coronal and sagittal images reviewed. Dose length product 1425 mGycm. Automatic exposure control, adjustment of mA/kV or iterative reconstruction technique used to limit radiation dose.    COMPARISON:  CT 09/07/2023    FINDINGS:  Liver/biliary: Cirrhosis.  No suspicious liver lesion identified.  Prior cholecystectomy. No biliary dilatation.    Pancreas: Normal.    Spleen: Splenomegaly measures 16.5 cm.    Adrenals: Normal.    Genitourinary: Symmetric enhancement of the kidneys.  No hydronephrosis.  Normal  bladder.    Stomach and bowel: Interval right hemicolectomy.  No dilated bowel.  No suspicious findings near the anastomosis.    Lymph nodes/peritoneum: Stable 10 mm aortocaval lymph node (series 4, image 55).  No ascites or free air. No fluid collection.    Vasculature: Mostly eccentric thrombus within the SMV and extending through the main portal vein.    Abdominal wall: Very mild postsurgical changes anteriorly.    Lung bases: No consolidation or pleural effusion.  Stable 3 mm solid lingular nodule (series 7, image 3).    Musculoskeletal: Similar mildly heterogeneous overall bone mineralization.  No aggressive osseous lesion identified.  Impression: Nonocclusive thrombus within the SMV and extending through the main portal vein.    Findings discussed with Dr. Olmstead at the time of dictation.    Electronically signed by: Abner Mcgregor  Date:    02/08/2024  Time:    11:27  CT Pelvis W Wo  Contrast  Narrative: EXAMINATION:  CT ABDOMEN W WO CONTRAST; CT PELVIS WITH AND WITHOUT CONTRAST    CLINICAL HISTORY:  MALIGNANT NEOPLASM OF ASCENDING COLON/ ABDOMINAL PAIN;Malignant neoplasm of ascending colon    TECHNIQUE:  CT imaging of the abdomen and pelvis before and after intravenous contrast.  Axial, coronal and sagittal images reviewed. Dose length product 1425 mGycm. Automatic exposure control, adjustment of mA/kV or iterative reconstruction technique used to limit radiation dose.    COMPARISON:  CT 09/07/2023    FINDINGS:  Liver/biliary: Cirrhosis.  No suspicious liver lesion identified.  Prior cholecystectomy. No biliary dilatation.    Pancreas: Normal.    Spleen: Splenomegaly measures 16.5 cm.    Adrenals: Normal.    Genitourinary: Symmetric enhancement of the kidneys.  No hydronephrosis.  Normal bladder.    Stomach and bowel: Interval right hemicolectomy.  No dilated bowel.  No suspicious findings near the anastomosis.    Lymph nodes/peritoneum: Stable 10 mm aortocaval lymph node (series 4, image 55).  No ascites or  free air. No fluid collection.    Vasculature: Mostly eccentric thrombus within the SMV and extending through the main portal vein.    Abdominal wall: Very mild postsurgical changes anteriorly.    Lung bases: No consolidation or pleural effusion.  Stable 3 mm solid lingular nodule (series 7, image 3).    Musculoskeletal: Similar mildly heterogeneous overall bone mineralization.  No aggressive osseous lesion identified.  Impression: Nonocclusive thrombus within the SMV and extending through the main portal vein.    Findings discussed with Dr. Olmstead at the time of dictation.    Electronically signed by: Abner Mcgregor  Date:    02/08/2024  Time:    11:27      Kenna Choi MD  Department of Hospital Medicine   [facility]   04/20/2024

## 2024-04-21 LAB
BASOPHILS # BLD AUTO: 0.02 X10(3)/MCL
BASOPHILS NFR BLD AUTO: 0.6 %
EOSINOPHIL # BLD AUTO: 0.13 X10(3)/MCL (ref 0–0.9)
EOSINOPHIL NFR BLD AUTO: 3.8 %
ERYTHROCYTE [DISTWIDTH] IN BLOOD BY AUTOMATED COUNT: 22.7 % (ref 11.5–17)
HCT VFR BLD AUTO: 24.1 % (ref 37–47)
HGB BLD-MCNC: 7.6 G/DL (ref 12–16)
IMM GRANULOCYTES # BLD AUTO: 0.09 X10(3)/MCL (ref 0–0.04)
IMM GRANULOCYTES NFR BLD AUTO: 2.7 %
LYMPHOCYTES # BLD AUTO: 0.93 X10(3)/MCL (ref 0.6–4.6)
LYMPHOCYTES NFR BLD AUTO: 27.4 %
MCH RBC QN AUTO: 27 PG (ref 27–31)
MCHC RBC AUTO-ENTMCNC: 31.5 G/DL (ref 33–36)
MCV RBC AUTO: 85.5 FL (ref 80–94)
MONOCYTES # BLD AUTO: 0.48 X10(3)/MCL (ref 0.1–1.3)
MONOCYTES NFR BLD AUTO: 14.2 %
NEUTROPHILS # BLD AUTO: 1.74 X10(3)/MCL (ref 2.1–9.2)
NEUTROPHILS NFR BLD AUTO: 51.3 %
NRBC BLD AUTO-RTO: 0 %
PLATELET # BLD AUTO: 83 X10(3)/MCL (ref 130–400)
PLATELETS.RETICULATED NFR BLD AUTO: 4.9 % (ref 0.9–11.2)
PMV BLD AUTO: ABNORMAL FL
POCT GLUCOSE: 118 MG/DL (ref 70–110)
POCT GLUCOSE: 185 MG/DL (ref 70–110)
RBC # BLD AUTO: 2.82 X10(6)/MCL (ref 4.2–5.4)
WBC # SPEC AUTO: 3.39 X10(3)/MCL (ref 4.5–11.5)

## 2024-04-21 PROCEDURE — 25000003 PHARM REV CODE 250: Performed by: INTERNAL MEDICINE

## 2024-04-21 PROCEDURE — C9113 INJ PANTOPRAZOLE SODIUM, VIA: HCPCS | Performed by: NURSE PRACTITIONER

## 2024-04-21 PROCEDURE — 85025 COMPLETE CBC W/AUTO DIFF WBC: CPT | Performed by: INTERNAL MEDICINE

## 2024-04-21 PROCEDURE — 11000001 HC ACUTE MED/SURG PRIVATE ROOM

## 2024-04-21 PROCEDURE — 63600175 PHARM REV CODE 636 W HCPCS: Performed by: NURSE PRACTITIONER

## 2024-04-21 RX ADMIN — GABAPENTIN 600 MG: 300 CAPSULE ORAL at 08:04

## 2024-04-21 RX ADMIN — INSULIN ASPART 2 UNITS: 100 INJECTION, SOLUTION INTRAVENOUS; SUBCUTANEOUS at 08:04

## 2024-04-21 RX ADMIN — PANTOPRAZOLE SODIUM 40 MG: 40 INJECTION, POWDER, LYOPHILIZED, FOR SOLUTION INTRAVENOUS at 08:04

## 2024-04-21 RX ADMIN — MUPIROCIN: 20 OINTMENT TOPICAL at 08:04

## 2024-04-21 RX ADMIN — SERTRALINE HYDROCHLORIDE 50 MG: 50 TABLET ORAL at 08:04

## 2024-04-21 RX ADMIN — INSULIN ASPART 2 UNITS: 100 INJECTION, SOLUTION INTRAVENOUS; SUBCUTANEOUS at 04:04

## 2024-04-21 NOTE — PROGRESS NOTES
Indu Azul   MRN: [id number]   ADMISSION DATE: 2024  : 1955  AGE: 68 y.o.    DATE :  2024       PROVIDER: RACHEL HEATON      SUBJECTIVE:  Patient seems to be doing better.  One episode of small amount of blood in stool this morning.  None since then.  Otherwise doing okay      Review of Systems   One episode of small amount of blood in stool.  Otherwise doing well.  No cardiopulmonary symptoms.  No abdominal pain.  Tolerating food well.  No fever or chills.    Review of patient's allergies indicates:  No Known Allergies     Current Facility-Administered Medications   Medication Dose Route Frequency    gabapentin  600 mg Oral QHS    mupirocin   Nasal BID    pantoprazole  40 mg Intravenous BID    sertraline  50 mg Oral Nightly       Medications Discontinued During This Encounter   Medication Reason    potassium chloride 20 mEq in 100 mL IVPB (FOR CENTRAL LINE ADMINISTRATION ONLY)     sertraline tablet 50 mg         Current Facility-Administered Medications   Medication Dose Route Frequency Last Rate Last Admin        Past Medical History:   Diagnosis Date    Anesthesia complication     trouble awakening    Charcot's joint of foot, left     Chronic kidney disease, unspecified     Cirrhosis of liver without ascites     Depression     Diabetes mellitus, type II, insulin dependent     Diabetic neuropathy     GERD (gastroesophageal reflux disease)     H/O: hysterectomy     Hepatic steatosis     Malignant neoplasm of ascending colon     Obesity, unspecified     Overactive bladder     Thrombocytopenia, unspecified     Vitamin D deficiency       Social History     Socioeconomic History    Marital status:    Occupational History    Occupation: retired   Tobacco Use    Smoking status: Every Day     Current packs/day: 0.50     Average packs/day: 0.5 packs/day for 61.3 years (30.7 ttl pk-yrs)     Types: Cigarettes     Start date:      Passive exposure: Current    Smokeless tobacco: Never    Substance and Sexual Activity    Alcohol use: Not Currently     Comment: occassionally    Drug use: Never    Sexual activity: Not Currently     Social Determinants of Health     Financial Resource Strain: Low Risk  (2024)    Overall Financial Resource Strain (CARDIA)     Difficulty of Paying Living Expenses: Not very hard   Food Insecurity: No Food Insecurity (2024)    Hunger Vital Sign     Worried About Running Out of Food in the Last Year: Never true     Ran Out of Food in the Last Year: Never true   Transportation Needs: No Transportation Needs (2024)    PRAPARE - Transportation     Lack of Transportation (Medical): No     Lack of Transportation (Non-Medical): No   Stress: Stress Concern Present (2024)    [employer] [place] [employer] - Occupational Stress Questionnaire     Feeling of Stress : To some extent   Housing Stability: Low Risk  (2024)    Housing Stability Vital Sign     Unable to Pay for Housing in the Last Year: No     Number of Times Moved in the Last Year: 1     Homeless in the Last Year: No      Past Surgical History:   Procedure Laterality Date    APPENDECTOMY      BREAST BIOPSY  2016     SECTION      x3    charcot-leyden crystals identification      CHOLECYSTECTOMY      COLONOSCOPY N/A 2023    Procedure: COLON;  Surgeon: Luis Amador MD;  Location: [facility] ENDOSCOPY;  Service: Gastroenterology;  Laterality: N/A;    COLONOSCOPY, WITH 1 OR MORE BIOPSIES  2023    Procedure: COLONOSCOPY, WITH 1 OR MORE BIOPSIES;  Surgeon: Luis Amador MD;  Location: [facility] ENDOSCOPY;  Service: Gastroenterology;;    COLONOSCOPY, WITH DIRECTED SUBMUCOSAL INJECTION  2023    Procedure: COLONOSCOPY, WITH DIRECTED SUBMUCOSAL INJECTION;  Surgeon: Luis Amador MD;  Location: [facility] ENDOSCOPY;  Service: Gastroenterology;;  8cc Colon Tattoo    COLONOSCOPY, WITH POLYPECTOMY USING SNARE  2023    Procedure:  COLONOSCOPY, WITH POLYPECTOMY USING SNARE;  Surgeon: Luis Amador MD;  Location: [facility] ENDOSCOPY;  Service: Gastroenterology;;    ESOPHAGOGASTRODUODENOSCOPY N/A 08/28/2023    Procedure: DOUBLE;  Surgeon: Luis Amador MD;  Location: [facility] ENDOSCOPY;  Service: Gastroenterology;  Laterality: N/A;    ESOPHAGOGASTRODUODENOSCOPY N/A 4/19/2024    Procedure: EGD;  Surgeon: Nolan Massey MD;  Location: [facility] ENDOSCOPY;  Service: Gastroenterology;  Laterality: N/A;    HEMORRHOID SURGERY      HYSTERECTOMY  1990    total    INSERTION OF SUBCUTANEOUS PORT Right     INSERTION OF TUNNELED CENTRAL VENOUS CATHETER (CVC) WITH SUBCUTANEOUS PORT Right 11/20/2023    Procedure: [id number];  Surgeon: Timothy Russ MD;  Location: [facility] OR;  Service: General;  Laterality: Right;    POLYPECTOMY      right foot surgery Right 02/01/2022    XI ROBOTIC COLECTOMY, RIGHT N/A 10/19/2023    Procedure: XI ROBOTIC COLECTOMY, RIGHT;  Surgeon: Timothy Russ MD;  Location: [facility];  Service: General;  Laterality: N/A;        OBJECTIVE:     Vitals:    04/21/24 0330 04/21/24 0733 04/21/24 1105 04/21/24 1559   BP: 108/64 (!) 103/56 110/68 (!) 106/59   BP Location:  Left arm Left arm    Patient Position:  Lying Lying    Pulse: 77 78 71 76   Resp:  18 18 18   Temp: 97.8 °F (36.6 °C) 97.4 °F (36.3 °C) 97.8 °F (36.6 °C) 97.9 °F (36.6 °C)   TempSrc: Oral Oral Oral Oral   SpO2: 96% 96% 97% 96%   Weight:       Height:           Physical Exam   Constitutional:       General: She is not in acute distress.     Appearance: She is obese. She is not ill-appearing.   HENT:      Head: Normocephalic and atraumatic.   Eyes:      General: No scleral icterus.     Extraocular Movements: Extraocular movements intact.   Cardiovascular:      Rate and Rhythm: Normal rate and regular rhythm.   Pulmonary:      Effort: Pulmonary effort is normal. No respiratory distress.   Abdominal:      General: Bowel sounds are normal. There is  "no distension.      Palpations: Abdomen is soft. There is no mass.      Tenderness: None   Musculoskeletal:         General: Normal range of motion.      Right lower leg: No edema.      Left lower leg: No edema.   Skin:     General: Skin is warm and dry.   Neurological:      Mental Status: She is alert and oriented to person, place, and time.   Psychiatric:         Mood and Affect: Mood and affect normal    LABS    Recent Labs   Lab 04/19/24  0105 04/19/24  0511 04/19/24  2355 04/20/24  0753 04/21/24  0521   WBC 4.23*  --   --  2.85* 3.39*   HGB 8.4*   < > 7.6* 8.0* 7.6*   HCT 26.6*   < > 24.3* 25.5* 24.1*   PLT 95*  --   --  82* 83*    < > = values in this interval not displayed.      Recent Labs   Lab 04/15/24  1542 04/19/24  0105 04/19/24  1452 04/20/24  0546   * 136 139 140   K 4.0 2.9* 4.0 3.4*   CO2 20* 21* 20* 21*   BUN 30.3* 27.8* 22.1* 18.6   CREATININE 1.61* 1.28* 1.07* 1.07*   CALCIUM 8.5 8.5 7.9* 7.9*   BILITOT 0.9 0.6  --  0.5   ALKPHOS 90 114  --  97   ALT 16 20  --  16   AST 19 22  --  19   GLUCOSE 156* 162* 135* 120*      Recent Labs   Lab 04/20/24  0753   INR 1.3    No results for input(s): "AMYLASE" in the last 168 hours.   Recent Labs   Lab 04/19/24  0105 04/20/24  0753   INR 1.8* 1.3   PTT 29.6  --            RESULTS: No results found.           ICD-10-CM ICD-9-CM   1. Anticoagulated  Z79.01 V58.61   2. Weakness  R53.1 780.79   3. Gastrointestinal hemorrhage, unspecified gastrointestinal hemorrhage type  K92.2 578.9   4. Diarrhea, unspecified type  R19.7 787.91          ASSESSMENT & PLAN:   69 yo CF known to Dr. Amador with a PMH of DASH cirrhosis, grade 1-2 esophageal varices, thrombocytopenia, stage IIIB colon adenocarcinoma diagnosed 10/2023 s/p right hemicolectomy (Dr. 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  Here with GI bleeding.      GI bleeding: dark " red with clots  Acute on chronic anemia   Thrombocytopenia  AC use  H/O esophageal varices     Hgb 8.4 -- 7.9      - Given hx of esophageal varices and AC use, will proceed with EGD to evaluate for brisk UGIB for completeness. NPO.   - If EGD negative may consider CT GIB protocol to try to localize bleeding. Another potential source is anastomotic site.   -Continue iv ppi  -Monitor H/H q6 hours for now and transfuse as needed to Hgb 7  - monitor plts and transfuse to 50k in setting of active bleeding. No need at this time.   -Monitor stools for bleeding  - eliquis on hold. Last dose 4/1 4/21/24  Patient seems to be doing well from GI standpoint.  No overt GI bleed except a small episode blood in stool this morning.  Tolerating p.o. well.  Hemoglobin/hematocrit is fairly stable.  Anticoagulation on hold for now.  Colonoscopy done yesterday.  Results as follow:  1.  Multiple erosions with small clean based ulcerations, distal small bowel.  Photodocumentation obtained.  2. Hyperemic anastomotic site with no evidence of active bleed.  Biopsies obtained.    3. Grade 1-2 internal hemorrhoids.  Otherwise normal exam.    Follow up biopsy results.  GI consult service to follow the patient tomorrow.

## 2024-04-21 NOTE — PROGRESS NOTES
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 Patient's hemoglobin was found to be 7.9 INR of 1.8 with [place] 8[address]. Patient has been admitted to hospitalist service GI has been consulted     Interval Hx:   Patient seen and examined had 1 episode of bloody stool this morning hemoglobin of 7.6 Case was discussed with patient's nurse   Objective/physical exam:  General: In no acute distress, afebrile  Chest: Clear to auscultation bilaterally  Heart: RRR, +S1, S2, no appreciable murmur  Abdomen: Soft, nontender, BS +  MSK: Warm, no lower extremity edema, no clubbing or cyanosis  Neurologic: Alert and oriented x4,   VITAL SIGNS: 24 HRS MIN & MAX LAST   Temp  Min: 97.4 °F (36.3 °C)  Max: 98 °F (36.7 °C) 97.8 °F (36.6 °C)   BP  Min: 90/50  Max: 110/68 110/68   Pulse  Min: 71  Max: 80  71   Resp  Min: 18  Max: 18 18   SpO2  Min: 96 %  Max: 100 % 97 %     I have reviewed the following labs:  Recent Labs   Lab 04/19/24  0105 04/19/24  0511 04/19/24  2355 04/20/24  0753 04/21/24  0521   WBC 4.23*  --   --  2.85* 3.39*   RBC 3.15*  --   --  2.98* 2.82*   HGB 8.4*   < > 7.6* 8.0* 7.6*   HCT 26.6*   < > 24.3* 25.5* 24.1*   MCV 84.4  --   --  85.9 85.5   MCH 26.7*  --   --  26.5* 27.0   MCHC 31.6*  --   --  30.9* 31.5*   RDW 22.6*  --   --  22.8* 22.7*   PLT 95*  --   --  82* 83*    < > = values in this interval not displayed.     Recent Labs   Lab 04/15/24  1542 04/19/24  0105 04/19/24  1452 04/20/24  0546   * 136 139 140   K 4.0 2.9* 4.0 3.4*   CO2 20* 21* 20* 21*    BUN 30.3* 27.8* 22.1* 18.6   CREATININE 1.61* 1.28* 1.07* 1.07*   CALCIUM 8.5 8.5 7.9* 7.9*   MG  --  1.90  --   --    ALBUMIN 3.1* 2.7*  --  2.5*   ALKPHOS 90 114  --  97   ALT 16 20  --  16   AST 19 22  --  19   BILITOT 0.9 0.6  --  0.5     Microbiology Results (last 7 days)       ** No results found for the last 168 hours. **             See below for Radiology    Scheduled Med:  Current Facility-Administered Medications   Medication Dose Route Frequency    gabapentin  600 mg Oral QHS    mupirocin   Nasal BID    pantoprazole  40 mg Intravenous BID    sertraline  50 mg Oral Nightly      Continuous Infusions:  Current Facility-Administered Medications   Medication Dose Route Frequency Last Rate Last Admin      PRN Meds:    Current Facility-Administered Medications:     acetaminophen, 650 mg, Oral, Q8H PRN    acetaminophen, 650 mg, Oral, Q4H PRN    dextrose 10%, 12.5 g, Intravenous, PRN    dextrose 10%, 25 g, Intravenous, PRN    glucagon (human recombinant), 1 mg, Intramuscular, PRN    insulin aspart U-100, 1-10 Units, Subcutaneous, Q6H PRN    ondansetron, 4 mg, Intravenous, Q8H PRN     Assessment/Plan:  GI bleed   Acute on chronic anemia secondary to above   Hypokalemia   Stage IIIB colon cancer s/p right hemicolectomy, on chemotherapy   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  Type 2 DM, insulin dependent   Thrombocytopenia  CKD stage IIIb - stable     History:  Liver cirrhosis secondary to fatty liver disease, Depression, Diabetic neuropathy, GERD, Vitamin-D deficiency, Folate deficiency, iron-deficiency, Esophageal varices, Essential hypertension, Charcot's joint of left foot, Nicotine dependence    Colonoscopy showed multiple erosions with small clean based ulceration in the distal small bowel, hyperemic anastomotic site with no evidence of active bleeding, grade 1-2 internal hemorrhoids  Hemoglobin this morning of 7.6  Will advance diet to soft diet   Need to check with GI about when to resume anticoagulation as patient  does have history of DVT discussed with the nursing staff to check with him Eliquis is on hold   EGD showed small varices no source of bleeding  Continue with Protonix b.i.d.  Diet as per GI   Other home medications have been resumed   Repeat H&H in a.m.    Potential discharge once H&H stays stable and if okay with GI to start anticoagulation    VTE prophylaxis:  SCD for now    Patient condition:  Stable/Fair/Guarded/ Serious/ Critical    Anticipated discharge and Disposition:         All diagnosis and differential diagnosis have been reviewed; assessment and plan has been documented; I have personally reviewed the labs and test results that are presently available; I have reviewed the patients medication list; I have reviewed the consulting providers response and recommendations. I have reviewed or attempted to review medical records based upon their availability    All of the patient's questions have been  addressed and answered. Patient's is agreeable to the above stated plan. I will continue to monitor closely and make adjustments to medical management as needed.  _____________________________________________________________________    Nutrition Status:    Radiology:  I have personally reviewed the following imaging and agree with the radiologist.     CT Abdomen With Without Contrast  Narrative: EXAMINATION:  CT ABDOMEN W WO CONTRAST; CT PELVIS WITH AND WITHOUT CONTRAST    CLINICAL HISTORY:  MALIGNANT NEOPLASM OF ASCENDING COLON/ ABDOMINAL PAIN;Malignant neoplasm of ascending colon    TECHNIQUE:  CT imaging of the abdomen and pelvis before and after intravenous contrast.  Axial, coronal and sagittal images reviewed. Dose length product 1425 mGycm. Automatic exposure control, adjustment of mA/kV or iterative reconstruction technique used to limit radiation dose.    COMPARISON:  CT 09/07/2023    FINDINGS:  Liver/biliary: Cirrhosis.  No suspicious liver lesion identified.  Prior cholecystectomy. No biliary  dilatation.    Pancreas: Normal.    Spleen: Splenomegaly measures 16.5 cm.    Adrenals: Normal.    Genitourinary: Symmetric enhancement of the kidneys.  No hydronephrosis.  Normal bladder.    Stomach and bowel: Interval right hemicolectomy.  No dilated bowel.  No suspicious findings near the anastomosis.    Lymph nodes/peritoneum: Stable 10 mm aortocaval lymph node (series 4, image 55).  No ascites or free air. No fluid collection.    Vasculature: Mostly eccentric thrombus within the SMV and extending through the main portal vein.    Abdominal wall: Very mild postsurgical changes anteriorly.    Lung bases: No consolidation or pleural effusion.  Stable 3 mm solid lingular nodule (series 7, image 3).    Musculoskeletal: Similar mildly heterogeneous overall bone mineralization.  No aggressive osseous lesion identified.  Impression: Nonocclusive thrombus within the SMV and extending through the main portal vein.    Findings discussed with Dr. Olmstead at the time of dictation.    Electronically signed by: Abner Mcgregor  Date:    02/08/2024  Time:    11:27  CT Pelvis W Wo  Contrast  Narrative: EXAMINATION:  CT ABDOMEN W WO CONTRAST; CT PELVIS WITH AND WITHOUT CONTRAST    CLINICAL HISTORY:  MALIGNANT NEOPLASM OF ASCENDING COLON/ ABDOMINAL PAIN;Malignant neoplasm of ascending colon    TECHNIQUE:  CT imaging of the abdomen and pelvis before and after intravenous contrast.  Axial, coronal and sagittal images reviewed. Dose length product 1425 mGycm. Automatic exposure control, adjustment of mA/kV or iterative reconstruction technique used to limit radiation dose.    COMPARISON:  CT 09/07/2023    FINDINGS:  Liver/biliary: Cirrhosis.  No suspicious liver lesion identified.  Prior cholecystectomy. No biliary dilatation.    Pancreas: Normal.    Spleen: Splenomegaly measures 16.5 cm.    Adrenals: Normal.    Genitourinary: Symmetric enhancement of the kidneys.  No hydronephrosis.  Normal bladder.    Stomach and bowel: Interval  right hemicolectomy.  No dilated bowel.  No suspicious findings near the anastomosis.    Lymph nodes/peritoneum: Stable 10 mm aortocaval lymph node (series 4, image 55).  No ascites or free air. No fluid collection.    Vasculature: Mostly eccentric thrombus within the SMV and extending through the main portal vein.    Abdominal wall: Very mild postsurgical changes anteriorly.    Lung bases: No consolidation or pleural effusion.  Stable 3 mm solid lingular nodule (series 7, image 3).    Musculoskeletal: Similar mildly heterogeneous overall bone mineralization.  No aggressive osseous lesion identified.  Impression: Nonocclusive thrombus within the SMV and extending through the main portal vein.    Findings discussed with Dr. Olmstead at the time of dictation.    Electronically signed by: Abner Mcgregor  Date:    02/08/2024  Time:    11:27      Kenna Choi MD  Department of Hospital Medicine   [facility]   04/21/2024

## 2024-04-22 ENCOUNTER — TELEPHONE (OUTPATIENT)
Dept: HEMATOLOGY/ONCOLOGY | Facility: CLINIC | Age: 69
End: 2024-04-22
Payer: MEDICARE

## 2024-04-22 LAB
ABO + RH BLD: NORMAL
ANION GAP SERPL CALC-SCNC: 8 MEQ/L
BASOPHILS # BLD AUTO: 0.02 X10(3)/MCL
BASOPHILS NFR BLD AUTO: 0.6 %
BLD PROD TYP BPU: NORMAL
BLOOD UNIT EXPIRATION DATE: NORMAL
BLOOD UNIT TYPE CODE: 5100
BUN SERPL-MCNC: 15.2 MG/DL (ref 9.8–20.1)
CALCIUM SERPL-MCNC: 7.8 MG/DL (ref 8.4–10.2)
CHLORIDE SERPL-SCNC: 110 MMOL/L (ref 98–107)
CO2 SERPL-SCNC: 21 MMOL/L (ref 23–31)
CREAT SERPL-MCNC: 1.03 MG/DL (ref 0.55–1.02)
CREAT/UREA NIT SERPL: 15
CROSSMATCH INTERPRETATION: NORMAL
DISPENSE STATUS: NORMAL
EOSINOPHIL # BLD AUTO: 0.13 X10(3)/MCL (ref 0–0.9)
EOSINOPHIL NFR BLD AUTO: 3.7 %
ERYTHROCYTE [DISTWIDTH] IN BLOOD BY AUTOMATED COUNT: 22.5 % (ref 11.5–17)
GFR SERPLBLD CREATININE-BSD FMLA CKD-EPI: 60 MLS/MIN/1.73/M2
GLUCOSE SERPL-MCNC: 150 MG/DL (ref 70–110)
GLUCOSE SERPL-MCNC: 172 MG/DL (ref 82–115)
GLUCOSE SERPL-MCNC: 208 MG/DL (ref 70–110)
HCT VFR BLD AUTO: 23.6 % (ref 37–47)
HGB BLD-MCNC: 7.5 G/DL (ref 12–16)
IMM GRANULOCYTES # BLD AUTO: 0.12 X10(3)/MCL (ref 0–0.04)
IMM GRANULOCYTES NFR BLD AUTO: 3.4 %
LYMPHOCYTES # BLD AUTO: 0.95 X10(3)/MCL (ref 0.6–4.6)
LYMPHOCYTES NFR BLD AUTO: 26.8 %
MCH RBC QN AUTO: 26.9 PG (ref 27–31)
MCHC RBC AUTO-ENTMCNC: 31.8 G/DL (ref 33–36)
MCV RBC AUTO: 84.6 FL (ref 80–94)
MONOCYTES # BLD AUTO: 0.5 X10(3)/MCL (ref 0.1–1.3)
MONOCYTES NFR BLD AUTO: 14.1 %
NEUTROPHILS # BLD AUTO: 1.82 X10(3)/MCL (ref 2.1–9.2)
NEUTROPHILS NFR BLD AUTO: 51.4 %
NRBC BLD AUTO-RTO: 0 %
PLATELET # BLD AUTO: 82 X10(3)/MCL (ref 130–400)
PLATELETS.RETICULATED NFR BLD AUTO: 4.4 % (ref 0.9–11.2)
PMV BLD AUTO: ABNORMAL FL
POCT GLUCOSE: 206 MG/DL (ref 70–110)
POTASSIUM SERPL-SCNC: 3.8 MMOL/L (ref 3.5–5.1)
RBC # BLD AUTO: 2.79 X10(6)/MCL (ref 4.2–5.4)
SODIUM SERPL-SCNC: 139 MMOL/L (ref 136–145)
UNIT NUMBER: NORMAL
WBC # SPEC AUTO: 3.54 X10(3)/MCL (ref 4.5–11.5)

## 2024-04-22 PROCEDURE — 80048 BASIC METABOLIC PNL TOTAL CA: CPT | Performed by: INTERNAL MEDICINE

## 2024-04-22 PROCEDURE — 36430 TRANSFUSION BLD/BLD COMPNT: CPT

## 2024-04-22 PROCEDURE — 63600175 PHARM REV CODE 636 W HCPCS: Performed by: NURSE PRACTITIONER

## 2024-04-22 PROCEDURE — C9113 INJ PANTOPRAZOLE SODIUM, VIA: HCPCS | Performed by: NURSE PRACTITIONER

## 2024-04-22 PROCEDURE — 25000003 PHARM REV CODE 250: Performed by: INTERNAL MEDICINE

## 2024-04-22 PROCEDURE — 99223 1ST HOSP IP/OBS HIGH 75: CPT | Mod: ,,, | Performed by: INTERNAL MEDICINE

## 2024-04-22 PROCEDURE — 11000001 HC ACUTE MED/SURG PRIVATE ROOM

## 2024-04-22 PROCEDURE — 30233N1 TRANSFUSION OF NONAUTOLOGOUS RED BLOOD CELLS INTO PERIPHERAL VEIN, PERCUTANEOUS APPROACH: ICD-10-PCS | Performed by: INTERNAL MEDICINE

## 2024-04-22 PROCEDURE — P9016 RBC LEUKOCYTES REDUCED: HCPCS | Performed by: INTERNAL MEDICINE

## 2024-04-22 PROCEDURE — 85025 COMPLETE CBC W/AUTO DIFF WBC: CPT | Performed by: INTERNAL MEDICINE

## 2024-04-22 PROCEDURE — 86923 COMPATIBILITY TEST ELECTRIC: CPT | Performed by: INTERNAL MEDICINE

## 2024-04-22 RX ORDER — HYDROCODONE BITARTRATE AND ACETAMINOPHEN 500; 5 MG/1; MG/1
TABLET ORAL
Status: DISCONTINUED | OUTPATIENT
Start: 2024-04-22 | End: 2024-04-24 | Stop reason: HOSPADM

## 2024-04-22 RX ORDER — MICONAZOLE NITRATE 2 %
POWDER (GRAM) TOPICAL 2 TIMES DAILY
Status: DISCONTINUED | OUTPATIENT
Start: 2024-04-22 | End: 2024-04-24 | Stop reason: HOSPADM

## 2024-04-22 RX ORDER — AMMONIUM LACTATE 12 G/100G
LOTION TOPICAL 2 TIMES DAILY
Status: DISCONTINUED | OUTPATIENT
Start: 2024-04-22 | End: 2024-04-24 | Stop reason: HOSPADM

## 2024-04-22 RX ADMIN — PANTOPRAZOLE SODIUM 40 MG: 40 INJECTION, POWDER, LYOPHILIZED, FOR SOLUTION INTRAVENOUS at 09:04

## 2024-04-22 RX ADMIN — PANTOPRAZOLE SODIUM 40 MG: 40 INJECTION, POWDER, LYOPHILIZED, FOR SOLUTION INTRAVENOUS at 12:04

## 2024-04-22 RX ADMIN — SERTRALINE HYDROCHLORIDE 50 MG: 50 TABLET ORAL at 09:04

## 2024-04-22 RX ADMIN — MUPIROCIN: 20 OINTMENT TOPICAL at 09:04

## 2024-04-22 RX ADMIN — GABAPENTIN 600 MG: 300 CAPSULE ORAL at 09:04

## 2024-04-22 RX ADMIN — AMMONIUM LACTATE: 12 LOTION TOPICAL at 12:04

## 2024-04-22 RX ADMIN — INSULIN ASPART 2 UNITS: 100 INJECTION, SOLUTION INTRAVENOUS; SUBCUTANEOUS at 10:04

## 2024-04-22 RX ADMIN — AMMONIUM LACTATE: 12 LOTION TOPICAL at 09:04

## 2024-04-22 RX ADMIN — MICONAZOLE NITRATE: 20 POWDER TOPICAL at 10:04

## 2024-04-22 NOTE — ANESTHESIA POSTPROCEDURE EVALUATION
Anesthesia Post Evaluation    Patient: Indu Azul    Procedure(s) Performed: Procedure(s) (LRB):  EGD (N/A)    Final Anesthesia Type: general      Patient location during evaluation: GI PACU  Patient participation: Yes- Able to Participate  Level of consciousness: awake and alert  Post-procedure vital signs: reviewed and stable  Pain management: adequate  Airway patency: patent    PONV status at discharge: No PONV  Anesthetic complications: no      Respiratory status: unassisted  Hydration status: euvolemic  Follow-up not needed.              Vitals Value Taken Time   /64 04/19/24 1149   Temp 35.8 °C (96.4 °F) 04/19/24 1130   Pulse 74 04/19/24 1149   Resp 15 04/19/24 1149   SpO2 99 % 04/19/24 1149         No case tracking events are documented in the log.      Pain/Lisa Score: No data recorded

## 2024-04-22 NOTE — PROGRESS NOTES
[facility] - 5th Floor Med Surg  Wound Care    Patient Name:  Indu Azul   MRN:  [id number]  Date: 4/22/2024  Diagnosis: <principal problem not specified>    History:     Past Medical History:   Diagnosis Date    Anesthesia complication     trouble awakening    Charcot's joint of foot, left     Chronic kidney disease, unspecified     Cirrhosis of liver without ascites     Depression     Diabetes mellitus, type II, insulin dependent     Diabetic neuropathy     GERD (gastroesophageal reflux disease)     H/O: hysterectomy     Hepatic steatosis     Malignant neoplasm of ascending colon     Obesity, unspecified     Overactive bladder     Thrombocytopenia, unspecified     Vitamin D deficiency        Social History     Socioeconomic History    Marital status:    Occupational History    Occupation: retired   Tobacco Use    Smoking status: Every Day     Current packs/day: 0.50     Average packs/day: 0.5 packs/day for 61.3 years (30.7 ttl pk-yrs)     Types: Cigarettes     Start date: 2003     Passive exposure: Current    Smokeless tobacco: Never   Substance and Sexual Activity    Alcohol use: Not Currently     Comment: occassionally    Drug use: Never    Sexual activity: Not Currently     Social Determinants of Health     Financial Resource Strain: Low Risk  (4/20/2024)    Overall Financial Resource Strain (CARDIA)     Difficulty of Paying Living Expenses: Not very hard   Food Insecurity: No Food Insecurity (4/20/2024)    Hunger Vital Sign     Worried About Running Out of Food in the Last Year: Never true     Ran Out of Food in the Last Year: Never true   Transportation Needs: No Transportation Needs (4/20/2024)    PRAPARE - Transportation     Lack of Transportation (Medical): No     Lack of Transportation (Non-Medical): No   Stress: Stress Concern Present (4/20/2024)    [employer] [place] [employer] - Occupational Stress Questionnaire     Feeling of Stress : To some extent   Housing  Stability: Low Risk  (4/20/2024)    Housing Stability Vital Sign     Unable to Pay for Housing in the Last Year: No     Number of Times Moved in the Last Year: 1     Homeless in the Last Year: No       Precautions:     Allergies as of 04/19/2024    (No Known Allergies)       [facility] Assessment Details/Treatment      04/22/24 0926   WOCN Assessment   Visit Date 04/22/24   Visit Time 0926   Consult Type New   WOCN Speciality Wound   Intervention chart review;assessed;applied;orders   Teaching on-going        Wound 04/19/24 0900 Skin Tear Right medial Lower quadrant #1   Date First Assessed/Time First Assessed: 04/19/24 0900   Present on Original Admission: Yes  Primary Wound Type: Skin Tear  Side: Right  Orientation: medial  Location: Lower quadrant  Wound Number: #1   Wound Image    Dressing Appearance Open to air   Drainage Amount None   Drainage Characteristics/Odor No odor   Appearance Pink;Red;Moist   Tissue loss description Partial thickness   Black (%), Wound Tissue Color 0 %   Red (%), Wound Tissue Color 100 %   Yellow (%), Wound Tissue Color 0 %   Periwound Area Intact;Dry;Pink   Wound Edges Defined   Wound Length (cm) 1.9 cm   Wound Width (cm) 0.3 cm   Wound Depth (cm) 0.1 cm   Wound Volume (cm^3) 0.057 cm^3   Wound Surface Area (cm^2) 0.57 cm^2   Care Cleansed with:;Antimicrobial agent;Wound cleanser;Other (see comments)  (Vashe)     WOCN consulted for right abd fold. Discussed plan of care with nurse Hernandez prior to visiting the patient. Introduced self and explained reason for visit, patient agreeable to be seen. Family at bedside. Treatment recommendations in place. Abd folds: Cleanse with hibiclens, dry well. Apply bactroban to open area and apply powder over area BID and PRN with soilage. Lac-hydrin to legs and arms for dryness. Educated the patient on the importance of mobilizing when possible and maintaining good hygiene regimen, she verbalized understanding. Nursing to continue with treatment  recommendations and other preventative measures. DANIELLE mattress ordered. No further questions at this time from staff, patient or family. Will follow up.   04/22/2024

## 2024-04-22 NOTE — PROGRESS NOTES
"Gastroenterology Progress Note    Subjective/Interval History:  Spoke with hospitalist and nurse regarding patient. Hypotension noted with BP as low as 88/47 overnight, Hgb stable at 7.5. 1u PRBC ordered. No BM. Asking about resuming Eliquis.    Patient resting in bed, guest at bedside. Denies n/v. Admits to generalized abd tenderness which is mild. Denies BM. She is asking for her oncologist Dr. Olmstead to be consulted.    ROS:  Review of Systems   Constitutional:  Positive for malaise/fatigue.   Respiratory:  Negative for cough and shortness of breath.    Cardiovascular:  Negative for chest pain.   Gastrointestinal:  Positive for abdominal pain. Negative for blood in stool, constipation, diarrhea, melena, nausea and vomiting.   Neurological:  Positive for weakness.   All other systems reviewed and are negative.      Vital Signs:  /64 (BP Location: Left arm, Patient Position: Lying)   Pulse 76   Temp 98 °F (36.7 °C) (Oral)   Resp 18   Ht 5' 3" (1.6 m)   Wt 101.5 kg (223 lb 12.3 oz)   SpO2 (!) 94%   BMI 39.64 kg/m²   Body mass index is 39.64 kg/m².    Physical Exam:  Physical Exam  Constitutional:       General: She is not in acute distress.     Appearance: She is obese. She is ill-appearing.   HENT:      Head: Normocephalic and atraumatic.      Right Ear: External ear normal.      Left Ear: External ear normal.      Nose: Nose normal.      Mouth/Throat:      Pharynx: Oropharynx is clear.   Eyes:      Conjunctiva/sclera: Conjunctivae normal.   Pulmonary:      Effort: Pulmonary effort is normal. No respiratory distress.   Abdominal:      General: Abdomen is flat. There is no distension.      Palpations: Abdomen is soft.      Tenderness: There is generalized abdominal tenderness. There is no guarding.   Musculoskeletal:         General: Normal range of motion.      Cervical back: Normal range of motion.   Skin:     General: Skin is dry.      Coloration: Skin is pale.   Neurological:      Mental Status: " She is alert and oriented to person, place, and time. Mental status is at baseline.   Psychiatric:         Mood and Affect: Mood normal.         Behavior: Behavior normal.         Thought Content: Thought content normal.         Labs:  Recent Results (from the past 24 hour(s))   POCT glucose    Collection Time: 04/21/24  4:01 PM   Result Value Ref Range    POCT Glucose 185 (H) 70 - 110 mg/dL   POCT glucose    Collection Time: 04/21/24  8:46 PM   Result Value Ref Range    POCT Glucose 206 (H) 70 - 110 mg/dL   CBC with Differential    Collection Time: 04/22/24  4:33 AM   Result Value Ref Range    WBC 3.54 (L) 4.50 - 11.50 x10(3)/mcL    RBC 2.79 (L) 4.20 - 5.40 x10(6)/mcL    Hgb 7.5 (L) 12.0 - 16.0 g/dL    Hct 23.6 (L) 37.0 - 47.0 %    MCV 84.6 80.0 - 94.0 fL    MCH 26.9 (L) 27.0 - 31.0 pg    MCHC 31.8 (L) 33.0 - 36.0 g/dL    RDW 22.5 (H) 11.5 - 17.0 %    Platelet 82 (L) 130 - 400 x10(3)/mcL    MPV      Neut % 51.4 %    Lymph % 26.8 %    Mono % 14.1 %    Eos % 3.7 %    Basophil % 0.6 %    Lymph # 0.95 0.6 - 4.6 x10(3)/mcL    Neut # 1.82 (L) 2.1 - 9.2 x10(3)/mcL    Mono # 0.50 0.1 - 1.3 x10(3)/mcL    Eos # 0.13 0 - 0.9 x10(3)/mcL    Baso # 0.02 <=0.2 x10(3)/mcL    IG# 0.12 (H) 0 - 0.04 x10(3)/mcL    IG% 3.4 %    NRBC% 0.0 %    IPF 4.4 0.9 - 11.2 %   Basic Metabolic Panel    Collection Time: 04/22/24  4:34 AM   Result Value Ref Range    Sodium Level 139 136 - 145 mmol/L    Potassium Level 3.8 3.5 - 5.1 mmol/L    Chloride 110 (H) 98 - 107 mmol/L    Carbon Dioxide 21 (L) 23 - 31 mmol/L    Glucose Level 172 (H) 82 - 115 mg/dL    Blood Urea Nitrogen 15.2 9.8 - 20.1 mg/dL    Creatinine 1.03 (H) 0.55 - 1.02 mg/dL    BUN/Creatinine Ratio 15     Calcium Level Total 7.8 (L) 8.4 - 10.2 mg/dL    Anion Gap 8.0 mEq/L    eGFR 60 mls/min/1.73/m2   POCT Glucose, Hand-Held Device    Collection Time: 04/22/24  4:56 AM   Result Value Ref Range    POC Glucose 150 (A) 70 - 110 MG/DL         Assessment/Plan:  69 yo CF known to Dr. Amador  with a PMH of DASH cirrhosis, grade 1-2 esophageal varices, thrombocytopenia, stage IIIB colon adenocarcinoma diagnosed 10/2023 s/p right hemicolectomy (Dr. 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  Here with GI bleeding.     EGD 04/19/24: small [<5mm] esophageal varices, portal hypertensive gastropathy    Colonoscopy 04/20/24: multiple erosions with small clean based ulcerations in distal small bowel; hyperemic anastomotic site with no evidence of active bleeding; grade 1-2 internal hemorrhoids    Acute on chronic anemia with component 2/2 GI blood loss  Portal hypertensive gastropathy  Esophageal varices, small  Distal small bowel ulcerations and erosions  H/o colon cancer s/p right hemicolectomy  -monitor and transfuse as needed to keep Hgb >7-8. To receive 1u PRBC today  -monitor stool for color/blood  -PPI BID  -soft diet as tolerated  -f/u path  -will need repeat EGD in 1 year for esophageal variceal surveillance - our office will arrange    Thrombocytopenia  -plt has ranged 80s-90s this admission  -monitor plts and transfuse to 50k in setting of active bleeding. No need at this time.     SMV thrombus  -on Eliquis - held since 04/18/24  -will d/w Dr. Cole regarding restarting anticoagulation     Geraldine Hightower, PA-C  Gastroenterology  [facility]

## 2024-04-22 NOTE — PLAN OF CARE
04/22/24 1456   Discharge Assessment   Assessment Type Discharge Planning Assessment   Confirmed/corrected address, phone number and insurance Yes   Confirmed Demographics Correct on Facesheet   Source of Information patient   Communicated JUAN with patient/caregiver Date not available/Unable to determine   Reason For Admission weakness, GI hemmorrhage,   People in Home alone   Do you expect to return to your current living situation? Yes   Do you have help at home or someone to help you manage your care at home? Yes   Who are your caregiver(s) and their phone number(s)? daughter Elva Barrera   Prior to hospitilization cognitive status: Alert/Oriented   Current cognitive status: Alert/Oriented   Walking or Climbing Stairs Difficulty yes   Walking or Climbing Stairs stair climbing difficulty, assistance 1 person;ambulation difficulty, requires equipment;transferring difficulty, requires equipment   Mobility Management uses various devices wheelchair most often at home   Dressing/Bathing Difficulty no   Home Accessibility wheelchair accessible   Home Layout Able to live on 1st floor   Equipment Currently Used at Home wheelchair;walker, rolling;cane, quad   Readmission within 30 days? No   Patient currently being followed by outpatient case management? No   Do you currently have service(s) that help you manage your care at home? No   Do you take prescription medications? Yes   Do you have prescription coverage? Yes   Coverage Medicare and Medicaid   Do you have any problems affording any of your prescribed medications? No   Is the patient taking medications as prescribed? yes   Who is going to help you get home at discharge? daughter via car   How do you get to doctors appointments? family or friend will provide   Are you on dialysis? No   Do you take coumadin? No   Discharge Plan A Home with family   Discharge Plan B Home Health   DME Needed Upon Discharge  none   Discharge Plan discussed with: Patient;Adult  children   Transition of Care Barriers None

## 2024-04-22 NOTE — TELEPHONE ENCOUNTER
Patient's daughter called to report that she is inpatient with rectal bleeding. Has f/u appointment with you 5/08/24.

## 2024-04-22 NOTE — PROGRESS NOTES
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 Patient's hemoglobin was found to be 7.9 INR of 1.8 with platelet count of 7[address]. Patient has been admitted to hospitalist service GI has been consulted     Interval Hx:   Patient seen and examined this morning reports she is feeling tired and fatigued overnight patient was slightly hypotensive but patient's daughter said her blood pressure is usually on the lower side but not this low        Objective/physical exam:  General: In no acute distress, afebrile  Chest: Clear to auscultation bilaterally  Heart: RRR, +S1, S2, no appreciable murmur  Abdomen: Soft, nontender, BS +  MSK: Warm, no lower extremity edema, no clubbing or cyanosis  Neurologic: Alert and oriented x4,   VITAL SIGNS: 24 HRS MIN & MAX LAST   Temp  Min: 97.3 °F (36.3 °C)  Max: 98.4 °F (36.9 °C) 98 °F (36.7 °C)   BP  Min: 88/47  Max: 111/68 102/64   Pulse  Min: 71  Max: 78  76   Resp  Min: 18  Max: 18 18   SpO2  Min: 94 %  Max: 97 % (!) 94 %     I have reviewed the following labs:  Recent Labs   Lab 04/20/24  0753 04/21/24  0521 04/22/24  0433   WBC 2.85* 3.39* 3.54*   RBC 2.98* 2.82* 2.79*   HGB 8.0* 7.6* 7.5*   HCT 25.5* 24.1* 23.6*   MCV 85.9 85.5 84.6   MCH 26.5* 27.0 26.9*   MCHC 30.9* 31.5* 31.8*   RDW 22.8* 22.7* 22.5*   PLT 82* 83* 82*     Recent Labs   Lab 04/15/24  1542 04/19/24  0105 04/19/24  1452 04/20/24  0546   * 136 139 140   K 4.0 2.9* 4.0 3.4*   CO2 20* 21* 20* 21*   BUN 30.3* 27.8* 22.1* 18.6   CREATININE 1.61* 1.28* 1.07* 1.07*   CALCIUM  8.5 8.5 7.9* 7.9*   MG  --  1.90  --   --    ALBUMIN 3.1* 2.7*  --  2.5*   ALKPHOS 90 114  --  97   ALT 16 20  --  16   AST 19 22  --  19   BILITOT 0.9 0.6  --  0.5     Microbiology Results (last 7 days)       ** No results found for the last 168 hours. **             See below for Radiology    Scheduled Med:  Current Facility-Administered Medications   Medication Dose Route Frequency    gabapentin  600 mg Oral QHS    mupirocin   Nasal BID    pantoprazole  40 mg Intravenous BID    sertraline  50 mg Oral Nightly      Continuous Infusions:  Current Facility-Administered Medications   Medication Dose Route Frequency Last Rate Last Admin      PRN Meds:    Current Facility-Administered Medications:     0.9%  NaCl infusion (for blood administration), , Intravenous, Q24H PRN    acetaminophen, 650 mg, Oral, Q8H PRN    acetaminophen, 650 mg, Oral, Q4H PRN    dextrose 10%, 12.5 g, Intravenous, PRN    dextrose 10%, 25 g, Intravenous, PRN    glucagon (human recombinant), 1 mg, Intramuscular, PRN    insulin aspart U-100, 1-10 Units, Subcutaneous, Q6H PRN    ondansetron, 4 mg, Intravenous, Q8H PRN     Assessment/Plan:  GI bleed   Acute on chronic anemia secondary to above   Hypokalemia   Stage IIIB colon cancer s/p right hemicolectomy, on chemotherapy   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  Type 2 DM, insulin dependent   Thrombocytopenia  CKD stage IIIb - stable     History:  Liver cirrhosis secondary to fatty liver disease, Depression, Diabetic neuropathy, GERD, Vitamin-D deficiency, Folate deficiency, iron-deficiency, Esophageal varices, Essential hypertension, Charcot's joint of left foot, Nicotine dependence    I will give 1 more unit of packed RBC today, ordered BMP   Awaiting GI recommendations to see when we have to start anticoagulation and the plan for discharge  Colonoscopy showed multiple erosions with small clean based ulceration in the distal small bowel, hyperemic anastomotic site with no evidence of active bleeding, grade 1-2  internal hemorrhoids  Hemoglobin this morning of 7.6  Will advance diet to soft diet   Need to check with GI about when to resume anticoagulation as patient does have history of DVT discussed with the nursing staff to check with him Eliquis is on hold   EGD showed small varices no source of bleeding  Continue with Protonix b.i.d.  Diet as per GI   Other home medications have been resumed       VTE prophylaxis:  SCD for now    Patient condition:  Stable/Fair/Guarded/ Serious/ Critical    Anticipated discharge and Disposition:         All diagnosis and differential diagnosis have been reviewed; assessment and plan has been documented; I have personally reviewed the labs and test results that are presently available; I have reviewed the patients medication list; I have reviewed the consulting providers response and recommendations. I have reviewed or attempted to review medical records based upon their availability    All of the patient's questions have been  addressed and answered. Patient's is agreeable to the above stated plan. I will continue to monitor closely and make adjustments to medical management as needed.  _____________________________________________________________________    Nutrition Status:    Radiology:  I have personally reviewed the following imaging and agree with the radiologist.     CT Abdomen With Without Contrast  Narrative: EXAMINATION:  CT ABDOMEN W WO CONTRAST; CT PELVIS WITH AND WITHOUT CONTRAST    CLINICAL HISTORY:  MALIGNANT NEOPLASM OF ASCENDING COLON/ ABDOMINAL PAIN;Malignant neoplasm of ascending colon    TECHNIQUE:  CT imaging of the abdomen and pelvis before and after intravenous contrast.  Axial, coronal and sagittal images reviewed. Dose length product 1425 mGycm. Automatic exposure control, adjustment of mA/kV or iterative reconstruction technique used to limit radiation dose.    COMPARISON:  CT 09/07/2023    FINDINGS:  Liver/biliary: Cirrhosis.  No suspicious liver lesion  identified.  Prior cholecystectomy. No biliary dilatation.    Pancreas: Normal.    Spleen: Splenomegaly measures 16.5 cm.    Adrenals: Normal.    Genitourinary: Symmetric enhancement of the kidneys.  No hydronephrosis.  Normal bladder.    Stomach and bowel: Interval right hemicolectomy.  No dilated bowel.  No suspicious findings near the anastomosis.    Lymph nodes/peritoneum: Stable 10 mm aortocaval lymph node (series 4, image 55).  No ascites or free air. No fluid collection.    Vasculature: Mostly eccentric thrombus within the SMV and extending through the main portal vein.    Abdominal wall: Very mild postsurgical changes anteriorly.    Lung bases: No consolidation or pleural effusion.  Stable 3 mm solid lingular nodule (series 7, image 3).    Musculoskeletal: Similar mildly heterogeneous overall bone mineralization.  No aggressive osseous lesion identified.  Impression: Nonocclusive thrombus within the SMV and extending through the main portal vein.    Findings discussed with Dr. Olmstead at the time of dictation.    Electronically signed by: Abner Mcgregor  Date:    02/08/2024  Time:    11:27  CT Pelvis W Wo  Contrast  Narrative: EXAMINATION:  CT ABDOMEN W WO CONTRAST; CT PELVIS WITH AND WITHOUT CONTRAST    CLINICAL HISTORY:  MALIGNANT NEOPLASM OF ASCENDING COLON/ ABDOMINAL PAIN;Malignant neoplasm of ascending colon    TECHNIQUE:  CT imaging of the abdomen and pelvis before and after intravenous contrast.  Axial, coronal and sagittal images reviewed. Dose length product 1425 mGycm. Automatic exposure control, adjustment of mA/kV or iterative reconstruction technique used to limit radiation dose.    COMPARISON:  CT 09/07/2023    FINDINGS:  Liver/biliary: Cirrhosis.  No suspicious liver lesion identified.  Prior cholecystectomy. No biliary dilatation.    Pancreas: Normal.    Spleen: Splenomegaly measures 16.5 cm.    Adrenals: Normal.    Genitourinary: Symmetric enhancement of the kidneys.  No hydronephrosis.   Normal bladder.    Stomach and bowel: Interval right hemicolectomy.  No dilated bowel.  No suspicious findings near the anastomosis.    Lymph nodes/peritoneum: Stable 10 mm aortocaval lymph node (series 4, image 55).  No ascites or free air. No fluid collection.    Vasculature: Mostly eccentric thrombus within the SMV and extending through the main portal vein.    Abdominal wall: Very mild postsurgical changes anteriorly.    Lung bases: No consolidation or pleural effusion.  Stable 3 mm solid lingular nodule (series 7, image 3).    Musculoskeletal: Similar mildly heterogeneous overall bone mineralization.  No aggressive osseous lesion identified.  Impression: Nonocclusive thrombus within the SMV and extending through the main portal vein.    Findings discussed with Dr. Olmstead at the time of dictation.    Electronically signed by: Abner Mcgregor  Date:    02/08/2024  Time:    11:27      Kenna Choi MD  Department of Hospital Medicine   [facility]   04/22/2024

## 2024-04-22 NOTE — CONSULTS
[facility] - Oncology Acute  Hematology/Oncology  Progress Note      Consult Requested By: Kenna Choi MD    Reason for Consult:     SUBJECTIVE:     History of Present Illness:  Patient is a 68 y.o. female well known to me with history of stage IIIB colon cancer when she was found to have iron deficiency anemia.  EGD performed, grade I-II esophageal varices found, too small to band. She also had Colonoscopy by Dr Hammonds that showed an ulcerated malignant-appearing partially obstructing medium sized mass in the ascending colon.      She underwent lab/jaswinder right colon resection on 10/19/2023. Path c/w really differentiated adeno carcinoma.  Started on adjuvant FOLFOX with dose reduction oxaliplatin due to neuropathy started on 11/28/2023 for which she received 7 cycles, she was not tolerating chemo very well so chemo was switched to Xeloda and oxaliplatin.  Last cycle was on 03/26/2024.        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 She  presented to ER with complaints of blood in the stool with blood clots and diarrhea. Patient's hemoglobin was found to be 7.9 INR of 1.8 with platelet count of 95,000. Patient presents to [facility] via EMS on 4/19/2024 with c/o blood in stool with clots on the evening of presentation.  Patient endorsed diarrhea over the past several days.  She reports constipation x4 days for which she took laxatives and then began having diarrhea for which she then took Imodium.  She denied abdominal pain, nausea, vomiting, fever or chills.     Patient is seen in rounds this afternoon.  She is lying in bed.  Daughter nurse at bedside.  She underwent EGD on 04/19/2024 that showed very small esophageal varices, portal hypertensive gastropathy.  Colonoscopy 04/20/2024 showed multiple  erosions with small clean based ulceration in the distal small bowel, hyperemic anastomotic site with no evidence of active bleeding.  Grade 1-2 internal hemorrhoids.    Rectal bleeding has essentially resolved.  She has not have bowel movement last day or so.    Continuous Infusions:  Current Facility-Administered Medications   Medication Dose Route Frequency Last Rate Last Admin     Scheduled Meds:  Current Facility-Administered Medications   Medication Dose Route Frequency    gabapentin  600 mg Oral QHS    mupirocin   Nasal BID    pantoprazole  40 mg Intravenous BID    sertraline  50 mg Oral Nightly     PRN Meds:  Current Facility-Administered Medications:     0.9%  NaCl infusion (for blood administration), , Intravenous, Q24H PRN    acetaminophen, 650 mg, Oral, Q8H PRN    acetaminophen, 650 mg, Oral, Q4H PRN    dextrose 10%, 12.5 g, Intravenous, PRN    dextrose 10%, 25 g, Intravenous, PRN    glucagon (human recombinant), 1 mg, Intramuscular, PRN    insulin aspart U-100, 1-10 Units, Subcutaneous, Q6H PRN    ondansetron, 4 mg, Intravenous, Q8H PRN    Past Medical History:   Diagnosis Date    Anesthesia complication     trouble awakening    Charcot's joint of foot, left     Chronic kidney disease, unspecified     Cirrhosis of liver without ascites     Depression     Diabetes mellitus, type II, insulin dependent     Diabetic neuropathy     GERD (gastroesophageal reflux disease)     H/O: hysterectomy     Hepatic steatosis     Malignant neoplasm of ascending colon     Obesity, unspecified     Overactive bladder     Thrombocytopenia, unspecified     Vitamin D deficiency      Past Surgical History:   Procedure Laterality Date    APPENDECTOMY      BREAST BIOPSY  2016     SECTION      x3    charcot-leyden crystals identification      CHOLECYSTECTOMY  2006    COLONOSCOPY N/A 2023    Procedure: COLON;  Surgeon: Luis Amador MD;  Location: [facility] ENDOSCOPY;  Service: Gastroenterology;   Laterality: N/A;    COLONOSCOPY N/A 4/20/2024    Procedure: COLON;  Surgeon: Mor Porter MD;  Location: [facility];  Service: Gastroenterology;  Laterality: N/A;    COLONOSCOPY, WITH 1 OR MORE BIOPSIES  08/28/2023    Procedure: COLONOSCOPY, WITH 1 OR MORE BIOPSIES;  Surgeon: Luis Amador MD;  Location: [facility] ENDOSCOPY;  Service: Gastroenterology;;    COLONOSCOPY, WITH 1 OR MORE BIOPSIES N/A 4/20/2024    Procedure: COLONOSCOPY, WITH 1 OR MORE BIOPSIES;  Surgeon: Mor Porter MD;  Location: [facility] OR;  Service: Gastroenterology;  Laterality: N/A;    COLONOSCOPY, WITH DIRECTED SUBMUCOSAL INJECTION  08/28/2023    Procedure: COLONOSCOPY, WITH DIRECTED SUBMUCOSAL INJECTION;  Surgeon: Luis Amador MD;  Location: [facility] ENDOSCOPY;  Service: Gastroenterology;;  8cc Colon Tattoo    COLONOSCOPY, WITH POLYPECTOMY USING SNARE  08/28/2023    Procedure: COLONOSCOPY, WITH POLYPECTOMY USING SNARE;  Surgeon: Luis Amaodr MD;  Location: [facility] ENDOSCOPY;  Service: Gastroenterology;;    ESOPHAGOGASTRODUODENOSCOPY N/A 08/28/2023    Procedure: DOUBLE;  Surgeon: Luis Amador MD;  Location: [facility] ENDOSCOPY;  Service: Gastroenterology;  Laterality: N/A;    ESOPHAGOGASTRODUODENOSCOPY N/A 4/19/2024    Procedure: EGD;  Surgeon: Nolan Massey MD;  Location: [facility] ENDOSCOPY;  Service: Gastroenterology;  Laterality: N/A;    HEMORRHOID SURGERY      HYSTERECTOMY  1990    total    INSERTION OF SUBCUTANEOUS PORT Right     INSERTION OF TUNNELED CENTRAL VENOUS CATHETER (CVC) WITH SUBCUTANEOUS PORT Right 11/20/2023    Procedure: [id number];  Surgeon: Timothy Russ MD;  Location: [facility];  Service: General;  Laterality: Right;    POLYPECTOMY      right foot surgery Right 02/01/2022    XI ROBOTIC COLECTOMY, RIGHT N/A 10/19/2023    Procedure: XI ROBOTIC COLECTOMY, RIGHT;  Surgeon: Timothy Russ MD;  Location: [facility];  Service: General;  Laterality: N/A;     Family History    Problem Relation Name Age of Onset    Diabetes Mother      Alzheimer's disease Mother      Diabetes Father      Leukemia Father      Diabetes Sister      Liver cancer Sister      Diabetes Brother       Social History     Tobacco Use    Smoking status: Every Day     Current packs/day: 0.50     Average packs/day: 0.5 packs/day for 61.3 years (30.7 ttl pk-yrs)     Types: Cigarettes     Start date: 2003     Passive exposure: Current    Smokeless tobacco: Never   Substance Use Topics    Alcohol use: Not Currently     Comment: occassionally    Drug use: Never       Review of patient's allergies indicates:  No Known Allergies   No current facility-administered medications on file prior to encounter.     Current Outpatient Medications on File Prior to Encounter   Medication Sig Dispense Refill    apixaban (ELIQUIS) 5 mg Tab Take 1 tablet (5 mg total) by mouth 2 (two) times daily. 60 tablet 4    capecitabine (XELODA) 500 MG Tab Take 3 tablets (1,500 mg total) by mouth 2 (two) times daily on days 1-14 of each 21 day chemotherapy cycle. 84 tablet 4    dulaglutide (TRULICITY) 3 mg/0.5 mL pen injector Inject 3 mg into the skin every 7 days. 4 pen 11    furosemide (LASIX) 40 MG tablet Take 40 mg by mouth as needed.      gabapentin (NEURONTIN) 300 MG capsule TAKE 2 CAPSULES BY MOUTH IN THE EVENING 60 capsule 6    insulin glargine, TOUJEO, (TOUJEO SOLOSTAR U-300 INSULIN) 300 unit/mL (1.5 mL) InPn pen INJECT 79 UNITS SUBCUTANEOUSLY ONCE DAILY 6 mL 11    OLANZapine (ZYPREXA) 5 MG tablet TAKE 1 TABLET BY MOUTH IN THE EVENING **NIGHTLY  ON  DAYS  1-3  OF  EACH  CHEMOTHERAPY  CYCLE 6 tablet 0    ondansetron (ZOFRAN-ODT) 8 MG TbDL Take 1 tablet (8 mg total) by mouth every 6 (six) hours as needed. 10 tablet 0    prochlorperazine (COMPAZINE) 5 MG tablet Take 5 mg by mouth every 6 (six) hours as needed.      sertraline (ZOLOFT) 50 MG tablet Take 1 tablet by mouth once daily 90 tablet 3    solifenacin (VESICARE) 10 MG tablet Take 10 mg by  mouth once daily.      spironolactone (ALDACTONE) 50 MG tablet Take 50 mg by mouth once daily.      HYDROcodone-acetaminophen (NORCO) 5-325 mg per tablet Take 1 tablet by mouth every 6 (six) hours as needed for Pain. 30 tablet 0    insulin syringe-needle U-100 1 mL 31 gauge x 5/16 Syrg Inject 1 Syringe into the skin 3 (three) times daily with meals.         Review of Systems   Constitutional:  Positive for fatigue. Negative for activity change, appetite change, chills, fever and unexpected weight change.   HENT:  Negative for mouth dryness, mouth sores, nosebleeds, sore throat and trouble swallowing.    Eyes:  Negative for visual disturbance.   Respiratory:  Negative for cough and shortness of breath.    Cardiovascular:  Negative for chest pain, palpitations and leg swelling.   Gastrointestinal:  Positive for abdominal pain, blood in stool (none today) and change in bowel habit. Negative for abdominal distention, constipation, diarrhea, nausea and vomiting.   Endocrine: Negative.    Genitourinary:  Negative for dysuria, frequency, hematuria and urgency.   Musculoskeletal:  Negative for arthralgias, back pain, myalgias and neck pain.   Integumentary:  Negative for rash.   Neurological:  Positive for weakness. Negative for dizziness, tremors, syncope, speech difficulty, light-headedness, numbness, headaches and memory loss.   Hematological:  Does not bruise/bleed easily.   Psychiatric/Behavioral:  Negative for confusion and suicidal ideas.          OBJECTIVE:     Vital Signs (Most Recent)  Temp: 98 °F (36.7 °C) (04/22/24 0806)  Pulse: 76 (04/22/24 0806)  Resp: 18 (04/22/24 0806)  BP: 102/64 (04/22/24 0806)  SpO2: (!) 94 % (04/22/24 0806)    Pain Assessment: No pain reported at this time    Vital Signs Range (Last 24H):  Temp:  [97.3 °F (36.3 °C)-98.4 °F (36.9 °C)]   Pulse:  [71-78]   Resp:  [18]   BP: ()/(47-68)   SpO2:  [94 %-97 %]     Physical Exam:  Physical Exam  Vitals and nursing note reviewed.    Constitutional:       General: She is not in acute distress.     Appearance: She is morbidly obese.   HENT:      Head: Normocephalic and atraumatic.      Mouth/Throat:      Mouth: Mucous membranes are moist.   Eyes:      General: No scleral icterus.     Extraocular Movements: Extraocular movements intact.      Conjunctiva/sclera: Conjunctivae normal.      Pupils: Pupils are equal, round, and reactive to light.   Neck:      Vascular: No JVD.   Cardiovascular:      Rate and Rhythm: Normal rate and regular rhythm.      Heart sounds: No murmur heard.  Pulmonary:      Effort: Pulmonary effort is normal.      Breath sounds: Normal breath sounds. No wheezing or rhonchi.   Abdominal:      General: Bowel sounds are normal. There is no distension.      Palpations: Abdomen is soft. There is no mass.      Tenderness: There is generalized abdominal tenderness.   Musculoskeletal:         General: No swelling or deformity.      Cervical back: Neck supple.   Lymphadenopathy:      Head:      Right side of head: No submandibular adenopathy.      Left side of head: No submandibular adenopathy.      Cervical: No cervical adenopathy.      Upper Body:      Right upper body: No supraclavicular or axillary adenopathy.      Left upper body: No supraclavicular or axillary adenopathy.      Lower Body: No right inguinal adenopathy. No left inguinal adenopathy.   Skin:     General: Skin is warm.      Coloration: Skin is not jaundiced.      Findings: No lesion or rash.      Nails: There is no clubbing.   Neurological:      Mental Status: She is alert and oriented to person, place, and time.      Cranial Nerves: Cranial nerves 2-12 are intact.   Psychiatric:         Attention and Perception: Attention normal.         Behavior: Behavior is cooperative.         Cognition and Memory: Memory is not impaired.         Judgment: Judgment normal.         Laboratory:  CBC with Differential:  Recent Labs   Lab 04/22/24  0433   WBC 3.54*   RBC 2.79*   HCT  "23.6*   HGB 7.5*   MCV 84.6   MCH 26.9*   RDW 22.5*   PLT 82*     CMP:  Recent Labs   Lab 04/22/24  0434   CALCIUM 7.8*      K 3.8   CO2 21*   BUN 15.2   CREATININE 1.03*     BMP:   Recent Labs   Lab 04/22/24  0434   CALCIUM 7.8*      K 3.8   CO2 21*   BUN 15.2   CREATININE 1.03*     LFTs: No results for input(s): "ALT", "AST", "ALKPHOS", "BILITOT", "PROT", "ALBUMIN" in the last 24 hours.  Haptoglobin: No results for input(s): "HAPTOGLOBIN" in the last 24 hours.  Tumor Markers: No results for input(s): "PSA", "CEA", "", "AFPTM", "[id number]", "" in the last 24 hours.    Invalid input(s): "ALGTM"  Immunology: No results for input(s): "SPEP", "RAFY", "LADI", "FREELAMBDALI" in the last 24 hours.  Coagulation: No results for input(s): "PT", "INR", "APTT" in the last 24 hours.  Specimen (24h ago, onward)      None          Microbiology Results (last 7 days)       ** No results found for the last 168 hours. **            Diagnostic Results:  Imaging Results    None             ASSESSMENT/PLAN:     Patient Active Problem List   Diagnosis    Thrombocytopenia    Iron deficiency    Folate deficiency    Type 2 diabetes mellitus with hyperglycemia, with long-term current use of insulin    Long-term insulin use    Hyperlipidemia associated with type 2 diabetes mellitus    CKD stage 3 due to type 2 diabetes mellitus    Recurrent major depressive disorder, in full remission    Vitamin D deficiency    Hepatic steatosis    Other cirrhosis of liver    Diabetic polyneuropathy associated with type 2 diabetes mellitus    Gammopathy    Splenomegaly    Microcytic anemia    Malignant neoplasm of ascending colon    Pulmonary nodules    Elevated CEA    Immunodeficiency due to chemotherapy    Immunosuppression due to drug therapy     Stage IIIB colon cancer s/p right hemicolectomy, on chemotherapy   Thrombocytopenia-  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

## 2024-04-23 LAB
BASOPHILS # BLD AUTO: 0.03 X10(3)/MCL
BASOPHILS NFR BLD AUTO: 0.9 %
EOSINOPHIL # BLD AUTO: 0.18 X10(3)/MCL (ref 0–0.9)
EOSINOPHIL NFR BLD AUTO: 5.2 %
ERYTHROCYTE [DISTWIDTH] IN BLOOD BY AUTOMATED COUNT: 21.4 % (ref 11.5–17)
GLUCOSE SERPL-MCNC: 152 MG/DL (ref 70–110)
HCT VFR BLD AUTO: 26.5 % (ref 37–47)
HGB BLD-MCNC: 8.5 G/DL (ref 12–16)
IMM GRANULOCYTES # BLD AUTO: 0.1 X10(3)/MCL (ref 0–0.04)
IMM GRANULOCYTES NFR BLD AUTO: 2.9 %
LYMPHOCYTES # BLD AUTO: 0.98 X10(3)/MCL (ref 0.6–4.6)
LYMPHOCYTES NFR BLD AUTO: 28.1 %
MCH RBC QN AUTO: 27.2 PG (ref 27–31)
MCHC RBC AUTO-ENTMCNC: 32.1 G/DL (ref 33–36)
MCV RBC AUTO: 84.7 FL (ref 80–94)
MONOCYTES # BLD AUTO: 0.34 X10(3)/MCL (ref 0.1–1.3)
MONOCYTES NFR BLD AUTO: 9.7 %
NEUTROPHILS # BLD AUTO: 1.86 X10(3)/MCL (ref 2.1–9.2)
NEUTROPHILS NFR BLD AUTO: 53.2 %
NRBC BLD AUTO-RTO: 0 %
PLATELET # BLD AUTO: 70 X10(3)/MCL (ref 130–400)
PLATELETS.RETICULATED NFR BLD AUTO: 5.7 % (ref 0.9–11.2)
PMV BLD AUTO: ABNORMAL FL
PSYCHE PATHOLOGY RESULT: NORMAL
RBC # BLD AUTO: 3.13 X10(6)/MCL (ref 4.2–5.4)
WBC # SPEC AUTO: 3.49 X10(3)/MCL (ref 4.5–11.5)

## 2024-04-23 PROCEDURE — 25000003 PHARM REV CODE 250: Performed by: NURSE PRACTITIONER

## 2024-04-23 PROCEDURE — 63600175 PHARM REV CODE 636 W HCPCS: Performed by: NURSE PRACTITIONER

## 2024-04-23 PROCEDURE — 25000003 PHARM REV CODE 250: Performed by: INTERNAL MEDICINE

## 2024-04-23 PROCEDURE — 11000001 HC ACUTE MED/SURG PRIVATE ROOM

## 2024-04-23 PROCEDURE — 36415 COLL VENOUS BLD VENIPUNCTURE: CPT | Performed by: INTERNAL MEDICINE

## 2024-04-23 PROCEDURE — C9113 INJ PANTOPRAZOLE SODIUM, VIA: HCPCS | Performed by: NURSE PRACTITIONER

## 2024-04-23 PROCEDURE — 85025 COMPLETE CBC W/AUTO DIFF WBC: CPT | Performed by: INTERNAL MEDICINE

## 2024-04-23 RX ORDER — POLYETHYLENE GLYCOL 3350 17 G/17G
17 POWDER, FOR SOLUTION ORAL 2 TIMES DAILY
Status: DISCONTINUED | OUTPATIENT
Start: 2024-04-23 | End: 2024-04-24 | Stop reason: HOSPADM

## 2024-04-23 RX ADMIN — APIXABAN 5 MG: 5 TABLET, FILM COATED ORAL at 09:04

## 2024-04-23 RX ADMIN — MICONAZOLE NITRATE: 20 POWDER TOPICAL at 09:04

## 2024-04-23 RX ADMIN — AMMONIUM LACTATE: 12 LOTION TOPICAL at 09:04

## 2024-04-23 RX ADMIN — MUPIROCIN: 20 OINTMENT TOPICAL at 09:04

## 2024-04-23 RX ADMIN — ACETAMINOPHEN 650 MG: 325 TABLET, FILM COATED ORAL at 02:04

## 2024-04-23 RX ADMIN — INSULIN ASPART 2 UNITS: 100 INJECTION, SOLUTION INTRAVENOUS; SUBCUTANEOUS at 10:04

## 2024-04-23 RX ADMIN — SODIUM CHLORIDE 250 ML: 9 INJECTION, SOLUTION INTRAVENOUS at 01:04

## 2024-04-23 RX ADMIN — SERTRALINE HYDROCHLORIDE 50 MG: 50 TABLET ORAL at 09:04

## 2024-04-23 RX ADMIN — PANTOPRAZOLE SODIUM 40 MG: 40 INJECTION, POWDER, LYOPHILIZED, FOR SOLUTION INTRAVENOUS at 09:04

## 2024-04-23 RX ADMIN — INSULIN ASPART 2 UNITS: 100 INJECTION, SOLUTION INTRAVENOUS; SUBCUTANEOUS at 11:04

## 2024-04-23 RX ADMIN — APIXABAN 5 MG: 5 TABLET, FILM COATED ORAL at 11:04

## 2024-04-23 RX ADMIN — GABAPENTIN 600 MG: 300 CAPSULE ORAL at 09:04

## 2024-04-23 NOTE — PROGRESS NOTES
"Gastroenterology Progress Note    Subjective/Interval History:  H&H 8.5/26.5 s/p 1u PRBC - appropriate response    Patient sleeping in bed, family at bedside. She is tolerating PO intake well w/o n/v. Diet being advanced to regular solids tonight. No BM in several days, family concerned about constipation. Also concerned about hypotension.     ROS:  Review of Systems   Unable to perform ROS: Acuity of condition   Constitutional:  Positive for malaise/fatigue.   Respiratory:  Negative for cough and shortness of breath.    Cardiovascular:  Negative for chest pain.   Gastrointestinal:  Positive for constipation. Negative for abdominal pain, diarrhea, nausea and vomiting.   Neurological:  Positive for weakness.   All other systems reviewed and are negative.      Vital Signs:  /64   Pulse 72   Temp 97.8 °F (36.6 °C) (Oral)   Resp 18   Ht 5' 3" (1.6 m)   Wt 101.5 kg (223 lb 12.3 oz)   SpO2 97%   BMI 39.64 kg/m²   Body mass index is 39.64 kg/m².    Physical Exam:  Physical Exam  Constitutional:       General: She is sleeping. She is not in acute distress.     Appearance: She is ill-appearing.   HENT:      Head: Normocephalic and atraumatic.      Right Ear: External ear normal.      Left Ear: External ear normal.      Nose: Nose normal.      Mouth/Throat:      Pharynx: Oropharynx is clear.   Eyes:      Conjunctiva/sclera: Conjunctivae normal.   Pulmonary:      Effort: Pulmonary effort is normal. No respiratory distress.   Abdominal:      General: Abdomen is flat. There is no distension.      Palpations: Abdomen is soft.      Tenderness: There is no abdominal tenderness. There is no guarding.   Musculoskeletal:         General: Normal range of motion.      Cervical back: Normal range of motion.   Skin:     General: Skin is warm and dry.      Coloration: Skin is not pale.   Neurological:      Mental Status: Mental status is at baseline.   Psychiatric:         Mood and Affect: Mood normal.         Behavior: " Behavior normal.         Labs:  Recent Results (from the past 24 hour(s))   POCT Glucose, Hand-Held Device    Collection Time: 04/22/24  9:33 PM   Result Value Ref Range    POC Glucose 208 (A) 70 - 110 MG/DL   POCT Glucose, Hand-Held Device    Collection Time: 04/23/24  5:24 AM   Result Value Ref Range    POC Glucose 152 (A) 70 - 110 MG/DL   CBC with Differential    Collection Time: 04/23/24  5:41 AM   Result Value Ref Range    WBC 3.49 (L) 4.50 - 11.50 x10(3)/mcL    RBC 3.13 (L) 4.20 - 5.40 x10(6)/mcL    Hgb 8.5 (L) 12.0 - 16.0 g/dL    Hct 26.5 (L) 37.0 - 47.0 %    MCV 84.7 80.0 - 94.0 fL    MCH 27.2 27.0 - 31.0 pg    MCHC 32.1 (L) 33.0 - 36.0 g/dL    RDW 21.4 (H) 11.5 - 17.0 %    Platelet 70 (L) 130 - 400 x10(3)/mcL    MPV      Neut % 53.2 %    Lymph % 28.1 %    Mono % 9.7 %    Eos % 5.2 %    Basophil % 0.9 %    Lymph # 0.98 0.6 - 4.6 x10(3)/mcL    Neut # 1.86 (L) 2.1 - 9.2 x10(3)/mcL    Mono # 0.34 0.1 - 1.3 x10(3)/mcL    Eos # 0.18 0 - 0.9 x10(3)/mcL    Baso # 0.03 <=0.2 x10(3)/mcL    IG# 0.10 (H) 0 - 0.04 x10(3)/mcL    IG% 2.9 %    NRBC% 0.0 %    IPF 5.7 0.9 - 11.2 %         Assessment/Plan:  67 yo CF known to Dr. Amador with a PMH of DASH cirrhosis, grade 1-2 esophageal varices, thrombocytopenia, stage IIIB colon adenocarcinoma diagnosed 10/2023 s/p right hemicolectomy (Dr. 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  Here with GI bleeding.      EGD 04/19/24: small [<5mm] esophageal varices, portal hypertensive gastropathy     Colonoscopy 04/20/24: multiple erosions with small clean based ulcerations in distal small bowel; hyperemic anastomotic site with no evidence of active bleeding; grade 1-2 internal hemorrhoids     Acute on chronic anemia with component 2/2 GI blood loss - stable  Portal hypertensive gastropathy  Esophageal varices, small  Distal small bowel ulcerations and  erosions  H/o colon cancer s/p right hemicolectomy  -monitor and transfuse as needed to keep Hgb >7-8. Appropriate response to 1u PRBC  -monitor stool for color/blood  -PPI BID  -low Na diet as tolerated  -f/u path  -will need repeat EGD in 1 year for esophageal variceal surveillance - our office will arrange     Thrombocytopenia  -plt has ranged 80s-90s this admission. Down to 70 today.  -monitor plts and transfuse to 50k in setting of active bleeding. No need at this time.      SMV thrombus  -on Eliquis - held since 04/18/24  -ok to resume anticoagulation from a GI standpoint. Monitor for s/s of GI bleeding/anemia.     -if Hgb remains stable in a.m. following resumption of anticoagulation and no signs of GI bleeding, ok to discharge in a.m.    Geraldine Hightower PA-C  Gastroenterology  [facility]

## 2024-04-23 NOTE — PLAN OF CARE
Problem: Adult Inpatient Plan of Care  Goal: Plan of Care Review  Outcome: Progressing  Goal: Patient-Specific Goal (Individualized)  Outcome: Progressing  Goal: Absence of Hospital-Acquired Illness or Injury  Outcome: Progressing  Goal: Optimal Comfort and Wellbeing  Outcome: Progressing  Goal: Readiness for Transition of Care  Outcome: Progressing     Problem: Diabetes Comorbidity  Goal: Blood Glucose Level Within Targeted Range  Outcome: Progressing     Problem: Fall Injury Risk  Goal: Absence of Fall and Fall-Related Injury  Outcome: Progressing     Problem: Skin Injury Risk Increased  Goal: Skin Health and Integrity  Outcome: Progressing

## 2024-04-23 NOTE — PROGRESS NOTES
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 Patient's hemoglobin was found to be 7.9 INR of 1.8 with [place] [address]. Patient has been admitted to hospitalist service GI has been consulted     Interval Hx:   Patient seen and examined this morning symptomatically much better status post 1 unit of packed RBC blood pressure at her baseline   Objective/physical exam:  General: In no acute distress, afebrile  Chest: Clear to auscultation bilaterally  Heart: RRR, +S1, S2, no appreciable murmur  Abdomen: Soft, nontender, BS +  MSK: Warm, no lower extremity edema, no clubbing or cyanosis  Neurologic: Alert and oriented x4,   VITAL SIGNS: 24 HRS MIN & MAX LAST   Temp  Min: 96.7 °F (35.9 °C)  Max: 98.2 °F (36.8 °C) 97.8 °F (36.6 °C)   BP  Min: 95/60  Max: 114/59 100/64   Pulse  Min: 65  Max: 79  72   Resp  Min: 18  Max: 18 18   SpO2  Min: 96 %  Max: 98 % 97 %     I have reviewed the following labs:  Recent Labs   Lab 04/21/24  0521 04/22/24  0433 04/23/24  0541   WBC 3.39* 3.54* 3.49*   RBC 2.82* 2.79* 3.13*   HGB 7.6* 7.5* 8.5*   HCT 24.1* 23.6* 26.5*   MCV 85.5 84.6 84.7   MCH 27.0 26.9* 27.2   MCHC 31.5* 31.8* 32.1*   RDW 22.7* 22.5* 21.4*   PLT 83* 82* 70*     Recent Labs   Lab 04/19/24  0105 04/19/24  1452 04/20/24  0546 04/22/24  0434    139 140 139   K 2.9* 4.0 3.4* 3.8   CO2 21* 20* 21* 21*   BUN 27.8* 22.1* 18.6 15.2   CREATININE 1.28* 1.07* 1.07* 1.03*   CALCIUM 8.5 7.9* 7.9* 7.8*   MG 1.90  --   --   --    ALBUMIN 2.7*  --  2.5*  --    ALKPHOS 114  --  97   --    ALT 20  --  16  --    AST 22  --  19  --    BILITOT 0.6  --  0.5  --      Microbiology Results (last 7 days)       ** No results found for the last 168 hours. **             See below for Radiology    Scheduled Med:  Current Facility-Administered Medications   Medication Dose Route Frequency    ammonium lactate   Topical (Top) BID    gabapentin  600 mg Oral QHS    miconazole NITRATE 2 %   Topical (Top) BID    mupirocin   Nasal BID    pantoprazole  40 mg Intravenous BID    sertraline  50 mg Oral Nightly      Continuous Infusions:  Current Facility-Administered Medications   Medication Dose Route Frequency Last Rate Last Admin      PRN Meds:    Current Facility-Administered Medications:     0.9%  NaCl infusion (for blood administration), , Intravenous, Q24H PRN    acetaminophen, 650 mg, Oral, Q8H PRN    acetaminophen, 650 mg, Oral, Q4H PRN    dextrose 10%, 12.5 g, Intravenous, PRN    dextrose 10%, 25 g, Intravenous, PRN    glucagon (human recombinant), 1 mg, Intramuscular, PRN    insulin aspart U-100, 1-10 Units, Subcutaneous, Q6H PRN    ondansetron, 4 mg, Intravenous, Q8H PRN     Assessment/Plan:  GI bleed   Acute on chronic anemia secondary to above   Hypokalemia   Stage IIIB colon cancer s/p right hemicolectomy, on chemotherapy   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  Type 2 DM, insulin dependent   Thrombocytopenia  CKD stage IIIb - stable     History:  Liver cirrhosis secondary to fatty liver disease, Depression, Diabetic neuropathy, GERD, Vitamin-D deficiency, Folate deficiency, iron-deficiency, Esophageal varices, Essential hypertension, Charcot's joint of left foot, Nicotine dependence    Status post 1 unit of packed RBC yesterday hemoglobin was 7.5 today it is 8.5 so came up appropriately  Awaiting GI to see if we can resume Eliquis today and about the discharge plan   Colonoscopy showed multiple erosions with small clean based ulceration in the distal small bowel, hyperemic anastomotic site with no evidence of active  bleeding, grade 1-2 internal hemorrhoids  EGD showed small varices no source of bleeding  Continue with Protonix b.i.d.  Diet as per GI   Other home medications have been resumed       VTE prophylaxis:  SCD for now    Patient condition:  Stable/Fair/Guarded/ Serious/ Critical    Anticipated discharge and Disposition:         All diagnosis and differential diagnosis have been reviewed; assessment and plan has been documented; I have personally reviewed the labs and test results that are presently available; I have reviewed the patients medication list; I have reviewed the consulting providers response and recommendations. I have reviewed or attempted to review medical records based upon their availability    All of the patient's questions have been  addressed and answered. Patient's is agreeable to the above stated plan. I will continue to monitor closely and make adjustments to medical management as needed.  _____________________________________________________________________    Nutrition Status:    Radiology:  I have personally reviewed the following imaging and agree with the radiologist.     CT Abdomen With Without Contrast  Narrative: EXAMINATION:  CT ABDOMEN W WO CONTRAST; CT PELVIS WITH AND WITHOUT CONTRAST    CLINICAL HISTORY:  MALIGNANT NEOPLASM OF ASCENDING COLON/ ABDOMINAL PAIN;Malignant neoplasm of ascending colon    TECHNIQUE:  CT imaging of the abdomen and pelvis before and after intravenous contrast.  Axial, coronal and sagittal images reviewed. Dose length product 1425 mGycm. Automatic exposure control, adjustment of mA/kV or iterative reconstruction technique used to limit radiation dose.    COMPARISON:  CT 09/07/2023    FINDINGS:  Liver/biliary: Cirrhosis.  No suspicious liver lesion identified.  Prior cholecystectomy. No biliary dilatation.    Pancreas: Normal.    Spleen: Splenomegaly measures 16.5 cm.    Adrenals: Normal.    Genitourinary: Symmetric enhancement of the kidneys.  No hydronephrosis.   Normal bladder.    Stomach and bowel: Interval right hemicolectomy.  No dilated bowel.  No suspicious findings near the anastomosis.    Lymph nodes/peritoneum: Stable 10 mm aortocaval lymph node (series 4, image 55).  No ascites or free air. No fluid collection.    Vasculature: Mostly eccentric thrombus within the SMV and extending through the main portal vein.    Abdominal wall: Very mild postsurgical changes anteriorly.    Lung bases: No consolidation or pleural effusion.  Stable 3 mm solid lingular nodule (series 7, image 3).    Musculoskeletal: Similar mildly heterogeneous overall bone mineralization.  No aggressive osseous lesion identified.  Impression: Nonocclusive thrombus within the SMV and extending through the main portal vein.    Findings discussed with Dr. Olmstead at the time of dictation.    Electronically signed by: Abner Mcgregor  Date:    02/08/2024  Time:    11:27  CT Pelvis W Wo  Contrast  Narrative: EXAMINATION:  CT ABDOMEN W WO CONTRAST; CT PELVIS WITH AND WITHOUT CONTRAST    CLINICAL HISTORY:  MALIGNANT NEOPLASM OF ASCENDING COLON/ ABDOMINAL PAIN;Malignant neoplasm of ascending colon    TECHNIQUE:  CT imaging of the abdomen and pelvis before and after intravenous contrast.  Axial, coronal and sagittal images reviewed. Dose length product 1425 mGycm. Automatic exposure control, adjustment of mA/kV or iterative reconstruction technique used to limit radiation dose.    COMPARISON:  CT 09/07/2023    FINDINGS:  Liver/biliary: Cirrhosis.  No suspicious liver lesion identified.  Prior cholecystectomy. No biliary dilatation.    Pancreas: Normal.    Spleen: Splenomegaly measures 16.5 cm.    Adrenals: Normal.    Genitourinary: Symmetric enhancement of the kidneys.  No hydronephrosis.  Normal bladder.    Stomach and bowel: Interval right hemicolectomy.  No dilated bowel.  No suspicious findings near the anastomosis.    Lymph nodes/peritoneum: Stable 10 mm aortocaval lymph node (series 4, image 55).  No  ascites or free air. No fluid collection.    Vasculature: Mostly eccentric thrombus within the SMV and extending through the main portal vein.    Abdominal wall: Very mild postsurgical changes anteriorly.    Lung bases: No consolidation or pleural effusion.  Stable 3 mm solid lingular nodule (series 7, image 3).    Musculoskeletal: Similar mildly heterogeneous overall bone mineralization.  No aggressive osseous lesion identified.  Impression: Nonocclusive thrombus within the SMV and extending through the main portal vein.    Findings discussed with Dr. Olmstead at the time of dictation.    Electronically signed by: Abner Mcgregor  Date:    02/08/2024  Time:    11:27      Kenna Choi MD  Department of Hospital Medicine   [facility]   04/23/2024

## 2024-04-24 VITALS
HEART RATE: 68 BPM | SYSTOLIC BLOOD PRESSURE: 102 MMHG | DIASTOLIC BLOOD PRESSURE: 67 MMHG | HEIGHT: 63 IN | WEIGHT: 223.75 LBS | BODY MASS INDEX: 39.64 KG/M2 | RESPIRATION RATE: 18 BRPM | TEMPERATURE: 98 F | OXYGEN SATURATION: 98 %

## 2024-04-24 LAB
BASOPHILS # BLD AUTO: 0.03 X10(3)/MCL
BASOPHILS NFR BLD AUTO: 0.8 %
EOSINOPHIL # BLD AUTO: 0.2 X10(3)/MCL (ref 0–0.9)
EOSINOPHIL NFR BLD AUTO: 5.3 %
ERYTHROCYTE [DISTWIDTH] IN BLOOD BY AUTOMATED COUNT: 21.8 % (ref 11.5–17)
HCT VFR BLD AUTO: 25.8 % (ref 37–47)
HGB BLD-MCNC: 8.2 G/DL (ref 12–16)
IMM GRANULOCYTES # BLD AUTO: 0.1 X10(3)/MCL (ref 0–0.04)
IMM GRANULOCYTES NFR BLD AUTO: 2.7 %
LYMPHOCYTES # BLD AUTO: 0.9 X10(3)/MCL (ref 0.6–4.6)
LYMPHOCYTES NFR BLD AUTO: 23.9 %
MCH RBC QN AUTO: 27.4 PG (ref 27–31)
MCHC RBC AUTO-ENTMCNC: 31.8 G/DL (ref 33–36)
MCV RBC AUTO: 86.3 FL (ref 80–94)
MONOCYTES # BLD AUTO: 0.3 X10(3)/MCL (ref 0.1–1.3)
MONOCYTES NFR BLD AUTO: 8 %
NEUTROPHILS # BLD AUTO: 2.23 X10(3)/MCL (ref 2.1–9.2)
NEUTROPHILS NFR BLD AUTO: 59.3 %
NRBC BLD AUTO-RTO: 0 %
PLATELET # BLD AUTO: 85 X10(3)/MCL (ref 130–400)
PLATELETS.RETICULATED NFR BLD AUTO: 6.1 % (ref 0.9–11.2)
PMV BLD AUTO: ABNORMAL FL
RBC # BLD AUTO: 2.99 X10(6)/MCL (ref 4.2–5.4)
WBC # SPEC AUTO: 3.76 X10(3)/MCL (ref 4.5–11.5)

## 2024-04-24 PROCEDURE — 85025 COMPLETE CBC W/AUTO DIFF WBC: CPT | Performed by: INTERNAL MEDICINE

## 2024-04-24 PROCEDURE — C9113 INJ PANTOPRAZOLE SODIUM, VIA: HCPCS | Performed by: NURSE PRACTITIONER

## 2024-04-24 PROCEDURE — 63600175 PHARM REV CODE 636 W HCPCS: Performed by: NURSE PRACTITIONER

## 2024-04-24 PROCEDURE — 25000003 PHARM REV CODE 250: Performed by: INTERNAL MEDICINE

## 2024-04-24 PROCEDURE — 36415 COLL VENOUS BLD VENIPUNCTURE: CPT | Performed by: INTERNAL MEDICINE

## 2024-04-24 PROCEDURE — 99231 SBSQ HOSP IP/OBS SF/LOW 25: CPT | Mod: ,,, | Performed by: INTERNAL MEDICINE

## 2024-04-24 RX ORDER — PANTOPRAZOLE SODIUM 40 MG/1
40 TABLET, DELAYED RELEASE ORAL DAILY
Qty: 30 TABLET | Refills: 0 | Status: SHIPPED | OUTPATIENT
Start: 2024-04-24 | End: 2024-05-24

## 2024-04-24 RX ADMIN — MICONAZOLE NITRATE: 20 POWDER TOPICAL at 09:04

## 2024-04-24 RX ADMIN — PANTOPRAZOLE SODIUM 40 MG: 40 INJECTION, POWDER, LYOPHILIZED, FOR SOLUTION INTRAVENOUS at 08:04

## 2024-04-24 RX ADMIN — APIXABAN 5 MG: 5 TABLET, FILM COATED ORAL at 08:04

## 2024-04-24 RX ADMIN — AMMONIUM LACTATE: 12 LOTION TOPICAL at 08:04

## 2024-04-24 NOTE — PLAN OF CARE
Problem: Adult Inpatient Plan of Care  Goal: Plan of Care Review  4/24/2024 0732 by Jolanta Joe RN  Outcome: Progressing  4/24/2024 0324 by Jolanta Joe RN  Outcome: Progressing  Goal: Patient-Specific Goal (Individualized)  4/24/2024 0732 by Jolanta Joe RN  Outcome: Progressing  4/24/2024 0324 by Jolanta Joe RN  Outcome: Progressing  Goal: Absence of Hospital-Acquired Illness or Injury  4/24/2024 0732 by Jolanta Joe RN  Outcome: Progressing  4/24/2024 0324 by Jolanta Joe RN  Outcome: Progressing  Goal: Optimal Comfort and Wellbeing  4/24/2024 0732 by Jolanta Joe RN  Outcome: Progressing  4/24/2024 0324 by Jolanta Joe RN  Outcome: Progressing  Goal: Readiness for Transition of Care  4/24/2024 0732 by Jolanta Joe RN  Outcome: Progressing  4/24/2024 0324 by Jolanta Joe RN  Outcome: Progressing

## 2024-04-24 NOTE — PLAN OF CARE
Problem: Adult Inpatient Plan of Care  Goal: Plan of Care Review  Outcome: Progressing  Goal: Patient-Specific Goal (Individualized)  Outcome: Progressing  Goal: Absence of Hospital-Acquired Illness or Injury  Outcome: Progressing  Goal: Optimal Comfort and Wellbeing  Outcome: Progressing  Goal: Readiness for Transition of Care  Outcome: Progressing     Problem: Diabetes Comorbidity  Goal: Blood Glucose Level Within Targeted Range  Outcome: Progressing     Problem: Fall Injury Risk  Goal: Absence of Fall and Fall-Related Injury  Outcome: Progressing     Problem: Skin Injury Risk Increased  Goal: Skin Health and Integrity  Outcome: Progressing     Problem: Wound  Goal: Optimal Coping  Outcome: Progressing  Goal: Optimal Functional Ability  Outcome: Progressing  Goal: Absence of Infection Signs and Symptoms  Outcome: Progressing  Goal: Improved Oral Intake  Outcome: Progressing  Goal: Optimal Pain Control and Function  Outcome: Progressing  Goal: Skin Health and Integrity  Outcome: Progressing  Goal: Optimal Wound Healing  Outcome: Progressing

## 2024-04-24 NOTE — PROGRESS NOTES
[facility] - Oncology Acute  Hematology/Oncology  Progress Note      Consult Requested By: Kenna Choi MD    Reason for Consult:     SUBJECTIVE:     History of Present Illness:  Patient is a 68 y.o. female well known to me with history of stage IIIB colon cancer when she was found to have iron deficiency anemia.  EGD performed, grade I-II esophageal varices found, too small to band. She also had Colonoscopy by Dr Hammonds that showed an ulcerated malignant-appearing partially obstructing medium sized mass in the ascending colon.      She underwent lab/jaswinder right colon resection on 10/19/2023. Path c/w really differentiated adeno carcinoma.  Started on adjuvant FOLFOX with dose reduction oxaliplatin due to neuropathy started on 11/28/2023 for which she received 7 cycles, she was not tolerating chemo very well so chemo was switched to Xeloda and oxaliplatin.  Last cycle was on 03/26/2024.        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 She  presented to ER with complaints of blood in the stool with blood clots and diarrhea. Patient's hemoglobin was found to be 7.9 INR of 1.8 with platelet count of 95,000. Patient presents to [facility] via EMS on 4/19/2024 with c/o blood in stool with clots on the evening of presentation.  Patient endorsed diarrhea over the past several days.  She reports constipation x4 days for which she took laxatives and then began having diarrhea for which she then took Imodium.  She denied abdominal pain, nausea, vomiting, fever or chills.     4/24/24: Patient is seen in rounds today .  She is lying in bed.  Daughter  at bedside.  She underwent EGD on 04/19/2024 that showed very small esophageal varices, portal hypertensive gastropathy.  Colonoscopy 04/20/2024 showed multiple erosions  with small clean based ulceration in the distal small bowel, hyperemic anastomotic site with no evidence of active bleeding.  Grade 1-2 internal hemorrhoids. Pathology was negative for malignancy.     Rectal bleeding has resolved.      Continuous Infusions:  Current Facility-Administered Medications   Medication Dose Route Frequency Last Rate Last Admin     Scheduled Meds:  Current Facility-Administered Medications   Medication Dose Route Frequency    ammonium lactate   Topical (Top) BID    apixaban  5 mg Oral BID    gabapentin  600 mg Oral QHS    miconazole NITRATE 2 %   Topical (Top) BID    pantoprazole  40 mg Intravenous BID    polyethylene glycol  17 g Oral BID    sertraline  50 mg Oral Nightly     PRN Meds:  Current Facility-Administered Medications:     0.9%  NaCl infusion (for blood administration), , Intravenous, Q24H PRN    acetaminophen, 650 mg, Oral, Q8H PRN    acetaminophen, 650 mg, Oral, Q4H PRN    dextrose 10%, 12.5 g, Intravenous, PRN    dextrose 10%, 25 g, Intravenous, PRN    glucagon (human recombinant), 1 mg, Intramuscular, PRN    insulin aspart U-100, 1-10 Units, Subcutaneous, Q6H PRN    ondansetron, 4 mg, Intravenous, Q8H PRN    Past Medical History:   Diagnosis Date    Anesthesia complication     trouble awakening    Charcot's joint of foot, left     Chronic kidney disease, unspecified     Cirrhosis of liver without ascites     Depression     Diabetes mellitus, type II, insulin dependent     Diabetic neuropathy     GERD (gastroesophageal reflux disease)     H/O: hysterectomy     Hepatic steatosis     Malignant neoplasm of ascending colon     Obesity, unspecified     Overactive bladder     Thrombocytopenia, unspecified     Vitamin D deficiency      Past Surgical History:   Procedure Laterality Date    APPENDECTOMY      BREAST BIOPSY  2016     SECTION      x3    charcot-leyden crystals identification      CHOLECYSTECTOMY  2006    COLONOSCOPY N/A 2023    Procedure: COLON;   Surgeon: Luis Amador MD;  Location: [facility] ENDOSCOPY;  Service: Gastroenterology;  Laterality: N/A;    COLONOSCOPY N/A 4/20/2024    Procedure: COLON;  Surgeon: Mor Porter MD;  Location: [facility];  Service: Gastroenterology;  Laterality: N/A;    COLONOSCOPY, WITH 1 OR MORE BIOPSIES  08/28/2023    Procedure: COLONOSCOPY, WITH 1 OR MORE BIOPSIES;  Surgeon: Luis Amador MD;  Location: [facility] ENDOSCOPY;  Service: Gastroenterology;;    COLONOSCOPY, WITH 1 OR MORE BIOPSIES N/A 4/20/2024    Procedure: COLONOSCOPY, WITH 1 OR MORE BIOPSIES;  Surgeon: Mor Porter MD;  Location: [facility];  Service: Gastroenterology;  Laterality: N/A;    COLONOSCOPY, WITH DIRECTED SUBMUCOSAL INJECTION  08/28/2023    Procedure: COLONOSCOPY, WITH DIRECTED SUBMUCOSAL INJECTION;  Surgeon: Luis Amador MD;  Location: [facility] ENDOSCOPY;  Service: Gastroenterology;;  8cc Colon Tattoo    COLONOSCOPY, WITH POLYPECTOMY USING SNARE  08/28/2023    Procedure: COLONOSCOPY, WITH POLYPECTOMY USING SNARE;  Surgeon: Luis Amador MD;  Location: [facility] ENDOSCOPY;  Service: Gastroenterology;;    ESOPHAGOGASTRODUODENOSCOPY N/A 08/28/2023    Procedure: DOUBLE;  Surgeon: Luis Amador MD;  Location: [facility] ENDOSCOPY;  Service: Gastroenterology;  Laterality: N/A;    ESOPHAGOGASTRODUODENOSCOPY N/A 4/19/2024    Procedure: EGD;  Surgeon: Nolan Massey MD;  Location: [facility] ENDOSCOPY;  Service: Gastroenterology;  Laterality: N/A;    HEMORRHOID SURGERY      HYSTERECTOMY  1990    total    INSERTION OF SUBCUTANEOUS PORT Right     INSERTION OF TUNNELED CENTRAL VENOUS CATHETER (CVC) WITH SUBCUTANEOUS PORT Right 11/20/2023    Procedure: [id number];  Surgeon: Timothy Russ MD;  Location: [facility];  Service: General;  Laterality: Right;    POLYPECTOMY      right foot surgery Right 02/01/2022    XI ROBOTIC COLECTOMY, RIGHT N/A 10/19/2023    Procedure: XI ROBOTIC COLECTOMY, RIGHT;  Surgeon:  Timothy Russ MD;  Location: [facility];  Service: General;  Laterality: N/A;     Family History   Problem Relation Name Age of Onset    Diabetes Mother      Alzheimer's disease Mother      Diabetes Father      Leukemia Father      Diabetes Sister      Liver cancer Sister      Diabetes Brother       Social History     Tobacco Use    Smoking status: Every Day     Current packs/day: 0.50     Average packs/day: 0.5 packs/day for 61.3 years (30.7 ttl pk-yrs)     Types: Cigarettes     Start date: 2003     Passive exposure: Current    Smokeless tobacco: Never   Substance Use Topics    Alcohol use: Not Currently     Comment: occassionally    Drug use: Never       Review of patient's allergies indicates:  No Known Allergies   No current facility-administered medications on file prior to encounter.     Current Outpatient Medications on File Prior to Encounter   Medication Sig Dispense Refill    apixaban (ELIQUIS) 5 mg Tab Take 1 tablet (5 mg total) by mouth 2 (two) times daily. 60 tablet 4    capecitabine (XELODA) 500 MG Tab Take 3 tablets (1,500 mg total) by mouth 2 (two) times daily on days 1-14 of each 21 day chemotherapy cycle. 84 tablet 4    dulaglutide (TRULICITY) 3 mg/0.5 mL pen injector Inject 3 mg into the skin every 7 days. 4 pen 11    furosemide (LASIX) 40 MG tablet Take 40 mg by mouth as needed.      gabapentin (NEURONTIN) 300 MG capsule TAKE 2 CAPSULES BY MOUTH IN THE EVENING 60 capsule 6    insulin glargine, TOUJEO, (TOUJEO SOLOSTAR U-300 INSULIN) 300 unit/mL (1.5 mL) InPn pen INJECT 79 UNITS SUBCUTANEOUSLY ONCE DAILY 6 mL 11    OLANZapine (ZYPREXA) 5 MG tablet TAKE 1 TABLET BY MOUTH IN THE EVENING **NIGHTLY  ON  DAYS  1-3  OF  EACH  CHEMOTHERAPY  CYCLE 6 tablet 0    ondansetron (ZOFRAN-ODT) 8 MG TbDL Take 1 tablet (8 mg total) by mouth every 6 (six) hours as needed. 10 tablet 0    prochlorperazine (COMPAZINE) 5 MG tablet Take 5 mg by mouth every 6 (six) hours as needed.      sertraline (ZOLOFT) 50 MG tablet  Take 1 tablet by mouth once daily 90 tablet 3    solifenacin (VESICARE) 10 MG tablet Take 10 mg by mouth once daily.      spironolactone (ALDACTONE) 50 MG tablet Take 50 mg by mouth once daily.      HYDROcodone-acetaminophen (NORCO) 5-325 mg per tablet Take 1 tablet by mouth every 6 (six) hours as needed for Pain. 30 tablet 0    insulin syringe-needle U-100 1 mL 31 gauge x 5/16 Syrg Inject 1 Syringe into the skin 3 (three) times daily with meals.         Review of Systems   Constitutional:  Positive for fatigue. Negative for activity change, appetite change, chills, fever and unexpected weight change.   HENT:  Negative for mouth dryness, mouth sores, nosebleeds, sore throat and trouble swallowing.    Eyes:  Negative for visual disturbance.   Respiratory:  Negative for cough and shortness of breath.    Cardiovascular:  Negative for chest pain, palpitations and leg swelling.   Gastrointestinal:  Positive for abdominal pain, blood in stool (none today) and change in bowel habit. Negative for abdominal distention, constipation, diarrhea, nausea and vomiting.   Endocrine: Negative.    Genitourinary:  Negative for dysuria, frequency, hematuria and urgency.   Musculoskeletal:  Negative for arthralgias, back pain, myalgias and neck pain.   Integumentary:  Negative for rash.   Neurological:  Positive for weakness. Negative for dizziness, tremors, syncope, speech difficulty, light-headedness, numbness, headaches and memory loss.   Hematological:  Does not bruise/bleed easily.   Psychiatric/Behavioral:  Negative for confusion and suicidal ideas.          OBJECTIVE:     Vital Signs (Most Recent)  Temp: 98.1 °F (36.7 °C) (04/24/24 1100)  Pulse: 68 (04/24/24 1100)  Resp: 18 (04/24/24 1100)  BP: 102/67 (04/24/24 1100)  SpO2: 98 % (04/24/24 1100)    Pain Assessment: No pain reported at this time    Vital Signs Range (Last 24H):  Temp:  [97.4 °F (36.3 °C)-98.1 °F (36.7 °C)]   Pulse:  [68-75]   Resp:  [18]   BP: ()/(57-70)    SpO2:  [96 %-98 %]     Physical Exam:  Physical Exam  Vitals and nursing note reviewed.   Constitutional:       General: She is not in acute distress.     Appearance: She is morbidly obese.   HENT:      Head: Normocephalic and atraumatic.      Mouth/Throat:      Mouth: Mucous membranes are moist.   Eyes:      General: No scleral icterus.     Extraocular Movements: Extraocular movements intact.      Conjunctiva/sclera: Conjunctivae normal.      Pupils: Pupils are equal, round, and reactive to light.   Neck:      Vascular: No JVD.   Cardiovascular:      Rate and Rhythm: Normal rate and regular rhythm.      Heart sounds: No murmur heard.  Pulmonary:      Effort: Pulmonary effort is normal.      Breath sounds: Normal breath sounds. No wheezing or rhonchi.   Abdominal:      General: Bowel sounds are normal. There is no distension.      Palpations: Abdomen is soft. There is no mass.      Tenderness: There is generalized abdominal tenderness.   Musculoskeletal:         General: No swelling or deformity.      Cervical back: Neck supple.   Lymphadenopathy:      Head:      Right side of head: No submandibular adenopathy.      Left side of head: No submandibular adenopathy.      Cervical: No cervical adenopathy.      Upper Body:      Right upper body: No supraclavicular or axillary adenopathy.      Left upper body: No supraclavicular or axillary adenopathy.      Lower Body: No right inguinal adenopathy. No left inguinal adenopathy.   Skin:     General: Skin is warm.      Coloration: Skin is not jaundiced.      Findings: No lesion or rash.      Nails: There is no clubbing.   Neurological:      Mental Status: She is alert and oriented to person, place, and time.      Cranial Nerves: Cranial nerves 2-12 are intact.   Psychiatric:         Attention and Perception: Attention normal.         Behavior: Behavior is cooperative.         Cognition and Memory: Memory is not impaired.         Judgment: Judgment normal.  "        Laboratory:  CBC with Differential:  Recent Labs   Lab 04/24/24  0616   WBC 3.76*   RBC 2.99*   HCT 25.8*   HGB 8.2*   MCV 86.3   MCH 27.4   RDW 21.8*   PLT 85*     CMP:  No results for input(s): "GLU", "CALCIUM", "ALBUMIN", "PROT", "NA", "K", "CO2", "CL", "BUN", "CREATININE", "ALKPHOS", "ALT", "AST", "BILITOT" in the last 24 hours.    BMP:   No results for input(s): "GLU", "CALCIUM", "NA", "K", "CO2", "CL", "BUN", "CREATININE" in the last 24 hours.    LFTs: No results for input(s): "ALT", "AST", "ALKPHOS", "BILITOT", "PROT", "ALBUMIN" in the last 24 hours.  Haptoglobin: No results for input(s): "HAPTOGLOBIN" in the last 24 hours.  Tumor Markers: No results for input(s): "PSA", "CEA", "", "AFPTM", "SR3678", "" in the last 24 hours.    Invalid input(s): "ALGTM"  Immunology: No results for input(s): "SPEP", "RAFY", "LADI", "FREELAMBDALI" in the last 24 hours.  Coagulation: No results for input(s): "PT", "INR", "APTT" in the last 24 hours.  Specimen (24h ago, onward)      None          Microbiology Results (last 7 days)       ** No results found for the last 168 hours. **            Diagnostic Results:  Imaging Results    None             ASSESSMENT/PLAN:     Patient Active Problem List   Diagnosis    Thrombocytopenia    Iron deficiency    Folate deficiency    Type 2 diabetes mellitus with hyperglycemia, with long-term current use of insulin    Long-term insulin use    Hyperlipidemia associated with type 2 diabetes mellitus    CKD stage 3 due to type 2 diabetes mellitus    Recurrent major depressive disorder, in full remission    Vitamin D deficiency    Hepatic steatosis    Other cirrhosis of liver    Diabetic polyneuropathy associated with type 2 diabetes mellitus    Gammopathy    Splenomegaly    Microcytic anemia    Malignant neoplasm of ascending colon    Pulmonary nodules    Elevated CEA    Immunodeficiency due to chemotherapy    Immunosuppression due to drug therapy     Stage IIIB colon cancer s/p " right hemicolectomy, on chemotherapy   Thrombocytopenia-  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   She is no longer experiencing rectal bleeding.   Ok to d/c from heme/onc standpoint.   Lab and f/u with heme/onc next week to discuss resuming treatment vs. Stopping adjuvant treatment as she has been on it for 5 months with multiple delays.     LAURA Degroot    I have reviewed medical records and I have discussed  case in details with Nurse Practitioner.  I have participated in all elements of the history, physical and treatment planning of this patient.  Reason for the visit, chief complaint, history of present illness, past medical, past surgical, family and social history as well as allergies current medications and review of systems were all review and I agree with  practitioner assessment as well.  I have reviewed the lab and imaging studies myself  and I agree with above practioner's  plan.     AAYUSH REIS MD

## 2024-04-25 ENCOUNTER — PATIENT OUTREACH (OUTPATIENT)
Dept: ADMINISTRATIVE | Facility: CLINIC | Age: 69
End: 2024-04-25
Payer: MEDICARE

## 2024-04-27 ENCOUNTER — PATIENT MESSAGE (OUTPATIENT)
Dept: ADMINISTRATIVE | Facility: OTHER | Age: 69
End: 2024-04-27
Payer: MEDICARE

## 2024-04-30 ENCOUNTER — OFFICE VISIT (OUTPATIENT)
Dept: SURGICAL ONCOLOGY | Facility: CLINIC | Age: 69
End: 2024-04-30
Payer: MEDICARE

## 2024-04-30 VITALS
DIASTOLIC BLOOD PRESSURE: 70 MMHG | SYSTOLIC BLOOD PRESSURE: 109 MMHG | HEART RATE: 93 BPM | HEIGHT: 63 IN | BODY MASS INDEX: 40.93 KG/M2 | WEIGHT: 231 LBS

## 2024-04-30 DIAGNOSIS — C18.2 MALIGNANT NEOPLASM OF ASCENDING COLON: Primary | ICD-10-CM

## 2024-04-30 PROCEDURE — 1159F MED LIST DOCD IN RCRD: CPT | Mod: CPTII,S$GLB,, | Performed by: SURGERY

## 2024-04-30 PROCEDURE — 3078F DIAST BP <80 MM HG: CPT | Mod: CPTII,S$GLB,, | Performed by: SURGERY

## 2024-04-30 PROCEDURE — 3051F HG A1C>EQUAL 7.0%<8.0%: CPT | Mod: CPTII,S$GLB,, | Performed by: SURGERY

## 2024-04-30 PROCEDURE — 99999 PR PBB SHADOW E&M-EST. PATIENT-LVL III: CPT | Mod: PBBFAC,,, | Performed by: SURGERY

## 2024-04-30 PROCEDURE — 1101F PT FALLS ASSESS-DOCD LE1/YR: CPT | Mod: CPTII,S$GLB,, | Performed by: SURGERY

## 2024-04-30 PROCEDURE — 3288F FALL RISK ASSESSMENT DOCD: CPT | Mod: CPTII,S$GLB,, | Performed by: SURGERY

## 2024-04-30 PROCEDURE — 1111F DSCHRG MED/CURRENT MED MERGE: CPT | Mod: CPTII,S$GLB,, | Performed by: SURGERY

## 2024-04-30 PROCEDURE — 3008F BODY MASS INDEX DOCD: CPT | Mod: CPTII,S$GLB,, | Performed by: SURGERY

## 2024-04-30 PROCEDURE — 3074F SYST BP LT 130 MM HG: CPT | Mod: CPTII,S$GLB,, | Performed by: SURGERY

## 2024-04-30 PROCEDURE — 99214 OFFICE O/P EST MOD 30 MIN: CPT | Mod: S$GLB,,, | Performed by: SURGERY

## 2024-04-30 PROCEDURE — 3066F NEPHROPATHY DOC TX: CPT | Mod: CPTII,S$GLB,, | Performed by: SURGERY

## 2024-04-30 NOTE — PROGRESS NOTES
History & Physical    CHIEF COMPLAINT:  ASCENDING COLON MASS     History of Present Illness:  68 year-old-female referred by Dr. Amadro.  Patient presented with iron deficiency anemia.  EGD performed, grade I-II esophageal varices found, too small to band.  Colonoscopy performed, an ulcerated malignant-appearing partially obstructing medium sized mass found in the ascending colon.  Biopsy and tattoo of the mass showed fragments of colonic mucosa, no evidence of dysplasia or malignancy.  Two other polyps were completely removed in the descending and sigmoid colon, pathology returned hyperplastic polyps.  CT abd/pel showed no acute findings.    October 19, 2023 Patient underwent laparoscopic/robotic right hemicolectomy postoperative course unremarkable.  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    Medical oncology recommended systemic chemotherapy and she is undergoing this at this time.  She reports no significant issues with bowel function, bowel function is satisfactory.            Review of patient's allergies indicates:  No Known Allergies    Current Outpatient Medications   Medication Sig Dispense Refill    apixaban (ELIQUIS) 5 mg Tab Take 1 tablet (5 mg total) by mouth 2 (two) times daily. 60 tablet 4    capecitabine (XELODA) 500 MG Tab Take 3 tablets (1,500 mg total) by mouth 2 (two) times daily on days 1-14 of each 21 day chemotherapy cycle. 84 tablet 4    dulaglutide (TRULICITY) 3 mg/0.5 mL pen injector Inject 3 mg into the skin every 7 days. 4 pen 11    furosemide (LASIX) 40 MG tablet Take 40 mg by mouth as needed.      gabapentin (NEURONTIN) 300 MG capsule TAKE 2 CAPSULES BY MOUTH IN THE EVENING 60 capsule 6    HYDROcodone-acetaminophen (NORCO) 5-325 mg per tablet Take 1 tablet by mouth every 6 (six) hours as needed for Pain. 30 tablet 0    insulin glargine, TOUJEO, (TOUJEO SOLOSTAR U-300 INSULIN) 300 unit/mL (1.5 mL) InPn pen INJECT 79 UNITS  SUBCUTANEOUSLY ONCE DAILY 6 mL 11    insulin syringe-needle U-100 1 mL 31 gauge x 5/16 Syrg Inject 1 Syringe into the skin 3 (three) times daily with meals.      OLANZapine (ZYPREXA) 5 MG tablet TAKE 1 TABLET BY MOUTH IN THE EVENING **NIGHTLY  ON  DAYS  1-3  OF  EACH  CHEMOTHERAPY  CYCLE 6 tablet 0    ondansetron (ZOFRAN-ODT) 8 MG TbDL Take 1 tablet (8 mg total) by mouth every 6 (six) hours as needed. 10 tablet 0    pantoprazole (PROTONIX) 40 MG tablet Take 1 tablet (40 mg total) by mouth once daily. 30 tablet 0    prochlorperazine (COMPAZINE) 5 MG tablet Take 5 mg by mouth every 6 (six) hours as needed.      sertraline (ZOLOFT) 50 MG tablet Take 1 tablet by mouth once daily 90 tablet 3    solifenacin (VESICARE) 10 MG tablet Take 10 mg by mouth once daily.      spironolactone (ALDACTONE) 50 MG tablet Take 50 mg by mouth once daily.       Current Facility-Administered Medications   Medication Dose Route Frequency Provider Last Rate Last Admin    0.9%  NaCl infusion (for blood administration)   Intravenous Once Gayatri Jaffe FNP        promethazine (PHENERGAN) 12.5 mg in dextrose 5 % (D5W) 50 mL IVPB  12.5 mg Intravenous Once Gayatri Jaffe FNP           Past Medical History:   Diagnosis Date    Anesthesia complication     trouble awakening    Charcot's joint of foot, left     Chronic kidney disease, unspecified     Cirrhosis of liver without ascites     Depression     Diabetes mellitus, type II, insulin dependent     Diabetic neuropathy     GERD (gastroesophageal reflux disease)     H/O: hysterectomy     Hepatic steatosis     Malignant neoplasm of ascending colon     Obesity, unspecified     Overactive bladder     Thrombocytopenia, unspecified     Vitamin D deficiency      Past Surgical History:   Procedure Laterality Date    APPENDECTOMY      BREAST BIOPSY  2016     SECTION      x3    charcot-leyden crystals identification      CHOLECYSTECTOMY  2006    COLONOSCOPY N/A 2023    Procedure: COLON;   Surgeon: Luis Amador MD;  Location: [facility] ENDOSCOPY;  Service: Gastroenterology;  Laterality: N/A;    COLONOSCOPY N/A 4/20/2024    Procedure: COLON;  Surgeon: Mor Porter MD;  Location: [facility];  Service: Gastroenterology;  Laterality: N/A;    COLONOSCOPY, WITH 1 OR MORE BIOPSIES  08/28/2023    Procedure: COLONOSCOPY, WITH 1 OR MORE BIOPSIES;  Surgeon: Luis Amador MD;  Location: [facility] ENDOSCOPY;  Service: Gastroenterology;;    COLONOSCOPY, WITH 1 OR MORE BIOPSIES N/A 4/20/2024    Procedure: COLONOSCOPY, WITH 1 OR MORE BIOPSIES;  Surgeon: Mor Porter MD;  Location: [facility];  Service: Gastroenterology;  Laterality: N/A;    COLONOSCOPY, WITH DIRECTED SUBMUCOSAL INJECTION  08/28/2023    Procedure: COLONOSCOPY, WITH DIRECTED SUBMUCOSAL INJECTION;  Surgeon: Luis Amador MD;  Location: [facility] ENDOSCOPY;  Service: Gastroenterology;;  8cc Colon Tattoo    COLONOSCOPY, WITH POLYPECTOMY USING SNARE  08/28/2023    Procedure: COLONOSCOPY, WITH POLYPECTOMY USING SNARE;  Surgeon: Luis Amador MD;  Location: [facility] ENDOSCOPY;  Service: Gastroenterology;;    ESOPHAGOGASTRODUODENOSCOPY N/A 08/28/2023    Procedure: DOUBLE;  Surgeon: Luis Amador MD;  Location: [facility] ENDOSCOPY;  Service: Gastroenterology;  Laterality: N/A;    ESOPHAGOGASTRODUODENOSCOPY N/A 4/19/2024    Procedure: EGD;  Surgeon: Nolan Massey MD;  Location: [facility] ENDOSCOPY;  Service: Gastroenterology;  Laterality: N/A;    HEMORRHOID SURGERY      HYSTERECTOMY  1990    total    INSERTION OF SUBCUTANEOUS PORT Right     INSERTION OF TUNNELED CENTRAL VENOUS CATHETER (CVC) WITH SUBCUTANEOUS PORT Right 11/20/2023    Procedure: [id number];  Surgeon: Timothy Russ MD;  Location: [facility];  Service: General;  Laterality: Right;    POLYPECTOMY      right foot surgery Right 02/01/2022    XI ROBOTIC COLECTOMY, RIGHT N/A 10/19/2023    Procedure: XI ROBOTIC COLECTOMY, RIGHT;  Surgeon:  "Timothy Russ MD;  Location: [facility];  Service: General;  Laterality: N/A;     Family History   Problem Relation Name Age of Onset    Diabetes Mother      Alzheimer's disease Mother      Diabetes Father      Leukemia Father      Diabetes Sister      Liver cancer Sister      Diabetes Brother       Social History     Tobacco Use    Smoking status: Every Day     Current packs/day: 0.50     Average packs/day: 0.5 packs/day for 61.3 years (30.7 ttl pk-yrs)     Types: Cigarettes     Start date: 2003     Passive exposure: Current    Smokeless tobacco: Never   Substance Use Topics    Alcohol use: Not Currently     Comment: occassionally    Drug use: Never        Review of Systems:  Review of Systems   Constitutional:  Negative for appetite change, chills, diaphoresis and fever.   HENT:  Negative for congestion, drooling, ear discharge, ear pain and hearing loss.    Eyes:  Negative for discharge.   Respiratory:  Negative for apnea, cough, choking, chest tightness, shortness of breath and stridor.    Cardiovascular:  Negative for chest pain, palpitations and leg swelling.   Endocrine: Negative for cold intolerance and heat intolerance.   Genitourinary:  Negative for difficulty urinating, dyspareunia, dysuria and hematuria.   Musculoskeletal:  Negative for arthralgias, gait problem and joint swelling.   Skin:  Negative for color change and rash.   Neurological:  Negative for dizziness, tremors, seizures, syncope, facial asymmetry, speech difficulty, light-headedness, numbness and headaches.   Psychiatric/Behavioral:  Negative for agitation and confusion.        OBJECTIVE:     Vital Signs (Most Recent)  Pulse: 93 (04/30/24 1128)  BP: 109/70 (04/30/24 1128)  5' 3" (1.6 m)  104.8 kg (231 lb)     Physical Exam:  Physical Exam  Constitutional:       General: She is not in acute distress.     Appearance: Normal appearance. She is not toxic-appearing.   HENT:      Head: Normocephalic and atraumatic.      Right Ear: External ear " normal.      Left Ear: External ear normal.      Mouth/Throat:      Mouth: Mucous membranes are moist.   Eyes:      General: No scleral icterus.     Conjunctiva/sclera: Conjunctivae normal.      Pupils: Pupils are equal, round, and reactive to light.   Cardiovascular:      Rate and Rhythm: Normal rate and regular rhythm.      Pulses: Normal pulses.   Pulmonary:      Effort: Pulmonary effort is normal. No respiratory distress.      Breath sounds: Normal breath sounds. No stridor. No wheezing or rhonchi.   Abdominal:      General: Abdomen is flat. There is no distension.      Palpations: Abdomen is soft. There is no mass.      Tenderness: There is no abdominal tenderness. There is no guarding or rebound.      Hernia: No hernia is present.   Musculoskeletal:         General: No swelling or tenderness. Normal range of motion.      Cervical back: Normal range of motion and neck supple.      Right lower leg: No edema.      Left lower leg: No edema.   Skin:     General: Skin is warm.      Capillary Refill: Capillary refill takes less than 2 seconds.      Coloration: Skin is not jaundiced or pale.      Findings: No erythema or rash.   Neurological:      General: No focal deficit present.      Mental Status: She is alert and oriented to person, place, and time.      Gait: Gait normal.   Psychiatric:         Mood and Affect: Mood normal.         Behavior: Behavior normal.         Judgment: Judgment normal.           ASSESSMENT/PLAN:     Poorly differentiated adenocarcinoma of the ascending colon, T3 N2a, status post laparoscopic/robotic right hemicolectomy October 2023.  Systemic therapy ongoing    Satisfactory bowel function    Surveillance protocol per medical oncology and gastroenterology        Timothy Russ MD  Surgical Oncology  Complex General, Gastrointestinal and Hepatobiliary Surgery

## 2024-05-03 ENCOUNTER — LAB VISIT (OUTPATIENT)
Dept: LAB | Facility: HOSPITAL | Age: 69
End: 2024-05-03
Payer: MEDICARE

## 2024-05-03 DIAGNOSIS — E11.65 TYPE 2 DIABETES MELLITUS WITH HYPERGLYCEMIA, WITH LONG-TERM CURRENT USE OF INSULIN: ICD-10-CM

## 2024-05-03 DIAGNOSIS — Z79.899 IMMUNOSUPPRESSION DUE TO DRUG THERAPY: ICD-10-CM

## 2024-05-03 DIAGNOSIS — N18.32 STAGE 3B CHRONIC KIDNEY DISEASE: ICD-10-CM

## 2024-05-03 DIAGNOSIS — E83.39 HYPOPHOSPHATEMIA: ICD-10-CM

## 2024-05-03 DIAGNOSIS — C18.2 MALIGNANT NEOPLASM OF ASCENDING COLON: ICD-10-CM

## 2024-05-03 DIAGNOSIS — D84.821 IMMUNOSUPPRESSION DUE TO DRUG THERAPY: ICD-10-CM

## 2024-05-03 DIAGNOSIS — I10 PRIMARY HYPERTENSION: ICD-10-CM

## 2024-05-03 DIAGNOSIS — Z79.4 TYPE 2 DIABETES MELLITUS WITH HYPERGLYCEMIA, WITH LONG-TERM CURRENT USE OF INSULIN: ICD-10-CM

## 2024-05-03 LAB
ALBUMIN SERPL-MCNC: 2.8 G/DL (ref 3.4–4.8)
ALBUMIN/GLOB SERPL: 0.8 RATIO (ref 1.1–2)
ALP SERPL-CCNC: 126 UNIT/L (ref 40–150)
ALT SERPL-CCNC: 13 UNIT/L (ref 0–55)
AST SERPL-CCNC: 21 UNIT/L (ref 5–34)
BASOPHILS # BLD AUTO: 0.04 X10(3)/MCL
BASOPHILS NFR BLD AUTO: 0.9 %
BILIRUB SERPL-MCNC: 0.4 MG/DL
BUN SERPL-MCNC: 18.1 MG/DL (ref 9.8–20.1)
CALCIUM SERPL-MCNC: 8.7 MG/DL (ref 8.4–10.2)
CHLORIDE SERPL-SCNC: 101 MMOL/L (ref 98–107)
CO2 SERPL-SCNC: 27 MMOL/L (ref 23–31)
CREAT SERPL-MCNC: 1.38 MG/DL (ref 0.55–1.02)
CREAT UR-MCNC: 113.5 MG/DL (ref 45–106)
EOSINOPHIL # BLD AUTO: 0.18 X10(3)/MCL (ref 0–0.9)
EOSINOPHIL NFR BLD AUTO: 4.2 %
ERYTHROCYTE [DISTWIDTH] IN BLOOD BY AUTOMATED COUNT: 22 % (ref 11.5–17)
GFR SERPLBLD CREATININE-BSD FMLA CKD-EPI: 42 MLS/MIN/1.73/M2
GLOBULIN SER-MCNC: 3.7 GM/DL (ref 2.4–3.5)
GLUCOSE SERPL-MCNC: 190 MG/DL (ref 82–115)
HCT VFR BLD AUTO: 26.2 % (ref 37–47)
HGB BLD-MCNC: 8.2 G/DL (ref 12–16)
IMM GRANULOCYTES # BLD AUTO: 0.01 X10(3)/MCL (ref 0–0.04)
IMM GRANULOCYTES NFR BLD AUTO: 0.2 %
LYMPHOCYTES # BLD AUTO: 0.77 X10(3)/MCL (ref 0.6–4.6)
LYMPHOCYTES NFR BLD AUTO: 18.1 %
MCH RBC QN AUTO: 26.5 PG (ref 27–31)
MCHC RBC AUTO-ENTMCNC: 31.3 G/DL (ref 33–36)
MCV RBC AUTO: 84.5 FL (ref 80–94)
MONOCYTES # BLD AUTO: 0.4 X10(3)/MCL (ref 0.1–1.3)
MONOCYTES NFR BLD AUTO: 9.4 %
NEUTROPHILS # BLD AUTO: 2.85 X10(3)/MCL (ref 2.1–9.2)
NEUTROPHILS NFR BLD AUTO: 67.2 %
NRBC BLD AUTO-RTO: 0 %
PHOSPHATE SERPL-MCNC: 2.6 MG/DL (ref 2.3–4.7)
PLATELET # BLD AUTO: 92 X10(3)/MCL (ref 130–400)
PLATELETS.RETICULATED NFR BLD AUTO: 9.3 % (ref 0.9–11.2)
PMV BLD AUTO: ABNORMAL FL
POTASSIUM SERPL-SCNC: 4.2 MMOL/L (ref 3.5–5.1)
PROT SERPL-MCNC: 6.5 GM/DL (ref 5.8–7.6)
PROT UR STRIP-MCNC: 14.4 MG/DL
PTH-INTACT SERPL-MCNC: 91.5 PG/ML (ref 8.7–77)
RBC # BLD AUTO: 3.1 X10(6)/MCL (ref 4.2–5.4)
SODIUM SERPL-SCNC: 137 MMOL/L (ref 136–145)
URINE PROTEIN/CREATININE RATIO (OLG): 0.1
WBC # SPEC AUTO: 4.25 X10(3)/MCL (ref 4.5–11.5)

## 2024-05-03 PROCEDURE — 85025 COMPLETE CBC W/AUTO DIFF WBC: CPT

## 2024-05-03 PROCEDURE — 83970 ASSAY OF PARATHORMONE: CPT

## 2024-05-03 PROCEDURE — 84100 ASSAY OF PHOSPHORUS: CPT

## 2024-05-03 PROCEDURE — 80053 COMPREHEN METABOLIC PANEL: CPT

## 2024-05-03 PROCEDURE — 36415 COLL VENOUS BLD VENIPUNCTURE: CPT

## 2024-05-03 PROCEDURE — 82570 ASSAY OF URINE CREATININE: CPT

## 2024-05-07 ENCOUNTER — DOCUMENTATION ONLY (OUTPATIENT)
Dept: ADMINISTRATIVE | Facility: HOSPITAL | Age: 69
End: 2024-05-07
Payer: MEDICARE

## 2024-05-07 ENCOUNTER — OFFICE VISIT (OUTPATIENT)
Dept: NEPHROLOGY | Facility: CLINIC | Age: 69
End: 2024-05-07
Payer: MEDICARE

## 2024-05-07 VITALS
TEMPERATURE: 97 F | BODY MASS INDEX: 40.79 KG/M2 | HEIGHT: 63 IN | RESPIRATION RATE: 20 BRPM | DIASTOLIC BLOOD PRESSURE: 64 MMHG | WEIGHT: 230.19 LBS | SYSTOLIC BLOOD PRESSURE: 104 MMHG | OXYGEN SATURATION: 95 % | HEART RATE: 97 BPM

## 2024-05-07 DIAGNOSIS — N18.32 STAGE 3B CHRONIC KIDNEY DISEASE: Primary | ICD-10-CM

## 2024-05-07 DIAGNOSIS — E83.39 HYPOPHOSPHATEMIA: ICD-10-CM

## 2024-05-07 DIAGNOSIS — Z79.4 TYPE 2 DIABETES MELLITUS WITH HYPERGLYCEMIA, WITH LONG-TERM CURRENT USE OF INSULIN: ICD-10-CM

## 2024-05-07 DIAGNOSIS — C18.2 MALIGNANT NEOPLASM OF ASCENDING COLON: ICD-10-CM

## 2024-05-07 DIAGNOSIS — E11.65 TYPE 2 DIABETES MELLITUS WITH HYPERGLYCEMIA, WITH LONG-TERM CURRENT USE OF INSULIN: ICD-10-CM

## 2024-05-07 DIAGNOSIS — I10 PRIMARY HYPERTENSION: ICD-10-CM

## 2024-05-07 PROCEDURE — 3066F NEPHROPATHY DOC TX: CPT | Mod: CPTII,S$GLB,,

## 2024-05-07 PROCEDURE — 99213 OFFICE O/P EST LOW 20 MIN: CPT | Mod: S$GLB,,,

## 2024-05-07 PROCEDURE — 3008F BODY MASS INDEX DOCD: CPT | Mod: CPTII,S$GLB,,

## 2024-05-07 PROCEDURE — 1101F PT FALLS ASSESS-DOCD LE1/YR: CPT | Mod: CPTII,S$GLB,,

## 2024-05-07 PROCEDURE — 1111F DSCHRG MED/CURRENT MED MERGE: CPT | Mod: CPTII,S$GLB,,

## 2024-05-07 PROCEDURE — 99999 PR PBB SHADOW E&M-EST. PATIENT-LVL IV: CPT | Mod: PBBFAC,,,

## 2024-05-07 PROCEDURE — 1159F MED LIST DOCD IN RCRD: CPT | Mod: CPTII,S$GLB,,

## 2024-05-07 PROCEDURE — 1126F AMNT PAIN NOTED NONE PRSNT: CPT | Mod: CPTII,S$GLB,,

## 2024-05-07 PROCEDURE — 3288F FALL RISK ASSESSMENT DOCD: CPT | Mod: CPTII,S$GLB,,

## 2024-05-07 PROCEDURE — 3074F SYST BP LT 130 MM HG: CPT | Mod: CPTII,S$GLB,,

## 2024-05-07 PROCEDURE — 3051F HG A1C>EQUAL 7.0%<8.0%: CPT | Mod: CPTII,S$GLB,,

## 2024-05-07 PROCEDURE — 3078F DIAST BP <80 MM HG: CPT | Mod: CPTII,S$GLB,,

## 2024-05-07 NOTE — PATIENT INSTRUCTIONS
Decrease spironolactone to 25 mg daily    Only take Lasix as needed for gaining 2 lb or greater overnight    Please have your oncologist send us a copy of her office visit note in the coming week.

## 2024-05-07 NOTE — PROGRESS NOTES
[facility] [place] [facility] Progress Note      HPI  Indu Azul, 68 y.o. female, presents to office for a follow up visit for CKD 3B related to nephrosclerosis and diabetes.  Patient is status post partial colectomy with lymph node dissection after being diagnosed with colon cancer.  She completed a chemotherapy treatments.  She tells me she was recently hospitalized for lower GI bleed and ultimately ended up having a colonoscopy which did not find a bleed or any evidence of malignancy.  She plans on having a follow up with Dr. Olmstead in the coming week to decide if she even needs to continue chemotherapy.  She has not had any chemo for the last 4 weeks due to low platelet count and hospitalization.  Since hospitalization she states that her blood pressure has been low and she has not feeling very well.  She denies any current overt blood loss.  She is still maintained on Eliquis.  She states she has been taking Lasix daily as well as 50 mg of spironolactone for lower extremity edema.    Medical Diagnoses:   Past Medical History:   Diagnosis Date    Anesthesia complication     trouble awakening    Charcot's joint of foot, left     Chronic kidney disease, unspecified     Cirrhosis of liver without ascites     Depression     Diabetes mellitus, type II, insulin dependent     Diabetic neuropathy     GERD (gastroesophageal reflux disease)     H/O: hysterectomy     Hepatic steatosis     Malignant neoplasm of ascending colon     Obesity, unspecified     Overactive bladder     Thrombocytopenia, unspecified     Vitamin D deficiency      Patient Active Problem List   Diagnosis    Thrombocytopenia    Iron deficiency    Folate deficiency    Type 2 diabetes mellitus with hyperglycemia, with long-term current use of insulin    Long-term insulin use    Hyperlipidemia associated with type 2 diabetes mellitus    CKD stage 3 due to type 2 diabetes mellitus    Recurrent major depressive disorder, in full remission    Vitamin D  deficiency    Hepatic steatosis    Other cirrhosis of liver    Diabetic polyneuropathy associated with type 2 diabetes mellitus    Gammopathy    Splenomegaly    Microcytic anemia    Malignant neoplasm of ascending colon    Pulmonary nodules    Elevated CEA    Immunodeficiency due to chemotherapy    Immunosuppression due to drug therapy       Surgical History:   Past Surgical History:   Procedure Laterality Date    APPENDECTOMY  1978    BREAST BIOPSY  2016     SECTION      x3    charcot-leyden crystals identification      CHOLECYSTECTOMY  2006    COLONOSCOPY N/A 2023    Procedure: COLON;  Surgeon: Luis Amador MD;  Location: [facility] ENDOSCOPY;  Service: Gastroenterology;  Laterality: N/A;    COLONOSCOPY N/A 2024    Procedure: COLON;  Surgeon: Mor Porter MD;  Location: [facility] OR;  Service: Gastroenterology;  Laterality: N/A;    COLONOSCOPY, WITH 1 OR MORE BIOPSIES  2023    Procedure: COLONOSCOPY, WITH 1 OR MORE BIOPSIES;  Surgeon: Luis Amador MD;  Location: [facility] ENDOSCOPY;  Service: Gastroenterology;;    COLONOSCOPY, WITH 1 OR MORE BIOPSIES N/A 2024    Procedure: COLONOSCOPY, WITH 1 OR MORE BIOPSIES;  Surgeon: Mor Porter MD;  Location: [facility] OR;  Service: Gastroenterology;  Laterality: N/A;    COLONOSCOPY, WITH DIRECTED SUBMUCOSAL INJECTION  2023    Procedure: COLONOSCOPY, WITH DIRECTED SUBMUCOSAL INJECTION;  Surgeon: Luis Amador MD;  Location: [facility] ENDOSCOPY;  Service: Gastroenterology;;  8cc Colon Tattoo    COLONOSCOPY, WITH POLYPECTOMY USING SNARE  2023    Procedure: COLONOSCOPY, WITH POLYPECTOMY USING SNARE;  Surgeon: Luis Amador MD;  Location: [facility] ENDOSCOPY;  Service: Gastroenterology;;    ESOPHAGOGASTRODUODENOSCOPY N/A 2023    Procedure: DOUBLE;  Surgeon: Luis Amador MD;  Location: [facility] ENDOSCOPY;  Service: Gastroenterology;  Laterality: N/A;    ESOPHAGOGASTRODUODENOSCOPY N/A  4/19/2024    Procedure: EGD;  Surgeon: Nolan Massey MD;  Location: [facility] ENDOSCOPY;  Service: Gastroenterology;  Laterality: N/A;    HEMORRHOID SURGERY      HYSTERECTOMY  1990    total    INSERTION OF SUBCUTANEOUS PORT Right     INSERTION OF TUNNELED CENTRAL VENOUS CATHETER (CVC) WITH SUBCUTANEOUS PORT Right 11/20/2023    Procedure: [id number];  Surgeon: Timothy Russ MD;  Location: [facility] OR;  Service: General;  Laterality: Right;    POLYPECTOMY      right foot surgery Right 02/01/2022    XI ROBOTIC COLECTOMY, RIGHT N/A 10/19/2023    Procedure: XI ROBOTIC COLECTOMY, RIGHT;  Surgeon: Timothy Russ MD;  Location: [facility] OR;  Service: General;  Laterality: N/A;       Family History:   Family History   Problem Relation Name Age of Onset    Diabetes Mother      Alzheimer's disease Mother      Diabetes Father      Leukemia Father      Diabetes Sister      Liver cancer Sister      Diabetes Brother         Social History:   Social History     Tobacco Use    Smoking status: Every Day     Current packs/day: 0.50     Average packs/day: 0.5 packs/day for 61.3 years (30.7 ttl pk-yrs)     Types: Cigarettes     Start date: 2003     Passive exposure: Current    Smokeless tobacco: Never   Substance Use Topics    Alcohol use: Not Currently     Comment: occassionally       Allergies:  Review of patient's allergies indicates:  No Known Allergies    Medications:    Current Outpatient Medications:     apixaban (ELIQUIS) 5 mg Tab, Take 1 tablet (5 mg total) by mouth 2 (two) times daily., Disp: 60 tablet, Rfl: 4    capecitabine (XELODA) 500 MG Tab, Take 3 tablets (1,500 mg total) by mouth 2 (two) times daily on days 1-14 of each 21 day chemotherapy cycle., Disp: 84 tablet, Rfl: 4    dulaglutide (TRULICITY) 3 mg/0.5 mL pen injector, Inject 3 mg into the skin every 7 days., Disp: 4 pen , Rfl: 11    furosemide (LASIX) 40 MG tablet, Take 40 mg by mouth as needed., Disp: , Rfl:     gabapentin (NEURONTIN) 300 MG capsule,  TAKE 2 CAPSULES BY MOUTH IN THE EVENING, Disp: 60 capsule, Rfl: 6    HYDROcodone-acetaminophen (NORCO) 5-325 mg per tablet, Take 1 tablet by mouth every 6 (six) hours as needed for Pain., Disp: 30 tablet, Rfl: 0    insulin glargine, TOUJEO, (TOUJEO SOLOSTAR U-300 INSULIN) 300 unit/mL (1.5 mL) InPn pen, INJECT 79 UNITS SUBCUTANEOUSLY ONCE DAILY, Disp: 6 mL, Rfl: 11    insulin syringe-needle U-100 1 mL 31 gauge x 5/16 Syrg, Inject 1 Syringe into the skin 3 (three) times daily with meals., Disp: , Rfl:     OLANZapine (ZYPREXA) 5 MG tablet, TAKE 1 TABLET BY MOUTH IN THE EVENING **NIGHTLY  ON  DAYS  1-3  OF  EACH  CHEMOTHERAPY  CYCLE, Disp: 6 tablet, Rfl: 0    ondansetron (ZOFRAN-ODT) 8 MG TbDL, Take 1 tablet (8 mg total) by mouth every 6 (six) hours as needed., Disp: 10 tablet, Rfl: 0    pantoprazole (PROTONIX) 40 MG tablet, Take 1 tablet (40 mg total) by mouth once daily., Disp: 30 tablet, Rfl: 0    prochlorperazine (COMPAZINE) 5 MG tablet, Take 5 mg by mouth every 6 (six) hours as needed., Disp: , Rfl:     sertraline (ZOLOFT) 50 MG tablet, Take 1 tablet by mouth once daily, Disp: 90 tablet, Rfl: 3    solifenacin (VESICARE) 10 MG tablet, Take 10 mg by mouth once daily., Disp: , Rfl:     spironolactone (ALDACTONE) 50 MG tablet, Take 50 mg by mouth once daily., Disp: , Rfl:     Current Facility-Administered Medications:     0.9%  NaCl infusion (for blood administration), , Intravenous, Once, Gayatri Jaffe FNP    promethazine (PHENERGAN) 12.5 mg in dextrose 5 % (D5W) 50 mL IVPB, 12.5 mg, Intravenous, Once, Gayatri Jaffe FNP       Review of Systems:    Constitutional: Denies fever, generalized weakness; + fatigue  Skin: Denies wounds, no rashes, no itching, no new skin lesions  Respiratory:  Denies cough, shortness of breath, or wheezing  Cardiovascular: Denies chest pain, palpitations,; + chronic lower extremity swelling  Gastrointestional: Denies abdominal pain, nausea, vomiting, diarrhea, or  "constipation  Genitourinary: Denies dysuria, hematuria, foamy urine, or incontinence; reports able to empty bladder  Musculoskeletal: Denies back or flank pain  Neurological: Denies headaches, dizziness, paresthesias, tremors or focal weakness      Vital Signs:  /64 (BP Location: Left arm, Patient Position: Sitting, BP Method: Medium (Manual))   Pulse 97   Temp 97.4 °F (36.3 °C) (Temporal)   Resp 20   Ht 5' 2.99" (1.6 m)   Wt 104.4 kg (230 lb 3.2 oz)   SpO2 95%   BMI 40.79 kg/m²   Body mass index is 40.79 kg/m².      Physical Exam:    General: no acute distress, awake, alert  Eyes: conjunctiva clear, eyelids without swelling  HENT: atraumatic, oropharynx and nasal mucosa patent  Neck: supple, trache midline, no JVD  Respiratory: equal, unlabored, clear to auscultation A/P  Cardiovascular: RRR without rub  Edema:  Trace bilateral lower extremity edema  Gastrointestinal: soft, non-tender, non-distended  Musculoskeletal: ROM without new limitation or discomfort; + Rollator for ambulation assistance  Integumentary: warm, dry; no rashes, wounds, or skin lesions  Neurological: oriented x4, appropriate, no acute deficits      Labs:        Component Value Date/Time     05/03/2024 1027     04/22/2024 0434     06/01/2022 1340    K 4.2 05/03/2024 1027    K 3.8 04/22/2024 0434    K 4.9 06/01/2022 1340     06/01/2022 1340    CO2 27 05/03/2024 1027    CO2 21 (L) 04/22/2024 0434    CO2 29 06/01/2022 1340    BUN 18.1 05/03/2024 1027    BUN 15.2 04/22/2024 0434    BUN 22 06/01/2022 1340    CREATININE 1.38 (H) 05/03/2024 1027    CREATININE 1.03 (H) 04/22/2024 0434    CREATININE 1.07 (H) 04/20/2024 0546    CREATININE 1.07 (H) 04/19/2024 1452    CREATININE 1.32 (H) 06/01/2022 1340    CALCIUM 8.7 05/03/2024 1027    CALCIUM 7.8 (L) 04/22/2024 0434    CALCIUM 8.7 (L) 06/01/2022 1340    PHOS 2.6 05/03/2024 1027    PHOS 2.1 (L) 12/19/2023 0932    PTH 91.5 (H) 05/03/2024 1027    .2 (H) 12/19/2023 " 0932    PTH 67.2 07/19/2023 0834           Component Value Date/Time    WBC 4.25 (L) 05/03/2024 1027    WBC 3.76 (L) 04/24/2024 0616    HGB 8.2 (L) 05/03/2024 1027    HGB 8.2 (L) 04/24/2024 0616    HCT 26.2 (L) 05/03/2024 1027    HCT 25.8 (L) 04/24/2024 0616    PLT 92 (L) 05/03/2024 1027    PLT 85 (L) 04/24/2024 0616       Urine Creatinine   Date Value Ref Range Status   05/03/2024 113.5 (H) 45.0 - 106.0 mg/dL Final   02/26/2024 65.9 45.0 - 106.0 mg/dL Final       Urine Protein Level   Date Value Ref Range Status   05/03/2024 14.4 mg/dL Final   02/26/2024 9.3 mg/dL Final         Impression:  1. Stage 3b chronic kidney disease    2. Primary hypertension    3. Type 2 diabetes mellitus with hyperglycemia, with long-term current use of insulin    4. Hypophosphatemia    5. Malignant neoplasm of ascending colon        CKD 3 related to nephrosclerosis; renal function stable  No significant proteinuria  Colon cancer status post resection; completed a treatments of chemotherapy regimen; recent colonoscopy in the hospital negative for malignancy so at this time chemotherapy is on hold she will be following up with Dr. Olmstead in the next week or so to determine if she needs to complete any further treatments or if she is in remission.  Phosphorus had trended back up to normal    Blood pressure borderline hypotension.  Patient states it has been this way and even lower since she was discharged from hospital    Plan:    Decrease spironolactone to 25 mg due to hypotension  Take Lasix PRN for gaining greater than 2 lb overnight (currently only trace edema to lower extremities)    Avoid NSAIDs    Follow up in 3 months    Asked pt to have Dr. Olmstead forward her note to us to follow with plan of care    Dez BARRIOS        This note was created with the assistance of [employer] voice recognition software or phone dictation. There may be transcription errors as a result of using this technology however minimal. Effort has been  made to assure accuracy of transcription but any obvious errors or omissions should be clarified with the author of the document.

## 2024-05-08 ENCOUNTER — LAB VISIT (OUTPATIENT)
Dept: LAB | Facility: HOSPITAL | Age: 69
End: 2024-05-08
Attending: INTERNAL MEDICINE
Payer: MEDICARE

## 2024-05-08 ENCOUNTER — OFFICE VISIT (OUTPATIENT)
Dept: HEMATOLOGY/ONCOLOGY | Facility: CLINIC | Age: 69
End: 2024-05-08
Payer: MEDICARE

## 2024-05-08 VITALS
BODY MASS INDEX: 40.93 KG/M2 | OXYGEN SATURATION: 98 % | TEMPERATURE: 98 F | HEART RATE: 82 BPM | WEIGHT: 231 LBS | DIASTOLIC BLOOD PRESSURE: 76 MMHG | HEIGHT: 63 IN | SYSTOLIC BLOOD PRESSURE: 120 MMHG | RESPIRATION RATE: 18 BRPM

## 2024-05-08 DIAGNOSIS — C18.2 MALIGNANT NEOPLASM OF ASCENDING COLON: ICD-10-CM

## 2024-05-08 DIAGNOSIS — C18.2 MALIGNANT NEOPLASM OF ASCENDING COLON: Primary | ICD-10-CM

## 2024-05-08 LAB
ALBUMIN SERPL-MCNC: 3 G/DL (ref 3.4–4.8)
ALBUMIN/GLOB SERPL: 0.9 RATIO (ref 1.1–2)
ALP SERPL-CCNC: 122 UNIT/L (ref 40–150)
ALT SERPL-CCNC: 18 UNIT/L (ref 0–55)
AST SERPL-CCNC: 26 UNIT/L (ref 5–34)
BASOPHILS # BLD AUTO: 0.03 X10(3)/MCL
BASOPHILS NFR BLD AUTO: 0.7 %
BILIRUB SERPL-MCNC: 0.4 MG/DL
BUN SERPL-MCNC: 14.2 MG/DL (ref 9.8–20.1)
CALCIUM SERPL-MCNC: 9 MG/DL (ref 8.4–10.2)
CHLORIDE SERPL-SCNC: 103 MMOL/L (ref 98–107)
CO2 SERPL-SCNC: 29 MMOL/L (ref 23–31)
CREAT SERPL-MCNC: 1.2 MG/DL (ref 0.55–1.02)
EOSINOPHIL # BLD AUTO: 0.44 X10(3)/MCL (ref 0–0.9)
EOSINOPHIL NFR BLD AUTO: 10.8 %
ERYTHROCYTE [DISTWIDTH] IN BLOOD BY AUTOMATED COUNT: 20.9 % (ref 11.5–17)
GFR SERPLBLD CREATININE-BSD FMLA CKD-EPI: 50 ML/MIN/1.73/M2
GLOBULIN SER-MCNC: 3.3 GM/DL (ref 2.4–3.5)
GLUCOSE SERPL-MCNC: 152 MG/DL (ref 82–115)
HCT VFR BLD AUTO: 26.7 % (ref 37–47)
HGB BLD-MCNC: 8.2 G/DL (ref 12–16)
IMM GRANULOCYTES # BLD AUTO: 0.01 X10(3)/MCL (ref 0–0.04)
IMM GRANULOCYTES NFR BLD AUTO: 0.2 %
LYMPHOCYTES # BLD AUTO: 0.96 X10(3)/MCL (ref 0.6–4.6)
LYMPHOCYTES NFR BLD AUTO: 23.5 %
MCH RBC QN AUTO: 26 PG (ref 27–31)
MCHC RBC AUTO-ENTMCNC: 30.7 G/DL (ref 33–36)
MCV RBC AUTO: 84.8 FL (ref 80–94)
MONOCYTES # BLD AUTO: 0.36 X10(3)/MCL (ref 0.1–1.3)
MONOCYTES NFR BLD AUTO: 8.8 %
NEUTROPHILS # BLD AUTO: 2.28 X10(3)/MCL (ref 2.1–9.2)
NEUTROPHILS NFR BLD AUTO: 56 %
PLATELET # BLD AUTO: 119 X10(3)/MCL (ref 130–400)
PMV BLD AUTO: ABNORMAL FL
POTASSIUM SERPL-SCNC: 4.4 MMOL/L (ref 3.5–5.1)
PROT SERPL-MCNC: 6.3 GM/DL (ref 5.8–7.6)
RBC # BLD AUTO: 3.15 X10(6)/MCL (ref 4.2–5.4)
SODIUM SERPL-SCNC: 138 MMOL/L (ref 136–145)
WBC # SPEC AUTO: 4.08 X10(3)/MCL (ref 4.5–11.5)

## 2024-05-08 PROCEDURE — 99999 PR PBB SHADOW E&M-EST. PATIENT-LVL V: CPT | Mod: PBBFAC,,, | Performed by: NURSE PRACTITIONER

## 2024-05-08 PROCEDURE — 1101F PT FALLS ASSESS-DOCD LE1/YR: CPT | Mod: CPTII,S$GLB,, | Performed by: NURSE PRACTITIONER

## 2024-05-08 PROCEDURE — 80053 COMPREHEN METABOLIC PANEL: CPT

## 2024-05-08 PROCEDURE — 3066F NEPHROPATHY DOC TX: CPT | Mod: CPTII,S$GLB,, | Performed by: NURSE PRACTITIONER

## 2024-05-08 PROCEDURE — 3008F BODY MASS INDEX DOCD: CPT | Mod: CPTII,S$GLB,, | Performed by: NURSE PRACTITIONER

## 2024-05-08 PROCEDURE — 85025 COMPLETE CBC W/AUTO DIFF WBC: CPT

## 2024-05-08 PROCEDURE — 36415 COLL VENOUS BLD VENIPUNCTURE: CPT

## 2024-05-08 PROCEDURE — 3074F SYST BP LT 130 MM HG: CPT | Mod: CPTII,S$GLB,, | Performed by: NURSE PRACTITIONER

## 2024-05-08 PROCEDURE — 3051F HG A1C>EQUAL 7.0%<8.0%: CPT | Mod: CPTII,S$GLB,, | Performed by: NURSE PRACTITIONER

## 2024-05-08 PROCEDURE — 82728 ASSAY OF FERRITIN: CPT

## 2024-05-08 PROCEDURE — 3078F DIAST BP <80 MM HG: CPT | Mod: CPTII,S$GLB,, | Performed by: NURSE PRACTITIONER

## 2024-05-08 PROCEDURE — 1126F AMNT PAIN NOTED NONE PRSNT: CPT | Mod: CPTII,S$GLB,, | Performed by: NURSE PRACTITIONER

## 2024-05-08 PROCEDURE — 1159F MED LIST DOCD IN RCRD: CPT | Mod: CPTII,S$GLB,, | Performed by: NURSE PRACTITIONER

## 2024-05-08 PROCEDURE — 83540 ASSAY OF IRON: CPT

## 2024-05-08 PROCEDURE — 99215 OFFICE O/P EST HI 40 MIN: CPT | Mod: S$GLB,,, | Performed by: NURSE PRACTITIONER

## 2024-05-08 PROCEDURE — 1111F DSCHRG MED/CURRENT MED MERGE: CPT | Mod: CPTII,S$GLB,, | Performed by: NURSE PRACTITIONER

## 2024-05-08 PROCEDURE — 1160F RVW MEDS BY RX/DR IN RCRD: CPT | Mod: CPTII,S$GLB,, | Performed by: NURSE PRACTITIONER

## 2024-05-08 PROCEDURE — 3288F FALL RISK ASSESSMENT DOCD: CPT | Mod: CPTII,S$GLB,, | Performed by: NURSE PRACTITIONER

## 2024-05-08 NOTE — PROGRESS NOTES
HEMATOLOGY/ONCOLOGY OFFICE CLINIC VISIT    Visit Information:    Initial Evaluation: 4/22/2022  Referring Provider: Maggy Jaquez NP  Other providers: Dr Timothy Russ  Code status: Not addressed     Diagnosis/Problem list:   pT3 N2a Mx Stage IIIB Colon cancer. Dx 10/19/23  Microcytic anemia.   Folate deficiency     Present Treatment:  Xelox to start on 3/26/24  (Received 1 cycle then stopped).     Treatment history:  10/19/2023 Lap/jaswinder right colon resection   FOLFOX with dose reduction on Oxaliplatin due to neuropathy. Started on 11/28/23. x 7 cycles then switched to XELOX      Imaging studies:  CT C/A/P 6/3/2022: There is no significant hepatic steatosis.  The liver is cirrhotic.  No liver lesion is identified.  The spleen is enlarged, measuring 15.5 cm in length.  There is no retroperitoneal lymphadenopathy or abdominal aortic aneurysm.  A mildly prominent right external iliac lymph node measures 9 mm in short axis, nonspecific.  This is also similar in appearance when compared to 2015. Impression: Cirrhosis. Splenomegaly.  CT CAP 9/11/2023: There is no supraclavicular or axillary lymphadenopathy.  No mediastinal adenopathy. 3 mm right upper lobe pulmonary nodule. There are few additional pulmonary micronodules in the lung apices measuring no greater than 1-2 mm.  Findings similar to prior exam.     Impression: Scattered pulmonary nodules in the upper lobe similar to prior.  No further follow-up acute according to Fleischner criteria.  No convincing evidence for metastatic disease. Nodular appearing liver, correlation for cirrhosis.  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     Pathology:  10/19/2023:  COLON, RIGHT HEMICOLECTOMY (1.5 CM OF TERMINAL ILEUM, 25 CM LENGTH OF RIGHT COLON): POORLY DIFFERENTIATED ADENOCARCINOMA WITH FOCAL SIGNET RING CELL FEATURES, UNIFOCAL, RIGHT COLON, 8 CM GREATEST DIMENSION.   THE TUMOR INVADES THROUGH THE MUSCULARIS  [place] INTO PERICOLONIC TISSUE (PT3).   FOCAL LYMPHVASCULAR INVASION IS PRESENT.   THE SURGICAL MARGINS ARE FREE OF TUMOR.   METASTATIC ADENOCARCINOMA IS IDENTIFIED IN FOUR (4) OF TWENTY-SEVEN (27)   PERICOLONIC LYMPH NODES (LARGEST METASTATIC DEPOSIT 8 MM; FOCAL EXTRACAPSULAR EXTENSION OF TUMOR IS PRESENT).      PATHOLOGIC STAGING:  pT3 N2a Mx     Histologic Grade:  G3, poorly differentiated   Macroscopic Tumor Perforation:  Not identified   Lymphovascular Invasion:  Small vessel   Perineural Invasion:  Not identified        CLINICAL HISTORY:       Patient: Indu Azul is a 68 y.o. female. with multiple medical problems that I saw originally on 2022 for thrombocytopenia.  Patient platelet counts on 2021 were 132,000 and since that that has been slowly trending down.  2021 platelets 132,000  2022 platelets 121,000  2022 platelets 115,000     Of note patient have a UA done that same day that suggest possible UTI with positive nitrates, 1+ leukocytes and many bacteria.  Urine culture with E. coli.   Reviewing electronic medical record and going back to 12/15/2014 patient with occasional low platelets.  They were never lower than 100,000 and they always normalized.   As per patient in 2020 when her platelets were slightly decreased (117,000), this was shortly after she has her left foot surgery.  Then in May 17 and May 18 2021, platelets were 105k and 101k,  she had COVID.  Again in 2022 she have a UTI for which she completed antibiotics.     Patient's father diagnosed Blood disorder requiring blood transfusion that started q 4 weeks and the q week. He was diagnosed on his 80's but  at 91. Sister and niece had ovarian cancer. Sister  of it. Niece still alive.     She was found to have iron deficiency anemia.  EGD performed, grade I-II esophageal varices found, too small to band. She also had Colonoscopy by Dr Hammonds that showed an ulcerated malignant-appearing  partially obstructing medium sized mass in the ascending colon.  She reports that she had a colonoscopy about 5 years ago at [facility] and everything was fine, no polyps or masses.   Biopsy and tattoo of the mass showed fragments of colonic mucosa, no evidence of dysplasia or malignancy.  Two other polyps were completely removed in the descending and sigmoid colon, pathology returned hyperplastic polyps.  CT abd/pel with no acute findings.     Despite of biopsy because of malignant-appearing partially obstructing mass of the ascending colon and photographs and description were consistent with colon cancer she underwent Lap/jaswinder right colon resection on 10/19/2023 by Dr Timothy Russ. Path c/w really differentiated adeno carcinoma.         Chief Complaint: Follow-up      Interval History:  Patient with h/o Charcot feet for which she had surgery. She does not have sensation on her feet from ankle down. This was prior to her diagnosis of colon cancer. She is getting Oxaloplatin but neuropathy is not worse. She started dose reduced and will cont like same dose. If neuropathy worsen will d/c oxaliplatin.       2/12/2024: Patient presents today for labs and TD prior to C6 of FOLFOX with dose reduction of Oxaliplatin due to neuropathy, accompanied by her daughter. C6 was held on 2/6/24 due to thrombocytopenia (plt 74k). 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 She is tolerating well. She continues with occasional right sided abdominal pain and oral thrush. Pharmacy did not have RX in stock and gave an alternative, but it does not help.  No worsening in neuropathy as of yet. Hyperpigmentation of nails and skin on hands commonly seen with 5FU. Overall, she is doing fair. No fever, chills or sweats. Denies chest pain or shortness of breath. No bleeding. Bowels are moving without difficulty.  Plts today 128k.    2/19/24: Patient presents  today as an add on. She has questions about her recent diagnosis of thrombus within the SMV and extending through the main portal vein . She complain of pain there so imaging was done. She was started on Eliquis. Last week after her chemo, she was nauseated so she didn't take any of her PO meds for 2 days and her right sided abdominal pain returned. Once she resumed Eliqius, her right sided abdominal pain decreased. Reports increased pain when she lays on her right side. She wants to know if we will be repeating imaging to eval her blood clot. Pancytopenia today. She treated last week (sonia). Denies fever.     2/26/24: Patient here for one week f/u. Due for C7 tomorrow. Platelet count is 75 k today. Denies any evidence of bleeding.     3/4/24: Patient presents today for one week f/u. Her platelet count increased to 115k. She has no complaints to report this week. She continues on Eliquis 5 mg bid. Denies any evidence of bleeding. Her neuropathy has not worsened as of yet.     3/18/24: Patient presents today for 2 week f/u due for C8 of FOLFOX. Her platelet count is 87k so we will have to delay again. She has no complaints today. The right lower abdominal pain subsided.     3/25/24: Patient presents today for 1 week f/u. We plan to start XELOX tomorrow for the remainder of her adjuvant treatment. Platelet count is now 109k today.     4/2/24: Patient presents today for once week f/u for toxicity check. She started XELOX on 3/26/24. Tolerating xeloda much better than infusional 5fu. Reports less nausea and she is not sleeping during the day like she was before. She reports chest pain that was transient. Hgb is 8 today.     4/15/24: Patient presents today prior to C2 of XELOX. She is c/o abdominal pain and constipation. Reports no BM since Wednesday ( 5 days). Platelet count is 89k    5/8/24: Patient presents today for scheduled f/u. In the interim she was hospitalized for GI bleeding. Colonoscopy was performed on  24 and path was negative for malignancy. She reports fatigue . She is anemic with Hgb of 8.2. Iron studies added to labs drawn today ( pending). 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         Past Medical History:   Diagnosis Date    Anesthesia complication     trouble awakening    Charcot's joint of foot, left     Chronic kidney disease, unspecified     Cirrhosis of liver without ascites     Depression     Diabetes mellitus, type II, insulin dependent     Diabetic neuropathy     GERD (gastroesophageal reflux disease)     H/O: hysterectomy     Hepatic steatosis     Malignant neoplasm of ascending colon     Obesity, unspecified     Overactive bladder     Thrombocytopenia, unspecified     Vitamin D deficiency       Past Surgical History:   Procedure Laterality Date    APPENDECTOMY      BREAST BIOPSY  2016     SECTION      x3    charcot-leyden crystals identification      CHOLECYSTECTOMY      COLONOSCOPY N/A 2023    Procedure: COLON;  Surgeon: uLis Amador MD;  Location: [facility] ENDOSCOPY;  Service: Gastroenterology;  Laterality: N/A;    COLONOSCOPY N/A 2024    Procedure: COLON;  Surgeon: Mor Porter MD;  Location: [facility] OR;  Service: Gastroenterology;  Laterality: N/A;    COLONOSCOPY, WITH 1 OR MORE BIOPSIES  2023    Procedure: COLONOSCOPY, WITH 1 OR MORE BIOPSIES;  Surgeon: Luis Amador MD;  Location: [facility] ENDOSCOPY;  Service: Gastroenterology;;    COLONOSCOPY, WITH 1 OR MORE BIOPSIES N/A 2024    Procedure: COLONOSCOPY, WITH 1 OR MORE BIOPSIES;  Surgeon: Mor Porter MD;  Location: [facility] OR;  Service: Gastroenterology;  Laterality: N/A;    COLONOSCOPY, WITH DIRECTED SUBMUCOSAL INJECTION  2023    Procedure: COLONOSCOPY, WITH DIRECTED SUBMUCOSAL INJECTION;  Surgeon: Luis Amador MD;  Location: [facility] ENDOSCOPY;  Service: Gastroenterology;;  8cc Colon Tattoo    COLONOSCOPY,  WITH POLYPECTOMY USING SNARE  08/28/2023    Procedure: COLONOSCOPY, WITH POLYPECTOMY USING SNARE;  Surgeon: Luis Amador MD;  Location: [facility] ENDOSCOPY;  Service: Gastroenterology;;    ESOPHAGOGASTRODUODENOSCOPY N/A 08/28/2023    Procedure: DOUBLE;  Surgeon: Luis Amador MD;  Location: [facility] ENDOSCOPY;  Service: Gastroenterology;  Laterality: N/A;    ESOPHAGOGASTRODUODENOSCOPY N/A 4/19/2024    Procedure: EGD;  Surgeon: Nolan Massey MD;  Location: [facility] ENDOSCOPY;  Service: Gastroenterology;  Laterality: N/A;    HEMORRHOID SURGERY      HYSTERECTOMY  1990    total    INSERTION OF SUBCUTANEOUS PORT Right     INSERTION OF TUNNELED CENTRAL VENOUS CATHETER (CVC) WITH SUBCUTANEOUS PORT Right 11/20/2023    Procedure: [id number];  Surgeon: Timothy Russ MD;  Location: [facility] OR;  Service: General;  Laterality: Right;    POLYPECTOMY      right foot surgery Right 02/01/2022    XI ROBOTIC COLECTOMY, RIGHT N/A 10/19/2023    Procedure: XI ROBOTIC COLECTOMY, RIGHT;  Surgeon: Timothy Russ MD;  Location: [facility];  Service: General;  Laterality: N/A;     Family History   Problem Relation Name Age of Onset    Diabetes Mother      Alzheimer's disease Mother      Diabetes Father      Leukemia Father      Diabetes Sister      Liver cancer Sister      Diabetes Brother              Review of patient's allergies indicates:  No Known Allergies   Current Outpatient Medications on File Prior to Visit   Medication Sig Dispense Refill    apixaban (ELIQUIS) 5 mg Tab Take 1 tablet (5 mg total) by mouth 2 (two) times daily. 60 tablet 4    capecitabine (XELODA) 500 MG Tab Take 3 tablets (1,500 mg total) by mouth 2 (two) times daily on days 1-14 of each 21 day chemotherapy cycle. 84 tablet 4    dulaglutide (TRULICITY) 3 mg/0.5 mL pen injector Inject 3 mg into the skin every 7 days. 4 pen 11    furosemide (LASIX) 40 MG tablet Take 40 mg by mouth as needed.      gabapentin (NEURONTIN) 300 MG capsule  "TAKE 2 CAPSULES BY MOUTH IN THE EVENING 60 capsule 6    HYDROcodone-acetaminophen (NORCO) 5-325 mg per tablet Take 1 tablet by mouth every 6 (six) hours as needed for Pain. 30 tablet 0    insulin glargine, TOUJEO, (TOUJEO SOLOSTAR U-300 INSULIN) 300 unit/mL (1.5 mL) InPn pen INJECT 79 UNITS SUBCUTANEOUSLY ONCE DAILY 6 mL 11    insulin syringe-needle U-100 1 mL 31 gauge x 5/16 Syrg Inject 1 Syringe into the skin 3 (three) times daily with meals.      OLANZapine (ZYPREXA) 5 MG tablet TAKE 1 TABLET BY MOUTH IN THE EVENING **NIGHTLY  ON  DAYS  1-3  OF  EACH  CHEMOTHERAPY  CYCLE 6 tablet 0    ondansetron (ZOFRAN-ODT) 8 MG TbDL Take 1 tablet (8 mg total) by mouth every 6 (six) hours as needed. 10 tablet 0    pantoprazole (PROTONIX) 40 MG tablet Take 1 tablet (40 mg total) by mouth once daily. 30 tablet 0    prochlorperazine (COMPAZINE) 5 MG tablet Take 5 mg by mouth every 6 (six) hours as needed.      sertraline (ZOLOFT) 50 MG tablet Take 1 tablet by mouth once daily 90 tablet 3    solifenacin (VESICARE) 10 MG tablet Take 10 mg by mouth once daily.      spironolactone (ALDACTONE) 50 MG tablet Take 25 mg by mouth once daily.       Current Facility-Administered Medications on File Prior to Visit   Medication Dose Route Frequency Provider Last Rate Last Admin    0.9%  NaCl infusion (for blood administration)   Intravenous Once Gayatri Jaffe FNP        promethazine (PHENERGAN) 12.5 mg in dextrose 5 % (D5W) 50 mL IVPB  12.5 mg Intravenous Once Gayatri Jaffe FNP          Review of Systems   Constitutional:  Positive for fatigue.   Neurological:  Positive for numbness.            Vitals:    05/08/24 1303   BP: 120/76   BP Location: Left arm   Patient Position: Sitting   Pulse: 82   Resp: 18   Temp: 98.2 °F (36.8 °C)   TempSrc: Oral   SpO2: 98%   Weight: 104.8 kg (231 lb)   Height: 5' 2.99" (1.6 m)                  Wt Readings from Last 6 Encounters:   05/08/24 104.8 kg (231 lb)   05/07/24 104.4 kg (230 lb 3.2 oz)   04/30/24 " 104.8 kg (231 lb)   04/20/24 101.5 kg (223 lb 12.3 oz)   04/15/24 104.8 kg (231 lb)   04/15/24 104.8 kg (231 lb)     Body mass index is 40.93 kg/m².  Body surface area is 2.16 meters squared.  Physical Exam  Vitals and nursing note reviewed.   Constitutional:       General: She is not in acute distress.     Appearance: Normal appearance. She is obese.   HENT:      Head: Normocephalic and atraumatic.      Mouth/Throat:      Mouth: Mucous membranes are moist.   Eyes:      General: No scleral icterus.     Extraocular Movements: Extraocular movements intact.      Conjunctiva/sclera: Conjunctivae normal.      Pupils: Pupils are equal, round, and reactive to light.   Neck:      Vascular: No JVD.   Cardiovascular:      Rate and Rhythm: Normal rate and regular rhythm.      Heart sounds: No murmur heard.  Pulmonary:      Effort: Pulmonary effort is normal.      Breath sounds: Normal breath sounds. No wheezing or rhonchi.   Chest:      Comments: Right chest wall mediport in place.    Abdominal:      General: Bowel sounds are normal. There is no distension.      Palpations: Abdomen is soft.      Comments: Surgical incisions healed after colon surgery.    Musculoskeletal:         General: No swelling or deformity.      Cervical back: Neck supple.   Lymphadenopathy:      Head:      Right side of head: No submandibular adenopathy.      Left side of head: No submandibular adenopathy.      Cervical: No cervical adenopathy.      Upper Body:      Right upper body: No supraclavicular or axillary adenopathy.      Left upper body: No supraclavicular or axillary adenopathy.      Lower Body: No right inguinal adenopathy. No left inguinal adenopathy.   Skin:     General: Skin is warm.      Coloration: Skin is not jaundiced.      Findings: No lesion or rash.      Nails: There is no clubbing.      Comments: Darkening of nails and skin on hands commonly seen with 5FU   Neurological:      General: No focal deficit present.      Mental Status:  She is alert and oriented to person, place, and time.      Cranial Nerves: Cranial nerves 2-12 are intact.   Psychiatric:         Attention and Perception: Attention normal.         Mood and Affect: Mood is depressed.         Behavior: Behavior is cooperative.         Judgment: Judgment normal.       ECOG SCORE             Laboratory:  CBC with Differential:  Lab Results   Component Value Date    WBC 4.08 (L) 05/08/2024    RBC 3.15 (L) 05/08/2024    HGB 8.2 (L) 05/08/2024    HCT 26.7 (L) 05/08/2024    MCV 84.8 05/08/2024    MCH 26.0 (L) 05/08/2024    MCHC 30.7 (L) 05/08/2024    RDW 20.9 (H) 05/08/2024     (L) 05/08/2024    MPV  05/08/2024      Comment:      Unable to calculate MPV         CMP:  Sodium   Date Value Ref Range Status   05/08/2024 138 136 - 145 mmol/L Final   06/01/2022 140 136 - 145 mmol/L Final     Potassium   Date Value Ref Range Status   05/08/2024 4.4 3.5 - 5.1 mmol/L Final   06/01/2022 4.9 3.5 - 5.1 mmol/L Final     Chloride   Date Value Ref Range Status   06/01/2022 104 100 - 109 mmol/L Final     CO2   Date Value Ref Range Status   05/08/2024 29 23 - 31 mmol/L Final     Carbon Dioxide   Date Value Ref Range Status   06/01/2022 29 22 - 33 mmol/L Final     Blood Urea Nitrogen   Date Value Ref Range Status   05/08/2024 14.2 9.8 - 20.1 mg/dL Final   06/01/2022 22 5 - 25 mg/dL Final     Creatinine   Date Value Ref Range Status   05/08/2024 1.20 (H) 0.55 - 1.02 mg/dL Final   06/01/2022 1.32 (H) 0.57 - 1.25 mg/dL Final     Calcium   Date Value Ref Range Status   05/08/2024 9.0 8.4 - 10.2 mg/dL Final   06/01/2022 8.7 (L) 8.8 - 10.6 mg/dL Final     Albumin   Date Value Ref Range Status   05/08/2024 3.0 (L) 3.4 - 4.8 g/dL Final     Bilirubin Total   Date Value Ref Range Status   05/08/2024 0.4 <=1.5 mg/dL Final     ALP   Date Value Ref Range Status   05/08/2024 122 40 - 150 unit/L Final     AST   Date Value Ref Range Status   05/08/2024 26 5 - 34 unit/L Final     ALT   Date Value Ref Range Status    05/08/2024 18 0 - 55 unit/L Final     Anion Gap   Date Value Ref Range Status   06/01/2022 7 (L) 8 - 16 mmol/L Final     eGFR    Date Value Ref Range Status   06/01/2022 45 mL/min/1.73mSq Final     Comment:     In accordance with NKF-ASN Task Force recommendation, calculation based on the Chronic Kidney Disease Epidemiology Collaboration (CKD-EPI) equation without adjustment for race. eGFR adjusted for gender and age and calculated in ml/min/1.73mSquared. eGFR cannot be calculated if patient is under 18 years of age.     Reference Range:   >= 60 ml/min/1.73mSquared.     Estimated GFR-Non    Date Value Ref Range Status   08/04/2022 43 mls/min/1.73/m2 Final         Assessment:       1) Stage IIIB adenocarcinoma of the ascending colon  --Patient will benefit of adjuvant chemotherapy.   --Due to severe diabetic neuropathy, will try dose reduction of Oxaliplatin, starting 65 mg/m2.    --If not tolerated, then with d/c Oxaliplatin and cont 5FU and LV  2) Lung nodules-Stable. Will monitor  3) iron deficiency anemia-on iron PO  4) Thrombocytopenia- Will follow counts closely as she has splenomegaly and this can affects her counts mostly platelets.  5) Folate deficiency-she will start taking folic acid at this time.   6) Diabetic neuropathy-severe. She does not feel her feet.  --She will have nerve stimulator placement to see if can help her feet     Educated the patient on the risks versus benefits as well as toxicities associated with treatment.  Verbally consented the patient to the treatment plan and the patient was educated on the planned duration of the treatment and schedule of the treatment administration.  All questions were answered.      Plan:         Case discussed with Dr. Olmstead . She is s/p 8 cycles of FOLFOX (7) + XELOX (1) that she received as adjuvant treatment over 5 months. She had some delays for cytopenias so we have been unable to complete. She was recently  hospitalized for GI bleeding and thankfully her path from colonoscopy was negative. She is still weak and tired and has ultimately decided to start surveillance at this time. I agree that this is the right thing to do as she has neuropathy from diabetes any continuing Oxaliplatin at this point my be more harmful than helpful. Her Hgb has been running in the 8s. She is weak and requires a walker for ambulation. Will add iron studies to labs drawn today as she may benefit from iron transfusion.   Discussed finding of cirrhosis & splenomegaly on imaging and how that affects her blood counts - she has upcoming appointment with GI and will discuss liver with him.   Continue Eliquis.   CT C/A/P for post treatment imaging as a baseline to start surveillance.   RTC 2 week with lab same day (CBC, CMP, CEA) -With MD for scan results.     Encouraged to call with questions or problems  The patient was given ample opportunity to ask questions and they were all answered to satisfaction; patient demonstrated understanding of what we discussed and is agreeable to the plan.     LAURA Degroot

## 2024-05-10 DIAGNOSIS — D50.0 IRON DEFICIENCY ANEMIA DUE TO CHRONIC BLOOD LOSS: Primary | ICD-10-CM

## 2024-05-10 LAB
FERRITIN SERPL-MCNC: 10.72 NG/ML (ref 4.63–204)
IRON SATN MFR SERPL: 7 % (ref 20–50)
IRON SERPL-MCNC: 23 UG/DL (ref 50–170)
TIBC SERPL-MCNC: 324 UG/DL (ref 70–310)
TIBC SERPL-MCNC: 347 UG/DL (ref 250–450)
TRANSFERRIN SERPL-MCNC: 318 MG/DL (ref 173–360)

## 2024-05-10 RX ORDER — SODIUM CHLORIDE 0.9 % (FLUSH) 0.9 %
10 SYRINGE (ML) INJECTION
OUTPATIENT
Start: 2024-05-15

## 2024-05-10 RX ORDER — SODIUM CHLORIDE 9 MG/ML
INJECTION, SOLUTION INTRAVENOUS CONTINUOUS
OUTPATIENT
Start: 2024-05-15

## 2024-05-10 RX ORDER — HEPARIN 100 UNIT/ML
5 SYRINGE INTRAVENOUS
OUTPATIENT
Start: 2024-05-15

## 2024-05-10 RX ORDER — EPINEPHRINE 0.3 MG/.3ML
0.3 INJECTION SUBCUTANEOUS ONCE AS NEEDED
OUTPATIENT
Start: 2024-05-15

## 2024-05-10 RX ORDER — DIPHENHYDRAMINE HYDROCHLORIDE 50 MG/ML
50 INJECTION INTRAMUSCULAR; INTRAVENOUS ONCE AS NEEDED
OUTPATIENT
Start: 2024-05-15

## 2024-05-13 RX ORDER — HEPARIN 100 UNIT/ML
500 SYRINGE INTRAVENOUS
Status: CANCELLED | OUTPATIENT
Start: 2024-05-15

## 2024-05-13 RX ORDER — SODIUM CHLORIDE 0.9 % (FLUSH) 0.9 %
10 SYRINGE (ML) INJECTION
Status: CANCELLED | OUTPATIENT
Start: 2024-05-15

## 2024-05-13 RX ORDER — DIPHENHYDRAMINE HYDROCHLORIDE 50 MG/ML
50 INJECTION INTRAMUSCULAR; INTRAVENOUS ONCE AS NEEDED
Status: CANCELLED | OUTPATIENT
Start: 2024-05-15

## 2024-05-13 RX ORDER — EPINEPHRINE 0.3 MG/.3ML
0.3 INJECTION SUBCUTANEOUS ONCE AS NEEDED
Status: CANCELLED | OUTPATIENT
Start: 2024-05-15

## 2024-05-16 ENCOUNTER — HOSPITAL ENCOUNTER (OUTPATIENT)
Dept: RADIOLOGY | Facility: HOSPITAL | Age: 69
Discharge: HOME | End: 2024-05-16
Attending: NURSE PRACTITIONER
Payer: MEDICARE

## 2024-05-16 DIAGNOSIS — C18.2 MALIGNANT NEOPLASM OF ASCENDING COLON: ICD-10-CM

## 2024-05-16 PROCEDURE — 74177 CT ABD & PELVIS W/CONTRAST: CPT | Mod: TC

## 2024-05-16 PROCEDURE — 25500020 PHARM REV CODE 255: Performed by: NURSE PRACTITIONER

## 2024-05-16 RX ADMIN — DIATRIZOATE MEGLUMINE AND DIATRIZOATE SODIUM 30 ML: 600; 100 SOLUTION ORAL; RECTAL at 01:05

## 2024-05-16 RX ADMIN — IOHEXOL 100 ML: 350 INJECTION, SOLUTION INTRAVENOUS at 01:05

## 2024-05-22 ENCOUNTER — OFFICE VISIT (OUTPATIENT)
Dept: HEMATOLOGY/ONCOLOGY | Facility: CLINIC | Age: 69
End: 2024-05-22
Payer: MEDICARE

## 2024-05-22 ENCOUNTER — INFUSION (OUTPATIENT)
Dept: INFUSION THERAPY | Facility: HOSPITAL | Age: 69
End: 2024-05-22
Attending: INTERNAL MEDICINE
Payer: MEDICARE

## 2024-05-22 VITALS
BODY MASS INDEX: 41.37 KG/M2 | HEART RATE: 74 BPM | TEMPERATURE: 98 F | WEIGHT: 233.5 LBS | SYSTOLIC BLOOD PRESSURE: 106 MMHG | RESPIRATION RATE: 18 BRPM | OXYGEN SATURATION: 97 % | HEIGHT: 63 IN | DIASTOLIC BLOOD PRESSURE: 67 MMHG

## 2024-05-22 DIAGNOSIS — Z13.6 ENCOUNTER FOR SCREENING FOR CARDIOVASCULAR DISORDERS: ICD-10-CM

## 2024-05-22 DIAGNOSIS — Z51.81 THERAPEUTIC DRUG MONITORING: ICD-10-CM

## 2024-05-22 DIAGNOSIS — D50.0 IRON DEFICIENCY ANEMIA DUE TO CHRONIC BLOOD LOSS: Primary | ICD-10-CM

## 2024-05-22 DIAGNOSIS — C18.2 MALIGNANT NEOPLASM OF ASCENDING COLON: ICD-10-CM

## 2024-05-22 DIAGNOSIS — R97.0 ELEVATED CEA: ICD-10-CM

## 2024-05-22 DIAGNOSIS — I81 PORTAL VEIN THROMBOSIS: ICD-10-CM

## 2024-05-22 DIAGNOSIS — D50.9 MICROCYTIC ANEMIA: ICD-10-CM

## 2024-05-22 PROCEDURE — 99215 OFFICE O/P EST HI 40 MIN: CPT | Mod: S$GLB,,, | Performed by: INTERNAL MEDICINE

## 2024-05-22 PROCEDURE — 3066F NEPHROPATHY DOC TX: CPT | Mod: CPTII,S$GLB,, | Performed by: INTERNAL MEDICINE

## 2024-05-22 PROCEDURE — 1111F DSCHRG MED/CURRENT MED MERGE: CPT | Mod: CPTII,S$GLB,, | Performed by: INTERNAL MEDICINE

## 2024-05-22 PROCEDURE — 25000003 PHARM REV CODE 250: Performed by: NURSE PRACTITIONER

## 2024-05-22 PROCEDURE — 3074F SYST BP LT 130 MM HG: CPT | Mod: CPTII,S$GLB,, | Performed by: INTERNAL MEDICINE

## 2024-05-22 PROCEDURE — 3078F DIAST BP <80 MM HG: CPT | Mod: CPTII,S$GLB,, | Performed by: INTERNAL MEDICINE

## 2024-05-22 PROCEDURE — 1159F MED LIST DOCD IN RCRD: CPT | Mod: CPTII,S$GLB,, | Performed by: INTERNAL MEDICINE

## 2024-05-22 PROCEDURE — A4216 STERILE WATER/SALINE, 10 ML: HCPCS | Performed by: NURSE PRACTITIONER

## 2024-05-22 PROCEDURE — 99999 PR PBB SHADOW E&M-EST. PATIENT-LVL IV: CPT | Mod: PBBFAC,,, | Performed by: INTERNAL MEDICINE

## 2024-05-22 PROCEDURE — 1126F AMNT PAIN NOTED NONE PRSNT: CPT | Mod: CPTII,S$GLB,, | Performed by: INTERNAL MEDICINE

## 2024-05-22 PROCEDURE — 3008F BODY MASS INDEX DOCD: CPT | Mod: CPTII,S$GLB,, | Performed by: INTERNAL MEDICINE

## 2024-05-22 PROCEDURE — 3288F FALL RISK ASSESSMENT DOCD: CPT | Mod: CPTII,S$GLB,, | Performed by: INTERNAL MEDICINE

## 2024-05-22 PROCEDURE — 3051F HG A1C>EQUAL 7.0%<8.0%: CPT | Mod: CPTII,S$GLB,, | Performed by: INTERNAL MEDICINE

## 2024-05-22 PROCEDURE — 63600175 PHARM REV CODE 636 W HCPCS: Performed by: NURSE PRACTITIONER

## 2024-05-22 PROCEDURE — 96365 THER/PROPH/DIAG IV INF INIT: CPT

## 2024-05-22 PROCEDURE — 1101F PT FALLS ASSESS-DOCD LE1/YR: CPT | Mod: CPTII,S$GLB,, | Performed by: INTERNAL MEDICINE

## 2024-05-22 RX ORDER — SODIUM CHLORIDE 0.9 % (FLUSH) 0.9 %
10 SYRINGE (ML) INJECTION
OUTPATIENT
Start: 2024-05-29

## 2024-05-22 RX ORDER — SODIUM CHLORIDE 0.9 % (FLUSH) 0.9 %
10 SYRINGE (ML) INJECTION
Status: DISCONTINUED | OUTPATIENT
Start: 2024-05-22 | End: 2024-05-22 | Stop reason: HOSPADM

## 2024-05-22 RX ORDER — DIPHENHYDRAMINE HYDROCHLORIDE 50 MG/ML
50 INJECTION INTRAMUSCULAR; INTRAVENOUS ONCE AS NEEDED
Status: CANCELLED | OUTPATIENT
Start: 2024-05-29

## 2024-05-22 RX ORDER — DIPHENHYDRAMINE HYDROCHLORIDE 50 MG/ML
50 INJECTION INTRAMUSCULAR; INTRAVENOUS ONCE AS NEEDED
Status: DISCONTINUED | OUTPATIENT
Start: 2024-05-22 | End: 2024-05-22 | Stop reason: HOSPADM

## 2024-05-22 RX ORDER — SODIUM CHLORIDE 0.9 % (FLUSH) 0.9 %
10 SYRINGE (ML) INJECTION
Status: CANCELLED | OUTPATIENT
Start: 2024-05-29

## 2024-05-22 RX ORDER — EPINEPHRINE 0.3 MG/.3ML
0.3 INJECTION SUBCUTANEOUS ONCE AS NEEDED
Status: DISCONTINUED | OUTPATIENT
Start: 2024-05-22 | End: 2024-05-22 | Stop reason: HOSPADM

## 2024-05-22 RX ORDER — EPINEPHRINE 0.3 MG/.3ML
0.3 INJECTION SUBCUTANEOUS ONCE AS NEEDED
Status: CANCELLED | OUTPATIENT
Start: 2024-05-29

## 2024-05-22 RX ORDER — HEPARIN 100 UNIT/ML
500 SYRINGE INTRAVENOUS
Status: CANCELLED | OUTPATIENT
Start: 2024-05-29

## 2024-05-22 RX ORDER — HEPARIN 100 UNIT/ML
500 SYRINGE INTRAVENOUS
Status: DISCONTINUED | OUTPATIENT
Start: 2024-05-22 | End: 2024-05-22 | Stop reason: HOSPADM

## 2024-05-22 RX ORDER — HEPARIN 100 UNIT/ML
500 SYRINGE INTRAVENOUS
OUTPATIENT
Start: 2024-05-29

## 2024-05-22 RX ADMIN — Medication 10 ML: at 02:05

## 2024-05-22 RX ADMIN — IRON SUCROSE 200 MG: 20 INJECTION, SOLUTION INTRAVENOUS at 12:05

## 2024-05-22 RX ADMIN — SODIUM CHLORIDE: 9 INJECTION, SOLUTION INTRAVENOUS at 12:05

## 2024-05-22 RX ADMIN — HEPARIN 500 UNITS: 100 SYRINGE at 02:05

## 2024-05-22 NOTE — PLAN OF CARE
Pt received 1/5 venofer pt tolerated well. Shaunna given to granddaughter pt discharged in stable condition

## 2024-05-22 NOTE — PROGRESS NOTES
HEMATOLOGY/ONCOLOGY OFFICE CLINIC VISIT    Visit Information:    Initial Evaluation: 4/22/2022  Referring Provider: Maggy Jaquez NP  Other providers: Dr Timothy Russ  Code status: Not addressed     Diagnosis/Problem list:   pT3 N2a Mx Stage IIIB Colon cancer. Dx 10/19/23  Microcytic anemia.   Folate deficiency     Present Treatment:  Xelox to start on 3/26/24  (Received 1 cycle then stopped).     Treatment history:  10/19/2023 Lap/jaswinder right colon resection   FOLFOX with dose reduction on Oxaliplatin due to neuropathy. Started on 11/28/23. x 7 cycles then switched to XELOX      Imaging studies:  CT C/A/P 6/3/2022: There is no significant hepatic steatosis.  The liver is cirrhotic.  No liver lesion is identified.  The spleen is enlarged, measuring 15.5 cm in length.  There is no retroperitoneal lymphadenopathy or abdominal aortic aneurysm.  A mildly prominent right external iliac lymph node measures 9 mm in short axis, nonspecific.  This is also similar in appearance when compared to 2015. Impression: Cirrhosis. Splenomegaly.  CT CAP 9/11/2023: There is no supraclavicular or axillary lymphadenopathy.  No mediastinal adenopathy. 3 mm right upper lobe pulmonary nodule. There are few additional pulmonary micronodules in the lung apices measuring no greater than 1-2 mm.  Findings similar to prior exam.     Impression: Scattered pulmonary nodules in the upper lobe similar to prior.  No further follow-up acute according to Fleischner criteria.  No convincing evidence for metastatic disease. Nodular appearing liver, correlation for cirrhosis.  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  CT C/A/P 5/16/2024:  No new or worsening malignant findings identified.  No defined portal vein thrombus on today's exam.  Poor evaluation of the SMV.       Pathology:  10/19/2023:  COLON, RIGHT HEMICOLECTOMY (1.5 CM OF TERMINAL ILEUM, 25 CM LENGTH OF RIGHT COLON): POORLY  DIFFERENTIATED ADENOCARCINOMA WITH FOCAL SIGNET RING CELL FEATURES, UNIFOCAL, RIGHT COLON, 8 CM GREATEST DIMENSION.   THE TUMOR INVADES THROUGH THE MUSCULARIS PROPRIA INTO PERICOLONIC TISSUE (PT3).   FOCAL LYMPHVASCULAR INVASION IS PRESENT.   THE SURGICAL MARGINS ARE FREE OF TUMOR.   METASTATIC ADENOCARCINOMA IS IDENTIFIED IN FOUR (4) OF TWENTY-SEVEN (27)   PERICOLONIC LYMPH NODES (LARGEST METASTATIC DEPOSIT 8 MM; FOCAL EXTRACAPSULAR EXTENSION OF TUMOR IS PRESENT).      PATHOLOGIC STAGING:  pT3 N2a Mx     Histologic Grade:  G3, poorly differentiated   Macroscopic Tumor Perforation:  Not identified   Lymphovascular Invasion:  Small vessel   Perineural Invasion:  Not identified        CLINICAL HISTORY:       Patient: Indu Azul is a 68 y.o. female. with multiple medical problems that I saw originally on 2022 for thrombocytopenia.  Patient platelet counts on 2021 were 132,000 and since that that has been slowly trending down.  2021 platelets 132,000  2022 platelets 121,000  2022 platelets 115,000     Of note patient have a UA done that same day that suggest possible UTI with positive nitrates, 1+ leukocytes and many bacteria.  Urine culture with E. coli.   Reviewing electronic medical record and going back to 12/15/2014 patient with occasional low platelets.  They were never lower than 100,000 and they always normalized.   As per patient in 2020 when her platelets were slightly decreased (117,000), this was shortly after she has her left foot surgery.  Then in May 17 and May 18 2021, platelets were 105k and 101k,  she had COVID.  Again in 2022 she have a UTI for which she completed antibiotics.     Patient's father diagnosed Blood disorder requiring blood transfusion that started q 4 weeks and the q week. He was diagnosed on his 80's but  at 91. Sister and niece had ovarian cancer. Sister  of it. Niece still alive.     She was found to have iron deficiency anemia.   EGD performed, grade I-II esophageal varices found, too small to band. She also had Colonoscopy by Dr Hammonds that showed an ulcerated malignant-appearing partially obstructing medium sized mass in the ascending colon.  She reports that she had a colonoscopy about 5 years ago at [facility] and everything was fine, no polyps or masses.   Biopsy and tattoo of the mass showed fragments of colonic mucosa, no evidence of dysplasia or malignancy.  Two other polyps were completely removed in the descending and sigmoid colon, pathology returned hyperplastic polyps.  CT abd/pel with no acute findings.     Despite of biopsy because of malignant-appearing partially obstructing mass of the ascending colon and photographs and description were consistent with colon cancer she underwent Lap/jaswinder right colon resection on 10/19/2023 by Dr Timothy Russ. Path c/w really differentiated adeno carcinoma.         Chief Complaint: 2 Week Follow Up (PT has questions about her iron medications.)      Interval History:  Patient with h/o Charcot feet for which she had surgery. She does not have sensation on her feet from ankle down. This was prior to her diagnosis of colon cancer. She is getting Oxaloplatin but neuropathy is not worse. She started dose reduced and will cont like same dose. If neuropathy worsen will d/c oxaliplatin.       2/12/2024: Patient presents today for labs and TD prior to C6 of FOLFOX with dose reduction of Oxaliplatin due to neuropathy, accompanied by her daughter. C6 was held on 2/6/24 due to thrombocytopenia (plt 74k). 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 She is tolerating well. She continues with occasional right sided abdominal pain and oral thrush. Pharmacy did not have RX in stock and gave an alternative, but it does not help.  No worsening in neuropathy as of yet. Hyperpigmentation of nails and skin on hands commonly  seen with 5FU. Overall, she is doing fair. No fever, chills or sweats. Denies chest pain or shortness of breath. No bleeding. Bowels are moving without difficulty.  Plts today 128k.    2/19/24: Patient presents today as an add on. She has questions about her recent diagnosis of thrombus within the SMV and extending through the main portal vein . She complain of pain there so imaging was done. She was started on Eliquis. Last week after her chemo, she was nauseated so she didn't take any of her PO meds for 2 days and her right sided abdominal pain returned. Once she resumed Eliqius, her right sided abdominal pain decreased. Reports increased pain when she lays on her right side. She wants to know if we will be repeating imaging to eval her blood clot. Pancytopenia today. She treated last week (sonia). Denies fever.     2/26/24: Patient here for one week f/u. Due for C7 tomorrow. Platelet count is 75 k today. Denies any evidence of bleeding.     3/4/24: Patient presents today for one week f/u. Her platelet count increased to 115k. She has no complaints to report this week. She continues on Eliquis 5 mg bid. Denies any evidence of bleeding. Her neuropathy has not worsened as of yet.     3/18/24: Patient presents today for 2 week f/u due for C8 of FOLFOX. Her platelet count is 87k so we will have to delay again. She has no complaints today. The right lower abdominal pain subsided.     3/25/24: Patient presents today for 1 week f/u. We plan to start XELOX tomorrow for the remainder of her adjuvant treatment. Platelet count is now 109k today.     4/2/24: Patient presents today for once week f/u for toxicity check. She started XELOX on 3/26/24. Tolerating xeloda much better than infusional 5fu. Reports less nausea and she is not sleeping during the day like she was before. She reports chest pain that was transient. Hgb is 8 today.     4/15/24: Patient presents today prior to C2 of XELOX. She is c/o abdominal pain and  constipation. Reports no BM since Wednesday ( 5 days). Platelet count is 89k    24: Patient presents today for scheduled f/u. In the interim she was hospitalized for GI bleeding. Colonoscopy was performed on 24 and path was negative for malignancy. She reports fatigue . She is anemic with Hgb of 8.2. Iron studies added to labs drawn today ( pending). 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     24: Patient here today for follow up to discuss CT scan results, accompanied by her daughter. She's due to start iron infusion today, this will be .  She is doing fair, no new complaints but still weak and debilitated. She is on wheelchair today  CT scan discussed with them in detail, all questions answered.        Past Medical History:   Diagnosis Date    Anesthesia complication     trouble awakening    Charcot's joint of foot, left     Chronic kidney disease, unspecified     Cirrhosis of liver without ascites     Depression     Diabetes mellitus, type II, insulin dependent     Diabetic neuropathy     GERD (gastroesophageal reflux disease)     H/O: hysterectomy     Hepatic steatosis     Malignant neoplasm of ascending colon     Obesity, unspecified     Overactive bladder     Thrombocytopenia, unspecified     Vitamin D deficiency       Past Surgical History:   Procedure Laterality Date    APPENDECTOMY      BREAST BIOPSY  2016     SECTION      x3    charcot-leyden crystals identification      CHOLECYSTECTOMY      COLONOSCOPY N/A 2023    Procedure: COLON;  Surgeon: Luis Amador MD;  Location: [facility] ENDOSCOPY;  Service: Gastroenterology;  Laterality: N/A;    COLONOSCOPY N/A 2024    Procedure: COLON;  Surgeon: Mor Porter MD;  Location: [facility] OR;  Service: Gastroenterology;  Laterality: N/A;    COLONOSCOPY, WITH 1 OR MORE BIOPSIES  2023    Procedure: COLONOSCOPY, WITH 1 OR MORE BIOPSIES;  Surgeon: Perry  Luis MITCHELL MD;  Location: [facility] ENDOSCOPY;  Service: Gastroenterology;;    COLONOSCOPY, WITH 1 OR MORE BIOPSIES N/A 4/20/2024    Procedure: COLONOSCOPY, WITH 1 OR MORE BIOPSIES;  Surgeon: Mor Porter MD;  Location: [facility];  Service: Gastroenterology;  Laterality: N/A;    COLONOSCOPY, WITH DIRECTED SUBMUCOSAL INJECTION  08/28/2023    Procedure: COLONOSCOPY, WITH DIRECTED SUBMUCOSAL INJECTION;  Surgeon: Luis Amador MD;  Location: [facility] ENDOSCOPY;  Service: Gastroenterology;;  8cc Colon Tattoo    COLONOSCOPY, WITH POLYPECTOMY USING SNARE  08/28/2023    Procedure: COLONOSCOPY, WITH POLYPECTOMY USING SNARE;  Surgeon: Luis Amador MD;  Location: [facility] ENDOSCOPY;  Service: Gastroenterology;;    ESOPHAGOGASTRODUODENOSCOPY N/A 08/28/2023    Procedure: DOUBLE;  Surgeon: Luis Amador MD;  Location: [facility] ENDOSCOPY;  Service: Gastroenterology;  Laterality: N/A;    ESOPHAGOGASTRODUODENOSCOPY N/A 4/19/2024    Procedure: EGD;  Surgeon: Nolan Massey MD;  Location: [facility] ENDOSCOPY;  Service: Gastroenterology;  Laterality: N/A;    HEMORRHOID SURGERY      HYSTERECTOMY  1990    total    INSERTION OF SUBCUTANEOUS PORT Right     INSERTION OF TUNNELED CENTRAL VENOUS CATHETER (CVC) WITH SUBCUTANEOUS PORT Right 11/20/2023    Procedure: [id number];  Surgeon: Timothy Russ MD;  Location: [facility];  Service: General;  Laterality: Right;    POLYPECTOMY      right foot surgery Right 02/01/2022    XI ROBOTIC COLECTOMY, RIGHT N/A 10/19/2023    Procedure: XI ROBOTIC COLECTOMY, RIGHT;  Surgeon: Timothy Russ MD;  Location: [facility];  Service: General;  Laterality: N/A;     Family History   Problem Relation Name Age of Onset    Diabetes Mother      Alzheimer's disease Mother      Diabetes Father      Leukemia Father      Diabetes Sister      Liver cancer Sister      Diabetes Brother              Review of patient's allergies indicates:  No Known Allergies   Current Outpatient  Medications on File Prior to Visit   Medication Sig Dispense Refill    apixaban (ELIQUIS) 5 mg Tab Take 1 tablet (5 mg total) by mouth 2 (two) times daily. 60 tablet 4    capecitabine (XELODA) 500 MG Tab Take 3 tablets (1,500 mg total) by mouth 2 (two) times daily on days 1-14 of each 21 day chemotherapy cycle. 84 tablet 4    dulaglutide (TRULICITY) 3 mg/0.5 mL pen injector Inject 3 mg into the skin every 7 days. 4 pen 11    furosemide (LASIX) 40 MG tablet Take 40 mg by mouth as needed.      gabapentin (NEURONTIN) 300 MG capsule TAKE 2 CAPSULES BY MOUTH IN THE EVENING 60 capsule 6    HYDROcodone-acetaminophen (NORCO) 5-325 mg per tablet Take 1 tablet by mouth every 6 (six) hours as needed for Pain. 30 tablet 0    insulin glargine, TOUJEO, (TOUJEO SOLOSTAR U-300 INSULIN) 300 unit/mL (1.5 mL) InPn pen INJECT 79 UNITS SUBCUTANEOUSLY ONCE DAILY 6 mL 11    insulin syringe-needle U-100 1 mL 31 gauge x 5/16 Syrg Inject 1 Syringe into the skin 3 (three) times daily with meals.      OLANZapine (ZYPREXA) 5 MG tablet TAKE 1 TABLET BY MOUTH IN THE EVENING **NIGHTLY  ON  DAYS  1-3  OF  EACH  CHEMOTHERAPY  CYCLE 6 tablet 0    ondansetron (ZOFRAN-ODT) 8 MG TbDL Take 1 tablet (8 mg total) by mouth every 6 (six) hours as needed. 10 tablet 0    pantoprazole (PROTONIX) 40 MG tablet Take 1 tablet (40 mg total) by mouth once daily. 30 tablet 0    prochlorperazine (COMPAZINE) 5 MG tablet Take 5 mg by mouth every 6 (six) hours as needed.      sertraline (ZOLOFT) 50 MG tablet Take 1 tablet by mouth once daily 90 tablet 3    solifenacin (VESICARE) 10 MG tablet Take 10 mg by mouth once daily.      spironolactone (ALDACTONE) 50 MG tablet Take 25 mg by mouth once daily.       Current Facility-Administered Medications on File Prior to Visit   Medication Dose Route Frequency Provider Last Rate Last Admin    0.9%  NaCl infusion (for blood administration)   Intravenous Once Gayatri Jaffe FNP        promethazine (PHENERGAN) 12.5 mg in dextrose 5  "% (D5W) 50 mL IVPB  12.5 mg Intravenous Once Gayatri Jaffe, DENIS          Review of Systems   Constitutional:  Positive for fatigue. Negative for activity change, appetite change, chills, fever and unexpected weight change.   HENT:  Negative for mouth dryness, mouth sores, nosebleeds, sore throat and trouble swallowing.    Eyes:  Negative for visual disturbance.   Respiratory:  Negative for cough and shortness of breath.    Cardiovascular:  Negative for chest pain, palpitations and leg swelling.   Gastrointestinal:  Negative for abdominal distention, abdominal pain, blood in stool, change in bowel habit, constipation, diarrhea, nausea and vomiting.   Endocrine: Negative.    Genitourinary:  Negative for dysuria, frequency, hematuria and urgency.   Musculoskeletal:  Negative for arthralgias, back pain, myalgias and neck pain.   Integumentary:  Negative for rash.   Neurological:  Positive for weakness and numbness. Negative for dizziness, tremors, syncope, speech difficulty, light-headedness, headaches and memory loss.   Hematological:  Does not bruise/bleed easily.   Psychiatric/Behavioral:  Negative for confusion and suicidal ideas.           Vitals:    05/22/24 1135   BP: 106/67   BP Location: Left arm   Patient Position: Sitting   Pulse: 74   Resp: 18   Temp: 98 °F (36.7 °C)   TempSrc: Oral   SpO2: 97%   Weight: 105.9 kg (233 lb 8 oz)   Height: 5' 2.99" (1.6 m)            Wt Readings from Last 6 Encounters:   05/22/24 105.9 kg (233 lb 8 oz)   05/08/24 104.8 kg (231 lb)   05/07/24 104.4 kg (230 lb 3.2 oz)   04/30/24 104.8 kg (231 lb)   04/20/24 101.5 kg (223 lb 12.3 oz)   04/15/24 104.8 kg (231 lb)     Body mass index is 41.38 kg/m².  Body surface area is 2.17 meters squared.  Physical Exam  Vitals and nursing note reviewed.   Constitutional:       General: She is not in acute distress.     Appearance: She is obese. She is ill-appearing (chronically).   HENT:      Head: Normocephalic and atraumatic.      " Mouth/Throat:      Mouth: Mucous membranes are moist.   Eyes:      General: No scleral icterus.     Extraocular Movements: Extraocular movements intact.      Conjunctiva/sclera: Conjunctivae normal.      Pupils: Pupils are equal, round, and reactive to light.   Neck:      Vascular: No JVD.   Cardiovascular:      Rate and Rhythm: Normal rate and regular rhythm.      Heart sounds: No murmur heard.  Pulmonary:      Effort: Pulmonary effort is normal.      Breath sounds: Normal breath sounds. No wheezing or rhonchi.   Chest:      Comments: Right chest wall mediport in place.    Abdominal:      General: Bowel sounds are normal. There is no distension.      Palpations: Abdomen is soft.      Comments: Surgical incisions healed after colon surgery.    Musculoskeletal:         General: No swelling or deformity.      Cervical back: Neck supple.   Lymphadenopathy:      Head:      Right side of head: No submandibular adenopathy.      Left side of head: No submandibular adenopathy.      Cervical: No cervical adenopathy.      Upper Body:      Right upper body: No supraclavicular or axillary adenopathy.      Left upper body: No supraclavicular or axillary adenopathy.      Lower Body: No right inguinal adenopathy. No left inguinal adenopathy.   Skin:     General: Skin is warm.      Coloration: Skin is not jaundiced.      Findings: No lesion or rash.      Nails: There is no clubbing.      Comments: Darkening of nails and skin on hands commonly seen with 5FU   Neurological:      Mental Status: She is alert and oriented to person, place, and time.      Cranial Nerves: Cranial nerves 2-12 are intact.      Motor: Weakness present.   Psychiatric:         Attention and Perception: Attention normal.         Mood and Affect: Mood is depressed.         Behavior: Behavior is cooperative.         Judgment: Judgment normal.       ECOG SCORE             Laboratory:  CBC with Differential:  Lab Results   Component Value Date    WBC 4.95  05/22/2024    RBC 3.84 (L) 05/22/2024    HGB 9.9 (L) 05/22/2024    HCT 32.9 (L) 05/22/2024    MCV 85.7 05/22/2024    MCH 25.8 (L) 05/22/2024    MCHC 30.1 (L) 05/22/2024    RDW 23.0 (H) 05/22/2024     (L) 05/22/2024    MPV  05/22/2024      Comment:      Unable to calculate MPV         CMP:  Sodium   Date Value Ref Range Status   05/22/2024 141 136 - 145 mmol/L Final   06/01/2022 140 136 - 145 mmol/L Final     Potassium   Date Value Ref Range Status   05/22/2024 4.5 3.5 - 5.1 mmol/L Final   06/01/2022 4.9 3.5 - 5.1 mmol/L Final     Chloride   Date Value Ref Range Status   06/01/2022 104 100 - 109 mmol/L Final     CO2   Date Value Ref Range Status   05/22/2024 26 23 - 31 mmol/L Final     Carbon Dioxide   Date Value Ref Range Status   06/01/2022 29 22 - 33 mmol/L Final     Blood Urea Nitrogen   Date Value Ref Range Status   05/22/2024 13.2 9.8 - 20.1 mg/dL Final   06/01/2022 22 5 - 25 mg/dL Final     Creatinine   Date Value Ref Range Status   05/22/2024 1.15 (H) 0.55 - 1.02 mg/dL Final   06/01/2022 1.32 (H) 0.57 - 1.25 mg/dL Final     Calcium   Date Value Ref Range Status   05/22/2024 8.9 8.4 - 10.2 mg/dL Final   06/01/2022 8.7 (L) 8.8 - 10.6 mg/dL Final     Albumin   Date Value Ref Range Status   05/22/2024 3.2 (L) 3.4 - 4.8 g/dL Final     Bilirubin Total   Date Value Ref Range Status   05/22/2024 0.4 <=1.5 mg/dL Final     ALP   Date Value Ref Range Status   05/22/2024 112 40 - 150 unit/L Final     AST   Date Value Ref Range Status   05/22/2024 25 5 - 34 unit/L Final     ALT   Date Value Ref Range Status   05/22/2024 18 0 - 55 unit/L Final     Anion Gap   Date Value Ref Range Status   06/01/2022 7 (L) 8 - 16 mmol/L Final     eGFR    Date Value Ref Range Status   06/01/2022 45 mL/min/1.73mSq Final     Comment:     In accordance with NKF-ASN Task Force recommendation, calculation based on the Chronic Kidney Disease Epidemiology Collaboration (CKD-EPI) equation without adjustment for race. eGFR  adjusted for gender and age and calculated in ml/min/1.73mSquared. eGFR cannot be calculated if patient is under 18 years of age.     Reference Range:   >= 60 ml/min/1.73mSquared.     Estimated GFR-Non    Date Value Ref Range Status   08/04/2022 43 mls/min/1.73/m2 Final         Assessment:       1) Stage IIIB adenocarcinoma of the ascending colon  --Patient will benefit of adjuvant chemotherapy.   --Due to severe diabetic neuropathy, will try dose reduction of Oxaliplatin, starting 65 mg/m2.    --If not tolerated, then with d/c Oxaliplatin and cont 5FU and LV  2) Lung nodules-Stable. Will monitor  3) iron deficiency anemia-on iron PO  4) Thrombocytopenia- Will follow counts closely as she has splenomegaly and this can affects her counts mostly platelets.  5) Folate deficiency-she will start taking folic acid at this time.   6) Diabetic neuropathy-severe. She does not feel her feet.  --She will have nerve stimulator placement to see if can help her feet     Educated the patient on the risks versus benefits as well as toxicities associated with treatment.  Verbally consented the patient to the treatment plan and the patient was educated on the planned duration of the treatment and schedule of the treatment administration.  All questions were answered.      Plan:       Case discussed with Dr. Olmstead . She is s/p 8 cycles of FOLFOX (7) + XELOX (1) that she received as adjuvant treatment over 5 months. She had some delays for cytopenias so we have been unable to complete. She was recently hospitalized for GI bleeding and thankfully her path from colonoscopy was negative. She is still weak and tired and has ultimately decided to start surveillance at this time. I agree that this is the right thing to do as she has neuropathy from diabetes any continuing Oxaliplatin at this point my be more harmful than helpful. Her Hgb has been running in the 8s. She is weak and requires a walker for ambulation. Will add  iron studies to labs drawn today as she may benefit from iron transfusion.   Discussed finding of cirrhosis & splenomegaly on imaging and how that affects her blood counts - she has upcoming appointment with GI and will discuss liver with him.   Staging imaging with [facility]. Will continue surveillance for colon cancer. She will continue colonoscopies as per GI    Continue Eliquis for now, will check hypercoagulation next visit to determine if Eliquis will be lifelong  Instructed to stop oral iron for now  Keep scheduled appointments for IV iron as scheduled  CT C/A/P every 6 months - due mid Nov. 2024 - will order next visit  RTC in 3 months with NP  Chet melchor every 3 months  Labs prior: CBC,CMP, CEA, hypercoagulation work up  From hem/onc standpoint okay to proceed with procedure for stimulator to help with neuropathy symptoms. Okay to hold Eliquis 48 hours prior to procedure    Encouraged to call with questions or problems  The patient was given ample opportunity to ask questions and they were all answered to satisfaction; patient demonstrated understanding of what we discussed and is agreeable to the plan.    Fatemeh Olmstead MD  Hematology/Oncology      Professional Services   I, Ashlie Duong LPN, acted solely as a scribe for and in the presence of Dr. Fatemeh Olmstead, who performed these services.

## 2024-05-22 NOTE — DISCHARGE SUMMARY
[facility] Medicine Discharge Summary    Admit Date: 4/19/2024  Discharge Date and Time:  April 24, 2024 Admitting Physician:  Team  Discharging Physician: Kenna Choi MD.  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  Type 2 DM, insulin dependent   Thrombocytopenia  CKD stage IIIb - stable    History: Liver cirrhosis secondary to fatty liver disease, Depression, Diabetic neuropathy, GERD, Vitamin-D deficiency, Folate deficiency, iron-deficiency, Esophageal varices, Essential hypertension, Charcot's joint of left foot, Nicotine dependence     Hospital Course:   68-year-old female with past medical history of stage IIIB colon cancer status post right hemicolectomy, cirrhosis, depression, diabetes mellitus type 2, diabetic neuropathy, GERD, chronic anemia, esophageal varices, CKD stage IIIB, essential hypertension, 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 Patient's hemoglobin was found to be 7.9 INR of 1.8 with [place] [address]. Patient has been admitted to hospitalist service GI has been consulted patient had colonoscopy that showed small multiple erosion with small clean based ulceration in the distal bowel hyperemic anastomotic site with no evidence of active bleeding grade 1-2 hemorrhoids EGD showed small varices no sources of bleeding GI recommended continuing Protonix was given 1 unit of packed RBC GI and Oncology both cleared the patient for discharge so patient was discharged home in stable condition with a follow up with PCP and GI outpatient  Pt was seen and examined on the day of discharge  Vitals:  VITAL SIGNS: 24 HRS MIN & MAX LAST   No data recorded 98.1 °F (36.7 °C)   No  "data recorded 102/67   No data recorded  68   No data recorded 18   No data recorded 98 %       Physical Exam:  General: In no acute distress, afebrile  Chest: Clear to auscultation bilaterally  Heart: RRR, +S1, S2, no appreciable murmur  Abdomen: Soft, nontender, BS +  MSK: Warm, no lower extremity edema, no clubbing or cyanosis  Neurologic: Alert and oriented x4,     Procedures Performed: No admission procedures for hospital encounter.     Significant Diagnostic Studies: See Full reports for all details    No results for input(s): "WBC", "RBC", "HGB", "HCT", "MCV", "MCH", "MCHC", "RDW", "PLT", "MPV", "GRAN", "LYMPH", "MONO", "BASO", "NRBC" in the last 168 hours.    No results for input(s): "NA", "K", "CL", "CO2", "ANIONGAP", "BUN", "CREATININE", "GLU", "CALCIUM", "PH", "MG", "ALBUMIN", "PROT", "ALKPHOS", "ALT", "AST", "BILITOT" in the last 168 hours.     Microbiology Results (last 7 days)       ** No results found for the last 168 hours. **             CT Chest Abdomen Pelvis With IV Contrast (XPD) Routine Oral Contrast  Narrative: EXAMINATION:  CT CHEST ABDOMEN PELVIS WITH IV CONTRAST (XPD)    CLINICAL HISTORY:  Colon cancer, assess treatment response; Malignant neoplasm of ascending colon    TECHNIQUE:  CT imaging from the chest through the pelvis after IV contrast.  Axial, coronal and sagittal reformatted images reviewed. Dose length product 1596 mGycm. Automatic exposure control, adjustment of mA/kV or iterative reconstruction technique used to limit radiation dose.    COMPARISON:  CT abdomen/pelvis 02/08/2024    Chest CT 09/11/2023    FINDINGS:  Lung and large airways: Few subcentimeter pulmonary nodules stable since September.  Small area of retained secretions in the lower trachea.    Pleura: No pleural effusion.    Mediastinum and mallory: Right-sided MediPort catheter tip in the lower SVC.  No pathologically enlarged lymph node identified.    Chest wall/axilla and lower neck: No significant " findings.    Liver/biliary: Cirrhotic liver morphology.  No suspicious liver lesion appreciated.  No radiodense gallstone or biliary dilatation.    Pancreas: Normal.    Spleen: Splenomegaly measures 16.5 cm.    Adrenals: Normal.    Genitourinary: Symmetric renal enhancement. No hydronephrosis. Bladder within normal limits. Stable 28 mm left adnexal cyst.    Stomach/Bowel: Right mid abdominal ileal colonic anastomosis.  No dilated bowel.    Abdominal/pelvic lymph nodes and peritoneum: Stable 9 mm aortocaval lymph node.  No significant ascites.    Abdominal/pelvic vasculature: SMV not well evaluated on current study due to contrast timing, but no defined thrombus identified in the main portal vein on current exam.    Abdominal wall: Minimal postsurgical change along the lower anterior midline.    Bones: No aggressive osseous lesion.  Impression: No new or worsening malignant findings identified.    No defined portal vein thrombus on today's exam.  Poor evaluation of the SMV.    Electronically signed by: Abner Mcgregor  Date:    05/17/2024  Time:    08:56         Medication List        START taking these medications      pantoprazole 40 MG tablet  Commonly known as: PROTONIX  Take 1 tablet (40 mg total) by mouth once daily.            CONTINUE taking these medications      apixaban 5 mg Tab  Commonly known as: ELIQUIS  Take 1 tablet (5 mg total) by mouth 2 (two) times daily.     capecitabine 500 MG Tab  Commonly known as: XELODA  Take 3 tablets (1,500 mg total) by mouth 2 (two) times daily on days 1-14 of each 21 day chemotherapy cycle.     furosemide 40 MG tablet  Commonly known as: LASIX     gabapentin 300 MG capsule  Commonly known as: NEURONTIN  TAKE 2 CAPSULES BY MOUTH IN THE EVENING     HYDROcodone-acetaminophen 5-325 mg per tablet  Commonly known as: NORCO  Take 1 tablet by mouth every 6 (six) hours as needed for Pain.     insulin syringe-needle U-100 1 mL 31 gauge x 5/16 Syrg     OLANZapine 5 MG tablet  Commonly  known as: ZyPREXA  TAKE 1 TABLET BY MOUTH IN THE EVENING **NIGHTLY  ON  DAYS  1-3  OF  EACH  CHEMOTHERAPY  CYCLE     ondansetron 8 MG Tbdl  Commonly known as: ZOFRAN-ODT  Take 1 tablet (8 mg total) by mouth every 6 (six) hours as needed.     prochlorperazine 5 MG tablet  Commonly known as: COMPAZINE     sertraline 50 MG tablet  Commonly known as: ZOLOFT  Take 1 tablet by mouth once daily     solifenacin 10 MG tablet  Commonly known as: VESICARE     spironolactone 50 MG tablet  Commonly known as: ALDACTONE     TOUJEO SOLOSTAR U-300 INSULIN 300 unit/mL (1.5 mL) Inpn pen  Generic drug: insulin glargine (TOUJEO)  INJECT 79 UNITS SUBCUTANEOUSLY ONCE DAILY     TRULICITY 3 mg/0.5 mL pen injector  Generic drug: dulaglutide  Inject 3 mg into the skin every 7 days.               Where to Get Your Medications        These medications [facility] to [facility] [address] [place], [place] [address] [facility] [address] [facility]  [address] [facility] [address]      Phone: [phone]   pantoprazole 40 MG tablet          Explained in detail to the patient about the discharge plan, medications, and follow-up visits. Pt understands and agrees with the treatment plan  Discharge Disposition: Home or Self Care   Discharged Condition: stable  Diet-    Medications Per [place] med rec  Activities as tolerated   Follow-up Information       Alejandra Pinedo MD Follow up in 1 week(s).    Specialty: Internal Medicine  Why: F/U appointment on Monday May 6, 2024  @ 2:20pm with the NP.  Contact information:  [address]  [facility] [id number] [phone]               Broderick Cole MD Follow up in 1 month(s).    Specialty: Gastroenterology  Why: office will call you to schedule an apppoinment  Contact information:  [address]  [address]  [facility] [id number] [phone]                           For further questions contact hospitalist office    Discharge time 33 minutes    For worsening symptoms, chest pain,  shortness of breath, increased abdominal pain, high grade fever, stroke or stroke like symptoms, immediately go to the nearest Emergency Room or call 911 as soon as possible.      Kenna Garcia M.D, on 5/21/2024. at 8:45 PM.

## 2024-05-29 ENCOUNTER — INFUSION (OUTPATIENT)
Dept: INFUSION THERAPY | Facility: HOSPITAL | Age: 69
End: 2024-05-29
Attending: INTERNAL MEDICINE
Payer: MEDICARE

## 2024-05-29 VITALS
OXYGEN SATURATION: 99 % | RESPIRATION RATE: 18 BRPM | HEIGHT: 63 IN | BODY MASS INDEX: 40.82 KG/M2 | HEART RATE: 85 BPM | DIASTOLIC BLOOD PRESSURE: 77 MMHG | TEMPERATURE: 98 F | SYSTOLIC BLOOD PRESSURE: 127 MMHG | WEIGHT: 230.38 LBS

## 2024-05-29 DIAGNOSIS — D50.0 IRON DEFICIENCY ANEMIA DUE TO CHRONIC BLOOD LOSS: Primary | ICD-10-CM

## 2024-05-29 PROCEDURE — 25000003 PHARM REV CODE 250: Performed by: NURSE PRACTITIONER

## 2024-05-29 PROCEDURE — 63600175 PHARM REV CODE 636 W HCPCS: Performed by: NURSE PRACTITIONER

## 2024-05-29 PROCEDURE — 96365 THER/PROPH/DIAG IV INF INIT: CPT

## 2024-05-29 PROCEDURE — A4216 STERILE WATER/SALINE, 10 ML: HCPCS | Performed by: NURSE PRACTITIONER

## 2024-05-29 RX ORDER — EPINEPHRINE 0.3 MG/.3ML
0.3 INJECTION SUBCUTANEOUS ONCE AS NEEDED
Status: CANCELLED | OUTPATIENT
Start: 2024-06-05

## 2024-05-29 RX ORDER — DIPHENHYDRAMINE HYDROCHLORIDE 50 MG/ML
50 INJECTION INTRAMUSCULAR; INTRAVENOUS ONCE AS NEEDED
Status: DISCONTINUED | OUTPATIENT
Start: 2024-05-29 | End: 2024-05-29 | Stop reason: HOSPADM

## 2024-05-29 RX ORDER — SODIUM CHLORIDE 0.9 % (FLUSH) 0.9 %
10 SYRINGE (ML) INJECTION
Status: CANCELLED | OUTPATIENT
Start: 2024-06-05

## 2024-05-29 RX ORDER — SODIUM CHLORIDE 0.9 % (FLUSH) 0.9 %
10 SYRINGE (ML) INJECTION
Status: DISCONTINUED | OUTPATIENT
Start: 2024-05-29 | End: 2024-05-29 | Stop reason: HOSPADM

## 2024-05-29 RX ORDER — DIPHENHYDRAMINE HYDROCHLORIDE 50 MG/ML
50 INJECTION INTRAMUSCULAR; INTRAVENOUS ONCE AS NEEDED
Status: CANCELLED | OUTPATIENT
Start: 2024-06-05

## 2024-05-29 RX ORDER — HEPARIN 100 UNIT/ML
500 SYRINGE INTRAVENOUS
Status: DISCONTINUED | OUTPATIENT
Start: 2024-05-29 | End: 2024-05-29 | Stop reason: HOSPADM

## 2024-05-29 RX ORDER — HEPARIN 100 UNIT/ML
500 SYRINGE INTRAVENOUS
Status: CANCELLED | OUTPATIENT
Start: 2024-06-05

## 2024-05-29 RX ORDER — EPINEPHRINE 0.3 MG/.3ML
0.3 INJECTION SUBCUTANEOUS ONCE AS NEEDED
Status: DISCONTINUED | OUTPATIENT
Start: 2024-05-29 | End: 2024-05-29 | Stop reason: HOSPADM

## 2024-05-29 RX ADMIN — SODIUM CHLORIDE: 9 INJECTION, SOLUTION INTRAVENOUS at 02:05

## 2024-05-29 RX ADMIN — HEPARIN 500 UNITS: 100 SYRINGE at 03:05

## 2024-05-29 RX ADMIN — Medication 10 ML: at 03:05

## 2024-05-29 RX ADMIN — IRON SUCROSE 200 MG: 20 INJECTION, SOLUTION INTRAVENOUS at 02:05

## 2024-05-29 NOTE — PLAN OF CARE
Plan of care reviewed, discussed with patient and family. Pt agrees. Venofer IVPB administered, dose 2/5, tolerated well. Patient uses portal for appointments, declines need for print out.

## 2024-06-05 ENCOUNTER — INFUSION (OUTPATIENT)
Dept: INFUSION THERAPY | Facility: HOSPITAL | Age: 69
End: 2024-06-05
Attending: INTERNAL MEDICINE
Payer: MEDICARE

## 2024-06-05 VITALS
TEMPERATURE: 98 F | RESPIRATION RATE: 16 BRPM | WEIGHT: 227 LBS | OXYGEN SATURATION: 98 % | BODY MASS INDEX: 40.22 KG/M2 | HEART RATE: 80 BPM | SYSTOLIC BLOOD PRESSURE: 112 MMHG | HEIGHT: 63 IN | DIASTOLIC BLOOD PRESSURE: 71 MMHG

## 2024-06-05 DIAGNOSIS — D50.0 IRON DEFICIENCY ANEMIA DUE TO CHRONIC BLOOD LOSS: Primary | ICD-10-CM

## 2024-06-05 PROCEDURE — 63600175 PHARM REV CODE 636 W HCPCS: Performed by: NURSE PRACTITIONER

## 2024-06-05 PROCEDURE — 25000003 PHARM REV CODE 250: Performed by: NURSE PRACTITIONER

## 2024-06-05 PROCEDURE — 96365 THER/PROPH/DIAG IV INF INIT: CPT

## 2024-06-05 PROCEDURE — A4216 STERILE WATER/SALINE, 10 ML: HCPCS | Performed by: NURSE PRACTITIONER

## 2024-06-05 RX ORDER — SODIUM CHLORIDE 0.9 % (FLUSH) 0.9 %
10 SYRINGE (ML) INJECTION
Status: DISCONTINUED | OUTPATIENT
Start: 2024-06-05 | End: 2024-06-05 | Stop reason: HOSPADM

## 2024-06-05 RX ORDER — EPINEPHRINE 0.3 MG/.3ML
0.3 INJECTION SUBCUTANEOUS ONCE AS NEEDED
Status: CANCELLED | OUTPATIENT
Start: 2024-06-12

## 2024-06-05 RX ORDER — DIPHENHYDRAMINE HYDROCHLORIDE 50 MG/ML
50 INJECTION INTRAMUSCULAR; INTRAVENOUS ONCE AS NEEDED
Status: CANCELLED | OUTPATIENT
Start: 2024-06-12

## 2024-06-05 RX ORDER — DIPHENHYDRAMINE HYDROCHLORIDE 50 MG/ML
50 INJECTION INTRAMUSCULAR; INTRAVENOUS ONCE AS NEEDED
Status: DISCONTINUED | OUTPATIENT
Start: 2024-06-05 | End: 2024-06-05 | Stop reason: HOSPADM

## 2024-06-05 RX ORDER — SODIUM CHLORIDE 0.9 % (FLUSH) 0.9 %
10 SYRINGE (ML) INJECTION
Status: CANCELLED | OUTPATIENT
Start: 2024-06-12

## 2024-06-05 RX ORDER — EPINEPHRINE 0.3 MG/.3ML
0.3 INJECTION SUBCUTANEOUS ONCE AS NEEDED
Status: DISCONTINUED | OUTPATIENT
Start: 2024-06-05 | End: 2024-06-05 | Stop reason: HOSPADM

## 2024-06-05 RX ORDER — HEPARIN 100 UNIT/ML
500 SYRINGE INTRAVENOUS
Status: DISCONTINUED | OUTPATIENT
Start: 2024-06-05 | End: 2024-06-05 | Stop reason: HOSPADM

## 2024-06-05 RX ORDER — HEPARIN 100 UNIT/ML
500 SYRINGE INTRAVENOUS
Status: CANCELLED | OUTPATIENT
Start: 2024-06-12

## 2024-06-05 RX ADMIN — Medication 10 ML: at 10:06

## 2024-06-05 RX ADMIN — SODIUM CHLORIDE: 9 INJECTION, SOLUTION INTRAVENOUS at 09:06

## 2024-06-05 RX ADMIN — HEPARIN 500 UNITS: 100 SYRINGE at 10:06

## 2024-06-05 RX ADMIN — IRON SUCROSE 200 MG: 20 INJECTION, SOLUTION INTRAVENOUS at 09:06

## 2024-06-10 NOTE — ANESTHESIA POSTPROCEDURE EVALUATION
Anesthesia Post Evaluation    Patient: Indu Azul    Procedure(s) Performed: Procedure(s) (LRB):  COLON (N/A)  COLONOSCOPY, WITH 1 OR MORE BIOPSIES (N/A)    Final Anesthesia Type: general      Patient location during evaluation: PACU  Patient participation: Yes- Able to Participate  Level of consciousness: awake and alert  Post-procedure vital signs: reviewed and stable  Pain management: adequate  Airway patency: patent      Anesthetic complications: no      Cardiovascular status: hemodynamically stable  Respiratory status: unassisted  Hydration status: euvolemic  Follow-up not needed.              Vitals Value Taken Time   /65 04/20/24 1141   Temp 36.5 °C (97.7 °F) 04/20/24 1141   Pulse 78 04/20/24 1141   Resp 14 04/20/24 1141   SpO2 98 % 04/20/24 1141         No case tracking events are documented in the log.      Pain/Lisa Score: Pain Rating Prior to Med Admin: 8 (4/19/2024  7:23 PM)  Pain Rating Post Med Admin: 4 (4/19/2024  8:23 PM)  Lisa Score: 10 (4/20/2024 11:50 AM)           no

## 2024-06-11 DIAGNOSIS — E11.65 TYPE 2 DIABETES MELLITUS WITH HYPERGLYCEMIA, WITH LONG-TERM CURRENT USE OF INSULIN: ICD-10-CM

## 2024-06-11 DIAGNOSIS — Z79.4 TYPE 2 DIABETES MELLITUS WITH HYPERGLYCEMIA, WITH LONG-TERM CURRENT USE OF INSULIN: ICD-10-CM

## 2024-06-11 RX ORDER — DULAGLUTIDE 3 MG/.5ML
3 INJECTION, SOLUTION SUBCUTANEOUS WEEKLY
Qty: 4 PEN | Refills: 11 | Status: SHIPPED | OUTPATIENT
Start: 2024-06-11

## 2024-06-12 ENCOUNTER — INFUSION (OUTPATIENT)
Dept: INFUSION THERAPY | Facility: HOSPITAL | Age: 69
End: 2024-06-12
Attending: INTERNAL MEDICINE
Payer: MEDICARE

## 2024-06-12 VITALS
WEIGHT: 231.31 LBS | DIASTOLIC BLOOD PRESSURE: 68 MMHG | SYSTOLIC BLOOD PRESSURE: 112 MMHG | HEIGHT: 63 IN | OXYGEN SATURATION: 97 % | BODY MASS INDEX: 40.98 KG/M2 | HEART RATE: 90 BPM | TEMPERATURE: 98 F | RESPIRATION RATE: 18 BRPM

## 2024-06-12 DIAGNOSIS — D50.0 IRON DEFICIENCY ANEMIA DUE TO CHRONIC BLOOD LOSS: Primary | ICD-10-CM

## 2024-06-12 PROCEDURE — 63600175 PHARM REV CODE 636 W HCPCS: Performed by: NURSE PRACTITIONER

## 2024-06-12 PROCEDURE — 25000003 PHARM REV CODE 250: Performed by: NURSE PRACTITIONER

## 2024-06-12 PROCEDURE — A4216 STERILE WATER/SALINE, 10 ML: HCPCS | Performed by: NURSE PRACTITIONER

## 2024-06-12 PROCEDURE — 96365 THER/PROPH/DIAG IV INF INIT: CPT

## 2024-06-12 RX ORDER — DIPHENHYDRAMINE HYDROCHLORIDE 50 MG/ML
50 INJECTION INTRAMUSCULAR; INTRAVENOUS ONCE AS NEEDED
Status: CANCELLED | OUTPATIENT
Start: 2024-06-19

## 2024-06-12 RX ORDER — HEPARIN 100 UNIT/ML
500 SYRINGE INTRAVENOUS
Status: CANCELLED | OUTPATIENT
Start: 2024-06-19

## 2024-06-12 RX ORDER — SODIUM CHLORIDE 0.9 % (FLUSH) 0.9 %
10 SYRINGE (ML) INJECTION
Status: CANCELLED | OUTPATIENT
Start: 2024-06-19

## 2024-06-12 RX ORDER — SODIUM CHLORIDE 0.9 % (FLUSH) 0.9 %
10 SYRINGE (ML) INJECTION
Status: DISCONTINUED | OUTPATIENT
Start: 2024-06-12 | End: 2024-06-12 | Stop reason: HOSPADM

## 2024-06-12 RX ORDER — HEPARIN 100 UNIT/ML
500 SYRINGE INTRAVENOUS
Status: DISCONTINUED | OUTPATIENT
Start: 2024-06-12 | End: 2024-06-12 | Stop reason: HOSPADM

## 2024-06-12 RX ORDER — DIPHENHYDRAMINE HYDROCHLORIDE 50 MG/ML
50 INJECTION INTRAMUSCULAR; INTRAVENOUS ONCE AS NEEDED
Status: DISCONTINUED | OUTPATIENT
Start: 2024-06-12 | End: 2024-06-12 | Stop reason: HOSPADM

## 2024-06-12 RX ORDER — EPINEPHRINE 0.3 MG/.3ML
0.3 INJECTION SUBCUTANEOUS ONCE AS NEEDED
Status: DISCONTINUED | OUTPATIENT
Start: 2024-06-12 | End: 2024-06-12 | Stop reason: HOSPADM

## 2024-06-12 RX ORDER — EPINEPHRINE 0.3 MG/.3ML
0.3 INJECTION SUBCUTANEOUS ONCE AS NEEDED
Status: CANCELLED | OUTPATIENT
Start: 2024-06-19

## 2024-06-12 RX ADMIN — HEPARIN 500 UNITS: 100 SYRINGE at 12:06

## 2024-06-12 RX ADMIN — SODIUM CHLORIDE: 9 INJECTION, SOLUTION INTRAVENOUS at 11:06

## 2024-06-12 RX ADMIN — IRON SUCROSE 200 MG: 20 INJECTION, SOLUTION INTRAVENOUS at 11:06

## 2024-06-12 RX ADMIN — Medication 10 ML: at 12:06

## 2024-06-12 NOTE — PLAN OF CARE
"[facility]   Physical Therapy Treatment Note      Name: Bhavna Landry  Clinic Number: [id number]    Therapy Diagnosis:   Encounter Diagnoses   Name Primary?    Decreased strength Yes    Decreased ROM of neck        Physician: Mode Cornelius MD    Visit Date: 11/2/2023  Physician Orders: PT Eval and Treat   Medical Diagnosis from Referral:   M47.812 (ICD-10-CM) - Cervical spondylosis   M47.816 (ICD-10-CM) - Lumbar spondylosis      Evaluation Date: 9/26/2023  Authorization Period Expiration: 11/21/2023  Plan of Care Expiration: 11/24/23  Visit # / Visits authorized: 9/23  FOTO: 10/10 - Next   PTA Visit: 1/6     Time In:2:05 PM  Time Out: 3:00 PM  Total Billable Time: 45 minutes (1 NMR +1MT  + 1 TE)     Precautions: Standard, Fibromyalgia, Migraines, Schizophrenia (stable)      Subjective     Patient reports: "I don't know why I feel so tired today." Pt reports she has been monitoring her blood pressure and disclosed she was 111/60 mm hg.  She was compliant with home exercise program.  Response to previous treatment: reports slight increased cervical pain today  Functional change: none reported     Pain: 6/10 constant (ache / soreness)  Location: bilateral cervical / L UT the most.     Objective      Objective Measures updated at progress report unless specified.     Treatment     Jessica received the treatments listed below:    BP pre activity :102/62, HR: 86 bpm, O2 sat: 97 % on room air   BP Mid session: 119/66, HR 86 bpm, O2 sat:98% on room air  BP end session: 105/60, HR: 88 bpm, O2 sats 97% on room air    therapeutic exercises to develop strength, ROM, and flexibility for 15 minutes, including:  -Cervical rotations with towel roll support : 10x5" B - cues to keep in pain free range  -Cervical flexion/extension with towel roll support 10x5"ea - cues to keep in pain free range  -Supine wand flexion 3 x 10 1# - NT  -Supine cervical isometric 5" x 10 ea - therapist hold - NT  -Seated " Venofer #4 of 5, tolerated treatment well. Patient confirmed next appt 6/19/24. D/C in stable condition.   "cervical snags 3" x 10ea  - NT, resume next  -Lat pull downs: 3x10 green theraband - NT  -Standing chicken wings on wall: 12x5'' holds    manual therapy techniques: Joint mobilizations and Soft tissue Mobilization were applied to the: B UT for 15 minutes, including:  -Cervical traction  -Cervical sideglide Bilateral to mid-cervical spine, grade II-III focusing on left side  -Left mid-cervical opening mobilizations, grade II-III  - PAs to CTJ, grade II-III    neuromuscular re-education activities to improve: Balance, Kinesthetic, Sense, and Proprioception for 15 minutes. The following activities were included:  -Chin tucks: 20x5'' - moderate cuing  -Chin tuck + rotation: 10x Bilateral to end range    therapeutic activities to improve functional performance for 00 minutes, including:  Wall slides: 20x5'' holds  -Rows: 2x15 green theraband    Moist heat pack cervical  x 00 min at end of session in supine.    Patient Education and Home Exercises       Education provided:   - cont HEP  -manual therapy expectations    Written Home Exercises Provided: Patient instructed to cont prior HEP. Exercises were reviewed and Jessica was able to demonstrate them prior to the end of the session.  Jessica demonstrated fair  understanding of the education provided. See Electronic Medical Record under Patient Instructions for exercises provided during therapy sessions    Assessment     Bhavna is a 71 y.o. female referred to outpatient Physical Therapy with a medical diagnosis of Cervical spondylosis and lumbar spondylosis. Limited session today due to symptoms of hypotension, pt's initial complaint was feeling tired, she has been compliant with her BP medication (per pt), she drove herself to today's session. Pt did not report any worsening of symptoms throughout treatment, responded well to modified treatment. Pt advised to contact MD, for medical guidance with her BP medication to address her hypotension today. Pt escorted to her " vehicle, she confirmed she was able to drive herself home.      Jessica Is progressing well towards her goals.   Patient prognosis is Fair.     Patient will continue to benefit from skilled outpatient physical therapy to address the deficits listed in the problem list box on initial evaluation, provide pt/family education and to maximize pt's level of independence in the home and community environment.     Patient's spiritual, cultural and educational needs considered and pt agreeable to plan of care and goals.     Anticipated barriers to physical therapy: RA, comorbidities, cervical scoliosis        GOALS: Short Term Goals: 4 weeks  1. Pt will demo good deep neck flexor strength to improve cervical lordosis and stabilization.  2. Increase cervical rotation ROM by 5-10 degrees Bilaterally in order to perform ADLs with decreased difficulty.  3. Pt will demo good sitting and standing posture for improved spine health and decreased neck pain.  4. Pt to tolerate HEP to improve ROM and independence with ADL's.     Long Term Goals: 8 weeks  1. Report decreased neck pain </=2/10 to increase tolerance for ADLs.  2. Increase cervical AROM to WFL with min neck pain for increased functional mobility.  3. Increased strength in B shoulders/scapular stabilizers to >/= 4/5 MMT grade to increase tolerance for ADL and work activities.  4. Pt will report at </= TBD% impaired on FOTO neck score for neck pain disability to demonstrate decrease in disability and improvement in neck pain.    Plan     Cont to advance PT within tolerance.    Vern Uribe PTA

## 2024-06-15 DIAGNOSIS — Z79.4 TYPE 2 DIABETES MELLITUS WITH HYPERGLYCEMIA, WITH LONG-TERM CURRENT USE OF INSULIN: ICD-10-CM

## 2024-06-15 DIAGNOSIS — E11.65 TYPE 2 DIABETES MELLITUS WITH HYPERGLYCEMIA, WITH LONG-TERM CURRENT USE OF INSULIN: ICD-10-CM

## 2024-06-17 RX ORDER — INSULIN GLARGINE 300 U/ML
INJECTION, SOLUTION SUBCUTANEOUS
Qty: 6 ML | Refills: 11 | Status: SHIPPED | OUTPATIENT
Start: 2024-06-17 | End: 2024-06-21

## 2024-06-19 ENCOUNTER — INFUSION (OUTPATIENT)
Dept: INFUSION THERAPY | Facility: HOSPITAL | Age: 69
End: 2024-06-19
Attending: INTERNAL MEDICINE
Payer: MEDICARE

## 2024-06-19 ENCOUNTER — TELEPHONE (OUTPATIENT)
Dept: FAMILY MEDICINE | Facility: CLINIC | Age: 69
End: 2024-06-19
Payer: MEDICARE

## 2024-06-19 VITALS
HEART RATE: 81 BPM | DIASTOLIC BLOOD PRESSURE: 72 MMHG | SYSTOLIC BLOOD PRESSURE: 110 MMHG | OXYGEN SATURATION: 98 % | BODY MASS INDEX: 39.2 KG/M2 | TEMPERATURE: 98 F | WEIGHT: 221.31 LBS

## 2024-06-19 DIAGNOSIS — E11.65 TYPE 2 DIABETES MELLITUS WITH HYPERGLYCEMIA, WITH LONG-TERM CURRENT USE OF INSULIN: ICD-10-CM

## 2024-06-19 DIAGNOSIS — I15.2 HYPERTENSION ASSOCIATED WITH TYPE 2 DIABETES MELLITUS: ICD-10-CM

## 2024-06-19 DIAGNOSIS — N18.30 CKD STAGE 3 DUE TO TYPE 2 DIABETES MELLITUS: ICD-10-CM

## 2024-06-19 DIAGNOSIS — Z79.4 TYPE 2 DIABETES MELLITUS WITH HYPERGLYCEMIA, WITH LONG-TERM CURRENT USE OF INSULIN: ICD-10-CM

## 2024-06-19 DIAGNOSIS — Z00.00 WELLNESS EXAMINATION: Primary | ICD-10-CM

## 2024-06-19 DIAGNOSIS — E78.5 HYPERLIPIDEMIA ASSOCIATED WITH TYPE 2 DIABETES MELLITUS: ICD-10-CM

## 2024-06-19 DIAGNOSIS — D50.0 IRON DEFICIENCY ANEMIA DUE TO CHRONIC BLOOD LOSS: Primary | ICD-10-CM

## 2024-06-19 DIAGNOSIS — E11.22 CKD STAGE 3 DUE TO TYPE 2 DIABETES MELLITUS: ICD-10-CM

## 2024-06-19 DIAGNOSIS — N18.32 STAGE 3B CHRONIC KIDNEY DISEASE: ICD-10-CM

## 2024-06-19 DIAGNOSIS — E11.69 HYPERLIPIDEMIA ASSOCIATED WITH TYPE 2 DIABETES MELLITUS: ICD-10-CM

## 2024-06-19 DIAGNOSIS — C18.2 MALIGNANT NEOPLASM OF ASCENDING COLON: ICD-10-CM

## 2024-06-19 DIAGNOSIS — D50.9 MICROCYTIC ANEMIA: ICD-10-CM

## 2024-06-19 DIAGNOSIS — E11.59 HYPERTENSION ASSOCIATED WITH TYPE 2 DIABETES MELLITUS: ICD-10-CM

## 2024-06-19 PROCEDURE — A4216 STERILE WATER/SALINE, 10 ML: HCPCS | Performed by: NURSE PRACTITIONER

## 2024-06-19 PROCEDURE — 63600175 PHARM REV CODE 636 W HCPCS: Performed by: NURSE PRACTITIONER

## 2024-06-19 PROCEDURE — 96365 THER/PROPH/DIAG IV INF INIT: CPT

## 2024-06-19 PROCEDURE — 25000003 PHARM REV CODE 250: Performed by: NURSE PRACTITIONER

## 2024-06-19 RX ORDER — EPINEPHRINE 0.3 MG/.3ML
0.3 INJECTION SUBCUTANEOUS ONCE AS NEEDED
Status: DISCONTINUED | OUTPATIENT
Start: 2024-06-19 | End: 2024-06-19 | Stop reason: HOSPADM

## 2024-06-19 RX ORDER — HEPARIN 100 UNIT/ML
500 SYRINGE INTRAVENOUS
Status: CANCELLED | OUTPATIENT
Start: 2024-06-19

## 2024-06-19 RX ORDER — HEPARIN 100 UNIT/ML
500 SYRINGE INTRAVENOUS
Status: DISCONTINUED | OUTPATIENT
Start: 2024-06-19 | End: 2024-06-19 | Stop reason: HOSPADM

## 2024-06-19 RX ORDER — DIPHENHYDRAMINE HYDROCHLORIDE 50 MG/ML
50 INJECTION INTRAMUSCULAR; INTRAVENOUS ONCE AS NEEDED
Status: DISCONTINUED | OUTPATIENT
Start: 2024-06-19 | End: 2024-06-19 | Stop reason: HOSPADM

## 2024-06-19 RX ORDER — SODIUM CHLORIDE 0.9 % (FLUSH) 0.9 %
10 SYRINGE (ML) INJECTION
Status: CANCELLED | OUTPATIENT
Start: 2024-06-19

## 2024-06-19 RX ORDER — SODIUM CHLORIDE 0.9 % (FLUSH) 0.9 %
10 SYRINGE (ML) INJECTION
Status: DISCONTINUED | OUTPATIENT
Start: 2024-06-19 | End: 2024-06-19 | Stop reason: HOSPADM

## 2024-06-19 RX ORDER — EPINEPHRINE 0.3 MG/.3ML
0.3 INJECTION SUBCUTANEOUS ONCE AS NEEDED
Status: CANCELLED | OUTPATIENT
Start: 2024-06-19

## 2024-06-19 RX ORDER — DIPHENHYDRAMINE HYDROCHLORIDE 50 MG/ML
50 INJECTION INTRAMUSCULAR; INTRAVENOUS ONCE AS NEEDED
Status: CANCELLED | OUTPATIENT
Start: 2024-06-19

## 2024-06-19 RX ADMIN — SODIUM CHLORIDE: 9 INJECTION, SOLUTION INTRAVENOUS at 11:06

## 2024-06-19 RX ADMIN — HEPARIN 500 UNITS: 100 SYRINGE at 12:06

## 2024-06-19 RX ADMIN — IRON SUCROSE 200 MG: 20 INJECTION, SOLUTION INTRAVENOUS at 11:06

## 2024-06-19 RX ADMIN — Medication 10 ML: at 12:06

## 2024-06-19 NOTE — TELEPHONE ENCOUNTER
----- Message from Danielle Arevalo sent at 6/19/2024  1:10 PM CDT -----  Who Called: Indu Azul    Caller is requesting assistance/information from provider's office.    Symptoms (please be specific):    How long has patient had these symptoms:    List of preferred pharmacies on file (remove unneeded): [unfilled]  If different, enter pharmacy into here including location and phone number:      Preferred Method of Contact: Phone Call  Patient's Preferred Phone Number on File: [phone]   Best Call Back Number, if different:  Additional Information: pt called to determine if labs are needed before upcoming appt, would like labs for A1C , stated its been awhile

## 2024-06-19 NOTE — TELEPHONE ENCOUNTER
Spoke with pt  Advised that she does need labs  Labs placed into system  Pt expressed understanding

## 2024-06-21 ENCOUNTER — TELEPHONE (OUTPATIENT)
Dept: FAMILY MEDICINE | Facility: CLINIC | Age: 69
End: 2024-06-21
Payer: MEDICARE

## 2024-06-21 ENCOUNTER — LAB VISIT (OUTPATIENT)
Dept: LAB | Facility: HOSPITAL | Age: 69
End: 2024-06-21
Attending: INTERNAL MEDICINE
Payer: MEDICARE

## 2024-06-21 DIAGNOSIS — E78.5 HYPERLIPIDEMIA ASSOCIATED WITH TYPE 2 DIABETES MELLITUS: ICD-10-CM

## 2024-06-21 DIAGNOSIS — Z79.4 TYPE 2 DIABETES MELLITUS WITH HYPERGLYCEMIA, WITH LONG-TERM CURRENT USE OF INSULIN: ICD-10-CM

## 2024-06-21 DIAGNOSIS — E11.59 HYPERTENSION ASSOCIATED WITH TYPE 2 DIABETES MELLITUS: ICD-10-CM

## 2024-06-21 DIAGNOSIS — Z00.00 WELLNESS EXAMINATION: ICD-10-CM

## 2024-06-21 DIAGNOSIS — E11.69 HYPERLIPIDEMIA ASSOCIATED WITH TYPE 2 DIABETES MELLITUS: ICD-10-CM

## 2024-06-21 DIAGNOSIS — E11.65 TYPE 2 DIABETES MELLITUS WITH HYPERGLYCEMIA, WITH LONG-TERM CURRENT USE OF INSULIN: ICD-10-CM

## 2024-06-21 DIAGNOSIS — N18.32 STAGE 3B CHRONIC KIDNEY DISEASE: ICD-10-CM

## 2024-06-21 DIAGNOSIS — I15.2 HYPERTENSION ASSOCIATED WITH TYPE 2 DIABETES MELLITUS: ICD-10-CM

## 2024-06-21 DIAGNOSIS — N18.30 CKD STAGE 3 DUE TO TYPE 2 DIABETES MELLITUS: ICD-10-CM

## 2024-06-21 DIAGNOSIS — E11.22 CKD STAGE 3 DUE TO TYPE 2 DIABETES MELLITUS: ICD-10-CM

## 2024-06-21 LAB
ALBUMIN SERPL-MCNC: 3.5 G/DL (ref 3.4–4.8)
ALBUMIN/GLOB SERPL: 0.9 RATIO (ref 1.1–2)
ALP SERPL-CCNC: 119 UNIT/L (ref 40–150)
ALT SERPL-CCNC: 18 UNIT/L (ref 0–55)
ANION GAP SERPL CALC-SCNC: 8 MEQ/L
AST SERPL-CCNC: 24 UNIT/L (ref 5–34)
BACTERIA #/AREA URNS AUTO: ABNORMAL /HPF
BASOPHILS # BLD AUTO: 0.04 X10(3)/MCL
BASOPHILS NFR BLD AUTO: 0.9 %
BILIRUB SERPL-MCNC: 0.4 MG/DL
BILIRUB UR QL STRIP.AUTO: NEGATIVE
BUN SERPL-MCNC: 18.9 MG/DL (ref 9.8–20.1)
CALCIUM SERPL-MCNC: 9.4 MG/DL (ref 8.4–10.2)
CHLORIDE SERPL-SCNC: 104 MMOL/L (ref 98–107)
CHOLEST SERPL-MCNC: 163 MG/DL
CHOLEST/HDLC SERPL: 3 {RATIO} (ref 0–5)
CLARITY UR: ABNORMAL
CO2 SERPL-SCNC: 27 MMOL/L (ref 23–31)
COLOR UR AUTO: ABNORMAL
CREAT SERPL-MCNC: 1.32 MG/DL (ref 0.55–1.02)
CREAT/UREA NIT SERPL: 14
EOSINOPHIL # BLD AUTO: 0.26 X10(3)/MCL (ref 0–0.9)
EOSINOPHIL NFR BLD AUTO: 6.2 %
ERYTHROCYTE [DISTWIDTH] IN BLOOD BY AUTOMATED COUNT: 21 % (ref 11.5–17)
EST. AVERAGE GLUCOSE BLD GHB EST-MCNC: 119.8 MG/DL
GFR SERPLBLD CREATININE-BSD FMLA CKD-EPI: 44 ML/MIN/1.73/M2
GLOBULIN SER-MCNC: 3.9 GM/DL (ref 2.4–3.5)
GLUCOSE SERPL-MCNC: 138 MG/DL (ref 82–115)
GLUCOSE UR QL STRIP: NEGATIVE
HBA1C MFR BLD: 5.8 %
HCT VFR BLD AUTO: 38.5 % (ref 37–47)
HDLC SERPL-MCNC: 50 MG/DL (ref 35–60)
HGB BLD-MCNC: 11.8 G/DL (ref 12–16)
HGB UR QL STRIP: ABNORMAL
IMM GRANULOCYTES # BLD AUTO: 0.01 X10(3)/MCL (ref 0–0.04)
IMM GRANULOCYTES NFR BLD AUTO: 0.2 %
KETONES UR QL STRIP: NEGATIVE
LDLC SERPL CALC-MCNC: 93 MG/DL (ref 50–140)
LEUKOCYTE ESTERASE UR QL STRIP: ABNORMAL
LYMPHOCYTES # BLD AUTO: 0.84 X10(3)/MCL (ref 0.6–4.6)
LYMPHOCYTES NFR BLD AUTO: 19.9 %
MCH RBC QN AUTO: 26 PG (ref 27–31)
MCHC RBC AUTO-ENTMCNC: 30.6 G/DL (ref 33–36)
MCV RBC AUTO: 85 FL (ref 80–94)
MONOCYTES # BLD AUTO: 0.28 X10(3)/MCL (ref 0.1–1.3)
MONOCYTES NFR BLD AUTO: 6.6 %
NEUTROPHILS # BLD AUTO: 2.79 X10(3)/MCL (ref 2.1–9.2)
NEUTROPHILS NFR BLD AUTO: 66.2 %
NITRITE UR QL STRIP: NEGATIVE
NRBC BLD AUTO-RTO: 0 %
PH UR STRIP: 7 [PH]
PLATELET # BLD AUTO: 78 X10(3)/MCL (ref 130–400)
PLATELETS.RETICULATED NFR BLD AUTO: 9 % (ref 0.9–11.2)
PMV BLD AUTO: ABNORMAL FL
POTASSIUM SERPL-SCNC: 4.5 MMOL/L (ref 3.5–5.1)
PROT SERPL-MCNC: 7.4 GM/DL (ref 5.8–7.6)
PROT UR QL STRIP: ABNORMAL
RBC # BLD AUTO: 4.53 X10(6)/MCL (ref 4.2–5.4)
RBC #/AREA URNS AUTO: ABNORMAL /HPF
SODIUM SERPL-SCNC: 139 MMOL/L (ref 136–145)
SP GR UR STRIP.AUTO: 1.01 (ref 1–1.03)
SQUAMOUS #/AREA URNS AUTO: ABNORMAL /HPF
TRIGL SERPL-MCNC: 98 MG/DL (ref 37–140)
TSH SERPL-ACNC: 2.25 UIU/ML (ref 0.35–4.94)
UROBILINOGEN UR STRIP-ACNC: 1
VLDLC SERPL CALC-MCNC: 20 MG/DL
WBC # BLD AUTO: 4.22 X10(3)/MCL (ref 4.5–11.5)
WBC #/AREA URNS AUTO: ABNORMAL /HPF

## 2024-06-21 PROCEDURE — 85025 COMPLETE CBC W/AUTO DIFF WBC: CPT

## 2024-06-21 PROCEDURE — 83036 HEMOGLOBIN GLYCOSYLATED A1C: CPT

## 2024-06-21 PROCEDURE — 84443 ASSAY THYROID STIM HORMONE: CPT

## 2024-06-21 PROCEDURE — 80061 LIPID PANEL: CPT

## 2024-06-21 PROCEDURE — 36415 COLL VENOUS BLD VENIPUNCTURE: CPT

## 2024-06-21 PROCEDURE — 80053 COMPREHEN METABOLIC PANEL: CPT

## 2024-06-21 RX ORDER — NITROFURANTOIN 25; 75 MG/1; MG/1
100 CAPSULE ORAL EVERY 12 HOURS
Qty: 14 CAPSULE | Refills: 0 | Status: SHIPPED | OUTPATIENT
Start: 2024-06-21 | End: 2024-06-28

## 2024-06-21 RX ORDER — INSULIN GLARGINE 300 [IU]/ML
INJECTION, SOLUTION SUBCUTANEOUS
Start: 2024-06-21

## 2024-06-21 NOTE — TELEPHONE ENCOUNTER
Patient has upcoming appt but her urine sample shows active UTI  I called patient to discuss  No dysuria, no urinary frequency but says sometimes it feels like it's hard to urinate  No fever/chills, no vomiting  Will empirically start treatment with macrobid 100 mg every 12 hours x 7 days  May need to change treatment pending final urine C/S report  If sx worsen over weekend, fatigue, lethargy or other abnormal sx please report to ED for evaluation  Also she mentioned sugar at night is dropping lower, few readings <80; advised her to reduce toujeo to 70 units at night and by 2 units more each night until BG at night is between 100-130 consistently  She expressed understanding to care plan  She has appt with me next week to review remainder of labs       I have signed for the following orders AND/OR meds. Please notify the patient and ask the patient to schedule the testing and/or information about any medications that were sent.      Medications Ordered This Encounter   Medications    insulin glargine, TOUJEO, (TOUJEO SOLOSTAR U-300 INSULIN) 300 unit/mL (1.5 mL) InPn pen     Sig: INJECT 70 UNITS SUBCUTANEOUSLY ONCE DAILY    nitrofurantoin, macrocrystal-monohydrate, (MACROBID) 100 MG capsule     Sig: Take 1 capsule (100 mg total) by mouth every 12 (twelve) hours. for 7 days     Dispense:  14 capsule     Refill:  0     No orders of the defined types were placed in this encounter.

## 2024-06-22 LAB — BACTERIA UR CULT: ABNORMAL

## 2024-06-24 ENCOUNTER — OFFICE VISIT (OUTPATIENT)
Dept: FAMILY MEDICINE | Facility: CLINIC | Age: 69
End: 2024-06-24
Payer: MEDICARE

## 2024-06-24 VITALS
OXYGEN SATURATION: 96 % | DIASTOLIC BLOOD PRESSURE: 58 MMHG | HEIGHT: 63 IN | WEIGHT: 220.31 LBS | HEART RATE: 72 BPM | BODY MASS INDEX: 39.04 KG/M2 | TEMPERATURE: 98 F | SYSTOLIC BLOOD PRESSURE: 112 MMHG | RESPIRATION RATE: 16 BRPM

## 2024-06-24 DIAGNOSIS — E11.69 HYPERLIPIDEMIA ASSOCIATED WITH TYPE 2 DIABETES MELLITUS: ICD-10-CM

## 2024-06-24 DIAGNOSIS — E11.22 CKD STAGE 3 DUE TO TYPE 2 DIABETES MELLITUS: ICD-10-CM

## 2024-06-24 DIAGNOSIS — E11.65 TYPE 2 DIABETES MELLITUS WITH HYPERGLYCEMIA, WITH LONG-TERM CURRENT USE OF INSULIN: ICD-10-CM

## 2024-06-24 DIAGNOSIS — Z12.31 ENCOUNTER FOR SCREENING MAMMOGRAM FOR BREAST CANCER: ICD-10-CM

## 2024-06-24 DIAGNOSIS — E78.5 HYPERLIPIDEMIA ASSOCIATED WITH TYPE 2 DIABETES MELLITUS: ICD-10-CM

## 2024-06-24 DIAGNOSIS — D69.6 THROMBOCYTOPENIA: ICD-10-CM

## 2024-06-24 DIAGNOSIS — Z79.4 TYPE 2 DIABETES MELLITUS WITH HYPERGLYCEMIA, WITH LONG-TERM CURRENT USE OF INSULIN: ICD-10-CM

## 2024-06-24 DIAGNOSIS — Z00.00 WELLNESS EXAMINATION: Primary | ICD-10-CM

## 2024-06-24 DIAGNOSIS — N18.30 CKD STAGE 3 DUE TO TYPE 2 DIABETES MELLITUS: ICD-10-CM

## 2024-06-24 DIAGNOSIS — E61.1 IRON DEFICIENCY: ICD-10-CM

## 2024-06-24 PROCEDURE — 1159F MED LIST DOCD IN RCRD: CPT | Mod: CPTII,,, | Performed by: INTERNAL MEDICINE

## 2024-06-24 PROCEDURE — 1158F ADVNC CARE PLAN TLK DOCD: CPT | Mod: CPTII,,, | Performed by: INTERNAL MEDICINE

## 2024-06-24 PROCEDURE — 3288F FALL RISK ASSESSMENT DOCD: CPT | Mod: CPTII,,, | Performed by: INTERNAL MEDICINE

## 2024-06-24 PROCEDURE — 3066F NEPHROPATHY DOC TX: CPT | Mod: CPTII,,, | Performed by: INTERNAL MEDICINE

## 2024-06-24 PROCEDURE — 3074F SYST BP LT 130 MM HG: CPT | Mod: CPTII,,, | Performed by: INTERNAL MEDICINE

## 2024-06-24 PROCEDURE — 1160F RVW MEDS BY RX/DR IN RCRD: CPT | Mod: CPTII,,, | Performed by: INTERNAL MEDICINE

## 2024-06-24 PROCEDURE — 3044F HG A1C LEVEL LT 7.0%: CPT | Mod: CPTII,,, | Performed by: INTERNAL MEDICINE

## 2024-06-24 PROCEDURE — G0439 PPPS, SUBSEQ VISIT: HCPCS | Mod: ,,, | Performed by: INTERNAL MEDICINE

## 2024-06-24 PROCEDURE — 3078F DIAST BP <80 MM HG: CPT | Mod: CPTII,,, | Performed by: INTERNAL MEDICINE

## 2024-06-24 PROCEDURE — 1101F PT FALLS ASSESS-DOCD LE1/YR: CPT | Mod: CPTII,,, | Performed by: INTERNAL MEDICINE

## 2024-06-24 RX ORDER — INSULIN GLARGINE 300 [IU]/ML
INJECTION, SOLUTION SUBCUTANEOUS
Start: 2024-06-24

## 2024-06-24 NOTE — PROGRESS NOTES
Patient ID: [id number]     Chief Complaint: Medicare AWV Follow Up      HPI:     Indu Azul is a 68 y.o. female here today for a Medicare Wellness.     Mammogram ordered        Diagnosis/Problem list:   pT3 N2a Mx Stage IIIB Colon cancer. Dx 10/19/23  Microcytic anemia.   Folate deficiency      Treatment history:  10/19/2023 Lap/jaswinder right colon resection    FOLFOX with dose reduction on Oxaliplatin due to neuropathy. Started on 11/28/23.  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 Olmstead to discuss if this is life long or not in future      DM:    A1c improving with dietary changes, down to 5.8 but having some low sugar  -has reduce insulin at night to 60 units nightly to avoid this     Currently taking Toujeo 40-60 units at bedtime, Trulicity 3 mg  q week,   DM eye exam scheduled next week  Urine M/C      thrombocytopenia:  Following Dr. Olmstead; suspect possible liver cirrhosis etiology, splenomegaly, advised patient to schedule f/u with GI for this     Hepatitic Steatosis/Cirrhosis:  Cirrhotic appearance of liver per CT.  Evaluated per Dr. Amador, who started her on Aldactone and Furosemide.          CKD3b:  Most recent GFR 43.  nephrology following, Dr. Weir,  kidneys done, nephrosclerosis, BP was low at last OV, reduced aldactone to 25 mg po daily      HLD: no longer on statin therapy      HTN: BP controlled, on aldactone only      Vitamin D deficiency: Recent vitamin D level 52.8.  Currently taking Vitamin D3 50,000 IUs weekly.           Depression: Stable.  Currently taking Zoloft 50 mg (1 tablet) daily.  Denies any suicidal/homicidal ideations.      Charcot's joint of left foot:  Recent surgery February 1st, 2022.      Overactive Bladder/Urge Incontinence: Specialist is Dr. Crenshaw,  urology.  Cystoscopy done 2022; states normal. Vesicare use, stable      Tobacco user:  Smokes 1/2 PPD x 40 years.         Opioid Screening: Patient medication list reviewed, patient is not taking prescription opioids. Patient is not using additional opioids than prescribed. Patient is at low risk of substance abuse based on this opioid use history.       -------------------------------------    Anesthesia complication    trouble awakening    Charcot's joint of foot, left    Chronic kidney disease, unspecified    Cirrhosis of liver without ascites    Depression    Diabetes mellitus, type II, insulin dependent    Diabetic neuropathy    GERD (gastroesophageal reflux disease)    H/O: hysterectomy    Hepatic steatosis    Malignant neoplasm of ascending colon    Obesity, unspecified    Overactive bladder    Thrombocytopenia, unspecified    Vitamin D deficiency        Past Surgical History:   Procedure Laterality Date    APPENDECTOMY      BREAST BIOPSY  2016     SECTION      x3    charcot-leyden crystals identification      CHOLECYSTECTOMY      COLONOSCOPY N/A 2023    Procedure: COLON;  Surgeon: Luis Amador MD;  Location: [facility] ENDOSCOPY;  Service: Gastroenterology;  Laterality: N/A;    COLONOSCOPY N/A 2024    Procedure: COLON;  Surgeon: Mor Porter MD;  Location: [facility];  Service: Gastroenterology;  Laterality: N/A;    COLONOSCOPY, WITH 1 OR MORE BIOPSIES  2023    Procedure: COLONOSCOPY, WITH 1 OR MORE BIOPSIES;  Surgeon: Luis Amador MD;  Location: [facility] ENDOSCOPY;  Service: Gastroenterology;;    COLONOSCOPY, WITH 1 OR MORE BIOPSIES N/A 2024    Procedure: COLONOSCOPY, WITH 1 OR MORE BIOPSIES;  Surgeon: Mor Porter MD;  Location: [facility] OR;  Service: Gastroenterology;  Laterality: N/A;    COLONOSCOPY, WITH DIRECTED SUBMUCOSAL INJECTION  2023    Procedure: COLONOSCOPY, WITH DIRECTED SUBMUCOSAL INJECTION;  Surgeon: Luis Amador MD;   Location: [facility] ENDOSCOPY;  Service: Gastroenterology;;  8cc Colon Tattoo    COLONOSCOPY, WITH POLYPECTOMY USING SNARE  08/28/2023    Procedure: COLONOSCOPY, WITH POLYPECTOMY USING SNARE;  Surgeon: Luis Amador MD;  Location: [facility] ENDOSCOPY;  Service: Gastroenterology;;    ESOPHAGOGASTRODUODENOSCOPY N/A 08/28/2023    Procedure: DOUBLE;  Surgeon: Luis Amador MD;  Location: [facility] ENDOSCOPY;  Service: Gastroenterology;  Laterality: N/A;    ESOPHAGOGASTRODUODENOSCOPY N/A 4/19/2024    Procedure: EGD;  Surgeon: Nolan Massey MD;  Location: [facility] ENDOSCOPY;  Service: Gastroenterology;  Laterality: N/A;    HEMORRHOID SURGERY      HYSTERECTOMY  1990    total    INSERTION OF SUBCUTANEOUS PORT Right     INSERTION OF TUNNELED CENTRAL VENOUS CATHETER (CVC) WITH SUBCUTANEOUS PORT Right 11/20/2023    Procedure: [id number];  Surgeon: Timothy Russ MD;  Location: [facility] OR;  Service: General;  Laterality: Right;    POLYPECTOMY      right foot surgery Right 02/01/2022    XI ROBOTIC COLECTOMY, RIGHT N/A 10/19/2023    Procedure: XI ROBOTIC COLECTOMY, RIGHT;  Surgeon: Timothy Russ MD;  Location: [facility];  Service: General;  Laterality: N/A;       Review of patient's allergies indicates:  No Known Allergies    Outpatient Medications Marked as Taking for the 6/24/24 encounter (Office Visit) with Alejandra Pinedo MD   Medication Sig Dispense Refill    apixaban (ELIQUIS) 5 mg Tab Take 1 tablet (5 mg total) by mouth 2 (two) times daily. 60 tablet 4    dulaglutide (TRULICITY) 3 mg/0.5 mL pen injector INJECT 1 SYRINGE SUBCUTANEOUSLY ONCE A WEEK 4 Pen 11    furosemide (LASIX) 40 MG tablet Take 40 mg by mouth as needed.      gabapentin (NEURONTIN) 300 MG capsule TAKE 2 CAPSULES BY MOUTH IN THE EVENING 60 capsule 6    nitrofurantoin, macrocrystal-monohydrate, (MACROBID) 100 MG capsule Take 1 capsule (100 mg total) by mouth every 12 (twelve) hours. for 7 days 14 capsule 0    sertraline  (ZOLOFT) 50 MG tablet Take 1 tablet by mouth once daily 90 tablet 3    solifenacin (VESICARE) 10 MG tablet Take 10 mg by mouth once daily.      spironolactone (ALDACTONE) 50 MG tablet Take 25 mg by mouth once daily.      [DISCONTINUED] insulin glargine, TOUJEO, (TOUJEO SOLOSTAR U-300 INSULIN) 300 unit/mL (1.5 mL) InPn pen INJECT 70 UNITS SUBCUTANEOUSLY ONCE DAILY       Current Facility-Administered Medications for the 6/24/24 encounter (Office Visit) with Alejandra Pinedo MD   Medication Dose Route Frequency Provider Last Rate Last Admin    0.9%  NaCl infusion (for blood administration)   Intravenous Once Gayatri Jaffe FNP        promethazine (PHENERGAN) 12.5 mg in dextrose 5 % (D5W) 50 mL IVPB  12.5 mg Intravenous Once Gayatri Jaffe FNP           Social History     Socioeconomic History    Marital status:    Occupational History    Occupation: retired   Tobacco Use    Smoking status: Every Day     Current packs/day: 0.50     Average packs/day: 0.5 packs/day for 61.5 years (30.7 ttl pk-yrs)     Types: Cigarettes     Start date: 2003     Passive exposure: Current    Smokeless tobacco: Never   Substance and Sexual Activity    Alcohol use: Not Currently     Comment: occassionally    Drug use: Never    Sexual activity: Not Currently     Social Determinants of Health     Financial Resource Strain: Low Risk  (4/20/2024)    Overall Financial Resource Strain (CARDIA)     Difficulty of Paying Living Expenses: Not very hard   Food Insecurity: No Food Insecurity (4/20/2024)    Hunger Vital Sign     Worried About Running Out of Food in the Last Year: Never true     Ran Out of Food in the Last Year: Never true   Transportation Needs: No Transportation Needs (4/20/2024)    PRAPARE - Transportation     Lack of Transportation (Medical): No     Lack of Transportation (Non-Medical): No   Stress: Stress Concern Present (4/20/2024)    [employer] - Occupational Stress Questionnaire     Feeling  of Stress : To some extent   Housing Stability: Low Risk  (4/20/2024)    Housing Stability Vital Sign     Unable to Pay for Housing in the Last Year: No     Homeless in the Last Year: No        Family History   Problem Relation Name Age of Onset    Diabetes Mother      Alzheimer's disease Mother      Diabetes Father      Leukemia Father      Diabetes Sister      Liver cancer Sister      Diabetes Brother          Patient Care Team:  Alejandra Pinedo MD as PCP - General (Internal Medicine)  Nolan Russ MD as Consulting Physician (Gastroenterology)  Trinity Shay RN as Diabetes Educator (Diabetes)       Subjective:     Review of Systems   Constitutional:  Negative for chills and fever.   Eyes:  Negative for pain.   Respiratory:  Negative for shortness of breath.    Cardiovascular:  Negative for chest pain.   Gastrointestinal:  Negative for abdominal pain.         Patient Reported Health Risk Assessment  What [age] 65-69  Are you male or female?: Female  During the past four weeks, how much have you been bothered by emotional problems such as feeling anxious, depressed, irritable, sad, or downhearted and blue?: Not at all  During the past five weeks, has your physical and/or emotional health limited your social activities with family, friends, neighbors, or groups?: Not at all  During the past four weeks, how much bodily pain have you generally had?: Mild pain  How often in the past four weeks have you been bothered by falling or dizzy when standing up?: Never  How often in the past four weeks have you been bothered by sexual problems?: Never  How often in the past four weeks have you been bothered by trouble eating well?: Seldom  How often in the past four weeks have you been bothered by teeth or denture problems?: Never  How often in the past four weeks have you been bothered with problems using the telephone?: Never  How often in the past four weeks have you been bothered by tiredness or fatigue?:  "Seldom    Objective:     BP (!) 112/58 (BP Location: Right arm, Patient Position: Sitting, BP Method: Large (Automatic))   Pulse 72   Temp 98.4 °F (36.9 °C) (Temporal)   Resp 16   Ht 5' 3" (1.6 m)   Wt 99.9 kg (220 lb 4.8 oz)   SpO2 96%   BMI 39.02 kg/m²     Physical Exam  Vitals and nursing note reviewed.   Constitutional:       General: She is not in acute distress.     Appearance: Normal appearance. She is not ill-appearing.   HENT:      Head: Normocephalic and atraumatic.   Eyes:      Pupils: Pupils are equal, round, and reactive to light.   Cardiovascular:      Rate and Rhythm: Normal rate and regular rhythm.   Pulmonary:      Effort: Pulmonary effort is normal. No respiratory distress.      Breath sounds: Normal breath sounds. No wheezing.   Musculoskeletal:      Cervical back: Neck supple.   Lymphadenopathy:      Cervical: No cervical adenopathy.   Neurological:      Mental Status: She is alert.       Protective Sensation (w/ 10 gram monofilament):  Right: Absent  Left: Absent    Visual Inspection:  Onychomycosis -  Bilateral    Pedal Pulses:   Right: Present  Left: Present    Posterior Tibialis Pulses:   Right:Present  Left: Present           No data to display                  6/24/2024    10:00 AM 6/19/2024    11:00 AM 6/12/2024    11:00 AM 6/5/2024     9:00 AM 5/22/2024     1:00 PM 5/22/2024    11:00 AM 5/8/2024     1:00 PM   Fall Risk Assessment - Outpatient   Mobility Status Ambulatory w/ assistance Ambulatory w/ assistance Ambulatory w/ assistance  Ambulatory w/ assistance Ambulatory w/ assistance Ambulatory w/ assistance   Number of falls 0 0 0 0 0 0 0   Identified as fall risk True True True  True True True           Depression Screening  Over the past two weeks, has the patient felt down, depressed, or hopeless?: No  Over the past two weeks, has the patient felt little interest or pleasure in doing things?: No  Functional Ability/Safety Screening  Was the patient's timed Up & Go test unsteady " or longer than 30 seconds?: Yes  Does the patient need help with phone, transportation, shopping, preparing meals, housework, laundry, meds, or managing money?: Yes  Does the patient's home have rugs in the hallway, lack grab bars in the bathroom, lack handrails on the stairs or have poor lighting?: No  Have you noticed any hearing difficulties?: No  Cognitive Function (Assessed through direct observation with due consideration of information obtained by way of patient reports and/or concerns raised by family, friends, caretakers, or others)    Does the patient repeat questions/statements in the same day?: No  Does the patient have trouble remembering the date, year, and time?: No  Does the patient have difficulty managing finances?: No  Does the patient have a decreased sense of direction?: No  Assessment/Plan:     1. Wellness examination  Mammogram ordered   Eye exam scheduled next week   2. Encounter for screening mammogram for breast cancer  -     Mammo Digital Screening Bilat w/ Sharad; Future; Expected date: 07/31/2024    3. Type 2 diabetes mellitus with hyperglycemia, with long-term current use of insulin  -     insulin glargine, TOUJEO, (TOUJEO SOLOSTAR U-300 INSULIN) 300 unit/mL (1.5 mL) InPn pen; INJECT 60 UNITS SUBCUTANEOUSLY ONCE DAILY  Reduce toujeo to 60 units nightly  Complete home BG log for next 10 days, drop off at office   Pending my review of log we can adjust her insulin   4. Iron deficiency  Improving with iron infusion  F/u per hematology   5. Thrombocytopenia  Unsure of etiology  Could be from spleen enlargement or liver cirrhosis  Please f/u with GI  6. CKD stage 3 due to type 2 diabetes mellitus  Stable  Avoid NSAID  F/u with nephrology  7. Hyperlipidemia associated with type 2 diabetes mellitus  Stable  LDL at goal  8. UTI  Complete course of macrobid  Unless patient has recurrence of sx we do not need to repeat urine sample after completion of abx       Medicare Annual Wellness and  Personalized Prevention Plan:   Fall Risk + Home Safety + Hearing Impairment + Depression Screen + Opioid and Substance Abuse Screening + Cognitive Impairment Screen + Health Risk Assessment all reviewed.     Health Maintenance Topics with due status: Not Due       Topic Last Completion Date    Influenza Vaccine 01/11/2023    DEXA Scan 07/31/2023    LDCT Lung Screen 09/11/2023    Colorectal Cancer Screening 04/20/2024    Lipid Panel 06/21/2024    Hemoglobin A1c 06/21/2024      The patient's Health Maintenance was reviewed and the following appears to be due at this time:   Health Maintenance Due   Topic Date Due    COVID-19 Vaccine (1) Never done    Foot Exam  Never done    TETANUS VACCINE  Never done    Shingles Vaccine (1 of 2) Never done    RSV Vaccine (Age 60+ and Pregnant patients) (1 - 1-dose 60+ series) Never done    Pneumococcal Vaccines (Age 65+) (2 of 2 - PCV) 11/30/2017    Eye Exam  05/26/2024    Diabetes Urine Screening  06/15/2024    Mammogram  07/31/2024     Advance Care Planning     Date: 06/24/2024    Power of   I initiated the process of voluntary advance care planning today and explained the importance of this process to the patient.  I introduced the concept of advance directives to the patient, as well. Then the patient received detailed information about the importance of designating a Health Care Power of  (HCPOA). She was also instructed to communicate with this person about their wishes for future healthcare, should she become sick and lose decision-making capacity. The patient has previously appointed a HCPOA. After our discussion, the patient has decided to complete a HCPOA and has appointed her daughter, health care agent:  Elva Barrera  & health care agent number:  [phone] . I encouraged her to communicate with this person about their wishes for future healthcare, should she become sick and lose decision-making capacity.      A total of 5 min was spent on advance  care planning, goals of care discussion, emotional support, formulating and communicating prognosis and exploring burden/benefit of various approaches of treatment. This discussion occurred on a fully voluntary basis with the verbal consent of the patient and/or family.           Follow up in about 3 months (around 9/24/2024) for Follow Up - Chronic Conditions. In addition to their scheduled follow up, the patient has also been instructed to follow up on as needed basis.

## 2024-06-28 LAB
LEFT EYE DM RETINOPATHY: POSITIVE
RIGHT EYE DM RETINOPATHY: POSITIVE

## 2024-07-18 ENCOUNTER — DOCUMENTATION ONLY (OUTPATIENT)
Dept: FAMILY MEDICINE | Facility: CLINIC | Age: 69
End: 2024-07-18
Payer: MEDICARE

## 2024-07-27 DIAGNOSIS — I81 PORTAL VEIN THROMBOSIS: ICD-10-CM

## 2024-07-29 RX ORDER — APIXABAN 5 MG/1
5 TABLET, FILM COATED ORAL 2 TIMES DAILY
Qty: 60 TABLET | Refills: 0 | Status: SHIPPED | OUTPATIENT
Start: 2024-07-29

## 2024-08-12 ENCOUNTER — HOSPITAL ENCOUNTER (OUTPATIENT)
Dept: RADIOLOGY | Facility: HOSPITAL | Age: 69
Discharge: HOME | End: 2024-08-12
Attending: INTERNAL MEDICINE
Payer: MEDICARE

## 2024-08-12 DIAGNOSIS — Z12.31 ENCOUNTER FOR SCREENING MAMMOGRAM FOR BREAST CANCER: ICD-10-CM

## 2024-08-12 PROCEDURE — 77067 SCR MAMMO BI INCL CAD: CPT | Mod: 26,,, | Performed by: RADIOLOGY

## 2024-08-12 PROCEDURE — 77067 SCR MAMMO BI INCL CAD: CPT | Mod: TC

## 2024-08-12 PROCEDURE — 77063 BREAST TOMOSYNTHESIS BI: CPT | Mod: 26,,, | Performed by: RADIOLOGY

## 2024-08-22 ENCOUNTER — LAB VISIT (OUTPATIENT)
Dept: LAB | Facility: HOSPITAL | Age: 69
End: 2024-08-22
Attending: INTERNAL MEDICINE
Payer: MEDICARE

## 2024-08-22 ENCOUNTER — INFUSION (OUTPATIENT)
Dept: INFUSION THERAPY | Facility: HOSPITAL | Age: 69
End: 2024-08-22
Attending: INTERNAL MEDICINE
Payer: MEDICARE

## 2024-08-22 VITALS — HEART RATE: 84 BPM | DIASTOLIC BLOOD PRESSURE: 70 MMHG | RESPIRATION RATE: 16 BRPM | SYSTOLIC BLOOD PRESSURE: 112 MMHG

## 2024-08-22 DIAGNOSIS — Z51.81 THERAPEUTIC DRUG MONITORING: ICD-10-CM

## 2024-08-22 DIAGNOSIS — D50.9 MICROCYTIC ANEMIA: ICD-10-CM

## 2024-08-22 DIAGNOSIS — D50.0 IRON DEFICIENCY ANEMIA DUE TO CHRONIC BLOOD LOSS: ICD-10-CM

## 2024-08-22 DIAGNOSIS — C18.2 MALIGNANT NEOPLASM OF ASCENDING COLON: ICD-10-CM

## 2024-08-22 DIAGNOSIS — Z13.6 ENCOUNTER FOR SCREENING FOR CARDIOVASCULAR DISORDERS: ICD-10-CM

## 2024-08-22 DIAGNOSIS — C18.2 MALIGNANT NEOPLASM OF ASCENDING COLON: Primary | ICD-10-CM

## 2024-08-22 DIAGNOSIS — R97.0 ELEVATED CEA: ICD-10-CM

## 2024-08-22 DIAGNOSIS — I81 PORTAL VEIN THROMBOSIS: ICD-10-CM

## 2024-08-22 LAB
(HCYS)2 SERPL-MCNC: 17.4 UMOL/L (ref 5.1–15.4)
ALBUMIN SERPL-MCNC: 3.5 G/DL (ref 3.4–4.8)
ALBUMIN/GLOB SERPL: 1 RATIO (ref 1.1–2)
ALP SERPL-CCNC: 110 UNIT/L (ref 40–150)
ALT SERPL-CCNC: 19 UNIT/L (ref 0–55)
ANION GAP SERPL CALC-SCNC: 8 MEQ/L
AST SERPL-CCNC: 23 UNIT/L (ref 5–34)
BASOPHILS # BLD AUTO: 0.03 X10(3)/MCL
BASOPHILS NFR BLD AUTO: 0.7 %
BILIRUB SERPL-MCNC: 0.5 MG/DL
BUN SERPL-MCNC: 16.3 MG/DL (ref 9.8–20.1)
CALCIUM SERPL-MCNC: 8.8 MG/DL (ref 8.4–10.2)
CEA SERPL-MCNC: 2.89 NG/ML (ref 0–3)
CHLORIDE SERPL-SCNC: 105 MMOL/L (ref 98–107)
CO2 SERPL-SCNC: 24 MMOL/L (ref 23–31)
CREAT SERPL-MCNC: 1.17 MG/DL (ref 0.55–1.02)
CREAT/UREA NIT SERPL: 14
EOSINOPHIL # BLD AUTO: 0.24 X10(3)/MCL (ref 0–0.9)
EOSINOPHIL NFR BLD AUTO: 5.6 %
ERYTHROCYTE [DISTWIDTH] IN BLOOD BY AUTOMATED COUNT: 16.5 % (ref 11.5–17)
FERRITIN SERPL-MCNC: 80.44 NG/ML (ref 4.63–204)
FOLATE SERPL-MCNC: 9.5 NG/ML (ref 7–31.4)
GFR SERPLBLD CREATININE-BSD FMLA CKD-EPI: 51 ML/MIN/1.73/M2
GLOBULIN SER-MCNC: 3.4 GM/DL (ref 2.4–3.5)
GLUCOSE SERPL-MCNC: 159 MG/DL (ref 82–115)
HCT VFR BLD AUTO: 41 % (ref 37–47)
HGB BLD-MCNC: 13.2 G/DL (ref 12–16)
IMM GRANULOCYTES # BLD AUTO: 0 X10(3)/MCL (ref 0–0.04)
IMM GRANULOCYTES NFR BLD AUTO: 0 %
IRON SATN MFR SERPL: 27 % (ref 20–50)
IRON SERPL-MCNC: 77 UG/DL (ref 50–170)
LYMPHOCYTES # BLD AUTO: 0.93 X10(3)/MCL (ref 0.6–4.6)
LYMPHOCYTES NFR BLD AUTO: 21.5 %
MCH RBC QN AUTO: 25.9 PG (ref 27–31)
MCHC RBC AUTO-ENTMCNC: 32.2 G/DL (ref 33–36)
MCV RBC AUTO: 80.6 FL (ref 80–94)
MONOCYTES # BLD AUTO: 0.24 X10(3)/MCL (ref 0.1–1.3)
MONOCYTES NFR BLD AUTO: 5.6 %
NEUTROPHILS # BLD AUTO: 2.88 X10(3)/MCL (ref 2.1–9.2)
NEUTROPHILS NFR BLD AUTO: 66.6 %
PLATELET # BLD AUTO: 79 X10(3)/MCL (ref 130–400)
PMV BLD AUTO: ABNORMAL FL
POTASSIUM SERPL-SCNC: 4.1 MMOL/L (ref 3.5–5.1)
PROT SERPL-MCNC: 6.9 GM/DL (ref 5.8–7.6)
RBC # BLD AUTO: 5.09 X10(6)/MCL (ref 4.2–5.4)
SODIUM SERPL-SCNC: 137 MMOL/L (ref 136–145)
TIBC SERPL-MCNC: 210 UG/DL (ref 70–310)
TIBC SERPL-MCNC: 287 UG/DL (ref 250–450)
TRANSFERRIN SERPL-MCNC: 263 MG/DL (ref 173–360)
WBC # BLD AUTO: 4.32 X10(3)/MCL (ref 4.5–11.5)

## 2024-08-22 PROCEDURE — 85303 CLOT INHIBIT PROT C ACTIVITY: CPT

## 2024-08-22 PROCEDURE — 85025 COMPLETE CBC W/AUTO DIFF WBC: CPT

## 2024-08-22 PROCEDURE — 86148 ANTI-PHOSPHOLIPID ANTIBODY: CPT

## 2024-08-22 PROCEDURE — 82378 CARCINOEMBRYONIC ANTIGEN: CPT

## 2024-08-22 PROCEDURE — 85300 ANTITHROMBIN III ACTIVITY: CPT

## 2024-08-22 PROCEDURE — 80053 COMPREHEN METABOLIC PANEL: CPT

## 2024-08-22 PROCEDURE — 96523 IRRIG DRUG DELIVERY DEVICE: CPT

## 2024-08-22 PROCEDURE — 85613 RUSSELL VIPER VENOM DILUTED: CPT

## 2024-08-22 PROCEDURE — 63600175 PHARM REV CODE 636 W HCPCS: Performed by: INTERNAL MEDICINE

## 2024-08-22 PROCEDURE — 83090 ASSAY OF HOMOCYSTEINE: CPT

## 2024-08-22 PROCEDURE — 25000003 PHARM REV CODE 250: Performed by: INTERNAL MEDICINE

## 2024-08-22 PROCEDURE — 36415 COLL VENOUS BLD VENIPUNCTURE: CPT

## 2024-08-22 PROCEDURE — A4216 STERILE WATER/SALINE, 10 ML: HCPCS | Performed by: INTERNAL MEDICINE

## 2024-08-22 PROCEDURE — 83550 IRON BINDING TEST: CPT

## 2024-08-22 PROCEDURE — 82728 ASSAY OF FERRITIN: CPT

## 2024-08-22 PROCEDURE — 82746 ASSAY OF FOLIC ACID SERUM: CPT

## 2024-08-22 PROCEDURE — 83540 ASSAY OF IRON: CPT

## 2024-08-22 RX ORDER — HEPARIN 100 UNIT/ML
500 SYRINGE INTRAVENOUS
Status: DISCONTINUED | OUTPATIENT
Start: 2024-08-22 | End: 2024-08-22 | Stop reason: HOSPADM

## 2024-08-22 RX ORDER — HEPARIN 100 UNIT/ML
500 SYRINGE INTRAVENOUS
OUTPATIENT
Start: 2024-08-22

## 2024-08-22 RX ORDER — SODIUM CHLORIDE 0.9 % (FLUSH) 0.9 %
10 SYRINGE (ML) INJECTION
Status: DISCONTINUED | OUTPATIENT
Start: 2024-08-22 | End: 2024-08-22 | Stop reason: HOSPADM

## 2024-08-22 RX ORDER — SODIUM CHLORIDE 0.9 % (FLUSH) 0.9 %
10 SYRINGE (ML) INJECTION
OUTPATIENT
Start: 2024-08-22

## 2024-08-22 RX ADMIN — SODIUM CHLORIDE, PRESERVATIVE FREE 10 ML: 5 INJECTION INTRAVENOUS at 11:08

## 2024-08-22 RX ADMIN — Medication 500 UNITS: at 11:08

## 2024-08-23 LAB
AT III ACT/NOR PPP CHRO: 97 % (ref 80–130)
MIXING STUDIES PPP-IMP: NORMAL
PROT C ACT/NOR PPP CHRO: 94 % (ref 70–150)
SCREEN DRVVT/NORMAL: 1.1 RATIO

## 2024-08-24 DIAGNOSIS — I81 PORTAL VEIN THROMBOSIS: ICD-10-CM

## 2024-08-26 ENCOUNTER — OFFICE VISIT (OUTPATIENT)
Dept: HEMATOLOGY/ONCOLOGY | Facility: CLINIC | Age: 69
End: 2024-08-26
Payer: MEDICARE

## 2024-08-26 VITALS
OXYGEN SATURATION: 97 % | BODY MASS INDEX: 39.93 KG/M2 | RESPIRATION RATE: 18 BRPM | TEMPERATURE: 98 F | DIASTOLIC BLOOD PRESSURE: 75 MMHG | SYSTOLIC BLOOD PRESSURE: 132 MMHG | WEIGHT: 225.38 LBS | HEART RATE: 66 BPM | HEIGHT: 63 IN

## 2024-08-26 DIAGNOSIS — R91.8 PULMONARY NODULES: ICD-10-CM

## 2024-08-26 DIAGNOSIS — Z13.6 ENCOUNTER FOR SCREENING FOR CARDIOVASCULAR DISORDERS: ICD-10-CM

## 2024-08-26 DIAGNOSIS — R79.89 ELEVATED SERUM HOMOCYSTEINE LEVEL: ICD-10-CM

## 2024-08-26 DIAGNOSIS — C18.2 MALIGNANT NEOPLASM OF ASCENDING COLON: Primary | ICD-10-CM

## 2024-08-26 PROCEDURE — 99999 PR PBB SHADOW E&M-EST. PATIENT-LVL IV: CPT | Mod: PBBFAC,,, | Performed by: NURSE PRACTITIONER

## 2024-08-26 PROCEDURE — 1101F PT FALLS ASSESS-DOCD LE1/YR: CPT | Mod: CPTII,S$GLB,, | Performed by: NURSE PRACTITIONER

## 2024-08-26 PROCEDURE — 3044F HG A1C LEVEL LT 7.0%: CPT | Mod: CPTII,S$GLB,, | Performed by: NURSE PRACTITIONER

## 2024-08-26 PROCEDURE — 3078F DIAST BP <80 MM HG: CPT | Mod: CPTII,S$GLB,, | Performed by: NURSE PRACTITIONER

## 2024-08-26 PROCEDURE — 3066F NEPHROPATHY DOC TX: CPT | Mod: CPTII,S$GLB,, | Performed by: NURSE PRACTITIONER

## 2024-08-26 PROCEDURE — 3288F FALL RISK ASSESSMENT DOCD: CPT | Mod: CPTII,S$GLB,, | Performed by: NURSE PRACTITIONER

## 2024-08-26 PROCEDURE — 99214 OFFICE O/P EST MOD 30 MIN: CPT | Mod: S$GLB,,, | Performed by: NURSE PRACTITIONER

## 2024-08-26 PROCEDURE — 3075F SYST BP GE 130 - 139MM HG: CPT | Mod: CPTII,S$GLB,, | Performed by: NURSE PRACTITIONER

## 2024-08-26 PROCEDURE — 1159F MED LIST DOCD IN RCRD: CPT | Mod: CPTII,S$GLB,, | Performed by: NURSE PRACTITIONER

## 2024-08-26 PROCEDURE — 1126F AMNT PAIN NOTED NONE PRSNT: CPT | Mod: CPTII,S$GLB,, | Performed by: NURSE PRACTITIONER

## 2024-08-26 PROCEDURE — 3008F BODY MASS INDEX DOCD: CPT | Mod: CPTII,S$GLB,, | Performed by: NURSE PRACTITIONER

## 2024-08-26 RX ORDER — APIXABAN 5 MG/1
5 TABLET, FILM COATED ORAL 2 TIMES DAILY
Qty: 60 TABLET | Refills: 0 | Status: SHIPPED | OUTPATIENT
Start: 2024-08-26

## 2024-08-26 NOTE — PROGRESS NOTES
HEMATOLOGY/ONCOLOGY OFFICE CLINIC VISIT    Visit Information:    Initial Evaluation: 4/22/2022  Referring Provider: Maggy Jaquez NP  Other providers: Dr Timothy Russ  Code status: Not addressed     Diagnosis/Problem list:   pT3 N2a Mx Stage IIIB Colon cancer. Dx 10/19/23  Microcytic anemia.   Folate deficiency     Present Treatment:  Xelox to start on 3/26/24  (Received 1 cycle then stopped).     Treatment history:  10/19/2023 Lap/jaswinder right colon resection   FOLFOX with dose reduction on Oxaliplatin due to neuropathy. Started on 11/28/23. x 7 cycles then switched to XELOX      Imaging studies:  CT C/A/P 6/3/2022: There is no significant hepatic steatosis.  The liver is cirrhotic.  No liver lesion is identified.  The spleen is enlarged, measuring 15.5 cm in length.  There is no retroperitoneal lymphadenopathy or abdominal aortic aneurysm.  A mildly prominent right external iliac lymph node measures 9 mm in short axis, nonspecific.  This is also similar in appearance when compared to 2015. Impression: Cirrhosis. Splenomegaly.  CT CAP 9/11/2023: There is no supraclavicular or axillary lymphadenopathy.  No mediastinal adenopathy. 3 mm right upper lobe pulmonary nodule. There are few additional pulmonary micronodules in the lung apices measuring no greater than 1-2 mm.  Findings similar to prior exam.     Impression: Scattered pulmonary nodules in the upper lobe similar to prior.  No further follow-up acute according to Fleischner criteria.  No convincing evidence for metastatic disease. Nodular appearing liver, correlation for cirrhosis.  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  CT C/A/P 5/16/2024:  No new or worsening malignant findings identified.  No defined portal vein thrombus on today's exam.  Poor evaluation of the SMV.       Pathology:  10/19/2023:  COLON, RIGHT HEMICOLECTOMY (1.5 CM OF TERMINAL ILEUM, 25 CM LENGTH OF RIGHT COLON): POORLY  DIFFERENTIATED ADENOCARCINOMA WITH FOCAL SIGNET RING CELL FEATURES, UNIFOCAL, RIGHT COLON, 8 CM GREATEST DIMENSION.   THE TUMOR INVADES THROUGH THE MUSCULARIS PROPRIA INTO PERICOLONIC TISSUE (PT3).   FOCAL LYMPHVASCULAR INVASION IS PRESENT.   THE SURGICAL MARGINS ARE FREE OF TUMOR.   METASTATIC ADENOCARCINOMA IS IDENTIFIED IN FOUR (4) OF TWENTY-SEVEN (27)   PERICOLONIC LYMPH NODES (LARGEST METASTATIC DEPOSIT 8 MM; FOCAL EXTRACAPSULAR EXTENSION OF TUMOR IS PRESENT).      PATHOLOGIC STAGING:  pT3 N2a Mx     Histologic Grade:  G3, poorly differentiated   Macroscopic Tumor Perforation:  Not identified   Lymphovascular Invasion:  Small vessel   Perineural Invasion:  Not identified        CLINICAL HISTORY:       Patient: Indu Azul is a 68 y.o. female. with multiple medical problems that I saw originally on 2022 for thrombocytopenia.  Patient platelet counts on 2021 were 132,000 and since that that has been slowly trending down.  2021 platelets 132,000  2022 platelets 121,000  2022 platelets 115,000     Of note patient have a UA done that same day that suggest possible UTI with positive nitrates, 1+ leukocytes and many bacteria.  Urine culture with E. coli.   Reviewing electronic medical record and going back to 12/15/2014 patient with occasional low platelets.  They were never lower than 100,000 and they always normalized.   As per patient in 2020 when her platelets were slightly decreased (117,000), this was shortly after she has her left foot surgery.  Then in May 17 and May 18 2021, platelets were 105k and 101k,  she had COVID.  Again in 2022 she have a UTI for which she completed antibiotics.     Patient's father diagnosed Blood disorder requiring blood transfusion that started q 4 weeks and the q week. He was diagnosed on his 80's but  at 91. Sister and niece had ovarian cancer. Sister  of it. Niece still alive.     She was found to have iron deficiency anemia.   EGD performed, grade I-II esophageal varices found, too small to band. She also had Colonoscopy by Dr Hammonds that showed an ulcerated malignant-appearing partially obstructing medium sized mass in the ascending colon.  She reports that she had a colonoscopy about 5 years ago at [facility] and everything was fine, no polyps or masses.   Biopsy and tattoo of the mass showed fragments of colonic mucosa, no evidence of dysplasia or malignancy.  Two other polyps were completely removed in the descending and sigmoid colon, pathology returned hyperplastic polyps.  CT abd/pel with no acute findings.     Despite of biopsy because of malignant-appearing partially obstructing mass of the ascending colon and photographs and description were consistent with colon cancer she underwent Lap/jaswinder right colon resection on 10/19/2023 by Dr Timothy Russ. Path c/w really differentiated adeno carcinoma.         Chief Complaint: No chief complaint on file.      Interval History:  Patient with h/o Charcot feet for which she had surgery. She does not have sensation on her feet from ankle down. This was prior to her diagnosis of colon cancer. She is getting Oxaloplatin but neuropathy is not worse. She started dose reduced and will cont like same dose. If neuropathy worsen will d/c oxaliplatin.       2/12/2024: Patient presents today for labs and TD prior to C6 of FOLFOX with dose reduction of Oxaliplatin due to neuropathy, accompanied by her daughter. C6 was held on 2/6/24 due to thrombocytopenia (plt 74k). 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 She is tolerating well. She continues with occasional right sided abdominal pain and oral thrush. Pharmacy did not have RX in stock and gave an alternative, but it does not help.  No worsening in neuropathy as of yet. Hyperpigmentation of nails and skin on hands commonly seen with 5FU. Overall, she is doing  fair. No fever, chills or sweats. Denies chest pain or shortness of breath. No bleeding. Bowels are moving without difficulty.  Plts today 128k.    2/19/24: Patient presents today as an add on. She has questions about her recent diagnosis of thrombus within the SMV and extending through the main portal vein . She complain of pain there so imaging was done. She was started on Eliquis. Last week after her chemo, she was nauseated so she didn't take any of her PO meds for 2 days and her right sided abdominal pain returned. Once she resumed Eliqius, her right sided abdominal pain decreased. Reports increased pain when she lays on her right side. She wants to know if we will be repeating imaging to eval her blood clot. Pancytopenia today. She treated last week (sonia). Denies fever.     2/26/24: Patient here for one week f/u. Due for C7 tomorrow. Platelet count is 75 k today. Denies any evidence of bleeding.     3/4/24: Patient presents today for one week f/u. Her platelet count increased to 115k. She has no complaints to report this week. She continues on Eliquis 5 mg bid. Denies any evidence of bleeding. Her neuropathy has not worsened as of yet.     3/18/24: Patient presents today for 2 week f/u due for C8 of FOLFOX. Her platelet count is 87k so we will have to delay again. She has no complaints today. The right lower abdominal pain subsided.     3/25/24: Patient presents today for 1 week f/u. We plan to start XELOX tomorrow for the remainder of her adjuvant treatment. Platelet count is now 109k today.     4/2/24: Patient presents today for once week f/u for toxicity check. She started XELOX on 3/26/24. Tolerating xeloda much better than infusional 5fu. Reports less nausea and she is not sleeping during the day like she was before. She reports chest pain that was transient. Hgb is 8 today.     4/15/24: Patient presents today prior to C2 of XELOX. She is c/o abdominal pain and constipation. Reports no BM since  Wednesday ( 5 days). Platelet count is 89k    24: Patient presents today for scheduled f/u. In the interim she was hospitalized for GI bleeding. Colonoscopy was performed on 24 and path was negative for malignancy. She reports fatigue . She is anemic with Hgb of 8.2. Iron studies added to labs drawn today ( pending). 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     24: Patient here today for follow up to discuss CT scan results, accompanied by her daughter. She's due to start iron infusion today, this will be .  She is doing fair, no new complaints but still weak and debilitated. She is on wheelchair today  CT scan discussed with them in detail, all questions answered.    24: Patient presents today for 3 mnth f/u. Anemia resolved. Iron profle normalized after IV iron. WBC and platelets are still decreased. She has been referred to GI to eval for liver disease. Her homocysteine level is still elevated. She c/o persistent fatigue.         Past Medical History:   Diagnosis Date    Anesthesia complication     trouble awakening    Charcot's joint of foot, left     Chronic kidney disease, unspecified     Cirrhosis of liver without ascites     Depression     Diabetes mellitus, type II, insulin dependent     Diabetic neuropathy     GERD (gastroesophageal reflux disease)     H/O: hysterectomy     Hepatic steatosis     Malignant neoplasm of ascending colon     Obesity, unspecified     Overactive bladder     Thrombocytopenia, unspecified     Vitamin D deficiency       Past Surgical History:   Procedure Laterality Date    APPENDECTOMY      BREAST BIOPSY  2016     SECTION      x3    charcot-leyden crystals identification      CHOLECYSTECTOMY      COLONOSCOPY N/A 2023    Procedure: COLON;  Surgeon: Luis Amador MD;  Location: [facility] ENDOSCOPY;  Service: Gastroenterology;  Laterality: N/A;    COLONOSCOPY N/A 2024     Procedure: COLON;  Surgeon: Mor Porter MD;  Location: [facility];  Service: Gastroenterology;  Laterality: N/A;    COLONOSCOPY, WITH 1 OR MORE BIOPSIES  08/28/2023    Procedure: COLONOSCOPY, WITH 1 OR MORE BIOPSIES;  Surgeon: Luis Amador MD;  Location: [facility] ENDOSCOPY;  Service: Gastroenterology;;    COLONOSCOPY, WITH 1 OR MORE BIOPSIES N/A 4/20/2024    Procedure: COLONOSCOPY, WITH 1 OR MORE BIOPSIES;  Surgeon: Mor Porter MD;  Location: [facility] OR;  Service: Gastroenterology;  Laterality: N/A;    COLONOSCOPY, WITH DIRECTED SUBMUCOSAL INJECTION  08/28/2023    Procedure: COLONOSCOPY, WITH DIRECTED SUBMUCOSAL INJECTION;  Surgeon: Luis Amador MD;  Location: [facility] ENDOSCOPY;  Service: Gastroenterology;;  8cc Colon Tattoo    COLONOSCOPY, WITH POLYPECTOMY USING SNARE  08/28/2023    Procedure: COLONOSCOPY, WITH POLYPECTOMY USING SNARE;  Surgeon: Luis Amador MD;  Location: [facility] ENDOSCOPY;  Service: Gastroenterology;;    ESOPHAGOGASTRODUODENOSCOPY N/A 08/28/2023    Procedure: DOUBLE;  Surgeon: Luis Amador MD;  Location: [facility] ENDOSCOPY;  Service: Gastroenterology;  Laterality: N/A;    ESOPHAGOGASTRODUODENOSCOPY N/A 4/19/2024    Procedure: EGD;  Surgeon: Nolan Massey MD;  Location: [facility] ENDOSCOPY;  Service: Gastroenterology;  Laterality: N/A;    HEMORRHOID SURGERY      HYSTERECTOMY  1990    total    INSERTION OF SUBCUTANEOUS PORT Right     INSERTION OF TUNNELED CENTRAL VENOUS CATHETER (CVC) WITH SUBCUTANEOUS PORT Right 11/20/2023    Procedure: [id number];  Surgeon: Timothy Russ MD;  Location: [facility];  Service: General;  Laterality: Right;    POLYPECTOMY      right foot surgery Right 02/01/2022    XI ROBOTIC COLECTOMY, RIGHT N/A 10/19/2023    Procedure: XI ROBOTIC COLECTOMY, RIGHT;  Surgeon: Timothy Russ MD;  Location: [facility];  Service: General;  Laterality: N/A;     Family History   Problem Relation Name Age of Onset    Diabetes Mother       Alzheimer's disease Mother      Diabetes Father      Leukemia Father      Diabetes Sister      Liver cancer Sister      Diabetes Brother              Review of patient's allergies indicates:  No Known Allergies   Current Outpatient Medications on File Prior to Visit   Medication Sig Dispense Refill    dulaglutide (TRULICITY) 3 mg/0.5 mL pen injector INJECT 1 SYRINGE SUBCUTANEOUSLY ONCE A WEEK 4 Pen 11    ELIQUIS 5 mg Tab Take 1 tablet by mouth twice daily 60 tablet 0    furosemide (LASIX) 40 MG tablet Take 40 mg by mouth as needed.      gabapentin (NEURONTIN) 300 MG capsule TAKE 2 CAPSULES BY MOUTH IN THE EVENING 60 capsule 6    insulin glargine, TOUJEO, (TOUJEO SOLOSTAR U-300 INSULIN) 300 unit/mL (1.5 mL) InPn pen INJECT 60 UNITS SUBCUTANEOUSLY ONCE DAILY      insulin syringe-needle U-100 1 mL 31 gauge x 5/16 Syrg Inject 1 Syringe into the skin 3 (three) times daily with meals. (Patient not taking: Reported on 6/24/2024)      sertraline (ZOLOFT) 50 MG tablet Take 1 tablet by mouth once daily 90 tablet 3    solifenacin (VESICARE) 10 MG tablet Take 10 mg by mouth once daily.      spironolactone (ALDACTONE) 50 MG tablet Take 25 mg by mouth once daily.      [DISCONTINUED] ELIQUIS 5 mg Tab Take 1 tablet by mouth twice daily 60 tablet 0     Current Facility-Administered Medications on File Prior to Visit   Medication Dose Route Frequency Provider Last Rate Last Admin    0.9%  NaCl infusion (for blood administration)   Intravenous Once Gayatri Jaffe FNP        promethazine (PHENERGAN) 12.5 mg in dextrose 5 % (D5W) 50 mL IVPB  12.5 mg Intravenous Once Gayatri Jaffe FNP          Review of Systems   Constitutional:  Positive for fatigue. Negative for activity change, appetite change, chills, fever and unexpected weight change.   HENT:  Negative for mouth dryness, mouth sores, nosebleeds, sore throat and trouble swallowing.    Eyes:  Negative for visual disturbance.   Respiratory:  Negative for cough and shortness of  breath.    Cardiovascular:  Negative for chest pain, palpitations and leg swelling.   Gastrointestinal:  Negative for abdominal distention, abdominal pain, blood in stool, change in bowel habit, constipation, diarrhea, nausea and vomiting.   Endocrine: Negative.    Genitourinary:  Negative for dysuria, frequency, hematuria and urgency.   Musculoskeletal:  Negative for arthralgias, back pain, myalgias and neck pain.   Integumentary:  Negative for rash.   Neurological:  Positive for numbness. Negative for dizziness, tremors, syncope, speech difficulty, weakness, light-headedness, headaches and memory loss.   Hematological:  Does not bruise/bleed easily.   Psychiatric/Behavioral:  Negative for confusion and suicidal ideas.           There were no vitals filed for this visit.           Wt Readings from Last 6 Encounters:   06/24/24 99.9 kg (220 lb 4.8 oz)   06/19/24 100.4 kg (221 lb 4.8 oz)   06/12/24 104.9 kg (231 lb 4.8 oz)   06/05/24 103 kg (227 lb)   05/29/24 104.5 kg (230 lb 6.1 oz)   05/22/24 105.9 kg (233 lb 8 oz)     There is no height or weight on file to calculate BMI.  There is no height or weight on file to calculate BSA.  Physical Exam  Vitals and nursing note reviewed.   Constitutional:       General: She is not in acute distress.     Appearance: She is obese. She is ill-appearing (chronically).   HENT:      Head: Normocephalic and atraumatic.      Mouth/Throat:      Mouth: Mucous membranes are moist.   Eyes:      General: No scleral icterus.     Extraocular Movements: Extraocular movements intact.      Conjunctiva/sclera: Conjunctivae normal.      Pupils: Pupils are equal, round, and reactive to light.   Neck:      Vascular: No JVD.   Cardiovascular:      Rate and Rhythm: Normal rate and regular rhythm.      Heart sounds: No murmur heard.  Pulmonary:      Effort: Pulmonary effort is normal.      Breath sounds: Normal breath sounds. No wheezing or rhonchi.   Chest:      Comments: Right chest wall mediport  in place.    Abdominal:      General: Bowel sounds are normal. There is no distension.      Palpations: Abdomen is soft.      Comments: Surgical incisions healed after colon surgery.    Musculoskeletal:         General: No swelling or deformity.      Cervical back: Neck supple.   Lymphadenopathy:      Head:      Right side of head: No submandibular adenopathy.      Left side of head: No submandibular adenopathy.      Cervical: No cervical adenopathy.      Upper Body:      Right upper body: No supraclavicular or axillary adenopathy.      Left upper body: No supraclavicular or axillary adenopathy.      Lower Body: No right inguinal adenopathy. No left inguinal adenopathy.   Skin:     General: Skin is warm.      Coloration: Skin is not jaundiced.      Findings: No lesion or rash.      Nails: There is no clubbing.      Comments: Darkening of nails and skin on hands commonly seen with 5FU   Neurological:      Mental Status: She is alert and oriented to person, place, and time.      Cranial Nerves: Cranial nerves 2-12 are intact.      Motor: Weakness present.   Psychiatric:         Attention and Perception: Attention normal.         Mood and Affect: Mood is depressed.         Behavior: Behavior is cooperative.         Judgment: Judgment normal.     ECOG SCORE             Laboratory:  CBC with Differential:  Lab Results   Component Value Date    WBC 4.32 (L) 08/22/2024    RBC 5.09 08/22/2024    HGB 13.2 08/22/2024    HCT 41.0 08/22/2024    MCV 80.6 08/22/2024    MCH 25.9 (L) 08/22/2024    MCHC 32.2 (L) 08/22/2024    RDW 16.5 08/22/2024    PLT 79 (L) 08/22/2024    MPV  08/22/2024      Comment:      Unable to calculate MPV         CMP:  Sodium   Date Value Ref Range Status   08/22/2024 137 136 - 145 mmol/L Final   06/01/2022 140 136 - 145 mmol/L Final     Potassium   Date Value Ref Range Status   08/22/2024 4.1 3.5 - 5.1 mmol/L Final   06/01/2022 4.9 3.5 - 5.1 mmol/L Final     Chloride   Date Value Ref Range Status    08/22/2024 105 98 - 107 mmol/L Final   06/01/2022 104 100 - 109 mmol/L Final     CO2   Date Value Ref Range Status   08/22/2024 24 23 - 31 mmol/L Final     Carbon Dioxide   Date Value Ref Range Status   06/01/2022 29 22 - 33 mmol/L Final     Blood Urea Nitrogen   Date Value Ref Range Status   08/22/2024 16.3 9.8 - 20.1 mg/dL Final   06/01/2022 22 5 - 25 mg/dL Final     Creatinine   Date Value Ref Range Status   08/22/2024 1.17 (H) 0.55 - 1.02 mg/dL Final   06/01/2022 1.32 (H) 0.57 - 1.25 mg/dL Final     Calcium   Date Value Ref Range Status   08/22/2024 8.8 8.4 - 10.2 mg/dL Final   06/01/2022 8.7 (L) 8.8 - 10.6 mg/dL Final     Albumin   Date Value Ref Range Status   08/22/2024 3.5 3.4 - 4.8 g/dL Final     Bilirubin Total   Date Value Ref Range Status   08/22/2024 0.5 <=1.5 mg/dL Final     ALP   Date Value Ref Range Status   08/22/2024 110 40 - 150 unit/L Final     AST   Date Value Ref Range Status   08/22/2024 23 5 - 34 unit/L Final     ALT   Date Value Ref Range Status   08/22/2024 19 0 - 55 unit/L Final     Anion Gap   Date Value Ref Range Status   06/01/2022 7 (L) 8 - 16 mmol/L Final     eGFR    Date Value Ref Range Status   06/01/2022 45 mL/min/1.73mSq Final     Comment:     In accordance with NKF-ASN Task Force recommendation, calculation based on the Chronic Kidney Disease Epidemiology Collaboration (CKD-EPI) equation without adjustment for race. eGFR adjusted for gender and age and calculated in ml/min/1.73mSquared. eGFR cannot be calculated if patient is under 18 years of age.     Reference Range:   >= 60 ml/min/1.73mSquared.     Estimated GFR-Non    Date Value Ref Range Status   08/04/2022 43 mls/min/1.73/m2 Final         Assessment:       1) Stage IIIB adenocarcinoma of the ascending colon  --Patient will benefit of adjuvant chemotherapy.   --Due to severe diabetic neuropathy, will try dose reduction of Oxaliplatin, starting 65 mg/m2.    --If not tolerated, then with d/c  Oxaliplatin and cont 5FU and LV  2) Lung nodules-Stable. Will monitor  3) iron deficiency anemia-on iron PO  4) Thrombocytopenia- Will follow counts closely as she has splenomegaly and this can affects her counts mostly platelets.  5) Folate deficiency-she will start taking folic acid at this time.   6) Diabetic neuropathy-severe. She does not feel her feet.  --She will have nerve stimulator placement to see if can help her feet     Educated the patient on the risks versus benefits as well as toxicities associated with treatment.  Verbally consented the patient to the treatment plan and the patient was educated on the planned duration of the treatment and schedule of the treatment administration.  All questions were answered.      Plan:       Discussed finding of cirrhosis & splenomegaly on imaging and how that affects her blood counts - she has upcoming appointment with GI and will discuss liver with him.   Staging imaging with [facility]. Will continue surveillance for colon cancer. She will continue colonoscopies as per GI    Continue Eliquis for now. Homocysteine level elevated. Ordered Folgaurd once daily. Will recheck homocysteine level in 3 months.   CT C/A/P every 6 months - due mid Nov. 2024 - ordered.   MediPort flushes every 3 months  RTC in 3 months with MD, Labs 1-3 days prior.     LAURA Degroot

## 2024-08-28 LAB
PS-PROTHROM CMPLX IGG SERPL IA-ACNC: 14.2 U
PS-PROTHROM CMPLX IGM SERPL IA-ACNC: 11.2 U

## 2024-09-05 ENCOUNTER — LAB VISIT (OUTPATIENT)
Dept: LAB | Facility: HOSPITAL | Age: 69
End: 2024-09-05
Payer: MEDICARE

## 2024-09-05 DIAGNOSIS — E11.65 TYPE 2 DIABETES MELLITUS WITH HYPERGLYCEMIA, WITH LONG-TERM CURRENT USE OF INSULIN: ICD-10-CM

## 2024-09-05 DIAGNOSIS — N18.32 STAGE 3B CHRONIC KIDNEY DISEASE: ICD-10-CM

## 2024-09-05 DIAGNOSIS — C18.2 MALIGNANT NEOPLASM OF ASCENDING COLON: ICD-10-CM

## 2024-09-05 DIAGNOSIS — E83.39 HYPOPHOSPHATEMIA: ICD-10-CM

## 2024-09-05 DIAGNOSIS — I10 PRIMARY HYPERTENSION: ICD-10-CM

## 2024-09-05 DIAGNOSIS — Z79.4 TYPE 2 DIABETES MELLITUS WITH HYPERGLYCEMIA, WITH LONG-TERM CURRENT USE OF INSULIN: ICD-10-CM

## 2024-09-05 LAB
ALBUMIN SERPL-MCNC: 3.5 G/DL (ref 3.4–4.8)
ALBUMIN/GLOB SERPL: 0.9 RATIO (ref 1.1–2)
ALP SERPL-CCNC: 118 UNIT/L (ref 40–150)
ALT SERPL-CCNC: 16 UNIT/L (ref 0–55)
ANION GAP SERPL CALC-SCNC: 6 MEQ/L
AST SERPL-CCNC: 20 UNIT/L (ref 5–34)
BASOPHILS # BLD AUTO: 0.03 X10(3)/MCL
BASOPHILS NFR BLD AUTO: 0.7 %
BILIRUB SERPL-MCNC: 0.4 MG/DL
BUN SERPL-MCNC: 14.6 MG/DL (ref 9.8–20.1)
CALCIUM SERPL-MCNC: 9.8 MG/DL (ref 8.4–10.2)
CHLORIDE SERPL-SCNC: 106 MMOL/L (ref 98–107)
CO2 SERPL-SCNC: 27 MMOL/L (ref 23–31)
CREAT SERPL-MCNC: 1.22 MG/DL (ref 0.55–1.02)
CREAT UR-MCNC: 23.7 MG/DL (ref 45–106)
CREAT/UREA NIT SERPL: 12
EOSINOPHIL # BLD AUTO: 0.21 X10(3)/MCL (ref 0–0.9)
EOSINOPHIL NFR BLD AUTO: 5.1 %
ERYTHROCYTE [DISTWIDTH] IN BLOOD BY AUTOMATED COUNT: 15.9 % (ref 11.5–17)
GFR SERPLBLD CREATININE-BSD FMLA CKD-EPI: 49 ML/MIN/1.73/M2
GLOBULIN SER-MCNC: 3.8 GM/DL (ref 2.4–3.5)
GLUCOSE SERPL-MCNC: 148 MG/DL (ref 82–115)
HCT VFR BLD AUTO: 41.9 % (ref 37–47)
HGB BLD-MCNC: 13.2 G/DL (ref 12–16)
IMM GRANULOCYTES # BLD AUTO: 0.01 X10(3)/MCL (ref 0–0.04)
IMM GRANULOCYTES NFR BLD AUTO: 0.2 %
LYMPHOCYTES # BLD AUTO: 1.13 X10(3)/MCL (ref 0.6–4.6)
LYMPHOCYTES NFR BLD AUTO: 27.5 %
MCH RBC QN AUTO: 25.7 PG (ref 27–31)
MCHC RBC AUTO-ENTMCNC: 31.5 G/DL (ref 33–36)
MCV RBC AUTO: 81.7 FL (ref 80–94)
MONOCYTES # BLD AUTO: 0.22 X10(3)/MCL (ref 0.1–1.3)
MONOCYTES NFR BLD AUTO: 5.4 %
NEUTROPHILS # BLD AUTO: 2.51 X10(3)/MCL (ref 2.1–9.2)
NEUTROPHILS NFR BLD AUTO: 61.1 %
NRBC BLD AUTO-RTO: 0 %
PHOSPHATE SERPL-MCNC: 3.1 MG/DL (ref 2.3–4.7)
PLATELET # BLD AUTO: 79 X10(3)/MCL (ref 130–400)
PLATELETS.RETICULATED NFR BLD AUTO: 8.4 % (ref 0.9–11.2)
PMV BLD AUTO: ABNORMAL FL
POTASSIUM SERPL-SCNC: 4.4 MMOL/L (ref 3.5–5.1)
PROT SERPL-MCNC: 7.3 GM/DL (ref 5.8–7.6)
PROT UR STRIP-MCNC: <6.8 MG/DL
PTH-INTACT SERPL-MCNC: 46.5 PG/ML (ref 8.7–77)
RBC # BLD AUTO: 5.13 X10(6)/MCL (ref 4.2–5.4)
SODIUM SERPL-SCNC: 139 MMOL/L (ref 136–145)
WBC # BLD AUTO: 4.11 X10(3)/MCL (ref 4.5–11.5)

## 2024-09-05 PROCEDURE — 84100 ASSAY OF PHOSPHORUS: CPT

## 2024-09-05 PROCEDURE — 36415 COLL VENOUS BLD VENIPUNCTURE: CPT

## 2024-09-05 PROCEDURE — 80053 COMPREHEN METABOLIC PANEL: CPT

## 2024-09-05 PROCEDURE — 84156 ASSAY OF PROTEIN URINE: CPT

## 2024-09-05 PROCEDURE — 85025 COMPLETE CBC W/AUTO DIFF WBC: CPT

## 2024-09-05 PROCEDURE — 82570 ASSAY OF URINE CREATININE: CPT

## 2024-09-05 PROCEDURE — 83970 ASSAY OF PARATHORMONE: CPT

## 2024-09-10 ENCOUNTER — OFFICE VISIT (OUTPATIENT)
Dept: NEPHROLOGY | Facility: CLINIC | Age: 69
End: 2024-09-10
Payer: MEDICARE

## 2024-09-10 VITALS
HEIGHT: 63 IN | BODY MASS INDEX: 39.23 KG/M2 | DIASTOLIC BLOOD PRESSURE: 64 MMHG | TEMPERATURE: 98 F | OXYGEN SATURATION: 97 % | RESPIRATION RATE: 20 BRPM | SYSTOLIC BLOOD PRESSURE: 114 MMHG | WEIGHT: 221.38 LBS | HEART RATE: 79 BPM

## 2024-09-10 DIAGNOSIS — N18.32 STAGE 3B CHRONIC KIDNEY DISEASE: Primary | ICD-10-CM

## 2024-09-10 PROCEDURE — 99999 PR PBB SHADOW E&M-EST. PATIENT-LVL IV: CPT | Mod: PBBFAC,,, | Performed by: INTERNAL MEDICINE

## 2024-09-10 PROCEDURE — 3078F DIAST BP <80 MM HG: CPT | Mod: CPTII,S$GLB,, | Performed by: INTERNAL MEDICINE

## 2024-09-10 PROCEDURE — 1126F AMNT PAIN NOTED NONE PRSNT: CPT | Mod: CPTII,S$GLB,, | Performed by: INTERNAL MEDICINE

## 2024-09-10 PROCEDURE — 1101F PT FALLS ASSESS-DOCD LE1/YR: CPT | Mod: CPTII,S$GLB,, | Performed by: INTERNAL MEDICINE

## 2024-09-10 PROCEDURE — 3008F BODY MASS INDEX DOCD: CPT | Mod: CPTII,S$GLB,, | Performed by: INTERNAL MEDICINE

## 2024-09-10 PROCEDURE — 1159F MED LIST DOCD IN RCRD: CPT | Mod: CPTII,S$GLB,, | Performed by: INTERNAL MEDICINE

## 2024-09-10 PROCEDURE — 99213 OFFICE O/P EST LOW 20 MIN: CPT | Mod: S$GLB,,, | Performed by: INTERNAL MEDICINE

## 2024-09-10 PROCEDURE — 3288F FALL RISK ASSESSMENT DOCD: CPT | Mod: CPTII,S$GLB,, | Performed by: INTERNAL MEDICINE

## 2024-09-10 PROCEDURE — 3066F NEPHROPATHY DOC TX: CPT | Mod: CPTII,S$GLB,, | Performed by: INTERNAL MEDICINE

## 2024-09-10 PROCEDURE — 3044F HG A1C LEVEL LT 7.0%: CPT | Mod: CPTII,S$GLB,, | Performed by: INTERNAL MEDICINE

## 2024-09-10 PROCEDURE — 3074F SYST BP LT 130 MM HG: CPT | Mod: CPTII,S$GLB,, | Performed by: INTERNAL MEDICINE

## 2024-09-10 NOTE — PROGRESS NOTES
[facility] [place] Nephrology Ambulatory Progress Note      HPI  Indu Azul, 68 y.o. female, presents to office for a follow up visit for CKD 3, colon cancer cirrhosis  Patient is followed up by the oncology  She is also on Lasix 3 times a week and Aldactone 25 mg 3 times a week  Feels fine except for occasional lower extremity edema but denies any major complaints    Patient denies taking NSAIDs or new antibiotics. Also denies recent episode of dehydration, diarrhea, vomiting, acute illness, hospitalization, recent angiograms or exposure to IV radiocontrast.        Medical Diagnoses:   Past Medical History:   Diagnosis Date    Anesthesia complication     trouble awakening    Charcot's joint of foot, left     Chronic kidney disease, unspecified     Cirrhosis of liver without ascites     Depression     Diabetes mellitus, type II, insulin dependent     Diabetic neuropathy     GERD (gastroesophageal reflux disease)     H/O: hysterectomy     Hepatic steatosis     Malignant neoplasm of ascending colon     Obesity, unspecified     Overactive bladder     Thrombocytopenia, unspecified     Vitamin D deficiency      Patient Active Problem List   Diagnosis    Thrombocytopenia    Iron deficiency    Folate deficiency    Type 2 diabetes mellitus with hyperglycemia, with long-term current use of insulin    Long-term insulin use    Hyperlipidemia associated with type 2 diabetes mellitus    CKD stage 3 due to type 2 diabetes mellitus    Recurrent major depressive disorder, in full remission    Vitamin D deficiency    Hepatic steatosis    Other cirrhosis of liver    Diabetic polyneuropathy associated with type 2 diabetes mellitus    Gammopathy    Splenomegaly    Microcytic anemia    Malignant neoplasm of ascending colon    Pulmonary nodules    Elevated CEA    Immunodeficiency due to chemotherapy    Immunosuppression due to drug therapy    Iron deficiency anemia due to chronic blood loss       Surgical History:   Past Surgical History:    Procedure Laterality Date    APPENDECTOMY      BREAST BIOPSY  2016     SECTION      x3    charcot-leyden crystals identification      CHOLECYSTECTOMY      COLONOSCOPY N/A 2023    Procedure: COLON;  Surgeon: Luis Amador MD;  Location: [facility] ENDOSCOPY;  Service: Gastroenterology;  Laterality: N/A;    COLONOSCOPY N/A 2024    Procedure: COLON;  Surgeon: Mor Porter MD;  Location: [facility] OR;  Service: Gastroenterology;  Laterality: N/A;    COLONOSCOPY, WITH 1 OR MORE BIOPSIES  2023    Procedure: COLONOSCOPY, WITH 1 OR MORE BIOPSIES;  Surgeon: Luis Amador MD;  Location: [facility] ENDOSCOPY;  Service: Gastroenterology;;    COLONOSCOPY, WITH 1 OR MORE BIOPSIES N/A 2024    Procedure: COLONOSCOPY, WITH 1 OR MORE BIOPSIES;  Surgeon: Mor Porter MD;  Location: [facility] OR;  Service: Gastroenterology;  Laterality: N/A;    COLONOSCOPY, WITH DIRECTED SUBMUCOSAL INJECTION  2023    Procedure: COLONOSCOPY, WITH DIRECTED SUBMUCOSAL INJECTION;  Surgeon: Luis Amador MD;  Location: [facility] ENDOSCOPY;  Service: Gastroenterology;;  8cc Colon Tattoo    COLONOSCOPY, WITH POLYPECTOMY USING SNARE  2023    Procedure: COLONOSCOPY, WITH POLYPECTOMY USING SNARE;  Surgeon: Luis Amador MD;  Location: [facility] ENDOSCOPY;  Service: Gastroenterology;;    ESOPHAGOGASTRODUODENOSCOPY N/A 2023    Procedure: DOUBLE;  Surgeon: Luis Amador MD;  Location: [facility] ENDOSCOPY;  Service: Gastroenterology;  Laterality: N/A;    ESOPHAGOGASTRODUODENOSCOPY N/A 2024    Procedure: EGD;  Surgeon: Nolan Massey MD;  Location: [facility] ENDOSCOPY;  Service: Gastroenterology;  Laterality: N/A;    HEMORRHOID SURGERY      HYSTERECTOMY      total    INSERTION OF SUBCUTANEOUS PORT Right     INSERTION OF TUNNELED CENTRAL VENOUS CATHETER (CVC) WITH SUBCUTANEOUS PORT Right 2023    Procedure: [id number];  Surgeon: Timothy Russ  MD;  Location: [facility] OR;  Service: General;  Laterality: Right;    POLYPECTOMY      right foot surgery Right 02/01/2022    XI ROBOTIC COLECTOMY, RIGHT N/A 10/19/2023    Procedure: XI ROBOTIC COLECTOMY, RIGHT;  Surgeon: Timothy Russ MD;  Location: [facility] OR;  Service: General;  Laterality: N/A;       Family History:   Family History   Problem Relation Name Age of Onset    Diabetes Mother      Alzheimer's disease Mother      Diabetes Father      Leukemia Father      Diabetes Sister      Liver cancer Sister      Diabetes Brother         Social History:   Social History     Tobacco Use    Smoking status: Every Day     Current packs/day: 0.50     Average packs/day: 0.5 packs/day for 61.7 years (30.8 ttl pk-yrs)     Types: Cigarettes     Start date: 2003     Passive exposure: Current    Smokeless tobacco: Never   Substance Use Topics    Alcohol use: Not Currently     Comment: occassionally       Allergies:  Review of patient's allergies indicates:  No Known Allergies    Medications:  Outpatient Medications Marked as Taking for the 9/10/24 encounter (Office Visit) with Daya Weir MD   Medication Sig Dispense Refill    dulaglutide (TRULICITY) 3 mg/0.5 mL pen injector INJECT 1 SYRINGE SUBCUTANEOUSLY ONCE A WEEK 4 Pen 11    ELIQUIS 5 mg Tab Take 1 tablet by mouth twice daily 60 tablet 0    folic acid-vit B6-vit B12 0.8- mg-mg-mcg Tab Take 1 tablet by mouth once daily. 90 each 2    furosemide (LASIX) 40 MG tablet Take 40 mg by mouth as needed.      gabapentin (NEURONTIN) 300 MG capsule TAKE 2 CAPSULES BY MOUTH IN THE EVENING 60 capsule 6    insulin glargine, TOUJEO, (TOUJEO SOLOSTAR U-300 INSULIN) 300 unit/mL (1.5 mL) InPn pen INJECT 60 UNITS SUBCUTANEOUSLY ONCE DAILY (Patient taking differently: INJECT 40 UNITS SUBCUTANEOUSLY ONCE DAILY)      sertraline (ZOLOFT) 50 MG tablet Take 1 tablet by mouth once daily 90 tablet 3    solifenacin (VESICARE) 10 MG tablet Take 10 mg by mouth once daily.      spironolactone  "(ALDACTONE) 50 MG tablet Take 25 mg by mouth once daily.       Current Facility-Administered Medications for the 9/10/24 encounter (Office Visit) with Daya Weir MD   Medication Dose Route Frequency Provider Last Rate Last Admin    0.9%  NaCl infusion (for blood administration)   Intravenous Once Gayatri Jaffe FNP        promethazine (PHENERGAN) 12.5 mg in dextrose 5 % (D5W) 50 mL IVPB  12.5 mg Intravenous Once Gayatri Jaffe FNP              Review of Systems:    Constitutional: Denies fever, occasional fatigue  Skin: Denies wounds, no rashes, no itching, no new skin lesions  Respiratory:  Denies cough, shortness of breath, or wheezing  Cardiovascular: Denies chest pain, palpitations, or worsening swelling  Gastrointestional: Denies abdominal pain, nausea, vomiting, diarrhea, or constipation  Genitourinary: Denies dysuria, hematuria, foamy urine, or incontinence; reports able to empty bladder  Musculoskeletal: Denies back or flank pain  Neurological: Denies headaches, dizziness, paresthesias, tremors or focal weakness      Vital Signs:  /64 (BP Location: Left arm, Patient Position: Sitting)   Pulse 79   Temp 98 °F (36.7 °C) (Temporal)   Resp 20   Ht 5' 3" (1.6 m)   Wt 100.4 kg (221 lb 6.4 oz)   SpO2 97%   BMI 39.22 kg/m²   Body mass index is 39.22 kg/m².      Physical Exam:    General: no acute distress, awake, alert  Eyes: conjunctiva clear, eyelids without swelling  HENT: atraumatic, oropharynx and nasal mucosa patent  Neck: supple, trache midline, no JVD  Respiratory: equal, unlabored, clear to auscultation A/P  Cardiovascular: RRR without r rub  Edema:  +1 lower extremity  Gastrointestinal: soft, non-tender, non-distended; positive bowel sounds; no masses to palpation; no ascites    Musculoskeletal: ROM without new limitation or discomfort  Integumentary: warm, dry; no rashes, wounds, or skin lesions  Neurological: oriented x4, appropriate, no acute deficits; no asterixis      Labs:      "   Component Value Date/Time     09/05/2024 1340     08/22/2024 1122     06/01/2022 1340    K 4.4 09/05/2024 1340    K 4.1 08/22/2024 1122    K 4.9 06/01/2022 1340     09/05/2024 1340     08/22/2024 1122     06/01/2022 1340    CO2 27 09/05/2024 1340    CO2 24 08/22/2024 1122    CO2 29 06/01/2022 1340    BUN 14.6 09/05/2024 1340    BUN 16.3 08/22/2024 1122    BUN 22 06/01/2022 1340    CREATININE 1.22 (H) 09/05/2024 1340    CREATININE 1.17 (H) 08/22/2024 1122    CREATININE 1.32 (H) 06/21/2024 0955    CREATININE 1.15 (H) 05/22/2024 1030    CREATININE 1.32 (H) 06/01/2022 1340    CALCIUM 9.8 09/05/2024 1340    CALCIUM 8.8 08/22/2024 1122    CALCIUM 8.7 (L) 06/01/2022 1340    PHOS 3.1 09/05/2024 1340    PHOS 2.6 05/03/2024 1027    PTH 46.5 09/05/2024 1340    PTH 91.5 (H) 05/03/2024 1027    .2 (H) 12/19/2023 0932           Component Value Date/Time    WBC 4.11 (L) 09/05/2024 1340    WBC 4.32 (L) 08/22/2024 1122    HGB 13.2 09/05/2024 1340    HGB 13.2 08/22/2024 1122    HCT 41.9 09/05/2024 1340    HCT 41.0 08/22/2024 1122    PLT 79 (L) 09/05/2024 1340    PLT 79 (L) 08/22/2024 1122       Urine Creatinine   Date Value Ref Range Status   09/05/2024 23.7 (L) 45.0 - 106.0 mg/dL Final   05/03/2024 113.5 (H) 45.0 - 106.0 mg/dL Final       Urine Protein Level   Date Value Ref Range Status   09/05/2024 <6.8 mg/dL Final   05/03/2024 14.4 mg/dL Final           Imaging:  Retroperitoneal Ultrasound:      Impression:    CKD 3b related to nephrosclerosis  Cirrhosis  Colon cancer    Plan:    Renal wise stable  No change in plans Labs and follow-up visit in 4 months          Daya Weir      This note was created with the assistance of [employer] voice recognition software or phone dictation. There may be transcription errors as a result of using this technology however minimal. Effort has been made to assure accuracy of transcription but any obvious errors or omissions should be clarified with the  author of the document.

## 2024-09-21 DIAGNOSIS — I81 PORTAL VEIN THROMBOSIS: ICD-10-CM

## 2024-09-23 RX ORDER — APIXABAN 5 MG/1
5 TABLET, FILM COATED ORAL 2 TIMES DAILY
Qty: 60 TABLET | Refills: 2 | Status: SHIPPED | OUTPATIENT
Start: 2024-09-23

## 2024-10-24 ENCOUNTER — OFFICE VISIT (OUTPATIENT)
Dept: FAMILY MEDICINE | Facility: CLINIC | Age: 69
End: 2024-10-24
Payer: MEDICARE

## 2024-10-24 ENCOUNTER — LAB VISIT (OUTPATIENT)
Dept: LAB | Facility: HOSPITAL | Age: 69
End: 2024-10-24
Attending: ORTHOPAEDIC SURGERY
Payer: MEDICARE

## 2024-10-24 ENCOUNTER — CLINICAL SUPPORT (OUTPATIENT)
Dept: RESPIRATORY THERAPY | Facility: HOSPITAL | Age: 69
End: 2024-10-24
Attending: ORTHOPAEDIC SURGERY
Payer: MEDICARE

## 2024-10-24 VITALS
HEIGHT: 63 IN | WEIGHT: 231.88 LBS | TEMPERATURE: 98 F | DIASTOLIC BLOOD PRESSURE: 66 MMHG | SYSTOLIC BLOOD PRESSURE: 114 MMHG | BODY MASS INDEX: 41.09 KG/M2 | HEART RATE: 73 BPM | OXYGEN SATURATION: 96 % | RESPIRATION RATE: 19 BRPM

## 2024-10-24 DIAGNOSIS — Z01.812 PRE-OPERATIVE LABORATORY EXAMINATION: Primary | ICD-10-CM

## 2024-10-24 DIAGNOSIS — E55.9 VITAMIN D INSUFFICIENCY: ICD-10-CM

## 2024-10-24 DIAGNOSIS — Z01.818 PRE-OP EXAM: Primary | ICD-10-CM

## 2024-10-24 DIAGNOSIS — Z01.810 PRE-OPERATIVE CARDIOVASCULAR EXAMINATION: ICD-10-CM

## 2024-10-24 DIAGNOSIS — Z01.810 PRE-OPERATIVE CARDIOVASCULAR EXAMINATION: Primary | ICD-10-CM

## 2024-10-24 LAB
25(OH)D3+25(OH)D2 SERPL-MCNC: 18 NG/ML (ref 30–80)
ALBUMIN SERPL-MCNC: 3.5 G/DL (ref 3.4–4.8)
ALBUMIN/GLOB SERPL: 0.9 RATIO (ref 1.1–2)
ALP SERPL-CCNC: 117 UNIT/L (ref 40–150)
ALT SERPL-CCNC: 16 UNIT/L (ref 0–55)
ANION GAP SERPL CALC-SCNC: 9 MEQ/L
AST SERPL-CCNC: 23 UNIT/L (ref 5–34)
BILIRUB SERPL-MCNC: 0.5 MG/DL
BUN SERPL-MCNC: 19.7 MG/DL (ref 9.8–20.1)
CALCIUM SERPL-MCNC: 9.5 MG/DL (ref 8.4–10.2)
CHLORIDE SERPL-SCNC: 105 MMOL/L (ref 98–107)
CO2 SERPL-SCNC: 27 MMOL/L (ref 23–31)
CREAT SERPL-MCNC: 1.23 MG/DL (ref 0.55–1.02)
CREAT/UREA NIT SERPL: 16
ERYTHROCYTE [DISTWIDTH] IN BLOOD BY AUTOMATED COUNT: 16.4 % (ref 11.5–17)
GFR SERPLBLD CREATININE-BSD FMLA CKD-EPI: 48 ML/MIN/1.73/M2
GLOBULIN SER-MCNC: 3.8 GM/DL (ref 2.4–3.5)
GLUCOSE SERPL-MCNC: 123 MG/DL (ref 82–115)
HCT VFR BLD AUTO: 35.9 % (ref 37–47)
HGB BLD-MCNC: 11.6 G/DL (ref 12–16)
MCH RBC QN AUTO: 26.9 PG (ref 27–31)
MCHC RBC AUTO-ENTMCNC: 32.3 G/DL (ref 33–36)
MCV RBC AUTO: 83.3 FL (ref 80–94)
NRBC BLD AUTO-RTO: 0 %
PLATELET # BLD AUTO: 78 X10(3)/MCL (ref 130–400)
PMV BLD AUTO: 11.7 FL (ref 7.4–10.4)
POTASSIUM SERPL-SCNC: 4.3 MMOL/L (ref 3.5–5.1)
PROT SERPL-MCNC: 7.3 GM/DL (ref 5.8–7.6)
RBC # BLD AUTO: 4.31 X10(6)/MCL (ref 4.2–5.4)
SODIUM SERPL-SCNC: 141 MMOL/L (ref 136–145)
WBC # BLD AUTO: 3.94 X10(3)/MCL (ref 4.5–11.5)

## 2024-10-24 PROCEDURE — 84597 ASSAY OF VITAMIN K: CPT

## 2024-10-24 PROCEDURE — 80053 COMPREHEN METABOLIC PANEL: CPT

## 2024-10-24 PROCEDURE — 82306 VITAMIN D 25 HYDROXY: CPT

## 2024-10-24 PROCEDURE — 93005 ELECTROCARDIOGRAM TRACING: CPT

## 2024-10-24 PROCEDURE — 36415 COLL VENOUS BLD VENIPUNCTURE: CPT

## 2024-10-24 PROCEDURE — 93010 ELECTROCARDIOGRAM REPORT: CPT | Mod: ,,, | Performed by: INTERNAL MEDICINE

## 2024-10-24 PROCEDURE — 85027 COMPLETE CBC AUTOMATED: CPT

## 2024-10-24 RX ORDER — LACTULOSE 10 G/15ML
30 SOLUTION ORAL 3 TIMES DAILY
COMMUNITY
Start: 2024-09-12

## 2024-10-24 NOTE — PROGRESS NOTES
Subjective:      Patient ID: Indu Azul is a 69 y.o. White female      Chief Complaint: Sx Clearance       Past Medical History:   Diagnosis Date    Anesthesia complication     trouble awakening    Charcot's joint of foot, left     Chronic kidney disease, unspecified     Cirrhosis of liver without ascites     Depression     Diabetes mellitus, type II, insulin dependent     Diabetic neuropathy     GERD (gastroesophageal reflux disease)     H/O: hysterectomy     Hepatic steatosis     Malignant neoplasm of ascending colon     Obesity, unspecified     Overactive bladder     Thrombocytopenia, unspecified     Vitamin D deficiency         HPI  Presents to clinic for surgery clearance.      Surgery: Peripheral Nerve Stimulator   Surgeon: Dr. Hart in [place]  Date: 11/5/2024  Anesthesia type: General and Local  Allergies to medications: NKA  Previous exposure to anesthesia: yes  Complications secondary to anesthesia:  no       Preoperative Evaluation:  Step 1: Emergency Surgery? No  Step 2: Has coronary revascularization been done in the past 5 years? No  Step 3: Has coronary angiography or stress test been done in past 2 years? No  Step 4: Evaluate clinical predictors:  No symptoms of stable or unstable angina, no history of arrhythmias, no history of severe valvular heart disease, no history of uncontrolled hypertension, no history of abnormal EKG, no history of prior myocardial infarction, no history to suggest congestive heart failure, no history of prior CHF, + hx of DM and CKD      EKG performed today: n/a  Preoperative Labs ordered: per surgeon  Medications adjusted: Hold Trulicity 1 week before surgery; hold Eliquis 3 days before surgery               Patient Care Team:  Alejandra Pinedo MD as PCP - General (Internal Medicine)  Nolan Russ MD as Consulting Physician (Gastroenterology)  Trinity Shay, RN as Diabetes Educator (Diabetes)      Review of Systems   Constitutional: Negative.  Negative  for appetite change, chills and fever.   HENT: Negative.     Eyes: Negative.  Negative for discharge and redness.   Respiratory: Negative.  Negative for shortness of breath.    Cardiovascular: Negative.  Negative for chest pain.   Gastrointestinal: Negative.    Endocrine: Negative.    Genitourinary: Negative.    Integumentary:  Negative.   Allergic/Immunologic: Negative.    Neurological: Negative.    Psychiatric/Behavioral: Negative.     All other systems reviewed and are negative.          Objective:      Vitals:    10/24/24 1453   BP: 114/66   Pulse: 73   Resp: 19   Temp: 97.8 °F (36.6 °C)      Body mass index is 41.08 kg/m².     Physical Exam  Vitals reviewed.   Constitutional:       Appearance: Normal appearance.   HENT:      Head: Normocephalic.      Mouth/Throat:      Mouth: Mucous membranes are moist.   Eyes:      Conjunctiva/sclera: Conjunctivae normal.      Pupils: Pupils are equal, round, and reactive to light.   Cardiovascular:      Rate and Rhythm: Normal rate and regular rhythm.   Pulmonary:      Effort: Pulmonary effort is normal. No respiratory distress.      Breath sounds: Normal breath sounds.   Musculoskeletal:         General: Normal range of motion.      Cervical back: Normal range of motion and neck supple.   Skin:     General: Skin is warm and dry.   Neurological:      Mental Status: She is alert and oriented to person, place, and time.   Psychiatric:         Mood and Affect: Mood normal.            Current Outpatient Medications:     CONSTULOSE 10 gram/15 mL solution, Take 30 mLs by mouth 3 (three) times daily., Disp: , Rfl:     dulaglutide (TRULICITY) 3 mg/0.5 mL pen injector, INJECT 1 SYRINGE SUBCUTANEOUSLY ONCE A WEEK, Disp: 4 Pen, Rfl: 11    ELIQUIS 5 mg Tab, Take 1 tablet by mouth twice daily, Disp: 60 tablet, Rfl: 2    folic acid-vit B6-vit B12 0.8- mg-mg-mcg Tab, Take 1 tablet by mouth once daily., Disp: 90 each, Rfl: 2    furosemide (LASIX) 40 MG tablet, Take 40 mg by mouth as  needed., Disp: , Rfl:     gabapentin (NEURONTIN) 300 MG capsule, TAKE 2 CAPSULES BY MOUTH IN THE EVENING, Disp: 60 capsule, Rfl: 6    insulin glargine, TOUJEO, (TOUJEO SOLOSTAR U-300 INSULIN) 300 unit/mL (1.5 mL) InPn pen, INJECT 60 UNITS SUBCUTANEOUSLY ONCE DAILY, Disp: , Rfl:     sertraline (ZOLOFT) 50 MG tablet, Take 1 tablet by mouth once daily, Disp: 90 tablet, Rfl: 3    Current Facility-Administered Medications:     0.9%  NaCl infusion (for blood administration), , Intravenous, Once, Gayatri Jaffe FNP    promethazine (PHENERGAN) 12.5 mg in dextrose 5 % (D5W) 50 mL IVPB, 12.5 mg, Intravenous, Once, Gayatri Jaffe FNP    Assessment & Plan:     Problem List Items Addressed This Visit          Other    Pre-op exam - Primary     EKG performed today: n/a  Preoperative Labs ordered: per surgeon  EKG performed today: n/a  Preoperative Labs ordered: per surgeon  Medications adjusted: Hold Trulicity 1 week before surgery; hold Eliquis 3 days before surgery     Pt is medically cleared for surgery  Keep appt with PCP for follow up

## 2024-10-25 PROBLEM — Z01.818 PRE-OP EXAM: Status: ACTIVE | Noted: 2024-10-25

## 2024-10-25 LAB
OHS QRS DURATION: 70 MS
OHS QTC CALCULATION: 442 MS

## 2024-10-25 NOTE — ASSESSMENT & PLAN NOTE
EKG performed today: n/a  Preoperative Labs ordered: per surgeon  EKG performed today: n/a  Preoperative Labs ordered: per surgeon  Medications adjusted: Hold Trulicity 1 week before surgery; hold Eliquis 3 days before surgery     Pt is medically cleared for surgery  Keep appt with PCP for follow up

## 2024-10-26 LAB — MAYO GENERIC ORDERABLE RESULT: NORMAL

## 2024-11-21 ENCOUNTER — INFUSION (OUTPATIENT)
Dept: INFUSION THERAPY | Facility: HOSPITAL | Age: 69
End: 2024-11-21
Attending: INTERNAL MEDICINE
Payer: MEDICARE

## 2024-11-21 VITALS
HEIGHT: 63 IN | SYSTOLIC BLOOD PRESSURE: 120 MMHG | BODY MASS INDEX: 41.09 KG/M2 | DIASTOLIC BLOOD PRESSURE: 69 MMHG | RESPIRATION RATE: 18 BRPM | HEART RATE: 82 BPM | WEIGHT: 231.88 LBS

## 2024-11-21 DIAGNOSIS — C18.2 MALIGNANT NEOPLASM OF ASCENDING COLON: Primary | ICD-10-CM

## 2024-11-21 PROCEDURE — 63600175 PHARM REV CODE 636 W HCPCS: Performed by: INTERNAL MEDICINE

## 2024-11-21 PROCEDURE — 96523 IRRIG DRUG DELIVERY DEVICE: CPT

## 2024-11-21 RX ORDER — SODIUM CHLORIDE 0.9 % (FLUSH) 0.9 %
10 SYRINGE (ML) INJECTION
Status: DISCONTINUED | OUTPATIENT
Start: 2024-11-21 | End: 2024-11-21 | Stop reason: HOSPADM

## 2024-11-21 RX ORDER — SODIUM CHLORIDE 0.9 % (FLUSH) 0.9 %
10 SYRINGE (ML) INJECTION
OUTPATIENT
Start: 2024-11-21

## 2024-11-21 RX ORDER — HEPARIN 100 UNIT/ML
500 SYRINGE INTRAVENOUS
Status: DISCONTINUED | OUTPATIENT
Start: 2024-11-21 | End: 2024-11-21 | Stop reason: HOSPADM

## 2024-11-21 RX ORDER — HEPARIN 100 UNIT/ML
500 SYRINGE INTRAVENOUS
OUTPATIENT
Start: 2024-11-21

## 2024-11-21 RX ADMIN — HEPARIN 500 UNITS: 100 SYRINGE at 11:11

## 2024-11-26 ENCOUNTER — HOSPITAL ENCOUNTER (OUTPATIENT)
Dept: RADIOLOGY | Facility: HOSPITAL | Age: 69
Discharge: HOME | End: 2024-11-26
Attending: NURSE PRACTITIONER
Payer: MEDICARE

## 2024-11-26 DIAGNOSIS — R91.8 PULMONARY NODULES: ICD-10-CM

## 2024-11-26 DIAGNOSIS — C18.2 MALIGNANT NEOPLASM OF ASCENDING COLON: ICD-10-CM

## 2024-11-26 PROCEDURE — 74177 CT ABD & PELVIS W/CONTRAST: CPT | Mod: TC

## 2024-11-26 PROCEDURE — 25500020 PHARM REV CODE 255: Performed by: NURSE PRACTITIONER

## 2024-11-26 RX ORDER — DIATRIZOATE MEGLUMINE AND DIATRIZOATE SODIUM 660; 100 MG/ML; MG/ML
30 SOLUTION ORAL; RECTAL
Status: COMPLETED | OUTPATIENT
Start: 2024-11-26 | End: 2024-11-26

## 2024-11-26 RX ADMIN — IOHEXOL 100 ML: 350 INJECTION, SOLUTION INTRAVENOUS at 10:11

## 2024-11-26 RX ADMIN — DIATRIZOATE MEGLUMINE AND DIATRIZOATE SODIUM 30 ML: 660; 100 LIQUID ORAL; RECTAL at 10:11

## 2024-12-02 NOTE — PROGRESS NOTES
HEMATOLOGY/ONCOLOGY OFFICE CLINIC VISIT    Visit Information:    Initial Evaluation: 4/22/2022  Referring Provider: Maggy Jaquez NP  Other providers: Dr Timothy Russ  Code status: Not addressed     Diagnosis/Problem list:   pT3 N2a Mx Stage IIIB Colon cancer. Dx 10/19/23  Microcytic anemia.   Folate deficiency   Pancytopenia-intermittent    Present Treatment:  Xelox to start on 3/26/24  (Received 1 cycle then stopped).     Treatment history:  10/19/2023 Lap/jaswinder right colon resection   FOLFOX with dose reduction on Oxaliplatin due to neuropathy. Started on 11/28/23. x 7 cycles then switched to XELOX      Imaging studies:  CT C/A/P 6/3/2022: There is no significant hepatic steatosis.  The liver is cirrhotic.  No liver lesion is identified.  The spleen is enlarged, measuring 15.5 cm in length.  There is no retroperitoneal lymphadenopathy or abdominal aortic aneurysm.  A mildly prominent right external iliac lymph node measures 9 mm in short axis, nonspecific.  This is also similar in appearance when compared to 2015. Impression: Cirrhosis. Splenomegaly.  CT CAP 9/11/2023: There is no supraclavicular or axillary lymphadenopathy.  No mediastinal adenopathy. 3 mm right upper lobe pulmonary nodule. There are few additional pulmonary micronodules in the lung apices measuring no greater than 1-2 mm.  Findings similar to prior exam.     Impression: Scattered pulmonary nodules in the upper lobe similar to prior.  No further follow-up acute according to Fleischner criteria.  No convincing evidence for metastatic disease. Nodular appearing liver, correlation for cirrhosis.  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  CT C/A/P 5/16/2024: No new or worsening malignant findings identified. No defined portal vein thrombus on today's exam.  Poor evaluation of the SMV.  CT CAP 11/26/2024:  Spleen mildly enlarged. No evidence of recurrent malignancy or metastatic disease.  Slightly irregular liver contour suggesting cirrhosis, similar      Pathology:  10/19/2023:  COLON, RIGHT HEMICOLECTOMY (1.5 CM OF TERMINAL ILEUM, 25 CM LENGTH OF RIGHT COLON): POORLY DIFFERENTIATED ADENOCARCINOMA WITH FOCAL SIGNET RING CELL FEATURES, UNIFOCAL, RIGHT COLON, 8 CM GREATEST DIMENSION.   THE TUMOR INVADES THROUGH THE MUSCULARIS PROPRIA INTO PERICOLONIC TISSUE (PT3).   FOCAL LYMPHVASCULAR INVASION IS PRESENT.   THE SURGICAL MARGINS ARE FREE OF TUMOR.   METASTATIC ADENOCARCINOMA IS IDENTIFIED IN FOUR (4) OF TWENTY-SEVEN (27)   PERICOLONIC LYMPH NODES (LARGEST METASTATIC DEPOSIT 8 MM; FOCAL EXTRACAPSULAR EXTENSION OF TUMOR IS PRESENT).      PATHOLOGIC STAGING:  pT3 N2a Mx     Histologic Grade:  G3, poorly differentiated   Macroscopic Tumor Perforation:  Not identified   Lymphovascular Invasion:  Small vessel   Perineural Invasion:  Not identified        CLINICAL HISTORY:       Patient: Indu Azul is a 69 y.o. female. with multiple medical problems that I saw originally on 4/22/2022 for thrombocytopenia.  Patient platelet counts on 12/21/2021 were 132,000 and since that that has been slowly trending down.  12/21/2021 platelets 132,000  01/19/2022 platelets 121,000  04/08/2022 platelets 115,000     Of note patient have a UA done that same day that suggest possible UTI with positive nitrates, 1+ leukocytes and many bacteria.  Urine culture with E. coli.   Reviewing electronic medical record and going back to 12/15/2014 patient with occasional low platelets.  They were never lower than 100,000 and they always normalized.   As per patient in January 2020 when her platelets were slightly decreased (117,000), this was shortly after she has her left foot surgery.  Then in May 17 and May 18 2021, platelets were 105k and 101k,  she had COVID.  Again in 4/8/2022 she have a UTI for which she completed antibiotics.     Patient's father diagnosed Blood disorder requiring blood transfusion that started q 4 weeks and  the q week. He was diagnosed on his 80's but  at 91. Sister and niece had ovarian cancer. Sister  of it. Niece still alive.     She was found to have iron deficiency anemia.  EGD performed, grade I-II esophageal varices found, too small to band. She also had Colonoscopy by Dr Hammonds that showed an ulcerated malignant-appearing partially obstructing medium sized mass in the ascending colon.  She reports that she had a colonoscopy about 5 years ago at [facility] and everything was fine, no polyps or masses.   Biopsy and tattoo of the mass showed fragments of colonic mucosa, no evidence of dysplasia or malignancy.  Two other polyps were completely removed in the descending and sigmoid colon, pathology returned hyperplastic polyps.  CT abd/pel with no acute findings.     Despite of biopsy because of malignant-appearing partially obstructing mass of the ascending colon and photographs and description were consistent with colon cancer she underwent Lap/jaswinder right colon resection on 10/19/2023 by Dr Timothy Russ. Path c/w really differentiated adeno carcinoma.         Chief Complaint: Results (CT Results.)      Interval History:      24-Patient labs and CT are stable. Patient has no complaints today.     Patient with h/o Charcot feet for which she had surgery. She does not have sensation on her feet from ankle down. This was prior to her diagnosis of colon cancer. She is getting Oxaloplatin but neuropathy is not worse. She started dose reduced and will cont like same dose. If neuropathy worsen will d/c oxaliplatin.       2024: Patient presents today for labs and TD prior to C6 of FOLFOX with dose reduction of Oxaliplatin due to neuropathy, accompanied by her daughter. C6 was held on 24 due to thrombocytopenia (plt 74k). CT A/P done on 24, showed Nonocclusive thrombus within the SMV and extending through the main portal vein, she was started on Eliquis. She is tolerating well. She continues with  occasional right sided abdominal pain and oral thrush. Pharmacy did not have RX in stock and gave an alternative, but it does not help.  No worsening in neuropathy as of yet. Hyperpigmentation of nails and skin on hands commonly seen with 5FU. Overall, she is doing fair. No fever, chills or sweats. Denies chest pain or shortness of breath. No bleeding. Bowels are moving without difficulty.  Plts today 128k.    2/19/24: Patient presents today as an add on. She has questions about her recent diagnosis of thrombus within the SMV and extending through the main portal vein . She complain of pain there so imaging was done. She was started on Eliquis. Last week after her chemo, she was nauseated so she didn't take any of her PO meds for 2 days and her right sided abdominal pain returned. Once she resumed Eliqius, her right sided abdominal pain decreased. Reports increased pain when she lays on her right side. She wants to know if we will be repeating imaging to eval her blood clot. Pancytopenia today. She treated last week (sonia). Denies fever.     2/26/24: Patient here for one week f/u. Due for C7 tomorrow. Platelet count is 75 k today. Denies any evidence of bleeding.     3/4/24: Patient presents today for one week f/u. Her platelet count increased to 115k. She has no complaints to report this week. She continues on Eliquis 5 mg bid. Denies any evidence of bleeding. Her neuropathy has not worsened as of yet.     3/18/24: Patient presents today for 2 week f/u due for C8 of FOLFOX. Her platelet count is 87k so we will have to delay again. She has no complaints today. The right lower abdominal pain subsided.     3/25/24: Patient presents today for 1 week f/u. We plan to start XELOX tomorrow for the remainder of her adjuvant treatment. Platelet count is now 109k today.     4/2/24: Patient presents today for once week f/u for toxicity check. She started XELOX on 3/26/24. Tolerating xeloda much better than infusional 5fu.  Reports less nausea and she is not sleeping during the day like she was before. She reports chest pain that was transient. Hgb is 8 today.     4/15/24: Patient presents today prior to C2 of XELOX. She is c/o abdominal pain and constipation. Reports no BM since Wednesday ( 5 days). Platelet count is 89k    24: Patient presents today for scheduled f/u. In the interim she was hospitalized for GI bleeding. Colonoscopy was performed on 24 and path was negative for malignancy. She reports fatigue . She is anemic with Hgb of 8.2. Iron studies added to labs drawn today ( pending). 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     24: Patient here today for follow up to discuss CT scan results, accompanied by her daughter. She's due to start iron infusion today, this will be .  She is doing fair, no new complaints but still weak and debilitated. She is on wheelchair today  CT scan discussed with them in detail, all questions answered.        Past Medical History:   Diagnosis Date    Anesthesia complication     trouble awakening    Charcot's joint of foot, left     Chronic kidney disease, unspecified     Cirrhosis of liver without ascites     Depression     Diabetes mellitus, type II, insulin dependent     Diabetic neuropathy     GERD (gastroesophageal reflux disease)     H/O: hysterectomy     Hepatic steatosis     Malignant neoplasm of ascending colon     Obesity, unspecified     Overactive bladder     Thrombocytopenia, unspecified     Vitamin D deficiency       Past Surgical History:   Procedure Laterality Date    APPENDECTOMY      BREAST BIOPSY  2016     SECTION      x3    charcot-leyden crystals identification      CHOLECYSTECTOMY      COLONOSCOPY N/A 2023    Procedure: COLON;  Surgeon: Luis Amador MD;  Location: [facility] ENDOSCOPY;  Service: Gastroenterology;  Laterality: N/A;    COLONOSCOPY N/A 2024    Procedure:  COLON;  Surgeon: Mor Porter MD;  Location: [facility];  Service: Gastroenterology;  Laterality: N/A;    COLONOSCOPY, WITH 1 OR MORE BIOPSIES  08/28/2023    Procedure: COLONOSCOPY, WITH 1 OR MORE BIOPSIES;  Surgeon: Luis Amador MD;  Location: [facility] ENDOSCOPY;  Service: Gastroenterology;;    COLONOSCOPY, WITH 1 OR MORE BIOPSIES N/A 4/20/2024    Procedure: COLONOSCOPY, WITH 1 OR MORE BIOPSIES;  Surgeon: oMr Porter MD;  Location: [facility] OR;  Service: Gastroenterology;  Laterality: N/A;    COLONOSCOPY, WITH DIRECTED SUBMUCOSAL INJECTION  08/28/2023    Procedure: COLONOSCOPY, WITH DIRECTED SUBMUCOSAL INJECTION;  Surgeon: Luis Amador MD;  Location: [facility] ENDOSCOPY;  Service: Gastroenterology;;  8cc Colon Tattoo    COLONOSCOPY, WITH POLYPECTOMY USING SNARE  08/28/2023    Procedure: COLONOSCOPY, WITH POLYPECTOMY USING SNARE;  Surgeon: Luis Amador MD;  Location: [facility] ENDOSCOPY;  Service: Gastroenterology;;    ESOPHAGOGASTRODUODENOSCOPY N/A 08/28/2023    Procedure: DOUBLE;  Surgeon: Luis Amador MD;  Location: [facility] ENDOSCOPY;  Service: Gastroenterology;  Laterality: N/A;    ESOPHAGOGASTRODUODENOSCOPY N/A 4/19/2024    Procedure: EGD;  Surgeon: Nolan Massey MD;  Location: [facility] ENDOSCOPY;  Service: Gastroenterology;  Laterality: N/A;    HEMORRHOID SURGERY      HYSTERECTOMY  1990    total    INSERTION OF SUBCUTANEOUS PORT Right     INSERTION OF TUNNELED CENTRAL VENOUS CATHETER (CVC) WITH SUBCUTANEOUS PORT Right 11/20/2023    Procedure: [id number];  Surgeon: Timothy Russ MD;  Location: [facility];  Service: General;  Laterality: Right;    POLYPECTOMY      right foot surgery Right 02/01/2022    XI ROBOTIC COLECTOMY, RIGHT N/A 10/19/2023    Procedure: XI ROBOTIC COLECTOMY, RIGHT;  Surgeon: Timothy Russ MD;  Location: [facility];  Service: General;  Laterality: N/A;     Family History   Problem Relation Name Age of Onset    Diabetes Mother       Alzheimer's disease Mother      Diabetes Father      Leukemia Father      Diabetes Sister      Liver cancer Sister      Diabetes Brother              Review of patient's allergies indicates:  No Known Allergies   Current Outpatient Medications on File Prior to Visit   Medication Sig Dispense Refill    CONSTULOSE 10 gram/15 mL solution Take 30 mLs by mouth 3 (three) times daily.      dulaglutide (TRULICITY) 3 mg/0.5 mL pen injector INJECT 1 SYRINGE SUBCUTANEOUSLY ONCE A WEEK 4 Pen 11    ELIQUIS 5 mg Tab Take 1 tablet by mouth twice daily 60 tablet 2    folic acid-vit B6-vit B12 0.8- mg-mg-mcg Tab Take 1 tablet by mouth once daily. 90 each 2    furosemide (LASIX) 40 MG tablet Take 40 mg by mouth as needed.      gabapentin (NEURONTIN) 300 MG capsule TAKE 2 CAPSULES BY MOUTH IN THE EVENING 60 capsule 6    insulin glargine, TOUJEO, (TOUJEO SOLOSTAR U-300 INSULIN) 300 unit/mL (1.5 mL) InPn pen INJECT 60 UNITS SUBCUTANEOUSLY ONCE DAILY      sertraline (ZOLOFT) 50 MG tablet Take 1 tablet by mouth once daily 90 tablet 3     Current Facility-Administered Medications on File Prior to Visit   Medication Dose Route Frequency Provider Last Rate Last Admin    0.9%  NaCl infusion (for blood administration)   Intravenous Once Gayatri Jaffe FNP        promethazine (PHENERGAN) 12.5 mg in dextrose 5 % (D5W) 50 mL IVPB  12.5 mg Intravenous Once Gayatri Jaffe FNP          Review of Systems   Constitutional:  Positive for fatigue. Negative for activity change, appetite change, chills, fever and unexpected weight change.   HENT:  Negative for mouth dryness, mouth sores, nosebleeds, sore throat and trouble swallowing.    Eyes:  Negative for visual disturbance.   Respiratory:  Negative for cough and shortness of breath.    Cardiovascular:  Negative for chest pain, palpitations and leg swelling.   Gastrointestinal:  Negative for abdominal distention, abdominal pain, blood in stool, change in bowel habit, constipation, diarrhea,  "nausea and vomiting.   Endocrine: Negative.    Genitourinary:  Negative for dysuria, frequency, hematuria and urgency.   Musculoskeletal:  Negative for arthralgias, back pain, myalgias and neck pain.   Integumentary:  Negative for rash.   Neurological:  Positive for weakness and numbness. Negative for dizziness, tremors, syncope, speech difficulty, light-headedness, headaches and memory loss.   Hematological:  Negative for adenopathy. Does not bruise/bleed easily.   Psychiatric/Behavioral:  Negative for confusion and suicidal ideas. The patient is not nervous/anxious.           Vitals:    12/04/24 1010   BP: (!) 121/59   BP Location: Left arm   Patient Position: Sitting   Pulse: 69   Resp: 18   Temp: 97.8 °F (36.6 °C)   TempSrc: Oral   SpO2: (!) 94%   Weight: 106.5 kg (234 lb 14.4 oz)   Height: 5' 2.99" (1.6 m)              Wt Readings from Last 6 Encounters:   12/04/24 106.5 kg (234 lb 14.4 oz)   11/21/24 105.2 kg (231 lb 14.4 oz)   10/24/24 105.2 kg (231 lb 14.4 oz)   09/10/24 100.4 kg (221 lb 6.4 oz)   08/26/24 102.2 kg (225 lb 6.4 oz)   06/24/24 99.9 kg (220 lb 4.8 oz)     Body mass index is 41.62 kg/m².  Body surface area is 2.18 meters squared.  Physical Exam  Vitals and nursing note reviewed.   Constitutional:       General: She is not in acute distress.     Appearance: She is obese. She is ill-appearing (chronically).   HENT:      Head: Normocephalic and atraumatic.      Mouth/Throat:      Mouth: Mucous membranes are moist.   Eyes:      General: No scleral icterus.     Extraocular Movements: Extraocular movements intact.      Conjunctiva/sclera: Conjunctivae normal.      Pupils: Pupils are equal, round, and reactive to light.   Neck:      Vascular: No JVD.   Cardiovascular:      Rate and Rhythm: Normal rate and regular rhythm.      Heart sounds: No murmur heard.  Pulmonary:      Effort: Pulmonary effort is normal.      Breath sounds: Normal breath sounds. No wheezing or rhonchi.   Chest:      Comments: Right " chest wall mediport in place.    Abdominal:      General: Bowel sounds are normal. There is no distension.      Palpations: Abdomen is soft.      Comments: Surgical incisions healed after colon surgery.    Musculoskeletal:         General: No swelling or deformity.      Cervical back: Neck supple.   Lymphadenopathy:      Head:      Right side of head: No submandibular adenopathy.      Left side of head: No submandibular adenopathy.      Cervical: No cervical adenopathy.      Upper Body:      Right upper body: No supraclavicular or axillary adenopathy.      Left upper body: No supraclavicular or axillary adenopathy.      Lower Body: No right inguinal adenopathy. No left inguinal adenopathy.   Skin:     General: Skin is warm.      Coloration: Skin is not jaundiced.      Findings: No lesion or rash.      Nails: There is no clubbing.      Comments: Darkening of nails and skin on hands commonly seen with 5FU   Neurological:      Mental Status: She is alert and oriented to person, place, and time.      Cranial Nerves: Cranial nerves 2-12 are intact.      Motor: Weakness present.   Psychiatric:         Attention and Perception: Attention normal.         Mood and Affect: Mood is depressed.         Behavior: Behavior is cooperative.         Judgment: Judgment normal.       ECOG SCORE             Laboratory:  CBC with Differential:  Lab Results   Component Value Date    WBC 4.40 (L) 11/26/2024    RBC 4.60 11/26/2024    HGB 12.5 11/26/2024    HCT 38.5 11/26/2024    MCV 83.7 11/26/2024    MCH 27.2 11/26/2024    MCHC 32.5 (L) 11/26/2024    RDW 14.8 11/26/2024    PLT 83 (L) 11/26/2024    MPV 11.8 (H) 11/26/2024        CMP:  Sodium   Date Value Ref Range Status   11/26/2024 138 136 - 145 mmol/L Final   06/01/2022 140 136 - 145 mmol/L Final     Potassium   Date Value Ref Range Status   11/26/2024 4.3 3.5 - 5.1 mmol/L Final   06/01/2022 4.9 3.5 - 5.1 mmol/L Final     Chloride   Date Value Ref Range Status   11/26/2024 103 98 - 107  mmol/L Final   06/01/2022 104 100 - 109 mmol/L Final     CO2   Date Value Ref Range Status   11/26/2024 24 23 - 31 mmol/L Final     Carbon Dioxide   Date Value Ref Range Status   06/01/2022 29 22 - 33 mmol/L Final     Blood Urea Nitrogen   Date Value Ref Range Status   11/26/2024 24.5 (H) 9.8 - 20.1 mg/dL Final   06/01/2022 22 5 - 25 mg/dL Final     Creatinine   Date Value Ref Range Status   11/26/2024 1.37 (H) 0.55 - 1.02 mg/dL Final   06/01/2022 1.32 (H) 0.57 - 1.25 mg/dL Final     Calcium   Date Value Ref Range Status   11/26/2024 9.6 8.4 - 10.2 mg/dL Final   06/01/2022 8.7 (L) 8.8 - 10.6 mg/dL Final     Albumin   Date Value Ref Range Status   11/26/2024 3.7 3.4 - 4.8 g/dL Final     Bilirubin Total   Date Value Ref Range Status   11/26/2024 0.5 <=1.5 mg/dL Final     ALP   Date Value Ref Range Status   11/26/2024 118 40 - 150 unit/L Final     AST   Date Value Ref Range Status   11/26/2024 19 5 - 34 unit/L Final     ALT   Date Value Ref Range Status   11/26/2024 16 0 - 55 unit/L Final     Anion Gap   Date Value Ref Range Status   06/01/2022 7 (L) 8 - 16 mmol/L Final     eGFR    Date Value Ref Range Status   06/01/2022 45 mL/min/1.73mSq Final     Comment:     In accordance with NKF-ASN Task Force recommendation, calculation based on the Chronic Kidney Disease Epidemiology Collaboration (CKD-EPI) equation without adjustment for race. eGFR adjusted for gender and age and calculated in ml/min/1.73mSquared. eGFR cannot be calculated if patient is under 18 years of age.     Reference Range:   >= 60 ml/min/1.73mSquared.     Estimated GFR-Non    Date Value Ref Range Status   08/04/2022 43 mls/min/1.73/m2 Final         Assessment:       1. Splenomegaly    2. Malignant neoplasm of ascending colon    3. Pulmonary nodules    4. Leukopenia, unspecified type    5. Thrombocytopenia    6. Gaucher splenomegaly    7. Homocystinemia        1) Stage IIIB adenocarcinoma of the ascending colon  --Patient  will benefit of adjuvant chemotherapy.   --Due to severe diabetic neuropathy, will try dose reduction of Oxaliplatin, starting 65 mg/m2.    --She is s/p 8 cycles of FOLFOX (7) + XELOX (1) that she received as adjuvant treatment over 5 months. She had some delays for cytopenias so we have been unable to complete. She was hospitalized for GI bleeding and thankfully her path from colonoscopy was negative. She was weak, debilitated and tired and has ultimately decided to stopped chemotherapy. We started surveillance at this time.  I agree that this is the right thing to do as she has neuropathy from diabetes and continuing Oxaliplatin is more harmful than helpful and risks outweigh the benefits. We discuss 5FU pump but again declined further chemotherapy.    2) Lung nodules-Stable.  Not describe most recent scan.  Will monitor.     3) iron deficiency anemia-on iron PO-resolved?  Today's lab with no anemia    4) Thrombocytopenia- persistent Will follow counts closely as she has splenomegaly and this can affects her counts mostly platelets.    5) Folate deficiency-on folic acid at this time.     6) Diabetic neuropathy-severe. She does not feel her feet.  --S/p nerve stimulator placement      7) hyperhomocystinemia  --she was taking vitamin B12, B6 and folate but have not take the vitamins in over a month.  She stopped them for her surgical procedure and have not restarted them.        Plan:       Discussed finding of cirrhosis & splenomegaly on imaging and how that affects her blood counts  Staging imaging with [facility]. Will continue surveillance for colon cancer.     Continue Eliquis  She will continue colonoscopies as per GI  Continue vitamin B12, B6 and folic acid  CT scan in 6 months. Will order next OV  RTC in 3 months with NP  MediPort flushes every 3 months  Labs prior: CBC,CMP, CEA, Homocysteine, Serum         Encouraged to call with questions or problems  The patient was given ample opportunity to ask questions and  they were all answered to satisfaction; patient demonstrated understanding of what we discussed and is agreeable to the plan.    Fatemeh Olmstead MD  Hematology/Oncology      Professional Services   I, Neela Xiao LPN, acted solely as a scribe for and in the presence of Dr. Fatemeh Olmstead, who performed these services.

## 2024-12-04 ENCOUNTER — OFFICE VISIT (OUTPATIENT)
Dept: HEMATOLOGY/ONCOLOGY | Facility: CLINIC | Age: 69
End: 2024-12-04
Payer: MEDICARE

## 2024-12-04 VITALS
HEART RATE: 69 BPM | SYSTOLIC BLOOD PRESSURE: 121 MMHG | BODY MASS INDEX: 41.62 KG/M2 | RESPIRATION RATE: 18 BRPM | HEIGHT: 63 IN | OXYGEN SATURATION: 94 % | TEMPERATURE: 98 F | DIASTOLIC BLOOD PRESSURE: 59 MMHG | WEIGHT: 234.88 LBS

## 2024-12-04 DIAGNOSIS — C18.2 MALIGNANT NEOPLASM OF ASCENDING COLON: ICD-10-CM

## 2024-12-04 DIAGNOSIS — R16.1 SPLENOMEGALY: Primary | ICD-10-CM

## 2024-12-04 DIAGNOSIS — D69.6 THROMBOCYTOPENIA: ICD-10-CM

## 2024-12-04 DIAGNOSIS — R79.83 HOMOCYSTINEMIA: ICD-10-CM

## 2024-12-04 DIAGNOSIS — D72.819 LEUKOPENIA, UNSPECIFIED TYPE: ICD-10-CM

## 2024-12-04 DIAGNOSIS — R91.8 PULMONARY NODULES: ICD-10-CM

## 2024-12-04 PROCEDURE — 3078F DIAST BP <80 MM HG: CPT | Mod: CPTII,S$GLB,, | Performed by: INTERNAL MEDICINE

## 2024-12-04 PROCEDURE — 1159F MED LIST DOCD IN RCRD: CPT | Mod: CPTII,S$GLB,, | Performed by: INTERNAL MEDICINE

## 2024-12-04 PROCEDURE — 99999 PR PBB SHADOW E&M-EST. PATIENT-LVL IV: CPT | Mod: PBBFAC,,, | Performed by: INTERNAL MEDICINE

## 2024-12-04 PROCEDURE — 3074F SYST BP LT 130 MM HG: CPT | Mod: CPTII,S$GLB,, | Performed by: INTERNAL MEDICINE

## 2024-12-04 PROCEDURE — 99214 OFFICE O/P EST MOD 30 MIN: CPT | Mod: S$GLB,,, | Performed by: INTERNAL MEDICINE

## 2024-12-04 PROCEDURE — 3044F HG A1C LEVEL LT 7.0%: CPT | Mod: CPTII,S$GLB,, | Performed by: INTERNAL MEDICINE

## 2024-12-04 PROCEDURE — 3288F FALL RISK ASSESSMENT DOCD: CPT | Mod: CPTII,S$GLB,, | Performed by: INTERNAL MEDICINE

## 2024-12-04 PROCEDURE — 1160F RVW MEDS BY RX/DR IN RCRD: CPT | Mod: CPTII,S$GLB,, | Performed by: INTERNAL MEDICINE

## 2024-12-04 PROCEDURE — 3008F BODY MASS INDEX DOCD: CPT | Mod: CPTII,S$GLB,, | Performed by: INTERNAL MEDICINE

## 2024-12-04 PROCEDURE — 1126F AMNT PAIN NOTED NONE PRSNT: CPT | Mod: CPTII,S$GLB,, | Performed by: INTERNAL MEDICINE

## 2024-12-04 PROCEDURE — 3066F NEPHROPATHY DOC TX: CPT | Mod: CPTII,S$GLB,, | Performed by: INTERNAL MEDICINE

## 2024-12-04 PROCEDURE — 1101F PT FALLS ASSESS-DOCD LE1/YR: CPT | Mod: CPTII,S$GLB,, | Performed by: INTERNAL MEDICINE

## 2024-12-12 ENCOUNTER — TELEPHONE (OUTPATIENT)
Dept: FAMILY MEDICINE | Facility: CLINIC | Age: 69
End: 2024-12-12
Payer: MEDICARE

## 2024-12-12 DIAGNOSIS — E11.42 DIABETIC POLYNEUROPATHY ASSOCIATED WITH TYPE 2 DIABETES MELLITUS: ICD-10-CM

## 2024-12-12 RX ORDER — GABAPENTIN 300 MG/1
600 CAPSULE ORAL NIGHTLY
Qty: 60 CAPSULE | Refills: 6 | Status: SHIPPED | OUTPATIENT
Start: 2024-12-12

## 2024-12-12 NOTE — TELEPHONE ENCOUNTER
----- Message from Danielle sent at 12/12/2024  9:51 AM CST -----  Who Called: Indu Azul    Refill or New Rx:Refill  RX Name and Strength:gabapentin (NEURONTIN) 300 MG capsule  How is the patient currently taking it? (ex. 1XDay):1x  Is this a 30 day or 90 day RX:  Local or Mail Order:  List of preferred pharmacies on file (remove unneeded): [unfilled]  If different Pharmacy is requested, enter Pharmacy information here including location and phone number: [facility] [address] [place] [address] [id number]       Ordering Provider:        Patient's Preferred Phone Number on File: [phone]   Best Call Back Number, if different:  Additional Information:

## 2024-12-17 DIAGNOSIS — I81 PORTAL VEIN THROMBOSIS: ICD-10-CM

## 2024-12-17 RX ORDER — APIXABAN 5 MG/1
5 TABLET, FILM COATED ORAL 2 TIMES DAILY
Qty: 180 TABLET | Refills: 0 | Status: SHIPPED | OUTPATIENT
Start: 2024-12-17

## 2025-01-08 ENCOUNTER — LAB VISIT (OUTPATIENT)
Dept: LAB | Facility: HOSPITAL | Age: 70
End: 2025-01-08
Attending: INTERNAL MEDICINE
Payer: MEDICARE

## 2025-01-08 DIAGNOSIS — N18.32 STAGE 3B CHRONIC KIDNEY DISEASE: ICD-10-CM

## 2025-01-08 LAB
ALBUMIN SERPL-MCNC: 3.5 G/DL (ref 3.4–4.8)
ALBUMIN/GLOB SERPL: 0.9 RATIO (ref 1.1–2)
ALP SERPL-CCNC: 116 UNIT/L (ref 40–150)
ALT SERPL-CCNC: 15 UNIT/L (ref 0–55)
ANION GAP SERPL CALC-SCNC: 7 MEQ/L
AST SERPL-CCNC: 17 UNIT/L (ref 5–34)
BACTERIA #/AREA URNS AUTO: ABNORMAL /HPF
BASOPHILS # BLD AUTO: 0.03 X10(3)/MCL
BASOPHILS NFR BLD AUTO: 0.8 %
BILIRUB SERPL-MCNC: 0.4 MG/DL
BILIRUB UR QL STRIP.AUTO: NEGATIVE
BUN SERPL-MCNC: 18.2 MG/DL (ref 9.8–20.1)
CALCIUM SERPL-MCNC: 9.4 MG/DL (ref 8.4–10.2)
CHLORIDE SERPL-SCNC: 105 MMOL/L (ref 98–107)
CLARITY UR: ABNORMAL
CO2 SERPL-SCNC: 26 MMOL/L (ref 23–31)
COLOR UR AUTO: ABNORMAL
CREAT SERPL-MCNC: 1.18 MG/DL (ref 0.55–1.02)
CREAT UR-MCNC: 40.4 MG/DL (ref 45–106)
CREAT/UREA NIT SERPL: 15
EOSINOPHIL # BLD AUTO: 0.2 X10(3)/MCL (ref 0–0.9)
EOSINOPHIL NFR BLD AUTO: 5.6 %
ERYTHROCYTE [DISTWIDTH] IN BLOOD BY AUTOMATED COUNT: 14.4 % (ref 11.5–17)
GFR SERPLBLD CREATININE-BSD FMLA CKD-EPI: 50 ML/MIN/1.73/M2
GLOBULIN SER-MCNC: 3.7 GM/DL (ref 2.4–3.5)
GLUCOSE SERPL-MCNC: 132 MG/DL (ref 82–115)
GLUCOSE UR QL STRIP: NEGATIVE
HCT VFR BLD AUTO: 38.4 % (ref 37–47)
HGB BLD-MCNC: 12.2 G/DL (ref 12–16)
HGB UR QL STRIP: ABNORMAL
IMM GRANULOCYTES # BLD AUTO: 0.01 X10(3)/MCL (ref 0–0.04)
IMM GRANULOCYTES NFR BLD AUTO: 0.3 %
KETONES UR QL STRIP: NEGATIVE
LEUKOCYTE ESTERASE UR QL STRIP: NEGATIVE
LYMPHOCYTES # BLD AUTO: 0.87 X10(3)/MCL (ref 0.6–4.6)
LYMPHOCYTES NFR BLD AUTO: 24.6 %
MCH RBC QN AUTO: 26.8 PG (ref 27–31)
MCHC RBC AUTO-ENTMCNC: 31.8 G/DL (ref 33–36)
MCV RBC AUTO: 84.2 FL (ref 80–94)
MONOCYTES # BLD AUTO: 0.25 X10(3)/MCL (ref 0.1–1.3)
MONOCYTES NFR BLD AUTO: 7.1 %
NEUTROPHILS # BLD AUTO: 2.18 X10(3)/MCL (ref 2.1–9.2)
NEUTROPHILS NFR BLD AUTO: 61.6 %
NITRITE UR QL STRIP: POSITIVE
NRBC BLD AUTO-RTO: 0 %
PH UR STRIP: 5.5 [PH]
PHOSPHATE SERPL-MCNC: 3.8 MG/DL (ref 2.3–4.7)
PLATELET # BLD AUTO: 70 X10(3)/MCL (ref 130–400)
PLATELETS.RETICULATED NFR BLD AUTO: 8.2 % (ref 0.9–11.2)
PMV BLD AUTO: 11.7 FL (ref 7.4–10.4)
POTASSIUM SERPL-SCNC: 5.2 MMOL/L (ref 3.5–5.1)
PROT SERPL-MCNC: 7.2 GM/DL (ref 5.8–7.6)
PROT UR QL STRIP: NEGATIVE
PROT UR STRIP-MCNC: 10 MG/DL
PTH-INTACT SERPL-MCNC: 70.5 PG/ML (ref 8.7–77)
RBC # BLD AUTO: 4.56 X10(6)/MCL (ref 4.2–5.4)
RBC #/AREA URNS AUTO: ABNORMAL /HPF
SODIUM SERPL-SCNC: 138 MMOL/L (ref 136–145)
SP GR UR STRIP.AUTO: 1.01 (ref 1–1.03)
SQUAMOUS #/AREA URNS AUTO: ABNORMAL /HPF
URINE PROTEIN/CREATININE RATIO (OLG): 0.2
UROBILINOGEN UR STRIP-ACNC: 1
WBC # BLD AUTO: 3.54 X10(3)/MCL (ref 4.5–11.5)
WBC #/AREA URNS AUTO: ABNORMAL /HPF

## 2025-01-08 PROCEDURE — 36415 COLL VENOUS BLD VENIPUNCTURE: CPT

## 2025-01-08 PROCEDURE — 80053 COMPREHEN METABOLIC PANEL: CPT

## 2025-01-08 PROCEDURE — 84156 ASSAY OF PROTEIN URINE: CPT

## 2025-01-08 PROCEDURE — 85025 COMPLETE CBC W/AUTO DIFF WBC: CPT

## 2025-01-08 PROCEDURE — 83970 ASSAY OF PARATHORMONE: CPT

## 2025-01-08 PROCEDURE — 81003 URINALYSIS AUTO W/O SCOPE: CPT

## 2025-01-08 PROCEDURE — 84100 ASSAY OF PHOSPHORUS: CPT

## 2025-01-10 ENCOUNTER — OFFICE VISIT (OUTPATIENT)
Dept: NEPHROLOGY | Facility: CLINIC | Age: 70
End: 2025-01-10
Payer: MEDICARE

## 2025-01-10 VITALS
SYSTOLIC BLOOD PRESSURE: 136 MMHG | DIASTOLIC BLOOD PRESSURE: 74 MMHG | TEMPERATURE: 98 F | OXYGEN SATURATION: 96 % | HEIGHT: 62 IN | BODY MASS INDEX: 44.01 KG/M2 | RESPIRATION RATE: 20 BRPM | HEART RATE: 71 BPM | WEIGHT: 239.19 LBS

## 2025-01-10 DIAGNOSIS — N18.32 STAGE 3B CHRONIC KIDNEY DISEASE: Primary | ICD-10-CM

## 2025-01-10 DIAGNOSIS — C18.2 MALIGNANT NEOPLASM OF ASCENDING COLON: ICD-10-CM

## 2025-01-10 PROCEDURE — 99999 PR PBB SHADOW E&M-EST. PATIENT-LVL IV: CPT | Mod: PBBFAC,,, | Performed by: NURSE PRACTITIONER

## 2025-01-10 RX ORDER — SOLIFENACIN SUCCINATE 10 MG/1
10 TABLET, FILM COATED ORAL DAILY
COMMUNITY
Start: 2024-12-08

## 2025-01-10 NOTE — PROGRESS NOTES
[facility] Progress Note      HPI  Indu Azul, 69 y.o. female, presents to office for a follow up visit for CKD 3B related to nephrosclerosis and diabetes.  Patient is status post partial colectomy with lymph node dissection after being diagnosed with colon cancer.  She completed a chemotherapy treatments.  She tells me she was recently hospitalized for lower GI bleed and ultimately ended up having a colonoscopy which did not find a bleed or any evidence of malignancy.  She plans on having a follow up with Dr. Olmstead in the coming week to decide if she even needs to continue chemotherapy.  She has not had any chemo for the last 4 weeks due to low platelet count and hospitalization.  Since hospitalization she states that her blood pressure has been low and she has not feeling very well.  She denies any current overt blood loss.  She is still maintained on Eliquis.  She states she has been taking Lasix daily as well as 50 mg of spironolactone for lower extremity edema.    Medical Diagnoses:   Past Medical History:   Diagnosis Date    Anesthesia complication     trouble awakening    Charcot's joint of foot, left     Chronic kidney disease, unspecified     Cirrhosis of liver without ascites     Depression     Diabetes mellitus, type II, insulin dependent     Diabetic neuropathy     GERD (gastroesophageal reflux disease)     H/O: hysterectomy     Hepatic steatosis     Malignant neoplasm of ascending colon     Obesity, unspecified     Overactive bladder     Thrombocytopenia, unspecified     Vitamin D deficiency      Patient Active Problem List   Diagnosis    Thrombocytopenia    Iron deficiency    Folate deficiency    Type 2 diabetes mellitus with hyperglycemia, with long-term current use of insulin    Long-term insulin use    Hyperlipidemia associated with type 2 diabetes mellitus    CKD stage 3 due to type 2 diabetes mellitus    Recurrent major depressive disorder, in full remission    Vitamin D  deficiency    Hepatic steatosis    Other cirrhosis of liver    Diabetic polyneuropathy associated with type 2 diabetes mellitus    Gammopathy    Splenomegaly    Microcytic anemia    Malignant neoplasm of ascending colon    Pulmonary nodules    Elevated CEA    Immunodeficiency due to chemotherapy    Immunosuppression due to drug therapy    Iron deficiency anemia due to chronic blood loss    Pre-op exam    Leukopenia    Homocystinemia       Surgical History:   Past Surgical History:   Procedure Laterality Date    APPENDECTOMY      BREAST BIOPSY  2016     SECTION      x3    charcot-leyden crystals identification      CHOLECYSTECTOMY      COLONOSCOPY N/A 2023    Procedure: COLON;  Surgeon: Luis Amador MD;  Location: [facility] ENDOSCOPY;  Service: Gastroenterology;  Laterality: N/A;    COLONOSCOPY N/A 2024    Procedure: COLON;  Surgeon: Mor Porter MD;  Location: [facility];  Service: Gastroenterology;  Laterality: N/A;    COLONOSCOPY, WITH 1 OR MORE BIOPSIES  2023    Procedure: COLONOSCOPY, WITH 1 OR MORE BIOPSIES;  Surgeon: Luis Amador MD;  Location: [facility] ENDOSCOPY;  Service: Gastroenterology;;    COLONOSCOPY, WITH 1 OR MORE BIOPSIES N/A 2024    Procedure: COLONOSCOPY, WITH 1 OR MORE BIOPSIES;  Surgeon: Mor Porter MD;  Location: [facility];  Service: Gastroenterology;  Laterality: N/A;    COLONOSCOPY, WITH DIRECTED SUBMUCOSAL INJECTION  2023    Procedure: COLONOSCOPY, WITH DIRECTED SUBMUCOSAL INJECTION;  Surgeon: Luis Amador MD;  Location: [facility] ENDOSCOPY;  Service: Gastroenterology;;  8cc Colon Tattoo    COLONOSCOPY, WITH POLYPECTOMY USING SNARE  2023    Procedure: COLONOSCOPY, WITH POLYPECTOMY USING SNARE;  Surgeon: Luis Amador MD;  Location: [facility] ENDOSCOPY;  Service: Gastroenterology;;    ESOPHAGOGASTRODUODENOSCOPY N/A 2023    Procedure: DOUBLE;  Surgeon: Luis Amador MD;  Location: [facility]  ENDOSCOPY;  Service: Gastroenterology;  Laterality: N/A;    ESOPHAGOGASTRODUODENOSCOPY N/A 4/19/2024    Procedure: EGD;  Surgeon: Nolan Massey MD;  Location: [facility] ENDOSCOPY;  Service: Gastroenterology;  Laterality: N/A;    HEMORRHOID SURGERY      HYSTERECTOMY  1990    total    INSERTION OF SUBCUTANEOUS PORT Right     INSERTION OF TUNNELED CENTRAL VENOUS CATHETER (CVC) WITH SUBCUTANEOUS PORT Right 11/20/2023    Procedure: [id number];  Surgeon: Timothy Russ MD;  Location: [facility] OR;  Service: General;  Laterality: Right;    POLYPECTOMY      right foot surgery Right 02/01/2022    XI ROBOTIC COLECTOMY, RIGHT N/A 10/19/2023    Procedure: XI ROBOTIC COLECTOMY, RIGHT;  Surgeon: Timothy Russ MD;  Location: [facility];  Service: General;  Laterality: N/A;       Family History:   Family History   Problem Relation Name Age of Onset    Diabetes Mother      Alzheimer's disease Mother      Diabetes Father      Leukemia Father      Diabetes Sister      Liver cancer Sister      Diabetes Brother         Social History:   Social History     Tobacco Use    Smoking status: Every Day     Current packs/day: 0.50     Average packs/day: 0.5 packs/day for 62.0 years (31.0 ttl pk-yrs)     Types: Cigarettes     Start date: 2003     Passive exposure: Current    Smokeless tobacco: Never   Substance Use Topics    Alcohol use: Not Currently     Comment: occassionally       Allergies:  Review of patient's allergies indicates:  No Known Allergies    Medications:    Current Outpatient Medications:     CONSTULOSE 10 gram/15 mL solution, Take 30 mLs by mouth 3 (three) times daily., Disp: , Rfl:     ELIQUIS 5 mg Tab, Take 1 tablet by mouth twice daily, Disp: 180 tablet, Rfl: 0    folic acid-vit B6-vit B12 0.8- mg-mg-mcg Tab, Take 1 tablet by mouth once daily., Disp: 90 each, Rfl: 2    furosemide (LASIX) 40 MG tablet, Take 40 mg by mouth once daily., Disp: , Rfl:     gabapentin (NEURONTIN) 300 MG capsule, Take 2 capsules (600  "mg total) by mouth every evening., Disp: 60 capsule, Rfl: 6    insulin glargine, TOUJEO, (TOUJEO SOLOSTAR U-300 INSULIN) 300 unit/mL (1.5 mL) InPn pen, INJECT 60 UNITS SUBCUTANEOUSLY ONCE DAILY, Disp: , Rfl:     sertraline (ZOLOFT) 50 MG tablet, Take 1 tablet by mouth once daily, Disp: 90 tablet, Rfl: 3    solifenacin (VESICARE) 10 MG tablet, Take 10 mg by mouth once daily., Disp: , Rfl:     dulaglutide (TRULICITY) 3 mg/0.5 mL pen injector, INJECT 1 SYRINGE SUBCUTANEOUSLY ONCE A WEEK (Patient not taking: Reported on 1/10/2025), Disp: 4 Pen, Rfl: 11    Current Facility-Administered Medications:     0.9%  NaCl infusion (for blood administration), , Intravenous, Once, Gayatri Jaffe, HOLLYP    promethazine (PHENERGAN) 12.5 mg in dextrose 5 % (D5W) 50 mL IVPB, 12.5 mg, Intravenous, Once, Gayatri Jaffe, HOLLYP       Review of Systems:    Constitutional: Denies fever, generalized weakness; + fatigue  Skin: Denies wounds, no rashes, no itching, no new skin lesions  Respiratory:  Denies cough, shortness of breath, or wheezing  Cardiovascular: Denies chest pain, palpitations,; + chronic lower extremity swelling  Gastrointestional: Denies abdominal pain, nausea, vomiting, diarrhea, or constipation  Genitourinary: Denies dysuria, hematuria, foamy urine, or incontinence; reports able to empty bladder  Musculoskeletal: Denies back or flank pain  Neurological: Denies headaches, dizziness, paresthesias, tremors or focal weakness      Vital Signs:  /74 (BP Location: Left arm, Patient Position: Sitting)   Pulse 71   Temp 97.8 °F (36.6 °C) (Temporal)   Resp 20   Ht 5' 2" (1.575 m)   Wt 108.5 kg (239 lb 3.2 oz)   SpO2 96%   BMI 43.75 kg/m²   Body mass index is 43.75 kg/m².      Physical Exam:    General: no acute distress, awake, alert  Eyes: conjunctiva clear, eyelids without swelling  HENT: atraumatic, oropharynx and nasal mucosa patent  Neck: supple, trache midline, no JVD  Respiratory: equal, unlabored, clear to " auscultation A/P  Cardiovascular: RRR without rub  Edema:  Trace bilateral lower extremity edema  Gastrointestinal: soft, non-tender, non-distended  Musculoskeletal: ROM without new limitation or discomfort; + Rollator for ambulation assistance  Integumentary: warm, dry; no rashes, wounds, or skin lesions  Neurological: oriented x4, appropriate, no acute deficits      Labs:        Component Value Date/Time     01/08/2025 1109     11/26/2024 0951     06/01/2022 1340    K 5.2 (H) 01/08/2025 1109    K 4.3 11/26/2024 0951    K 4.9 06/01/2022 1340     01/08/2025 1109     11/26/2024 0951     06/01/2022 1340    CO2 26 01/08/2025 1109    CO2 24 11/26/2024 0951    CO2 29 06/01/2022 1340    BUN 18.2 01/08/2025 1109    BUN 24.5 (H) 11/26/2024 0951    BUN 22 06/01/2022 1340    CREATININE 1.18 (H) 01/08/2025 1109    CREATININE 1.37 (H) 11/26/2024 0951    CREATININE 1.23 (H) 10/24/2024 1415    CREATININE 1.22 (H) 09/05/2024 1340    CREATININE 1.32 (H) 06/01/2022 1340    CALCIUM 9.4 01/08/2025 1109    CALCIUM 9.6 11/26/2024 0951    CALCIUM 8.7 (L) 06/01/2022 1340    PHOS 3.8 01/08/2025 1109    PHOS 3.1 09/05/2024 1340    PTH 70.5 01/08/2025 1109    PTH 46.5 09/05/2024 1340    PTH 91.5 (H) 05/03/2024 1027           Component Value Date/Time    WBC 3.54 (L) 01/08/2025 1109    WBC 4.40 (L) 11/26/2024 0951    HGB 12.2 01/08/2025 1109    HGB 12.5 11/26/2024 0951    HCT 38.4 01/08/2025 1109    HCT 38.5 11/26/2024 0951    PLT 70 (L) 01/08/2025 1109    PLT 83 (L) 11/26/2024 0951       Urine Creatinine   Date Value Ref Range Status   01/08/2025 40.4 (L) 45.0 - 106.0 mg/dL Final   09/05/2024 23.7 (L) 45.0 - 106.0 mg/dL Final       Urine Protein Level   Date Value Ref Range Status   01/08/2025 10.0 mg/dL Final   09/05/2024 <6.8 mg/dL Final         Impression:  1. Stage 3b chronic kidney disease    2. Malignant neoplasm of ascending colon        CKD 3 related to nephrosclerosis; renal function stable  No  significant proteinuria  Colon cancer status post resection; completed a treatments of chemotherapy regimen; recent colonoscopy in the hospital negative for malignancy so at this time chemotherapy is on hold she will be following up with Dr. Olmstead in the next week or so to determine if she needs to complete any further treatments or if she is in remission.  Phosphorus had trended back up to normal    Blood pressure borderline hypotension.  Patient states it has been this way and even lower since she was discharged from hospital    Plan:    Decrease spironolactone to 25 mg due to hypotension  Take Lasix PRN for gaining greater than 2 lb overnight (currently only trace edema to lower extremities)    Avoid NSAIDs    Follow up in 3 months    Asked pt to have Dr. Olmstead forward her note to us to follow with plan of care    Bri BARRIOS        This note was created with the assistance of [employer] voice recognition software or phone dictation. There may be transcription errors as a result of using this technology however minimal. Effort has been made to assure accuracy of transcription but any obvious errors or omissions should be clarified with the author of the document.

## 2025-01-10 NOTE — PROGRESS NOTES
[facility] Progress Note      HPI  Indu Azul, 69 y.o. female, presents to office for a follow up visit for CKD 3, colon cancer cirrhosis  Patient is followed up by the oncology  She is also on Lasix 3 times a week and Aldactone 25 mg 3 times a week  Feels fine except for occasional lower extremity edema but denies any major complaints    Patient denies taking NSAIDs or new antibiotics. Also denies recent episode of dehydration, diarrhea, vomiting, acute illness, hospitalization, recent angiograms or exposure to IV radiocontrast.        Medical Diagnoses:   Past Medical History:   Diagnosis Date    Anesthesia complication     trouble awakening    Charcot's joint of foot, left     Chronic kidney disease, unspecified     Cirrhosis of liver without ascites     Depression     Diabetes mellitus, type II, insulin dependent     Diabetic neuropathy     GERD (gastroesophageal reflux disease)     H/O: hysterectomy     Hepatic steatosis     Malignant neoplasm of ascending colon     Obesity, unspecified     Overactive bladder     Thrombocytopenia, unspecified     Vitamin D deficiency      Patient Active Problem List   Diagnosis    Thrombocytopenia    Iron deficiency    Folate deficiency    Type 2 diabetes mellitus with hyperglycemia, with long-term current use of insulin    Long-term insulin use    Hyperlipidemia associated with type 2 diabetes mellitus    CKD stage 3 due to type 2 diabetes mellitus    Recurrent major depressive disorder, in full remission    Vitamin D deficiency    Hepatic steatosis    Other cirrhosis of liver    Diabetic polyneuropathy associated with type 2 diabetes mellitus    Gammopathy    Splenomegaly    Microcytic anemia    Malignant neoplasm of ascending colon    Pulmonary nodules    Elevated CEA    Immunodeficiency due to chemotherapy    Immunosuppression due to drug therapy    Iron deficiency anemia due to chronic blood loss    Pre-op exam    Leukopenia    Homocystinemia        Surgical History:   Past Surgical History:   Procedure Laterality Date    APPENDECTOMY  1978    BREAST BIOPSY  2016     SECTION      x3    charcot-leyden crystals identification      CHOLECYSTECTOMY      COLONOSCOPY N/A 2023    Procedure: COLON;  Surgeon: Luis Amador MD;  Location: [facility] ENDOSCOPY;  Service: Gastroenterology;  Laterality: N/A;    COLONOSCOPY N/A 2024    Procedure: COLON;  Surgeon: Mor Porter MD;  Location: [facility] OR;  Service: Gastroenterology;  Laterality: N/A;    COLONOSCOPY, WITH 1 OR MORE BIOPSIES  2023    Procedure: COLONOSCOPY, WITH 1 OR MORE BIOPSIES;  Surgeon: Luis Amador MD;  Location: [facility] ENDOSCOPY;  Service: Gastroenterology;;    COLONOSCOPY, WITH 1 OR MORE BIOPSIES N/A 2024    Procedure: COLONOSCOPY, WITH 1 OR MORE BIOPSIES;  Surgeon: Mor Porter MD;  Location: [facility] OR;  Service: Gastroenterology;  Laterality: N/A;    COLONOSCOPY, WITH DIRECTED SUBMUCOSAL INJECTION  2023    Procedure: COLONOSCOPY, WITH DIRECTED SUBMUCOSAL INJECTION;  Surgeon: Luis Amador MD;  Location: [facility] ENDOSCOPY;  Service: Gastroenterology;;  8cc Colon Tattoo    COLONOSCOPY, WITH POLYPECTOMY USING SNARE  2023    Procedure: COLONOSCOPY, WITH POLYPECTOMY USING SNARE;  Surgeon: Luis Amador MD;  Location: [facility] ENDOSCOPY;  Service: Gastroenterology;;    ESOPHAGOGASTRODUODENOSCOPY N/A 2023    Procedure: DOUBLE;  Surgeon: Luis Amaodr MD;  Location: [facility] ENDOSCOPY;  Service: Gastroenterology;  Laterality: N/A;    ESOPHAGOGASTRODUODENOSCOPY N/A 2024    Procedure: EGD;  Surgeon: Nolan Massey MD;  Location: [facility] ENDOSCOPY;  Service: Gastroenterology;  Laterality: N/A;    HEMORRHOID SURGERY      HYSTERECTOMY      total    INSERTION OF SUBCUTANEOUS PORT Right     INSERTION OF TUNNELED CENTRAL VENOUS CATHETER (CVC) WITH SUBCUTANEOUS PORT Right 2023    Procedure:  [id number];  Surgeon: Timothy Russ MD;  Location: [facility] OR;  Service: General;  Laterality: Right;    POLYPECTOMY      right foot surgery Right 02/01/2022    XI ROBOTIC COLECTOMY, RIGHT N/A 10/19/2023    Procedure: XI ROBOTIC COLECTOMY, RIGHT;  Surgeon: Timothy Russ MD;  Location: [facility] OR;  Service: General;  Laterality: N/A;       Family History:   Family History   Problem Relation Name Age of Onset    Diabetes Mother      Alzheimer's disease Mother      Diabetes Father      Leukemia Father      Diabetes Sister      Liver cancer Sister      Diabetes Brother         Social History:   Social History     Tobacco Use    Smoking status: Every Day     Current packs/day: 0.50     Average packs/day: 0.5 packs/day for 62.0 years (31.0 ttl pk-yrs)     Types: Cigarettes     Start date: 2003     Passive exposure: Current    Smokeless tobacco: Never   Substance Use Topics    Alcohol use: Not Currently     Comment: occassionally       Allergies:  Review of patient's allergies indicates:  No Known Allergies    Medications:  Outpatient Medications Marked as Taking for the 1/10/25 encounter (Office Visit) with Bri Fernandes [facility]   Medication Sig Dispense Refill    CONSTULOSE 10 gram/15 mL solution Take 30 mLs by mouth 3 (three) times daily.      ELIQUIS 5 mg Tab Take 1 tablet by mouth twice daily 180 tablet 0    folic acid-vit B6-vit B12 0.8- mg-mg-mcg Tab Take 1 tablet by mouth once daily. 90 each 2    furosemide (LASIX) 40 MG tablet Take 40 mg by mouth once daily.      gabapentin (NEURONTIN) 300 MG capsule Take 2 capsules (600 mg total) by mouth every evening. 60 capsule 6    insulin glargine, TOUJEO, (TOUJEO SOLOSTAR U-300 INSULIN) 300 unit/mL (1.5 mL) InPn pen INJECT 60 UNITS SUBCUTANEOUSLY ONCE DAILY      sertraline (ZOLOFT) 50 MG tablet Take 1 tablet by mouth once daily 90 tablet 3    solifenacin (VESICARE) 10 MG tablet Take 10 mg by mouth once daily.       Current Facility-Administered  "Medications for the 1/10/25 encounter (Office Visit) with Bri Fernandes ACNP   Medication Dose Route Frequency Provider Last Rate Last Admin    0.9%  NaCl infusion (for blood administration)   Intravenous Once Gayatri Jaffe FNP        promethazine (PHENERGAN) 12.5 mg in dextrose 5 % (D5W) 50 mL IVPB  12.5 mg Intravenous Once Gayatri Jaffe FNP              Review of Systems:    Constitutional: Denies fever, occasional fatigue  Skin: Denies wounds, no rashes, no itching, no new skin lesions  Respiratory:  Denies cough, shortness of breath, or wheezing  Cardiovascular: Denies chest pain, palpitations, or worsening swelling  Gastrointestional: Denies abdominal pain, nausea, vomiting, diarrhea, or constipation  Genitourinary: Denies dysuria, hematuria, foamy urine, or incontinence; reports able to empty bladder  Musculoskeletal: Denies back or flank pain  Neurological: Denies headaches, dizziness, paresthesias, tremors or focal weakness      Vital Signs:  /74 (BP Location: Left arm, Patient Position: Sitting)   Pulse 71   Temp 97.8 °F (36.6 °C) (Temporal)   Resp 20   Ht 5' 2" (1.575 m)   Wt 108.5 kg (239 lb 3.2 oz)   SpO2 96%   BMI 43.75 kg/m²   Body mass index is 43.75 kg/m².      Physical Exam:    General: no acute distress, awake, alert  Eyes: conjunctiva clear, eyelids without swelling  HENT: atraumatic, oropharynx and nasal mucosa patent  Neck: supple, trache midline, no JVD  Respiratory: equal, unlabored, clear to auscultation A/P  Cardiovascular: RRR without r rub  Edema:  +1 lower extremity  Gastrointestinal: soft, non-tender, non-distended; positive bowel sounds; no masses to palpation; no ascites    Musculoskeletal: ROM without new limitation or discomfort  Integumentary: warm, dry; no rashes, wounds, or skin lesions  Neurological: oriented x4, appropriate, no acute deficits; no asterixis      Labs:        Component Value Date/Time     01/08/2025 1109     11/26/2024 0951    "  06/01/2022 1340    K 5.2 (H) 01/08/2025 1109    K 4.3 11/26/2024 0951    K 4.9 06/01/2022 1340     01/08/2025 1109     11/26/2024 0951     06/01/2022 1340    CO2 26 01/08/2025 1109    CO2 24 11/26/2024 0951    CO2 29 06/01/2022 1340    BUN 18.2 01/08/2025 1109    BUN 24.5 (H) 11/26/2024 0951    BUN 22 06/01/2022 1340    CREATININE 1.18 (H) 01/08/2025 1109    CREATININE 1.37 (H) 11/26/2024 0951    CREATININE 1.23 (H) 10/24/2024 1415    CREATININE 1.22 (H) 09/05/2024 1340    CREATININE 1.32 (H) 06/01/2022 1340    CALCIUM 9.4 01/08/2025 1109    CALCIUM 9.6 11/26/2024 0951    CALCIUM 8.7 (L) 06/01/2022 1340    PHOS 3.8 01/08/2025 1109    PHOS 3.1 09/05/2024 1340    PTH 70.5 01/08/2025 1109    PTH 46.5 09/05/2024 1340    PTH 91.5 (H) 05/03/2024 1027           Component Value Date/Time    WBC 3.54 (L) 01/08/2025 1109    WBC 4.40 (L) 11/26/2024 0951    HGB 12.2 01/08/2025 1109    HGB 12.5 11/26/2024 0951    HCT 38.4 01/08/2025 1109    HCT 38.5 11/26/2024 0951    PLT 70 (L) 01/08/2025 1109    PLT 83 (L) 11/26/2024 0951       Urine Creatinine   Date Value Ref Range Status   01/08/2025 40.4 (L) 45.0 - 106.0 mg/dL Final   09/05/2024 23.7 (L) 45.0 - 106.0 mg/dL Final       Urine Protein Level   Date Value Ref Range Status   01/08/2025 10.0 mg/dL Final   09/05/2024 <6.8 mg/dL Final           Imaging:  Retroperitoneal Ultrasound:      Impression:    CKD 3b related to nephrosclerosis  Cirrhosis  Colon cancer    Plan:    Renal wise stable  Labs in 2 weeks to monitor potassium level.   Low K diet.   No change in plans Labs and follow-up visit in 4 months          Bri Fernandes ACNP-BC        This note was created with the assistance of [employer] voice recognition software or phone dictation. There may be transcription errors as a result of using this technology however minimal. Effort has been made to assure accuracy of transcription but any obvious errors or omissions should be clarified with the author  of the document.

## 2025-01-17 DIAGNOSIS — F33.42 RECURRENT MAJOR DEPRESSIVE DISORDER, IN FULL REMISSION: ICD-10-CM

## 2025-01-17 RX ORDER — SERTRALINE HYDROCHLORIDE 50 MG/1
TABLET, FILM COATED ORAL
Qty: 90 TABLET | Refills: 3 | Status: SHIPPED | OUTPATIENT
Start: 2025-01-17

## 2025-03-06 ENCOUNTER — LAB VISIT (OUTPATIENT)
Dept: LAB | Facility: HOSPITAL | Age: 70
End: 2025-03-06
Attending: INTERNAL MEDICINE
Payer: MEDICARE

## 2025-03-06 ENCOUNTER — INFUSION (OUTPATIENT)
Dept: INFUSION THERAPY | Facility: HOSPITAL | Age: 70
End: 2025-03-06
Attending: INTERNAL MEDICINE
Payer: MEDICARE

## 2025-03-06 VITALS
OXYGEN SATURATION: 97 % | RESPIRATION RATE: 18 BRPM | HEART RATE: 75 BPM | DIASTOLIC BLOOD PRESSURE: 53 MMHG | TEMPERATURE: 98 F | SYSTOLIC BLOOD PRESSURE: 137 MMHG

## 2025-03-06 DIAGNOSIS — C18.2 MALIGNANT NEOPLASM OF ASCENDING COLON: Primary | ICD-10-CM

## 2025-03-06 DIAGNOSIS — D69.6 THROMBOCYTOPENIA: ICD-10-CM

## 2025-03-06 DIAGNOSIS — C18.2 MALIGNANT NEOPLASM OF ASCENDING COLON: ICD-10-CM

## 2025-03-06 DIAGNOSIS — R16.1 SPLENOMEGALY: ICD-10-CM

## 2025-03-06 LAB
(HCYS)2 SERPL-MCNC: 18.1 UMOL/L (ref 5.1–15.4)
ALBUMIN SERPL-MCNC: 3.5 G/DL (ref 3.4–4.8)
ALBUMIN/GLOB SERPL: 0.9 RATIO (ref 1.1–2)
ALP SERPL-CCNC: 95 UNIT/L (ref 40–150)
ALT SERPL-CCNC: 14 UNIT/L (ref 0–55)
ANION GAP SERPL CALC-SCNC: 4 MEQ/L
AST SERPL-CCNC: 18 UNIT/L (ref 5–34)
BASOPHILS # BLD AUTO: 0.04 X10(3)/MCL
BASOPHILS NFR BLD AUTO: 1.1 %
BILIRUB SERPL-MCNC: 0.5 MG/DL
BUN SERPL-MCNC: 21.3 MG/DL (ref 9.8–20.1)
CALCIUM SERPL-MCNC: 9.3 MG/DL (ref 8.4–10.2)
CEA SERPL-MCNC: 3.94 NG/ML (ref 0–3)
CHLORIDE SERPL-SCNC: 107 MMOL/L (ref 98–107)
CO2 SERPL-SCNC: 26 MMOL/L (ref 23–31)
CREAT SERPL-MCNC: 1.21 MG/DL (ref 0.55–1.02)
CREAT/UREA NIT SERPL: 18
EOSINOPHIL # BLD AUTO: 0.19 X10(3)/MCL (ref 0–0.9)
EOSINOPHIL NFR BLD AUTO: 5.3 %
ERYTHROCYTE [DISTWIDTH] IN BLOOD BY AUTOMATED COUNT: 14.6 % (ref 11.5–17)
GFR SERPLBLD CREATININE-BSD FMLA CKD-EPI: 49 ML/MIN/1.73/M2
GLOBULIN SER-MCNC: 3.7 GM/DL (ref 2.4–3.5)
GLUCOSE SERPL-MCNC: 126 MG/DL (ref 82–115)
HCT VFR BLD AUTO: 37.5 % (ref 37–47)
HGB BLD-MCNC: 12 G/DL (ref 12–16)
IMM GRANULOCYTES # BLD AUTO: 0.01 X10(3)/MCL (ref 0–0.04)
IMM GRANULOCYTES NFR BLD AUTO: 0.3 %
LYMPHOCYTES # BLD AUTO: 0.85 X10(3)/MCL (ref 0.6–4.6)
LYMPHOCYTES NFR BLD AUTO: 23.6 %
MCH RBC QN AUTO: 26.8 PG (ref 27–31)
MCHC RBC AUTO-ENTMCNC: 32 G/DL (ref 33–36)
MCV RBC AUTO: 83.7 FL (ref 80–94)
MONOCYTES # BLD AUTO: 0.25 X10(3)/MCL (ref 0.1–1.3)
MONOCYTES NFR BLD AUTO: 6.9 %
NEUTROPHILS # BLD AUTO: 2.26 X10(3)/MCL (ref 2.1–9.2)
NEUTROPHILS NFR BLD AUTO: 62.8 %
PLATELET # BLD AUTO: 79 X10(3)/MCL (ref 130–400)
PMV BLD AUTO: 11.3 FL (ref 7.4–10.4)
POTASSIUM SERPL-SCNC: 4.6 MMOL/L (ref 3.5–5.1)
PROT SERPL-MCNC: 7.2 GM/DL (ref 5.8–7.6)
RBC # BLD AUTO: 4.48 X10(6)/MCL (ref 4.2–5.4)
SODIUM SERPL-SCNC: 137 MMOL/L (ref 136–145)
WBC # BLD AUTO: 3.6 X10(3)/MCL (ref 4.5–11.5)

## 2025-03-06 PROCEDURE — 63600175 PHARM REV CODE 636 W HCPCS: Performed by: INTERNAL MEDICINE

## 2025-03-06 PROCEDURE — 80053 COMPREHEN METABOLIC PANEL: CPT

## 2025-03-06 PROCEDURE — 82378 CARCINOEMBRYONIC ANTIGEN: CPT

## 2025-03-06 PROCEDURE — 36415 COLL VENOUS BLD VENIPUNCTURE: CPT

## 2025-03-06 PROCEDURE — 96523 IRRIG DRUG DELIVERY DEVICE: CPT

## 2025-03-06 PROCEDURE — 83090 ASSAY OF HOMOCYSTEINE: CPT

## 2025-03-06 PROCEDURE — 25000003 PHARM REV CODE 250: Performed by: INTERNAL MEDICINE

## 2025-03-06 PROCEDURE — A4216 STERILE WATER/SALINE, 10 ML: HCPCS | Performed by: INTERNAL MEDICINE

## 2025-03-06 PROCEDURE — 85025 COMPLETE CBC W/AUTO DIFF WBC: CPT

## 2025-03-06 RX ORDER — SODIUM CHLORIDE 0.9 % (FLUSH) 0.9 %
10 SYRINGE (ML) INJECTION
Status: DISCONTINUED | OUTPATIENT
Start: 2025-03-06 | End: 2025-03-06 | Stop reason: HOSPADM

## 2025-03-06 RX ORDER — SODIUM CHLORIDE 0.9 % (FLUSH) 0.9 %
10 SYRINGE (ML) INJECTION
OUTPATIENT
Start: 2025-03-06

## 2025-03-06 RX ORDER — HEPARIN 100 UNIT/ML
500 SYRINGE INTRAVENOUS
OUTPATIENT
Start: 2025-03-06

## 2025-03-06 RX ORDER — HEPARIN 100 UNIT/ML
500 SYRINGE INTRAVENOUS
Status: DISCONTINUED | OUTPATIENT
Start: 2025-03-06 | End: 2025-03-06 | Stop reason: HOSPADM

## 2025-03-06 RX ADMIN — HEPARIN 500 UNITS: 100 SYRINGE at 01:03

## 2025-03-06 RX ADMIN — Medication 10 ML: at 01:03

## 2025-03-06 NOTE — PLAN OF CARE
Mediport flushed without difficulty. Tolerated well. Next appts confirmed with pt. Dc'd home in stable condition.

## 2025-03-10 ENCOUNTER — TELEPHONE (OUTPATIENT)
Dept: FAMILY MEDICINE | Facility: CLINIC | Age: 70
End: 2025-03-10
Payer: MEDICARE

## 2025-03-10 NOTE — TELEPHONE ENCOUNTER
Please call patient to find out what her current dose of Toujeo AKA insulin glargine is sowe can sent in appropriate refills  thanks

## 2025-03-11 ENCOUNTER — OFFICE VISIT (OUTPATIENT)
Dept: HEMATOLOGY/ONCOLOGY | Facility: CLINIC | Age: 70
End: 2025-03-11
Payer: MEDICARE

## 2025-03-11 VITALS
HEIGHT: 62 IN | DIASTOLIC BLOOD PRESSURE: 69 MMHG | WEIGHT: 246.69 LBS | RESPIRATION RATE: 18 BRPM | SYSTOLIC BLOOD PRESSURE: 136 MMHG | BODY MASS INDEX: 45.4 KG/M2 | TEMPERATURE: 98 F | OXYGEN SATURATION: 95 % | HEART RATE: 71 BPM

## 2025-03-11 DIAGNOSIS — K76.0 HEPATIC STEATOSIS: ICD-10-CM

## 2025-03-11 DIAGNOSIS — N18.30 CKD STAGE 3 DUE TO TYPE 2 DIABETES MELLITUS: ICD-10-CM

## 2025-03-11 DIAGNOSIS — R97.0 ELEVATED CEA: ICD-10-CM

## 2025-03-11 DIAGNOSIS — E11.22 CKD STAGE 3 DUE TO TYPE 2 DIABETES MELLITUS: ICD-10-CM

## 2025-03-11 DIAGNOSIS — R91.8 PULMONARY NODULES: ICD-10-CM

## 2025-03-11 DIAGNOSIS — E61.1 IRON DEFICIENCY: ICD-10-CM

## 2025-03-11 DIAGNOSIS — D50.0 IRON DEFICIENCY ANEMIA DUE TO CHRONIC BLOOD LOSS: ICD-10-CM

## 2025-03-11 DIAGNOSIS — E11.42 DIABETIC POLYNEUROPATHY ASSOCIATED WITH TYPE 2 DIABETES MELLITUS: ICD-10-CM

## 2025-03-11 DIAGNOSIS — E11.65 TYPE 2 DIABETES MELLITUS WITH HYPERGLYCEMIA, WITH LONG-TERM CURRENT USE OF INSULIN: ICD-10-CM

## 2025-03-11 DIAGNOSIS — Z79.4 TYPE 2 DIABETES MELLITUS WITH HYPERGLYCEMIA, WITH LONG-TERM CURRENT USE OF INSULIN: ICD-10-CM

## 2025-03-11 DIAGNOSIS — D69.6 THROMBOCYTOPENIA: Primary | ICD-10-CM

## 2025-03-11 DIAGNOSIS — E53.8 FOLATE DEFICIENCY: ICD-10-CM

## 2025-03-11 DIAGNOSIS — K74.69 OTHER CIRRHOSIS OF LIVER: ICD-10-CM

## 2025-03-11 DIAGNOSIS — R16.1 SPLENOMEGALY: ICD-10-CM

## 2025-03-11 DIAGNOSIS — R79.83 HOMOCYSTINEMIA: ICD-10-CM

## 2025-03-11 DIAGNOSIS — C18.2 MALIGNANT NEOPLASM OF ASCENDING COLON: ICD-10-CM

## 2025-03-11 DIAGNOSIS — D72.819 LEUKOPENIA, UNSPECIFIED TYPE: ICD-10-CM

## 2025-03-11 PROCEDURE — 99999 PR PBB SHADOW E&M-EST. PATIENT-LVL V: CPT | Mod: PBBFAC,,, | Performed by: NURSE PRACTITIONER

## 2025-03-11 NOTE — PROGRESS NOTES
HEMATOLOGY/ONCOLOGY OFFICE CLINIC VISIT    Visit Information:    Initial Evaluation: 4/22/2022  Referring Provider: Maggy Jaquez NP  Other providers: Dr Timothy Russ  Code status: Not addressed     Diagnosis/Problem list:   pT3 N2a Mx Stage IIIB Colon cancer. Dx 10/19/23  Microcytic anemia.   Folate deficiency   Pancytopenia-intermittent    Present Treatment:  Xelox to start on 3/26/24  (Received 1 cycle then stopped).     Treatment history:  10/19/2023 Lap/jaswinder right colon resection   FOLFOX with dose reduction on Oxaliplatin due to neuropathy. Started on 11/28/23. x 7 cycles then switched to XELOX      Imaging studies:  CT C/A/P 6/3/2022: There is no significant hepatic steatosis.  The liver is cirrhotic.  No liver lesion is identified.  The spleen is enlarged, measuring 15.5 cm in length.  There is no retroperitoneal lymphadenopathy or abdominal aortic aneurysm.  A mildly prominent right external iliac lymph node measures 9 mm in short axis, nonspecific.  This is also similar in appearance when compared to 2015. Impression: Cirrhosis. Splenomegaly.  CT CAP 9/11/2023: There is no supraclavicular or axillary lymphadenopathy.  No mediastinal adenopathy. 3 mm right upper lobe pulmonary nodule. There are few additional pulmonary micronodules in the lung apices measuring no greater than 1-2 mm.  Findings similar to prior exam.     Impression: Scattered pulmonary nodules in the upper lobe similar to prior.  No further follow-up acute according to Fleischner criteria.  No convincing evidence for metastatic disease. Nodular appearing liver, correlation for cirrhosis.  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  CT C/A/P 5/16/2024: No new or worsening malignant findings identified. No defined portal vein thrombus on today's exam.  Poor evaluation of the SMV.  CT CAP 11/26/2024:  Spleen mildly enlarged. No evidence of recurrent malignancy or metastatic disease.  Slightly irregular liver contour suggesting cirrhosis, similar      Pathology:  10/19/2023:  COLON, RIGHT HEMICOLECTOMY (1.5 CM OF TERMINAL ILEUM, 25 CM LENGTH OF RIGHT COLON): POORLY DIFFERENTIATED ADENOCARCINOMA WITH FOCAL SIGNET RING CELL FEATURES, UNIFOCAL, RIGHT COLON, 8 CM GREATEST DIMENSION.   THE TUMOR INVADES THROUGH THE MUSCULARIS PROPRIA INTO PERICOLONIC TISSUE (PT3).   FOCAL LYMPHVASCULAR INVASION IS PRESENT.   THE SURGICAL MARGINS ARE FREE OF TUMOR.   METASTATIC ADENOCARCINOMA IS IDENTIFIED IN FOUR (4) OF TWENTY-SEVEN (27)   PERICOLONIC LYMPH NODES (LARGEST METASTATIC DEPOSIT 8 MM; FOCAL EXTRACAPSULAR EXTENSION OF TUMOR IS PRESENT).      PATHOLOGIC STAGING:  pT3 N2a Mx     Histologic Grade:  G3, poorly differentiated   Macroscopic Tumor Perforation:  Not identified   Lymphovascular Invasion:  Small vessel   Perineural Invasion:  Not identified        CLINICAL HISTORY:       Patient: Indu Azul is a 69 y.o. female. with multiple medical problems that I saw originally on 4/22/2022 for thrombocytopenia.  Patient platelet counts on 12/21/2021 were 132,000 and since that that has been slowly trending down.  12/21/2021 platelets 132,000  01/19/2022 platelets 121,000  04/08/2022 platelets 115,000     Of note patient have a UA done that same day that suggest possible UTI with positive nitrates, 1+ leukocytes and many bacteria.  Urine culture with E. coli.   Reviewing electronic medical record and going back to 12/15/2014 patient with occasional low platelets.  They were never lower than 100,000 and they always normalized.   As per patient in January 2020 when her platelets were slightly decreased (117,000), this was shortly after she has her left foot surgery.  Then in May 17 and May 18 2021, platelets were 105k and 101k,  she had COVID.  Again in 4/8/2022 she have a UTI for which she completed antibiotics.     Patient's father diagnosed Blood disorder requiring blood transfusion that started q 4 weeks and  the q week. He was diagnosed on his 80's but  at 91. Sister and niece had ovarian cancer. Sister  of it. Niece still alive.     She was found to have iron deficiency anemia.  EGD performed, grade I-II esophageal varices found, too small to band. She also had Colonoscopy by Dr Hammonds that showed an ulcerated malignant-appearing partially obstructing medium sized mass in the ascending colon.  She reports that she had a colonoscopy about 5 years ago at [facility] and everything was fine, no polyps or masses.   Biopsy and tattoo of the mass showed fragments of colonic mucosa, no evidence of dysplasia or malignancy.  Two other polyps were completely removed in the descending and sigmoid colon, pathology returned hyperplastic polyps.  CT abd/pel with no acute findings.     Despite of biopsy because of malignant-appearing partially obstructing mass of the ascending colon and photographs and description were consistent with colon cancer she underwent Lap/jaswinder right colon resection on 10/19/2023 by Dr Timothy Russ. Path c/w really differentiated adeno carcinoma.         Chief Complaint: 3 Month Follow Up      Interval History:      24-Patient labs and CT are stable. Patient has no complaints today.     Patient with h/o Charcot feet for which she had surgery. She does not have sensation on her feet from ankle down. This was prior to her diagnosis of colon cancer. She is getting Oxaloplatin but neuropathy is not worse. She started dose reduced and will cont like same dose. If neuropathy worsen will d/c oxaliplatin.       2024: Patient presents today for labs and TD prior to C6 of FOLFOX with dose reduction of Oxaliplatin due to neuropathy, accompanied by her daughter. C6 was held on 24 due to thrombocytopenia (plt 74k). CT A/P done on 24, showed Nonocclusive thrombus within the SMV and extending through the main portal vein, she was started on Eliquis. She is tolerating well. She continues with occasional  right sided abdominal pain and oral thrush. Pharmacy did not have RX in stock and gave an alternative, but it does not help.  No worsening in neuropathy as of yet. Hyperpigmentation of nails and skin on hands commonly seen with 5FU. Overall, she is doing fair. No fever, chills or sweats. Denies chest pain or shortness of breath. No bleeding. Bowels are moving without difficulty.  Plts today 128k.    2/19/24: Patient presents today as an add on. She has questions about her recent diagnosis of thrombus within the SMV and extending through the main portal vein . She complain of pain there so imaging was done. She was started on Eliquis. Last week after her chemo, she was nauseated so she didn't take any of her PO meds for 2 days and her right sided abdominal pain returned. Once she resumed Eliqius, her right sided abdominal pain decreased. Reports increased pain when she lays on her right side. She wants to know if we will be repeating imaging to eval her blood clot. Pancytopenia today. She treated last week (sonia). Denies fever.     2/26/24: Patient here for one week f/u. Due for C7 tomorrow. Platelet count is 75 k today. Denies any evidence of bleeding.     3/4/24: Patient presents today for one week f/u. Her platelet count increased to 115k. She has no complaints to report this week. She continues on Eliquis 5 mg bid. Denies any evidence of bleeding. Her neuropathy has not worsened as of yet.     3/18/24: Patient presents today for 2 week f/u due for C8 of FOLFOX. Her platelet count is 87k so we will have to delay again. She has no complaints today. The right lower abdominal pain subsided.     3/25/24: Patient presents today for 1 week f/u. We plan to start XELOX tomorrow for the remainder of her adjuvant treatment. Platelet count is now 109k today.     4/2/24: Patient presents today for once week f/u for toxicity check. She started XELOX on 3/26/24. Tolerating xeloda much better than infusional 5fu. Reports less  nausea and she is not sleeping during the day like she was before. She reports chest pain that was transient. Hgb is 8 today.     4/15/24: Patient presents today prior to C2 of XELOX. She is c/o abdominal pain and constipation. Reports no BM since Wednesday ( 5 days). Platelet count is 89k    5/8/24: Patient presents today for scheduled f/u. In the interim she was hospitalized for GI bleeding. Colonoscopy was performed on 4/20/24 and path was negative for malignancy. She reports fatigue . She is anemic with Hgb of 8.2. Iron studies added to labs drawn today ( pending). 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     5/22/24: Patient here today for follow up to discuss CT scan results, accompanied by her daughter. She's due to start iron infusion today, this will be 1/5.  She is doing fair, no new complaints but still weak and debilitated. She is on wheelchair today  CT scan discussed with them in detail, all questions answered.      Interval history  3/11/2025:  Latha presents for routine follow up of colon cancer. Reports improvement in bowel habits with better control and no accidents. Denies diarrhea, abdominal pain, blood in stool, or bleeding from any site. Denies abnormal bruising.     Current labs show WBC 3.6 (leukopenia), Hgb 12.0 (normal), platelets 79K (thrombocytopenia). Creatinine slightly elevated. Potassium normal.     Reports neuropathy symptoms improved with stimulator placement - experiencing tingling sensation in feet rather than complete numbness. Has Charcot foot with two bones showing recession, being monitored by orthopedic neurosurgeon.     Last CT scan 11/26/2024 showed no evidence of disease. Med-Port flush completed on 03/06/2025.     Recent provider visits:   - Dr. Pinedo (Family Medicine) on 12/12/2024 for Neurontin refill   - Dr. Fernandes (Nephrology) on 01/10/2025 for CKD Stage 3B with nephrosclerosis: Retroperitoneal ultrasound  performed, advised low potassium diet   - Dr. Hart (Orthopedic Neurosurgeon) for stimulator placement and Charcot foot monitoring     Current medications:   - Neurontin (neuropathy)   - Eliquis (anticoagulation)   - Multivitamin with B12, B6, folic acid      Past Medical History:   Diagnosis Date    Anesthesia complication     trouble awakening    Charcot's joint of foot, left     Chronic kidney disease, unspecified     Cirrhosis of liver without ascites     Depression     Diabetes mellitus, type II, insulin dependent     Diabetic neuropathy     GERD (gastroesophageal reflux disease)     H/O: hysterectomy     Hepatic steatosis     Malignant neoplasm of ascending colon     Obesity, unspecified     Overactive bladder     Thrombocytopenia, unspecified     Vitamin D deficiency       Past Surgical History:   Procedure Laterality Date    APPENDECTOMY      BREAST BIOPSY  2016     SECTION      x3    charcot-leyden crystals identification      CHOLECYSTECTOMY      COLONOSCOPY N/A 2023    Procedure: COLON;  Surgeon: Luis Amador MD;  Location: [facility] ENDOSCOPY;  Service: Gastroenterology;  Laterality: N/A;    COLONOSCOPY N/A 2024    Procedure: COLON;  Surgeon: Mor Porter MD;  Location: [facility];  Service: Gastroenterology;  Laterality: N/A;    COLONOSCOPY, WITH 1 OR MORE BIOPSIES  2023    Procedure: COLONOSCOPY, WITH 1 OR MORE BIOPSIES;  Surgeon: Luis Amador MD;  Location: [facility] ENDOSCOPY;  Service: Gastroenterology;;    COLONOSCOPY, WITH 1 OR MORE BIOPSIES N/A 2024    Procedure: COLONOSCOPY, WITH 1 OR MORE BIOPSIES;  Surgeon: Mor Porter MD;  Location: [facility] OR;  Service: Gastroenterology;  Laterality: N/A;    COLONOSCOPY, WITH DIRECTED SUBMUCOSAL INJECTION  2023    Procedure: COLONOSCOPY, WITH DIRECTED SUBMUCOSAL INJECTION;  Surgeon: Luis Amador MD;  Location: [facility] ENDOSCOPY;  Service: Gastroenterology;;  8cc Colon Tattoo     COLONOSCOPY, WITH POLYPECTOMY USING SNARE  08/28/2023    Procedure: COLONOSCOPY, WITH POLYPECTOMY USING SNARE;  Surgeon: Luis Amador MD;  Location: [facility] ENDOSCOPY;  Service: Gastroenterology;;    ESOPHAGOGASTRODUODENOSCOPY N/A 08/28/2023    Procedure: DOUBLE;  Surgeon: Luis Amador MD;  Location: [facility] ENDOSCOPY;  Service: Gastroenterology;  Laterality: N/A;    ESOPHAGOGASTRODUODENOSCOPY N/A 4/19/2024    Procedure: EGD;  Surgeon: Nolan Massey MD;  Location: [facility] ENDOSCOPY;  Service: Gastroenterology;  Laterality: N/A;    HEMORRHOID SURGERY      HYSTERECTOMY  1990    total    INSERTION OF SUBCUTANEOUS PORT Right     INSERTION OF TUNNELED CENTRAL VENOUS CATHETER (CVC) WITH SUBCUTANEOUS PORT Right 11/20/2023    Procedure: [id number];  Surgeon: Timothy Russ MD;  Location: [facility] OR;  Service: General;  Laterality: Right;    POLYPECTOMY      right foot surgery Right 02/01/2022    XI ROBOTIC COLECTOMY, RIGHT N/A 10/19/2023    Procedure: XI ROBOTIC COLECTOMY, RIGHT;  Surgeon: Timothy Russ MD;  Location: [facility];  Service: General;  Laterality: N/A;     Family History   Problem Relation Name Age of Onset    Diabetes Mother      Alzheimer's disease Mother      Diabetes Father      Leukemia Father      Diabetes Sister      Liver cancer Sister      Diabetes Brother              Review of patient's allergies indicates:  No Known Allergies   Current Outpatient Medications on File Prior to Visit   Medication Sig Dispense Refill    CONSTULOSE 10 gram/15 mL solution Take 30 mLs by mouth 3 (three) times daily.      dulaglutide (TRULICITY) 3 mg/0.5 mL pen injector INJECT 1 SYRINGE SUBCUTANEOUSLY ONCE A WEEK (Patient not taking: Reported on 1/10/2025) 4 Pen 11    ELIQUIS 5 mg Tab Take 1 tablet by mouth twice daily 180 tablet 0    folic acid-vit B6-vit B12 0.8- mg-mg-mcg Tab Take 1 tablet by mouth once daily. 90 each 2    furosemide (LASIX) 40 MG tablet Take 40 mg by mouth  once daily.      gabapentin (NEURONTIN) 300 MG capsule Take 2 capsules (600 mg total) by mouth every evening. 60 capsule 6    insulin glargine, TOUJEO, (TOUJEO SOLOSTAR U-300 INSULIN) 300 unit/mL (1.5 mL) InPn pen Inject 79 Units into the skin every evening. 7.9 mL 11    sertraline (ZOLOFT) 50 MG tablet Take 1 tablet by mouth once daily 90 tablet 3    solifenacin (VESICARE) 10 MG tablet Take 10 mg by mouth once daily.      [DISCONTINUED] insulin glargine, TOUJEO, (TOUJEO SOLOSTAR U-300 INSULIN) 300 unit/mL (1.5 mL) InPn pen INJECT 60 UNITS SUBCUTANEOUSLY ONCE DAILY       Current Facility-Administered Medications on File Prior to Visit   Medication Dose Route Frequency Provider Last Rate Last Admin    0.9%  NaCl infusion (for blood administration)   Intravenous Once Gayatri Jaffe FNP        promethazine (PHENERGAN) 12.5 mg in dextrose 5 % (D5W) 50 mL IVPB  12.5 mg Intravenous Once Gayatri Jaffe FNP          Review of Systems   Constitutional:  Positive for fatigue. Negative for activity change, appetite change, chills, fever and unexpected weight change.   HENT:  Negative for mouth dryness, mouth sores, nosebleeds, sore throat and trouble swallowing.    Eyes:  Negative for visual disturbance.   Respiratory:  Negative for cough and shortness of breath.    Cardiovascular:  Negative for chest pain, palpitations and leg swelling.   Gastrointestinal:  Negative for abdominal distention, abdominal pain, blood in stool, change in bowel habit, constipation, diarrhea, nausea and vomiting.   Endocrine: Negative.    Genitourinary:  Negative for dysuria, frequency, hematuria and urgency.   Musculoskeletal:  Negative for arthralgias, back pain, myalgias and neck pain.   Integumentary:  Negative for rash.   Neurological:  Positive for weakness and numbness. Negative for dizziness, tremors, syncope, speech difficulty, light-headedness, headaches and memory loss.   Hematological:  Negative for adenopathy. Does not bruise/bleed  easily.   Psychiatric/Behavioral:  Negative for confusion and suicidal ideas. The patient is not nervous/anxious.           Vitals:    03/11/25 1339   Weight: 111.9 kg (246 lb 11.2 oz)              Wt Readings from Last 6 Encounters:   03/11/25 111.9 kg (246 lb 11.2 oz)   01/10/25 108.5 kg (239 lb 3.2 oz)   12/04/24 106.5 kg (234 lb 14.4 oz)   11/21/24 105.2 kg (231 lb 14.4 oz)   10/24/24 105.2 kg (231 lb 14.4 oz)   09/10/24 100.4 kg (221 lb 6.4 oz)     Body mass index is 45.12 kg/m².  Body surface area is 2.21 meters squared.  Physical Exam  Vitals and nursing note reviewed.   Constitutional:       General: She is not in acute distress.     Appearance: She is obese. She is ill-appearing (chronically).   HENT:      Head: Normocephalic and atraumatic.      Mouth/Throat:      Mouth: Mucous membranes are moist.   Eyes:      General: No scleral icterus.     Extraocular Movements: Extraocular movements intact.      Conjunctiva/sclera: Conjunctivae normal.      Pupils: Pupils are equal, round, and reactive to light.   Neck:      Vascular: No JVD.   Cardiovascular:      Rate and Rhythm: Normal rate and regular rhythm.      Heart sounds: No murmur heard.  Pulmonary:      Effort: Pulmonary effort is normal.      Breath sounds: Normal breath sounds. No wheezing or rhonchi.   Chest:      Comments: Right chest wall mediport in place.    Abdominal:      General: Bowel sounds are normal. There is no distension.      Palpations: Abdomen is soft.      Comments: Surgical incisions healed after colon surgery.    Musculoskeletal:         General: No swelling or deformity.      Cervical back: Neck supple.   Lymphadenopathy:      Head:      Right side of head: No submandibular adenopathy.      Left side of head: No submandibular adenopathy.      Cervical: No cervical adenopathy.      Upper Body:      Right upper body: No supraclavicular or axillary adenopathy.      Left upper body: No supraclavicular or axillary adenopathy.      Lower  Body: No right inguinal adenopathy. No left inguinal adenopathy.   Skin:     General: Skin is warm.      Coloration: Skin is not jaundiced.      Findings: No lesion or rash.      Nails: There is no clubbing.      Comments: Darkening of nails and skin on hands commonly seen with 5FU   Neurological:      Mental Status: She is alert and oriented to person, place, and time.      Cranial Nerves: Cranial nerves 2-12 are intact.      Motor: Weakness present.   Psychiatric:         Attention and Perception: Attention normal.         Mood and Affect: Mood is depressed.         Behavior: Behavior is cooperative.         Judgment: Judgment normal.       ECOG SCORE             Laboratory:  CBC with Differential:  Lab Results   Component Value Date    WBC 3.60 (L) 03/06/2025    RBC 4.48 03/06/2025    HGB 12.0 03/06/2025    HCT 37.5 03/06/2025    MCV 83.7 03/06/2025    MCH 26.8 (L) 03/06/2025    MCHC 32.0 (L) 03/06/2025    RDW 14.6 03/06/2025    PLT 79 (L) 03/06/2025    MPV 11.3 (H) 03/06/2025        CMP:  Sodium   Date Value Ref Range Status   03/06/2025 137 136 - 145 mmol/L Final   06/01/2022 140 136 - 145 mmol/L Final     Potassium   Date Value Ref Range Status   03/06/2025 4.6 3.5 - 5.1 mmol/L Final   06/01/2022 4.9 3.5 - 5.1 mmol/L Final     Chloride   Date Value Ref Range Status   03/06/2025 107 98 - 107 mmol/L Final   06/01/2022 104 100 - 109 mmol/L Final     CO2   Date Value Ref Range Status   03/06/2025 26 23 - 31 mmol/L Final     Carbon Dioxide   Date Value Ref Range Status   06/01/2022 29 22 - 33 mmol/L Final     Blood Urea Nitrogen   Date Value Ref Range Status   03/06/2025 21.3 (H) 9.8 - 20.1 mg/dL Final   06/01/2022 22 5 - 25 mg/dL Final     Creatinine   Date Value Ref Range Status   03/06/2025 1.21 (H) 0.55 - 1.02 mg/dL Final   06/01/2022 1.32 (H) 0.57 - 1.25 mg/dL Final     Calcium   Date Value Ref Range Status   03/06/2025 9.3 8.4 - 10.2 mg/dL Final   06/01/2022 8.7 (L) 8.8 - 10.6 mg/dL Final     Albumin   Date  Value Ref Range Status   03/06/2025 3.5 3.4 - 4.8 g/dL Final     Bilirubin Total   Date Value Ref Range Status   03/06/2025 0.5 <=1.5 mg/dL Final     ALP   Date Value Ref Range Status   03/06/2025 95 40 - 150 unit/L Final     AST   Date Value Ref Range Status   03/06/2025 18 5 - 34 unit/L Final     ALT   Date Value Ref Range Status   03/06/2025 14 0 - 55 unit/L Final     Anion Gap   Date Value Ref Range Status   06/01/2022 7 (L) 8 - 16 mmol/L Final     eGFR    Date Value Ref Range Status   06/01/2022 45 mL/min/1.73mSq Final     Comment:     In accordance with NKF-ASN Task Force recommendation, calculation based on the Chronic Kidney Disease Epidemiology Collaboration (CKD-EPI) equation without adjustment for race. eGFR adjusted for gender and age and calculated in ml/min/1.73mSquared. eGFR cannot be calculated if patient is under 18 years of age.     Reference Range:   >= 60 ml/min/1.73mSquared.     Estimated GFR-Non    Date Value Ref Range Status   08/04/2022 43 mls/min/1.73/m2 Final         Assessment:       No diagnosis found.      1) Stage IIIB adenocarcinoma of the ascending colon  --Patient will benefit of adjuvant chemotherapy.   --Due to severe diabetic neuropathy, will try dose reduction of Oxaliplatin, starting 65 mg/m2.    --She is s/p 8 cycles of FOLFOX (7) + XELOX (1) that she received as adjuvant treatment over 5 months. She had some delays for cytopenias so we have been unable to complete. She was hospitalized for GI bleeding and thankfully her path from colonoscopy was negative. She was weak, debilitated and tired and has ultimately decided to stopped chemotherapy. We started surveillance at this time.  I agree that this is the right thing to do as she has neuropathy from diabetes and continuing Oxaliplatin is more harmful than helpful and risks outweigh the benefits. We discuss 5FU pump but again declined further chemotherapy.    2) Lung nodules-Stable.  Not describe  most recent scan.  Will monitor.     3) iron deficiency anemia-on iron PO-resolved?  Today's lab with no anemia    4) Thrombocytopenia- persistent Will follow counts closely as she has splenomegaly and this can affects her counts mostly platelets.    5) Folate deficiency-on folic acid at this time.     6) Diabetic neuropathy-severe. She does not feel her feet.  --S/p nerve stimulator placement      7) hyperhomocystinemia  --she was taking vitamin B12, B6 and folate but have not take the vitamins in over a month.  She stopped them for her surgical procedure and have not restarted them.        Plan:       Dx: Colon Cancer   Status: No evidence of disease   - CT scan to be scheduled for May 2025   - Will have colonoscopy next month per GI recommendations.  - Follow up with Dr. Olmstead to review results   - Next visit in 3 months (June) with labs week prior   - Continue surveillance every 6 months   - Counseled on increased risk due to incomplete chemotherapy course.     Dx: Thrombocytopenia   Status: Stable, platelets 79K   - Continue Eliquis   - Monitor for unexplained bruising/bleeding   - Will hold anticoagulation if platelets drop below 50K       Dx:  Hyperhomocystinemia  Status:  Improving  -continue folic acid  -continue multivitamins     Dx: CKD Stage 3B with Nephrosclerosis   Status: Stable   - Continue low potassium diet per nephrologist  - Maintain hydration     Dx: Peripheral Neuropathy   Status: Improved   - Continue current management with stimulator   - Continue Neurontin     Dx: Charcot Foot   Status: Being monitored   - Continue follow up with orthopedic neurosurgeon for bone recession at [facility]    Patient instructions and visit orders.       -Follow-up:  F/U with MD (Aysha) 3 month  -Labs:  CBC, CMP, CEA, folate, B12, Iron, ferritin 1 week PTA  -Imaging:  CT C/A/P W/O contrast- 5/26/2025 of shortly after  -Other orders:  Port flush Q 3 month    43 were spent on this encounter    Kemar Choudhury,  MSN, FNP-BC, APRN, AOCNP   Department of Hematology/Oncology.

## 2025-03-13 ENCOUNTER — TELEPHONE (OUTPATIENT)
Dept: HEMATOLOGY/ONCOLOGY | Facility: CLINIC | Age: 70
End: 2025-03-13
Payer: MEDICARE

## 2025-03-19 DIAGNOSIS — I81 PORTAL VEIN THROMBOSIS: ICD-10-CM

## 2025-03-19 RX ORDER — APIXABAN 5 MG/1
5 TABLET, FILM COATED ORAL 2 TIMES DAILY
Qty: 180 TABLET | Refills: 0 | Status: SHIPPED | OUTPATIENT
Start: 2025-03-19

## 2025-03-20 ENCOUNTER — TELEPHONE (OUTPATIENT)
Dept: HEMATOLOGY/ONCOLOGY | Facility: CLINIC | Age: 70
End: 2025-03-20
Payer: MEDICARE

## 2025-03-26 ENCOUNTER — TELEPHONE (OUTPATIENT)
Dept: HEMATOLOGY/ONCOLOGY | Facility: CLINIC | Age: 70
End: 2025-03-26
Payer: MEDICARE

## 2025-04-07 ENCOUNTER — TELEPHONE (OUTPATIENT)
Dept: FAMILY MEDICINE | Facility: CLINIC | Age: 70
End: 2025-04-07
Payer: MEDICARE

## 2025-04-07 DIAGNOSIS — E11.65 TYPE 2 DIABETES MELLITUS WITH HYPERGLYCEMIA, WITH LONG-TERM CURRENT USE OF INSULIN: Primary | ICD-10-CM

## 2025-04-07 DIAGNOSIS — Z79.4 TYPE 2 DIABETES MELLITUS WITH HYPERGLYCEMIA, WITH LONG-TERM CURRENT USE OF INSULIN: Primary | ICD-10-CM

## 2025-04-09 ENCOUNTER — RESULTS FOLLOW-UP (OUTPATIENT)
Dept: FAMILY MEDICINE | Facility: CLINIC | Age: 70
End: 2025-04-09

## 2025-04-09 ENCOUNTER — LAB VISIT (OUTPATIENT)
Dept: LAB | Facility: HOSPITAL | Age: 70
End: 2025-04-09
Attending: INTERNAL MEDICINE
Payer: MEDICARE

## 2025-04-09 DIAGNOSIS — Z79.4 TYPE 2 DIABETES MELLITUS WITH HYPERGLYCEMIA, WITH LONG-TERM CURRENT USE OF INSULIN: ICD-10-CM

## 2025-04-09 DIAGNOSIS — E11.65 TYPE 2 DIABETES MELLITUS WITH HYPERGLYCEMIA, WITH LONG-TERM CURRENT USE OF INSULIN: ICD-10-CM

## 2025-04-09 LAB
EST. AVERAGE GLUCOSE BLD GHB EST-MCNC: 148.5 MG/DL
HBA1C MFR BLD: 6.8 %

## 2025-04-09 PROCEDURE — 83036 HEMOGLOBIN GLYCOSYLATED A1C: CPT

## 2025-04-09 PROCEDURE — 36415 COLL VENOUS BLD VENIPUNCTURE: CPT

## 2025-04-10 ENCOUNTER — OFFICE VISIT (OUTPATIENT)
Dept: FAMILY MEDICINE | Facility: CLINIC | Age: 70
End: 2025-04-10
Payer: MEDICARE

## 2025-04-10 VITALS
HEIGHT: 62 IN | HEART RATE: 75 BPM | TEMPERATURE: 98 F | BODY MASS INDEX: 46.53 KG/M2 | SYSTOLIC BLOOD PRESSURE: 132 MMHG | WEIGHT: 252.88 LBS | DIASTOLIC BLOOD PRESSURE: 76 MMHG | RESPIRATION RATE: 12 BRPM | OXYGEN SATURATION: 97 %

## 2025-04-10 DIAGNOSIS — Z79.4 TYPE 2 DIABETES MELLITUS WITH HYPERGLYCEMIA, WITH LONG-TERM CURRENT USE OF INSULIN: Primary | ICD-10-CM

## 2025-04-10 DIAGNOSIS — E11.65 TYPE 2 DIABETES MELLITUS WITH HYPERGLYCEMIA, WITH LONG-TERM CURRENT USE OF INSULIN: Primary | ICD-10-CM

## 2025-04-10 PROBLEM — D84.821 IMMUNOSUPPRESSION DUE TO DRUG THERAPY: Status: RESOLVED | Noted: 2024-02-29 | Resolved: 2025-04-10

## 2025-04-10 PROBLEM — R97.0 ELEVATED CEA: Status: RESOLVED | Noted: 2023-11-13 | Resolved: 2025-04-10

## 2025-04-10 PROBLEM — Z01.818 PRE-OP EXAM: Status: RESOLVED | Noted: 2024-10-25 | Resolved: 2025-04-10

## 2025-04-10 PROBLEM — Z79.899 IMMUNOSUPPRESSION DUE TO DRUG THERAPY: Status: RESOLVED | Noted: 2024-02-29 | Resolved: 2025-04-10

## 2025-04-10 PROBLEM — E61.1 IRON DEFICIENCY: Status: RESOLVED | Noted: 2022-05-04 | Resolved: 2025-04-10

## 2025-04-10 PROCEDURE — 1159F MED LIST DOCD IN RCRD: CPT | Mod: CPTII,,, | Performed by: INTERNAL MEDICINE

## 2025-04-10 PROCEDURE — 3044F HG A1C LEVEL LT 7.0%: CPT | Mod: CPTII,,, | Performed by: INTERNAL MEDICINE

## 2025-04-10 PROCEDURE — 3066F NEPHROPATHY DOC TX: CPT | Mod: CPTII,,, | Performed by: INTERNAL MEDICINE

## 2025-04-10 PROCEDURE — 1101F PT FALLS ASSESS-DOCD LE1/YR: CPT | Mod: CPTII,,, | Performed by: INTERNAL MEDICINE

## 2025-04-10 PROCEDURE — 1125F AMNT PAIN NOTED PAIN PRSNT: CPT | Mod: CPTII,,, | Performed by: INTERNAL MEDICINE

## 2025-04-10 PROCEDURE — 3008F BODY MASS INDEX DOCD: CPT | Mod: CPTII,,, | Performed by: INTERNAL MEDICINE

## 2025-04-10 PROCEDURE — 3288F FALL RISK ASSESSMENT DOCD: CPT | Mod: CPTII,,, | Performed by: INTERNAL MEDICINE

## 2025-04-10 PROCEDURE — 1160F RVW MEDS BY RX/DR IN RCRD: CPT | Mod: CPTII,,, | Performed by: INTERNAL MEDICINE

## 2025-04-10 PROCEDURE — 3078F DIAST BP <80 MM HG: CPT | Mod: CPTII,,, | Performed by: INTERNAL MEDICINE

## 2025-04-10 PROCEDURE — 99214 OFFICE O/P EST MOD 30 MIN: CPT | Mod: ,,, | Performed by: INTERNAL MEDICINE

## 2025-04-10 PROCEDURE — 3075F SYST BP GE 130 - 139MM HG: CPT | Mod: CPTII,,, | Performed by: INTERNAL MEDICINE

## 2025-04-10 RX ORDER — TIRZEPATIDE 2.5 MG/.5ML
2.5 INJECTION, SOLUTION SUBCUTANEOUS
Qty: 2 ML | Refills: 0 | Status: SHIPPED | OUTPATIENT
Start: 2025-04-10

## 2025-04-10 RX ORDER — INSULIN GLARGINE 300 [IU]/ML
10 INJECTION, SOLUTION SUBCUTANEOUS NIGHTLY
Start: 2025-04-10

## 2025-04-10 NOTE — PROGRESS NOTES
"Subjective:      Patient ID: Indu Azul is a 69 y.o. female.    Chief Complaint: Follow-up (3 mth f/u chronic conditions)    HPI    DM follow up  Reporting home BG fluctuating high and low  A1c is stable at 6.8  Has dex com  Patient wants to try mounjaro    Health Maintenance Due   Topic Date Due    COVID-19 Vaccine (1) Never done    TETANUS VACCINE  Never done    Shingles Vaccine (1 of 2) Never done    RSV Vaccine (Age 60+ and Pregnant patients) (1 - Risk 60-74 years 1-dose series) Never done    Pneumococcal Vaccines (Age 50+) (2 of 2 - PCV) 11/30/2017    Diabetes Urine Screening  06/15/2024    LDCT Lung Screen  09/11/2024    Diabetic Eye Exam  06/28/2025     Review of Systems   Constitutional:  Negative for chills and fever.   HENT:  Negative for congestion, sinus pressure and sore throat.    Respiratory:  Negative for cough and shortness of breath.    Cardiovascular:  Negative for chest pain and palpitations.   Gastrointestinal:  Negative for abdominal pain and nausea.   Musculoskeletal:  Positive for arthralgias.   Skin:  Negative for rash.       Objective:     Vitals:    04/10/25 1045   BP: 132/76   BP Location: Left arm   Patient Position: Sitting   Pulse: 75   Resp: 12   Temp: 97.6 °F (36.4 °C)   TempSrc: Temporal   SpO2: 97%   Weight: 114.7 kg (252 lb 14.4 oz)   Height: 5' 2" (1.575 m)     Physical Exam  Vitals and nursing note reviewed.   Constitutional:       Appearance: She is obese.   HENT:      Head: Normocephalic and atraumatic.   Neurological:      Mental Status: She is alert.       Assessment/Plan:     1. Type 2 diabetes mellitus with hyperglycemia, with long-term current use of insulin  -     tirzepatide (MOUNJARO) 2.5 mg/0.5 mL PnIj; Inject 2.5 mg into the skin every 7 days.  Dispense: 2 mL; Refill: 0  -     insulin glargine, TOUJEO, (TOUJEO SOLOSTAR U-300 INSULIN) 300 unit/mL (1.5 mL) InPn pen; Inject 10 Units into the skin every evening.    BG fluctuating  Start mounjaro 2.5 mg subQ " weekly  Reduce toujeo 10 units subQ weekly  Montor BG closely  If preprandial sugar remains >250, then take novolog 5 units subQ with the meal and increase her evening lantus by 2 more units until both fasting BG <130 and preprandial sugar is <180  Patient and daughter expressed understanding  Plan for 1 week phone call  Plan for 1 month f/u in person    Medication side effects reviewed including but not limited to pancreatitis, constipation, nausea/vomiting, upset stomach, thyroid cancer and adrenal tumors, symptoms and signs of pancreatitis reviewed  Discussed that this is a long term medication, when we stop this medication weight gain is most likely and that patient needs to focus on controlling caloric intake, reducing carbohydrate portions, focus more on adequate protein and fiber intake and staying well hydrated       Follow up in about 4 weeks (around 5/8/2025) for Follow Up - Chronic Conditions add on 30 minute appt.    This includes face to face time and non-face to face time preparing to see the patient (eg, review of tests), obtaining and/or reviewing separately obtained history, documenting clinical information in the electronic or other health record, independently interpreting results and communicating results to the patient/family/caregiver, or care coordinator.

## 2025-04-17 ENCOUNTER — OFFICE VISIT (OUTPATIENT)
Dept: FAMILY MEDICINE | Facility: CLINIC | Age: 70
End: 2025-04-17
Payer: MEDICARE

## 2025-04-17 DIAGNOSIS — Z79.4 TYPE 2 DIABETES MELLITUS WITH HYPERGLYCEMIA, WITH LONG-TERM CURRENT USE OF INSULIN: Primary | ICD-10-CM

## 2025-04-17 DIAGNOSIS — E11.65 TYPE 2 DIABETES MELLITUS WITH HYPERGLYCEMIA, WITH LONG-TERM CURRENT USE OF INSULIN: Primary | ICD-10-CM

## 2025-04-17 PROCEDURE — 3066F NEPHROPATHY DOC TX: CPT | Mod: CPTII,95,, | Performed by: INTERNAL MEDICINE

## 2025-04-17 PROCEDURE — 3044F HG A1C LEVEL LT 7.0%: CPT | Mod: CPTII,95,, | Performed by: INTERNAL MEDICINE

## 2025-04-17 PROCEDURE — 98005 SYNCH AUDIO-VIDEO EST LOW 20: CPT | Mod: 95,,, | Performed by: INTERNAL MEDICINE

## 2025-04-17 PROCEDURE — 1159F MED LIST DOCD IN RCRD: CPT | Mod: CPTII,95,, | Performed by: INTERNAL MEDICINE

## 2025-04-17 PROCEDURE — 1160F RVW MEDS BY RX/DR IN RCRD: CPT | Mod: CPTII,95,, | Performed by: INTERNAL MEDICINE

## 2025-04-17 RX ORDER — SOLIFENACIN SUCCINATE 10 MG/1
10 TABLET, FILM COATED ORAL DAILY
Qty: 90 TABLET | Refills: 3 | Status: SHIPPED | OUTPATIENT
Start: 2025-04-17

## 2025-04-17 NOTE — PROGRESS NOTES
TELEMEDICINE VISIT     Patient ID: Indu Azul is a 69 y.o. female.  MRN: [id number]  : 1955    Subjective:        TELEMEDICINE  The patient location is: home  The chief complaint leading to consultation is: Diabetes     Visit type: Virtual visit with synchronous audio and video    Total time spent with patient: 15 minutes  20 minutes of total time spent on the encounter, which includes face to face time and non-face to face time preparing to see the patient (eg, review of tests), obtaining and/or reviewing separately obtained history, documenting clinical information in the electronic or other health record, independently interpreting results (not separately reported) and communicating results to the patient/family/caregiver, or care coordination (not separately reported).    Each patient to whom he or she provides medical services by telemedicine is:  (1) informed of the relationship between the physician and patient and the respective role of any other health care provider with respect to management of the patient; and (2) notified that he or she may decline to receive medical services by telemedicine and may withdraw from such care at any time.    HPI: Diabetes  She has type 2 diabetes mellitus. No MedicAlert identification noted. The initial diagnosis of diabetes was made 15 years ago. Pertinent negatives for hypoglycemia include no confusion, dizziness, headaches, hunger, mood changes, nervousness/anxiousness, pallor, seizures, sleepiness, speech difficulty, sweats or tremors. Associated symptoms include polyuria and weight loss. Pertinent negatives for diabetes include no blurred vision, no chest pain, no fatigue, no foot paresthesias, no foot ulcerations, no polydipsia, no polyphagia and no weakness. Pertinent negatives for hypoglycemia complications include no blackouts, no hospitalization, no nocturnal hypoglycemia, no required assistance and no required glucagon injection. Symptoms are stable.  Diabetic complications include autonomic neuropathy, peripheral neuropathy and PVD. Pertinent negatives for diabetic complications include no CVA, heart disease, nephropathy or retinopathy. There are no known risk factors for coronary artery disease. Current diabetic treatment includes insulin injections. She is compliant with treatment most of the time. She is currently taking insulin at bedtime. Insulin injections are given by patient. Rotation sites for injection include the abdominal wall. Her weight is decreasing steadily. She is following a generally healthy diet. Meal planning includes avoidance of concentrated sweets. She has not had a previous visit with a dietitian. She rarely participates in exercise. She monitors blood glucose at home 5+ x per day. She monitors urine at home <1 x per month. Blood glucose monitoring compliance is good. Her home blood glucose trend is fluctuating minimally. She sees a podiatrist.Eye exam is current.        DM follow up  Patient and daughter present  Has not required any insulin toujeo or novolog since starting mounjaro 2.5 mg weekly one week ago  Average -170      Health maintenance reviewed with the patient.  Health maintenance completed:  Health Maintenance Topics with due status: Not Due       Topic Last Completion Date    DEXA Scan 07/31/2023    Colorectal Cancer Screening 04/20/2024    Lipid Panel 06/21/2024    Foot Exam 06/24/2024    Mammogram 08/12/2024    Hemoglobin A1c 04/09/2025      Health maintenance due:  Health Maintenance Due   Topic Date Due    COVID-19 Vaccine (1) Never done    TETANUS VACCINE  Never done    Shingles Vaccine (1 of 2) Never done    RSV Vaccine (Age 60+ and Pregnant patients) (1 - Risk 60-74 years 1-dose series) Never done    Pneumococcal Vaccines (Age 50+) (2 of 2 - PCV) 11/30/2017    Diabetes Urine Screening  06/15/2024    LDCT Lung Screen  09/11/2024    Diabetic Eye Exam  06/28/2025      ROS:  Review of Systems   Constitutional:   Positive for weight loss. Negative for fatigue.   Eyes:  Negative for blurred vision.   Cardiovascular:  Negative for chest pain.   Endocrine: Positive for polyuria. Negative for polydipsia and polyphagia.   Integumentary:  Negative for pallor.   Neurological:  Negative for dizziness, tremors, seizures, speech difficulty, weakness and headaches.   Psychiatric/Behavioral:  Negative for confusion. The patient is not nervous/anxious.       History:     Past Medical History:   Diagnosis Date    Anesthesia complication     trouble awakening    Charcot's joint of foot, left     Chronic kidney disease, unspecified     Cirrhosis of liver without ascites     Depression     Diabetes mellitus, type II, insulin dependent     Diabetic neuropathy     GERD (gastroesophageal reflux disease)     H/O: hysterectomy     Hepatic steatosis     Malignant neoplasm of ascending colon     Obesity, unspecified     Overactive bladder     Thrombocytopenia, unspecified     Vitamin D deficiency       Past Surgical History:   Procedure Laterality Date    APPENDECTOMY      BREAST BIOPSY       SECTION  05/13/88    x3    charcot-leyden crystals identification      CHOLECYSTECTOMY      COLON SURGERY  1923    COLONOSCOPY N/A 2023    Procedure: COLON;  Surgeon: Luis Amador MD;  Location: [facility] ENDOSCOPY;  Service: Gastroenterology;  Laterality: N/A;    COLONOSCOPY N/A 2024    Procedure: COLON;  Surgeon: Mor Porter MD;  Location: [facility];  Service: Gastroenterology;  Laterality: N/A;    COLONOSCOPY, WITH 1 OR MORE BIOPSIES  2023    Procedure: COLONOSCOPY, WITH 1 OR MORE BIOPSIES;  Surgeon: Luis Amador MD;  Location: [facility] ENDOSCOPY;  Service: Gastroenterology;;    COLONOSCOPY, WITH 1 OR MORE BIOPSIES N/A 2024    Procedure: COLONOSCOPY, WITH 1 OR MORE BIOPSIES;  Surgeon: Mor Porter MD;  Location: [facility];  Service: Gastroenterology;  Laterality: N/A;    COLONOSCOPY,  WITH DIRECTED SUBMUCOSAL INJECTION  08/28/2023    Procedure: COLONOSCOPY, WITH DIRECTED SUBMUCOSAL INJECTION;  Surgeon: Luis Amador MD;  Location: [facility] ENDOSCOPY;  Service: Gastroenterology;;  8cc Colon Tattoo    COLONOSCOPY, WITH POLYPECTOMY USING SNARE  08/28/2023    Procedure: COLONOSCOPY, WITH POLYPECTOMY USING SNARE;  Surgeon: Luis Amador MD;  Location: [facility] ENDOSCOPY;  Service: Gastroenterology;;    ESOPHAGOGASTRODUODENOSCOPY N/A 08/28/2023    Procedure: DOUBLE;  Surgeon: Luis Amador MD;  Location: [facility] ENDOSCOPY;  Service: Gastroenterology;  Laterality: N/A;    ESOPHAGOGASTRODUODENOSCOPY N/A 04/19/2024    Procedure: EGD;  Surgeon: Nolan Massey MD;  Location: [facility] ENDOSCOPY;  Service: Gastroenterology;  Laterality: N/A;    HEMORRHOID SURGERY      HYSTERECTOMY  1990    total    INSERTION OF SUBCUTANEOUS PORT Right     INSERTION OF TUNNELED CENTRAL VENOUS CATHETER (CVC) WITH SUBCUTANEOUS PORT Right 11/20/2023    Procedure: [id number];  Surgeon: Timothy Russ MD;  Location: [facility];  Service: General;  Laterality: Right;    POLYPECTOMY      right foot surgery Right 02/01/2022    SMALL INTESTINE SURGERY  10/18/23    XI ROBOTIC COLECTOMY, RIGHT N/A 10/19/2023    Procedure: XI ROBOTIC COLECTOMY, RIGHT;  Surgeon: Timothy Russ MD;  Location: [facility];  Service: General;  Laterality: N/A;     Family History   Problem Relation Name Age of Onset    Diabetes Mother Estela Azul     Alzheimer's disease Mother Estela Azul     Diabetes Father Malcom Azul     Leukemia Father Malcom Azul     Cancer Father Malcom Azul     Diabetes Sister Maria L Latha Grace     Liver cancer Sister Maria L Azul Grace     Diabetes Brother Chris Azul       Social History     Tobacco Use    Smoking status: Every Day     Current packs/day: 0.50     Average packs/day: 0.5 packs/day for 62.3 years (31.1 ttl pk-yrs)     Types: Cigarettes     Start  date: 1/1/2003     Passive exposure: Current    Smokeless tobacco: Never   Substance and Sexual Activity    Alcohol use: Not Currently     Comment: occassionally    Drug use: Never    Sexual activity: Not Currently     Partners: Male     Birth control/protection: None          Allergies: Review of patient's allergies indicates:  No Known Allergies  Objective:   There were no vitals filed for this visit.      Physical Examination: Physical exam was not performed during this virtual visit.  Physical Exam    Labs:   Diagnostic Tests:  Significant Imaging: I have reviewed and interpreted all pertinent imaging results/findings.  CT Chest Abdomen Pelvis With IV Contrast (XPD) Routine Oral Contrast  Narrative: EXAMINATION:  CT CHEST ABDOMEN PELVIS WITH IV CONTRAST (XPD)    CLINICAL HISTORY:  Colon cancer, assess treatment response; Malignant neoplasm of ascending colon    TECHNIQUE:  Low dose axial images, sagittal and coronal reformations were obtained from the thoracic inlet to the pubic symphysis following the IV administration of 100 mL of Omnipaque 350 .  Oral contrast given.  DLP 1654.  Automated exposure control used.    COMPARISON:  05/16/2024    FINDINGS:  Chest: Pericardium, trachea, esophagus normal.  No mediastinal mass or lymphadenopathy.  Right-sided port catheter.  No hilar enlargement.    Soft tissues of the surrounding chest wall are normal.    Lungs clear without infiltrate or pulmonary nodule.    Abdomen and pelvis: Liver normal enhancement with no focal lesion.  Slightly irregular liver contour.    Cholecystectomy.  Biliary ductal system normal.    Pancreas normal.  Portal vein patent.  Stomach normal.  Spleen mildly enlarged.  Adrenal glands normal.    Kidneys normal enhancement with no hydronephrosis.    Aorta tapers normally.    Bowel loops normal with no thickening or dilation.  Moderate stool throughout the colon.    Mild generalized mesenteric haziness more significant centrally.    Mesentery  normal with no inflammatory change or ascites.  No lymphadenopathy in the abdomen or pelvis.    Bladder and rectum normal.    Musculoskeletal system: No evidence of malignancy.  Impression: No evidence of recurrent malignancy or metastatic disease.    Slightly irregular liver contour suggesting cirrhosis, similar to the prior.    Electronically signed by: Philip Lin MD  Date:    11/27/2024  Time:    13:33      Laboratory Data:  All pertinent labs have been reviewed.  I have reviewed the following records today:     Details:   [x] Labs [] Internal  [] External    [] Micro [] Internal  [] External    [] Pathology [] Internal  [] External    [x] Imaging [] Internal  [] External    [x] Cardiology Procedures [] Internal  [] External    [x] Provider Records [] Internal  [] External    [] Other [] Internal  [] External      Labs:   Lab Results   Component Value Date    WBC 3.60 (L) 03/06/2025    RBC 4.48 03/06/2025    HGB 12.0 03/06/2025    HCT 37.5 03/06/2025    MCV 83.7 03/06/2025    MCH 26.8 (L) 03/06/2025    PLT 79 (L) 03/06/2025     03/06/2025    K 4.6 03/06/2025     03/06/2025    BUN 21.3 (H) 03/06/2025    CREATININE 1.21 (H) 03/06/2025    AST 18 03/06/2025    ALT 14 03/06/2025    BILITOT 0.5 03/06/2025    TSH 2.249 06/21/2024    HGBA1C 6.8 04/09/2025      Medications:     Medication List with Changes/Refills   Current Medications    CONSTULOSE 10 GRAM/15 ML SOLUTION    Take 30 mLs by mouth 3 (three) times daily.    ELIQUIS 5 MG TAB    Take 1 tablet by mouth twice daily    FOLIC ACID-VIT B6-VIT B12 0.8- MG-MG-MCG TAB    Take 1 tablet by mouth once daily.    FUROSEMIDE (LASIX) 40 MG TABLET    Take 40 mg by mouth once daily.    GABAPENTIN (NEURONTIN) 300 MG CAPSULE    Take 2 capsules (600 mg total) by mouth every evening.    RIFAXIMIN (XIFAXAN) 200 MG TAB    Take 550 mg by mouth 2 (two) times daily.    SERTRALINE (ZOLOFT) 50 MG TABLET    Take 1 tablet by mouth once daily    TIRZEPATIDE  (MOUNJARO) 2.5 MG/0.5 ML PNIJ    Inject 2.5 mg into the skin every 7 days.   Changed and/or Refilled Medications    Modified Medication Previous Medication    SOLIFENACIN (VESICARE) 10 MG TABLET solifenacin (VESICARE) 10 MG tablet       Take 1 tablet (10 mg total) by mouth once daily.    Take 10 mg by mouth once daily.   Discontinued Medications    INSULIN GLARGINE, TOUJEO, (TOUJEO SOLOSTAR U-300 INSULIN) 300 UNIT/ML (1.5 ML) INPN PEN    Inject 10 Units into the skin every evening.     Assessment:     1. Type 2 diabetes mellitus with hyperglycemia, with long-term current use of insulin      Plan:   Indu was seen today for diabetes.    Diagnoses and all orders for this visit:    Type 2 diabetes mellitus with hyperglycemia, with long-term current use of insulin    Other orders  -     solifenacin (VESICARE) 10 MG tablet; Take 1 tablet (10 mg total) by mouth once daily.    Continue mounjaro 2.5 mg subQ weekly  Stop insulin  F/u in 3 weeks for dose adjustments  Call prior if you have issues with BG controls    Follow Up:   Follow up in about 3 weeks (around 5/8/2025) for Virtual Visit DM.    I spent greater than 15 minutes today both in chart review and greater than 50% of that time in discussion with the patient regarding health maintenance, diagnoses, diagnostic tests, medications, treatments, symptom management, expected results and adverse effects. Patient verbalized understanding and all questions were answered.

## 2025-05-06 ENCOUNTER — PATIENT MESSAGE (OUTPATIENT)
Dept: NEPHROLOGY | Facility: CLINIC | Age: 70
End: 2025-05-06
Payer: MEDICARE

## 2025-05-09 DIAGNOSIS — Z79.4 TYPE 2 DIABETES MELLITUS WITH HYPERGLYCEMIA, WITH LONG-TERM CURRENT USE OF INSULIN: ICD-10-CM

## 2025-05-09 DIAGNOSIS — E11.65 TYPE 2 DIABETES MELLITUS WITH HYPERGLYCEMIA, WITH LONG-TERM CURRENT USE OF INSULIN: ICD-10-CM

## 2025-05-09 RX ORDER — TIRZEPATIDE 5 MG/.5ML
5 INJECTION, SOLUTION SUBCUTANEOUS
Qty: 2 ML | Refills: 0 | Status: SHIPPED | OUTPATIENT
Start: 2025-05-09

## 2025-05-09 RX ORDER — TIRZEPATIDE 2.5 MG/.5ML
2.5 INJECTION, SOLUTION SUBCUTANEOUS
Qty: 2 ML | Refills: 0 | Status: CANCELLED | OUTPATIENT
Start: 2025-05-09

## 2025-05-09 NOTE — TELEPHONE ENCOUNTER
Spoke to patient over phone  She completed her last dose of 2.5 mg tirzepatide today  Her BG has risen  Starting next week, we will increase her dose to 5 mg subQ weekly, new Rx sent  Keep f/u as planned

## 2025-05-09 NOTE — TELEPHONE ENCOUNTER
Copied from CRM #[id number]. Topic: Medications - Medication Question  >> May 9, 2025  8:36 AM Najma wrote:  .Who Called: Indu Latha    Refill or New Rx:Refill  RX Name and Strength:tirzepatide (MOUNJARO) 2.5 mg/0.5 mL PnIj  How is the patient currently taking it? (ex. 1XDay):  Is this a 30 day or 90 day RX:  Local or Mail Order:  List of preferred pharmacies on file (remove unneeded): [unfilled]  If different Pharmacy is requested, enter Pharmacy information here including location and phone number:    Ordering Provider:[facility]      Preferred Method of Contact: Phone Call  Patient's Preferred Phone Number on File: [phone]   Best Call Back Number, if different:  Additional Information: pt states tirzepatide (MOUNJARO) 2.5 mg/0.5 mL PnIj - wants to discuss increase - pt is out of med

## 2025-05-15 ENCOUNTER — OFFICE VISIT (OUTPATIENT)
Dept: FAMILY MEDICINE | Facility: CLINIC | Age: 70
End: 2025-05-15
Payer: MEDICARE

## 2025-05-15 DIAGNOSIS — E11.65 TYPE 2 DIABETES MELLITUS WITH HYPERGLYCEMIA, WITH LONG-TERM CURRENT USE OF INSULIN: Primary | ICD-10-CM

## 2025-05-15 DIAGNOSIS — Z79.4 TYPE 2 DIABETES MELLITUS WITH HYPERGLYCEMIA, WITH LONG-TERM CURRENT USE OF INSULIN: Primary | ICD-10-CM

## 2025-05-15 PROCEDURE — 3066F NEPHROPATHY DOC TX: CPT | Mod: CPTII,95,, | Performed by: INTERNAL MEDICINE

## 2025-05-15 PROCEDURE — 98005 SYNCH AUDIO-VIDEO EST LOW 20: CPT | Mod: 95,,, | Performed by: INTERNAL MEDICINE

## 2025-05-15 PROCEDURE — 1159F MED LIST DOCD IN RCRD: CPT | Mod: CPTII,95,, | Performed by: INTERNAL MEDICINE

## 2025-05-15 PROCEDURE — 1160F RVW MEDS BY RX/DR IN RCRD: CPT | Mod: CPTII,95,, | Performed by: INTERNAL MEDICINE

## 2025-05-15 PROCEDURE — 3044F HG A1C LEVEL LT 7.0%: CPT | Mod: CPTII,95,, | Performed by: INTERNAL MEDICINE

## 2025-05-15 RX ORDER — TIRZEPATIDE 5 MG/.5ML
5 INJECTION, SOLUTION SUBCUTANEOUS
Qty: 2 ML | Refills: 1 | Status: SHIPPED | OUTPATIENT
Start: 2025-05-15

## 2025-05-15 NOTE — PROGRESS NOTES
TELEMEDICINE VISIT     Patient ID: Indu Azul is a 69 y.o. female.  MRN: [id number]  : 1955    Subjective:        TELEMEDICINE  The patient location is: home  The chief complaint leading to consultation is: Diabetes     Visit type: Virtual visit with synchronous audio and video    Total time spent with patient: 15 minutes  20 minutes of total time spent on the encounter, which includes face to face time and non-face to face time preparing to see the patient (eg, review of tests), obtaining and/or reviewing separately obtained history, documenting clinical information in the electronic or other health record, independently interpreting results (not separately reported) and communicating results to the patient/family/caregiver, or care coordination (not separately reported).    Each patient to whom he or she provides medical services by telemedicine is:  (1) informed of the relationship between the physician and patient and the respective role of any other health care provider with respect to management of the patient; and (2) notified that he or she may decline to receive medical services by telemedicine and may withdraw from such care at any time.    HPI: Diabetes  No MedicAlert identification noted. The initial diagnosis of diabetes was made 2008 years ago. Pertinent negatives for hypoglycemia include no confusion, dizziness, headaches, hunger, mood changes, nervousness/anxiousness, pallor, seizures, sleepiness, speech difficulty, sweats or tremors. Associated symptoms include polyuria and weight loss. Pertinent negatives for diabetes include no blurred vision, no chest pain, no fatigue, no foot paresthesias, no foot ulcerations, no polydipsia, no polyphagia, no visual change and no weakness. Pertinent negatives for hypoglycemia complications include no blackouts, no hospitalization, no nocturnal hypoglycemia, no required assistance and no required glucagon injection. Symptoms are stable. Diabetic  complications include autonomic neuropathy. Pertinent negatives for diabetic complications include no CVA, heart disease, nephropathy, peripheral neuropathy, PVD or retinopathy. There are no known risk factors for coronary artery disease. Current diabetic treatment includes diet and insulin injections. She is compliant with treatment all of the time. She is currently taking insulin at bedtime. Insulin injections are given by patient. Her weight is fluctuating minimally. She is following a generally healthy diet. When asked about meal planning, she reported none. She has not had a previous visit with a dietitian. She rarely participates in exercise. She monitors blood glucose at home 5+ x per day. She monitors urine at home <1 x per month. Blood glucose monitoring compliance is good. Her home blood glucose trend is fluctuating minimally. She sees a podiatrist.Eye exam is current.      On mounjaro 5 mg now for 1 week  Fasting BG around 140  Evening around 180-190  Not on novolog currently down 23# in 5 weeks  Eating much less  Sausage biscuit in AM no lunch, small supper      Health maintenance reviewed with the patient.  Health maintenance completed:  Health Maintenance Topics with due status: Not Due       Topic Last Completion Date    DEXA Scan 07/31/2023    Colorectal Cancer Screening 04/20/2024    Lipid Panel 06/21/2024    Hemoglobin A1c 04/09/2025      Health maintenance due:  Health Maintenance Due   Topic Date Due    COVID-19 Vaccine (1) Never done    TETANUS VACCINE  Never done    Shingles Vaccine (1 of 2) Never done    RSV Vaccine (Age 60+ and Pregnant patients) (1 - Risk 60-74 years 1-dose series) Never done    Pneumococcal Vaccines (Age 50+) (2 of 2 - PCV) 11/30/2017    Diabetes Urine Screening  06/15/2024    LDCT Lung Screen  09/11/2024    Foot Exam  06/24/2025    Diabetic Eye Exam  06/28/2025    Mammogram  08/12/2025      ROS:  Review of Systems   Constitutional:  Positive for weight loss. Negative for  fatigue.   Eyes:  Negative for blurred vision.   Cardiovascular:  Negative for chest pain.   Endocrine: Positive for polyuria. Negative for polydipsia and polyphagia.   Integumentary:  Negative for pallor.   Neurological:  Negative for dizziness, tremors, seizures, speech difficulty, weakness and headaches.   Psychiatric/Behavioral:  Negative for confusion. The patient is not nervous/anxious.       History:     Past Medical History:   Diagnosis Date    Anesthesia complication     trouble awakening    Charcot's joint of foot, left     Chronic kidney disease, unspecified     Cirrhosis of liver without ascites     Depression     Diabetes mellitus, type II, insulin dependent     Diabetic neuropathy     GERD (gastroesophageal reflux disease)     H/O: hysterectomy     Hepatic steatosis     Malignant neoplasm of ascending colon     Obesity, unspecified     Overactive bladder     Thrombocytopenia, unspecified     Vitamin D deficiency       Past Surgical History:   Procedure Laterality Date    APPENDECTOMY      BREAST BIOPSY       SECTION  05/13/88    x3    charcot-leyden crystals identification      CHOLECYSTECTOMY      COLON SURGERY  1923    COLONOSCOPY N/A 2023    Procedure: COLON;  Surgeon: Luis Amador MD;  Location: [facility] ENDOSCOPY;  Service: Gastroenterology;  Laterality: N/A;    COLONOSCOPY N/A 2024    Procedure: COLON;  Surgeon: Mor Porter MD;  Location: [facility];  Service: Gastroenterology;  Laterality: N/A;    COLONOSCOPY, WITH 1 OR MORE BIOPSIES  2023    Procedure: COLONOSCOPY, WITH 1 OR MORE BIOPSIES;  Surgeon: Luis Amador MD;  Location: [facility] ENDOSCOPY;  Service: Gastroenterology;;    COLONOSCOPY, WITH 1 OR MORE BIOPSIES N/A 2024    Procedure: COLONOSCOPY, WITH 1 OR MORE BIOPSIES;  Surgeon: Mor Porter MD;  Location: [facility] OR;  Service: Gastroenterology;  Laterality: N/A;    COLONOSCOPY, WITH DIRECTED SUBMUCOSAL INJECTION   08/28/2023    Procedure: COLONOSCOPY, WITH DIRECTED SUBMUCOSAL INJECTION;  Surgeon: Luis Amador MD;  Location: [facility] ENDOSCOPY;  Service: Gastroenterology;;  8cc Colon Tattoo    COLONOSCOPY, WITH POLYPECTOMY USING SNARE  08/28/2023    Procedure: COLONOSCOPY, WITH POLYPECTOMY USING SNARE;  Surgeon: Luis Amador MD;  Location: [facility] ENDOSCOPY;  Service: Gastroenterology;;    ESOPHAGOGASTRODUODENOSCOPY N/A 08/28/2023    Procedure: DOUBLE;  Surgeon: Luis Amador MD;  Location: [facility] ENDOSCOPY;  Service: Gastroenterology;  Laterality: N/A;    ESOPHAGOGASTRODUODENOSCOPY N/A 04/19/2024    Procedure: EGD;  Surgeon: Nolan Massey MD;  Location: [facility] ENDOSCOPY;  Service: Gastroenterology;  Laterality: N/A;    HEMORRHOID SURGERY      HYSTERECTOMY  1990    total    INSERTION OF SUBCUTANEOUS PORT Right     INSERTION OF TUNNELED CENTRAL VENOUS CATHETER (CVC) WITH SUBCUTANEOUS PORT Right 11/20/2023    Procedure: [id number];  Surgeon: Timothy Russ MD;  Location: [facility];  Service: General;  Laterality: Right;    POLYPECTOMY      right foot surgery Right 02/01/2022    SMALL INTESTINE SURGERY  10/18/23    XI ROBOTIC COLECTOMY, RIGHT N/A 10/19/2023    Procedure: XI ROBOTIC COLECTOMY, RIGHT;  Surgeon: Timothy Russ MD;  Location: [facility];  Service: General;  Laterality: N/A;     Family History   Problem Relation Name Age of Onset    Diabetes Mother Estela Azul     Alzheimer's disease Mother Estela Azul     Diabetes Father Malcom Azul     Leukemia Father Malcom Azul     Cancer Father Malcom Azul     Diabetes Sister Maria L Latha Grace     Liver cancer Sister Maria L Latha Grace     Diabetes Brother Chris Latha       Social History     Tobacco Use    Smoking status: Every Day     Current packs/day: 0.50     Average packs/day: 0.5 packs/day for 62.4 years (31.2 ttl pk-yrs)     Types: Cigarettes     Start date: 1/1/2003     Passive exposure:  Current    Smokeless tobacco: Never   Substance and Sexual Activity    Alcohol use: Not Currently     Comment: occassionally    Drug use: Never    Sexual activity: Not Currently     Partners: Male     Birth control/protection: None          Allergies: Review of patient's allergies indicates:  No Known Allergies  Objective:   There were no vitals filed for this visit.      Physical Examination: Physical exam was not performed during this virtual visit.  Physical Exam    Labs:   Diagnostic Tests:  Significant Imaging: I have reviewed and interpreted all pertinent imaging results/findings.  CT Chest Abdomen Pelvis With IV Contrast (XPD) Routine Oral Contrast  Narrative: EXAMINATION:  CT CHEST ABDOMEN PELVIS WITH IV CONTRAST (XPD)    CLINICAL HISTORY:  Colon cancer, assess treatment response; Malignant neoplasm of ascending colon    TECHNIQUE:  Low dose axial images, sagittal and coronal reformations were obtained from the thoracic inlet to the pubic symphysis following the IV administration of 100 mL of Omnipaque 350 .  Oral contrast given.  DLP 1654.  Automated exposure control used.    COMPARISON:  05/16/2024    FINDINGS:  Chest: Pericardium, trachea, esophagus normal.  No mediastinal mass or lymphadenopathy.  Right-sided port catheter.  No hilar enlargement.    Soft tissues of the surrounding chest wall are normal.    Lungs clear without infiltrate or pulmonary nodule.    Abdomen and pelvis: Liver normal enhancement with no focal lesion.  Slightly irregular liver contour.    Cholecystectomy.  Biliary ductal system normal.    Pancreas normal.  Portal vein patent.  Stomach normal.  Spleen mildly enlarged.  Adrenal glands normal.    Kidneys normal enhancement with no hydronephrosis.    Aorta tapers normally.    Bowel loops normal with no thickening or dilation.  Moderate stool throughout the colon.    Mild generalized mesenteric haziness more significant centrally.    Mesentery normal with no inflammatory change or  ascites.  No lymphadenopathy in the abdomen or pelvis.    Bladder and rectum normal.    Musculoskeletal system: No evidence of malignancy.  Impression: No evidence of recurrent malignancy or metastatic disease.    Slightly irregular liver contour suggesting cirrhosis, similar to the prior.    Electronically signed by: Philip Lin MD  Date:    11/27/2024  Time:    13:33      Laboratory Data:  All pertinent labs have been reviewed.  I have reviewed the following records today:     Details:   [x] Labs [] Internal  [] External    [] Micro [] Internal  [] External    [] Pathology [] Internal  [] External    [x] Imaging [] Internal  [] External    [x] Cardiology Procedures [] Internal  [] External    [x] Provider Records [] Internal  [] External    [] Other [] Internal  [] External      Labs:   Lab Results   Component Value Date    WBC 3.60 (L) 03/06/2025    RBC 4.48 03/06/2025    HGB 12.0 03/06/2025    HCT 37.5 03/06/2025    MCV 83.7 03/06/2025    MCH 26.8 (L) 03/06/2025    PLT 79 (L) 03/06/2025     (H) 03/06/2025     03/06/2025    K 4.6 03/06/2025     03/06/2025    BUN 21.3 (H) 03/06/2025    CREATININE 1.21 (H) 03/06/2025    AST 18 03/06/2025    ALT 14 03/06/2025    BILITOT 0.5 03/06/2025    TSH 2.249 06/21/2024    HGBA1C 6.8 04/09/2025      Medications:     Medication List with Changes/Refills   Current Medications    CONSTULOSE 10 GRAM/15 ML SOLUTION    Take 30 mLs by mouth 3 (three) times daily.    ELIQUIS 5 MG TAB    Take 1 tablet by mouth twice daily    FOLIC ACID-VIT B6-VIT B12 0.8- MG-MG-MCG TAB    Take 1 tablet by mouth once daily.    FUROSEMIDE (LASIX) 40 MG TABLET    Take 40 mg by mouth once daily.    GABAPENTIN (NEURONTIN) 300 MG CAPSULE    Take 2 capsules (600 mg total) by mouth every evening.    RIFAXIMIN (XIFAXAN) 200 MG TAB    Take 550 mg by mouth 2 (two) times daily.    SERTRALINE (ZOLOFT) 50 MG TABLET    Take 1 tablet by mouth once daily    SOLIFENACIN (VESICARE) 10 MG  TABLET    Take 1 tablet (10 mg total) by mouth once daily.   Changed and/or Refilled Medications    Modified Medication Previous Medication    TIRZEPATIDE (MOUNJARO) 5 MG/0.5 ML PNIJ tirzepatide (MOUNJARO) 5 mg/0.5 mL PnIj       Inject 5 mg into the skin every 7 days.    Inject 5 mg into the skin every 7 days.     Assessment:     1. Type 2 diabetes mellitus with hyperglycemia, with long-term current use of insulin      Plan:   Indu was seen today for diabetes.    Diagnoses and all orders for this visit:    Type 2 diabetes mellitus with hyperglycemia, with long-term current use of insulin  -     tirzepatide (MOUNJARO) 5 mg/0.5 mL PnIj; Inject 5 mg into the skin every 7 days.    I am concerned patient is not eating enough protein, getting proper nutrients  Work on increasing protein intake, vegetable intake, berries, nuts to help work on proper nutrition  Make sure to walk/exercise within the house  Continue Mounjaro at this dose  If evening BG remains >180, then take novolog 5 units just before her evening meal  F/u in 2 mos    Follow Up:   Follow up in about 8 weeks (around 7/7/2025) for Follow Up - Chronic Conditions.    I spent greater than 20 minutes today both in chart review and greater than 50% of that time in discussion with the patient regarding health maintenance, diagnoses, diagnostic tests, medications, treatments, symptom management, expected results and adverse effects. Patient verbalized understanding and all questions were answered.

## 2025-05-23 ENCOUNTER — LAB VISIT (OUTPATIENT)
Dept: LAB | Facility: HOSPITAL | Age: 70
End: 2025-05-23
Attending: NURSE PRACTITIONER
Payer: MEDICARE

## 2025-05-23 DIAGNOSIS — C18.2 MALIGNANT NEOPLASM OF ASCENDING COLON: ICD-10-CM

## 2025-05-23 DIAGNOSIS — N18.32 STAGE 3B CHRONIC KIDNEY DISEASE: ICD-10-CM

## 2025-05-23 LAB
ALBUMIN SERPL-MCNC: 3.7 G/DL (ref 3.4–4.8)
ALBUMIN/GLOB SERPL: 0.9 RATIO (ref 1.1–2)
ALP SERPL-CCNC: 87 UNIT/L (ref 40–150)
ALT SERPL-CCNC: 13 UNIT/L (ref 0–55)
ANION GAP SERPL CALC-SCNC: 8 MEQ/L
AST SERPL-CCNC: 17 UNIT/L (ref 11–45)
BACTERIA #/AREA URNS AUTO: ABNORMAL /HPF
BASOPHILS # BLD AUTO: 0.06 X10(3)/MCL
BASOPHILS NFR BLD AUTO: 1 %
BILIRUB SERPL-MCNC: 0.5 MG/DL
BILIRUB UR QL STRIP.AUTO: NEGATIVE
BUN SERPL-MCNC: 20.2 MG/DL (ref 9.8–20.1)
CALCIUM SERPL-MCNC: 9.1 MG/DL (ref 8.4–10.2)
CHLORIDE SERPL-SCNC: 100 MMOL/L (ref 98–107)
CLARITY UR: ABNORMAL
CO2 SERPL-SCNC: 23 MMOL/L (ref 23–31)
COLOR UR AUTO: YELLOW
CREAT SERPL-MCNC: 1.41 MG/DL (ref 0.55–1.02)
CREAT UR-MCNC: 157.5 MG/DL (ref 45–106)
CREAT/UREA NIT SERPL: 14
EOSINOPHIL # BLD AUTO: 0.33 X10(3)/MCL (ref 0–0.9)
EOSINOPHIL NFR BLD AUTO: 5.6 %
ERYTHROCYTE [DISTWIDTH] IN BLOOD BY AUTOMATED COUNT: 14.5 % (ref 11.5–17)
GFR SERPLBLD CREATININE-BSD FMLA CKD-EPI: 41 ML/MIN/1.73/M2
GLOBULIN SER-MCNC: 4 GM/DL (ref 2.4–3.5)
GLUCOSE SERPL-MCNC: 151 MG/DL (ref 82–115)
GLUCOSE UR QL STRIP: NEGATIVE
HCT VFR BLD AUTO: 41.5 % (ref 37–47)
HGB BLD-MCNC: 13.7 G/DL (ref 12–16)
HGB UR QL STRIP: ABNORMAL
IMM GRANULOCYTES # BLD AUTO: 0.01 X10(3)/MCL (ref 0–0.04)
IMM GRANULOCYTES NFR BLD AUTO: 0.2 %
KETONES UR QL STRIP: NEGATIVE
LEUKOCYTE ESTERASE UR QL STRIP: ABNORMAL
LYMPHOCYTES # BLD AUTO: 1.23 X10(3)/MCL (ref 0.6–4.6)
LYMPHOCYTES NFR BLD AUTO: 20.7 %
MCH RBC QN AUTO: 26.2 PG (ref 27–31)
MCHC RBC AUTO-ENTMCNC: 33 G/DL (ref 33–36)
MCV RBC AUTO: 79.5 FL (ref 80–94)
MONOCYTES # BLD AUTO: 0.36 X10(3)/MCL (ref 0.1–1.3)
MONOCYTES NFR BLD AUTO: 6.1 %
NEUTROPHILS # BLD AUTO: 3.95 X10(3)/MCL (ref 2.1–9.2)
NEUTROPHILS NFR BLD AUTO: 66.4 %
NITRITE UR QL STRIP: NEGATIVE
NRBC BLD AUTO-RTO: 0 %
PH UR STRIP: 5.5 [PH]
PLATELET # BLD AUTO: 102 X10(3)/MCL (ref 130–400)
PLATELETS.RETICULATED NFR BLD AUTO: 7.8 % (ref 0.9–11.2)
PMV BLD AUTO: 11.6 FL (ref 7.4–10.4)
POTASSIUM SERPL-SCNC: 4.8 MMOL/L (ref 3.5–5.1)
PROT SERPL-MCNC: 7.7 GM/DL (ref 5.8–7.6)
PROT UR QL STRIP: 30
PROT UR STRIP-MCNC: 35.7 MG/DL
PTH-INTACT SERPL-MCNC: 96.9 PG/ML (ref 8.7–77)
RBC # BLD AUTO: 5.22 X10(6)/MCL (ref 4.2–5.4)
RBC #/AREA URNS AUTO: ABNORMAL /HPF
SODIUM SERPL-SCNC: 131 MMOL/L (ref 136–145)
SP GR UR STRIP.AUTO: 1.02 (ref 1–1.03)
SQUAMOUS #/AREA URNS AUTO: ABNORMAL /HPF
URINE PROTEIN/CREATININE RATIO (OLG): 0.2
UROBILINOGEN UR STRIP-ACNC: 0.2
WBC # BLD AUTO: 5.94 X10(3)/MCL (ref 4.5–11.5)
WBC #/AREA URNS AUTO: ABNORMAL /HPF

## 2025-05-23 PROCEDURE — 36415 COLL VENOUS BLD VENIPUNCTURE: CPT

## 2025-05-23 PROCEDURE — 80053 COMPREHEN METABOLIC PANEL: CPT

## 2025-05-23 PROCEDURE — 83970 ASSAY OF PARATHORMONE: CPT

## 2025-05-23 PROCEDURE — 84156 ASSAY OF PROTEIN URINE: CPT

## 2025-05-23 PROCEDURE — 85025 COMPLETE CBC W/AUTO DIFF WBC: CPT

## 2025-05-23 PROCEDURE — 81003 URINALYSIS AUTO W/O SCOPE: CPT

## 2025-05-23 PROCEDURE — 87077 CULTURE AEROBIC IDENTIFY: CPT

## 2025-05-26 LAB — BACTERIA UR CULT: ABNORMAL

## 2025-05-27 ENCOUNTER — OFFICE VISIT (OUTPATIENT)
Dept: NEPHROLOGY | Facility: CLINIC | Age: 70
End: 2025-05-27
Payer: MEDICARE

## 2025-05-27 VITALS
DIASTOLIC BLOOD PRESSURE: 75 MMHG | OXYGEN SATURATION: 99 % | RESPIRATION RATE: 18 BRPM | TEMPERATURE: 98 F | SYSTOLIC BLOOD PRESSURE: 115 MMHG | WEIGHT: 230 LBS | HEIGHT: 62 IN | HEART RATE: 74 BPM | BODY MASS INDEX: 42.33 KG/M2

## 2025-05-27 DIAGNOSIS — N18.32 STAGE 3B CHRONIC KIDNEY DISEASE: Primary | ICD-10-CM

## 2025-05-27 DIAGNOSIS — E11.22 TYPE 2 DIABETES MELLITUS WITH STAGE 3B CHRONIC KIDNEY DISEASE, WITH LONG-TERM CURRENT USE OF INSULIN: ICD-10-CM

## 2025-05-27 DIAGNOSIS — Z79.4 TYPE 2 DIABETES MELLITUS WITH STAGE 3B CHRONIC KIDNEY DISEASE, WITH LONG-TERM CURRENT USE OF INSULIN: ICD-10-CM

## 2025-05-27 DIAGNOSIS — N25.81 SECONDARY HYPERPARATHYROIDISM OF RENAL ORIGIN: ICD-10-CM

## 2025-05-27 DIAGNOSIS — E66.813 CLASS 3 SEVERE OBESITY WITH SERIOUS COMORBIDITY AND BODY MASS INDEX (BMI) OF 40.0 TO 44.9 IN ADULT, UNSPECIFIED OBESITY TYPE: ICD-10-CM

## 2025-05-27 DIAGNOSIS — E87.1 HYPONATREMIA: ICD-10-CM

## 2025-05-27 DIAGNOSIS — N18.32 TYPE 2 DIABETES MELLITUS WITH STAGE 3B CHRONIC KIDNEY DISEASE, WITH LONG-TERM CURRENT USE OF INSULIN: ICD-10-CM

## 2025-05-27 DIAGNOSIS — N39.0 UTI (URINARY TRACT INFECTION), UNCOMPLICATED: ICD-10-CM

## 2025-05-27 DIAGNOSIS — E66.01 CLASS 3 SEVERE OBESITY WITH SERIOUS COMORBIDITY AND BODY MASS INDEX (BMI) OF 40.0 TO 44.9 IN ADULT, UNSPECIFIED OBESITY TYPE: ICD-10-CM

## 2025-05-27 PROCEDURE — 3044F HG A1C LEVEL LT 7.0%: CPT | Mod: CPTII,S$GLB,,

## 2025-05-27 PROCEDURE — 99214 OFFICE O/P EST MOD 30 MIN: CPT | Mod: S$GLB,,,

## 2025-05-27 PROCEDURE — 99999 PR PBB SHADOW E&M-EST. PATIENT-LVL III: CPT | Mod: PBBFAC,,,

## 2025-05-27 PROCEDURE — 3066F NEPHROPATHY DOC TX: CPT | Mod: CPTII,S$GLB,,

## 2025-05-27 PROCEDURE — 1159F MED LIST DOCD IN RCRD: CPT | Mod: CPTII,S$GLB,,

## 2025-05-27 PROCEDURE — 3008F BODY MASS INDEX DOCD: CPT | Mod: CPTII,S$GLB,,

## 2025-05-27 PROCEDURE — 3074F SYST BP LT 130 MM HG: CPT | Mod: CPTII,S$GLB,,

## 2025-05-27 PROCEDURE — 3078F DIAST BP <80 MM HG: CPT | Mod: CPTII,S$GLB,,

## 2025-05-27 RX ORDER — CEPHALEXIN 500 MG/1
500 CAPSULE ORAL EVERY 12 HOURS
Qty: 10 CAPSULE | Refills: 0 | Status: SHIPPED | OUTPATIENT
Start: 2025-05-27 | End: 2025-06-01

## 2025-05-27 NOTE — PROGRESS NOTES
[facility]phrology Ambulatory Progress Note      HPI  Indu Azul, 69 y.o. female, presents to office for a follow up visit for CKD 3; history of colon cancer and cirrhosis in remission. Patient is followed up by oncology.  Plans for scans next week to monitor for any recurrence.  Cardiology discontinued her Lasix and Aldactone and started her on compression device for lower extremity swelling which has significantly helped her edema.      Patient denies taking NSAIDs or new antibiotics. Also denies recent episode of dehydration, diarrhea, vomiting, acute illness, hospitalization, recent angiograms or exposure to IV radiocontrast.        Medical Diagnoses:   Past Medical History:   Diagnosis Date    Anesthesia complication     trouble awakening    Charcot's joint of foot, left     Chronic kidney disease, unspecified     Cirrhosis of liver without ascites     Depression     Diabetes mellitus, type II, insulin dependent     Diabetic neuropathy     GERD (gastroesophageal reflux disease)     H/O: hysterectomy     Hepatic steatosis     Malignant neoplasm of ascending colon     Obesity, unspecified     Overactive bladder     Thrombocytopenia, unspecified     Vitamin D deficiency      Patient Active Problem List   Diagnosis    Thrombocytopenia    Folate deficiency    Type 2 diabetes mellitus with hyperglycemia, with long-term current use of insulin    Long-term insulin use    Hyperlipidemia associated with type 2 diabetes mellitus    CKD stage 3 due to type 2 diabetes mellitus    Recurrent major depressive disorder, in full remission    Vitamin D deficiency    Hepatic steatosis    Other cirrhosis of liver    Diabetic polyneuropathy associated with type 2 diabetes mellitus    Gammopathy    Splenomegaly    Microcytic anemia    Malignant neoplasm of ascending colon    Pulmonary nodules    Immunodeficiency due to chemotherapy    Stage 3b chronic kidney disease    Iron deficiency anemia due to chronic blood loss    Leukopenia     Homocystinemia    Hyponatremia    Secondary hyperparathyroidism of renal origin    Class 3 severe obesity with serious comorbidity and body mass index (BMI) of 40.0 to 44.9 in adult    UTI (urinary tract infection), uncomplicated       Surgical History:   Past Surgical History:   Procedure Laterality Date    APPENDECTOMY      BREAST BIOPSY       SECTION  05/13/88    x3    charcot-leyden crystals identification      CHOLECYSTECTOMY      COLON SURGERY  1923    COLONOSCOPY N/A 2023    Procedure: COLON;  Surgeon: Luis Amador MD;  Location: [facility] ENDOSCOPY;  Service: Gastroenterology;  Laterality: N/A;    COLONOSCOPY N/A 2024    Procedure: COLON;  Surgeon: Mor Porter MD;  Location: [facility] OR;  Service: Gastroenterology;  Laterality: N/A;    COLONOSCOPY, WITH 1 OR MORE BIOPSIES  2023    Procedure: COLONOSCOPY, WITH 1 OR MORE BIOPSIES;  Surgeon: Luis Amador MD;  Location: [facility] ENDOSCOPY;  Service: Gastroenterology;;    COLONOSCOPY, WITH 1 OR MORE BIOPSIES N/A 2024    Procedure: COLONOSCOPY, WITH 1 OR MORE BIOPSIES;  Surgeon: Mor Porter MD;  Location: [facility];  Service: Gastroenterology;  Laterality: N/A;    COLONOSCOPY, WITH DIRECTED SUBMUCOSAL INJECTION  2023    Procedure: COLONOSCOPY, WITH DIRECTED SUBMUCOSAL INJECTION;  Surgeon: Luis Amador MD;  Location: [facility] ENDOSCOPY;  Service: Gastroenterology;;  8cc Colon Tattoo    COLONOSCOPY, WITH POLYPECTOMY USING SNARE  2023    Procedure: COLONOSCOPY, WITH POLYPECTOMY USING SNARE;  Surgeon: Luis Amador MD;  Location: [facility] ENDOSCOPY;  Service: Gastroenterology;;    ESOPHAGOGASTRODUODENOSCOPY N/A 2023    Procedure: DOUBLE;  Surgeon: Luis Amador MD;  Location: [facility] ENDOSCOPY;  Service: Gastroenterology;  Laterality: N/A;    ESOPHAGOGASTRODUODENOSCOPY N/A 2024    Procedure: EGD;  Surgeon: Nolan Massey MD;  Location:  [facility] ENDOSCOPY;  Service: Gastroenterology;  Laterality: N/A;    HEMORRHOID SURGERY      HYSTERECTOMY  1990    total    INSERTION OF SUBCUTANEOUS PORT Right     INSERTION OF TUNNELED CENTRAL VENOUS CATHETER (CVC) WITH SUBCUTANEOUS PORT Right 11/20/2023    Procedure: [id number];  Surgeon: Timothy Russ MD;  Location: [facility] OR;  Service: General;  Laterality: Right;    POLYPECTOMY      right foot surgery Right 02/01/2022    SMALL INTESTINE SURGERY  10/18/23    XI ROBOTIC COLECTOMY, RIGHT N/A 10/19/2023    Procedure: XI ROBOTIC COLECTOMY, RIGHT;  Surgeon: Timothy Russ MD;  Location: [facility] OR;  Service: General;  Laterality: N/A;       Family History:   Family History   Problem Relation Name Age of Onset    Diabetes Mother Estela Azul     Alzheimer's disease Mother Estela Azul     Diabetes Father Malcom Azul     Leukemia Father Malcom Azul     Cancer Father Malcom Azul     Diabetes Sister Maria L Azul Grace     Liver cancer Sister Maria L Azul Grace     Diabetes Brother Chris Azul        Social History:   Social History     Tobacco Use    Smoking status: Every Day     Current packs/day: 0.50     Average packs/day: 0.5 packs/day for 62.4 years (31.2 ttl pk-yrs)     Types: Cigarettes     Start date: 1/1/2003     Passive exposure: Current    Smokeless tobacco: Never   Substance Use Topics    Alcohol use: Not Currently     Comment: occassionally       Allergies:  Review of patient's allergies indicates:  No Known Allergies    Medications:  Outpatient Medications Marked as Taking for the 5/27/25 encounter (Office Visit) with Dez Burdick FNP   Medication Sig Dispense Refill    CONSTULOSE 10 gram/15 mL solution Take 30 mLs by mouth 3 (three) times daily.      ELIQUIS 5 mg Tab Take 1 tablet by mouth twice daily 180 tablet 0    gabapentin (NEURONTIN) 300 MG capsule Take 2 capsules (600 mg total) by mouth every evening. 60 capsule 6    sertraline (ZOLOFT) 50 MG tablet  "Take 1 tablet by mouth once daily 90 tablet 3    solifenacin (VESICARE) 10 MG tablet Take 1 tablet (10 mg total) by mouth once daily. 90 tablet 3    tirzepatide (MOUNJARO) 5 mg/0.5 mL PnIj Inject 5 mg into the skin every 7 days. 2 mL 1          Review of Systems:    Constitutional: Denies fever   Skin: Denies wounds, no rashes, no itching, no new skin lesions  Respiratory:  Denies cough, shortness of breath, or wheezing  Cardiovascular: Denies chest pain, palpitations: ++ significant improvement in lower extremity edema with compression device at home  Gastrointestional: Denies abdominal pain, nausea, vomiting, diarrhea, or constipation  Genitourinary: Denies dysuria, hematuria, foamy urine, or incontinence; reports able to empty bladder  Musculoskeletal: Denies back or flank pain  Neurological: Denies headaches, dizziness, paresthesias, tremors or focal weakness      Vital Signs:  /75 (BP Location: Left arm, Patient Position: Sitting)   Pulse 74   Temp 98 °F (36.7 °C) (Oral)   Resp 18   Ht 5' 2" (1.575 m)   Wt 104.3 kg (230 lb)   SpO2 99%   BMI 42.07 kg/m²   Body mass index is 42.07 kg/m².      Physical Exam:    General: no acute distress, awake, alert  Eyes: conjunctiva clear, eyelids without swelling  HENT: atraumatic, oropharynx and nasal mucosa patent  Neck: supple, trache midline, no JVD  Respiratory: equal, unlabored, clear to auscultation A/P  Cardiovascular: RRR without r rub  Edema:  Trace bilateral lower extremity   Gastrointestinal: soft, non-tender, non-distended; positive bowel sounds; no masses to palpation; no ascites  Musculoskeletal: ROM without new limitation or discomfort  Integumentary: warm, dry; no rashes, wounds, or skin lesions  Neurological: oriented x4, appropriate, no acute deficits; no asterixis      Labs:        Component Value Date/Time     (L) 05/23/2025 1359     03/06/2025 1318     06/01/2022 1340    K 4.8 05/23/2025 1359    K 4.6 03/06/2025 1318    K " 4.9 06/01/2022 1340     05/23/2025 1359     03/06/2025 1318     06/01/2022 1340    CO2 23 05/23/2025 1359    CO2 26 03/06/2025 1318    CO2 29 06/01/2022 1340    BUN 20.2 (H) 05/23/2025 1359    BUN 21.3 (H) 03/06/2025 1318    BUN 22 06/01/2022 1340    CREATININE 1.41 (H) 05/23/2025 1359    CREATININE 1.21 (H) 03/06/2025 1318    CREATININE 1.18 (H) 01/08/2025 1109    CREATININE 1.37 (H) 11/26/2024 0951    CREATININE 1.32 (H) 06/01/2022 1340    CALCIUM 9.1 05/23/2025 1359    CALCIUM 9.3 03/06/2025 1318    CALCIUM 8.7 (L) 06/01/2022 1340    PHOS 3.8 01/08/2025 1109    PHOS 3.1 09/05/2024 1340    PTH 96.9 (H) 05/23/2025 1359    PTH 70.5 01/08/2025 1109    PTH 46.5 09/05/2024 1340           Component Value Date/Time    WBC 5.94 05/23/2025 1359    WBC 3.60 (L) 03/06/2025 1318    HGB 13.7 05/23/2025 1359    HGB 12.0 03/06/2025 1318    HCT 41.5 05/23/2025 1359    HCT 37.5 03/06/2025 1318     (L) 05/23/2025 1359    PLT 79 (L) 03/06/2025 1318       Urine Creatinine   Date Value Ref Range Status   05/23/2025 157.5 (H) 45.0 - 106.0 mg/dL Final   01/08/2025 40.4 (L) 45.0 - 106.0 mg/dL Final       Urine Protein Level   Date Value Ref Range Status   05/23/2025 35.7 mg/dL Final   01/08/2025 10.0 mg/dL Final       Impression:  1. Stage 3b chronic kidney disease        2. Type 2 diabetes mellitus with stage 3b chronic kidney disease, with long-term current use of insulin        3. Hyponatremia  Sodium, Random Urine    Protein, Random Urine    Creatinine, Random Urine      4. Secondary hyperparathyroidism of renal origin        5. Class 3 severe obesity with serious comorbidity and body mass index (BMI) of 40.0 to 44.9 in adult, unspecified obesity type        6. UTI (urinary tract infection), uncomplicated            CKD 3b related to nephrosclerosis  Cirrhosis  Colon cancer in remission--> has oncology scans next week  New onset hyponatremia.  Patient has labs ordered next week with Oncology.  I will forward  them to my inbox to check for accuracy.  I will also add urine studies to check fractional excretion of urinary sodium.  Patient is drinking over 100 oz of fluid daily.  Discuss with decreasing by 1-2 bottles of water daily.  Denies any symptoms of hyponatremia.  Of note she is on SSRI and history of cancer.  PTH mildly elevated  UTI    Plan:  Labs and urine studies on June 6.  Follow up the week after    Decrease total fluid intake by about 20-30 oz    Keflex 500 b.i.d. x5 days    Renal wise stable         Dez BARRIOS-MELLISA        This note was created with the assistance of [employer] voice recognition software or phone dictation. There may be transcription errors as a result of using this technology however minimal. Effort has been made to assure accuracy of transcription but any obvious errors or omissions should be clarified with the author of the document.

## 2025-05-27 NOTE — PATIENT INSTRUCTIONS
Decrease total fluid intake by 20-30 oz daily.    Goal is total fluid intake about 50-60  oz daily    Keflex twice daily for 5 days for UTI    Please give urine sample on June 6th and repeat the lab on June 6. I will have it sent to my inbox

## 2025-06-06 ENCOUNTER — HOSPITAL ENCOUNTER (OUTPATIENT)
Dept: RADIOLOGY | Facility: HOSPITAL | Age: 70
Discharge: HOME | End: 2025-06-06
Attending: NURSE PRACTITIONER
Payer: MEDICARE

## 2025-06-06 ENCOUNTER — INFUSION (OUTPATIENT)
Dept: INFUSION THERAPY | Facility: HOSPITAL | Age: 70
End: 2025-06-06
Attending: INTERNAL MEDICINE
Payer: MEDICARE

## 2025-06-06 VITALS
HEART RATE: 81 BPM | DIASTOLIC BLOOD PRESSURE: 75 MMHG | RESPIRATION RATE: 16 BRPM | TEMPERATURE: 98 F | OXYGEN SATURATION: 96 % | SYSTOLIC BLOOD PRESSURE: 131 MMHG

## 2025-06-06 DIAGNOSIS — Z79.4 TYPE 2 DIABETES MELLITUS WITH HYPERGLYCEMIA, WITH LONG-TERM CURRENT USE OF INSULIN: ICD-10-CM

## 2025-06-06 DIAGNOSIS — E53.8 FOLATE DEFICIENCY: ICD-10-CM

## 2025-06-06 DIAGNOSIS — C18.2 MALIGNANT NEOPLASM OF ASCENDING COLON: ICD-10-CM

## 2025-06-06 DIAGNOSIS — E61.1 IRON DEFICIENCY: ICD-10-CM

## 2025-06-06 DIAGNOSIS — K74.69 OTHER CIRRHOSIS OF LIVER: ICD-10-CM

## 2025-06-06 DIAGNOSIS — R16.1 SPLENOMEGALY: ICD-10-CM

## 2025-06-06 DIAGNOSIS — D50.0 IRON DEFICIENCY ANEMIA DUE TO CHRONIC BLOOD LOSS: ICD-10-CM

## 2025-06-06 DIAGNOSIS — C18.2 MALIGNANT NEOPLASM OF ASCENDING COLON: Primary | ICD-10-CM

## 2025-06-06 DIAGNOSIS — D69.6 THROMBOCYTOPENIA: ICD-10-CM

## 2025-06-06 DIAGNOSIS — R79.83 HOMOCYSTINEMIA: ICD-10-CM

## 2025-06-06 DIAGNOSIS — E11.22 CKD STAGE 3 DUE TO TYPE 2 DIABETES MELLITUS: ICD-10-CM

## 2025-06-06 DIAGNOSIS — D72.819 LEUKOPENIA, UNSPECIFIED TYPE: ICD-10-CM

## 2025-06-06 DIAGNOSIS — R97.0 ELEVATED CEA: ICD-10-CM

## 2025-06-06 DIAGNOSIS — E11.65 TYPE 2 DIABETES MELLITUS WITH HYPERGLYCEMIA, WITH LONG-TERM CURRENT USE OF INSULIN: ICD-10-CM

## 2025-06-06 DIAGNOSIS — E11.42 DIABETIC POLYNEUROPATHY ASSOCIATED WITH TYPE 2 DIABETES MELLITUS: ICD-10-CM

## 2025-06-06 DIAGNOSIS — K76.0 HEPATIC STEATOSIS: ICD-10-CM

## 2025-06-06 DIAGNOSIS — N18.30 CKD STAGE 3 DUE TO TYPE 2 DIABETES MELLITUS: ICD-10-CM

## 2025-06-06 DIAGNOSIS — R91.8 PULMONARY NODULES: ICD-10-CM

## 2025-06-06 PROCEDURE — 71250 CT THORAX DX C-: CPT | Mod: TC

## 2025-06-06 PROCEDURE — 25000003 PHARM REV CODE 250: Performed by: INTERNAL MEDICINE

## 2025-06-06 PROCEDURE — A4216 STERILE WATER/SALINE, 10 ML: HCPCS | Performed by: INTERNAL MEDICINE

## 2025-06-06 PROCEDURE — 63600175 PHARM REV CODE 636 W HCPCS: Performed by: INTERNAL MEDICINE

## 2025-06-06 PROCEDURE — 96523 IRRIG DRUG DELIVERY DEVICE: CPT

## 2025-06-06 PROCEDURE — 25500020 PHARM REV CODE 255: Performed by: NURSE PRACTITIONER

## 2025-06-06 RX ORDER — DIATRIZOATE MEGLUMINE AND DIATRIZOATE SODIUM 660; 100 MG/ML; MG/ML
30 SOLUTION ORAL; RECTAL
Status: COMPLETED | OUTPATIENT
Start: 2025-06-06 | End: 2025-06-06

## 2025-06-06 RX ORDER — SODIUM CHLORIDE 0.9 % (FLUSH) 0.9 %
10 SYRINGE (ML) INJECTION
OUTPATIENT
Start: 2025-06-06

## 2025-06-06 RX ORDER — HEPARIN 100 UNIT/ML
500 SYRINGE INTRAVENOUS
OUTPATIENT
Start: 2025-06-06

## 2025-06-06 RX ORDER — HEPARIN 100 UNIT/ML
500 SYRINGE INTRAVENOUS
Status: DISCONTINUED | OUTPATIENT
Start: 2025-06-06 | End: 2025-06-06 | Stop reason: HOSPADM

## 2025-06-06 RX ORDER — SODIUM CHLORIDE 0.9 % (FLUSH) 0.9 %
10 SYRINGE (ML) INJECTION
Status: DISCONTINUED | OUTPATIENT
Start: 2025-06-06 | End: 2025-06-06 | Stop reason: HOSPADM

## 2025-06-06 RX ADMIN — DIATRIZOATE MEGLUMINE AND DIATRIZOATE SODIUM 30 ML: 660; 100 LIQUID ORAL; RECTAL at 11:06

## 2025-06-06 RX ADMIN — Medication 10 ML: at 01:06

## 2025-06-06 RX ADMIN — HEPARIN 500 UNITS: 100 SYRINGE at 01:06

## 2025-06-09 ENCOUNTER — RESULTS FOLLOW-UP (OUTPATIENT)
Dept: NEPHROLOGY | Facility: CLINIC | Age: 70
End: 2025-06-09

## 2025-06-10 ENCOUNTER — OFFICE VISIT (OUTPATIENT)
Dept: NEPHROLOGY | Facility: CLINIC | Age: 70
End: 2025-06-10
Payer: MEDICARE

## 2025-06-10 VITALS
SYSTOLIC BLOOD PRESSURE: 125 MMHG | WEIGHT: 230 LBS | HEART RATE: 79 BPM | OXYGEN SATURATION: 97 % | HEIGHT: 62 IN | BODY MASS INDEX: 42.33 KG/M2 | DIASTOLIC BLOOD PRESSURE: 78 MMHG | TEMPERATURE: 99 F | RESPIRATION RATE: 18 BRPM

## 2025-06-10 DIAGNOSIS — N18.32 TYPE 2 DIABETES MELLITUS WITH STAGE 3B CHRONIC KIDNEY DISEASE, WITH LONG-TERM CURRENT USE OF INSULIN: ICD-10-CM

## 2025-06-10 DIAGNOSIS — E66.01 CLASS 3 SEVERE OBESITY WITH SERIOUS COMORBIDITY AND BODY MASS INDEX (BMI) OF 40.0 TO 44.9 IN ADULT, UNSPECIFIED OBESITY TYPE: ICD-10-CM

## 2025-06-10 DIAGNOSIS — E11.22 TYPE 2 DIABETES MELLITUS WITH STAGE 3B CHRONIC KIDNEY DISEASE, WITH LONG-TERM CURRENT USE OF INSULIN: ICD-10-CM

## 2025-06-10 DIAGNOSIS — N18.32 STAGE 3B CHRONIC KIDNEY DISEASE: Primary | ICD-10-CM

## 2025-06-10 DIAGNOSIS — Z79.4 TYPE 2 DIABETES MELLITUS WITH STAGE 3B CHRONIC KIDNEY DISEASE, WITH LONG-TERM CURRENT USE OF INSULIN: ICD-10-CM

## 2025-06-10 DIAGNOSIS — E66.813 CLASS 3 SEVERE OBESITY WITH SERIOUS COMORBIDITY AND BODY MASS INDEX (BMI) OF 40.0 TO 44.9 IN ADULT, UNSPECIFIED OBESITY TYPE: ICD-10-CM

## 2025-06-10 DIAGNOSIS — E87.1 HYPONATREMIA: ICD-10-CM

## 2025-06-10 DIAGNOSIS — N25.81 SECONDARY HYPERPARATHYROIDISM OF RENAL ORIGIN: ICD-10-CM

## 2025-06-10 PROCEDURE — 99214 OFFICE O/P EST MOD 30 MIN: CPT | Mod: S$GLB,,,

## 2025-06-10 PROCEDURE — 3074F SYST BP LT 130 MM HG: CPT | Mod: CPTII,S$GLB,,

## 2025-06-10 PROCEDURE — 99999 PR PBB SHADOW E&M-EST. PATIENT-LVL III: CPT | Mod: PBBFAC,,,

## 2025-06-10 PROCEDURE — 3066F NEPHROPATHY DOC TX: CPT | Mod: CPTII,S$GLB,,

## 2025-06-10 PROCEDURE — 3008F BODY MASS INDEX DOCD: CPT | Mod: CPTII,S$GLB,,

## 2025-06-10 PROCEDURE — 1159F MED LIST DOCD IN RCRD: CPT | Mod: CPTII,S$GLB,,

## 2025-06-10 PROCEDURE — 3078F DIAST BP <80 MM HG: CPT | Mod: CPTII,S$GLB,,

## 2025-06-10 PROCEDURE — 3288F FALL RISK ASSESSMENT DOCD: CPT | Mod: CPTII,S$GLB,,

## 2025-06-10 PROCEDURE — 1101F PT FALLS ASSESS-DOCD LE1/YR: CPT | Mod: CPTII,S$GLB,,

## 2025-06-10 PROCEDURE — 3044F HG A1C LEVEL LT 7.0%: CPT | Mod: CPTII,S$GLB,,

## 2025-06-10 RX ORDER — FUROSEMIDE 20 MG/1
10 TABLET ORAL DAILY
Qty: 15 TABLET | Refills: 11 | Status: SHIPPED | OUTPATIENT
Start: 2025-06-10 | End: 2026-06-10

## 2025-06-10 NOTE — PATIENT INSTRUCTIONS
Start furosemide (Lasix) 10 mg daily (cut the pill in half)    Lab work and repeat urine test to check for UTI in 2 weeks    If all is stable we will see you back in 3 months    Continue with limiting water intake to about 50 oz daily

## 2025-06-10 NOTE — PROGRESS NOTES
[facility]phrology Ambulatory Progress Note      HPI  Indu Azul, 69 y.o. female, presents to office for a follow up visit for CKD 3; history of colon cancer and cirrhosis in remission. Patient is followed up by oncology.  Lower extremity swelling stable with compression device ordered by her podiatrist.  Patient reports she has never seen a cardiologist.  Last visit found to have new onset hyponatremia.  Fractional excretion of sodium checked and patient advised to decrease water intake as she was drinking over 100 oz daily.  She is now drinking 50 oz daily, sodium remains 131.  She has no acute complaints.  She did have repeat CT scan done by Oncology which was negative for malignancy.    Patient denies taking NSAIDs or new antibiotics. Also denies recent episode of dehydration, diarrhea, vomiting, acute illness, hospitalization, recent angiograms or exposure to IV radiocontrast.        Medical Diagnoses:   Past Medical History:   Diagnosis Date    Anesthesia complication     trouble awakening    Charcot's joint of foot, left     Chronic kidney disease, unspecified     Cirrhosis of liver without ascites     Depression     Diabetes mellitus, type II, insulin dependent     Diabetic neuropathy     GERD (gastroesophageal reflux disease)     H/O: hysterectomy     Hepatic steatosis     Malignant neoplasm of ascending colon     Obesity, unspecified     Overactive bladder     Thrombocytopenia, unspecified     Vitamin D deficiency      Patient Active Problem List   Diagnosis    Thrombocytopenia    Folate deficiency    Type 2 diabetes mellitus with hyperglycemia, with long-term current use of insulin    Long-term insulin use    Hyperlipidemia associated with type 2 diabetes mellitus    CKD stage 3 due to type 2 diabetes mellitus    Recurrent major depressive disorder, in full remission    Vitamin D deficiency    Hepatic steatosis    Other cirrhosis of liver    Diabetic polyneuropathy associated with type 2 diabetes  mellitus    Gammopathy    Splenomegaly    Microcytic anemia    Malignant neoplasm of ascending colon    Pulmonary nodules    Immunodeficiency due to chemotherapy    Stage 3b chronic kidney disease    Iron deficiency anemia due to chronic blood loss    Leukopenia    Homocystinemia    Hyponatremia    Secondary hyperparathyroidism of renal origin    Class 3 severe obesity with serious comorbidity and body mass index (BMI) of 40.0 to 44.9 in adult    UTI (urinary tract infection), uncomplicated       Surgical History:   Past Surgical History:   Procedure Laterality Date    APPENDECTOMY      BREAST BIOPSY       SECTION  05/13/88    x3    charcot-leyden crystals identification      CHOLECYSTECTOMY      COLON SURGERY  1923    COLONOSCOPY N/A 2023    Procedure: COLON;  Surgeon: Luis Amador MD;  Location: [facility] ENDOSCOPY;  Service: Gastroenterology;  Laterality: N/A;    COLONOSCOPY N/A 2024    Procedure: COLON;  Surgeon: Mor Porter MD;  Location: [facility];  Service: Gastroenterology;  Laterality: N/A;    COLONOSCOPY, WITH 1 OR MORE BIOPSIES  2023    Procedure: COLONOSCOPY, WITH 1 OR MORE BIOPSIES;  Surgeon: Luis Amador MD;  Location: [facility] ENDOSCOPY;  Service: Gastroenterology;;    COLONOSCOPY, WITH 1 OR MORE BIOPSIES N/A 2024    Procedure: COLONOSCOPY, WITH 1 OR MORE BIOPSIES;  Surgeon: Mor Porter MD;  Location: [facility];  Service: Gastroenterology;  Laterality: N/A;    COLONOSCOPY, WITH DIRECTED SUBMUCOSAL INJECTION  2023    Procedure: COLONOSCOPY, WITH DIRECTED SUBMUCOSAL INJECTION;  Surgeon: Luis Amador MD;  Location: [facility] ENDOSCOPY;  Service: Gastroenterology;;  8cc Colon Tattoo    COLONOSCOPY, WITH POLYPECTOMY USING SNARE  2023    Procedure: COLONOSCOPY, WITH POLYPECTOMY USING SNARE;  Surgeon: Luis Amador MD;  Location: [facility] ENDOSCOPY;  Service: Gastroenterology;;     ESOPHAGOGASTRODUODENOSCOPY N/A 08/28/2023    Procedure: DOUBLE;  Surgeon: Luis Amador MD;  Location: [facility] ENDOSCOPY;  Service: Gastroenterology;  Laterality: N/A;    ESOPHAGOGASTRODUODENOSCOPY N/A 04/19/2024    Procedure: EGD;  Surgeon: Nolan Massey MD;  Location: [facility] ENDOSCOPY;  Service: Gastroenterology;  Laterality: N/A;    HEMORRHOID SURGERY      HYSTERECTOMY  1990    total    INSERTION OF SUBCUTANEOUS PORT Right     INSERTION OF TUNNELED CENTRAL VENOUS CATHETER (CVC) WITH SUBCUTANEOUS PORT Right 11/20/2023    Procedure: [id number];  Surgeon: Timothy Russ MD;  Location: [facility] OR;  Service: General;  Laterality: Right;    POLYPECTOMY      right foot surgery Right 02/01/2022    SMALL INTESTINE SURGERY  10/18/23    XI ROBOTIC COLECTOMY, RIGHT N/A 10/19/2023    Procedure: XI ROBOTIC COLECTOMY, RIGHT;  Surgeon: Timothy Russ MD;  Location: [facility] OR;  Service: General;  Laterality: N/A;       Family History:   Family History   Problem Relation Name Age of Onset    Diabetes Mother Estela Azul     Alzheimer's disease Mother Estela Azul     Diabetes Father Malcom Azul     Leukemia Father Malcom Azul     Cancer Father Malcom Azul     Diabetes Sister Maria L Azul Grace     Liver cancer Sister Maria L Davilaque Grace     Diabetes Brother Chris Azul        Social History:   Social History     Tobacco Use    Smoking status: Every Day     Current packs/day: 0.50     Average packs/day: 0.5 packs/day for 62.4 years (31.2 ttl pk-yrs)     Types: Cigarettes     Start date: 1/1/2003     Passive exposure: Current    Smokeless tobacco: Never   Substance Use Topics    Alcohol use: Not Currently     Comment: occassionally       Allergies:  Review of patient's allergies indicates:  No Known Allergies    Medications:  Outpatient Medications Marked as Taking for the 6/10/25 encounter (Office Visit) with Dez Burdick FNP   Medication Sig Dispense Refill    CONSTULOSE  "10 gram/15 mL solution Take 30 mLs by mouth 3 (three) times daily.      ELIQUIS 5 mg Tab Take 1 tablet by mouth twice daily 180 tablet 0    gabapentin (NEURONTIN) 300 MG capsule Take 2 capsules (600 mg total) by mouth every evening. 60 capsule 6    sertraline (ZOLOFT) 50 MG tablet Take 1 tablet by mouth once daily 90 tablet 3    solifenacin (VESICARE) 10 MG tablet Take 1 tablet (10 mg total) by mouth once daily. 90 tablet 3    tirzepatide (MOUNJARO) 5 mg/0.5 mL PnIj Inject 5 mg into the skin every 7 days. 2 mL 1          Review of Systems:    Constitutional: Denies fever   Skin: Denies wounds, no rashes, no itching, no new skin lesions  Respiratory:  Denies cough, shortness of breath, or wheezing  Cardiovascular: Denies chest pain, palpitations: ++ significant improvement in lower extremity edema with compression device at home  Gastrointestional: Denies abdominal pain, nausea, vomiting, diarrhea, or constipation  Genitourinary: Denies dysuria, hematuria, foamy urine, or incontinence; reports able to empty bladder  Musculoskeletal: Denies back or flank pain  Neurological: Denies headaches, dizziness, paresthesias, tremors or focal weakness      Vital Signs:  /78 (BP Location: Left arm, Patient Position: Sitting)   Pulse 79   Temp 98.6 °F (37 °C) (Oral)   Resp 18   Ht 5' 2" (1.575 m)   Wt 104.3 kg (230 lb)   SpO2 97%   BMI 42.07 kg/m²   Body mass index is 42.07 kg/m².      Physical Exam:    General: no acute distress, awake, alert  Eyes: conjunctiva clear, eyelids without swelling  HENT: atraumatic, oropharynx and nasal mucosa patent  Neck: supple, trache midline, no JVD  Respiratory: equal, unlabored, clear to auscultation A/P  Cardiovascular: RRR without r rub  Edema:  Trace bilateral lower extremity   Gastrointestinal: soft, non-tender, non-distended; positive bowel sounds; no masses to palpation; no ascites  Musculoskeletal: ROM without new limitation or discomfort  Integumentary: warm, dry; no " rashes, wounds, or skin lesions  Neurological: oriented x4, appropriate, no acute deficits; no asterixis      Labs:        Component Value Date/Time     (L) 06/06/2025 1241     (L) 05/23/2025 1359     06/01/2022 1340    K 4.7 06/06/2025 1241    K 4.8 05/23/2025 1359    K 4.9 06/01/2022 1340    CL 99 06/06/2025 1241     05/23/2025 1359     06/01/2022 1340    CO2 24 06/06/2025 1241    CO2 23 05/23/2025 1359    CO2 29 06/01/2022 1340    BUN 18.4 06/06/2025 1241    BUN 20.2 (H) 05/23/2025 1359    BUN 22 06/01/2022 1340    CREATININE 1.43 (H) 06/06/2025 1241    CREATININE 1.41 (H) 05/23/2025 1359    CREATININE 1.21 (H) 03/06/2025 1318    CREATININE 1.18 (H) 01/08/2025 1109    CREATININE 1.32 (H) 06/01/2022 1340    CALCIUM 9.2 06/06/2025 1241    CALCIUM 9.1 05/23/2025 1359    CALCIUM 8.7 (L) 06/01/2022 1340    PHOS 3.8 01/08/2025 1109    PHOS 3.1 09/05/2024 1340    PTH 96.9 (H) 05/23/2025 1359    PTH 70.5 01/08/2025 1109    PTH 46.5 09/05/2024 1340           Component Value Date/Time    WBC 6.33 06/06/2025 1241    WBC 5.94 05/23/2025 1359    HGB 13.7 06/06/2025 1241    HGB 13.7 05/23/2025 1359    HCT 42.1 06/06/2025 1241    HCT 41.5 05/23/2025 1359    PLT 92 (L) 06/06/2025 1241     (L) 05/23/2025 1359       Urine Creatinine   Date Value Ref Range Status   06/06/2025 146.0 (H) 45.0 - 106.0 mg/dL Final   05/23/2025 157.5 (H) 45.0 - 106.0 mg/dL Final       Urine Protein Level   Date Value Ref Range Status   06/06/2025 34.1 mg/dL Final   05/23/2025 35.7 mg/dL Final       Impression:  1. Stage 3b chronic kidney disease  Comprehensive Metabolic Panel      2. Type 2 diabetes mellitus with stage 3b chronic kidney disease, with long-term current use of insulin  Comprehensive Metabolic Panel      3. Hyponatremia  Comprehensive Metabolic Panel      4. Secondary hyperparathyroidism of renal origin  Comprehensive Metabolic Panel      5. Class 3 severe obesity with serious comorbidity and body mass  index (BMI) of 40.0 to 44.9 in adult, unspecified obesity type  Comprehensive Metabolic Panel          CKD 3b related to nephrosclerosis and hyper filtration syndrome, on lower side of baseline  Cirrhosis  Colon cancer in remission--> repeat CT negative for malignancy  New onset hyponatremia at last visit.  Patient was drinking over 100 oz of fluid daily, advised to decrease and she is now drinking about 50 oz daily.  Of note she is on SSRI and history of cancer.  FeNa 0.3% pointing to prerenal cause with treatment being continued fluid restriction or loop diuretic  Plans for repeat colonoscopy soon  Advised to discuss with PCP if she needs a cardiology workup due to swelling    Plan:  Low-dose loop diuretic Lasix 10 mg daily    Labs in 2 weeks    Follow up in 3 months       Dez STALLINGS        This note was created with the assistance of [employer] voice recognition software or phone dictation. There may be transcription errors as a result of using this technology however minimal. Effort has been made to assure accuracy of transcription but any obvious errors or omissions should be clarified with the author of the document.

## 2025-06-10 NOTE — PROGRESS NOTES
HEMATOLOGY/ONCOLOGY OFFICE CLINIC VISIT    Visit Information:    Initial Evaluation: 4/22/2022  Referring Provider: Maggy Jaquez NP  Other providers: Dr Timothy Russ  Code status: Not addressed     Diagnosis/Problem list:   pT3 N2a Mx Stage IIIB Colon cancer. Dx 10/19/23  Microcytic anemia.   Folate deficiency   Pancytopenia-intermittent    Present Treatment:  Xelox to start on 3/26/24  (Received 1 cycle then stopped).     Treatment history:  10/19/2023 Lap/jaswinder right colon resection   FOLFOX with dose reduction on Oxaliplatin due to neuropathy. Started on 11/28/23. x 7 cycles then switched to XELOX      Imaging studies:  CT C/A/P 6/3/2022: There is no significant hepatic steatosis.  The liver is cirrhotic.  No liver lesion is identified.  The spleen is enlarged, measuring 15.5 cm in length.  There is no retroperitoneal lymphadenopathy or abdominal aortic aneurysm.  A mildly prominent right external iliac lymph node measures 9 mm in short axis, nonspecific.  This is also similar in appearance when compared to 2015. Impression: Cirrhosis. Splenomegaly.  CT CAP 9/11/2023: There is no supraclavicular or axillary lymphadenopathy.  No mediastinal adenopathy. 3 mm right upper lobe pulmonary nodule. There are few additional pulmonary micronodules in the lung apices measuring no greater than 1-2 mm.  Findings similar to prior exam.     Impression: Scattered pulmonary nodules in the upper lobe similar to prior.  No further follow-up acute according to Fleischner criteria.  No convincing evidence for metastatic disease. Nodular appearing liver, correlation for cirrhosis.  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  CT C/A/P 5/16/2024: No new or worsening malignant findings identified. No defined portal vein thrombus on today's exam.  Poor evaluation of the SMV.  CT CAP 11/26/2024:  Spleen mildly enlarged. No evidence of recurrent malignancy or metastatic disease.  Slightly irregular liver contour suggesting cirrhosis, similar   CT CAP 06/06/2025: 1. No evidence of recurrent or metastatic disease in the chest abdomen or pelvis.  2. Cirrhosis and splenomegaly.      Pathology:  10/19/2023:  COLON, RIGHT HEMICOLECTOMY (1.5 CM OF TERMINAL ILEUM, 25 CM LENGTH OF RIGHT COLON): POORLY DIFFERENTIATED ADENOCARCINOMA WITH FOCAL SIGNET RING CELL FEATURES, UNIFOCAL, RIGHT COLON, 8 CM GREATEST DIMENSION.   THE TUMOR INVADES THROUGH THE MUSCULARIS PROPRIA INTO PERICOLONIC TISSUE (PT3).   FOCAL LYMPHVASCULAR INVASION IS PRESENT.   THE SURGICAL MARGINS ARE FREE OF TUMOR.   METASTATIC ADENOCARCINOMA IS IDENTIFIED IN FOUR (4) OF TWENTY-SEVEN (27)   PERICOLONIC LYMPH NODES (LARGEST METASTATIC DEPOSIT 8 MM; FOCAL EXTRACAPSULAR EXTENSION OF TUMOR IS PRESENT).      PATHOLOGIC STAGING:  pT3 N2a Mx     Histologic Grade:  G3, poorly differentiated   Macroscopic Tumor Perforation:  Not identified   Lymphovascular Invasion:  Small vessel   Perineural Invasion:  Not identified        CLINICAL HISTORY:       Patient: Indu Azul is a 69 y.o. female. with multiple medical problems that I saw originally on 4/22/2022 for thrombocytopenia.  Patient platelet counts on 12/21/2021 were 132,000 and since that that has been slowly trending down.  12/21/2021 platelets 132,000  01/19/2022 platelets 121,000  04/08/2022 platelets 115,000     Of note patient have a UA done that same day that suggest possible UTI with positive nitrates, 1+ leukocytes and many bacteria.  Urine culture with E. coli.   Reviewing electronic medical record and going back to 12/15/2014 patient with occasional low platelets.  They were never lower than 100,000 and they always normalized.   As per patient in January 2020 when her platelets were slightly decreased (117,000), this was shortly after she has her left foot surgery.  Then in May 17 and May 18 2021, platelets were 105k and 101k,  she had COVID.  Again in 4/8/2022 she have a UTI  for which she completed antibiotics.     Patient's father diagnosed Blood disorder requiring blood transfusion that started q 4 weeks and the q week. He was diagnosed on his 80's but  at 91. Sister and niece had ovarian cancer. Sister  of it. Niece still alive.     She was found to have iron deficiency anemia.  EGD performed, grade I-II esophageal varices found, too small to band. She also had Colonoscopy by Dr Hammonds that showed an ulcerated malignant-appearing partially obstructing medium sized mass in the ascending colon.  She reports that she had a colonoscopy about 5 years ago at [facility] and everything was fine, no polyps or masses.   Biopsy and tattoo of the mass showed fragments of colonic mucosa, no evidence of dysplasia or malignancy.  Two other polyps were completely removed in the descending and sigmoid colon, pathology returned hyperplastic polyps.  CT abd/pel with no acute findings.     Despite of biopsy because of malignant-appearing partially obstructing mass of the ascending colon and photographs and description were consistent with colon cancer she underwent Lap/jaswinder right colon resection on 10/19/2023 by Dr Timothy Russ. Path c/w really differentiated adeno carcinoma.         Chief Complaint: Splenomegaly      Interval History:  Patient with h/o Charcot feet for which she had surgery. She does not have sensation on her feet from ankle down. This was prior to her diagnosis of colon cancer. She is getting Oxaloplatin but neuropathy is not worse. She started dose reduced and will cont like same dose. If neuropathy worsen will d/c oxaliplatin.     3/25/24: Patient presents today for 1 week f/u. We plan to start XELOX tomorrow for the remainder of her adjuvant treatment. Platelet count is now 109k today.     24: Patient presents today for once week f/u for toxicity check. She started XELOX on 3/26/24. Tolerating xeloda much better than infusional 5fu. Reports less nausea and she is not  sleeping during the day like she was before. She reports chest pain that was transient. Hgb is 8 today.     24: Patient presents today for scheduled f/u. In the interim she was hospitalized for GI bleeding. Colonoscopy was performed on 24 and path was negative for malignancy. She reports fatigue . She is anemic with Hgb of 8.2. Iron studies added to labs drawn today ( pending). 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       25: Patient presents today for 3 month follow up. Patient had a iron fusion and tolerated well. Labs reviewed with pt and daughter, all questions answered. Current labs show WBC 6.33, Hgb 13.7 (normal), platelets 92K (thrombocytopenia). CT CAP results,  No evidence of recurrent or metastatic disease in the chest abdomen or pelvis. Cirrhosis and splenomegaly.     Past Medical History:   Diagnosis Date    Anesthesia complication     trouble awakening    Charcot's joint of foot, left     Chronic kidney disease, unspecified     Cirrhosis of liver without ascites     Depression     Diabetes mellitus, type II, insulin dependent     Diabetic neuropathy     GERD (gastroesophageal reflux disease)     H/O: hysterectomy     Hepatic steatosis     Malignant neoplasm of ascending colon     Obesity, unspecified     Overactive bladder     Thrombocytopenia, unspecified     Vitamin D deficiency       Past Surgical History:   Procedure Laterality Date    APPENDECTOMY      BREAST BIOPSY       SECTION  05/13/88    x3    charcot-leyden crystals identification      CHOLECYSTECTOMY      COLON SURGERY  1923    COLONOSCOPY N/A 2023    Procedure: COLON;  Surgeon: Luis Amador MD;  Location: [facility] ENDOSCOPY;  Service: Gastroenterology;  Laterality: N/A;    COLONOSCOPY N/A 2024    Procedure: COLON;  Surgeon: Mor Porter MD;  [facility] OR;  Service: Gastroenterology;  Laterality: N/A;     COLONOSCOPY, WITH 1 OR MORE BIOPSIES  08/28/2023    Procedure: COLONOSCOPY, WITH 1 OR MORE BIOPSIES;  Surgeon: Luis Amador MD;  Location: [facility] ENDOSCOPY;  Service: Gastroenterology;;    COLONOSCOPY, WITH 1 OR MORE BIOPSIES N/A 04/20/2024    Procedure: COLONOSCOPY, WITH 1 OR MORE BIOPSIES;  Surgeon: Mor Porter MD;  Location: [facility];  Service: Gastroenterology;  Laterality: N/A;    COLONOSCOPY, WITH DIRECTED SUBMUCOSAL INJECTION  08/28/2023    Procedure: COLONOSCOPY, WITH DIRECTED SUBMUCOSAL INJECTION;  Surgeon: Luis Amador MD;  Location: [facility] ENDOSCOPY;  Service: Gastroenterology;;  8cc Colon Tattoo    COLONOSCOPY, WITH POLYPECTOMY USING SNARE  08/28/2023    Procedure: COLONOSCOPY, WITH POLYPECTOMY USING SNARE;  Surgeon: Luis Amador MD;  Location: [facility] ENDOSCOPY;  Service: Gastroenterology;;    ESOPHAGOGASTRODUODENOSCOPY N/A 08/28/2023    Procedure: DOUBLE;  Surgeon: Luis Amador MD;  Location: [facility] ENDOSCOPY;  Service: Gastroenterology;  Laterality: N/A;    ESOPHAGOGASTRODUODENOSCOPY N/A 04/19/2024    Procedure: EGD;  Surgeon: Nolan Massey MD;  Location: [facility] ENDOSCOPY;  Service: Gastroenterology;  Laterality: N/A;    HEMORRHOID SURGERY      HYSTERECTOMY  1990    total    INSERTION OF SUBCUTANEOUS PORT Right     INSERTION OF TUNNELED CENTRAL VENOUS CATHETER (CVC) WITH SUBCUTANEOUS PORT Right 11/20/2023    Procedure: [id number];  Surgeon: Timothy Russ MD;  Location: [facility];  Service: General;  Laterality: Right;    POLYPECTOMY      right foot surgery Right 02/01/2022    SMALL INTESTINE SURGERY  10/18/23    XI ROBOTIC COLECTOMY, RIGHT N/A 10/19/2023    Procedure: XI ROBOTIC COLECTOMY, RIGHT;  Surgeon: Timothy Russ MD;  Location: [facility];  Service: General;  Laterality: N/A;     Family History   Problem Relation Name Age of Onset    Diabetes Mother Estela Phan Latha     Alzheimer's disease Mother Estela Phan  Latha     Diabetes Father Malcom Azul     Leukemia Father Malcom Azul     Cancer Father Malcom Azul     Diabetes Sister Maria L Azul Grace     Liver cancer Sister Maria L Azul Grace     Diabetes Brother Chris Azul             Review of patient's allergies indicates:  No Known Allergies   Current Outpatient Medications on File Prior to Visit   Medication Sig Dispense Refill    ELIQUIS 5 mg Tab Take 1 tablet by mouth twice daily 180 tablet 0    furosemide (LASIX) 20 MG tablet Take 0.5 tablets (10 mg total) by mouth once daily. 15 tablet 11    gabapentin (NEURONTIN) 300 MG capsule Take 2 capsules (600 mg total) by mouth every evening. 60 capsule 6    sertraline (ZOLOFT) 50 MG tablet Take 1 tablet by mouth once daily 90 tablet 3    solifenacin (VESICARE) 10 MG tablet Take 1 tablet (10 mg total) by mouth once daily. 90 tablet 3    tirzepatide (MOUNJARO) 5 mg/0.5 mL PnIj Inject 5 mg into the skin every 7 days. 2 mL 1    CONSTULOSE 10 gram/15 mL solution Take 30 mLs by mouth 3 (three) times daily. (Patient not taking: Reported on 6/12/2025)      folic acid-vit B6-vit B12 0.8- mg-mg-mcg Tab Take 1 tablet by mouth once daily. (Patient not taking: Reported on 5/27/2025) 90 each 2     No current facility-administered medications on file prior to visit.      Review of Systems   Constitutional:  Negative for activity change, appetite change, chills, fatigue, fever, night sweats and unexpected weight change.   HENT:  Negative for mouth dryness, mouth sores, nosebleeds, sore throat and trouble swallowing.    Eyes: Negative.  Negative for visual disturbance.   Respiratory:  Negative for cough and shortness of breath.    Cardiovascular:  Negative for chest pain, palpitations and leg swelling.   Gastrointestinal:  Negative for abdominal distention, abdominal pain, blood in stool, change in bowel habit, constipation, diarrhea, nausea and vomiting.   Endocrine: Negative.    Genitourinary:  Negative for dysuria, frequency,  "hematuria and urgency.   Musculoskeletal:  Negative for arthralgias, back pain, myalgias and neck pain.   Integumentary:  Negative for rash.   Neurological:  Negative for dizziness, tremors, syncope, speech difficulty, weakness, light-headedness, numbness, headaches and memory loss.   Hematological:  Negative for adenopathy. Does not bruise/bleed easily.   Psychiatric/Behavioral:  Negative for confusion and suicidal ideas. The patient is not nervous/anxious.           Vitals:    06/12/25 1308   BP: 133/82   Patient Position: Sitting   Pulse: 77   Resp: 20   Temp: 98.1 °F (36.7 °C)   SpO2: 96%   Weight: 103.5 kg (228 lb 1.6 oz)   Height: 5' 2" (1.575 m)        Wt Readings from Last 6 Encounters:   06/12/25 103.5 kg (228 lb 1.6 oz)   06/10/25 104.3 kg (230 lb)   05/27/25 104.3 kg (230 lb)   04/10/25 114.7 kg (252 lb 14.4 oz)   03/11/25 111.9 kg (246 lb 11.2 oz)   01/10/25 108.5 kg (239 lb 3.2 oz)     Body mass index is 41.72 kg/m².  Body surface area is 2.13 meters squared.  Physical Exam  Vitals and nursing note reviewed.   Constitutional:       General: She is not in acute distress.     Appearance: She is obese.   HENT:      Head: Normocephalic and atraumatic.      Mouth/Throat:      Mouth: Mucous membranes are moist.   Eyes:      General: No scleral icterus.     Extraocular Movements: Extraocular movements intact.      Conjunctiva/sclera: Conjunctivae normal.      Pupils: Pupils are equal, round, and reactive to light.   Neck:      Vascular: No JVD.   Cardiovascular:      Rate and Rhythm: Normal rate and regular rhythm.      Heart sounds: No murmur heard.  Pulmonary:      Effort: Pulmonary effort is normal.      Breath sounds: Normal breath sounds. No wheezing or rhonchi.   Chest:      Comments: Right chest wall mediport in place.    Abdominal:      General: Bowel sounds are normal. There is no distension.      Palpations: Abdomen is soft.      Comments: Surgical incisions healed after colon surgery.  "   Musculoskeletal:         General: No swelling or deformity.      Cervical back: Neck supple.   Lymphadenopathy:      Head:      Right side of head: No submandibular adenopathy.      Left side of head: No submandibular adenopathy.      Cervical: No cervical adenopathy.      Upper Body:      Right upper body: No supraclavicular or axillary adenopathy.      Left upper body: No supraclavicular or axillary adenopathy.      Lower Body: No right inguinal adenopathy. No left inguinal adenopathy.   Skin:     General: Skin is warm.      Coloration: Skin is not jaundiced.      Findings: No lesion or rash.      Nails: There is no clubbing.      Comments: Darkening of nails and skin on hands commonly seen with 5FU   Neurological:      Mental Status: She is alert and oriented to person, place, and time.      Cranial Nerves: Cranial nerves 2-12 are intact.      Motor: Weakness present.      Gait: Gait abnormal.      Comments: Uses cane for mobility   Psychiatric:         Attention and Perception: Attention normal.         Mood and Affect: Mood is depressed.         Behavior: Behavior is cooperative.         Judgment: Judgment normal.       ECOG SCORE    1 - Restricted in strenuous activity-ambulatory and able to carry out work of a light nature         Laboratory:  CBC with Differential:  Lab Results   Component Value Date    WBC 6.33 06/06/2025    RBC 5.21 06/06/2025    HGB 13.7 06/06/2025    HCT 42.1 06/06/2025    MCV 80.8 06/06/2025    MCH 26.3 (L) 06/06/2025    MCHC 32.5 (L) 06/06/2025    RDW 14.2 06/06/2025    PLT 92 (L) 06/06/2025    MPV 10.7 (H) 06/06/2025        CMP:  Sodium   Date Value Ref Range Status   06/06/2025 131 (L) 136 - 145 mmol/L Final   06/01/2022 140 136 - 145 mmol/L Final     Potassium   Date Value Ref Range Status   06/06/2025 4.7 3.5 - 5.1 mmol/L Final   06/01/2022 4.9 3.5 - 5.1 mmol/L Final     Chloride   Date Value Ref Range Status   06/06/2025 99 98 - 107 mmol/L Final   06/01/2022 104 100 - 109 mmol/L  Final     CO2   Date Value Ref Range Status   06/06/2025 24 23 - 31 mmol/L Final     Carbon Dioxide   Date Value Ref Range Status   06/01/2022 29 22 - 33 mmol/L Final     Glucose   Date Value Ref Range Status   06/06/2025 117 (H) 82 - 115 mg/dL Final     Blood Urea Nitrogen   Date Value Ref Range Status   06/06/2025 18.4 9.8 - 20.1 mg/dL Final   06/01/2022 22 5 - 25 mg/dL Final     Creatinine   Date Value Ref Range Status   06/06/2025 1.43 (H) 0.55 - 1.02 mg/dL Final   06/01/2022 1.32 (H) 0.57 - 1.25 mg/dL Final     Calcium   Date Value Ref Range Status   06/06/2025 9.2 8.4 - 10.2 mg/dL Final   06/01/2022 8.7 (L) 8.8 - 10.6 mg/dL Final     Protein Total   Date Value Ref Range Status   06/06/2025 7.8 (H) 5.8 - 7.6 gm/dL Final     Albumin   Date Value Ref Range Status   06/06/2025 3.7 3.4 - 4.8 g/dL Final     Bilirubin Total   Date Value Ref Range Status   06/06/2025 0.5 <=1.5 mg/dL Final     ALP   Date Value Ref Range Status   06/06/2025 92 40 - 150 unit/L Final     AST   Date Value Ref Range Status   06/06/2025 19 11 - 45 unit/L Final     ALT   Date Value Ref Range Status   06/06/2025 16 0 - 55 unit/L Final     Anion Gap   Date Value Ref Range Status   06/01/2022 7 (L) 8 - 16 mmol/L Final     eGFR    Date Value Ref Range Status   06/01/2022 45 mL/min/1.73mSq Final     Comment:     In accordance with NKF-ASN Task Force recommendation, calculation based on the Chronic Kidney Disease Epidemiology Collaboration (CKD-EPI) equation without adjustment for race. eGFR adjusted for gender and age and calculated in ml/min/1.73mSquared. eGFR cannot be calculated if patient is under 18 years of age.     Reference Range:   >= 60 ml/min/1.73mSquared.     Estimated GFR-Non    Date Value Ref Range Status   08/04/2022 43 mls/min/1.73/m2 Final         Assessment:       1. Malignant neoplasm of ascending colon    2. Folate deficiency    3. Splenomegaly    4. Thrombocytopenia          1) Stage IIIB  adenocarcinoma of the ascending colon  --Patient will benefit of adjuvant chemotherapy.   --Due to severe diabetic neuropathy, will try dose reduction of Oxaliplatin, starting 65 mg/m2.    --She is s/p 8 cycles of FOLFOX (7) + XELOX (1) that she received as adjuvant treatment over 5 months. She had some delays for cytopenias so we have been unable to complete. She was hospitalized for GI bleeding and thankfully her path from colonoscopy was negative. She was weak, debilitated and tired and has ultimately decided to stopped chemotherapy. We started surveillance at this time.  I agree that this is the right thing to do as she has neuropathy from diabetes and continuing Oxaliplatin is more harmful than helpful and risks outweigh the benefits. We discuss 5FU pump but again declined further chemotherapy.    2) Lung nodules-Stable.  Not describe most recent scan.  Will monitor.     3) iron deficiency anemia-on iron PO-resolved?  Today's lab with no anemia    4) Thrombocytopenia- persistent Will follow counts closely as she has splenomegaly and this can affects her counts mostly platelets.    5) Folate deficiency-on folic acid at this time.     6) Diabetic neuropathy-severe. She does not feel her feet.  --S/p nerve stimulator placement      7) hyperhomocystinemia  --she was taking vitamin B12, B6 and folate but have not take the vitamins in over a month.  She stopped them for her surgical procedure and have not restarted them.        Plan:     Patient with no clinical evidence of disease at this time.  We will continue surveillance.    CT CAP in 6 months - ordered  Port flush Q3 months  RTC in 6 months for results/labs 1 week prior  Labs: CBC, CMP, CEA    The patient was seen, interviewed and examined. Pertinent lab and radiology studies were reviewed.   The patient was given ample opportunity to ask questions, and to the best of my abilities, all questions answered to satisfaction; patient demonstrated understanding of  what we discussed and agreeable to the plan. Pt instructed to call should develop concerning signs/symptoms or have further questions.   Patient requires or will require continuous, longitudinal, and active collaborative plan of care related to this patient's health condition, Colon Cancer. - the management of which requires the direction of a practitioner with specialized clinical knowledge, skill, and expertise.          Fatemeh Olmstead MD  Hematology/Oncology  [facility]      Professional Services   I, Elva Frye LPN, acted solely as a scribe for and in the presence of Dr. Fatemeh Olmstead, who performed these services.

## 2025-06-12 ENCOUNTER — OFFICE VISIT (OUTPATIENT)
Dept: HEMATOLOGY/ONCOLOGY | Facility: CLINIC | Age: 70
End: 2025-06-12
Payer: MEDICARE

## 2025-06-12 VITALS
BODY MASS INDEX: 41.98 KG/M2 | OXYGEN SATURATION: 96 % | DIASTOLIC BLOOD PRESSURE: 82 MMHG | HEIGHT: 62 IN | WEIGHT: 228.13 LBS | SYSTOLIC BLOOD PRESSURE: 133 MMHG | RESPIRATION RATE: 20 BRPM | HEART RATE: 77 BPM | TEMPERATURE: 98 F

## 2025-06-12 DIAGNOSIS — E87.1 HYPONATREMIA: ICD-10-CM

## 2025-06-12 DIAGNOSIS — C18.2 MALIGNANT NEOPLASM OF ASCENDING COLON: Primary | ICD-10-CM

## 2025-06-12 DIAGNOSIS — E53.8 FOLATE DEFICIENCY: ICD-10-CM

## 2025-06-12 DIAGNOSIS — K74.69 OTHER CIRRHOSIS OF LIVER: ICD-10-CM

## 2025-06-12 DIAGNOSIS — R79.83 HOMOCYSTINEMIA: ICD-10-CM

## 2025-06-12 DIAGNOSIS — D69.6 THROMBOCYTOPENIA: ICD-10-CM

## 2025-06-12 DIAGNOSIS — I81 PORTAL VEIN THROMBOSIS: ICD-10-CM

## 2025-06-12 DIAGNOSIS — R16.1 SPLENOMEGALY: ICD-10-CM

## 2025-06-12 PROCEDURE — 3066F NEPHROPATHY DOC TX: CPT | Mod: CPTII,S$GLB,, | Performed by: INTERNAL MEDICINE

## 2025-06-12 PROCEDURE — 1160F RVW MEDS BY RX/DR IN RCRD: CPT | Mod: CPTII,S$GLB,, | Performed by: INTERNAL MEDICINE

## 2025-06-12 PROCEDURE — 3079F DIAST BP 80-89 MM HG: CPT | Mod: CPTII,S$GLB,, | Performed by: INTERNAL MEDICINE

## 2025-06-12 PROCEDURE — 99999 PR PBB SHADOW E&M-EST. PATIENT-LVL IV: CPT | Mod: PBBFAC,,, | Performed by: INTERNAL MEDICINE

## 2025-06-12 PROCEDURE — 1101F PT FALLS ASSESS-DOCD LE1/YR: CPT | Mod: CPTII,S$GLB,, | Performed by: INTERNAL MEDICINE

## 2025-06-12 PROCEDURE — 1159F MED LIST DOCD IN RCRD: CPT | Mod: CPTII,S$GLB,, | Performed by: INTERNAL MEDICINE

## 2025-06-12 PROCEDURE — 3044F HG A1C LEVEL LT 7.0%: CPT | Mod: CPTII,S$GLB,, | Performed by: INTERNAL MEDICINE

## 2025-06-12 PROCEDURE — 3288F FALL RISK ASSESSMENT DOCD: CPT | Mod: CPTII,S$GLB,, | Performed by: INTERNAL MEDICINE

## 2025-06-12 PROCEDURE — 3008F BODY MASS INDEX DOCD: CPT | Mod: CPTII,S$GLB,, | Performed by: INTERNAL MEDICINE

## 2025-06-12 PROCEDURE — 3075F SYST BP GE 130 - 139MM HG: CPT | Mod: CPTII,S$GLB,, | Performed by: INTERNAL MEDICINE

## 2025-06-12 PROCEDURE — 99214 OFFICE O/P EST MOD 30 MIN: CPT | Mod: S$GLB,,, | Performed by: INTERNAL MEDICINE

## 2025-06-12 PROCEDURE — 1126F AMNT PAIN NOTED NONE PRSNT: CPT | Mod: CPTII,S$GLB,, | Performed by: INTERNAL MEDICINE

## 2025-06-12 PROCEDURE — G2211 COMPLEX E/M VISIT ADD ON: HCPCS | Mod: S$GLB,,, | Performed by: INTERNAL MEDICINE

## 2025-06-24 DIAGNOSIS — I81 PORTAL VEIN THROMBOSIS: ICD-10-CM

## 2025-06-24 RX ORDER — APIXABAN 5 MG/1
5 TABLET, FILM COATED ORAL 2 TIMES DAILY
Qty: 180 TABLET | Refills: 0 | Status: SHIPPED | OUTPATIENT
Start: 2025-06-24

## 2025-07-07 ENCOUNTER — OFFICE VISIT (OUTPATIENT)
Dept: FAMILY MEDICINE | Facility: CLINIC | Age: 70
End: 2025-07-07
Payer: MEDICARE

## 2025-07-07 VITALS
HEIGHT: 62 IN | SYSTOLIC BLOOD PRESSURE: 120 MMHG | DIASTOLIC BLOOD PRESSURE: 70 MMHG | RESPIRATION RATE: 18 BRPM | HEART RATE: 88 BPM | BODY MASS INDEX: 41.59 KG/M2 | TEMPERATURE: 98 F | WEIGHT: 226 LBS | OXYGEN SATURATION: 97 %

## 2025-07-07 DIAGNOSIS — R91.8 PULMONARY NODULES: ICD-10-CM

## 2025-07-07 DIAGNOSIS — E66.813 CLASS 3 SEVERE OBESITY DUE TO EXCESS CALORIES WITH SERIOUS COMORBIDITY AND BODY MASS INDEX (BMI) OF 40.0 TO 44.9 IN ADULT: ICD-10-CM

## 2025-07-07 DIAGNOSIS — L84 CALLUS OF FOOT: ICD-10-CM

## 2025-07-07 DIAGNOSIS — F33.42 RECURRENT MAJOR DEPRESSIVE DISORDER, IN FULL REMISSION: ICD-10-CM

## 2025-07-07 DIAGNOSIS — E11.69 HYPERLIPIDEMIA ASSOCIATED WITH TYPE 2 DIABETES MELLITUS: ICD-10-CM

## 2025-07-07 DIAGNOSIS — E11.42 DIABETIC POLYNEUROPATHY ASSOCIATED WITH TYPE 2 DIABETES MELLITUS: ICD-10-CM

## 2025-07-07 DIAGNOSIS — E11.65 TYPE 2 DIABETES MELLITUS WITH HYPERGLYCEMIA, WITH LONG-TERM CURRENT USE OF INSULIN: ICD-10-CM

## 2025-07-07 DIAGNOSIS — N25.81 SECONDARY HYPERPARATHYROIDISM OF RENAL ORIGIN: ICD-10-CM

## 2025-07-07 DIAGNOSIS — E55.9 VITAMIN D DEFICIENCY: ICD-10-CM

## 2025-07-07 DIAGNOSIS — K76.0 HEPATIC STEATOSIS: ICD-10-CM

## 2025-07-07 DIAGNOSIS — Z79.4 TYPE 2 DIABETES MELLITUS WITH HYPERGLYCEMIA, WITH LONG-TERM CURRENT USE OF INSULIN: ICD-10-CM

## 2025-07-07 DIAGNOSIS — Z72.0 TOBACCO USER: ICD-10-CM

## 2025-07-07 DIAGNOSIS — E11.22 CKD STAGE 3 DUE TO TYPE 2 DIABETES MELLITUS: ICD-10-CM

## 2025-07-07 DIAGNOSIS — E78.5 HYPERLIPIDEMIA ASSOCIATED WITH TYPE 2 DIABETES MELLITUS: ICD-10-CM

## 2025-07-07 DIAGNOSIS — N18.30 CKD STAGE 3 DUE TO TYPE 2 DIABETES MELLITUS: ICD-10-CM

## 2025-07-07 DIAGNOSIS — Z00.00 WELLNESS EXAMINATION: Primary | ICD-10-CM

## 2025-07-07 DIAGNOSIS — I81 PORTAL VEIN THROMBOSIS: ICD-10-CM

## 2025-07-07 DIAGNOSIS — E66.01 CLASS 3 SEVERE OBESITY DUE TO EXCESS CALORIES WITH SERIOUS COMORBIDITY AND BODY MASS INDEX (BMI) OF 40.0 TO 44.9 IN ADULT: ICD-10-CM

## 2025-07-07 DIAGNOSIS — D69.6 THROMBOCYTOPENIA: ICD-10-CM

## 2025-07-07 DIAGNOSIS — Z12.31 ENCOUNTER FOR SCREENING MAMMOGRAM FOR MALIGNANT NEOPLASM OF BREAST: ICD-10-CM

## 2025-07-07 DIAGNOSIS — C18.2 MALIGNANT NEOPLASM OF ASCENDING COLON: ICD-10-CM

## 2025-07-07 PROBLEM — D50.9 MICROCYTIC ANEMIA: Status: RESOLVED | Noted: 2023-06-19 | Resolved: 2025-07-07

## 2025-07-07 PROBLEM — D47.2 GAMMOPATHY: Status: RESOLVED | Noted: 2023-02-02 | Resolved: 2025-07-07

## 2025-07-07 PROCEDURE — G0439 PPPS, SUBSEQ VISIT: HCPCS | Mod: ,,, | Performed by: NURSE PRACTITIONER

## 2025-07-07 PROCEDURE — 3066F NEPHROPATHY DOC TX: CPT | Mod: CPTII,,, | Performed by: NURSE PRACTITIONER

## 2025-07-07 PROCEDURE — 1126F AMNT PAIN NOTED NONE PRSNT: CPT | Mod: CPTII,,, | Performed by: NURSE PRACTITIONER

## 2025-07-07 PROCEDURE — 3074F SYST BP LT 130 MM HG: CPT | Mod: CPTII,,, | Performed by: NURSE PRACTITIONER

## 2025-07-07 PROCEDURE — 3078F DIAST BP <80 MM HG: CPT | Mod: CPTII,,, | Performed by: NURSE PRACTITIONER

## 2025-07-07 PROCEDURE — 3044F HG A1C LEVEL LT 7.0%: CPT | Mod: CPTII,,, | Performed by: NURSE PRACTITIONER

## 2025-07-07 PROCEDURE — 1158F ADVNC CARE PLAN TLK DOCD: CPT | Mod: CPTII,,, | Performed by: NURSE PRACTITIONER

## 2025-07-07 PROCEDURE — 1160F RVW MEDS BY RX/DR IN RCRD: CPT | Mod: CPTII,,, | Performed by: NURSE PRACTITIONER

## 2025-07-07 PROCEDURE — 1159F MED LIST DOCD IN RCRD: CPT | Mod: CPTII,,, | Performed by: NURSE PRACTITIONER

## 2025-07-07 NOTE — PROGRESS NOTES
Family Medicine      Patient ID: [id number]     Chief Complaint: Medicare Annual Wellness     HPI:     Indu Azul is a 69 y.o. female here today for a Medicare Annual Wellness visit and comprehensive Health Risk Assessment.     DM:  CBG'S 150 at home.  She has log a total of 28 pounds since starting Mounjaro.  She would like to increase dosage.      Health Maintenance         Date Due Completion Date    Diabetes Urine Screening 06/15/2024 6/15/2023    LDCT Lung Screen 09/11/2024 9/11/2023    Lipid Panel 06/21/2025 6/21/2024    Diabetic Eye Exam 06/28/2025 6/28/2024    Mammogram 08/12/2025 8/12/2024    RSV Vaccine (Age 60+ and Pregnant patients) (1 - Risk 60-74 years 1-dose series) 07/07/2025 (Originally 10/16/2016) ---    TETANUS VACCINE 07/07/2026 (Originally 10/16/1974) ---    Shingles Vaccine (1 of 2) 07/07/2026 (Originally 10/16/1975) ---    COVID-19 Vaccine (1) 07/07/2026 (Originally [birth year]16/1960) ---    Pneumococcal Vaccines (Age 50+) (2 of 2 - PCV) 07/07/2026 (Originally 11/30/2017) 11/30/2016    Influenza Vaccine (1) 09/01/2025 4/10/2025 (Declined)    Override on 4/10/2025: Declined    Hemoglobin A1c 10/09/2025 4/9/2025    Foot Exam 07/07/2026 7/7/2025 (Done)    Override on 7/7/2025: Done    DEXA Scan 07/31/2028 7/31/2023    Colorectal Cancer Screening 04/20/2034 4/20/2024        Labs up to date (Lipid and Microalbumin ordered)  Declines all vaccines  DM eye exam due:  Will call Ophthalmologist for appt. (Today's eye care)_    Past Medical History:   Diagnosis Date    Anesthesia complication     trouble awakening    Charcot's joint of foot, left     Chronic kidney disease, unspecified     Cirrhosis of liver without ascites     Depression     Diabetes mellitus, type II, insulin dependent     Diabetic neuropathy     Gammopathy 02/02/2023    GERD (gastroesophageal reflux disease)     H/O: hysterectomy     Hepatic steatosis     Malignant neoplasm of ascending colon     Obesity, unspecified     Overactive  bladder     Portal vein thrombosis     Thrombocytopenia, unspecified     Vitamin D deficiency         Past Surgical History:   Procedure Laterality Date    APPENDECTOMY      BREAST BIOPSY       SECTION  05/13/88    x3    charcot-leyden crystals identification      CHOLECYSTECTOMY      COLON SURGERY  1923    COLONOSCOPY N/A 2023    Procedure: COLON;  Surgeon: Luis Amador MD;  Location: [facility] ENDOSCOPY;  Service: Gastroenterology;  Laterality: N/A;    COLONOSCOPY N/A 2024    Procedure: COLON;  Surgeon: Mor Porter MD;  Location: [facility] OR;  Service: Gastroenterology;  Laterality: N/A;    COLONOSCOPY, WITH 1 OR MORE BIOPSIES  2023    Procedure: COLONOSCOPY, WITH 1 OR MORE BIOPSIES;  Surgeon: Luis Amador MD;  Location: [facility] ENDOSCOPY;  Service: Gastroenterology;;    COLONOSCOPY, WITH 1 OR MORE BIOPSIES N/A 2024    Procedure: COLONOSCOPY, WITH 1 OR MORE BIOPSIES;  Surgeon: Mor Porter MD;  Location: [facility];  Service: Gastroenterology;  Laterality: N/A;    COLONOSCOPY, WITH DIRECTED SUBMUCOSAL INJECTION  2023    Procedure: COLONOSCOPY, WITH DIRECTED SUBMUCOSAL INJECTION;  Surgeon: Luis Amador MD;  Location: [facility] ENDOSCOPY;  Service: Gastroenterology;;  8cc Colon Tattoo    COLONOSCOPY, WITH POLYPECTOMY USING SNARE  2023    Procedure: COLONOSCOPY, WITH POLYPECTOMY USING SNARE;  Surgeon: Luis Amador MD;  Location: [facility] ENDOSCOPY;  Service: Gastroenterology;;    ESOPHAGOGASTRODUODENOSCOPY N/A 2023    Procedure: DOUBLE;  Surgeon: Luis Amador MD;  Location: [facility] ENDOSCOPY;  Service: Gastroenterology;  Laterality: N/A;    ESOPHAGOGASTRODUODENOSCOPY N/A 2024    Procedure: EGD;  Surgeon: Nolan Massey MD;  Location: [facility] ENDOSCOPY;  Service: Gastroenterology;  Laterality: N/A;    HEMORRHOID SURGERY      HYSTERECTOMY      total    INSERTION OF SUBCUTANEOUS PORT  Right     INSERTION OF TUNNELED CENTRAL VENOUS CATHETER (CVC) WITH SUBCUTANEOUS PORT Right 11/20/2023    Procedure: [id number];  Surgeon: Timothy Russ MD;  Location: [facility] OR;  Service: General;  Laterality: Right;    POLYPECTOMY      right foot surgery Right 02/01/2022    SMALL INTESTINE SURGERY  10/18/23    XI ROBOTIC COLECTOMY, RIGHT N/A 10/19/2023    Procedure: XI ROBOTIC COLECTOMY, RIGHT;  Surgeon: Timothy Russ MD;  Location: [facility] OR;  Service: General;  Laterality: N/A;        Social History     Socioeconomic History    Marital status:    Occupational History    Occupation: retired   Tobacco Use    Smoking status: Every Day     Current packs/day: 0.50     Average packs/day: 0.5 packs/day for 62.5 years (31.3 ttl pk-yrs)     Types: Cigarettes     Start date: 1/1/2003     Passive exposure: Current    Smokeless tobacco: Never   Substance and Sexual Activity    Alcohol use: Not Currently    Drug use: Never    Sexual activity: Not Currently     Partners: Male     Birth control/protection: None     Social Drivers of Health     Financial Resource Strain: Low Risk  (4/17/2025)    Overall Financial Resource Strain (CARDIA)     Difficulty of Paying Living Expenses: Not very hard   Food Insecurity: No Food Insecurity (4/17/2025)    Hunger Vital Sign     Worried About Running Out of Food in the Last Year: Never true     Ran Out of Food in the Last Year: Never true   Transportation Needs: No Transportation Needs (4/17/2025)    PRAPARE - Transportation     Lack of Transportation (Medical): No     Lack of Transportation (Non-Medical): No   Physical Activity: Inactive (4/17/2025)    Exercise Vital Sign     Days of Exercise per Week: 0 days     Minutes of Exercise per Session: 0 min   Stress: No Stress Concern Present (4/17/2025)    [employer] [place] [employer] - Occupational Stress Questionnaire     Feeling of Stress : Not at all   Housing Stability: Low Risk  (4/17/2025)    Housing  Stability Vital Sign     Unable to Pay for Housing in the Last Year: No     Number of Times Moved in the Last Year: 0     Homeless in the Last Year: No        Family History   Problem Relation Name Age of Onset    Diabetes Mother Estela Azul     Alzheimer's disease Mother Estela Azul     Diabetes Father Malcom Azul     Leukemia Father Malcom Azul     Cancer Father Malcom Azul     Diabetes Sister Maria L Edwards     Liver cancer Sister Maria L Azul Grace     Diabetes Brother Chris Azul         Current Outpatient Medications   Medication Instructions    ELIQUIS 5 mg, Oral, 2 times daily    folic acid-vit B6-vit B12 0.8- mg-mg-mcg Tab 1 tablet, Oral, Daily    furosemide (LASIX) 10 mg, Oral, Daily    gabapentin (NEURONTIN) 600 mg, Oral, Nightly    sertraline (ZOLOFT) 50 MG tablet Take 1 tablet by mouth once daily    solifenacin (VESICARE) 10 mg, Oral, Daily    tirzepatide 7.5 mg, Subcutaneous, Every 7 days       Review of patient's allergies indicates:  No Known Allergies     Immunization History   Administered Date(s) Administered    Influenza 11/21/2012    Influenza (FLUAD) - Quadrivalent - Adjuvanted - PF *Preferred* (65+) 01/11/2023    Influenza - Quadrivalent 11/30/2016, 09/28/2020    Influenza - Quadrivalent - High Dose - PF (65 years and older) 12/21/2021    Influenza - Quadrivalent - PF *Preferred* (6 months and older) 11/30/2016    Influenza - Trivalent - Afluria, Fluzone MDV 11/01/2021, 11/02/2022    Pneumococcal Polysaccharide - 23 Valent 11/30/2016        Patient Care Team:  Alejandra Pinedo MD as PCP - General (Internal Medicine)  Nolan Russ MD as Consulting Physician (Gastroenterology)  Trinity Shay, RN as Diabetes Educator (Diabetes)  Daya Weir MD as Consulting Physician (Nephrology)  Bri Fernandes, ACNP as Nurse Practitioner (Nephrology)  Dez Burdick FNP as Nurse Practitioner (Nephrology)  Vanessa Keller DO as Consulting Physician  "(Nephrology)    Subjective:     Review of Systems   Constitutional: Negative.    HENT: Negative.     Eyes: Negative.    Respiratory: Negative.     Cardiovascular: Negative.    Gastrointestinal: Negative.    Endocrine: Negative.    Genitourinary: Negative.    Musculoskeletal: Negative.    Skin: Negative.    Neurological: Negative.    Psychiatric/Behavioral: Negative.     All other systems reviewed and are negative.      12 point review of systems conducted, negative except as stated in the history of present illness. See HPI for details.    Objective:     Visit Vitals  /70   Pulse 88   Temp 97.5 °F (36.4 °C) (Oral)   Resp 18   Ht 5' 2" (1.575 m)   Wt 102.5 kg (226 lb)   SpO2 97%   BMI 41.34 kg/m²       Physical Exam  Vitals reviewed.   Constitutional:       Appearance: Normal appearance.   HENT:      Head: Normocephalic.      Mouth/Throat:      Mouth: Mucous membranes are moist.   Eyes:      Conjunctiva/sclera: Conjunctivae normal.      Pupils: Pupils are equal, round, and reactive to light.   Cardiovascular:      Rate and Rhythm: Normal rate and regular rhythm.   Pulmonary:      Effort: Pulmonary effort is normal. No respiratory distress.      Breath sounds: Normal breath sounds.   Musculoskeletal:         General: Normal range of motion.      Cervical back: Normal range of motion and neck supple.   Feet:      Comments: Diabetic foot exam:   Protective Sensation (w/ 10 gram monofilament):  Right: Absent  Left: Absent     Visual Inspection:  Onychomycosis -  Bilateral    + callus noted right 2nd toe     Pedal Pulses:   Right: Present  Left: Present     Posterior Tibialis Pulses:   Right:Present  Left: Present      Skin:     General: Skin is warm and dry.   Neurological:      Mental Status: She is alert and oriented to person, place, and time.   Psychiatric:         Mood and Affect: Mood normal.         Assessment:       ICD-10-CM ICD-9-CM   1. Wellness examination  Z00.00 V70.0   2. Type 2 diabetes mellitus " with hyperglycemia, with long-term current use of insulin  E11.65 250.00    Z79.4 790.29     V58.67   3. Hyperlipidemia associated with type 2 diabetes mellitus  E11.69 250.80    E78.5 272.4   4. CKD stage 3 due to type 2 diabetes mellitus  E11.22 250.40    N18.30 585.3   5. Recurrent major depressive disorder, in full remission  F33.42 296.36   6. Diabetic polyneuropathy associated with type 2 diabetes mellitus  E11.42 250.60     357.2   7. Malignant neoplasm of ascending colon  C18.2 153.6   8. Vitamin D deficiency  E55.9 268.9   9. Secondary hyperparathyroidism of renal origin  N25.81 588.81   10. Class 3 severe obesity due to excess calories with serious comorbidity and body mass index (BMI) of 40.0 to 44.9 in adult  E66.813 278.01    E66.01 V85.41    Z68.41    11. Hepatic steatosis  K76.0 571.8   12. Encounter for screening mammogram for malignant neoplasm of breast  Z12.31 V76.12   13. Callus of foot  L84 700   14. Tobacco user  Z72.0 305.1   15. Pulmonary nodules  R91.8 793.19   16. Thrombocytopenia  D69.6 287.5   17. Portal vein thrombosis  I81 452        Plan:     1. Wellness examination  Assessment & Plan:  Labs up to date (Lipid and Microalbumin ordered)  Declines all vaccines  DM eye exam due:  Will call Ophthalmologist for appt. (Today's eye care)_      2. Type 2 diabetes mellitus with hyperglycemia, with long-term current use of insulin  Assessment & Plan:  Hemoglobin A1c   Date Value Ref Range Status   04/09/2025 6.8 <=7.0 % Final   06/21/2024 5.8 <=7.0 % Final   03/18/2024 7.9 (H) <=7.0 % Final     DM well controlled  Continue current meds  Increase Mounjaro to 7.5 mg weekly  CBGs daily  - Keep appt with PCP for follow-up      Orders:  -     Lipid Panel; Future; Expected date: 07/07/2025  -     Microalbumin/Creatinine Ratio, Urine  -     tirzepatide 7.5 mg/0.5 mL PnIj; Inject 7.5 mg into the skin every 7 days.  Dispense: 4 Pen; Refill: 3    3. Hyperlipidemia associated with type 2 diabetes  mellitus  Assessment & Plan:  Stable  Continue Statin  Keep appt with PCP for follow up        4. CKD stage 3 due to type 2 diabetes mellitus  Assessment & Plan:  Stable  Keep appt with Nephrology for follow up        5. Recurrent major depressive disorder, in full remission  Assessment & Plan:  Stable  Continue Sertraline as prescribed  - Keep appt with PCP for follow-up        6. Diabetic polyneuropathy associated with type 2 diabetes mellitus  Assessment & Plan:  Stable  Continue Gabapentin as prescribed  - Keep appt with PCP for follow-up        7. Malignant neoplasm of ascending colon  Assessment & Plan:  Stable  Keep appt with Oncology for follow up        8. Vitamin D deficiency  Assessment & Plan:  Stable  Keep appt with Nephrology for follow up        9. Secondary hyperparathyroidism of renal origin  Assessment & Plan:  Stable  Keep appt with Nephrology for follow up        10. Class 3 severe obesity due to excess calories with serious comorbidity and body mass index (BMI) of 40.0 to 44.9 in adult  Assessment & Plan:  Stable  Healthy eating habits discussed  Exercise as tolerated  Keep appt with PCP for follow up        11. Hepatic steatosis  Assessment & Plan:  Stable  Keep appt with GI for follow up        12. Encounter for screening mammogram for malignant neoplasm of breast  -     Mammo Digital Screening Bilat w/ Sharad (XPD); Future; Expected date: 07/07/2025    13. Callus of foot  Assessment & Plan:  Referral placed to Podiatry  - Keep appt with PCP for follow-up      Orders:  -     Ambulatory referral/consult to Podiatry; Future; Expected date: 07/14/2025    14. Tobacco user    15. Pulmonary nodules  Assessment & Plan:  Stable   Has CT chest/abd/pelvis ordered per Oncology  - Keep appt with PCP for follow-up        16. Thrombocytopenia  Assessment & Plan:  Stable  Keep appt with Oncology for follow up        17. Portal vein thrombosis  Assessment & Plan:  Stable  Continue Eliquis as prescribed  Keep  appt with Oncology for follow up             A comprehensive HEALTH RISK ASSESSMENT was completed today. Results are summarized below:    There are NO EMOTIONAL/SOCIAL CONCERNS identified on today's screening for Social Isolation, Depression and Anxiety.    There are NO COGNITIVE FUNCTION CONCERNS identified on today's screening.  The following FUNCTIONAL AND/OR SAFETY CONCERNS were identified on today's screening for Physical Symptoms, Nutritional, Home Safety/Living Situation, Fall Risk, Activities of Daily Living, Independent Activities of Daily Living, Physical Activity,Timed Up and Go test and Whisper test::   *Patient reports she NEEDS ASSISTANCE WITH SOME INDEPENDENT ACTIVITIES OF DAILY LIVING. (Do you need help with phone, transportation, shopping, preparing meals, housework, laundry, meds, or managing money?: (!) Yes (Transportation))  *Patient reports she NEEDS AMBULATION ASSISTANCE. (Do you use an assistance device to easily get around?: (!) Yes)(Ambulation Assistance: Walker (wheeled))     The patient reports NO OPIOID PRESCRIPTIONS. This was confirmed through medication reconciliation.    The patient is A CURRENT TOBACCO USER.  Tobacco Use: High Risk (7/7/2025)    Patient History     Smoking Tobacco Use: Every Day     Smokeless Tobacco Use: Never     Passive Exposure: Current     The patient reports NO SIGNIFICANT ALCOHOL USE.     All Questions regarding food, transportation or housing were not answered today.    The patient was asked and declined the use of a free .    Advance Care Planning   Today we discussed advance care planning. She is interested in learning more about how to make Advance Directives. Information was provided and I offered to discuss more at her discretion.         Provided patient with a 5-10 year written screening schedule and personal prevention plan. Recommendations were developed using the USPSTF age appropriate recommendations. Education, counseling, and referrals  were provided as needed. After Visit Summary printed and given to patient, which includes a list of additional screenings\tests needed.    Follow up in about 3 months (around 10/7/2025), or if symptoms worsen or fail to improve, for F/U with PCP. In addition to their scheduled follow up, the patient has also been instructed to follow up on as needed basis.     Future Appointments   Date Time Provider Department Center   9/10/2025  2:20 PM Dez Burdick, FNP [facility] NEPH [facility]   10/28/2025 10:45 AM Alejandra Pinedo MD [facility]OB[facility] M [place]   12/10/2025 11:10 AM LAB, [facility] LAB [facility]   12/11/2025  9:15 AM [facility] CT1  [address] LB LIMIT [facility] CTSCN [facility]   12/18/2025 10:40 AM [facility]emeh Olmstead [facility]        Maggy Jaquez NP       Advance Care Planning   Today we discussed advance care planning. She is interested in learning more about how to make Advance Directives. Information was provided and I offered to discuss more at her discretion.     Advance Care Planning   Indu Azul has Advance Directives on file, which we reviewed, and does not wish to make changes at this time.

## 2025-07-07 NOTE — ASSESSMENT & PLAN NOTE
Stable  Healthy eating habits discussed  Exercise as tolerated  Keep appt with PCP for follow up

## 2025-07-07 NOTE — ASSESSMENT & PLAN NOTE
Labs up to date (Lipid and Microalbumin ordered)  Declines all vaccines  DM eye exam due:  Will call Ophthalmologist for appt. (Today's eye care)_

## 2025-07-07 NOTE — ASSESSMENT & PLAN NOTE
Hemoglobin A1c   Date Value Ref Range Status   04/09/2025 6.8 <=7.0 % Final   06/21/2024 5.8 <=7.0 % Final   03/18/2024 7.9 (H) <=7.0 % Final     DM well controlled  Continue current meds  Increase Mounjaro to 7.5 mg weekly  CBGs daily  - Keep appt with PCP for follow-up

## 2025-07-31 DIAGNOSIS — E11.42 DIABETIC POLYNEUROPATHY ASSOCIATED WITH TYPE 2 DIABETES MELLITUS: ICD-10-CM

## 2025-07-31 RX ORDER — GABAPENTIN 300 MG/1
600 CAPSULE ORAL NIGHTLY
Qty: 60 CAPSULE | Refills: 0 | Status: SHIPPED | OUTPATIENT
Start: 2025-07-31

## 2025-08-01 ENCOUNTER — DOCUMENTATION ONLY (OUTPATIENT)
Dept: FAMILY MEDICINE | Facility: CLINIC | Age: 70
End: 2025-08-01
Payer: MEDICARE

## 2025-08-01 LAB
CRC RECOMMENDATION EXT: NORMAL
CRC RECOMMENDATION EXT: NORMAL

## 2025-08-18 ENCOUNTER — OFFICE VISIT (OUTPATIENT)
Dept: HEMATOLOGY/ONCOLOGY | Facility: CLINIC | Age: 70
End: 2025-08-18
Payer: MEDICARE

## 2025-08-18 VITALS
SYSTOLIC BLOOD PRESSURE: 143 MMHG | DIASTOLIC BLOOD PRESSURE: 84 MMHG | OXYGEN SATURATION: 95 % | WEIGHT: 224.88 LBS | HEART RATE: 76 BPM | BODY MASS INDEX: 41.38 KG/M2 | TEMPERATURE: 98 F | RESPIRATION RATE: 18 BRPM | HEIGHT: 62 IN

## 2025-08-18 DIAGNOSIS — C15.9 PRIMARY ESOPHAGEAL SQUAMOUS CELL CARCINOMA: Primary | ICD-10-CM

## 2025-08-18 DIAGNOSIS — C18.2 MALIGNANT NEOPLASM OF ASCENDING COLON: ICD-10-CM

## 2025-08-18 DIAGNOSIS — E53.8 FOLATE DEFICIENCY: ICD-10-CM

## 2025-08-18 DIAGNOSIS — Z92.21 HISTORY OF CHEMOTHERAPY: ICD-10-CM

## 2025-08-18 DIAGNOSIS — K74.69 OTHER CIRRHOSIS OF LIVER: ICD-10-CM

## 2025-08-18 DIAGNOSIS — D69.6 THROMBOCYTOPENIA: ICD-10-CM

## 2025-08-18 DIAGNOSIS — R16.1 SPLENOMEGALY: ICD-10-CM

## 2025-08-18 LAB
ALBUMIN SERPL-MCNC: 3.7 G/DL (ref 3.4–4.8)
ALBUMIN/GLOB SERPL: 1 RATIO (ref 1.1–2)
ALP SERPL-CCNC: 81 UNIT/L (ref 40–150)
ALT SERPL-CCNC: 13 UNIT/L (ref 0–55)
ANION GAP SERPL CALC-SCNC: 9 MEQ/L
AST SERPL-CCNC: 18 UNIT/L (ref 11–45)
BASOPHILS # BLD AUTO: 0.05 X10(3)/MCL
BASOPHILS NFR BLD AUTO: 1 %
BILIRUB SERPL-MCNC: 0.4 MG/DL
BUN SERPL-MCNC: 17.4 MG/DL (ref 9.8–20.1)
CALCIUM SERPL-MCNC: 9.4 MG/DL (ref 8.4–10.2)
CEA SERPL-MCNC: 2.57 NG/ML (ref 0–3)
CHLORIDE SERPL-SCNC: 102 MMOL/L (ref 98–107)
CO2 SERPL-SCNC: 23 MMOL/L (ref 23–31)
CREAT SERPL-MCNC: 1.32 MG/DL (ref 0.55–1.02)
CREAT/UREA NIT SERPL: 13
EOSINOPHIL # BLD AUTO: 0.23 X10(3)/MCL (ref 0–0.9)
EOSINOPHIL NFR BLD AUTO: 4.6 %
ERYTHROCYTE [DISTWIDTH] IN BLOOD BY AUTOMATED COUNT: 14.8 % (ref 11.5–17)
GFR SERPLBLD CREATININE-BSD FMLA CKD-EPI: 44 ML/MIN/1.73/M2
GLOBULIN SER-MCNC: 3.7 GM/DL (ref 2.4–3.5)
GLUCOSE SERPL-MCNC: 122 MG/DL (ref 82–115)
HCT VFR BLD AUTO: 39.4 % (ref 37–47)
HGB BLD-MCNC: 13.2 G/DL (ref 12–16)
IMM GRANULOCYTES # BLD AUTO: 0.01 X10(3)/MCL (ref 0–0.04)
IMM GRANULOCYTES NFR BLD AUTO: 0.2 %
LYMPHOCYTES # BLD AUTO: 1.02 X10(3)/MCL (ref 0.6–4.6)
LYMPHOCYTES NFR BLD AUTO: 20.4 %
MCH RBC QN AUTO: 27 PG (ref 27–31)
MCHC RBC AUTO-ENTMCNC: 33.5 G/DL (ref 33–36)
MCV RBC AUTO: 80.7 FL (ref 80–94)
MONOCYTES # BLD AUTO: 0.29 X10(3)/MCL (ref 0.1–1.3)
MONOCYTES NFR BLD AUTO: 5.8 %
NEUTROPHILS # BLD AUTO: 3.4 X10(3)/MCL (ref 2.1–9.2)
NEUTROPHILS NFR BLD AUTO: 68 %
PLATELET # BLD AUTO: 93 X10(3)/MCL (ref 130–400)
PMV BLD AUTO: 10.9 FL (ref 7.4–10.4)
POTASSIUM SERPL-SCNC: 4.7 MMOL/L (ref 3.5–5.1)
PROT SERPL-MCNC: 7.4 GM/DL (ref 5.8–7.6)
RBC # BLD AUTO: 4.88 X10(6)/MCL (ref 4.2–5.4)
SODIUM SERPL-SCNC: 134 MMOL/L (ref 136–145)
WBC # BLD AUTO: 5 X10(3)/MCL (ref 4.5–11.5)

## 2025-08-18 PROCEDURE — 85025 COMPLETE CBC W/AUTO DIFF WBC: CPT | Performed by: INTERNAL MEDICINE

## 2025-08-18 PROCEDURE — 99999 PR PBB SHADOW E&M-EST. PATIENT-LVL IV: CPT | Mod: PBBFAC,,, | Performed by: INTERNAL MEDICINE

## 2025-08-18 PROCEDURE — 99215 OFFICE O/P EST HI 40 MIN: CPT | Mod: S$GLB,,, | Performed by: INTERNAL MEDICINE

## 2025-08-18 PROCEDURE — 1159F MED LIST DOCD IN RCRD: CPT | Mod: CPTII,S$GLB,, | Performed by: INTERNAL MEDICINE

## 2025-08-18 PROCEDURE — 3044F HG A1C LEVEL LT 7.0%: CPT | Mod: CPTII,S$GLB,, | Performed by: INTERNAL MEDICINE

## 2025-08-18 PROCEDURE — 3079F DIAST BP 80-89 MM HG: CPT | Mod: CPTII,S$GLB,, | Performed by: INTERNAL MEDICINE

## 2025-08-18 PROCEDURE — 3288F FALL RISK ASSESSMENT DOCD: CPT | Mod: CPTII,S$GLB,, | Performed by: INTERNAL MEDICINE

## 2025-08-18 PROCEDURE — G2211 COMPLEX E/M VISIT ADD ON: HCPCS | Mod: S$GLB,,, | Performed by: INTERNAL MEDICINE

## 2025-08-18 PROCEDURE — 1160F RVW MEDS BY RX/DR IN RCRD: CPT | Mod: CPTII,S$GLB,, | Performed by: INTERNAL MEDICINE

## 2025-08-18 PROCEDURE — 3008F BODY MASS INDEX DOCD: CPT | Mod: CPTII,S$GLB,, | Performed by: INTERNAL MEDICINE

## 2025-08-18 PROCEDURE — 36415 COLL VENOUS BLD VENIPUNCTURE: CPT | Performed by: INTERNAL MEDICINE

## 2025-08-18 PROCEDURE — 3066F NEPHROPATHY DOC TX: CPT | Mod: CPTII,S$GLB,, | Performed by: INTERNAL MEDICINE

## 2025-08-18 PROCEDURE — 1126F AMNT PAIN NOTED NONE PRSNT: CPT | Mod: CPTII,S$GLB,, | Performed by: INTERNAL MEDICINE

## 2025-08-18 PROCEDURE — 3077F SYST BP >= 140 MM HG: CPT | Mod: CPTII,S$GLB,, | Performed by: INTERNAL MEDICINE

## 2025-08-18 PROCEDURE — 1101F PT FALLS ASSESS-DOCD LE1/YR: CPT | Mod: CPTII,S$GLB,, | Performed by: INTERNAL MEDICINE

## 2025-08-18 PROCEDURE — 82378 CARCINOEMBRYONIC ANTIGEN: CPT | Performed by: INTERNAL MEDICINE

## 2025-08-18 PROCEDURE — 80053 COMPREHEN METABOLIC PANEL: CPT | Performed by: INTERNAL MEDICINE

## 2025-08-25 DIAGNOSIS — E11.42 DIABETIC POLYNEUROPATHY ASSOCIATED WITH TYPE 2 DIABETES MELLITUS: ICD-10-CM

## 2025-08-25 RX ORDER — GABAPENTIN 300 MG/1
600 CAPSULE ORAL NIGHTLY
Qty: 60 CAPSULE | Refills: 0 | Status: SHIPPED | OUTPATIENT
Start: 2025-08-25

## (undated) DEVICE — SUPPORT ULNA NERVE PROTECTOR

## (undated) DEVICE — SEALER VESSEL EXTEND

## (undated) DEVICE — SNARE CAPTIFLEX 2.4X27MM 240CM

## (undated) DEVICE — CONTAINER SPECIMEN SCREW 4OZ

## (undated) DEVICE — OBTURATOR BLADELESS 8MM XI CLR

## (undated) DEVICE — SUT VLOC 90 3-0 V-20 NDL 6

## (undated) DEVICE — KIT GEN LAPAROSCOPY LAFAYETTE

## (undated) DEVICE — SHEARS HARMONIC CRVD TIP 36CM

## (undated) DEVICE — GLOVE PROTEXIS HYDROGEL SZ6.5

## (undated) DEVICE — SYR ONLY LUER LOCK 20CC

## (undated) DEVICE — SET TRI-LUMEN FILTERED TUBE

## (undated) DEVICE — SUT VICRYL 3-0 27 SH

## (undated) DEVICE — COVER PROBE US 5.5X58L NON LTX

## (undated) DEVICE — DRAPE COLUMN DAVINCI XI

## (undated) DEVICE — DRESSING TELFA N ADH 3X8IN

## (undated) DEVICE — BAG LABGUARD BIOHAZARD 6X9IN

## (undated) DEVICE — SOL IRRI STRL WATER 1000ML

## (undated) DEVICE — NDL PERC ENTRY BSDN 18-7.0

## (undated) DEVICE — SCISSOR CURVED ENDOPATH 5MM

## (undated) DEVICE — TOWEL OR BLUE STRL 16X26 8/PK

## (undated) DEVICE — NDL HYPO REG 25G X 1 1/2

## (undated) DEVICE — TUBING O2 FEMALE CONN 13FT

## (undated) DEVICE — CONTAINER SPEC STRL PATH 4OZ

## (undated) DEVICE — SUT PDS PLUS MONO 1 CT 36IN

## (undated) DEVICE — IRRIGATOR SUCTION W/TIP

## (undated) DEVICE — TIP SUCTION YANKAUER

## (undated) DEVICE — BAG INZII TISS RETRV 12/15MM

## (undated) DEVICE — KIT CANIST SUCTION 1200CC

## (undated) DEVICE — ADHESIVE DERMABOND ADVANCED

## (undated) DEVICE — CANNULA REDUCER 12-8MM

## (undated) DEVICE — GELPOINT MINI KIT ENDOSCOPIC

## (undated) DEVICE — COLLECTION SPECIMEN NEPTUNE

## (undated) DEVICE — ADAPTER DUAL NSL LUER M-M 7FT

## (undated) DEVICE — MARKER ENDOSCOPIC SPOT EX

## (undated) DEVICE — ELECTRODE REM PLYHSV RETURN 9

## (undated) DEVICE — GAUZE SPONGE 4X4 12PLY

## (undated) DEVICE — PORT ACCESS 8MM W/120MM LOW

## (undated) DEVICE — GLOVE PROTEXIS HYDROGEL SZ7

## (undated) DEVICE — FORCEP BX LG CAP 2.8MMX240CM

## (undated) DEVICE — CANNULA SEAL 12MM

## (undated) DEVICE — BLOCK BLOX BITE DENT RIM 54FR

## (undated) DEVICE — KIT SURGICAL TURNOVER

## (undated) DEVICE — COVER C-ARM STRAP BAND 44X80IN

## (undated) DEVICE — COVER TIP CURVED SCISSORS XI

## (undated) DEVICE — RELOAD SUREFORM 60 2.5 WHT 6R

## (undated) DEVICE — BAG MEDI-PLAST DECANTER C-FLOW

## (undated) DEVICE — CARTRIDGE BABCOCK GRASPER 5X45

## (undated) DEVICE — SYR SLIP TIP 10ML SHIELD

## (undated) DEVICE — KIT SURGICAL COLON .25 1.1OZ

## (undated) DEVICE — RELOAD SUREFORM 60 3.5 BLU 6R

## (undated) DEVICE — SUT VICRYL+ 27 UR-6 VIOL

## (undated) DEVICE — UNDERPAD PROTECT PLUS 17X24IN

## (undated) DEVICE — SUT 2/0 30IN SILK BLK BRAI

## (undated) DEVICE — BOWL UTILITY BLUE 32OZ

## (undated) DEVICE — ELECTRODE PATIENT RETURN DISP

## (undated) DEVICE — SUT MCRYL PLUS 4-0 PS2 27IN

## (undated) DEVICE — BLADE SURG STAINLESS STEEL #11

## (undated) DEVICE — SUT VICRYL PLUS 3-0 SH 18IN

## (undated) DEVICE — SPONGE LAP XRAY STERILE 18X18

## (undated) DEVICE — SOL NORMAL USPCA 0.9%

## (undated) DEVICE — GLOVE PROTEXIS BLUE LATEX 7

## (undated) DEVICE — DRESSING ADH ISLAND 3.6 X 14

## (undated) DEVICE — SOL CLEARIFY VISUALIZATION LAP

## (undated) DEVICE — TROCAR KII FIOS ZTHREAD 11X100

## (undated) DEVICE — UNDERPAD DISPOSABLE 30X30IN

## (undated) DEVICE — NDL 20GX1-1/2IN IB

## (undated) DEVICE — SEAL UNIVERSAL 5MM-8MM XI

## (undated) DEVICE — NDL SYR 10ML 18X1.5 LL BLUNT

## (undated) DEVICE — Device

## (undated) DEVICE — DRESSING TRANS 4X4 TEGADERM

## (undated) DEVICE — TRAP SUCTION POLYP

## (undated) DEVICE — COVER MAYO STAND REINFRCD 30

## (undated) DEVICE — HOLDER STRIP-T SELF ADH 2X10IN

## (undated) DEVICE — TRAY CATH FOL SIL URIMTR 16FR

## (undated) DEVICE — DRAPE ARM DAVINCI XI